# Patient Record
Sex: MALE | Race: WHITE | NOT HISPANIC OR LATINO | URBAN - METROPOLITAN AREA
[De-identification: names, ages, dates, MRNs, and addresses within clinical notes are randomized per-mention and may not be internally consistent; named-entity substitution may affect disease eponyms.]

---

## 2017-02-01 ENCOUNTER — OFFICE (OUTPATIENT)
Dept: URBAN - METROPOLITAN AREA CLINIC 19 | Facility: CLINIC | Age: 42
End: 2017-02-01

## 2017-02-01 VITALS
DIASTOLIC BLOOD PRESSURE: 84 MMHG | SYSTOLIC BLOOD PRESSURE: 127 MMHG | WEIGHT: 232 LBS | HEIGHT: 72 IN | HEART RATE: 77 BPM

## 2017-02-01 DIAGNOSIS — Z83.71 FAMILY HISTORY OF COLONIC POLYPS: ICD-10-CM

## 2017-02-01 DIAGNOSIS — K62.5 HEMORRHAGE OF ANUS AND RECTUM: ICD-10-CM

## 2017-02-01 DIAGNOSIS — E66.9 OBESITY, UNSPECIFIED: ICD-10-CM

## 2017-02-01 PROCEDURE — 99243 OFF/OP CNSLTJ NEW/EST LOW 30: CPT | Performed by: INTERNAL MEDICINE

## 2017-02-01 RX ORDER — HYDROCORTISONE ACETATE 25 MG/1
SUPPOSITORY RECTAL
Qty: 14 | Refills: 3 | Status: COMPLETED
Start: 2017-02-01 | End: 2017-06-13

## 2017-06-13 ENCOUNTER — AMBULATORY SURGICAL CENTER (OUTPATIENT)
Dept: URBAN - METROPOLITAN AREA SURGERY 2 | Facility: SURGERY | Age: 42
End: 2017-06-13
Payer: COMMERCIAL

## 2017-06-13 ENCOUNTER — AMBULATORY SURGICAL CENTER (OUTPATIENT)
Dept: URBAN - METROPOLITAN AREA SURGERY 2 | Facility: SURGERY | Age: 42
End: 2017-06-13

## 2017-06-13 VITALS
DIASTOLIC BLOOD PRESSURE: 77 MMHG | HEIGHT: 72 IN | SYSTOLIC BLOOD PRESSURE: 130 MMHG | DIASTOLIC BLOOD PRESSURE: 77 MMHG | OXYGEN SATURATION: 98 % | TEMPERATURE: 98.5 F | RESPIRATION RATE: 20 BRPM | TEMPERATURE: 98 F | HEART RATE: 79 BPM | WEIGHT: 230 LBS | RESPIRATION RATE: 18 BRPM | OXYGEN SATURATION: 100 % | HEART RATE: 74 BPM | DIASTOLIC BLOOD PRESSURE: 76 MMHG | OXYGEN SATURATION: 97 % | SYSTOLIC BLOOD PRESSURE: 127 MMHG | TEMPERATURE: 98.5 F | SYSTOLIC BLOOD PRESSURE: 128 MMHG | TEMPERATURE: 98 F | HEART RATE: 74 BPM | HEART RATE: 71 BPM | DIASTOLIC BLOOD PRESSURE: 86 MMHG | DIASTOLIC BLOOD PRESSURE: 86 MMHG | HEART RATE: 78 BPM | RESPIRATION RATE: 20 BRPM | OXYGEN SATURATION: 100 % | HEIGHT: 72 IN | DIASTOLIC BLOOD PRESSURE: 73 MMHG | SYSTOLIC BLOOD PRESSURE: 127 MMHG | SYSTOLIC BLOOD PRESSURE: 130 MMHG | HEART RATE: 78 BPM | OXYGEN SATURATION: 98 % | HEART RATE: 71 BPM | DIASTOLIC BLOOD PRESSURE: 76 MMHG | WEIGHT: 230 LBS | HEART RATE: 79 BPM | OXYGEN SATURATION: 97 % | RESPIRATION RATE: 18 BRPM | SYSTOLIC BLOOD PRESSURE: 128 MMHG | DIASTOLIC BLOOD PRESSURE: 73 MMHG

## 2017-06-13 DIAGNOSIS — K62.5 HEMORRHAGE OF ANUS AND RECTUM: ICD-10-CM

## 2017-06-13 DIAGNOSIS — Z83.71 FAMILY HISTORY OF COLONIC POLYPS: ICD-10-CM

## 2017-06-13 PROBLEM — D12.0 BENIGN NEOPLASM OF CECUM: Status: ACTIVE | Noted: 2017-06-13

## 2017-06-13 PROBLEM — K92.2 EVALUATION OF UNEXPLAINED GI BLEEDING: Status: ACTIVE | Noted: 2017-06-13

## 2017-06-13 PROCEDURE — 45378 DIAGNOSTIC COLONOSCOPY: CPT | Performed by: INTERNAL MEDICINE

## 2019-06-24 ENCOUNTER — APPOINTMENT (OUTPATIENT)
Dept: GENERAL RADIOLOGY | Facility: HOSPITAL | Age: 44
End: 2019-06-24

## 2019-06-24 ENCOUNTER — APPOINTMENT (OUTPATIENT)
Dept: CT IMAGING | Facility: HOSPITAL | Age: 44
End: 2019-06-24

## 2019-06-24 ENCOUNTER — HOSPITAL ENCOUNTER (INPATIENT)
Facility: HOSPITAL | Age: 44
LOS: 5 days | Discharge: HOME | End: 2019-06-29
Attending: INTERNAL MEDICINE | Admitting: INTERNAL MEDICINE

## 2019-06-24 ENCOUNTER — APPOINTMENT (OUTPATIENT)
Dept: CARDIOLOGY | Facility: HOSPITAL | Age: 44
End: 2019-06-24

## 2019-06-24 DIAGNOSIS — I42.8 NON-ISCHEMIC CARDIOMYOPATHY (HCC): ICD-10-CM

## 2019-06-24 DIAGNOSIS — B33.22 VIRAL MYOCARDITIS, UNSPECIFIED CHRONICITY: Primary | ICD-10-CM

## 2019-06-24 PROBLEM — A41.9 SEPSIS (HCC): Status: ACTIVE | Noted: 2019-06-24

## 2019-06-24 PROBLEM — I40.0: Status: ACTIVE | Noted: 2019-06-24

## 2019-06-24 PROBLEM — E78.49 FAMILIAL HYPERLIPIDEMIA: Status: ACTIVE | Noted: 2019-06-24

## 2019-06-24 PROBLEM — D72.829 LEUKOCYTOSIS: Status: ACTIVE | Noted: 2019-06-24

## 2019-06-24 PROBLEM — I51.4 MYOCARDITIS (HCC): Status: ACTIVE | Noted: 2019-06-24

## 2019-06-24 PROBLEM — I25.119 CORONARY ARTERY DISEASE INVOLVING NATIVE CORONARY ARTERY OF NATIVE HEART WITH ANGINA PECTORIS (HCC): Status: ACTIVE | Noted: 2019-06-24

## 2019-06-24 PROBLEM — E78.5 HYPERLIPIDEMIA LDL GOAL <70: Status: ACTIVE | Noted: 2019-06-24

## 2019-06-24 PROBLEM — I51.4 MYOCARDITIS (HCC): Status: RESOLVED | Noted: 2019-06-24 | Resolved: 2019-06-24

## 2019-06-24 LAB
ALBUMIN SERPL-MCNC: 3.4 G/DL (ref 3.5–5.2)
ALBUMIN/GLOB SERPL: 0.9 G/DL
ALP SERPL-CCNC: 65 U/L (ref 39–117)
ALT SERPL W P-5'-P-CCNC: 14 U/L (ref 1–41)
ANION GAP SERPL CALCULATED.3IONS-SCNC: 13 MMOL/L
AST SERPL-CCNC: 65 U/L (ref 1–40)
BASOPHILS # BLD AUTO: 0.07 10*3/MM3 (ref 0–0.2)
BASOPHILS NFR BLD AUTO: 0.3 % (ref 0–1.5)
BH CV ECHO MEAS - AO ROOT AREA (BSA CORRECTED): 1.3
BH CV ECHO MEAS - AO ROOT AREA: 6.7 CM^2
BH CV ECHO MEAS - AO ROOT DIAM: 2.9 CM
BH CV ECHO MEAS - BSA(HAYCOCK): 2.2 M^2
BH CV ECHO MEAS - BSA: 2.2 M^2
BH CV ECHO MEAS - BZI_BMI: 29.2 KILOGRAMS/M^2
BH CV ECHO MEAS - BZI_METRIC_HEIGHT: 182.9 CM
BH CV ECHO MEAS - BZI_METRIC_WEIGHT: 97.5 KG
BH CV ECHO MEAS - EDV(CUBED): 126.8 ML
BH CV ECHO MEAS - EDV(MOD-SP2): 118 ML
BH CV ECHO MEAS - EDV(MOD-SP4): 109 ML
BH CV ECHO MEAS - EDV(TEICH): 119.5 ML
BH CV ECHO MEAS - EF(CUBED): 61.7 %
BH CV ECHO MEAS - EF(MOD-BP): 53 %
BH CV ECHO MEAS - EF(MOD-SP2): 55.1 %
BH CV ECHO MEAS - EF(MOD-SP4): 54.1 %
BH CV ECHO MEAS - EF(TEICH): 53 %
BH CV ECHO MEAS - ESV(CUBED): 48.5 ML
BH CV ECHO MEAS - ESV(MOD-SP2): 53 ML
BH CV ECHO MEAS - ESV(MOD-SP4): 50 ML
BH CV ECHO MEAS - ESV(TEICH): 56.2 ML
BH CV ECHO MEAS - FS: 27.4 %
BH CV ECHO MEAS - IVS/LVPW: 0.93
BH CV ECHO MEAS - IVSD: 1.1 CM
BH CV ECHO MEAS - LA DIMENSION: 3.6 CM
BH CV ECHO MEAS - LA/AO: 1.2
BH CV ECHO MEAS - LAD MAJOR: 4.9 CM
BH CV ECHO MEAS - LAT PEAK E' VEL: 15.8 CM/SEC
BH CV ECHO MEAS - LATERAL E/E' RATIO: 7
BH CV ECHO MEAS - LV DIASTOLIC VOL/BSA (35-75): 49.6 ML/M^2
BH CV ECHO MEAS - LV MASS(C)D: 217.7 GRAMS
BH CV ECHO MEAS - LV MASS(C)DI: 99.1 GRAMS/M^2
BH CV ECHO MEAS - LV SYSTOLIC VOL/BSA (12-30): 22.8 ML/M^2
BH CV ECHO MEAS - LVIDD: 5 CM
BH CV ECHO MEAS - LVIDS: 3.6 CM
BH CV ECHO MEAS - LVLD AP2: 8.6 CM
BH CV ECHO MEAS - LVLD AP4: 8.8 CM
BH CV ECHO MEAS - LVLS AP2: 6.8 CM
BH CV ECHO MEAS - LVLS AP4: 7.4 CM
BH CV ECHO MEAS - LVPWD: 1.2 CM
BH CV ECHO MEAS - MED PEAK E' VEL: 11.6 CM/SEC
BH CV ECHO MEAS - MEDIAL E/E' RATIO: 9.5
BH CV ECHO MEAS - MV DEC TIME: 0.12 SEC
BH CV ECHO MEAS - MV E MAX VEL: 111.6 CM/SEC
BH CV ECHO MEAS - PA ACC SLOPE: 661.4 CM/SEC^2
BH CV ECHO MEAS - PA ACC TIME: 0.13 SEC
BH CV ECHO MEAS - PA PR(ACCEL): 19.6 MMHG
BH CV ECHO MEAS - RVDD: 2 CM
BH CV ECHO MEAS - SI(CUBED): 35.6 ML/M^2
BH CV ECHO MEAS - SI(MOD-SP2): 29.6 ML/M^2
BH CV ECHO MEAS - SI(MOD-SP4): 26.9 ML/M^2
BH CV ECHO MEAS - SI(TEICH): 28.8 ML/M^2
BH CV ECHO MEAS - SV(CUBED): 78.2 ML
BH CV ECHO MEAS - SV(MOD-SP2): 65 ML
BH CV ECHO MEAS - SV(MOD-SP4): 59 ML
BH CV ECHO MEAS - SV(TEICH): 63.3 ML
BH CV ECHO MEAS - TAPSE (>1.6): 2.1 CM2
BH CV ECHO MEASUREMENTS AVERAGE E/E' RATIO: 8.15
BH CV VAS BP RIGHT ARM: NORMAL MMHG
BH CV XLRA - RV BASE: 2.3 CM
BH CV XLRA - RV LENGTH: 6.4 CM
BH CV XLRA - RV MID: 1.9 CM
BH CV XLRA - TDI S': 12.2 CM/SEC
BILIRUB SERPL-MCNC: 0.4 MG/DL (ref 0.2–1.2)
BUN BLD-MCNC: 11 MG/DL (ref 6–20)
BUN/CREAT SERPL: 13.8 (ref 7–25)
CALCIUM SPEC-SCNC: 8.8 MG/DL (ref 8.6–10.5)
CHLORIDE SERPL-SCNC: 98 MMOL/L (ref 98–107)
CHOLEST SERPL-MCNC: 191 MG/DL (ref 0–200)
CO2 SERPL-SCNC: 22 MMOL/L (ref 22–29)
CREAT BLD-MCNC: 0.8 MG/DL (ref 0.76–1.27)
CRP SERPL-MCNC: 23.07 MG/DL (ref 0–0.5)
DEPRECATED RDW RBC AUTO: 37.2 FL (ref 37–54)
EOSINOPHIL # BLD AUTO: 0.06 10*3/MM3 (ref 0–0.4)
EOSINOPHIL NFR BLD AUTO: 0.3 % (ref 0.3–6.2)
ERYTHROCYTE [DISTWIDTH] IN BLOOD BY AUTOMATED COUNT: 12.2 % (ref 12.3–15.4)
ERYTHROCYTE [SEDIMENTATION RATE] IN BLOOD: 45 MM/HR (ref 0–15)
GFR SERPL CREATININE-BSD FRML MDRD: 105 ML/MIN/1.73
GLOBULIN UR ELPH-MCNC: 3.6 GM/DL
GLUCOSE BLD-MCNC: 130 MG/DL (ref 65–99)
HBA1C MFR BLD: 5.7 % (ref 4.8–5.6)
HCT VFR BLD AUTO: 43.9 % (ref 37.5–51)
HDLC SERPL-MCNC: 58 MG/DL (ref 40–60)
HGB BLD-MCNC: 14.3 G/DL (ref 13–17.7)
HIV1+2 AB SER QL: NORMAL
IMM GRANULOCYTES # BLD AUTO: 0.21 10*3/MM3 (ref 0–0.05)
IMM GRANULOCYTES NFR BLD AUTO: 1 % (ref 0–0.5)
L PNEUMO1 AG UR QL IA: NEGATIVE
LDLC SERPL CALC-MCNC: 123 MG/DL (ref 0–100)
LDLC/HDLC SERPL: 2.12 {RATIO}
LEFT ATRIUM VOLUME INDEX: 21.8 ML/M^2
LEFT ATRIUM VOLUME: 48 ML
LV EF 2D ECHO EST: 35 %
LYMPHOCYTES # BLD AUTO: 1.32 10*3/MM3 (ref 0.7–3.1)
LYMPHOCYTES NFR BLD AUTO: 6.1 % (ref 19.6–45.3)
MAGNESIUM SERPL-MCNC: 2.1 MG/DL (ref 1.6–2.6)
MAXIMAL PREDICTED HEART RATE: 176 BPM
MCH RBC QN AUTO: 27.4 PG (ref 26.6–33)
MCHC RBC AUTO-ENTMCNC: 32.6 G/DL (ref 31.5–35.7)
MCV RBC AUTO: 84.1 FL (ref 79–97)
MONOCYTES # BLD AUTO: 1.4 10*3/MM3 (ref 0.1–0.9)
MONOCYTES NFR BLD AUTO: 6.5 % (ref 5–12)
NEUTROPHILS # BLD AUTO: 18.64 10*3/MM3 (ref 1.7–7)
NEUTROPHILS NFR BLD AUTO: 85.8 % (ref 42.7–76)
NRBC BLD AUTO-RTO: 0 /100 WBC (ref 0–0.2)
PLATELET # BLD AUTO: 313 10*3/MM3 (ref 140–450)
PMV BLD AUTO: 9 FL (ref 6–12)
POTASSIUM BLD-SCNC: 4.1 MMOL/L (ref 3.5–5.2)
PROCALCITONIN SERPL-MCNC: 0.19 NG/ML (ref 0.1–0.25)
PROT SERPL-MCNC: 7 G/DL (ref 6–8.5)
RBC # BLD AUTO: 5.22 10*6/MM3 (ref 4.14–5.8)
SODIUM BLD-SCNC: 133 MMOL/L (ref 136–145)
STRESS TARGET HR: 150 BPM
TRIGL SERPL-MCNC: 51 MG/DL (ref 0–150)
TROPONIN T SERPL-MCNC: 1.02 NG/ML (ref 0–0.03)
TROPONIN T SERPL-MCNC: 1.27 NG/ML (ref 0–0.03)
VLDLC SERPL-MCNC: 10.2 MG/DL
WBC NRBC COR # BLD: 21.7 10*3/MM3 (ref 3.4–10.8)

## 2019-06-24 PROCEDURE — 93306 TTE W/DOPPLER COMPLETE: CPT

## 2019-06-24 PROCEDURE — 86666 EHRLICHIA ANTIBODY: CPT | Performed by: HOSPITALIST

## 2019-06-24 PROCEDURE — C1894 INTRO/SHEATH, NON-LASER: HCPCS | Performed by: INTERNAL MEDICINE

## 2019-06-24 PROCEDURE — 86140 C-REACTIVE PROTEIN: CPT | Performed by: PHYSICIAN ASSISTANT

## 2019-06-24 PROCEDURE — G0378 HOSPITAL OBSERVATION PER HR: HCPCS

## 2019-06-24 PROCEDURE — 25010000002 VANCOMYCIN 10 G RECONSTITUTED SOLUTION

## 2019-06-24 PROCEDURE — B2111ZZ FLUOROSCOPY OF MULTIPLE CORONARY ARTERIES USING LOW OSMOLAR CONTRAST: ICD-10-PCS | Performed by: INTERNAL MEDICINE

## 2019-06-24 PROCEDURE — 74176 CT ABD & PELVIS W/O CONTRAST: CPT

## 2019-06-24 PROCEDURE — 87633 RESP VIRUS 12-25 TARGETS: CPT | Performed by: PHYSICIAN ASSISTANT

## 2019-06-24 PROCEDURE — S0260 H&P FOR SURGERY: HCPCS | Performed by: PHYSICIAN ASSISTANT

## 2019-06-24 PROCEDURE — 85025 COMPLETE CBC W/AUTO DIFF WBC: CPT | Performed by: INTERNAL MEDICINE

## 2019-06-24 PROCEDURE — 71045 X-RAY EXAM CHEST 1 VIEW: CPT

## 2019-06-24 PROCEDURE — 84145 PROCALCITONIN (PCT): CPT | Performed by: PHYSICIAN ASSISTANT

## 2019-06-24 PROCEDURE — 4A023N7 MEASUREMENT OF CARDIAC SAMPLING AND PRESSURE, LEFT HEART, PERCUTANEOUS APPROACH: ICD-10-PCS | Performed by: INTERNAL MEDICINE

## 2019-06-24 PROCEDURE — 87899 AGENT NOS ASSAY W/OPTIC: CPT | Performed by: PHYSICIAN ASSISTANT

## 2019-06-24 PROCEDURE — 93458 L HRT ARTERY/VENTRICLE ANGIO: CPT | Performed by: INTERNAL MEDICINE

## 2019-06-24 PROCEDURE — 81001 URINALYSIS AUTO W/SCOPE: CPT | Performed by: HOSPITALIST

## 2019-06-24 PROCEDURE — 25010000002 FENTANYL CITRATE (PF) 100 MCG/2ML SOLUTION: Performed by: INTERNAL MEDICINE

## 2019-06-24 PROCEDURE — 93005 ELECTROCARDIOGRAM TRACING: CPT | Performed by: INTERNAL MEDICINE

## 2019-06-24 PROCEDURE — G0432 EIA HIV-1/HIV-2 SCREEN: HCPCS | Performed by: HOSPITALIST

## 2019-06-24 PROCEDURE — 99223 1ST HOSP IP/OBS HIGH 75: CPT | Performed by: INTERNAL MEDICINE

## 2019-06-24 PROCEDURE — 25010000002 PIPERACILLIN SOD-TAZOBACTAM PER 1 G: Performed by: HOSPITALIST

## 2019-06-24 PROCEDURE — 99255 IP/OBS CONSLTJ NEW/EST HI 80: CPT | Performed by: HOSPITALIST

## 2019-06-24 PROCEDURE — 80061 LIPID PANEL: CPT | Performed by: INTERNAL MEDICINE

## 2019-06-24 PROCEDURE — 0 IOPAMIDOL PER 1 ML: Performed by: INTERNAL MEDICINE

## 2019-06-24 PROCEDURE — 83036 HEMOGLOBIN GLYCOSYLATED A1C: CPT | Performed by: INTERNAL MEDICINE

## 2019-06-24 PROCEDURE — 87486 CHLMYD PNEUM DNA AMP PROBE: CPT | Performed by: PHYSICIAN ASSISTANT

## 2019-06-24 PROCEDURE — 25010000002 MIDAZOLAM PER 1 MG: Performed by: INTERNAL MEDICINE

## 2019-06-24 PROCEDURE — 86757 RICKETTSIA ANTIBODY: CPT | Performed by: HOSPITALIST

## 2019-06-24 PROCEDURE — 86753 PROTOZOA ANTIBODY NOS: CPT | Performed by: HOSPITALIST

## 2019-06-24 PROCEDURE — 87798 DETECT AGENT NOS DNA AMP: CPT | Performed by: PHYSICIAN ASSISTANT

## 2019-06-24 PROCEDURE — 84484 ASSAY OF TROPONIN QUANT: CPT | Performed by: INTERNAL MEDICINE

## 2019-06-24 PROCEDURE — B2151ZZ FLUOROSCOPY OF LEFT HEART USING LOW OSMOLAR CONTRAST: ICD-10-PCS | Performed by: INTERNAL MEDICINE

## 2019-06-24 PROCEDURE — 86618 LYME DISEASE ANTIBODY: CPT | Performed by: HOSPITALIST

## 2019-06-24 PROCEDURE — 25010000002 SULFUR HEXAFLUORIDE MICROSPH 60.7-25 MG RECONSTITUTED SUSPENSION: Performed by: INTERNAL MEDICINE

## 2019-06-24 PROCEDURE — 87581 M.PNEUMON DNA AMP PROBE: CPT | Performed by: PHYSICIAN ASSISTANT

## 2019-06-24 PROCEDURE — 85652 RBC SED RATE AUTOMATED: CPT | Performed by: PHYSICIAN ASSISTANT

## 2019-06-24 PROCEDURE — 25010000002 HEPARIN (PORCINE) PER 1000 UNITS: Performed by: INTERNAL MEDICINE

## 2019-06-24 PROCEDURE — 93010 ELECTROCARDIOGRAM REPORT: CPT | Performed by: INTERNAL MEDICINE

## 2019-06-24 PROCEDURE — 71250 CT THORAX DX C-: CPT

## 2019-06-24 PROCEDURE — 36415 COLL VENOUS BLD VENIPUNCTURE: CPT

## 2019-06-24 PROCEDURE — 83735 ASSAY OF MAGNESIUM: CPT | Performed by: INTERNAL MEDICINE

## 2019-06-24 PROCEDURE — 93306 TTE W/DOPPLER COMPLETE: CPT | Performed by: INTERNAL MEDICINE

## 2019-06-24 PROCEDURE — C1769 GUIDE WIRE: HCPCS | Performed by: INTERNAL MEDICINE

## 2019-06-24 PROCEDURE — 80053 COMPREHEN METABOLIC PANEL: CPT | Performed by: INTERNAL MEDICINE

## 2019-06-24 PROCEDURE — 87040 BLOOD CULTURE FOR BACTERIA: CPT | Performed by: PHYSICIAN ASSISTANT

## 2019-06-24 RX ORDER — POTASSIUM CHLORIDE 750 MG/1
40 CAPSULE, EXTENDED RELEASE ORAL AS NEEDED
Status: DISCONTINUED | OUTPATIENT
Start: 2019-06-24 | End: 2019-06-29 | Stop reason: HOSPADM

## 2019-06-24 RX ORDER — ACETAMINOPHEN 325 MG/1
650 TABLET ORAL EVERY 4 HOURS PRN
Status: DISCONTINUED | OUTPATIENT
Start: 2019-06-24 | End: 2019-06-29 | Stop reason: HOSPADM

## 2019-06-24 RX ORDER — MIDAZOLAM HYDROCHLORIDE 1 MG/ML
INJECTION INTRAMUSCULAR; INTRAVENOUS AS NEEDED
Status: DISCONTINUED | OUTPATIENT
Start: 2019-06-24 | End: 2019-06-24 | Stop reason: HOSPADM

## 2019-06-24 RX ORDER — SODIUM CHLORIDE 0.9 % (FLUSH) 0.9 %
3 SYRINGE (ML) INJECTION EVERY 12 HOURS SCHEDULED
Status: DISCONTINUED | OUTPATIENT
Start: 2019-06-24 | End: 2019-06-29 | Stop reason: HOSPADM

## 2019-06-24 RX ORDER — SODIUM CHLORIDE 0.9 % (FLUSH) 0.9 %
3-10 SYRINGE (ML) INJECTION AS NEEDED
Status: DISCONTINUED | OUTPATIENT
Start: 2019-06-24 | End: 2019-06-29 | Stop reason: HOSPADM

## 2019-06-24 RX ORDER — MAGNESIUM SULFATE HEPTAHYDRATE 40 MG/ML
2 INJECTION, SOLUTION INTRAVENOUS AS NEEDED
Status: DISCONTINUED | OUTPATIENT
Start: 2019-06-24 | End: 2019-06-29 | Stop reason: HOSPADM

## 2019-06-24 RX ORDER — AZITHROMYCIN 250 MG/1
250 TABLET, FILM COATED ORAL DAILY
COMMUNITY
End: 2019-06-29 | Stop reason: HOSPADM

## 2019-06-24 RX ORDER — MAGNESIUM SULFATE HEPTAHYDRATE 40 MG/ML
4 INJECTION, SOLUTION INTRAVENOUS AS NEEDED
Status: DISCONTINUED | OUTPATIENT
Start: 2019-06-24 | End: 2019-06-29 | Stop reason: HOSPADM

## 2019-06-24 RX ORDER — VALSARTAN 80 MG/1
40 TABLET ORAL
Status: DISCONTINUED | OUTPATIENT
Start: 2019-06-24 | End: 2019-06-25

## 2019-06-24 RX ORDER — DEXTROMETHORPHAN POLISTIREX 30 MG/5ML
60 SUSPENSION ORAL EVERY 12 HOURS PRN
Status: DISCONTINUED | OUTPATIENT
Start: 2019-06-24 | End: 2019-06-29 | Stop reason: HOSPADM

## 2019-06-24 RX ORDER — LIDOCAINE HYDROCHLORIDE 10 MG/ML
INJECTION, SOLUTION EPIDURAL; INFILTRATION; INTRACAUDAL; PERINEURAL AS NEEDED
Status: DISCONTINUED | OUTPATIENT
Start: 2019-06-24 | End: 2019-06-24 | Stop reason: HOSPADM

## 2019-06-24 RX ORDER — POTASSIUM CHLORIDE 1.5 G/1.77G
40 POWDER, FOR SOLUTION ORAL AS NEEDED
Status: DISCONTINUED | OUTPATIENT
Start: 2019-06-24 | End: 2019-06-29 | Stop reason: HOSPADM

## 2019-06-24 RX ORDER — SODIUM CHLORIDE 9 MG/ML
125 INJECTION, SOLUTION INTRAVENOUS CONTINUOUS
Status: DISCONTINUED | OUTPATIENT
Start: 2019-06-24 | End: 2019-06-24

## 2019-06-24 RX ORDER — FENTANYL CITRATE 50 UG/ML
INJECTION, SOLUTION INTRAMUSCULAR; INTRAVENOUS AS NEEDED
Status: DISCONTINUED | OUTPATIENT
Start: 2019-06-24 | End: 2019-06-24 | Stop reason: HOSPADM

## 2019-06-24 RX ORDER — TRAMADOL HYDROCHLORIDE 50 MG/1
50 TABLET ORAL EVERY 6 HOURS PRN
Status: DISCONTINUED | OUTPATIENT
Start: 2019-06-24 | End: 2019-06-29 | Stop reason: HOSPADM

## 2019-06-24 RX ADMIN — VALSARTAN 40 MG: 80 TABLET, FILM COATED ORAL at 20:51

## 2019-06-24 RX ADMIN — DEXTROMETHORPHAN POLISTIREX 60 MG: 30 SUSPENSION ORAL at 22:26

## 2019-06-24 RX ADMIN — SULFUR HEXAFLUORIDE 2 ML: KIT at 14:18

## 2019-06-24 RX ADMIN — TAZOBACTAM SODIUM AND PIPERACILLIN SODIUM 3.38 G: 375; 3 INJECTION, SOLUTION INTRAVENOUS at 18:01

## 2019-06-24 RX ADMIN — SODIUM CHLORIDE 125 ML/HR: 9 INJECTION, SOLUTION INTRAVENOUS at 18:01

## 2019-06-24 RX ADMIN — METOPROLOL TARTRATE 25 MG: 25 TABLET ORAL at 20:50

## 2019-06-24 RX ADMIN — DOXYCYCLINE 100 MG: 100 INJECTION, POWDER, LYOPHILIZED, FOR SOLUTION INTRAVENOUS at 19:11

## 2019-06-24 RX ADMIN — ACETAMINOPHEN 650 MG: 325 TABLET ORAL at 15:19

## 2019-06-24 RX ADMIN — VANCOMYCIN HYDROCHLORIDE 2000 MG: 10 INJECTION, POWDER, LYOPHILIZED, FOR SOLUTION INTRAVENOUS at 20:53

## 2019-06-24 RX ADMIN — ACETAMINOPHEN 650 MG: 325 TABLET ORAL at 21:00

## 2019-06-25 ENCOUNTER — APPOINTMENT (OUTPATIENT)
Dept: MRI IMAGING | Facility: HOSPITAL | Age: 44
End: 2019-06-25

## 2019-06-25 LAB
ALBUMIN SERPL-MCNC: 3.2 G/DL (ref 3.5–5.2)
ALBUMIN/GLOB SERPL: 0.9 G/DL
ALP SERPL-CCNC: 59 U/L (ref 39–117)
ALT SERPL W P-5'-P-CCNC: 28 U/L (ref 1–41)
ANION GAP SERPL CALCULATED.3IONS-SCNC: 13 MMOL/L
AST SERPL-CCNC: 128 U/L (ref 1–40)
B PARAPERT DNA SPEC QL NAA+PROBE: NOT DETECTED
B PERT DNA SPEC QL NAA+PROBE: NOT DETECTED
BACTERIA UR QL AUTO: NORMAL /HPF
BASOPHILS # BLD AUTO: 0.07 10*3/MM3 (ref 0–0.2)
BASOPHILS NFR BLD AUTO: 0.3 % (ref 0–1.5)
BILIRUB SERPL-MCNC: 0.8 MG/DL (ref 0.2–1.2)
BILIRUB UR QL STRIP: NEGATIVE
BUN BLD-MCNC: 11 MG/DL (ref 6–20)
BUN/CREAT SERPL: 11.3 (ref 7–25)
C PNEUM DNA NPH QL NAA+NON-PROBE: NOT DETECTED
CALCIUM SPEC-SCNC: 8.5 MG/DL (ref 8.6–10.5)
CHLORIDE SERPL-SCNC: 98 MMOL/L (ref 98–107)
CK SERPL-CCNC: 962 U/L (ref 20–200)
CLARITY UR: CLEAR
CO2 SERPL-SCNC: 23 MMOL/L (ref 22–29)
COLOR UR: YELLOW
CREAT BLD-MCNC: 0.97 MG/DL (ref 0.76–1.27)
DEPRECATED RDW RBC AUTO: 38.4 FL (ref 37–54)
EOSINOPHIL # BLD AUTO: 0.09 10*3/MM3 (ref 0–0.4)
EOSINOPHIL NFR BLD AUTO: 0.4 % (ref 0.3–6.2)
ERYTHROCYTE [DISTWIDTH] IN BLOOD BY AUTOMATED COUNT: 12.5 % (ref 12.3–15.4)
FLUAV H1 2009 PAND RNA NPH QL NAA+PROBE: NOT DETECTED
FLUAV H1 HA GENE NPH QL NAA+PROBE: NOT DETECTED
FLUAV H3 RNA NPH QL NAA+PROBE: NOT DETECTED
FLUAV SUBTYP SPEC NAA+PROBE: NOT DETECTED
FLUBV RNA ISLT QL NAA+PROBE: NOT DETECTED
GFR SERPL CREATININE-BSD FRML MDRD: 84 ML/MIN/1.73
GLOBULIN UR ELPH-MCNC: 3.6 GM/DL
GLUCOSE BLD-MCNC: 130 MG/DL (ref 65–99)
GLUCOSE UR STRIP-MCNC: NEGATIVE MG/DL
HADV DNA SPEC NAA+PROBE: NOT DETECTED
HCOV 229E RNA SPEC QL NAA+PROBE: NOT DETECTED
HCOV HKU1 RNA SPEC QL NAA+PROBE: NOT DETECTED
HCOV NL63 RNA SPEC QL NAA+PROBE: NOT DETECTED
HCOV OC43 RNA SPEC QL NAA+PROBE: NOT DETECTED
HCT VFR BLD AUTO: 42.9 % (ref 37.5–51)
HGB BLD-MCNC: 14.1 G/DL (ref 13–17.7)
HGB UR QL STRIP.AUTO: ABNORMAL
HMPV RNA NPH QL NAA+NON-PROBE: NOT DETECTED
HPIV1 RNA SPEC QL NAA+PROBE: NOT DETECTED
HPIV2 RNA SPEC QL NAA+PROBE: NOT DETECTED
HPIV3 RNA NPH QL NAA+PROBE: NOT DETECTED
HPIV4 P GENE NPH QL NAA+PROBE: NOT DETECTED
HYALINE CASTS UR QL AUTO: NORMAL /LPF
IMM GRANULOCYTES # BLD AUTO: 0.18 10*3/MM3 (ref 0–0.05)
IMM GRANULOCYTES NFR BLD AUTO: 0.8 % (ref 0–0.5)
KETONES UR QL STRIP: NEGATIVE
LEUKOCYTE ESTERASE UR QL STRIP.AUTO: NEGATIVE
LYMPHOCYTES # BLD AUTO: 2.04 10*3/MM3 (ref 0.7–3.1)
LYMPHOCYTES NFR BLD AUTO: 9.4 % (ref 19.6–45.3)
M PNEUMO IGG SER IA-ACNC: NOT DETECTED
MCH RBC QN AUTO: 27.6 PG (ref 26.6–33)
MCHC RBC AUTO-ENTMCNC: 32.9 G/DL (ref 31.5–35.7)
MCV RBC AUTO: 84.1 FL (ref 79–97)
MONOCYTES # BLD AUTO: 1.72 10*3/MM3 (ref 0.1–0.9)
MONOCYTES NFR BLD AUTO: 7.9 % (ref 5–12)
NEUTROPHILS # BLD AUTO: 17.54 10*3/MM3 (ref 1.7–7)
NEUTROPHILS NFR BLD AUTO: 81.2 % (ref 42.7–76)
NITRITE UR QL STRIP: NEGATIVE
NRBC BLD AUTO-RTO: 0 /100 WBC (ref 0–0.2)
PH UR STRIP.AUTO: 6 [PH] (ref 5–8)
PLATELET # BLD AUTO: 291 10*3/MM3 (ref 140–450)
PMV BLD AUTO: 8.9 FL (ref 6–12)
POTASSIUM BLD-SCNC: 4.2 MMOL/L (ref 3.5–5.2)
PROT SERPL-MCNC: 6.8 G/DL (ref 6–8.5)
PROT UR QL STRIP: NEGATIVE
RBC # BLD AUTO: 5.1 10*6/MM3 (ref 4.14–5.8)
RBC # UR: NORMAL /HPF
REF LAB TEST METHOD: NORMAL
RHINOVIRUS RNA SPEC NAA+PROBE: NOT DETECTED
RSV RNA NPH QL NAA+NON-PROBE: NOT DETECTED
SODIUM BLD-SCNC: 134 MMOL/L (ref 136–145)
SP GR UR STRIP: 1.02 (ref 1–1.03)
SQUAMOUS #/AREA URNS HPF: NORMAL /HPF
TROPONIN T SERPL-MCNC: 3.25 NG/ML (ref 0–0.03)
UROBILINOGEN UR QL STRIP: ABNORMAL
WBC NRBC COR # BLD: 21.64 10*3/MM3 (ref 3.4–10.8)
WBC UR QL AUTO: NORMAL /HPF

## 2019-06-25 PROCEDURE — 82550 ASSAY OF CK (CPK): CPT | Performed by: HOSPITALIST

## 2019-06-25 PROCEDURE — 25010000002 CEFTRIAXONE PER 250 MG: Performed by: INTERNAL MEDICINE

## 2019-06-25 PROCEDURE — 99233 SBSQ HOSP IP/OBS HIGH 50: CPT | Performed by: INTERNAL MEDICINE

## 2019-06-25 PROCEDURE — 87899 AGENT NOS ASSAY W/OPTIC: CPT | Performed by: INTERNAL MEDICINE

## 2019-06-25 PROCEDURE — 99232 SBSQ HOSP IP/OBS MODERATE 35: CPT | Performed by: INTERNAL MEDICINE

## 2019-06-25 PROCEDURE — 86658 ENTEROVIRUS ANTIBODY: CPT | Performed by: INTERNAL MEDICINE

## 2019-06-25 PROCEDURE — G0378 HOSPITAL OBSERVATION PER HR: HCPCS

## 2019-06-25 PROCEDURE — 25010000002 VANCOMYCIN 10 G RECONSTITUTED SOLUTION

## 2019-06-25 PROCEDURE — 80053 COMPREHEN METABOLIC PANEL: CPT | Performed by: HOSPITALIST

## 2019-06-25 PROCEDURE — 84484 ASSAY OF TROPONIN QUANT: CPT | Performed by: INTERNAL MEDICINE

## 2019-06-25 PROCEDURE — 25010000002 PIPERACILLIN SOD-TAZOBACTAM PER 1 G: Performed by: HOSPITALIST

## 2019-06-25 PROCEDURE — 85025 COMPLETE CBC W/AUTO DIFF WBC: CPT | Performed by: HOSPITALIST

## 2019-06-25 RX ORDER — FLUTICASONE PROPIONATE 50 MCG
2 SPRAY, SUSPENSION (ML) NASAL DAILY
Status: DISCONTINUED | OUTPATIENT
Start: 2019-06-25 | End: 2019-06-29 | Stop reason: HOSPADM

## 2019-06-25 RX ORDER — SPIRONOLACTONE 25 MG/1
25 TABLET ORAL DAILY
Status: DISCONTINUED | OUTPATIENT
Start: 2019-06-25 | End: 2019-06-25

## 2019-06-25 RX ORDER — ALPRAZOLAM 1 MG/1
1 TABLET ORAL
Status: DISCONTINUED | OUTPATIENT
Start: 2019-06-26 | End: 2019-06-25

## 2019-06-25 RX ORDER — ALPRAZOLAM 1 MG/1
1 TABLET ORAL
Status: COMPLETED | OUTPATIENT
Start: 2019-06-25 | End: 2019-06-25

## 2019-06-25 RX ORDER — DOXYCYCLINE 100 MG/1
100 CAPSULE ORAL EVERY 12 HOURS
Status: DISCONTINUED | OUTPATIENT
Start: 2019-06-25 | End: 2019-06-29 | Stop reason: HOSPADM

## 2019-06-25 RX ORDER — VALSARTAN 160 MG/1
80 TABLET ORAL
Status: DISCONTINUED | OUTPATIENT
Start: 2019-06-26 | End: 2019-06-27

## 2019-06-25 RX ORDER — METOPROLOL SUCCINATE 25 MG/1
25 TABLET, EXTENDED RELEASE ORAL
Status: DISCONTINUED | OUTPATIENT
Start: 2019-06-25 | End: 2019-06-29 | Stop reason: HOSPADM

## 2019-06-25 RX ORDER — CHOLECALCIFEROL (VITAMIN D3) 125 MCG
5 CAPSULE ORAL NIGHTLY
Status: DISCONTINUED | OUTPATIENT
Start: 2019-06-25 | End: 2019-06-29 | Stop reason: HOSPADM

## 2019-06-25 RX ORDER — IBUPROFEN 600 MG/1
600 TABLET ORAL EVERY 6 HOURS SCHEDULED
Status: DISCONTINUED | OUTPATIENT
Start: 2019-06-26 | End: 2019-06-26

## 2019-06-25 RX ADMIN — ACETAMINOPHEN 650 MG: 325 TABLET ORAL at 04:43

## 2019-06-25 RX ADMIN — DOXYCYCLINE 100 MG: 100 INJECTION, POWDER, LYOPHILIZED, FOR SOLUTION INTRAVENOUS at 04:25

## 2019-06-25 RX ADMIN — ACETAMINOPHEN 650 MG: 325 TABLET ORAL at 14:59

## 2019-06-25 RX ADMIN — FLUTICASONE PROPIONATE 2 SPRAY: 50 SPRAY, METERED NASAL at 14:12

## 2019-06-25 RX ADMIN — TAZOBACTAM SODIUM AND PIPERACILLIN SODIUM 3.38 G: 375; 3 INJECTION, SOLUTION INTRAVENOUS at 00:09

## 2019-06-25 RX ADMIN — VANCOMYCIN HYDROCHLORIDE 1500 MG: 10 INJECTION, POWDER, LYOPHILIZED, FOR SOLUTION INTRAVENOUS at 18:14

## 2019-06-25 RX ADMIN — TAZOBACTAM SODIUM AND PIPERACILLIN SODIUM 3.38 G: 375; 3 INJECTION, SOLUTION INTRAVENOUS at 08:59

## 2019-06-25 RX ADMIN — SODIUM CHLORIDE, PRESERVATIVE FREE 3 ML: 5 INJECTION INTRAVENOUS at 21:45

## 2019-06-25 RX ADMIN — DEXTROMETHORPHAN POLISTIREX 60 MG: 30 SUSPENSION ORAL at 09:07

## 2019-06-25 RX ADMIN — ALPRAZOLAM 1 MG: 1 TABLET ORAL at 16:51

## 2019-06-25 RX ADMIN — VALSARTAN 40 MG: 80 TABLET, FILM COATED ORAL at 08:59

## 2019-06-25 RX ADMIN — DOXYCYCLINE 100 MG: 100 CAPSULE ORAL at 18:13

## 2019-06-25 RX ADMIN — GUAIFENESIN 200 MG: 200 SOLUTION ORAL at 14:59

## 2019-06-25 RX ADMIN — CEFTRIAXONE 2 G: 2 INJECTION, POWDER, FOR SOLUTION INTRAMUSCULAR; INTRAVENOUS at 13:17

## 2019-06-25 RX ADMIN — METOPROLOL TARTRATE 25 MG: 25 TABLET ORAL at 08:59

## 2019-06-25 RX ADMIN — TRAMADOL HYDROCHLORIDE 50 MG: 50 TABLET, FILM COATED ORAL at 00:10

## 2019-06-25 RX ADMIN — VANCOMYCIN HYDROCHLORIDE 1500 MG: 10 INJECTION, POWDER, LYOPHILIZED, FOR SOLUTION INTRAVENOUS at 05:36

## 2019-06-25 RX ADMIN — ACETAMINOPHEN 650 MG: 325 TABLET ORAL at 09:07

## 2019-06-25 RX ADMIN — ACETAMINOPHEN 650 MG: 325 TABLET ORAL at 21:45

## 2019-06-25 RX ADMIN — GUAIFENESIN 200 MG: 200 SOLUTION ORAL at 21:45

## 2019-06-26 ENCOUNTER — APPOINTMENT (OUTPATIENT)
Dept: MRI IMAGING | Facility: HOSPITAL | Age: 44
End: 2019-06-26

## 2019-06-26 PROBLEM — I51.4 MYOCARDITIS (HCC): Status: ACTIVE | Noted: 2019-06-26

## 2019-06-26 LAB
B BURGDOR IGG+IGM SER-ACNC: <0.91 ISR (ref 0–0.9)
B BURGDOR IGM SER-ACNC: <0.8 INDEX (ref 0–0.79)
L PNEUMO1 AG UR QL IA: NEGATIVE
VANCOMYCIN TROUGH SERPL-MCNC: 8.8 MCG/ML (ref 5–20)

## 2019-06-26 PROCEDURE — 86713 LEGIONELLA ANTIBODY: CPT | Performed by: INTERNAL MEDICINE

## 2019-06-26 PROCEDURE — 25010000002 CEFTRIAXONE PER 250 MG: Performed by: INTERNAL MEDICINE

## 2019-06-26 PROCEDURE — 87040 BLOOD CULTURE FOR BACTERIA: CPT | Performed by: INTERNAL MEDICINE

## 2019-06-26 PROCEDURE — 86618 LYME DISEASE ANTIBODY: CPT | Performed by: INTERNAL MEDICINE

## 2019-06-26 PROCEDURE — 25010000002 PROMETHAZINE PER 50 MG: Performed by: INTERNAL MEDICINE

## 2019-06-26 PROCEDURE — 0 GADOBENATE DIMEGLUMINE 529 MG/ML SOLUTION: Performed by: INTERNAL MEDICINE

## 2019-06-26 PROCEDURE — 86753 PROTOZOA ANTIBODY NOS: CPT | Performed by: INTERNAL MEDICINE

## 2019-06-26 PROCEDURE — 80202 ASSAY OF VANCOMYCIN: CPT

## 2019-06-26 PROCEDURE — 99232 SBSQ HOSP IP/OBS MODERATE 35: CPT | Performed by: INTERNAL MEDICINE

## 2019-06-26 PROCEDURE — 75561 CARDIAC MRI FOR MORPH W/DYE: CPT

## 2019-06-26 PROCEDURE — A9577 INJ MULTIHANCE: HCPCS | Performed by: INTERNAL MEDICINE

## 2019-06-26 PROCEDURE — 75561 CARDIAC MRI FOR MORPH W/DYE: CPT | Performed by: INTERNAL MEDICINE

## 2019-06-26 PROCEDURE — 25010000002 VANCOMYCIN 10 G RECONSTITUTED SOLUTION

## 2019-06-26 RX ORDER — PROMETHAZINE HYDROCHLORIDE 25 MG/ML
25 INJECTION, SOLUTION INTRAMUSCULAR; INTRAVENOUS
Status: DISCONTINUED | OUTPATIENT
Start: 2019-06-27 | End: 2019-06-26

## 2019-06-26 RX ORDER — PROMETHAZINE HYDROCHLORIDE 25 MG/ML
25 INJECTION, SOLUTION INTRAMUSCULAR; INTRAVENOUS
Status: COMPLETED | OUTPATIENT
Start: 2019-06-26 | End: 2019-06-26

## 2019-06-26 RX ORDER — ALPRAZOLAM 1 MG/1
2 TABLET ORAL EVERY 8 HOURS
Status: DISCONTINUED | OUTPATIENT
Start: 2019-06-26 | End: 2019-06-26

## 2019-06-26 RX ORDER — IBUPROFEN 600 MG/1
600 TABLET ORAL EVERY 6 HOURS PRN
Status: DISCONTINUED | OUTPATIENT
Start: 2019-06-26 | End: 2019-06-29 | Stop reason: HOSPADM

## 2019-06-26 RX ADMIN — SODIUM CHLORIDE, PRESERVATIVE FREE 3 ML: 5 INJECTION INTRAVENOUS at 08:39

## 2019-06-26 RX ADMIN — DOXYCYCLINE 100 MG: 100 CAPSULE ORAL at 06:15

## 2019-06-26 RX ADMIN — MELATONIN TAB 5 MG 5 MG: 5 TAB at 22:09

## 2019-06-26 RX ADMIN — ACETAMINOPHEN 650 MG: 325 TABLET ORAL at 09:53

## 2019-06-26 RX ADMIN — FLUTICASONE PROPIONATE 2 SPRAY: 50 SPRAY, METERED NASAL at 08:36

## 2019-06-26 RX ADMIN — ALPRAZOLAM 2 MG: 1 TABLET ORAL at 11:08

## 2019-06-26 RX ADMIN — VALSARTAN 80 MG: 160 TABLET, FILM COATED ORAL at 08:37

## 2019-06-26 RX ADMIN — GADOBENATE DIMEGLUMINE 20 ML: 529 INJECTION, SOLUTION INTRAVENOUS at 13:30

## 2019-06-26 RX ADMIN — DEXTROMETHORPHAN POLISTIREX 60 MG: 30 SUSPENSION ORAL at 22:09

## 2019-06-26 RX ADMIN — IBUPROFEN 600 MG: 600 TABLET ORAL at 00:03

## 2019-06-26 RX ADMIN — GUAIFENESIN 200 MG: 200 SOLUTION ORAL at 11:19

## 2019-06-26 RX ADMIN — CEFTRIAXONE 2 G: 2 INJECTION, POWDER, FOR SOLUTION INTRAMUSCULAR; INTRAVENOUS at 13:38

## 2019-06-26 RX ADMIN — MELATONIN TAB 5 MG 5 MG: 5 TAB at 00:04

## 2019-06-26 RX ADMIN — IBUPROFEN 600 MG: 600 TABLET, FILM COATED ORAL at 16:44

## 2019-06-26 RX ADMIN — METOPROLOL SUCCINATE 25 MG: 25 TABLET, EXTENDED RELEASE ORAL at 08:37

## 2019-06-26 RX ADMIN — SODIUM CHLORIDE, PRESERVATIVE FREE 3 ML: 5 INJECTION INTRAVENOUS at 22:08

## 2019-06-26 RX ADMIN — DOXYCYCLINE 100 MG: 100 CAPSULE ORAL at 18:42

## 2019-06-26 RX ADMIN — PROMETHAZINE HYDROCHLORIDE 25 MG: 25 INJECTION INTRAMUSCULAR; INTRAVENOUS at 11:08

## 2019-06-26 RX ADMIN — GADOBENATE DIMEGLUMINE 5 ML: 529 INJECTION, SOLUTION INTRAVENOUS at 13:30

## 2019-06-26 RX ADMIN — Medication: at 05:00

## 2019-06-27 LAB
ALBUMIN SERPL-MCNC: 2.6 G/DL (ref 3.5–5.2)
ALBUMIN/GLOB SERPL: 0.7 G/DL
ALP SERPL-CCNC: 87 U/L (ref 39–117)
ALT SERPL W P-5'-P-CCNC: 72 U/L (ref 1–41)
ANION GAP SERPL CALCULATED.3IONS-SCNC: 11 MMOL/L (ref 5–15)
AST SERPL-CCNC: 79 U/L (ref 1–40)
B BURGDOR IGG+IGM SER-ACNC: <0.91 ISR (ref 0–0.9)
B MICROTI IGG TITR SER: NORMAL {TITER}
B MICROTI IGM TITR SER: NORMAL {TITER}
BACTERIA UR QL AUTO: NORMAL /HPF
BASOPHILS # BLD AUTO: 0.07 10*3/MM3 (ref 0–0.2)
BASOPHILS NFR BLD AUTO: 0.4 % (ref 0–1.5)
BILIRUB SERPL-MCNC: 0.4 MG/DL (ref 0.2–1.2)
BILIRUB UR QL STRIP: NEGATIVE
BUN BLD-MCNC: 13 MG/DL (ref 6–20)
BUN/CREAT SERPL: 13.8 (ref 7–25)
CALCIUM SPEC-SCNC: 8.4 MG/DL (ref 8.6–10.5)
CHLORIDE SERPL-SCNC: 102 MMOL/L (ref 98–107)
CLARITY UR: CLEAR
CO2 SERPL-SCNC: 22 MMOL/L (ref 22–29)
COLOR UR: YELLOW
CREAT BLD-MCNC: 0.94 MG/DL (ref 0.76–1.27)
CV A16 IGG TITR SER IF: NEGATIVE TITER
CV A16 IGM TITR SER IF: NEGATIVE TITER
CV A24 IGG TITR SER IF: ABNORMAL TITER
CV A24 IGM TITR SER IF: NEGATIVE TITER
CV A7 IGG TITR SER IF: NEGATIVE TITER
CV A7 IGM TITR SER IF: NEGATIVE TITER
CV A9 IGG TITR SER IF: ABNORMAL TITER
CV A9 IGM TITR SER IF: NEGATIVE TITER
DEPRECATED RDW RBC AUTO: 39.7 FL (ref 37–54)
EOSINOPHIL # BLD AUTO: 0.37 10*3/MM3 (ref 0–0.4)
EOSINOPHIL NFR BLD AUTO: 1.9 % (ref 0.3–6.2)
ERYTHROCYTE [DISTWIDTH] IN BLOOD BY AUTOMATED COUNT: 12.8 % (ref 12.3–15.4)
GFR SERPL CREATININE-BSD FRML MDRD: 87 ML/MIN/1.73
GLOBULIN UR ELPH-MCNC: 3.9 GM/DL
GLUCOSE BLD-MCNC: 111 MG/DL (ref 65–99)
GLUCOSE UR STRIP-MCNC: NEGATIVE MG/DL
HCT VFR BLD AUTO: 36.6 % (ref 37.5–51)
HGB BLD-MCNC: 12 G/DL (ref 13–17.7)
HGB UR QL STRIP.AUTO: NEGATIVE
HYALINE CASTS UR QL AUTO: NORMAL /LPF
IMM GRANULOCYTES # BLD AUTO: 0.17 10*3/MM3 (ref 0–0.05)
IMM GRANULOCYTES NFR BLD AUTO: 0.9 % (ref 0–0.5)
KETONES UR QL STRIP: NEGATIVE
LEUKOCYTE ESTERASE UR QL STRIP.AUTO: NEGATIVE
LYMPHOCYTES # BLD AUTO: 1.56 10*3/MM3 (ref 0.7–3.1)
LYMPHOCYTES NFR BLD AUTO: 8 % (ref 19.6–45.3)
MCH RBC QN AUTO: 27.8 PG (ref 26.6–33)
MCHC RBC AUTO-ENTMCNC: 32.8 G/DL (ref 31.5–35.7)
MCV RBC AUTO: 84.7 FL (ref 79–97)
MONOCYTES # BLD AUTO: 1.26 10*3/MM3 (ref 0.1–0.9)
MONOCYTES NFR BLD AUTO: 6.5 % (ref 5–12)
NEUTROPHILS # BLD AUTO: 16.02 10*3/MM3 (ref 1.7–7)
NEUTROPHILS NFR BLD AUTO: 82.3 % (ref 42.7–76)
NITRITE UR QL STRIP: NEGATIVE
NRBC BLD AUTO-RTO: 0 /100 WBC (ref 0–0.2)
PH UR STRIP.AUTO: 5.5 [PH] (ref 5–8)
PLATELET # BLD AUTO: 241 10*3/MM3 (ref 140–450)
PMV BLD AUTO: 9.5 FL (ref 6–12)
POTASSIUM BLD-SCNC: 3.8 MMOL/L (ref 3.5–5.2)
PROT SERPL-MCNC: 6.5 G/DL (ref 6–8.5)
PROT UR QL STRIP: ABNORMAL
R RICKETTSI IGG SER QL IA: NEGATIVE
R RICKETTSI IGM TITR SER: 0.3 INDEX (ref 0–0.89)
RBC # BLD AUTO: 4.32 10*6/MM3 (ref 4.14–5.8)
RBC # UR: NORMAL /HPF
REF LAB TEST METHOD: NORMAL
SODIUM BLD-SCNC: 135 MMOL/L (ref 136–145)
SP GR UR STRIP: 1.02 (ref 1–1.03)
SQUAMOUS #/AREA URNS HPF: NORMAL /HPF
UROBILINOGEN UR QL STRIP: ABNORMAL
WBC NRBC COR # BLD: 19.45 10*3/MM3 (ref 3.4–10.8)
WBC UR QL AUTO: NORMAL /HPF

## 2019-06-27 PROCEDURE — 86747 PARVOVIRUS ANTIBODY: CPT | Performed by: INTERNAL MEDICINE

## 2019-06-27 PROCEDURE — 85025 COMPLETE CBC W/AUTO DIFF WBC: CPT | Performed by: INTERNAL MEDICINE

## 2019-06-27 PROCEDURE — 86638 Q FEVER ANTIBODY: CPT | Performed by: INTERNAL MEDICINE

## 2019-06-27 PROCEDURE — 86038 ANTINUCLEAR ANTIBODIES: CPT | Performed by: INTERNAL MEDICINE

## 2019-06-27 PROCEDURE — 83520 IMMUNOASSAY QUANT NOS NONAB: CPT | Performed by: INTERNAL MEDICINE

## 2019-06-27 PROCEDURE — 86778 TOXOPLASMA ANTIBODY IGM: CPT | Performed by: INTERNAL MEDICINE

## 2019-06-27 PROCEDURE — 86790 VIRUS ANTIBODY NOS: CPT | Performed by: INTERNAL MEDICINE

## 2019-06-27 PROCEDURE — 86256 FLUORESCENT ANTIBODY TITER: CPT | Performed by: INTERNAL MEDICINE

## 2019-06-27 PROCEDURE — 99231 SBSQ HOSP IP/OBS SF/LOW 25: CPT | Performed by: NURSE PRACTITIONER

## 2019-06-27 PROCEDURE — 99232 SBSQ HOSP IP/OBS MODERATE 35: CPT | Performed by: INTERNAL MEDICINE

## 2019-06-27 PROCEDURE — 81001 URINALYSIS AUTO W/SCOPE: CPT | Performed by: INTERNAL MEDICINE

## 2019-06-27 PROCEDURE — 86480 TB TEST CELL IMMUN MEASURE: CPT | Performed by: INTERNAL MEDICINE

## 2019-06-27 PROCEDURE — 80053 COMPREHEN METABOLIC PANEL: CPT | Performed by: INTERNAL MEDICINE

## 2019-06-27 RX ORDER — VALSARTAN 40 MG/1
40 TABLET ORAL DAILY
Qty: 90 TABLET | Refills: 3 | Status: SHIPPED | OUTPATIENT
Start: 2019-06-27 | End: 2019-06-28

## 2019-06-27 RX ORDER — PROMETHAZINE HYDROCHLORIDE 25 MG/ML
25 INJECTION, SOLUTION INTRAMUSCULAR; INTRAVENOUS ONCE AS NEEDED
Status: DISCONTINUED | OUTPATIENT
Start: 2019-06-27 | End: 2019-06-29 | Stop reason: HOSPADM

## 2019-06-27 RX ORDER — VALSARTAN 80 MG/1
40 TABLET ORAL
Status: DISCONTINUED | OUTPATIENT
Start: 2019-06-28 | End: 2019-06-29 | Stop reason: HOSPADM

## 2019-06-27 RX ORDER — ALPRAZOLAM 1 MG/1
2 TABLET ORAL ONCE AS NEEDED
Status: COMPLETED | OUTPATIENT
Start: 2019-06-27 | End: 2019-06-28

## 2019-06-27 RX ORDER — METOPROLOL SUCCINATE 25 MG/1
25 TABLET, EXTENDED RELEASE ORAL
Qty: 90 TABLET | Refills: 3 | Status: SHIPPED | OUTPATIENT
Start: 2019-06-28 | End: 2019-06-28

## 2019-06-27 RX ADMIN — IBUPROFEN 600 MG: 600 TABLET, FILM COATED ORAL at 14:53

## 2019-06-27 RX ADMIN — DOXYCYCLINE 100 MG: 100 CAPSULE ORAL at 06:42

## 2019-06-27 RX ADMIN — ACETAMINOPHEN 650 MG: 325 TABLET ORAL at 01:01

## 2019-06-27 RX ADMIN — GUAIFENESIN 200 MG: 200 SOLUTION ORAL at 21:28

## 2019-06-27 RX ADMIN — SODIUM CHLORIDE, PRESERVATIVE FREE 3 ML: 5 INJECTION INTRAVENOUS at 21:28

## 2019-06-27 RX ADMIN — IBUPROFEN 600 MG: 600 TABLET, FILM COATED ORAL at 02:53

## 2019-06-27 RX ADMIN — FLUTICASONE PROPIONATE 2 SPRAY: 50 SPRAY, METERED NASAL at 08:40

## 2019-06-27 RX ADMIN — METOPROLOL SUCCINATE 25 MG: 25 TABLET, EXTENDED RELEASE ORAL at 08:39

## 2019-06-27 RX ADMIN — ACETAMINOPHEN 650 MG: 325 TABLET ORAL at 16:41

## 2019-06-27 RX ADMIN — SODIUM CHLORIDE, PRESERVATIVE FREE 3 ML: 5 INJECTION INTRAVENOUS at 08:41

## 2019-06-27 RX ADMIN — DOXYCYCLINE 100 MG: 100 CAPSULE ORAL at 17:39

## 2019-06-27 RX ADMIN — MELATONIN TAB 5 MG 5 MG: 5 TAB at 21:28

## 2019-06-28 ENCOUNTER — APPOINTMENT (OUTPATIENT)
Dept: MRI IMAGING | Facility: HOSPITAL | Age: 44
End: 2019-06-28

## 2019-06-28 PROBLEM — I42.8 NON-ISCHEMIC CARDIOMYOPATHY (HCC): Status: ACTIVE | Noted: 2019-06-28

## 2019-06-28 PROBLEM — I51.4 MYOCARDITIS: Status: RESOLVED | Noted: 2019-06-26 | Resolved: 2019-06-28

## 2019-06-28 LAB
A PHAGOCYTOPH IGM TITR SER IF: NEGATIVE {TITER}
ALBUMIN SERPL-MCNC: 2.6 G/DL (ref 3.5–5.2)
ALBUMIN/GLOB SERPL: 0.8 G/DL
ALP SERPL-CCNC: 155 U/L (ref 39–117)
ALT SERPL W P-5'-P-CCNC: 55 U/L (ref 1–41)
ANA SER QL: NEGATIVE
ANION GAP SERPL CALCULATED.3IONS-SCNC: 14 MMOL/L (ref 5–15)
ASO AB SERPL-ACNC: NEGATIVE [IU]/ML
AST SERPL-CCNC: 34 U/L (ref 1–40)
BILIRUB SERPL-MCNC: 0.3 MG/DL (ref 0.2–1.2)
BUN BLD-MCNC: 13 MG/DL (ref 6–20)
BUN/CREAT SERPL: 16.3 (ref 7–25)
CALCIUM SPEC-SCNC: 8.1 MG/DL (ref 8.6–10.5)
CHLORIDE SERPL-SCNC: 102 MMOL/L (ref 98–107)
CK SERPL-CCNC: 74 U/L (ref 20–200)
CO2 SERPL-SCNC: 21 MMOL/L (ref 22–29)
CONV HGE IGG TITER: NEGATIVE
CREAT BLD-MCNC: 0.8 MG/DL (ref 0.76–1.27)
DEPRECATED RDW RBC AUTO: 39.3 FL (ref 37–54)
E CHAFFEENSIS IGG TITR SER IF: NEGATIVE {TITER}
E. CHAFFEENSIS (HME) IGM TITER: NEGATIVE
ERYTHROCYTE [DISTWIDTH] IN BLOOD BY AUTOMATED COUNT: 12.8 % (ref 12.3–15.4)
GFR SERPL CREATININE-BSD FRML MDRD: 105 ML/MIN/1.73
GLOBULIN UR ELPH-MCNC: 3.4 GM/DL
GLUCOSE BLD-MCNC: 177 MG/DL (ref 65–99)
HCT VFR BLD AUTO: 36.4 % (ref 37.5–51)
HGB BLD-MCNC: 12 G/DL (ref 13–17.7)
LABORATORY COMMENT REPORT: NORMAL
MCH RBC QN AUTO: 27.8 PG (ref 26.6–33)
MCHC RBC AUTO-ENTMCNC: 33 G/DL (ref 31.5–35.7)
MCV RBC AUTO: 84.3 FL (ref 79–97)
PLATELET # BLD AUTO: 268 10*3/MM3 (ref 140–450)
PMV BLD AUTO: 9.5 FL (ref 6–12)
POTASSIUM BLD-SCNC: 3.4 MMOL/L (ref 3.5–5.2)
POTASSIUM BLD-SCNC: 3.9 MMOL/L (ref 3.5–5.2)
PROT SERPL-MCNC: 6 G/DL (ref 6–8.5)
RBC # BLD AUTO: 4.32 10*6/MM3 (ref 4.14–5.8)
SODIUM BLD-SCNC: 137 MMOL/L (ref 136–145)
T GONDII IGM SER IA-ACNC: <3 AU/ML (ref 0–7.9)
WBC NRBC COR # BLD: 16.35 10*3/MM3 (ref 3.4–10.8)

## 2019-06-28 PROCEDURE — 72141 MRI NECK SPINE W/O DYE: CPT

## 2019-06-28 PROCEDURE — 87798 DETECT AGENT NOS DNA AMP: CPT | Performed by: INTERNAL MEDICINE

## 2019-06-28 PROCEDURE — 82550 ASSAY OF CK (CPK): CPT | Performed by: INTERNAL MEDICINE

## 2019-06-28 PROCEDURE — 70551 MRI BRAIN STEM W/O DYE: CPT

## 2019-06-28 PROCEDURE — 84132 ASSAY OF SERUM POTASSIUM: CPT | Performed by: INTERNAL MEDICINE

## 2019-06-28 PROCEDURE — 86063 ANTISTREPTOLYSIN O SCREEN: CPT | Performed by: INTERNAL MEDICINE

## 2019-06-28 PROCEDURE — 80053 COMPREHEN METABOLIC PANEL: CPT | Performed by: INTERNAL MEDICINE

## 2019-06-28 PROCEDURE — 85027 COMPLETE CBC AUTOMATED: CPT | Performed by: INTERNAL MEDICINE

## 2019-06-28 PROCEDURE — 82164 ANGIOTENSIN I ENZYME TEST: CPT | Performed by: INTERNAL MEDICINE

## 2019-06-28 PROCEDURE — 99232 SBSQ HOSP IP/OBS MODERATE 35: CPT | Performed by: INTERNAL MEDICINE

## 2019-06-28 RX ORDER — DOXYCYCLINE 100 MG/1
100 CAPSULE ORAL EVERY 12 HOURS
Qty: 22 CAPSULE | Refills: 0 | Status: SHIPPED | OUTPATIENT
Start: 2019-06-28 | End: 2019-06-29

## 2019-06-28 RX ORDER — DEXTROMETHORPHAN POLISTIREX 30 MG/5ML
10 SUSPENSION ORAL EVERY 12 HOURS PRN
Qty: 280 ML | Refills: 0 | Status: SHIPPED | OUTPATIENT
Start: 2019-06-28 | End: 2019-06-29

## 2019-06-28 RX ORDER — VALSARTAN 40 MG/1
40 TABLET ORAL DAILY
Qty: 90 TABLET | Refills: 0 | Status: SHIPPED | OUTPATIENT
Start: 2019-06-28 | End: 2019-06-29

## 2019-06-28 RX ORDER — METOPROLOL SUCCINATE 25 MG/1
25 TABLET, EXTENDED RELEASE ORAL
Qty: 90 TABLET | Refills: 0 | Status: SHIPPED | OUTPATIENT
Start: 2019-06-28 | End: 2019-06-29

## 2019-06-28 RX ADMIN — VALSARTAN 40 MG: 80 TABLET, FILM COATED ORAL at 09:06

## 2019-06-28 RX ADMIN — DOXYCYCLINE 100 MG: 100 CAPSULE ORAL at 06:18

## 2019-06-28 RX ADMIN — IBUPROFEN 600 MG: 600 TABLET, FILM COATED ORAL at 13:10

## 2019-06-28 RX ADMIN — ACETAMINOPHEN 650 MG: 325 TABLET ORAL at 13:10

## 2019-06-28 RX ADMIN — TRAMADOL HYDROCHLORIDE 50 MG: 50 TABLET, FILM COATED ORAL at 02:26

## 2019-06-28 RX ADMIN — ACETAMINOPHEN 650 MG: 325 TABLET ORAL at 03:06

## 2019-06-28 RX ADMIN — POTASSIUM CHLORIDE 40 MEQ: 750 CAPSULE, EXTENDED RELEASE ORAL at 13:02

## 2019-06-28 RX ADMIN — DOXYCYCLINE 100 MG: 100 CAPSULE ORAL at 17:28

## 2019-06-28 RX ADMIN — ACETAMINOPHEN 650 MG: 325 TABLET ORAL at 20:22

## 2019-06-28 RX ADMIN — MELATONIN TAB 5 MG 5 MG: 5 TAB at 20:22

## 2019-06-28 RX ADMIN — POTASSIUM CHLORIDE 40 MEQ: 750 CAPSULE, EXTENDED RELEASE ORAL at 09:06

## 2019-06-28 RX ADMIN — FLUTICASONE PROPIONATE 2 SPRAY: 50 SPRAY, METERED NASAL at 09:06

## 2019-06-28 RX ADMIN — IBUPROFEN 600 MG: 600 TABLET, FILM COATED ORAL at 02:01

## 2019-06-28 RX ADMIN — SODIUM CHLORIDE, PRESERVATIVE FREE 3 ML: 5 INJECTION INTRAVENOUS at 09:07

## 2019-06-28 RX ADMIN — METOPROLOL SUCCINATE 25 MG: 25 TABLET, EXTENDED RELEASE ORAL at 09:06

## 2019-06-28 RX ADMIN — ALPRAZOLAM 2 MG: 1 TABLET ORAL at 04:01

## 2019-06-28 RX ADMIN — TRAMADOL HYDROCHLORIDE 50 MG: 50 TABLET, FILM COATED ORAL at 22:50

## 2019-06-29 VITALS
DIASTOLIC BLOOD PRESSURE: 71 MMHG | SYSTOLIC BLOOD PRESSURE: 123 MMHG | BODY MASS INDEX: 29.12 KG/M2 | HEIGHT: 72 IN | RESPIRATION RATE: 16 BRPM | OXYGEN SATURATION: 93 % | HEART RATE: 101 BPM | TEMPERATURE: 99.7 F | WEIGHT: 215 LBS

## 2019-06-29 LAB
ANION GAP SERPL CALCULATED.3IONS-SCNC: 12 MMOL/L (ref 5–15)
BACTERIA SPEC AEROBE CULT: NORMAL
BACTERIA SPEC AEROBE CULT: NORMAL
BASOPHILS # BLD AUTO: 0.07 10*3/MM3 (ref 0–0.2)
BASOPHILS NFR BLD AUTO: 0.4 % (ref 0–1.5)
BUN BLD-MCNC: 10 MG/DL (ref 6–20)
BUN/CREAT SERPL: 12.8 (ref 7–25)
C-ANCA TITR SER IF: NORMAL TITER
CALCIUM SPEC-SCNC: 8.6 MG/DL (ref 8.6–10.5)
CHLORIDE SERPL-SCNC: 103 MMOL/L (ref 98–107)
CO2 SERPL-SCNC: 22 MMOL/L (ref 22–29)
CREAT BLD-MCNC: 0.78 MG/DL (ref 0.76–1.27)
DEPRECATED RDW RBC AUTO: 40.4 FL (ref 37–54)
EOSINOPHIL # BLD AUTO: 0.41 10*3/MM3 (ref 0–0.4)
EOSINOPHIL NFR BLD AUTO: 2.4 % (ref 0.3–6.2)
ERYTHROCYTE [DISTWIDTH] IN BLOOD BY AUTOMATED COUNT: 12.8 % (ref 12.3–15.4)
GFR SERPL CREATININE-BSD FRML MDRD: 108 ML/MIN/1.73
GLUCOSE BLD-MCNC: 150 MG/DL (ref 65–99)
HCT VFR BLD AUTO: 38.5 % (ref 37.5–51)
HGB BLD-MCNC: 12.5 G/DL (ref 13–17.7)
IMM GRANULOCYTES # BLD AUTO: 0.19 10*3/MM3 (ref 0–0.05)
IMM GRANULOCYTES NFR BLD AUTO: 1.1 % (ref 0–0.5)
LYMPHOCYTES # BLD AUTO: 1.65 10*3/MM3 (ref 0.7–3.1)
LYMPHOCYTES NFR BLD AUTO: 9.8 % (ref 19.6–45.3)
MCH RBC QN AUTO: 27.8 PG (ref 26.6–33)
MCHC RBC AUTO-ENTMCNC: 32.5 G/DL (ref 31.5–35.7)
MCV RBC AUTO: 85.6 FL (ref 79–97)
MONOCYTES # BLD AUTO: 1.14 10*3/MM3 (ref 0.1–0.9)
MONOCYTES NFR BLD AUTO: 6.8 % (ref 5–12)
MYELOPEROXIDASE AB SER-ACNC: <9 U/ML (ref 0–9)
NEUTROPHILS # BLD AUTO: 13.36 10*3/MM3 (ref 1.7–7)
NEUTROPHILS NFR BLD AUTO: 79.5 % (ref 42.7–76)
NRBC BLD AUTO-RTO: 0 /100 WBC (ref 0–0.2)
P-ANCA ATYPICAL TITR SER IF: NORMAL TITER
P-ANCA TITR SER IF: NORMAL TITER
PLATELET # BLD AUTO: 286 10*3/MM3 (ref 140–450)
PMV BLD AUTO: 9.2 FL (ref 6–12)
POTASSIUM BLD-SCNC: 3.8 MMOL/L (ref 3.5–5.2)
PROTEINASE3 AB SER IA-ACNC: <3.5 U/ML (ref 0–3.5)
RBC # BLD AUTO: 4.5 10*6/MM3 (ref 4.14–5.8)
SODIUM BLD-SCNC: 137 MMOL/L (ref 136–145)
WBC NRBC COR # BLD: 16.82 10*3/MM3 (ref 3.4–10.8)

## 2019-06-29 PROCEDURE — 80048 BASIC METABOLIC PNL TOTAL CA: CPT

## 2019-06-29 PROCEDURE — 85025 COMPLETE CBC W/AUTO DIFF WBC: CPT | Performed by: INTERNAL MEDICINE

## 2019-06-29 PROCEDURE — 99232 SBSQ HOSP IP/OBS MODERATE 35: CPT | Performed by: PHYSICIAN ASSISTANT

## 2019-06-29 PROCEDURE — 99239 HOSP IP/OBS DSCHRG MGMT >30: CPT | Performed by: INTERNAL MEDICINE

## 2019-06-29 RX ORDER — DEXTROMETHORPHAN POLISTIREX 30 MG/5ML
10 SUSPENSION ORAL EVERY 12 HOURS PRN
Qty: 280 ML | Refills: 0 | Status: SHIPPED | OUTPATIENT
Start: 2019-06-29 | End: 2019-08-21

## 2019-06-29 RX ORDER — VALSARTAN 40 MG/1
40 TABLET ORAL DAILY
Qty: 90 TABLET | Refills: 0 | Status: SHIPPED | OUTPATIENT
Start: 2019-06-29 | End: 2019-08-28 | Stop reason: HOSPADM

## 2019-06-29 RX ORDER — METOPROLOL SUCCINATE 25 MG/1
25 TABLET, EXTENDED RELEASE ORAL
Qty: 90 TABLET | Refills: 0 | Status: SHIPPED | OUTPATIENT
Start: 2019-06-29 | End: 2020-02-04 | Stop reason: SDUPTHER

## 2019-06-29 RX ORDER — DOXYCYCLINE 100 MG/1
100 CAPSULE ORAL EVERY 12 HOURS
Qty: 22 CAPSULE | Refills: 0 | Status: SHIPPED | OUTPATIENT
Start: 2019-06-29 | End: 2019-07-12 | Stop reason: HOSPADM

## 2019-06-29 RX ADMIN — IBUPROFEN 600 MG: 600 TABLET, FILM COATED ORAL at 08:46

## 2019-06-29 RX ADMIN — DOXYCYCLINE 100 MG: 100 CAPSULE ORAL at 06:51

## 2019-06-29 RX ADMIN — FLUTICASONE PROPIONATE 2 SPRAY: 50 SPRAY, METERED NASAL at 08:47

## 2019-06-29 RX ADMIN — SODIUM CHLORIDE, PRESERVATIVE FREE 3 ML: 5 INJECTION INTRAVENOUS at 08:51

## 2019-06-29 RX ADMIN — GUAIFENESIN 200 MG: 200 SOLUTION ORAL at 08:46

## 2019-06-29 RX ADMIN — ACETAMINOPHEN 650 MG: 325 TABLET ORAL at 00:16

## 2019-06-29 RX ADMIN — METOPROLOL SUCCINATE 25 MG: 25 TABLET, EXTENDED RELEASE ORAL at 08:47

## 2019-06-29 RX ADMIN — DEXTROMETHORPHAN POLISTIREX 60 MG: 30 SUSPENSION ORAL at 00:16

## 2019-06-29 RX ADMIN — IBUPROFEN 600 MG: 600 TABLET, FILM COATED ORAL at 00:16

## 2019-06-29 RX ADMIN — VALSARTAN 40 MG: 80 TABLET, FILM COATED ORAL at 08:47

## 2019-06-29 RX ADMIN — ACETAMINOPHEN 650 MG: 325 TABLET ORAL at 08:46

## 2019-06-30 ENCOUNTER — READMISSION MANAGEMENT (OUTPATIENT)
Dept: CALL CENTER | Facility: HOSPITAL | Age: 44
End: 2019-06-30

## 2019-06-30 LAB
LEGIONELLA PNEUMOPHILA 1-6,IGM: NORMAL
T CRUZI IGG SER-ACNC: 0.2 IV

## 2019-06-30 NOTE — OUTREACH NOTE
Prep Survey      Responses   Facility patient discharged from?  [facility]   Is patient eligible?  Yes   Discharge diagnosis  Acute infective myocarditis   Does the patient have one of the following disease processes/diagnoses(primary or secondary)?  Sepsis   Does the patient have Home health ordered?  No   Is there a DME ordered?  No   Prep survey completed?  Yes          Riya Nicole RN

## 2019-07-01 ENCOUNTER — TRANSCRIBE ORDERS (OUTPATIENT)
Dept: LAB | Facility: HOSPITAL | Age: 44
End: 2019-07-01

## 2019-07-01 ENCOUNTER — READMISSION MANAGEMENT (OUTPATIENT)
Dept: CALL CENTER | Facility: HOSPITAL | Age: 44
End: 2019-07-01

## 2019-07-01 ENCOUNTER — LAB (OUTPATIENT)
Dept: LAB | Facility: HOSPITAL | Age: 44
End: 2019-07-01

## 2019-07-01 DIAGNOSIS — I40.9 ACUTE MYOCARDITIS, UNSPECIFIED MYOCARDITIS TYPE: ICD-10-CM

## 2019-07-01 DIAGNOSIS — R50.9 HYPERTHERMIA-INDUCED DEFECT: ICD-10-CM

## 2019-07-01 DIAGNOSIS — J18.9 UNRESOLVED PNEUMONIA: Primary | ICD-10-CM

## 2019-07-01 DIAGNOSIS — J18.9 UNRESOLVED PNEUMONIA: ICD-10-CM

## 2019-07-01 DIAGNOSIS — D72.823 NEUTROPHILIC LEUKEMOID REACTION: ICD-10-CM

## 2019-07-01 LAB
ACE SERPL-CCNC: 35 U/L (ref 14–82)
ALBUMIN SERPL-MCNC: 3.1 G/DL (ref 3.5–5.2)
ALBUMIN/GLOB SERPL: 0.7 G/DL
ALP SERPL-CCNC: 107 U/L (ref 39–117)
ALT SERPL W P-5'-P-CCNC: 32 U/L (ref 1–41)
ANION GAP SERPL CALCULATED.3IONS-SCNC: 13 MMOL/L (ref 5–15)
AST SERPL-CCNC: 18 U/L (ref 1–40)
B19V IGG SER IA-ACNC: 7.8 INDEX (ref 0–0.8)
B19V IGM SER IA-ACNC: 0.4 INDEX (ref 0–0.8)
BACTERIA SPEC AEROBE CULT: NORMAL
BACTERIA SPEC AEROBE CULT: NORMAL
BASOPHILS # BLD AUTO: 0.1 10*3/MM3 (ref 0–0.2)
BASOPHILS NFR BLD AUTO: 0.5 % (ref 0–1.5)
BILIRUB SERPL-MCNC: 0.3 MG/DL (ref 0.2–1.2)
BUN BLD-MCNC: 8 MG/DL (ref 6–20)
BUN/CREAT SERPL: 10.8 (ref 7–25)
CALCIUM SPEC-SCNC: 9 MG/DL (ref 8.6–10.5)
CHLORIDE SERPL-SCNC: 98 MMOL/L (ref 98–107)
CO2 SERPL-SCNC: 25 MMOL/L (ref 22–29)
CREAT BLD-MCNC: 0.74 MG/DL (ref 0.76–1.27)
DEPRECATED RDW RBC AUTO: 40.5 FL (ref 37–54)
EOSINOPHIL # BLD AUTO: 0.22 10*3/MM3 (ref 0–0.4)
EOSINOPHIL NFR BLD AUTO: 1.1 % (ref 0.3–6.2)
ERYTHROCYTE [DISTWIDTH] IN BLOOD BY AUTOMATED COUNT: 13 % (ref 12.3–15.4)
ERYTHROCYTE [SEDIMENTATION RATE] IN BLOOD: 87 MM/HR (ref 0–15)
GFR SERPL CREATININE-BSD FRML MDRD: 114 ML/MIN/1.73
GLOBULIN UR ELPH-MCNC: 4.3 GM/DL
GLUCOSE BLD-MCNC: 103 MG/DL (ref 65–99)
HCT VFR BLD AUTO: 39.6 % (ref 37.5–51)
HGB BLD-MCNC: 12.9 G/DL (ref 13–17.7)
IMM GRANULOCYTES # BLD AUTO: 0.29 10*3/MM3 (ref 0–0.05)
IMM GRANULOCYTES NFR BLD AUTO: 1.5 % (ref 0–0.5)
LYMPHOCYTES # BLD AUTO: 1.51 10*3/MM3 (ref 0.7–3.1)
LYMPHOCYTES NFR BLD AUTO: 7.8 % (ref 19.6–45.3)
MCH RBC QN AUTO: 27.7 PG (ref 26.6–33)
MCHC RBC AUTO-ENTMCNC: 32.6 G/DL (ref 31.5–35.7)
MCV RBC AUTO: 85.2 FL (ref 79–97)
MONOCYTES # BLD AUTO: 1.22 10*3/MM3 (ref 0.1–0.9)
MONOCYTES NFR BLD AUTO: 6.3 % (ref 5–12)
NEUTROPHILS # BLD AUTO: 15.93 10*3/MM3 (ref 1.7–7)
NEUTROPHILS NFR BLD AUTO: 82.8 % (ref 42.7–76)
NRBC BLD AUTO-RTO: 0 /100 WBC (ref 0–0.2)
NT-PROBNP SERPL-MCNC: 1356 PG/ML (ref 5–450)
PLATELET # BLD AUTO: 385 10*3/MM3 (ref 140–450)
PMV BLD AUTO: 9.8 FL (ref 6–12)
POTASSIUM BLD-SCNC: 3.9 MMOL/L (ref 3.5–5.2)
PROT SERPL-MCNC: 7.4 G/DL (ref 6–8.5)
QUANTIFERON CRITERIA: NORMAL
QUANTIFERON MITOGEN VALUE: >10 IU/ML
QUANTIFERON NIL VALUE: 0.02 IU/ML
QUANTIFERON TB1 AG VALUE: 0.05 IU/ML
QUANTIFERON TB2 AG VALUE: 0.03 IU/ML
QUANTIFERON-TB GOLD PLUS: NEGATIVE
RBC # BLD AUTO: 4.65 10*6/MM3 (ref 4.14–5.8)
SODIUM BLD-SCNC: 136 MMOL/L (ref 136–145)
TROPONIN T SERPL-MCNC: <0.01 NG/ML (ref 0–0.03)
WBC NRBC COR # BLD: 19.27 10*3/MM3 (ref 3.4–10.8)

## 2019-07-01 PROCEDURE — 87899 AGENT NOS ASSAY W/OPTIC: CPT

## 2019-07-01 PROCEDURE — 85025 COMPLETE CBC W/AUTO DIFF WBC: CPT

## 2019-07-01 PROCEDURE — 80053 COMPREHEN METABOLIC PANEL: CPT

## 2019-07-01 PROCEDURE — 84484 ASSAY OF TROPONIN QUANT: CPT

## 2019-07-01 PROCEDURE — 83880 ASSAY OF NATRIURETIC PEPTIDE: CPT

## 2019-07-01 PROCEDURE — 85652 RBC SED RATE AUTOMATED: CPT

## 2019-07-01 PROCEDURE — 36415 COLL VENOUS BLD VENIPUNCTURE: CPT

## 2019-07-01 NOTE — OUTREACH NOTE
Sepsis Week 1 Survey      Responses   Facility patient discharged from?  [facility]   Does the patient have one of the following disease processes/diagnoses(primary or secondary)?  Sepsis   Is there a successful TCM telephone encounter documented?  No   Week 1 attempt successful?  Yes   Call start time  1357   Rescheduled  Rescheduled-pt requested   Call end time  1357   Discharge diagnosis  Acute infective myocarditis          Jael Dumont RN

## 2019-07-01 NOTE — PAYOR COMM NOTE
"Ref # [id number]  Evelyn Jarrett RN, BSN  Phone # [phone]  Fax # [phone]  Stephen Banks (44 y.o. Male)     Date of Birth Social Security Number Address Home Phone MRN    1975  [address] [facility] [address] [phone] [id number]    Mormonism Marital Status          Unknown        Admission Date Admission Type Admitting Provider Attending Provider Department, Room/Bed    19 Urgent Deep Muñiz IV, MD  [facility] 6A, N612/1    Discharge Date Discharge Disposition Discharge Destination        2019 Home or Self Care              Attending Provider:  (none)   Allergies:  Ciprofloxacin    Isolation:  None   Infection:  None   Code Status:  Prior    Ht:  182 cm (71.65\")   Wt:  97.5 kg (215 lb)    Admission Cmt:  None   Principal Problem:  Acute infective myocarditis [I40.0] More...                 Active Insurance as of 2019     Primary Coverage     Payor Plan Insurance Group Employer/Plan Group    [employer]UE [employer] BLUE [employer] PPO [id number]     Payor Plan Address Payor Plan Phone Number Payor Plan Fax Number Effective Dates    [address] [phone]  2018 - None Entered    [place] [address]       Subscriber Name Subscriber Birth Date Member ID       STEPHEN BANKS 1975 [id number]                 Emergency Contacts      (Rel.) Home Phone Work Phone Mobile Phone    RAJEEV BANKS (Spouse) [phone] -- [phone]               Discharge Summary      Dana Garcia MD at 2019 12:55 PM              [facility] Medicine Services  DISCHARGE SUMMARY    Patient Name: Stephen Banks  : 1975  MRN: [id number]    Date of Admission: 2019  Date of Discharge:  2019  Primary Care Physician: Ihsan Blackwell MD    Consults     Date and Time Order Name Status Description    2019 1639 Inpatient Infectious Diseases Consult Completed     2019 " 1002 Inpatient Hospitalist Consult Completed           Hospital Course     Presenting Problem:   ST elevation (STEMI) myocardial infarction (CMS/HCC) [I21.3]  Myocarditis (CMS/HCC) [I51.4]  Myocarditis (CMS/HCC) [I51.4]    Active Hospital Problems    Diagnosis  POA   • **Acute infective myocarditis [I40.0]  Yes   • Non-ischemic cardiomyopathy (CMS/HCC) [I42.8]  Yes   • Hyperlipidemia LDL goal <70 [E78.5]  Yes   • Coronary artery disease involving native coronary artery of native heart with angina pectoris (CMS/HCC) [I25.119]  Yes   • Leukocytosis [D72.829]  Yes   • Possible Sepsis (CMS/HCC) [A41.9]  Yes      Resolved Hospital Problems    Diagnosis Date Resolved POA   • Myocarditis (CMS/HCC) [I51.4] 06/28/2019 Yes   • Myocarditis (CMS/HCC) [I51.4] 06/24/2019 Yes          Hospital Course:  Stephen Stoll is a 44 y.o. male, healthy and active at baseline, admitted for chest pain, fevers of unknown origin, and associated symptoms and lab abnormalities.     The patient had developed fevers and night sweats while on vacation in [place], [place].  He was diagnosed with pneumonia and treated with antibiotics but the fevers persisted.  The morning of admission, he developed severe pain in his right arm as well as chest pressure.  He went to the [facility] ER where his troponin was 11 and EKG showed ST elevation.  He was transferred to [facility] for emergent heart cath.      Cardiac catheterization showed only mild nonobstructive coronary artery disease present.  LV systolic function was low normal with an EF of approximately 50%.  An echocardiogram, however, showed EF of 35%.  He was diagnosed with acute myocarditis of unknown etiology.  His cardiac workup included an MRI (showing EF 42 and diffuse myocardal inflammation).  He was started on losartan and beta blockade.    His hectic fevers continued approximately three times a day, to 102.5 or so.  Associated issues were muscle cramps with elevated CK, elevated  transaminases, a chest CT showing a single necrotic lymph node in the mediastinum.  Sed rate was 45 and CRP was 23.  He was originally on broad-spectrum antibiotics, but these were weaned to doxycycline only with no clinical change.     He was followed by [facility].  Numerous serologies were sent off.  By discharge, he has tested negative/normal for MARIA ELENA, ASO, legionella, HIV, Lyme titers, RMSF, Toxoplasm, Babesia.  Blood cultures and respiratory PCR were negative.  He had a day of neck stiffness without headache or photophobia; MRI of head and neck were unremarkable and neck pain resolved quickly. Coxsackie IgG was positive in high titers but significance after 2-3 weeks of illness was questionable.     Clinically, he has remained stable to slightly improved.  His severe muscle cramps have abated and DK has normalized.  WBC has decreased from 25 to 16.  He remains weak but is able to walk a bit in the halls, and is tolerating food.  He has had a cough which was initially dry but is now a bit productive.  He has not required oxygen.      Still pending are several serologic tests.  Sarcoid and Still's disease are under consideration.  Because he has been stable, he is asking to go home with a plan for close follow-up in the [facility] clinic.      Discharge Follow Up Recommendations for labs/diagnostics:   *Dr Dumont to follow up on pending labs  * repeat echo in 2 months    Day of Discharge     HPI:   Feels the same to a bit better.  Still have fever several times per day which leave him weak.  He is able to walk slowly in the halls.  Has been eating.  Cough is better today. Neck pain has resolved.     Review of Systems   Gen- fatigue.   CV- No chest pain, palpitations  Resp- No dyspnea at rest  GI- No N/V/D, abd pain  Skin - no rash    Otherwise ROS is negative except as mentioned in the HPI.    Vital Signs:   Temp:  [97.5 °F (36.4 °C)-101.5 °F (38.6 °C)] 99.7 °F (37.6 °C)  Heart Rate:  [] 101  Resp:  [16] 16  BP:  (111-123)/(57-71) 123/71     Physical Exam:  Constitutional: Awake, alert man in bed, father at bedside.   Eyes: PERRLA, sclerae anicteric, no conjunctival injection  HENT: NCAT, mucous membranes moist  Neck: Supple, no thyromegaly, no lymphadenopathy, trachea midline  Respiratory: Clear to auscultation bilaterally, nonlabored respirations , no w r r, comfortable on RA  Cardiovascular: RRR, no murmurs, rubs, or gallops, palpable pedal pulses bilaterally  Gastrointestinal: Positive bowel sounds, soft, nontender, nondistended  Musculoskeletal: No bilateral ankle edema, no clubbing or cyanosis to extremities  Psychiatric: Appropriate affect, cooperative  Neurologic: Oriented x 3, strength symmetric in all extremities, Cranial Nerves grossly intact to confrontation, speech clear  Skin: No rashes      Pertinent  and/or Most Recent Results     Results from last 7 days   Lab Units 06/29/19  0455 06/28/19  1746 06/28/19 0405 06/27/19  0530 06/25/19  0434 06/24/19  0929   WBC 10*3/mm3 16.82*  --  16.35* 19.45* 21.64* 21.70*   HEMOGLOBIN g/dL 12.5*  --  12.0* 12.0* 14.1 14.3   HEMATOCRIT % 38.5  --  36.4* 36.6* 42.9 43.9   PLATELETS 10*3/mm3 286  --  268 241 291 313   SODIUM mmol/L 137  --  137 135* 134* 133*   POTASSIUM mmol/L 3.8 3.9 3.4* 3.8 4.2 4.1   CHLORIDE mmol/L 103  --  102 102 98 98   CO2 mmol/L 22.0  --  21.0* 22.0 23.0 22.0   BUN mg/dL 10  --  13 13 11 11   CREATININE mg/dL 0.78  --  0.80 0.94 0.97 0.80   GLUCOSE mg/dL 150*  --  177* 111* 130* 130*   CALCIUM mg/dL 8.6  --  8.1* 8.4* 8.5* 8.8     Results from last 7 days   Lab Units 06/28/19  0405 06/27/19  0530 06/25/19  0434 06/24/19  0929   BILIRUBIN mg/dL 0.3 0.4 0.8 0.4   ALK PHOS U/L 155* 87 59 65   ALT (SGPT) U/L 55* 72* 28 14   AST (SGOT) U/L 34 79* 128* 65*     Results from last 7 days   Lab Units 06/24/19  0929   CHOLESTEROL mg/dL 191   TRIGLYCERIDES mg/dL 51   HDL CHOL mg/dL 58     Results from last 7 days   Lab Units 06/25/19  0434 06/24/19  1620  06/24/19  0929   HEMOGLOBIN A1C %  --   --  5.70*   TROPONIN T ng/mL 3.250* 1.270* 1.020*   PROCALCITONIN ng/mL  --  0.19  --        Brief Urine Lab Results  (Last result in the past 365 days)      Color   Clarity   Blood   Leuk Est   Nitrite   Protein   CREAT   Urine HCG        06/27/19 1615 Yellow Clear Negative Negative Negative 30 mg/dL (1+)               Microbiology Results Abnormal     Procedure Component Value - Date/Time    Blood Culture - Blood, Arm, Left [[id number]] Collected:  06/26/19 0136    Lab Status:  Preliminary result Specimen:  Blood from Arm, Left Updated:  06/29/19 0200     Blood Culture No growth at 3 days    Blood Culture - Blood, Hand, Left [[id number]] Collected:  06/26/19 0133    Lab Status:  Preliminary result Specimen:  Blood from Hand, Left Updated:  06/29/19 0200     Blood Culture No growth at 3 days    Blood Culture - Blood, Arm, Right [[id number]] Collected:  06/24/19 1625    Lab Status:  Preliminary result Specimen:  Blood from Arm, Right Updated:  06/28/19 1645     Blood Culture No growth at 4 days    Narrative:       Aerobic bottle only    Blood Culture - Blood, Hand, Right [[id number]] Collected:  06/24/19 1620    Lab Status:  Preliminary result Specimen:  Blood from Hand, Right Updated:  06/28/19 1645     Blood Culture No growth at 4 days    Legionella Antigen, Urine - Urine, Urine, Clean Catch [[id number]]  (Normal) Collected:  06/25/19 1653    Lab Status:  Final result Specimen:  Urine, Clean Catch Updated:  06/26/19 0103     LEGIONELLA ANTIGEN, URINE Negative    Respiratory Panel, PCR - Swab, Nasopharynx [[id number]]  (Normal) Collected:  06/24/19 1824    Lab Status:  Final result Specimen:  Swab from Nasopharynx Updated:  06/25/19 1039     ADENOVIRUS, PCR Not Detected     Coronavirus 229E Not Detected     Coronavirus HKU1 Not Detected     Coronavirus NL63 Not Detected     Coronavirus OC43 Not Detected     Human Metapneumovirus Not Detected     Human Rhinovirus/Enterovirus Not  Detected     Influenza B PCR Not Detected     Parainfluenza Virus 1 Not Detected     Parainfluenza Virus 2 Not Detected     Parainfluenza Virus 3 Not Detected     Parainfluenza Virus 4 Not Detected     Bordetella pertussis pcr Not Detected     Influenza A H1 2009 PCR Not Detected     Chlamydophila pneumoniae PCR Not Detected     Mycoplasma pneumo by PCR Not Detected     Influenza A PCR Not Detected     Influenza A H3 Not Detected     Influenza A H1 Not Detected     RSV, PCR Not Detected     Bordetella parapertussis PCR Not Detected    Legionella Antigen, Urine - Urine, Urine, Clean Catch [[id number]]  (Normal) Collected:  06/24/19 1636    Lab Status:  Final result Specimen:  Urine, Clean Catch Updated:  06/24/19 2340     LEGIONELLA ANTIGEN, URINE Negative          Imaging Results (all)     Procedure Component Value Units Date/Time    MRI Brain Without Contrast [[id number]] Collected:  06/28/19 0846     Updated:  06/28/19 1603    Narrative:       EXAMINATION: MRI BRAIN WO CONTRAST-     INDICATION: Infection due to other mycobacteria; B33.22-Viral  myocarditis.      TECHNIQUE: Sagittal and axial images of the brain are displayed. No  intravenous contrast was utilized.     COMPARISON: None.     FINDINGS: There is no intracranial mass. There is no hemorrhage. There  is no midline shift or extraaxial fluid collection. There is no evidence  of encephalitis. There is no cerebellopontine angle mass. Pituitary  gland is normal. There is no acute infarct.       Impression:       Normal examination.     D:  06/28/2019  E:  06/28/2019     This report was finalized on 6/28/2019 4:00 PM by Dr. Don Diaz MD.       MRI Cervical Spine Without Contrast [[id number]] Collected:  06/28/19 0844     Updated:  06/28/19 1603    Narrative:       EXAMINATION: MRI CERVICAL SPINE WO CONTRAST-     INDICATION: B33.22-Viral myocarditis.     TECHNIQUE: Sagittal and axial images of the cervical spine are  displayed. No intravenous contrast was  utilized.     COMPARISON: None.     FINDINGS: There is normal marrow signal in the cervical vertebral  bodies. There is normal alignment. The intervertebral disc space is  preserved throughout. There is no disc protrusion. There is no spinal  stenosis. There is no epidural abscess. The cord is of normal size.  There is normal cord signal. There is no Chiari malformation.       Impression:       Normal examination.     D:  06/28/2019  E:  06/28/2019     This report was finalized on 6/28/2019 4:00 PM by Dr. Don Diaz MD.       MRI Cardiac Function Complete With & Without Morphology [[id number]] Resulted:  06/27/19 1319     Updated:  06/27/19 1319    CT Chest Without Contrast [[id number]] Collected:  06/24/19 1849     Updated:  06/26/19 1612    Narrative:       EXAMINATION: CT CHEST WO CONTRAST-      INDICATION: Myocarditis, recently treated pneumonia, rule out other  causes for systemic symptoms.      TECHNIQUE: CT chest without intravenous contrast.     The radiation dose reduction device was turned on for each scan per the  ALARA (As Low as Reasonably Achievable) protocol.     COMPARISON: None.     FINDINGS: Thyroid is homogeneous in attenuation. Mediastinal lymph node  right lower paratracheal which appears to have central low attenuation  may represent central necrosis measuring up to 1.6 cm. Central airways  are patent. Esophagus in normal course and caliber. Nonaneurysmal  thoracic aorta. Cardiac size within normal limits without pericardial  effusion. Extended lung windows without focal opacification or  consolidation. No dominant nodule or mass lesion. No pleural effusion.  Degenerative changes of the spine without aggressive osseous or soft  tissue body wall. Visualized portions of the upper abdomen are grossly  unremarkable.       Impression:       1. No acute parenchymal findings in the lung specifically no focal  consolidation or pleural effusion.  2. A 1.6 cm mildly enlarged and central low attenuation  concerning for  central necrotic right lower paratracheal lymph node of indeterminate  significance. Attention on followup as clinically recommended.  3. Cardiac size within normal limits without pericardial effusion.     D:  06/24/2019  E:  06/25/2019     This report was finalized on 6/26/2019 4:09 PM by Dr. Aneesh Rivero.       XR Chest 1 View [[id number]] Collected:  06/24/19 1332     Updated:  06/26/19 1603    Narrative:       EXAMINATION: XR CHEST 1 VW- 06/24/2019      INDICATION: ? Pneumonia      COMPARISON: NONE     FINDINGS: Cardiac size within normal limits. Mild prominence of the  perihilar lung markings may represent peribronchial inflammatory process  without focal consolidation. No pneumothorax or pleural effusion.  Degenerative changes of the spine.       Impression:       Increased perihilar lung markings suggestive of acute  bronchitis without focal consolidation to suggest acute pneumonia and no  significant pleural effusion.     D:  06/24/2019  E:  06/24/2019     This report was finalized on 6/26/2019 4:00 PM by Dr. Aneesh Rivero.       CT Abdomen Pelvis Without Contrast [[id number]] Collected:  06/25/19 0850     Updated:  06/25/19 1439    Narrative:       EXAMINATION: CT ABDOMEN AND PELVIS WO CONTRAST-06/24/2019:      INDICATION: Abdominal pain, fever, abscess suspected.     TECHNIQUE: CT scan of the abdomen and pelvis was performed without  intravenous contrast.     The radiation dose reduction device was turned on for each scan per the  ALARA (As Low as Reasonably Achievable) protocol.     COMPARISON: NONE.     FINDINGS: The most superior images demonstrate no basilar inflammatory  process or pleural effusion. The liver and spleen are normal. There is  no adrenal or pancreatic mass. There is no renal mass, stone or  obstruction. There is no ascites, aneurysm or retroperitoneal  lymphadenopathy. There are sigmoid diverticuli without features of  diverticulitis. There is no pelvic mass, fluid or  inguinal  lymphadenopathy.       Impression:       Normal examination.     D:  06/25/2019  E:  06/25/2019           This report was finalized on 6/25/2019 2:36 PM by Dr. Don Diaz MD.                       Results for orders placed during the hospital encounter of 06/24/19   Transthoracic Echo Complete With Contrast if Necessary Per Protocol    Narrative · Left ventricular systolic function is moderately decreased. Estimated EF   = 35%. There is global hypokinesis of the left ventricle.  · Mild mitral valve regurgitation is present           Order Current Status    ANCA Panel In process    Angiotensin Converting Enzyme In process    Human Herpes Virus Type 6 IgM In process    Legionella Pneumophila 1 - 6,IgG In process    Legionella Pneumophila 1 - 6,IgM In process    Parvovirus B19 Antibody, IgG & IgM In process    Q Fever Antibodies IgG / IgM In process    QuantiFERON TB Plus Client Incubated In process    Tropheryma Whipplei PCR In process    Trypanosoma Cruzi Antibody, IgG In process    Trypanosoma Cruzi Antibody, IgM In process    Blood Culture - Blood, Arm, Left Preliminary result    Blood Culture - Blood, Arm, Right Preliminary result    Blood Culture - Blood, Hand, Left Preliminary result    Blood Culture - Blood, Hand, Right Preliminary result        Discharge Details        Discharge Medications      New Medications      Instructions Start Date   dextromethorphan polistirex ER 30 MG/5ML Suspension Extended Release oral suspension  Commonly known as:  DELSYM   10 mL, Oral, Every 12 Hours PRN      doxycycline 100 MG capsule  Commonly known as:  MONODOX   100 mg, Oral, Every 12 Hours      metoprolol succinate XL 25 MG 24 hr tablet  Commonly known as:  TOPROL-XL   25 mg, Oral, Every 24 Hours Scheduled      valsartan 40 MG tablet  Commonly known as:  DIOVAN   40 mg, Oral, Daily         Stop These Medications    azithromycin 250 MG tablet  Commonly known as:  ZITHROMAX            Allergies   Allergen  "Reactions   • Ciprofloxacin Anxiety     \"Extreme anxiety and paranoia\"         Discharge Disposition:  Home or Self Care    Discharge Diet:  Diet Order   Procedures   • Diet Regular     Discharge Activity:  Walk several times daily.    CODE STATUS:    Code Status and Medical Interventions:   Ordered at: 06/24/19 0919     Code Status:    CPR     Medical Interventions (Level of Support Prior to Arrest):    Full         Future Appointments   Date Time Provider Department Center   7/3/2019  2:45 PM Willow Daniels APRN [facility]   9/3/2019  8:30 AM [facility] ECHO/VASC CART RM1 [facility]   9/3/2019 11:00 AM Korin Bear APRN [facility] None       Additional Instructions for the Follow-ups that You Need to Schedule     Discharge Follow-up with Specialty: Fredy Muñiz; 2 Months   As directed      Specialty:  Fredy Muñiz    Follow Up:  2 Months    Follow Up Details:  Hospital FU for myocarditis         Discharge Follow-up with Specified Provider: dr edenilson RIOS; 1 Week   As directed      To:  dr edenilson RIOS    Follow Up:  1 Week         Discharge Follow-up with Specified Provider: dr pyle tubalbina June 2   As directed      To:  pretty granados June 2               Time Spent on Discharge:  40 minutes    Electronically signed by Naila Garcia MD, 06/29/19, 2:00 PM.        Electronically signed by Naila Garcia MD at 6/29/2019  2:19 PM       Discharge Order (From admission, onward)    Start     Ordered    06/29/19 1129  Discharge patient  Once     Expected Discharge Date:  06/29/19    Expected Discharge Time:  Midday    Discharge Disposition:  Home or Self Care    Physician of Record for Attribution - Please select from Treatment Team:  NAILA GARCIA [[id number]]    Review needed by CMO to determine Physician of Record:  No       Question Answer Comment   Physician of Record for Attribution - Please select from Treatment Team NAILA GARCIA    Review needed by CMO to determine Physician of Record No  "       06/29/19 1129

## 2019-07-02 ENCOUNTER — READMISSION MANAGEMENT (OUTPATIENT)
Dept: CALL CENTER | Facility: HOSPITAL | Age: 44
End: 2019-07-02

## 2019-07-02 LAB
CRYPTOC AG CSF QL: NEGATIVE
Lab: ABNORMAL

## 2019-07-02 NOTE — OUTREACH NOTE
Sepsis Week 1 Survey      Responses   Facility patient discharged from?  [facility]   Does the patient have one of the following disease processes/diagnoses(primary or secondary)?  Sepsis   Is there a successful TCM telephone encounter documented?  No   Week 1 attempt successful?  Yes   Call start time  1229   Call end time  1241   Discharge diagnosis  Acute infective myocarditis   Is patient permission given to speak with other caregiver?  No   Meds reviewed with patient/caregiver?  Yes   Is the patient having any side effects they believe may be caused by any medication additions or changes?  No   Does the patient have all medications related to this admission filled (includes all antibiotics, inhalers, nebulizers,steroids,etc.)  Yes   Is the patient taking all medications as directed (includes completed medication regime)?  Yes   Does the patient have a primary care provider?   Yes   Comments regarding PCP  Ihsan Blackwell MD, 7/16/2019   Does the patient have an appointment with their PCP within 7 days of discharge?  Greater than 7 days   Has the patient kept scheduled appointments due by today?  N/A   Comments  New Patient with AILYN Taylor, Wednesday Jul 3, 2019 2:45 PM    Has home health visited the patient within 72 hours of discharge?  N/A   Psychosocial issues?  No   Did the patient receive a copy of their discharge instructions?  Yes   Nursing interventions  Reviewed instructions with patient   What is the patient's perception of their health status since discharge?  Same   Nursing interventions  Nurse provided patient education   Is the patient/caregiver able to teach back Sepsis?  S - Shivering,fever or very cold, E - Extreme pain or generalized discomfort (worst ever,especially abdomen), P - Pale or discolored skin, S - Sleepy, difficult to arouse,confused, I -   I feel like I might die-a feeling of hopelessness, S - Short of breath [Patient continues to run a fever ]   Nursing  interventions  Nurse provided reassurance to patient, Nurse provided patient education   Is patient/caregiver able to teach back steps to recovery at home?  Set small, achievable goals for return to baseline health, Rest and regain strength   Is the patient/caregiver able to teach back signs and symptoms of worsening condition:  Fever, Hyperthermia, Shortness of breath/rapid respiratory rate   Is the patient/caregiver able to teach back the hierarchy of who to call/visit for symptoms/problems? PCP, Specialist, Home health nurse, Urgent Care, ED, 911  Yes   Week 1 call completed?  Yes          Cynthia Martin RN

## 2019-07-03 ENCOUNTER — HOSPITAL ENCOUNTER (OUTPATIENT)
Dept: CARDIOLOGY | Facility: HOSPITAL | Age: 44
Discharge: HOME | End: 2019-07-03
Admitting: INTERNAL MEDICINE

## 2019-07-03 ENCOUNTER — OFFICE VISIT (OUTPATIENT)
Dept: CARDIOLOGY | Facility: HOSPITAL | Age: 44
End: 2019-07-03

## 2019-07-03 VITALS
OXYGEN SATURATION: 95 % | TEMPERATURE: 97.3 F | HEART RATE: 106 BPM | HEIGHT: 72 IN | SYSTOLIC BLOOD PRESSURE: 118 MMHG | WEIGHT: 220 LBS | DIASTOLIC BLOOD PRESSURE: 58 MMHG | BODY MASS INDEX: 29.8 KG/M2 | RESPIRATION RATE: 18 BRPM

## 2019-07-03 DIAGNOSIS — I25.119 CORONARY ARTERY DISEASE INVOLVING NATIVE CORONARY ARTERY OF NATIVE HEART WITH ANGINA PECTORIS (HCC): ICD-10-CM

## 2019-07-03 DIAGNOSIS — R06.02 SHORTNESS OF BREATH: ICD-10-CM

## 2019-07-03 DIAGNOSIS — I42.8 NON-ISCHEMIC CARDIOMYOPATHY (HCC): ICD-10-CM

## 2019-07-03 DIAGNOSIS — I42.8 NON-ISCHEMIC CARDIOMYOPATHY (HCC): Primary | ICD-10-CM

## 2019-07-03 DIAGNOSIS — I40.0 ACUTE VIRAL MYOCARDITIS: ICD-10-CM

## 2019-07-03 LAB
HHV6 IGM TITR SER IF: NORMAL {TITER}
Lab: NORMAL
T WHIPPLEI BY PCR, SOURCE: NORMAL
TROPHERYMA WHIPPLEI PCR: NOT DETECTED

## 2019-07-03 PROCEDURE — 93005 ELECTROCARDIOGRAM TRACING: CPT

## 2019-07-03 PROCEDURE — 93010 ELECTROCARDIOGRAM REPORT: CPT | Performed by: INTERNAL MEDICINE

## 2019-07-03 PROCEDURE — 99214 OFFICE O/P EST MOD 30 MIN: CPT | Performed by: NURSE PRACTITIONER

## 2019-07-03 NOTE — PROGRESS NOTES
[facility]      Encounter Date:07/03/2019     Stephen Stoll  [address] [facility] [place] [address]  [unfilled]    1975    Ihsan Blackwell MD    Stephen Stoll is a 44 y.o. male.      Subjective:     Chief Complaint:  No chief complaint on file.       HPI     44-year-old male who presented to [facility] on 6/24/2019 with chest pain.  Left heart catheterization completed with mild nonobstructive CAD, EF 50%.  Echocardiogram showed EF of 35%.  Diagnosed with acute myocarditis unknown etiology.  Cardiac MRI showed EF 42%, diffuse myocardial inflammation.  Patient started on losartan and beta-blockade.  Patient continued to have fevers of unknown origin.  Plan for repeat echocardiogram 2 months.  Followed by infectious disease.    Pt denies CP, pressure, palpitations, syncope, edema, weight gain.  Patient continuing to have right-sided hip pain.  He is taken Tylenol.  Feels that tramadol causes dizziness and nausea.  Tramadol recently discontinued with prescription of Norco.  Patient no longer taking NSAIDs due to cardiomyopathy.  Patient continues to have fevers 3 times a day associated with fatigue, pain, chills, dyspnea.    Patient had noted worsening dyspnea today before clinic visit.  He felt fever worsening.  Took Tylenol as scheduled.  Dyspnea slowly improving.  Patient denies feelings of anxiety.    Patient is tolerating metoprolol and valsartan.    Patient Active Problem List    Diagnosis Date Noted   • Non-ischemic cardiomyopathy (CMS/HCC) 06/28/2019     Note Last Updated: 6/28/2019     · Cardiac MR (6/26/2019): Diffuse gadolinium enhancement of the myocardium consistent with acute myocarditis.  LVEF 42%.     • Acute infective myocarditis 06/24/2019     Note Last Updated: 6/26/2019     · Elevated troponin and chest pain in the setting of recent respiratory illness  · Cardiac catheterization (6/24/2019): Minimal nonobstructive CAD.    · Cardiac MR  "(6/26/2019): Diffuse gadolinium enhancement of the myocardium consistent with acute myocarditis.  LVEF 42%.     • Hyperlipidemia LDL goal <70 06/24/2019     Note Last Updated: 6/24/2019     · High intensity statin therapy indicated given the presence of CAD     • Coronary artery disease involving native coronary artery of native heart with angina pectoris (CMS/HCC) 06/24/2019     Note Last Updated: 6/24/2019     · Cardiac catheterization for suspected STEMI (6/24/2019): Minimal nonobstructive CAD.  Normal LVEF.     • Leukocytosis 06/24/2019   • Possible Sepsis (CMS/HCC) 06/24/2019         Past Surgical History:   Procedure Laterality Date   • APPENDECTOMY     • BACK SURGERY      L5 S1   • CARDIAC CATHETERIZATION N/A 6/24/2019    Procedure: LEFT HEART CATH;  Surgeon: Deep Muñiz IV, MD;  Location: [facility] CATH INVASIVE LOCATION;  Service: Cardiovascular       Allergies   Allergen Reactions   • Ciprofloxacin Anxiety     \"Extreme anxiety and paranoia\"         Current Outpatient Medications:   •  dextromethorphan polistirex ER (DELSYM) 30 MG/5ML Suspension Extended Release oral suspension, Take 10 ml by mouth Every 12 (Twelve) Hours As Needed (cough)., Disp: 280 mL, Rfl: 0  •  doxycycline (MONODOX) 100 MG capsule, Take 1 capsule by mouth Every 12 (Twelve) Hours for 22 doses., Disp: 22 capsule, Rfl: 0  •  metoprolol succinate XL (TOPROL-XL) 25 MG 24 hr tablet, Take 1 tablet by mouth Daily., Disp: 90 tablet, Rfl: 0  •  valsartan (DIOVAN) 40 MG tablet, Take 1 tablet by mouth Daily., Disp: 90 tablet, Rfl: 0    The following portions of the patient's history were reviewed and updated today during office visit as appropriate: allergies, current medications, past family history, past medical history, past social history, past surgical history and problem list.    Review of Systems   Constitution: Positive for chills, diaphoresis and fever.   Respiratory: Positive for shortness of breath.    Musculoskeletal: " "Positive for joint pain.   Gastrointestinal: Positive for nausea.   All other systems reviewed and are negative.      Objective:     Vitals:    07/03/19 1424 07/03/19 1427 07/03/19 1428   BP: 132/64 127/58 118/58   BP Location: Left arm Right arm Right arm   Patient Position: Sitting Sitting Standing   Cuff Size: Adult Adult Adult   Pulse: 99  106   Resp: 18     Temp: 97.3 °F (36.3 °C)     TempSrc: Temporal     SpO2: 96%  95%   Weight: 99.8 kg (220 lb)     Height: 182 cm (71.65\")           Physical Exam   Constitutional: He is oriented to person, place, and time. He appears well-developed and well-nourished. No distress.   HENT:   Mouth/Throat: Oropharynx is clear and moist.   Eyes: No scleral icterus.   Neck: No hepatojugular reflux and no JVD present. Carotid bruit is not present. No tracheal deviation present. No thyromegaly present.   Cardiovascular: Normal rate, regular rhythm, normal heart sounds and intact distal pulses. Exam reveals no friction rub.   No murmur heard.  Pulmonary/Chest: Effort normal and breath sounds normal.   Abdominal: Soft. Bowel sounds are normal. He exhibits no distension. There is no tenderness.   Musculoskeletal: He exhibits no edema.   Lymphadenopathy:     He has no cervical adenopathy.   Neurological: He is alert and oriented to person, place, and time.   Skin: Skin is warm, dry and intact. No rash noted. No cyanosis or erythema. No pallor.   Psychiatric: He has a normal mood and affect. His behavior is normal. Thought content normal.   Vitals reviewed.      Lab and Diagnostic Review:  Lab Results   Component Value Date    WBC 19.27 (H) 07/01/2019    HGB 12.9 (L) 07/01/2019    HCT 39.6 07/01/2019    MCV 85.2 07/01/2019     07/01/2019     Lab Results   Component Value Date    GLUCOSE 103 (H) 07/01/2019    BUN 8 07/01/2019    CREATININE 0.74 (L) 07/01/2019    EGFRIFNONA 114 07/01/2019    BCR 10.8 07/01/2019    K 3.9 07/01/2019    CO2 25.0 07/01/2019    CALCIUM 9.0 07/01/2019    " ALBUMIN 3.10 (L) 07/01/2019    AST 18 07/01/2019    ALT 32 07/01/2019     Lab Results   Component Value Date    ALT 32 07/01/2019     Lab Results   Component Value Date    CHOL 191 06/24/2019     Lab Results   Component Value Date    TRIG 51 06/24/2019     Lab Results   Component Value Date    HDL 58 06/24/2019     Lab Results   Component Value Date     (H) 06/24/2019       Echocardiogram 6/24/2019: EF 35%, mild MR    Left heart catheterization 6/24/2019: Mild nonobstructive CAD  Assessment and Plan:         1. Non-ischemic cardiomyopathy (CMS/HCC)  EF 35%,  No s/s HF worsening    Heart failure education discussed: What is heart failure, causes, signs and symptoms, medication management, daily weight monitoring, low-sodium diet of less than 2 g per day, daily exercise, role the heart failure center.      - ECG 12 Lead; sinus rhythm 91 bpm    2. Acute viral myocarditis  Followed by ID  Avoiding NSAID    3. Coronary artery disease involving native coronary artery of native heart with angina pectoris (CMS/HCC)  Will need to consider statin, but at this time continue to monitor LFTs in setting of frequent Tylenol use, antibiotics.    4. Shortness of breath  Felt related to fever.  Improved after Tylenol.  Continue to monitor  No heart failure signs and symptoms.  Acceptable EKG    F/u 6 months or sooner if needed.  F/u with ID, [facility], PCP as scheduled.         *Please note that portions of this note were completed with a voice recognition program. Efforts were made to edit the dictations, but occasionally words are mistranscribed.

## 2019-07-08 ENCOUNTER — APPOINTMENT (OUTPATIENT)
Dept: GENERAL RADIOLOGY | Facility: HOSPITAL | Age: 44
End: 2019-07-08

## 2019-07-08 ENCOUNTER — APPOINTMENT (OUTPATIENT)
Dept: CARDIOLOGY | Facility: HOSPITAL | Age: 44
End: 2019-07-08

## 2019-07-08 ENCOUNTER — HOSPITAL ENCOUNTER (INPATIENT)
Facility: HOSPITAL | Age: 44
LOS: 4 days | Discharge: HOME | End: 2019-07-12
Attending: HOSPITALIST | Admitting: INTERNAL MEDICINE

## 2019-07-08 DIAGNOSIS — R93.89 ABNORMAL CT SCAN: ICD-10-CM

## 2019-07-08 DIAGNOSIS — R50.9 FEVER OF UNKNOWN ORIGIN: ICD-10-CM

## 2019-07-08 PROBLEM — A41.9 SEPSIS: Status: RESOLVED | Noted: 2019-06-24 | Resolved: 2019-07-08

## 2019-07-08 LAB
ALBUMIN SERPL-MCNC: 2.8 G/DL (ref 3.5–5.2)
ALBUMIN/GLOB SERPL: 0.6 G/DL
ALP SERPL-CCNC: 111 U/L (ref 39–117)
ALT SERPL W P-5'-P-CCNC: 25 U/L (ref 1–41)
ANION GAP SERPL CALCULATED.3IONS-SCNC: 15 MMOL/L (ref 5–15)
AST SERPL-CCNC: 31 U/L (ref 1–40)
BASOPHILS # BLD AUTO: 0.16 10*3/MM3 (ref 0–0.2)
BASOPHILS NFR BLD AUTO: 0.5 % (ref 0–1.5)
BILIRUB SERPL-MCNC: 0.5 MG/DL (ref 0.2–1.2)
BUN BLD-MCNC: 13 MG/DL (ref 6–20)
BUN/CREAT SERPL: 14 (ref 7–25)
CALCIUM SPEC-SCNC: 8.4 MG/DL (ref 8.6–10.5)
CHLORIDE SERPL-SCNC: 90 MMOL/L (ref 98–107)
CK MB SERPL-CCNC: <1 NG/ML
CK SERPL-CCNC: 129 U/L (ref 20–200)
CK SERPL-CCNC: 131 U/L (ref 20–200)
CO2 SERPL-SCNC: 23 MMOL/L (ref 22–29)
CREAT BLD-MCNC: 0.93 MG/DL (ref 0.76–1.27)
CRP SERPL-MCNC: 47.8 MG/DL (ref 0–0.5)
D-LACTATE SERPL-SCNC: 1.3 MMOL/L (ref 0.5–2)
DEPRECATED RDW RBC AUTO: 40.5 FL (ref 37–54)
EOSINOPHIL # BLD AUTO: 0.18 10*3/MM3 (ref 0–0.4)
EOSINOPHIL NFR BLD AUTO: 0.5 % (ref 0.3–6.2)
ERYTHROCYTE [DISTWIDTH] IN BLOOD BY AUTOMATED COUNT: 13.3 % (ref 12.3–15.4)
ERYTHROCYTE [SEDIMENTATION RATE] IN BLOOD: 90 MM/HR (ref 0–15)
FERRITIN SERPL-MCNC: 2250 NG/ML (ref 30–400)
GFR SERPL CREATININE-BSD FRML MDRD: 88 ML/MIN/1.73
GLOBULIN UR ELPH-MCNC: 4.5 GM/DL
GLUCOSE BLD-MCNC: 116 MG/DL (ref 65–99)
HCT VFR BLD AUTO: 34.7 % (ref 37.5–51)
HGB BLD-MCNC: 11.3 G/DL (ref 13–17.7)
IMM GRANULOCYTES # BLD AUTO: 0.99 10*3/MM3 (ref 0–0.05)
IMM GRANULOCYTES NFR BLD AUTO: 2.8 % (ref 0–0.5)
INR PPP: 1.53 (ref 0.85–1.16)
LYMPHOCYTES # BLD AUTO: 1.73 10*3/MM3 (ref 0.7–3.1)
LYMPHOCYTES NFR BLD AUTO: 4.9 % (ref 19.6–45.3)
MCH RBC QN AUTO: 27.2 PG (ref 26.6–33)
MCHC RBC AUTO-ENTMCNC: 32.6 G/DL (ref 31.5–35.7)
MCV RBC AUTO: 83.6 FL (ref 79–97)
MONOCYTES # BLD AUTO: 1.83 10*3/MM3 (ref 0.1–0.9)
MONOCYTES NFR BLD AUTO: 5.2 % (ref 5–12)
NEUTROPHILS # BLD AUTO: 30.13 10*3/MM3 (ref 1.7–7)
NEUTROPHILS NFR BLD AUTO: 86.1 % (ref 42.7–76)
NRBC BLD AUTO-RTO: 0 /100 WBC (ref 0–0.2)
NT-PROBNP SERPL-MCNC: 433.4 PG/ML (ref 5–450)
PLATELET # BLD AUTO: 529 10*3/MM3 (ref 140–450)
PMV BLD AUTO: 10 FL (ref 6–12)
POTASSIUM BLD-SCNC: 4.3 MMOL/L (ref 3.5–5.2)
PROCALCITONIN SERPL-MCNC: 2.18 NG/ML (ref 0.1–0.25)
PROT SERPL-MCNC: 7.3 G/DL (ref 6–8.5)
PROTHROMBIN TIME: 17.7 SECONDS (ref 11.2–14.3)
RBC # BLD AUTO: 4.15 10*6/MM3 (ref 4.14–5.8)
SODIUM BLD-SCNC: 128 MMOL/L (ref 136–145)
TROPONIN T SERPL-MCNC: <0.01 NG/ML (ref 0–0.03)
WBC NRBC COR # BLD: 35.02 10*3/MM3 (ref 3.4–10.8)

## 2019-07-08 PROCEDURE — 71046 X-RAY EXAM CHEST 2 VIEWS: CPT

## 2019-07-08 PROCEDURE — 86256 FLUORESCENT ANTIBODY TITER: CPT | Performed by: INTERNAL MEDICINE

## 2019-07-08 PROCEDURE — 82550 ASSAY OF CK (CPK): CPT | Performed by: INTERNAL MEDICINE

## 2019-07-08 PROCEDURE — 94640 AIRWAY INHALATION TREATMENT: CPT

## 2019-07-08 PROCEDURE — 25010000002 VANCOMYCIN 10 G RECONSTITUTED SOLUTION

## 2019-07-08 PROCEDURE — 82553 CREATINE MB FRACTION: CPT | Performed by: HOSPITALIST

## 2019-07-08 PROCEDURE — 84484 ASSAY OF TROPONIN QUANT: CPT | Performed by: HOSPITALIST

## 2019-07-08 PROCEDURE — 93306 TTE W/DOPPLER COMPLETE: CPT | Performed by: INTERNAL MEDICINE

## 2019-07-08 PROCEDURE — 86063 ANTISTREPTOLYSIN O SCREEN: CPT | Performed by: INTERNAL MEDICINE

## 2019-07-08 PROCEDURE — 84145 PROCALCITONIN (PCT): CPT | Performed by: HOSPITALIST

## 2019-07-08 PROCEDURE — 82550 ASSAY OF CK (CPK): CPT | Performed by: HOSPITALIST

## 2019-07-08 PROCEDURE — 25010000002 MORPHINE PER 10 MG: Performed by: INTERNAL MEDICINE

## 2019-07-08 PROCEDURE — 80053 COMPREHEN METABOLIC PANEL: CPT | Performed by: HOSPITALIST

## 2019-07-08 PROCEDURE — 87040 BLOOD CULTURE FOR BACTERIA: CPT | Performed by: HOSPITALIST

## 2019-07-08 PROCEDURE — 85025 COMPLETE CBC W/AUTO DIFF WBC: CPT | Performed by: HOSPITALIST

## 2019-07-08 PROCEDURE — 86140 C-REACTIVE PROTEIN: CPT | Performed by: HOSPITALIST

## 2019-07-08 PROCEDURE — 83520 IMMUNOASSAY QUANT NOS NONAB: CPT | Performed by: INTERNAL MEDICINE

## 2019-07-08 PROCEDURE — 99223 1ST HOSP IP/OBS HIGH 75: CPT | Performed by: INTERNAL MEDICINE

## 2019-07-08 PROCEDURE — 82728 ASSAY OF FERRITIN: CPT | Performed by: INTERNAL MEDICINE

## 2019-07-08 PROCEDURE — 85652 RBC SED RATE AUTOMATED: CPT | Performed by: HOSPITALIST

## 2019-07-08 PROCEDURE — 83880 ASSAY OF NATRIURETIC PEPTIDE: CPT | Performed by: INTERNAL MEDICINE

## 2019-07-08 PROCEDURE — 83605 ASSAY OF LACTIC ACID: CPT | Performed by: HOSPITALIST

## 2019-07-08 PROCEDURE — 85610 PROTHROMBIN TIME: CPT | Performed by: HOSPITALIST

## 2019-07-08 PROCEDURE — 25010000002 CEFTRIAXONE PER 250 MG: Performed by: INTERNAL MEDICINE

## 2019-07-08 PROCEDURE — 93306 TTE W/DOPPLER COMPLETE: CPT

## 2019-07-08 RX ORDER — HYDROCODONE BITARTRATE AND ACETAMINOPHEN 10; 325 MG/1; MG/1
1 TABLET ORAL EVERY 6 HOURS PRN
COMMUNITY
End: 2019-08-21

## 2019-07-08 RX ORDER — SODIUM CHLORIDE 0.9 % (FLUSH) 0.9 %
3 SYRINGE (ML) INJECTION EVERY 12 HOURS SCHEDULED
Status: DISCONTINUED | OUTPATIENT
Start: 2019-07-08 | End: 2019-07-12 | Stop reason: HOSPADM

## 2019-07-08 RX ORDER — IPRATROPIUM BROMIDE AND ALBUTEROL SULFATE 2.5; .5 MG/3ML; MG/3ML
3 SOLUTION RESPIRATORY (INHALATION)
Status: DISCONTINUED | OUTPATIENT
Start: 2019-07-08 | End: 2019-07-09

## 2019-07-08 RX ORDER — MORPHINE SULFATE 2 MG/ML
2 INJECTION, SOLUTION INTRAMUSCULAR; INTRAVENOUS EVERY 8 HOURS PRN
Status: DISCONTINUED | OUTPATIENT
Start: 2019-07-08 | End: 2019-07-12 | Stop reason: HOSPADM

## 2019-07-08 RX ORDER — DEXTROMETHORPHAN POLISTIREX 30 MG/5ML
60 SUSPENSION ORAL EVERY 12 HOURS SCHEDULED
Status: DISCONTINUED | OUTPATIENT
Start: 2019-07-09 | End: 2019-07-12 | Stop reason: HOSPADM

## 2019-07-08 RX ORDER — ACETAMINOPHEN 325 MG/1
650 TABLET ORAL EVERY 4 HOURS PRN
Status: DISCONTINUED | OUTPATIENT
Start: 2019-07-08 | End: 2019-07-12 | Stop reason: HOSPADM

## 2019-07-08 RX ORDER — SODIUM CHLORIDE 0.9 % (FLUSH) 0.9 %
3-10 SYRINGE (ML) INJECTION AS NEEDED
Status: DISCONTINUED | OUTPATIENT
Start: 2019-07-08 | End: 2019-07-12 | Stop reason: HOSPADM

## 2019-07-08 RX ORDER — HYDROCODONE BITARTRATE AND ACETAMINOPHEN 5; 325 MG/1; MG/1
1 TABLET ORAL EVERY 6 HOURS PRN
Status: DISCONTINUED | OUTPATIENT
Start: 2019-07-08 | End: 2019-07-10

## 2019-07-08 RX ADMIN — CEFTRIAXONE SODIUM 1 G: 1 INJECTION, POWDER, FOR SOLUTION INTRAMUSCULAR; INTRAVENOUS at 16:56

## 2019-07-08 RX ADMIN — MORPHINE SULFATE 2 MG: 2 INJECTION, SOLUTION INTRAMUSCULAR; INTRAVENOUS at 16:55

## 2019-07-08 RX ADMIN — VANCOMYCIN HYDROCHLORIDE 2000 MG: 10 INJECTION, POWDER, LYOPHILIZED, FOR SOLUTION INTRAVENOUS at 18:52

## 2019-07-08 RX ADMIN — IPRATROPIUM BROMIDE AND ALBUTEROL SULFATE 3 ML: 2.5; .5 SOLUTION RESPIRATORY (INHALATION) at 21:14

## 2019-07-08 RX ADMIN — ACETAMINOPHEN 650 MG: 325 TABLET, FILM COATED ORAL at 16:55

## 2019-07-08 RX ADMIN — HYDROCODONE BITARTRATE AND ACETAMINOPHEN 1 TABLET: 5; 325 TABLET ORAL at 21:18

## 2019-07-08 RX ADMIN — SODIUM CHLORIDE, PRESERVATIVE FREE 3 ML: 5 INJECTION INTRAVENOUS at 20:50

## 2019-07-08 NOTE — PROGRESS NOTES
"Pharmacy Consult-Vancomycin Dosing  Stephen Stoll is a  44 y.o. male receiving vancomycin therapy.     Indication: sepsis  Consulting Provider: hospital medicine  ID Consult: yes (Dr. Hauser)  Initial duration of therapy: 7 days  Goal Trough:15-20    Current Antimicrobial Therapy  Anti-Infectives (From admission, onward)      Ordered     Dose/Rate Route Frequency Start Stop    07/08/19 1619  vancomycin 2000 mg/500 mL 0.9% NS IVPB ([facility])     Ordering Provider:  Isiah Huntley RPH    2,000 mg  over 180 Minutes Intravenous Every 12 Hours Scheduled 07/08/19 1730 07/15/19 1759    07/08/19 1544  cefTRIAXone (ROCEPHIN) 1 g/100 mL 0.9% NS ([facility])     Ordering Provider:  Dana Garcia MD    1 g  over 30 Minutes Intravenous Every 24 Hours 07/08/19 1600 07/15/19 1559    07/08/19 1544  Pharmacy to dose vancomycin     Ordering Provider:  Dana Garcia MD     Does not apply Continuous PRN 07/08/19 1544 07/15/19 1543            Allergies  Allergies as of 07/08/2019 - Reviewed 07/08/2019   Allergen Reaction Noted   • Tramadol Nausea And Vomiting 07/08/2019   • Ciprofloxacin Anxiety 06/24/2019       Labs    Results from last 7 days   Lab Units 07/08/19  1429   BUN mg/dL 13   CREATININE mg/dL 0.93       Results from last 7 days   Lab Units 07/08/19  1429   WBC 10*3/mm3 35.02*       Evaluation of Dosing     Last Dose Received in the ED/Outside Facility: na  Is Patient on Dialysis or Renal Replacement: no    Ht - 182 cm (71.65\")  Wt - 99.8 kg (220 lb)    Estimated Creatinine Clearance: 122 mL/min (by C-G formula based on SCr of 0.93 mg/dL).    Intake & Output (last 3 days)       None            Microbiology and Radiology  Microbiology Results (last 10 days)       ** No results found for the last 240 hours. **            Evaluation of Level                           Assessment/Plan:  On prior admission in June 2019 vancomycin 1500 mg iv every 12 hours resulted in subtherapeutic trough of 8.8; will therefore start patient on " vancomycin 2000 mg iv every 12 hours. Will check vancomycin trough prior to fourth total dose on Wednesday(7/10)  Isiah Huntley RPH

## 2019-07-08 NOTE — H&P
[facility] Medicine Services  HISTORY AND PHYSICAL    Patient Name: Stephen Stoll  : 1975  MRN: [id number]  Primary Care Physician: Ihsan Blackwell MD  Date of admission: 2019      Subjective   Subjective     Chief Complaint:  Fever of unknown origin x weeks; ongoing despite doxycycline    HPI:  Stephen Stoll is a 44 y.o. male recently admitted from  to  with FUO and myocarditis.  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  He developed recurrent temperatures to 102 along with a cough and was treated outpatient for pneumonia, but was admitted on  due to new chest pain and troponin elevation.  He was seen by Cardiology here and was diagnosed with myocarditis.  Initial echo indicated an EF of 35%; cardiac MRI later showed EF of 42% with diffuse inflammation consistent with a myocarditis diagnosis.  He was followed closely by the ID and cardiac services and clinically improved with resolution of muscle cramps and some improvement in his energy and exercise tolerance.  He was eventually discharged in stable condition - still with intermittent fevers and cough, still with some serologic tests pending - on doxycycline with a plan for close followup by ID.      Since discharge the fevers have continued.  His dyspnea has worsened.  His R wrist became swollen and painful yesterday.  Today he saw ID in the office and was referred for admission and further inpatient workup.       Review of Systems   Gen- ongoing fever and chills several times per day.  Poor appetite. Fatigue.    CV- No chest pain, palpitations  Resp- Chronic dry cough and worsening dyspnea, no hemoptysis.    GI- No N/V/D, abd pain    Otherwise complete ROS reviewed and is negative except as mentioned in the HPI.    Personal History     Past Medical History:   Diagnosis Date   • Hyperlipidemia    •  Pneumonia        Past Surgical History:   Procedure Laterality Date   • APPENDECTOMY     • BACK SURGERY      L5 S1   • CARDIAC CATHETERIZATION N/A 6/24/2019    Procedure: LEFT HEART CATH;  Surgeon: Deep Muñiz IV, MD;  Location: [facility] CATH INVASIVE LOCATION;  Service: Cardiovascular       Family History: family history includes Cancer in his paternal grandfather; Emphysema in his maternal grandfather; Hyperlipidemia in his mother; Hypertension in his father; No Known Problems in his maternal grandmother; Stroke in his maternal grandfather and paternal grandmother. Otherwise pertinent FHx was reviewed and unremarkable.     Social History:  reports that he has never smoked. He has never used smokeless tobacco. He reports that he does not drink alcohol or use drugs.  Social History     Social History Narrative    Caffeine use 4-5 servings a day       Medications:    Available home medication information reviewed.  Medications Prior to Admission   Medication Sig Dispense Refill Last Dose   • guaiFENesin (ROBITUSSIN) 100 MG/5ML syrup Take 200 mg by mouth 3 (Three) Times a Day As Needed for Cough.      • HYDROcodone-acetaminophen (NORCO)  MG per tablet Take 1 tablet by mouth Every 6 (Six) Hours As Needed for Moderate Pain . 1/2 to 2 tablets every 6 hours as needed      • metoprolol succinate XL (TOPROL-XL) 25 MG 24 hr tablet Take 1 tablet by mouth Daily. 90 tablet 0 7/8/2019 at Unknown time   • valsartan (DIOVAN) 40 MG tablet Take 1 tablet by mouth Daily. 90 tablet 0 7/8/2019 at Unknown time   • dextromethorphan polistirex ER (DELSYM) 30 MG/5ML Suspension Extended Release oral suspension Take 10 ml by mouth Every 12 (Twelve) Hours As Needed (cough). 280 mL 0 Taking   • doxycycline (MONODOX) 100 MG capsule Take 1 capsule by mouth Every 12 (Twelve) Hours for 22 doses. 22 capsule 0 Taking       Allergies   Allergen Reactions   • Tramadol Nausea And Vomiting     Nausea and not working   • Ciprofloxacin  "Anxiety     \"Extreme anxiety and paranoia\"       Objective   Objective     Vital Signs:   Temp:  [100.2 °F (37.9 °C)] 100.2 °F (37.9 °C)  Heart Rate:  [93] 93  Resp:  [18] 18  BP: (125)/(80) 125/80        Physical Exam   Constitutional: Awake, alert, seen in echo lab  Eyes: PERRLA, sclerae anicteric, no conjunctival injection  HENT: NCAT, mucous membranes moist  Neck: Supple, no thyromegaly, no lymphadenopathy, trachea midline  Respiratory: persistent dry cough, unremitting.  CTA, no W R R  Cardiovascular: RRR, no murmurs, rubs, or gallops, palpable pedal pulses bilaterally  Gastrointestinal: Positive bowel sounds, soft, nontender, nondistended  Musculoskeletal: No bilateral ankle edema, no clubbing or cyanosis to extremities.  R wrist with effusion and tenderness to all ROM.  Not hot/red.  Tender to palpation.    Psychiatric: Appropriate affect, cooperative  Neurologic: Oriented x 3, strength symmetric in all extremities, Cranial Nerves grossly intact to confrontation, speech clear  Skin: No rashes.  Both pinnae are red and hot.  Has small tender point on R post calf but no visible lesion at the site.         Results Reviewed:  I have personally reviewed current lab, radiology, and data and agree.              Invalid input(s):  ALKPHOS  Estimated Creatinine Clearance: 153.3 mL/min (A) (by C-G formula based on SCr of 0.74 mg/dL (L)).  Brief Urine Lab Results  (Last result in the past 365 days)      Color   Clarity   Blood   Leuk Est   Nitrite   Protein   CREAT   Urine HCG        06/27/19 1615 Yellow Clear Negative Negative Negative 30 mg/dL (1+)             Imaging Results (last 24 hours)     ** No results found for the last 24 hours. **        Results for orders placed during the hospital encounter of 06/24/19   Transthoracic Echo Complete With Contrast if Necessary Per Protocol    Narrative · Left ventricular systolic function is moderately decreased. Estimated EF   = 35%. There is global hypokinesis of the left " ventricle.  · Mild mitral valve regurgitation is present          Assessment/Plan   Assessment / Plan     Active Hospital Problems    Diagnosis POA   • Fever [R50.9] Yes     A/p    FUO  Leukocytosis now 35K  Sed rate rising since admission   - still consider viral or atypical infection, sarcoid,Still's disease, vasculitis, other  - note that sarcoid has beeen associated with leukocytosis that resolves w treatment.     Myocarditis - EF has normalized by today's echo.  - continue ARB and BB  - discussed with Dr. Muñiz.   - normal  troponin    R wrist arthritis/effusion  - discussed w Dr Diaz - plan tap tomorrow under fluoro or CT guidance  - suspect this is reactive, ? Sarcoid - will ask ortho about recommended labs on synovial fluid    Persistent cough, dyspnea  CT last admission showed a necrotic center to a R paratracheal lymph node  - consider pulm or CT surg to attempt bronch/biopsy    DVT prophylaxis: ambulation.  [facility] today.      CODE STATUS:    There are no questions and answers to display.       Admission Status:  I believe this patient meets INPATIENT status due to the need for care which can only be reasonably provided in an hospital setting duye to progressie outpatient decline and multiple organ system involvement with an undndiagnosed illness..  In such, I feel patient’s risk for adverse outcomes and need for care warrant INPATIENT evaluation and predict the patient’s care encounter to likely last beyond 2 midnights.    Electronically signed by Dana Garcia MD, 07/08/19, 3:05 PM.

## 2019-07-09 ENCOUNTER — READMISSION MANAGEMENT (OUTPATIENT)
Dept: CALL CENTER | Facility: HOSPITAL | Age: 44
End: 2019-07-09

## 2019-07-09 ENCOUNTER — APPOINTMENT (OUTPATIENT)
Dept: CT IMAGING | Facility: HOSPITAL | Age: 44
End: 2019-07-09

## 2019-07-09 ENCOUNTER — APPOINTMENT (OUTPATIENT)
Dept: PULMONOLOGY | Facility: HOSPITAL | Age: 44
End: 2019-07-09

## 2019-07-09 ENCOUNTER — APPOINTMENT (OUTPATIENT)
Dept: GENERAL RADIOLOGY | Facility: HOSPITAL | Age: 44
End: 2019-07-09

## 2019-07-09 LAB
ANION GAP SERPL CALCULATED.3IONS-SCNC: 15 MMOL/L (ref 5–15)
APPEARANCE FLD: CLEAR
ASO AB SERPL-ACNC: NEGATIVE [IU]/ML
BASOPHILS # BLD MANUAL: 0 10*3/MM3 (ref 0–0.2)
BASOPHILS NFR BLD AUTO: 0 % (ref 0–1.5)
BH CV ECHO MEAS - AO MAX PG (FULL): 9.9 MMHG
BH CV ECHO MEAS - AO MAX PG: 22.7 MMHG
BH CV ECHO MEAS - AO ROOT AREA (BSA CORRECTED): 1.3
BH CV ECHO MEAS - AO ROOT AREA: 6.2 CM^2
BH CV ECHO MEAS - AO ROOT DIAM: 2.8 CM
BH CV ECHO MEAS - AO V2 MAX: 238.3 CM/SEC
BH CV ECHO MEAS - BSA(HAYCOCK): 2.3 M^2
BH CV ECHO MEAS - BSA: 2.2 M^2
BH CV ECHO MEAS - BZI_BMI: 30.7 KILOGRAMS/M^2
BH CV ECHO MEAS - BZI_METRIC_HEIGHT: 180.3 CM
BH CV ECHO MEAS - BZI_METRIC_WEIGHT: 99.8 KG
BH CV ECHO MEAS - EDV(CUBED): 102.1 ML
BH CV ECHO MEAS - EDV(MOD-SP2): 86 ML
BH CV ECHO MEAS - EDV(MOD-SP4): 85 ML
BH CV ECHO MEAS - EDV(TEICH): 101 ML
BH CV ECHO MEAS - EF(CUBED): 83.9 %
BH CV ECHO MEAS - EF(MOD-BP): 64 %
BH CV ECHO MEAS - EF(MOD-SP2): 67.4 %
BH CV ECHO MEAS - EF(MOD-SP4): 60 %
BH CV ECHO MEAS - EF(TEICH): 77 %
BH CV ECHO MEAS - ESV(CUBED): 16.4 ML
BH CV ECHO MEAS - ESV(MOD-SP2): 28 ML
BH CV ECHO MEAS - ESV(MOD-SP4): 34 ML
BH CV ECHO MEAS - ESV(TEICH): 23.3 ML
BH CV ECHO MEAS - FS: 45.6 %
BH CV ECHO MEAS - IVS/LVPW: 0.77
BH CV ECHO MEAS - IVSD: 0.83 CM
BH CV ECHO MEAS - LA DIMENSION: 3.5 CM
BH CV ECHO MEAS - LA/AO: 1.2
BH CV ECHO MEAS - LAD MAJOR: 5 CM
BH CV ECHO MEAS - LAT PEAK E' VEL: 14.5 CM/SEC
BH CV ECHO MEAS - LATERAL E/E' RATIO: 7.3
BH CV ECHO MEAS - LV DIASTOLIC VOL/BSA (35-75): 38.7 ML/M^2
BH CV ECHO MEAS - LV MASS(C)D: 152.3 GRAMS
BH CV ECHO MEAS - LV MASS(C)DI: 69.4 GRAMS/M^2
BH CV ECHO MEAS - LV MAX PG: 12.8 MMHG
BH CV ECHO MEAS - LV MEAN PG: 8.1 MMHG
BH CV ECHO MEAS - LV SYSTOLIC VOL/BSA (12-30): 15.5 ML/M^2
BH CV ECHO MEAS - LV V1 MAX: 178.9 CM/SEC
BH CV ECHO MEAS - LV V1 MEAN: 135.8 CM/SEC
BH CV ECHO MEAS - LV V1 VTI: 25.8 CM
BH CV ECHO MEAS - LVIDD: 4.7 CM
BH CV ECHO MEAS - LVIDS: 2.5 CM
BH CV ECHO MEAS - LVLD AP2: 8.1 CM
BH CV ECHO MEAS - LVLD AP4: 7.5 CM
BH CV ECHO MEAS - LVLS AP2: 6.8 CM
BH CV ECHO MEAS - LVLS AP4: 6.4 CM
BH CV ECHO MEAS - LVPWD: 1.1 CM
BH CV ECHO MEAS - MED PEAK E' VEL: 12.2 CM/SEC
BH CV ECHO MEAS - MEDIAL E/E' RATIO: 8.6
BH CV ECHO MEAS - MV A MAX VEL: 79.5 CM/SEC
BH CV ECHO MEAS - MV DEC TIME: 0.14 SEC
BH CV ECHO MEAS - MV E MAX VEL: 106.3 CM/SEC
BH CV ECHO MEAS - MV E/A: 1.3
BH CV ECHO MEAS - PA ACC SLOPE: 1155 CM/SEC^2
BH CV ECHO MEAS - PA ACC TIME: 0.11 SEC
BH CV ECHO MEAS - PA MAX PG: 8.5 MMHG
BH CV ECHO MEAS - PA PR(ACCEL): 29.9 MMHG
BH CV ECHO MEAS - PA V2 MAX: 145.5 CM/SEC
BH CV ECHO MEAS - SI(CUBED): 39 ML/M^2
BH CV ECHO MEAS - SI(MOD-SP2): 26.4 ML/M^2
BH CV ECHO MEAS - SI(MOD-SP4): 23.2 ML/M^2
BH CV ECHO MEAS - SI(TEICH): 35.4 ML/M^2
BH CV ECHO MEAS - SV(CUBED): 85.7 ML
BH CV ECHO MEAS - SV(MOD-SP2): 58 ML
BH CV ECHO MEAS - SV(MOD-SP4): 51 ML
BH CV ECHO MEAS - SV(TEICH): 77.8 ML
BH CV ECHO MEAS - TAPSE (>1.6): 2.9 CM2
BH CV ECHO MEASUREMENTS AVERAGE E/E' RATIO: 7.96
BH CV XLRA - RV BASE: 2.9 CM
BH CV XLRA - RV LENGTH: 7.8 CM
BH CV XLRA - RV MID: 2.1 CM
BH CV XLRA - TDI S': 20 CM/SEC
BUN BLD-MCNC: 13 MG/DL (ref 6–20)
BUN/CREAT SERPL: 13.7 (ref 7–25)
CALCIUM SPEC-SCNC: 8.4 MG/DL (ref 8.6–10.5)
CHLORIDE SERPL-SCNC: 94 MMOL/L (ref 98–107)
CO2 SERPL-SCNC: 22 MMOL/L (ref 22–29)
COLOR FLD: COLORLESS
CREAT BLD-MCNC: 0.95 MG/DL (ref 0.76–1.27)
CRYSTALS FLD MICRO: NORMAL
DEPRECATED RDW RBC AUTO: 42.1 FL (ref 37–54)
EOSINOPHIL # BLD MANUAL: 0.75 10*3/MM3 (ref 0–0.4)
EOSINOPHIL NFR BLD MANUAL: 2 % (ref 0.3–6.2)
ERYTHROCYTE [DISTWIDTH] IN BLOOD BY AUTOMATED COUNT: 13.4 % (ref 12.3–15.4)
GFR SERPL CREATININE-BSD FRML MDRD: 86 ML/MIN/1.73
GLUCOSE BLD-MCNC: 127 MG/DL (ref 65–99)
HCT VFR BLD AUTO: 36 % (ref 37.5–51)
HGB BLD-MCNC: 11.8 G/DL (ref 13–17.7)
KOH PREP NAIL: NORMAL
LEFT ATRIUM VOLUME INDEX: 24.1 ML/M^2
LEFT ATRIUM VOLUME: 53 ML
LV EF 2D ECHO EST: 55 %
LYMPHOCYTES # BLD MANUAL: 2.25 10*3/MM3 (ref 0.7–3.1)
LYMPHOCYTES NFR BLD MANUAL: 5 % (ref 5–12)
LYMPHOCYTES NFR BLD MANUAL: 6 % (ref 19.6–45.3)
MAXIMAL PREDICTED HEART RATE: 175 BPM
MCH RBC QN AUTO: 27.7 PG (ref 26.6–33)
MCHC RBC AUTO-ENTMCNC: 32.8 G/DL (ref 31.5–35.7)
MCV RBC AUTO: 84.5 FL (ref 79–97)
MONOCYTES # BLD AUTO: 1.88 10*3/MM3 (ref 0.1–0.9)
MYELOCYTES NFR BLD MANUAL: 1 % (ref 0–0)
NEUTROPHILS # BLD AUTO: 32.28 10*3/MM3 (ref 1.7–7)
NEUTROPHILS NFR BLD MANUAL: 83 % (ref 42.7–76)
NEUTS BAND NFR BLD MANUAL: 3 % (ref 0–5)
PLAT MORPH BLD: NORMAL
PLATELET # BLD AUTO: 597 10*3/MM3 (ref 140–450)
PMV BLD AUTO: 9.8 FL (ref 6–12)
POTASSIUM BLD-SCNC: 4.4 MMOL/L (ref 3.5–5.2)
RBC # BLD AUTO: 4.26 10*6/MM3 (ref 4.14–5.8)
RBC # FLD AUTO: <2000 /MM3
RBC MORPH BLD: NORMAL
SODIUM BLD-SCNC: 131 MMOL/L (ref 136–145)
STRESS TARGET HR: 149 BPM
TROPONIN T SERPL-MCNC: <0.01 NG/ML (ref 0–0.03)
WBC # FLD AUTO: 1 /MM3
WBC MORPH BLD: NORMAL
WBC NRBC COR # BLD: 37.54 10*3/MM3 (ref 3.4–10.8)

## 2019-07-09 PROCEDURE — 87070 CULTURE OTHR SPECIMN AEROBIC: CPT | Performed by: INTERNAL MEDICINE

## 2019-07-09 PROCEDURE — 87015 SPECIMEN INFECT AGNT CONCNTJ: CPT | Performed by: INTERNAL MEDICINE

## 2019-07-09 PROCEDURE — 25010000002 LORAZEPAM PER 2 MG: Performed by: INTERNAL MEDICINE

## 2019-07-09 PROCEDURE — 94010 BREATHING CAPACITY TEST: CPT

## 2019-07-09 PROCEDURE — 87205 SMEAR GRAM STAIN: CPT | Performed by: INTERNAL MEDICINE

## 2019-07-09 PROCEDURE — 0R9N3ZX DRAINAGE OF RIGHT WRIST JOINT, PERCUTANEOUS APPROACH, DIAGNOSTIC: ICD-10-PCS | Performed by: INTERNAL MEDICINE

## 2019-07-09 PROCEDURE — 77002 NEEDLE LOCALIZATION BY XRAY: CPT

## 2019-07-09 PROCEDURE — 87220 TISSUE EXAM FOR FUNGI: CPT | Performed by: INTERNAL MEDICINE

## 2019-07-09 PROCEDURE — 87449 NOS EACH ORGANISM AG IA: CPT | Performed by: INTERNAL MEDICINE

## 2019-07-09 PROCEDURE — 25010000002 CEFTRIAXONE PER 250 MG: Performed by: INTERNAL MEDICINE

## 2019-07-09 PROCEDURE — 99232 SBSQ HOSP IP/OBS MODERATE 35: CPT | Performed by: INTERNAL MEDICINE

## 2019-07-09 PROCEDURE — 87102 FUNGUS ISOLATION CULTURE: CPT | Performed by: INTERNAL MEDICINE

## 2019-07-09 PROCEDURE — 80048 BASIC METABOLIC PNL TOTAL CA: CPT | Performed by: INTERNAL MEDICINE

## 2019-07-09 PROCEDURE — 89050 BODY FLUID CELL COUNT: CPT | Performed by: INTERNAL MEDICINE

## 2019-07-09 PROCEDURE — 25010000003 LIDOCAINE 1 % SOLUTION: Performed by: RADIOLOGY

## 2019-07-09 PROCEDURE — 89060 EXAM SYNOVIAL FLUID CRYSTALS: CPT | Performed by: INTERNAL MEDICINE

## 2019-07-09 PROCEDURE — 94729 DIFFUSING CAPACITY: CPT | Performed by: INTERNAL MEDICINE

## 2019-07-09 PROCEDURE — 94729 DIFFUSING CAPACITY: CPT

## 2019-07-09 PROCEDURE — 94727 GAS DIL/WSHOT DETER LNG VOL: CPT | Performed by: INTERNAL MEDICINE

## 2019-07-09 PROCEDURE — 85027 COMPLETE CBC AUTOMATED: CPT | Performed by: INTERNAL MEDICINE

## 2019-07-09 PROCEDURE — 85007 BL SMEAR W/DIFF WBC COUNT: CPT | Performed by: INTERNAL MEDICINE

## 2019-07-09 PROCEDURE — 94727 GAS DIL/WSHOT DETER LNG VOL: CPT

## 2019-07-09 PROCEDURE — 25010000002 VANCOMYCIN 10 G RECONSTITUTED SOLUTION

## 2019-07-09 PROCEDURE — 94010 BREATHING CAPACITY TEST: CPT | Performed by: INTERNAL MEDICINE

## 2019-07-09 PROCEDURE — 84484 ASSAY OF TROPONIN QUANT: CPT | Performed by: INTERNAL MEDICINE

## 2019-07-09 PROCEDURE — 25010000002 MORPHINE PER 10 MG: Performed by: INTERNAL MEDICINE

## 2019-07-09 PROCEDURE — 99233 SBSQ HOSP IP/OBS HIGH 50: CPT | Performed by: INTERNAL MEDICINE

## 2019-07-09 PROCEDURE — 87385 HISTOPLASMA CAPSUL AG IA: CPT | Performed by: INTERNAL MEDICINE

## 2019-07-09 PROCEDURE — 94799 UNLISTED PULMONARY SVC/PX: CPT

## 2019-07-09 PROCEDURE — 71250 CT THORAX DX C-: CPT

## 2019-07-09 PROCEDURE — 99222 1ST HOSP IP/OBS MODERATE 55: CPT | Performed by: INTERNAL MEDICINE

## 2019-07-09 RX ORDER — LIDOCAINE HYDROCHLORIDE 10 MG/ML
10 INJECTION, SOLUTION INFILTRATION; PERINEURAL ONCE
Status: COMPLETED | OUTPATIENT
Start: 2019-07-09 | End: 2019-07-09

## 2019-07-09 RX ORDER — BENZONATATE 100 MG/1
100 CAPSULE ORAL 3 TIMES DAILY PRN
Status: DISCONTINUED | OUTPATIENT
Start: 2019-07-09 | End: 2019-07-12 | Stop reason: HOSPADM

## 2019-07-09 RX ORDER — METOPROLOL SUCCINATE 25 MG/1
25 TABLET, EXTENDED RELEASE ORAL
Status: DISCONTINUED | OUTPATIENT
Start: 2019-07-09 | End: 2019-07-12 | Stop reason: HOSPADM

## 2019-07-09 RX ORDER — LORAZEPAM 2 MG/ML
1 INJECTION INTRAMUSCULAR ONCE AS NEEDED
Status: COMPLETED | OUTPATIENT
Start: 2019-07-09 | End: 2019-07-09

## 2019-07-09 RX ADMIN — VANCOMYCIN HYDROCHLORIDE 2000 MG: 10 INJECTION, POWDER, LYOPHILIZED, FOR SOLUTION INTRAVENOUS at 17:00

## 2019-07-09 RX ADMIN — HYDROCODONE BITARTRATE AND ACETAMINOPHEN 1 TABLET: 5; 325 TABLET ORAL at 08:21

## 2019-07-09 RX ADMIN — ACETAMINOPHEN 650 MG: 325 TABLET, FILM COATED ORAL at 17:00

## 2019-07-09 RX ADMIN — BENZONATATE 100 MG: 100 CAPSULE ORAL at 00:55

## 2019-07-09 RX ADMIN — BENZONATATE 100 MG: 100 CAPSULE ORAL at 21:32

## 2019-07-09 RX ADMIN — CEFTRIAXONE SODIUM 1 G: 1 INJECTION, POWDER, FOR SOLUTION INTRAMUSCULAR; INTRAVENOUS at 15:19

## 2019-07-09 RX ADMIN — MORPHINE SULFATE 2 MG: 2 INJECTION, SOLUTION INTRAMUSCULAR; INTRAVENOUS at 01:36

## 2019-07-09 RX ADMIN — DEXTROMETHORPHAN POLISTIREX 60 MG: 30 SUSPENSION ORAL at 08:20

## 2019-07-09 RX ADMIN — BENZONATATE 100 MG: 100 CAPSULE ORAL at 13:21

## 2019-07-09 RX ADMIN — ACETAMINOPHEN 650 MG: 325 TABLET, FILM COATED ORAL at 22:02

## 2019-07-09 RX ADMIN — LIDOCAINE HYDROCHLORIDE 10 ML: 10 INJECTION, SOLUTION INFILTRATION; PERINEURAL at 11:15

## 2019-07-09 RX ADMIN — DEXTROMETHORPHAN POLISTIREX 60 MG: 30 SUSPENSION ORAL at 21:32

## 2019-07-09 RX ADMIN — ACETAMINOPHEN 650 MG: 325 TABLET, FILM COATED ORAL at 01:37

## 2019-07-09 RX ADMIN — MORPHINE SULFATE 2 MG: 2 INJECTION, SOLUTION INTRAMUSCULAR; INTRAVENOUS at 16:01

## 2019-07-09 RX ADMIN — LORAZEPAM 1 MG: 2 INJECTION, SOLUTION INTRAMUSCULAR; INTRAVENOUS at 09:32

## 2019-07-09 RX ADMIN — VANCOMYCIN HYDROCHLORIDE 2000 MG: 10 INJECTION, POWDER, LYOPHILIZED, FOR SOLUTION INTRAVENOUS at 06:50

## 2019-07-09 RX ADMIN — IPRATROPIUM BROMIDE AND ALBUTEROL SULFATE 3 ML: 2.5; .5 SOLUTION RESPIRATORY (INHALATION) at 06:44

## 2019-07-09 RX ADMIN — SODIUM CHLORIDE, PRESERVATIVE FREE 3 ML: 5 INJECTION INTRAVENOUS at 08:21

## 2019-07-09 RX ADMIN — METOPROLOL SUCCINATE 25 MG: 25 TABLET, EXTENDED RELEASE ORAL at 08:19

## 2019-07-09 RX ADMIN — MORPHINE SULFATE 2 MG: 2 INJECTION, SOLUTION INTRAMUSCULAR; INTRAVENOUS at 23:33

## 2019-07-09 NOTE — NURSING NOTE
Heart and Valve    Patient Name:  Stephen Stoll  :  1975  DOS:  19    Active Hospital Problems    Diagnosis   • **Fever   • Non-ischemic cardiomyopathy (CMS/HCC)     · Cardiac MR (2019): Diffuse gadolinium enhancement of the myocardium consistent with acute myocarditis.  LVEF 42%.  · Echo (2019): LVEF 55%.  No significant valvular abnormality         45 yo male with recent hospitalization on 19 with CP.  LHC revealed Non-obstructive CAD with NICM (EF 35%) due to viral myocarditis.  Pt readmitted for continued fevers, cough and joint pain.  EF has improved.  Followed by ID, Rheumatology, and pulmonology  Medical records have been reviewed. Reviewed role of H&V Center. Pt will f/u with H&V Center at needed.       Echo Results:    19 echo  · Left ventricular systolic function is normal. Estimated EF = 55%.  · The cardiac valves are anatomically and functionally normal.  ·   Heart Failure Quality Measures    ACE I, ARB, ARNI (if EF <40%)     If no contraindications:  ARB held due to cough.  Continue to monitor.  EF has improved    Evidence-based Beta Blocker (EF<40%)    (Bisoprolol, Carvedilol, Metoprolol Succinate)  Metoprolol succinate    Aldosterone Antagonist (EF <40%)  Normal EF.  No volume overload.      Heart Failure Education    Risk factors, Medications management and adherance, Low Na diet, Signs / symptoms, Daily weight monitoring and Role of Heart and Valve Center and when to call

## 2019-07-09 NOTE — PLAN OF CARE
Problem: Patient Care Overview  Goal: Plan of Care Review  Outcome: Ongoing (interventions implemented as appropriate)   07/09/19 1535   Coping/Psychosocial   Plan of Care Reviewed With patient   Plan of Care Review   Progress improving   OTHER   Outcome Summary Pt complains of minimal pain, prn medications given, Pulmonary function test completed, CT scan obtained, wrist aspirated, vital signs stable, pt continues to have a cough and hoarse voice.     Goal: Individualization and Mutuality  Outcome: Ongoing (interventions implemented as appropriate)   07/09/19 7775   Individualization   Patient Specific Interventions Assess pain q2hr and prn

## 2019-07-09 NOTE — PROGRESS NOTES
[facility]  Cardiology Progress Note      Chief Complaint/Reason for service:    · Myocarditis         Patient presently feels okay.  Right wrist is swollen.  No shortness of breath or chest pain.    Past medical, surgical, social and family history reviewed in the patient's electronic medical record.         Vital Sign Min/Max for last 24 hours  Temp  Min: 99.1 °F (37.3 °C)  Max: 102.4 °F (39.1 °C)   BP  Min: 125/80  Max: 145/80   Pulse  Min: 85  Max: 101   Resp  Min: 18  Max: 18   SpO2  Min: 93 %  Max: 95 %   No Data Recorded    No intake or output data in the 24 hours ending 07/09/19 0903        Physical Exam   Constitutional: He is oriented to person, place, and time. He appears well-developed and well-nourished.   HENT:   Head: Normocephalic and atraumatic.   Cardiovascular: Normal rate and regular rhythm.   Pulmonary/Chest: Effort normal.   Abdominal: Soft.   Neurological: He is alert and oriented to person, place, and time.   Skin: Skin is warm and dry.       Tele: Sinus    Results Review (reviewed the patient's recent labs in the electronic medical record):     Echo images from 7/8/2019 reviewed.  LV systolic function normal (previously 35% by left ventriculography).  No significant valvular abnormality.        Results from last 7 days   Lab Units 07/09/19  0708 07/08/19  1429   SODIUM mmol/L 131* 128*   POTASSIUM mmol/L 4.4 4.3   CHLORIDE mmol/L 94* 90*   BUN mg/dL 13 13   CREATININE mg/dL 0.95 0.93     Results from last 7 days   Lab Units 07/09/19  0708 07/08/19  1429   TROPONIN T ng/mL <0.010 <0.010     Results from last 7 days   Lab Units 07/09/19  0708 07/08/19  1429   WBC 10*3/mm3 37.54* 35.02*   HEMOGLOBIN g/dL 11.8* 11.3*   HEMATOCRIT % 36.0* 34.7*   PLATELETS 10*3/mm3 597* 529*       Lab Results   Component Value Date    HGBA1C 5.70 (H) 06/24/2019       Lab Results   Component Value Date    CHOL 191 06/24/2019    TRIG 51 06/24/2019    HDL 58 06/24/2019    LDL  123 (H) 06/24/2019    AST 31 07/08/2019    ALT 25 07/08/2019                    Active Hospital Problems    Diagnosis POA   • **Fever [R50.9] Yes     Priority: High   • Non-ischemic cardiomyopathy (CMS/HCC) [I42.8] Yes     Priority: Medium     · Cardiac MR (6/26/2019): Diffuse gadolinium enhancement of the myocardium consistent with acute myocarditis.  LVEF 42%.  · Echo (7/8/2019): LVEF 55%.  No significant valvular abnormality                · Rheumatologic/infectious work-up per hospitalist/ID  · If endomyocardial biopsy needed for diagnosis, we can arrange this hospitalization    Deep Muñiz IV, MD  7/9/2019

## 2019-07-09 NOTE — PAYOR COMM NOTE
"Stephen Banks (44 y.o. Male)     Date of Birth Social Security Number Address Home Phone MRN    1975  [address] [id number] [phone] [id number]    Anabaptist Marital Status          Zoroastrianism        Admission Date Admission Type Admitting Provider Attending Provider Department, Room/Bed    19 Urgent Dana Garcia MD Mini, Jocelyn, MD [facility] 2G, S222/1    Discharge Date Discharge Disposition Discharge Destination                       Attending Provider:  Dana Garcia MD    Allergies:  Tramadol, Ciprofloxacin    Isolation:  None   Infection:  None   Code Status:  CPR    Ht:  182 cm (71.65\")   Wt:  99.8 kg (220 lb)    Admission Cmt:  None   Principal Problem:  Fever [R50.9]                 Active Insurance as of 2019     Primary Coverage     Payor Plan Insurance Group Employer/Plan Group    ANTHEM BLUE CROSS ANTHEM BLUE CROSS BLUE SHIELD PPO [id number]     Payor Plan Address Payor Plan Phone Number Payor Plan Fax Number Effective Dates    [address] [phone]  2018 - None Entered    [facility] [id number]       Subscriber Name Subscriber Birth Date Member ID       STEPHEN BANKS 1975 [id number]                 Emergency Contacts      (Rel.) Home Phone Work Phone Mobile Phone    RAJEEV BANKS (Spouse) [phone] -- [phone]               History & Physical      Dana Garcia MD at 2019  3:05 PM              [facility] Medicine Services  HISTORY AND PHYSICAL    Patient Name: Stephen Banks  : 1975  MRN: [id number]  Primary Care Physician: Ihsan Blackwell MD  Date of admission: 2019      Subjective   Subjective     Chief Complaint:  Fever of unknown origin x weeks; ongoing despite doxycycline    HPI:  Stephen Banks is a 44 y.o. male recently admitted from  to  with FUO and myocarditis.  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  He developed recurrent temperatures to 102 along with a cough and was treated outpatient for pneumonia, but was admitted on 6/24 due to new chest pain and troponin elevation.  He was seen by Cardiology here and was diagnosed with myocarditis.  Initial echo indicated an EF of 35%; cardiac MRI later showed EF of 42% with diffuse inflammation consistent with a myocarditis diagnosis.  He was followed closely by the ID and cardiac services and clinically improved with resolution of muscle cramps and some improvement in his energy and exercise tolerance.  He was eventually discharged in stable condition - still with intermittent fevers and cough, still with some serologic tests pending - on doxycycline with a plan for close followup by ID.      Since discharge the fevers have continued.  His dyspnea has worsened.  His R wrist became swollen and painful yesterday.  Today he saw ID in the office and was referred for admission and further inpatient workup.       Review of Systems   Gen- ongoing fever and chills several times per day.  Poor appetite. Fatigue.    CV- No chest pain, palpitations  Resp- Chronic dry cough and worsening dyspnea, no hemoptysis.    GI- No N/V/D, abd pain    Otherwise complete ROS reviewed and is negative except as mentioned in the HPI.    Personal History     Past Medical History:   Diagnosis Date   • Hyperlipidemia    • Pneumonia        Past Surgical History:   Procedure Laterality Date   • APPENDECTOMY     • BACK SURGERY      L5 S1   • CARDIAC CATHETERIZATION N/A 6/24/2019    Procedure: LEFT HEART CATH;  Surgeon: Deep Muñiz IV, MD;  Location: [facility] CATH IN[facility]ASIVE LOC[facility];  Service: Cardiovascular       Family History: family history includes Cancer in his paternal grandfather; Emphysema in his maternal grandfather; Hyperlipidemia in his mother; Hypertension in his father; No Known Problems  "in his maternal grandmother; Stroke in his maternal grandfather and paternal grandmother. Otherwise pertinent FHx was reviewed and unremarkable.     Social History:  reports that he has never smoked. He has never used smokeless tobacco. He reports that he does not drink alcohol or use drugs.  Social History     Social History Narrative    Caffeine use 4-5 servings a day       Medications:    Available home medication information reviewed.  Medications Prior to Admission   Medication Sig Dispense Refill Last Dose   • guaiFENesin (ROBITUSSIN) 100 MG/5ML syrup Take 200 mg by mouth 3 (Three) Times a Day As Needed for Cough.      • HYDROcodone-acetaminophen (NORCO)  MG per tablet Take 1 tablet by mouth Every 6 (Six) Hours As Needed for Moderate Pain . 1/2 to 2 tablets every 6 hours as needed      • metoprolol succinate XL (TOPROL-XL) 25 MG 24 hr tablet Take 1 tablet by mouth Daily. 90 tablet 0 7/8/2019 at Unknown time   • valsartan (DIOVAN) 40 MG tablet Take 1 tablet by mouth Daily. 90 tablet 0 7/8/2019 at Unknown time   • dextromethorphan polistirex ER (DELSYM) 30 MG/5ML Suspension Extended Release oral suspension Take 10 ml by mouth Every 12 (Twelve) Hours As Needed (cough). 280 mL 0 Taking   • doxycycline (MONODOX) 100 MG capsule Take 1 capsule by mouth Every 12 (Twelve) Hours for 22 doses. 22 capsule 0 Taking       Allergies   Allergen Reactions   • Tramadol Nausea And Vomiting     Nausea and not working   • Ciprofloxacin Anxiety     \"Extreme anxiety and paranoia\"       Objective   Objective     Vital Signs:   Temp:  [100.2 °F (37.9 °C)] 100.2 °F (37.9 °C)  Heart Rate:  [93] 93  Resp:  [18] 18  BP: (125)/(80) 125/80        Physical Exam   Constitutional: Awake, alert, seen in echo lab  Eyes: PERRLA, sclerae anicteric, no conjunctival injection  HENT: NCAT, mucous membranes moist  Neck: Supple, no thyromegaly, no lymphadenopathy, trachea midline  Respiratory: persistent dry cough, unremitting.  CTA, no W R " R  Cardiovascular: RRR, no murmurs, rubs, or gallops, palpable pedal pulses bilaterally  Gastrointestinal: Positive bowel sounds, soft, nontender, nondistended  Musculoskeletal: No bilateral ankle edema, no clubbing or cyanosis to extremities.  R wrist with effusion and tenderness to all ROM.  Not hot/red.  Tender to palpation.    Psychiatric: Appropriate affect, cooperative  Neurologic: Oriented x 3, strength symmetric in all extremities, Cranial Nerves grossly intact to confrontation, speech clear  Skin: No rashes.  Both pinnae are red and hot.  Has small tender point on R post calf but no visible lesion at the site.         Results Reviewed:  I have personally reviewed current lab, radiology, and data and agree.              Invalid input(s):  ALKPHOS  Estimated Creatinine Clearance: 153.3 mL/min (A) (by C-G formula based on SCr of 0.74 mg/dL (L)).  Brief Urine Lab Results  (Last result in the past 365 days)      Color   Clarity   Blood   Leuk Est   Nitrite   Protein   CREAT   Urine HCG        06/27/19 1615 Yellow Clear Negative Negative Negative 30 mg/dL (1+)             Imaging Results (last 24 hours)     ** No results found for the last 24 hours. **        Results for orders placed during the hospital encounter of 06/24/19   Transthoracic Echo Complete With Contrast if Necessary Per Protocol    Narrative · Left ventricular systolic function is moderately decreased. Estimated EF   = 35%. There is global hypokinesis of the left ventricle.  · Mild mitral valve regurgitation is present          Assessment/Plan   Assessment / Plan     Active Hospital Problems    Diagnosis POA   • Fever [R50.9] Yes     A/p    FUO  Leukocytosis now 35K  Sed rate rising since admission   - still consider viral or atypical infection, sarcoid,Still's disease, vasculitis, other  - note that sarcoid has beeen associated with leukocytosis that resolves w treatment.     Myocarditis - EF has normalized by today's echo.  - continue ARB and  BB  - discussed with Dr. Muñiz.   - normal  troponin    R wrist arthritis/effusion  - discussed w Dr Diaz - plan tap tomorrow under fluoro or CT guidance  - suspect this is reactive, ? Sarcoid - will ask ortho about recommended labs on synovial fluid    Persistent cough, dyspnea  CT last admission showed a necrotic center to a R paratracheal lymph node  - consider pulm or CT surg to attempt bronch/biopsy    DVT prophylaxis: ambulation.  [facility] today.      CODE STATUS:    There are no questions and answers to display.       Admission Status:  I believe this patient meets INPATIENT status due to the need for care which can only be reasonably provided in an hospital setting duye to progressie outpatient decline and multiple organ system involvement with an undndiagnosed illness..  In such, I feel patient’s risk for adverse outcomes and need for care warrant INPATIENT evaluation and predict the patient’s care encounter to likely last beyond 2 midnights.    Electronically signed by Dana Garcia MD, 07/08/19, 3:05 PM.        Electronically signed by Dana Garcia MD at 7/8/2019  5:01 PM       ICU Vital Signs     Row Name 07/09/19 0810 07/09/19 0645 07/09/19 0600 07/09/19 0417 07/09/19 0400       Vitals    Temp  100.9 °F (38.3 °C)  (Abnormal)   --  --  99.1 °F (37.3 °C)  --    Temp src  Oral  --  --  Oral  --    Pulse  107  98  --  85  --    Heart Rate Source  Monitor  --  --  Monitor  --    Resp  18  --  --  18  --    Resp Rate Source  Visual  --  --  Visual  --    BP  113/98  --  --  145/80  --    Noninvasive MAP (mmHg)  105  --  --  99  --    BP Location  Right arm  --  --  Right arm  --    BP Method  Automatic  --  --  Automatic  --    Patient Position  Lying  --  --  Lying  --       Oxygen Therapy    SpO2  --  --  --  94 %  --    Pulse Oximetry Type  --  --  --  --  --    Device (Oxygen Therapy)  --  room air  room air  --  room air    Row Name 07/09/19 0200 07/09/19 0040 07/09/19 0000 07/08/19 2200 07/08/19 2114  "      Vitals    Temp  --  100.9 °F (38.3 °C)  (Abnormal)   --  --  --    Temp src  --  Oral  --  --  --    Pulse  --  94  --  --  --    Heart Rate Source  --  Monitor  --  --  Monitor    Resp  --  18  --  --  18    Resp Rate Source  --  Visual  --  --  Visual    BP  --  128/73  --  --  --    Noninvasive MAP (mmHg)  --  93  --  --  --    BP Location  --  Right arm  --  --  --    BP Method  --  Automatic  --  --  --    Patient Position  --  Lying  --  --  --       Oxygen Therapy    SpO2  --  94 %  --  --  --    Device (Oxygen Therapy)  room air  --  room air  room air  room air    Row Name 07/08/19 2000 07/08/19 1939 07/08/19 1924 07/08/19 1800 07/08/19 1730       Vitals    Temp  --  99.3 °F (37.4 °C)  99.3 °F (37.4 °C)  --  102.4 °F (39.1 °C)  (Abnormal)     Temp src  --  Oral  --  --  Oral    Pulse  --  88  --  --  101    Heart Rate Source  --  Monitor  --  --  Monitor    Resp  --  18  --  --  18    Resp Rate Source  --  Visual  --  --  Visual    BP  --  126/76  --  --  130/66    Noninvasive MAP (mmHg)  --  92  --  --  74    BP Location  --  Right arm  --  --  Right arm    BP Method  --  Automatic  --  --  Automatic    Patient Position  --  Lying  --  --  Lying       Oxygen Therapy    SpO2  --  --  --  --  93 %    Device (Oxygen Therapy)  room air  --  --  room air  --    Row Name 07/08/19 1609 07/08/19 1600 07/08/19 1409 07/08/19 1355          Height and Weight    Height  182 cm (71.65\")  --  --  --     Weight  99.8 kg (220 lb)  --  --  --     Ideal Body Weight (IBW) (kg)  81.1  --  --  --     BSA (Calculated - sq m)  2.21 sq meters  --  --  --     BMI (Calculated)  30.1  --  --  --     Weight in (lb) to have BMI = 25  182.2  --  --  --        Vitals    Temp  --  --  --  100.2 °F (37.9 °C)     Temp src  --  --  --  Oral     Pulse  --  --  --  93     Heart Rate Source  --  --  --  Monitor     Resp  --  --  --  18     Resp Rate Source  --  --  --  Visual     BP  --  --  --  125/80     Noninvasive MAP (mmHg)  --  --  " --  92     BP Location  --  --  --  Right arm     BP Method  --  --  --  Automatic     Patient Position  --  --  --  Lying        Oxygen Therapy    SpO2  --  --  --  95 %     Device (Oxygen Therapy)  --  room air  room air  room air         Hospital Medications (all)       Dose Frequency Start End    ! vancomycin trough ordered for 7/10 @ 05:30 please hold 7/10 0600 vancomycin dose until vnacomycin trough drawn and resulted with value less than or equal to 20  Once 7/10/2019     Sig - Route: 1 (One) Time. - Does not apply    acetaminophen (TYLENOL) tablet 650 mg 650 mg Every 4 Hours PRN 7/8/2019     Sig - Route: Take 2 tablets by mouth Every 4 (Four) Hours As Needed for Mild Pain . - Oral    benzonatate (TESSALON) capsule 100 mg 100 mg 3 Times Daily PRN 7/9/2019     Sig - Route: Take 1 capsule by mouth 3 (Three) Times a Day As Needed for Cough. - Oral    cefTRIAXone (ROCEPHIN) 1 g/100 mL 0.9% NS (MBP) 1 g Every 24 Hours 7/8/2019 7/15/2019    Sig - Route: Infuse 100 mL into a venous catheter Daily. - Intravenous    dextromethorphan polistirex ER (DELSYM) 30 MG/5ML oral suspension 60 mg 60 mg Every 12 Hours Scheduled 7/9/2019     Sig - Route: Take 60 mg by mouth Every 12 (Twelve) Hours. - Oral    HYDROcodone-acetaminophen (NORCO) 5-325 MG per tablet 1 tablet 1 tablet Every 6 Hours PRN 7/8/2019 7/18/2019    Sig - Route: Take 1 tablet by mouth Every 6 (Six) Hours As Needed for Moderate Pain . - Oral    lidocaine (XYLOCAINE) 1 % injection 10 mL 10 mL Once 7/9/2019 7/9/2019    Sig - Route: Inject 10 mL under the skin into the appropriate area as directed 1 (One) Time. - Subcutaneous    Cosign for Ordering: Required by Bruce Crowe MD    LORazepam (ATIVAN) injection 1 mg 1 mg Once As Needed 7/9/2019 7/9/2019    Sig - Route: Infuse 0.5 mL into a venous catheter 1 (One) Time As Needed for Anxiety (before high res CT). - Intravenous    metoprolol succinate XL (TOPROL-XL) 24 hr tablet 25 mg 25 mg Every 24 Hours Scheduled  7/9/2019     Sig - Route: Take 1 tablet by mouth Daily. - Oral    morphine injection 2 mg 2 mg Every 8 Hours PRN 7/8/2019 7/18/2019    Sig - Route: Infuse 1 mL into a venous catheter Every 8 (Eight) Hours As Needed for Severe Pain . - Intravenous    Pharmacy to dose vancomycin  Continuous PRN 7/8/2019 7/15/2019    Sig - Route: Continuous As Needed for Consult. - Does not apply    sodium chloride 0.9 % flush 3 mL 3 mL Every 12 Hours Scheduled 7/8/2019     Sig - Route: Infuse 3 mL into a venous catheter Every 12 (Twelve) Hours. - Intravenous    sodium chloride 0.9 % flush 3-10 mL 3-10 mL As Needed 7/8/2019     Sig - Route: Infuse 3-10 mL into a venous catheter As Needed for Line Care. - Intravenous    vancomycin 2000 mg/500 mL 0.9% NS IVPB ([facility]) 2,000 mg Every 12 Hours Scheduled 7/8/2019 7/15/2019    Sig - Route: Infuse 500 mL into a venous catheter Every 12 (Twelve) Hours. - Intravenous    ipratropium-albuterol (DUO-NEB) nebulizer solution 3 mL (Discontinued) 3 mL Every 6 Hours While Awake - RT 7/8/2019 7/9/2019    Sig - Route: Take 3 mL by nebulization Every 6 (Six) Hours While Awake. - Nebulization            Lab Results (last 24 hours)     Procedure Component Value Units Date/Time    Crystal Exam, Fluid - Synovial Fluid, [[id number]] Collected:  07/09/19 1030    Specimen:  Synovial Fluid Updated:  07/09/19 1205     Crystals, Fluid No crystals seen    Body Fluid Cell Count With Differential - Aspirate, Wrist, Right [[id number]] Collected:  07/09/19 1045    Specimen:  Aspirate from Wrist, Right Updated:  07/09/19 1203    Narrative:       The following orders were created for panel order Body Fluid Cell Count With Differential - Aspirate, Wrist, Right.  Procedure                               Abnormality         Status                     ---------                               -----------         ------                     Body fluid cell count - ...[[id number]]                      Final result                 Please  view results for these tests on the individual orders.    Body fluid cell count - Aspirate, Wrist, Right [[id number]] Collected:  07/09/19 1045    Specimen:  Aspirate from Wrist, Right Updated:  07/09/19 1203     Color, Fluid Colorless     Appearance, Fluid Clear     WBC, Fluid 1 /mm3      RBC, Fluid <2,000 /mm3     Narrative:       Differential not indicated.    Respiratory Culture - Sputum, Cough [[id number]] Collected:  07/09/19 0655    Specimen:  Sputum from Cough Updated:  07/09/19 0935     Gram Stain Few (2+) WBCs per low power field      Occasional Epithelial cells per low power field      Few (2+) Gram positive cocci in pairs    LSAC Slide Creation [[id number]] Collected:  07/09/19 0708    Specimen:  Blood Updated:  07/09/19 0810    CBC & Differential [[id number]] Collected:  07/09/19 0708    Specimen:  Blood Updated:  07/09/19 0809    Narrative:       The following orders were created for panel order CBC & Differential.  Procedure                               Abnormality         Status                     ---------                               -----------         ------                     Manual Differential[[id number]]          Abnormal            Final result               CBC Auto Differential[[id number]]        Abnormal            Final result                 Please view results for these tests on the individual orders.    Manual Differential [[id number]]  (Abnormal) Collected:  07/09/19 0708    Specimen:  Blood Updated:  07/09/19 0809     Neutrophil % 83.0 %      Lymphocyte % 6.0 %      Monocyte % 5.0 %      Eosinophil % 2.0 %      Basophil % 0.0 %      Bands %  3.0 %      Myelocyte % 1.0 %      Neutrophils Absolute 32.28 10*3/mm3      Lymphocytes Absolute 2.25 10*3/mm3      Monocytes Absolute 1.88 10*3/mm3      Eosinophils Absolute 0.75 10*3/mm3      Basophils Absolute 0.00 10*3/mm3      RBC Morphology Normal     WBC Morphology Normal     Platelet Morphology Normal    CBC Auto Differential [[id number]]   (Abnormal) Collected:  07/09/19 0708    Specimen:  Blood Updated:  07/09/19 0809     WBC 37.54 10*3/mm3      RBC 4.26 10*6/mm3      Hemoglobin 11.8 g/dL      Hematocrit 36.0 %      MCV 84.5 fL      MCH 27.7 pg      MCHC 32.8 g/dL      RDW 13.4 %      RDW-SD 42.1 fl      MPV 9.8 fL      Platelets 597 10*3/mm3     Basic Metabolic Panel [[id number]]  (Abnormal) Collected:  07/09/19 0708    Specimen:  Blood Updated:  07/09/19 0746     Glucose 127 mg/dL      BUN 13 mg/dL      Creatinine 0.95 mg/dL      Sodium 131 mmol/L      Potassium 4.4 mmol/L      Chloride 94 mmol/L      CO2 22.0 mmol/L      Calcium 8.4 mg/dL      eGFR Non African Amer 86 mL/min/1.73      BUN/Creatinine Ratio 13.7     Anion Gap 15.0 mmol/L     Narrative:       GFR Normal >60  Chronic Kidney Disease <60  Kidney Failure <15    Troponin [[id number]]  (Normal) Collected:  07/09/19 0708    Specimen:  Blood Updated:  07/09/19 0743     Troponin T <0.010 ng/mL     Narrative:       Troponin T Reference Range:  <= 0.03 ng/mL-   Negative for AMI  >0.03 ng/mL-     Abnormal for myocardial necrosis.  Clinicians would have to utilize clinical acumen, EKG, Troponin and serial changes to determine if it is an Acute Myocardial Infarction or myocardial injury due to an underlying chronic condition.     Antistreptolysin O Screen [[id number]]  (Normal) Collected:  07/08/19 1604    Specimen:  Blood Updated:  07/09/19 0738     ASO Negative    CK [[id number]]  (Normal) Collected:  07/08/19 1429    Specimen:  Blood Updated:  07/08/19 1611     Creatine Kinase 129 U/L     Ferritin [[id number]]  (Abnormal) Collected:  07/08/19 1429    Specimen:  Blood Updated:  07/08/19 1608     Ferritin 2,250.00 ng/mL     ANCA Panel [[id number]] Collected:  07/08/19 1604    Specimen:  Blood Updated:  07/08/19 1604    proBNP [[id number]]  (Normal) Collected:  07/08/19 1429    Specimen:  Blood Updated:  07/08/19 1547     proBNP 433.4 pg/mL     Narrative:       Among patients with dyspnea, NT-proBNP is  "highly sensitive for the detection of acute congestive heart failure. In addition NT-proBNP of <300 pg/ml effectively rules out acute congestive heart failure with 99% negative predictive value.    C-reactive Protein [[id number]]  (Abnormal) Collected:  07/08/19 1429    Specimen:  Blood Updated:  07/08/19 1528     C-Reactive Protein 47.80 mg/dL     Protime-INR [[id number]]  (Abnormal) Collected:  07/08/19 1429    Specimen:  Blood Updated:  07/08/19 1528     Protime 17.7 Seconds      INR 1.53    Procalcitonin [[id number]]  (Abnormal) Collected:  07/08/19 1429    Specimen:  Blood Updated:  07/08/19 1520     Procalcitonin 2.18 ng/mL     Narrative:       As a Marker for Sepsis (Non-Neonates):   1. <0.5 ng/mL represents a low risk of severe sepsis and/or septic shock.  1. >2 ng/mL represents a high risk of severe sepsis and/or septic shock.    As a Marker for Lower Respiratory Tract Infections that require antibiotic therapy:  PCT on Admission     Antibiotic Therapy             6-12 Hrs later  > 0.5                Strongly Recommended            >0.25 - <0.5         Recommended  0.1 - 0.25           Discouraged                   Remeasure/reassess PCT  <0.1                 Strongly Discouraged          Remeasure/reassess PCT      As 28 day mortality risk marker: \"Change in Procalcitonin Result\" (> 80 % or <=80 %) if Day 0 (or Day 1) and Day 4 values are available. Refer to http://www.[employer]-pct-calculator.com/   Change in PCT <=80 %   A decrease of PCT levels below or equal to 80 % defines a positive change in PCT test result representing a higher risk for 28-day all-cause mortality of patients diagnosed with severe sepsis or septic shock.  Change in PCT > 80 %   A decrease of PCT levels of more than 80 % defines a negative change in PCT result representing a lower risk for 28-day all-cause mortality of patients diagnosed with severe sepsis or septic shock.                Comprehensive Metabolic Panel [[id number]]  " (Abnormal) Collected:  07/08/19 1429    Specimen:  Blood Updated:  07/08/19 1519     Glucose 116 mg/dL      BUN 13 mg/dL      Creatinine 0.93 mg/dL      Sodium 128 mmol/L      Potassium 4.3 mmol/L      Chloride 90 mmol/L      CO2 23.0 mmol/L      Calcium 8.4 mg/dL      Total Protein 7.3 g/dL      Albumin 2.80 g/dL      ALT (SGPT) 25 U/L      AST (SGOT) 31 U/L      Alkaline Phosphatase 111 U/L      Total Bilirubin 0.5 mg/dL      eGFR Non African Amer 88 mL/min/1.73      Globulin 4.5 gm/dL      A/G Ratio 0.6 g/dL      BUN/Creatinine Ratio 14.0     Anion Gap 15.0 mmol/L     Narrative:       GFR Normal >60  Chronic Kidney Disease <60  Kidney Failure <15    CK Total & CKMB [[id number]]  (Normal) Collected:  07/08/19 1429    Specimen:  Blood Updated:  07/08/19 1519     CKMB <1.00 ng/mL      Creatine Kinase 131 U/L     Troponin [[id number]]  (Normal) Collected:  07/08/19 1429    Specimen:  Blood Updated:  07/08/19 1516     Troponin T <0.010 ng/mL     Narrative:       Troponin T Reference Range:  <= 0.03 ng/mL-   Negative for AMI  >0.03 ng/mL-     Abnormal for myocardial necrosis.  Clinicians would have to utilize clinical acumen, EKG, Troponin and serial changes to determine if it is an Acute Myocardial Infarction or myocardial injury due to an underlying chronic condition.     Sedimentation Rate [[id number]]  (Abnormal) Collected:  07/08/19 1429    Specimen:  Blood Updated:  07/08/19 1514     Sed Rate 90 mm/hr     CBC & Differential [[id number]] Collected:  07/08/19 1429    Specimen:  Blood Updated:  07/08/19 1512    Narrative:       The following orders were created for panel order CBC & Differential.  Procedure                               Abnormality         Status                     ---------                               -----------         ------                     CBC Auto Differential[[id number]]        Abnormal            Final result                 Please view results for these tests on the individual orders.     CBC Auto Differential [[id number]]  (Abnormal) Collected:  07/08/19 1429    Specimen:  Blood Updated:  07/08/19 1512     WBC 35.02 10*3/mm3      RBC 4.15 10*6/mm3      Hemoglobin 11.3 g/dL      Hematocrit 34.7 %      MCV 83.6 fL      MCH 27.2 pg      MCHC 32.6 g/dL      RDW 13.3 %      RDW-SD 40.5 fl      MPV 10.0 fL      Platelets 529 10*3/mm3      Neutrophil % 86.1 %      Lymphocyte % 4.9 %      Monocyte % 5.2 %      Eosinophil % 0.5 %      Basophil % 0.5 %      Immature Grans % 2.8 %      Neutrophils, Absolute 30.13 10*3/mm3      Lymphocytes, Absolute 1.73 10*3/mm3      Monocytes, Absolute 1.83 10*3/mm3      Eosinophils, Absolute 0.18 10*3/mm3      Basophils, Absolute 0.16 10*3/mm3      Immature Grans, Absolute 0.99 10*3/mm3      nRBC 0.0 /100 WBC     Narrative:       Verified by repeat analysis.    Lactic Acid, Plasma [[id number]]  (Normal) Collected:  07/08/19 1429    Specimen:  Blood Updated:  07/08/19 1510     Lactate 1.3 mmol/L      Comment: Falsely depressed results may occur on samples drawn from patients receiving N-Acetylcysteine (NAC) or Metamizole.       Blood Culture - Blood, Arm, Right [[id number]] Collected:  07/08/19 1429    Specimen:  Blood from Arm, Right Updated:  07/08/19 1441    Blood Culture - Blood, Arm, Left [[id number]] Collected:  07/08/19 1429    Specimen:  Blood from Arm, Left Updated:  07/08/19 1441        Imaging Results (last 24 hours)     Procedure Component Value Units Date/Time    CT Chest Hi Resolution [[id number]] Updated:  07/09/19 1050    FL Guided Aspiration Joint [[id number]] Updated:  07/09/19 1032    XR Chest PA & Lateral [[id number]] Collected:  07/09/19 0905     Updated:  07/09/19 1006    Narrative:       EXAMINATION: XR CHEST PA AND LATERAL- 07/08/2019      INDICATION: persistent hypoxia      COMPARISON: 06/24/2019     FINDINGS: Cardiac silhouette is normal. There is no evidence of cardiac  decompensation. There is no pulmonary inflammatory process, mass or  effusion.            Impression:       No active disease.     D:  07/09/2019  E:  07/09/2019                 Physician Progress Notes (last 24 hours) (Notes from 7/8/2019  1:16 PM through 7/9/2019  1:16 PM)      Deep Muñiz IV, MD at 7/9/2019  8:59 AM          [facility]  Cardiology Progress Note      Chief Complaint/Reason for service:    · Myocarditis    Subjective     Patient presently feels okay.  Right wrist is swollen.  No shortness of breath or chest pain.    Past medical, surgical, social and family history reviewed in the patient's electronic medical record.      Objective   Vital Sign Min/Max for last 24 hours  Temp  Min: 99.1 °F (37.3 °C)  Max: 102.4 °F (39.1 °C)   BP  Min: 125/80  Max: 145/80   Pulse  Min: 85  Max: 101   Resp  Min: 18  Max: 18   SpO2  Min: 93 %  Max: 95 %   No Data Recorded    No intake or output data in the 24 hours ending 07/09/19 0903        Physical Exam   Constitutional: He is oriented to person, place, and time. He appears well-developed and well-nourished.   HENT:   Head: Normocephalic and atraumatic.   Cardiovascular: Normal rate and regular rhythm.   Pulmonary/Chest: Effort normal.   Abdominal: Soft.   Neurological: He is alert and oriented to person, place, and time.   Skin: Skin is warm and dry.       Tele: Sinus    Results Review (reviewed the patient's recent labs in the electronic medical record):     Echo images from 7/8/2019 reviewed.  LV systolic function normal (previously 35% by left ventriculography).  No significant valvular abnormality.        Results from last 7 days   Lab Units 07/09/19  0708 07/08/19  1429   SODIUM mmol/L 131* 128*   POTASSIUM mmol/L 4.4 4.3   CHLORIDE mmol/L 94* 90*   BUN mg/dL 13 13   CREATININE mg/dL 0.95 0.93     Results from last 7 days   Lab Units 07/09/19  0708 07/08/19  1429   TROPONIN T ng/mL <0.010 <0.010     Results from last 7 days   Lab Units 07/09/19  0708 07/08/19  1429   WBC 10*3/mm3 37.54* 35.02*    HEMOGLOBIN g/dL 11.8* 11.3*   HEMATOCRIT % 36.0* 34.7*   PLATELETS 10*3/mm3 597* 529*       Lab Results   Component Value Date    HGBA1C 5.70 (H) 2019       Lab Results   Component Value Date    CHOL 191 2019    TRIG 51 2019    HDL 58 2019     (H) 2019    AST 31 2019    ALT 25 2019               Assessment     Active Hospital Problems    Diagnosis POA   • **Fever [R50.9] Yes     Priority: High   • Non-ischemic cardiomyopathy (CMS/HCC) [I42.8] Yes     Priority: Medium     · Cardiac MR (2019): Diffuse gadolinium enhancement of the myocardium consistent with acute myocarditis.  LVEF 42%.  · Echo (2019): LVEF 55%.  No significant valvular abnormality           Plan     · Rheumatologic/infectious work-up per hospitalist/ID  · If endomyocardial biopsy needed for diagnosis, we can arrange this hospitalization    Deep Muñiz IV, MD  2019      Electronically signed by Deep Muñiz IV, MD at 2019  9:03 AM     Dana Garcia MD at 2019  7:36 AM              [facility] Medicine Services  PROGRESS NOTE    Patient Name: Stephen Stoll  : 1975  MRN: [id number]    Date of Admission: 2019  Length of Stay: 1  Primary Care Physician: Ihsan Blackwell MD    Subjective   Subjective     CC:  Worsening dyspnea, new R wrist swelling    HPI:  Got some sleep last night.  Cough persists.  Nebs did not help.   Wrist a bit less 'hot' feeling.     Review of Systems   Gen- intermittent fevers continue  CV- No chest pain, palpitations  Resp-  Cough and dyspnea.    GI- No N/V/D, abd pain  musc - tolerating norco 5mg prn for back and wrist pain.     Otherwise ROS is negative except as mentioned in the HPI.    Objective   Objective     Vital Signs:   Temp:  [99.1 °F (37.3 °C)-102.4 °F (39.1 °C)] 99.1 °F (37.3 °C)  Heart Rate:  [] 98  Resp:  [18] 18  BP: (125-145)/(66-80) 145/80        Physical  Exam:  Constitutional: Awake, alert, up on EOB.  Looks nontoxic.   Eyes: PERRLA, sclerae anicteric, no conjunctival injection  HENT: NCAT, mucous membranes moist  Neck: Supple, , trachea midline  Respiratory: Clear to auscultation bilaterally, nonlabored respirations on RA, but dry unrelenting cough persists  Cardiovascular: RRR, no murmurs, rubs, or gallops, palpable pedal pulses bilaterally  Gastrointestinal: Positive bowel sounds, soft, nontender, nondistended  Musculoskeletal: No bilateral ankle edema, no clubbing or cyanosis to extremities.  R wrist swelling and tenderness/pain with movement persists unchanged.   Psychiatric: Appropriate affect, cooperative  Neurologic: Oriented x 3, strength symmetric in all extremities, Cranial Nerves grossly intact to confrontation, speech clear  Skin: No rashes    Results Reviewed:  I have personally reviewed current lab, radiology, and data and agree.    Results from last 7 days   Lab Units 07/08/19  1429   WBC 10*3/mm3 35.02*   HEMOGLOBIN g/dL 11.3*   HEMATOCRIT % 34.7*   PLATELETS 10*3/mm3 529*   INR  1.53*   PROCALCITONIN ng/mL 2.18*     Results from last 7 days   Lab Units 07/08/19  1429   SODIUM mmol/L 128*   POTASSIUM mmol/L 4.3   CHLORIDE mmol/L 90*   CO2 mmol/L 23.0   BUN mg/dL 13   CREATININE mg/dL 0.93   GLUCOSE mg/dL 116*   CALCIUM mg/dL 8.4*   ALT (SGPT) U/L 25   AST (SGOT) U/L 31   TROPONIN T ng/mL <0.010   PROBNP pg/mL 433.4     Estimated Creatinine Clearance: 122 mL/min (by C-G formula based on SCr of 0.93 mg/dL).    Microbiology Results Abnormal     None          Imaging Results (last 24 hours)     Procedure Component Value Units Date/Time    XR Chest PA & Lateral [[id number]] Updated:  07/08/19 1846          Results for orders placed during the hospital encounter of 06/24/19   Transthoracic Echo Complete With Contrast if Necessary Per Protocol    Narrative · Left ventricular systolic function is moderately decreased. Estimated EF   = 35%. There is global  hypokinesis of the left ventricle.  · Mild mitral valve regurgitation is present          I have reviewed the medications:  Scheduled Meds:  [START ON 7/10/2019] Pharmacy Consult  Does not apply Once   ceftriaxone 1 g Intravenous Q24H   dextromethorphan polistirex ER 60 mg Oral Q12H   ipratropium-albuterol 3 mL Nebulization Q6H While Awake - RT   sodium chloride 3 mL Intravenous Q12H   vancomycin 2,000 mg Intravenous Q12H     Continuous Infusions:  Pharmacy to dose vancomycin      PRN Meds:.acetaminophen  •  benzonatate  •  HYDROcodone-acetaminophen  •  Morphine  •  Pharmacy to dose vancomycin  •  sodium chloride      Assessment/Plan   Assessment / Plan     Active Hospital Problems    Diagnosis  POA   • **Fever [R50.9]  Yes      Resolved Hospital Problems   No resolved problems to display.        Brief Hospital Course to date:  Stephen Stoll is a 44 y.o. male with a recent admission June 24-29. He was a healthy man until early June when he developed daily fevers to 102 and a persistent dry cough.  Since then he has had myocarditis (now resolved), progressive dyspnea, and new R wrist arthritis.     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  This pattern waned over the years and was completely resolved prior to this episode.     On 7/8 I discussed his case with Sasha DE LA ROSA, an orthopedist, a rheumatologist, and a pulmonologist.  Will proceed with arthrocentesis by radiology today - though I doubt the findings will point to a specific diagnosis.  Given the prominent pulm symptoms and abnl lymph node on last CT, get HRCT and a pulm consult to eval for biopsy.     A/p     FUO  Leukocytosis now 37K  Sed rate rising since prev admission   - still consider viral or atypical infection, sarcoid,Still's disease, vasculitis, other  - note that sarcoid has beeen  associated with leukocytosis that resolves w treatment.      Myocarditis - EF has normalized by 7/8 echo.  Trop nl.   - stop ARB due to remote chance it is contributing to cough; continue BB  - discussed  echo with Dr. Muñiz.      R wrist arthritis/effusion  - discussed w Dr Diaz - plan tap today under fluoro or CT guidance  - suspect this is reactive, autoimmune ? Sarcoid  - Discussed with Ortho; will get the usual studies.        Persistent cough, progressive dyspnea  CT last admission showed a necrotic center to a R paratracheal lymph node  - HRCT today   - pulm consult for possible tissue diagnosis.       DVT Prophylaxis:  lovenox    Disposition: I expect the patient to be discharged tbd.    CODE STATUS:   Code Status and Medical Interventions:   Ordered at: 07/08/19 1545     Level Of Support Discussed With:    Patient     Code Status:    CPR     Medical Interventions (Level of Support Prior to Arrest):    Full         Electronically signed by Dana Garcia MD, 07/09/19, 7:36 AM.        Electronically signed by Dana Garcia MD at 7/9/2019  8:43 AM          Consult Notes (last 24 hours) (Notes from 7/8/2019  1:16 PM through 7/9/2019  1:16 PM)      Lazaro Hauser MD at 7/9/2019 12:16 PM      Consult Orders    1. Inpatient Infectious Diseases Consult [[id number]] ordered by Deep Muñiz IV, MD at 07/08/19 1536                Stephen Stoll  1975  [id number]  7/9/2019      Referring Provider: Lazaro Hauser MD  Reason for Consultation: No chief complaint on file.        Subjective   History of present illness:    Patient is a very pleasant  44 y.o.  Yr old male with CC of fever  Patient was otherwise well until over a month ago  Co cough fever and URI symptoms  Eventually Dx with pna - steroids and shot and Z-Pack    Two weeks ago admitted here with myocarditis  EF 35%  Eventually home with doxycycline  Now feels worse, with fever and new right wrist pain  Notes redness right wrist    Co  "productive cough with yellow phlegm  Co SOA and hypoxia - sats 92% in the office        Past Medical History:   Diagnosis Date   • Hyperlipidemia    • Pneumonia        Past Surgical History:   Procedure Laterality Date   • APPENDECTOMY     • BACK SURGERY      L5 S1   • CARDIAC CATHETERIZATION N/A 6/24/2019    Procedure: LEFT HEART CATH;  Surgeon: Deep Muñiz IV, MD;  Location:  [facility] INVASIVE [facility];  Service: Cardiovascular       Pediatric History   Patient Guardian Status   • Not on file     Other Topics Concern   • Not on file   Social History Narrative    Caffeine use 4-5 servings a day       family history includes Cancer in his paternal grandfather; Emphysema in his maternal grandfather; Hyperlipidemia in his mother; Hypertension in his father; No Known Problems in his maternal grandmother; Stroke in his maternal grandfather and paternal grandmother.    Allergies   Allergen Reactions   • Tramadol Nausea And Vomiting     Nausea and not working   • Ciprofloxacin Anxiety     \"Extreme anxiety and paranoia\"       Medication:  Antibiotics:  Anti-Infectives (From admission, onward)    Ordered     Dose/Rate Route Frequency Start Stop    07/08/19 1619  vancomycin 2000 mg/500 mL 0.9% NS IVPB ([facility])     Ordering Provider:  Isiah Huntley RPH    2,000 mg  over 180 Minutes Intravenous Every 12 Hours Scheduled 07/08/19 1730 07/15/19 1759    07/08/19 1544  cefTRIAXone (ROCEPHIN) 1 g/100 mL 0.9% NS (MBP)     Ordering Provider:  Dana Garcia MD    1 g  over 30 Minutes Intravenous Every 24 Hours 07/08/19 1600 07/15/19 1559    07/08/19 1544  Pharmacy to dose vancomycin     Ordering Provider:  Dana Garcia MD     Does not apply Continuous PRN 07/08/19 1544 07/15/19 1543          Please refer to the medical record for a full medication list    Review of Systems    General: fever  HEENT:  NO icterus  Resp:  Co cough  CV: No chest pain  ABD:  No diarrhea  :  No dysuria  MS:  co pain hips and right " "wrist  Neuro:  no CVA  Skin:  No rash  Endocrine:  no DM  Psych:  Co anxiety  Hematological  No bleeding      Objective     Physical Exam:   Vital Signs   Temp:  [99.1 °F (37.3 °C)-102.4 °F (39.1 °C)] 100.9 °F (38.3 °C)  Heart Rate:  [] 107  Resp:  [18] 18  BP: (113-145)/(66-98) 113/98    Blood pressure 113/98, pulse 107, temperature (!) 100.9 °F (38.3 °C), temperature source Oral, resp. rate 18, height 182 cm (71.65\"), weight 99.8 kg (220 lb), SpO2 94 %.  GENERAL: Awake and alert, in mild distress.   HEENT: Oropharynx without thrush. . Dentition in good repair. No cervical adenopathy. No neck masses  EYES: PERRL. No conjunctival injection. No icterus.   LYMPHATICS: No lymphadenopathy of the neck or axillary or inguinal regions.   HEART: No murmur, gallop, or pericardial friction rub.   LUNGS: Clear to auscultation anteriorly. No respiratory distress  ABDOMEN: Soft, nontender, nondistended. No appreciable HSM.    SKIN: Warm and dry without cutaneous eruptions.    PSYCHIATRIC: Mental status lucid. No confusion  EXT:  Redness swelling and tenderness right wrist        Lab Results   Component Value Date    WBC 37.54 (C) 07/09/2019    HGB 11.8 (L) 07/09/2019    HCT 36.0 (L) 07/09/2019    MCV 84.5 07/09/2019     (H) 07/09/2019       Lab Results   Component Value Date    GLUCOSE 127 (H) 07/09/2019    BUN 13 07/09/2019    CREATININE 0.95 07/09/2019    EGFRIFNONA 86 07/09/2019    BCR 13.7 07/09/2019    CO2 22.0 07/09/2019    CALCIUM 8.4 (L) 07/09/2019    ALBUMIN 2.80 (L) 07/08/2019    AST 31 07/08/2019    ALT 25 07/08/2019       Estimated Creatinine Clearance: 119.4 mL/min (by C-G formula based on SCr of 0.95 mg/dL).      Microbiology:reviewed      Radiology:  Imaging Results (last 72 hours)     Procedure Component Value Units Date/Time    CT Chest Hi Resolution [[id number]] Updated:  07/09/19 1050    FL Guided Aspiration Joint [[id number]] Updated:  07/09/19 1032    XR Chest PA & Lateral [[id number]] Collected:  " 07/09/19 0905     Updated:  07/09/19 1006    Narrative:       EXAMINATION: XR CHEST PA AND LATERAL- 07/08/2019      INDICATION: persistent hypoxia      COMPARISON: 06/24/2019     FINDINGS: Cardiac silhouette is normal. There is no evidence of cardiac  decompensation. There is no pulmonary inflammatory process, mass or  effusion.           Impression:       No active disease.     D:  07/09/2019  E:  07/09/2019                ASSESSMENT AND PLAN:     -- FUO  -- Myocarditis  --Inflammation right wrist   -- Neutrophilic Leukocytosis  -- Elevated inflammatory markers  -- cough with sputum production  -- High Ferritin    Continue vancomycin and rocephin for now  Suspect Stills  Steroids OK with me  Can hold on Myocardial Bx?    Discussed with Attending  Discussed with Cardiologist    Lung Bx OK with me - CT with nodular lesions?     I discussed the patients findings and my recommendations with patient and family    Thank you for this consult.  Our group would be pleased to follow this patient over the course of their hospitalization and assist with outpatient antimicrobial therapy, as indicated.     Lazaro Hauser MD  7/9/2019              Electronically signed by Lazaro Hauser MD at 7/9/2019 12:29 PM

## 2019-07-09 NOTE — CONSULTS
"Stephen Stoll  1975  [id number]  7/9/2019      Referring Provider: Lazaro Hauser MD  Reason for Consultation: No chief complaint on file.        Subjective   History of present illness:    Patient is a very pleasant  44 y.o.  Yr old male with CC of fever  Patient was otherwise well until over a month ago  Co cough fever and URI symptoms  Eventually Dx with pna - steroids and shot and Z-Pack    Two weeks ago admitted here with myocarditis  EF 35%  Eventually home with doxycycline  Now feels worse, with fever and new right wrist pain  Notes redness right wrist    Co productive cough with yellow phlegm  Co SOA and hypoxia - sats 92% in the office        Past Medical History:   Diagnosis Date   • Hyperlipidemia    • Pneumonia        Past Surgical History:   Procedure Laterality Date   • APPENDECTOMY     • BACK SURGERY      L5 S1   • CARDIAC CATHETERIZATION N/A 6/24/2019    Procedure: LEFT HEART CATH;  Surgeon: Deep Muñiz IV, MD;  Location: [facility] CATH INVASIVE LOC[facility];  Service: Cardiovascular       Pediatric History   Patient Guardian Status   • Not on file     Other Topics Concern   • Not on file   Social History Narrative    Caffeine use 4-5 servings a day       family history includes Cancer in his paternal grandfather; Emphysema in his maternal grandfather; Hyperlipidemia in his mother; Hypertension in his father; No Known Problems in his maternal grandmother; Stroke in his maternal grandfather and paternal grandmother.    Allergies   Allergen Reactions   • Tramadol Nausea And Vomiting     Nausea and not working   • Ciprofloxacin Anxiety     \"Extreme anxiety and paranoia\"       Medication:  Antibiotics:  Anti-Infectives (From admission, onward)    Ordered     Dose/Rate Route Frequency Start Stop    07/08/19 1619  vancomycin 2000 mg/500 mL 0.9% NS IVPB ([facility])     Ordering Provider:  Isiah Huntley [facility]    2,000 mg  over 180 Minutes Intravenous Every 12 Hours Scheduled 07/08/19 1731 " "07/15/19 1759    07/08/19 1544  cefTRIAXone (ROCEPHIN) 1 g/100 mL 0.9% NS (MBP)     Ordering Provider:  Dana Garcia MD    1 g  over 30 Minutes Intravenous Every 24 Hours 07/08/19 1600 07/15/19 1559    07/08/19 1544  Pharmacy to dose vancomycin     Ordering Provider:  Dana Garcia MD     Does not apply Continuous PRN 07/08/19 1544 07/15/19 1543          Please refer to the medical record for a full medication list    Review of Systems    General: fever  HEENT:  NO icterus  Resp:  Co cough  CV: No chest pain  ABD:  No diarrhea  :  No dysuria  MS:  co pain hips and right wrist  Neuro:  no CVA  Skin:  No rash  Endocrine:  no DM  Psych:  Co anxiety  Hematological  No bleeding      Objective     Physical Exam:   Vital Signs   Temp:  [99.1 °F (37.3 °C)-102.4 °F (39.1 °C)] 100.9 °F (38.3 °C)  Heart Rate:  [] 107  Resp:  [18] 18  BP: (113-145)/(66-98) 113/98    Blood pressure 113/98, pulse 107, temperature (!) 100.9 °F (38.3 °C), temperature source Oral, resp. rate 18, height 182 cm (71.65\"), weight 99.8 kg (220 lb), SpO2 94 %.  GENERAL: Awake and alert, in mild distress.   HEENT: Oropharynx without thrush. . Dentition in good repair. No cervical adenopathy. No neck masses  EYES: PERRL. No conjunctival injection. No icterus.   LYMPHATICS: No lymphadenopathy of the neck or axillary or inguinal regions.   HEART: No murmur, gallop, or pericardial friction rub.   LUNGS: Clear to auscultation anteriorly. No respiratory distress  ABDOMEN: Soft, nontender, nondistended. No appreciable HSM.    SKIN: Warm and dry without cutaneous eruptions.    PSYCHIATRIC: Mental status lucid. No confusion  EXT:  Redness swelling and tenderness right wrist        Lab Results   Component Value Date    WBC 37.54 (C) 07/09/2019    HGB 11.8 (L) 07/09/2019    HCT 36.0 (L) 07/09/2019    MCV 84.5 07/09/2019     (H) 07/09/2019       Lab Results   Component Value Date    GLUCOSE 127 (H) 07/09/2019    BUN 13 07/09/2019    CREATININE 0.95 " 07/09/2019    EGFRIFNONA 86 07/09/2019    BCR 13.7 07/09/2019    CO2 22.0 07/09/2019    CALCIUM 8.4 (L) 07/09/2019    ALBUMIN 2.80 (L) 07/08/2019    AST 31 07/08/2019    ALT 25 07/08/2019       Estimated Creatinine Clearance: 119.4 mL/min (by C-G formula based on SCr of 0.95 mg/dL).      Microbiology:reviewed      Radiology:  Imaging Results (last 72 hours)     Procedure Component Value Units Date/Time    CT Chest Hi Resolution [[id number]] Updated:  07/09/19 1050    FL Guided Aspiration Joint [[id number]] Updated:  07/09/19 1032    XR Chest PA & Lateral [[id number]] Collected:  07/09/19 0905     Updated:  07/09/19 1006    Narrative:       EXAMINATION: XR CHEST PA AND LATERAL- 07/08/2019      INDICATION: persistent hypoxia      COMPARISON: 06/24/2019     FINDINGS: Cardiac silhouette is normal. There is no evidence of cardiac  decompensation. There is no pulmonary inflammatory process, mass or  effusion.           Impression:       No active disease.     D:  07/09/2019  E:  07/09/2019                ASSESSMENT AND PLAN:     -- FUO  -- Myocarditis  --Inflammation right wrist   -- Neutrophilic Leukocytosis  -- Elevated inflammatory markers  -- cough with sputum production  -- High Ferritin    Continue vancomycin and rocephin for now  Suspect Stills  Steroids OK with me  Can hold on Myocardial Bx?    Discussed with Attending  Discussed with Cardiologist    Lung Bx OK with me - CT with nodular lesions?     I discussed the patients findings and my recommendations with patient and family    Thank you for this consult.  Our group would be pleased to follow this patient over the course of their hospitalization and assist with outpatient antimicrobial therapy, as indicated.     Lazaro Hauser MD  7/9/2019

## 2019-07-09 NOTE — PLAN OF CARE
Problem: Patient Care Overview  Goal: Plan of Care Review  Outcome: Ongoing (interventions implemented as appropriate)   07/09/19 0328   Coping/Psychosocial   Plan of Care Reviewed With patient   Plan of Care Review   Progress no change   OTHER   Outcome Summary Pt complains of pain in his back and a cough. He has also spiked a fever of 100.9. Will continue to monitor. 0330 7-9-19

## 2019-07-09 NOTE — PROGRESS NOTES
INPATIENT PULMONARY SERVICE   HOSPITAL VISIT     Hospital Day:  LOS: 1 day    Requesting MD: Dr. Radha Almanza   Mr. Stephen Stoll is a 44 y.o. male who was admitted with:      Fever    Non-ischemic cardiomyopathy (CMS/HCC)    HPI     Persistent/Recurrent fevers over last month.  Some cough.  Sputum was yellow, but now it clear.  Recent admission 2 weeks ago with myocarditis.  Also new Right Wrist pain.     PMH: He  has a past medical history of Hyperlipidemia and Pneumonia.   PSxH: He  has a past surgical history that includes Back surgery; Appendectomy; and Cardiac catheterization (N/A, 6/24/2019).      Medications:  No current facility-administered medications on file prior to encounter.      Current Outpatient Medications on File Prior to Encounter   Medication Sig   • guaiFENesin (ROBITUSSIN) 100 MG/5ML syrup Take 200 mg by mouth 3 (Three) Times a Day As Needed for Cough.   • HYDROcodone-acetaminophen (NORCO)  MG per tablet Take 1 tablet by mouth Every 6 (Six) Hours As Needed for Moderate Pain . 1/2 to 2 tablets every 6 hours as needed   • metoprolol succinate XL (TOPROL-XL) 25 MG 24 hr tablet Take 1 tablet by mouth Daily.   • valsartan (DIOVAN) 40 MG tablet Take 1 tablet by mouth Daily.   • dextromethorphan polistirex ER (DELSYM) 30 MG/5ML Suspension Extended Release oral suspension Take 10 ml by mouth Every 12 (Twelve) Hours As Needed (cough).   • doxycycline (MONODOX) 100 MG capsule Take 1 capsule by mouth Every 12 (Twelve) Hours for 22 doses.       Allergies: He is allergic to tramadol and ciprofloxacin.   FH: His family history includes Cancer in his paternal grandfather; Emphysema in his maternal grandfather; Hyperlipidemia in his mother; Hypertension in his father; No Known Problems in his maternal grandmother; Stroke in his maternal grandfather and paternal grandmother.   SH: He  reports that he has never smoked. He has never used smokeless tobacco. He reports that he does not drink  alcohol or use drugs.     The patient's relevant past medical, surgical and social history were reviewed and updated in Epic as appropriate.      Review of Systems  See H+P done by Dr. Garcia on 7/8/2019.    Objective   O     Vitals:  Temp: (!) 100.9 °F (38.3 °C) (07/09/19 0810) Temp  Min: 99.1 °F (37.3 °C)  Max: 102.4 °F (39.1 °C)   BP: 113/98 (07/09/19 0810) BP  Min: 113/98  Max: 145/80   Pulse: 107 (07/09/19 0810) Pulse  Min: 85  Max: 107   Resp: 18 (07/09/19 0810) Resp  Min: 18  Max: 18   SpO2: 94 % (07/09/19 0417) SpO2  Min: 93 %  Max: 94 %   Device: room air (07/09/19 1400)    Flow Rate:   No Data Recorded     Medications (drips):    Pharmacy to dose vancomycin       Telemetry:  Rhythm: normal sinus rhythm (07/09/19 1400)         Constitutional:  No acute distress.   HEENT: Normocephalic and atraumatic.   Neck:  No tracheal deviation present.  No JVD present.   No thyromegaly.    Cardiovascular: Normal rate, regular and rhythm. Normal heart sounds.  No murmurs, gallop or rub.   Respiratory: No respiratory distress.  Normal breath sounds  No adventitious sounds    Abdominal:  Soft with no tenderness  No distension. No HSM.   Extremities: Warm with no cyanosis.  No peripheral edema.   Skin No rashes, lesions or ulcers noted.    Neurological:   Alert and Oriented to person, place, and time.  Best Eye Response: 4-->(E4) spontaneous (07/09/19 0821)  Best Motor Response: 6-->(M6) obeys commands (07/09/19 0821)  Best Verbal Response: 5-->(V5) oriented (07/09/19 0821)  [place] Coma Scale Score: 15 (07/09/19 0821)   Psychiatric: Normal affect.     Hematology:  Results from last 7 days   Lab Units 07/09/19  0708 07/08/19  1429   WBC 10*3/mm3 37.54* 35.02*   HEMOGLOBIN g/dL 11.8* 11.3*   MCV fL 84.5 83.6   PLATELETS 10*3/mm3 597* 529*       Chemistry:  Estimated Creatinine Clearance: 119.4 mL/min (by C-G formula based on SCr of 0.95 mg/dL).    Results from last 7 days   Lab Units 07/09/19  0708 07/08/19  1429   SODIUM  mmol/L 131* 128*   POTASSIUM mmol/L 4.4 4.3   CHLORIDE mmol/L 94* 90*   CO2 mmol/L 22.0 23.0   ANION GAP mmol/L 15.0 15.0   GLUCOSE mg/dL 127* 116*   CALCIUM mg/dL 8.4* 8.4*         Results from last 7 days   Lab Units 07/09/19  0708 07/08/19  1429   BUN mg/dL 13 13   CREATININE mg/dL 0.95 0.93       Lab Results   Lab Value Date/Time    PROCALCITO 2.18 (H) 07/08/2019 1429    PROCALCITO 0.19 06/24/2019 1620    CRP 47.80 (H) 07/08/2019 1429    CRP 23.07 (H) 06/24/2019 1620       Lab Results   Lab Value Date/Time    BLOODCX No growth at 24 hours 07/08/2019 1429    BLOODCX No growth at 24 hours 07/08/2019 1429    BLOODCX No growth at 5 days 06/26/2019 0136    BLOODCX No growth at 5 days 06/26/2019 0133       Images:  Xr Chest Pa & Lateral    Result Date: 7/9/2019  No active disease.  D:  07/09/2019 E:  07/09/2019  This report was finalized on 7/9/2019 2:50 PM by Dr. Don Diaz MD.      Fl Guided Aspiration Joint    Result Date: 7/9/2019  Successful fluoroscopic guided right wrist joint aspiration as above with no immediate complications.      CT Chest 6/24/19    Impression:      1. No acute parenchymal findings in the lung specifically no focal consolidation or pleural effusion.  2. A 1.6 cm mildly enlarged and central low attenuation concerning for central necrotic right lower paratracheal lymph node of indeterminate significance. Attention on followup as clinically recommended.  3. Cardiac size within normal limits without pericardial effusion.     D:  06/24/2019    Echo:  Results for orders placed during the hospital encounter of 07/08/19   Adult Transthoracic Echo Complete W/ Cont if Necessary Per Protocol    Narrative · Left ventricular systolic function is normal. Estimated EF = 55%.  · The cardiac valves are anatomically and functionally normal.          I reviewed the patient's new clinical results.  I independently reviewed the patient's new imaging results.    Assessment/Plan   A / P     Assessment:    44  y.o.male, admitted on 7/8/2019 with Fever [R50.9]:     1. FUO  1. R paratracheal LN, with suggestion of necrosis.  2. Leukocytosis (37K), with neutrophilia.  3. Broad differential diagnosis, including rheumatological conditions, infectious, and others like sarcoidosis.  2. Right wrist effusion  3. Recent non ischemic myocarditis.  4. Antibiotics: PER ID  5. Dyslipidemia         Lab Results   Lab Value Date/Time    HGBA1C 5.70 (H) 06/24/2019 0929       Diet: Diet Regular; Cardiac   Advance Directives: Code Status and Medical Interventions:   Ordered at: 07/08/19 1545     Level Of Support Discussed With:    Patient     Code Status:    CPR     Medical Interventions (Level of Support Prior to Arrest):    Full        Plan:    1. He wants to discuss with Dr. Hauser, before agreeing with Bronchoscopy, as they have been talking about other kind of biopsies too (ie: endomyocardial biopsy).  2. Fungal serologies.  3. Please let us know if he decided to proceed with Bronch, TBBx and EBUS, so we can schedule it.    Plan of care and goals reviewed during interdisciplinary rounds.  I discussed the patient's findings and my recommendations with patient    Level of Risk is Moderate due to: undiagnosed new problem.     Time: 50 minutes, face to face, with the patient or on the sosa coordinating care with other health care providers.     I spent > 50% percent of this time, counseling and discussing management.     Patient Care Team:  Ihsan Blackwell MD as PCP - General (Family Medicine)    Thank you very much for allowing to participate in the care of Mr. Stephen Stoll    Marvel Sandoval MD, FACP, FCCP, CNSC  Intensive Care Medicine, Nutrition Support and Pulmonary Medicine

## 2019-07-09 NOTE — PROGRESS NOTES
[facility] Medicine Services  PROGRESS NOTE    Patient Name: Stephen Stoll  : 1975  MRN: [id number]    Date of Admission: 2019  Length of Stay: 1  Primary Care Physician: Ihsan Blackwell MD    Subjective   Subjective     CC:  Worsening dyspnea, new R wrist swelling    HPI:  Got some sleep last night.  Cough persists.  Nebs did not help.   Wrist a bit less 'hot' feeling.     Review of Systems   Gen- intermittent fevers continue  CV- No chest pain, palpitations  Resp-  Cough and dyspnea.    GI- No N/V/D, abd pain  musc - tolerating norco 5mg prn for back and wrist pain.     Otherwise ROS is negative except as mentioned in the HPI.    Objective   Objective     Vital Signs:   Temp:  [99.1 °F (37.3 °C)-102.4 °F (39.1 °C)] 99.1 °F (37.3 °C)  Heart Rate:  [] 98  Resp:  [18] 18  BP: (125-145)/(66-80) 145/80        Physical Exam:  Constitutional: Awake, alert, up on EOB.  Looks nontoxic.   Eyes: PERRLA, sclerae anicteric, no conjunctival injection  HENT: NCAT, mucous membranes moist  Neck: Supple, , trachea midline  Respiratory: Clear to auscultation bilaterally, nonlabored respirations on RA, but dry unrelenting cough persists  Cardiovascular: RRR, no murmurs, rubs, or gallops, palpable pedal pulses bilaterally  Gastrointestinal: Positive bowel sounds, soft, nontender, nondistended  Musculoskeletal: No bilateral ankle edema, no clubbing or cyanosis to extremities.  R wrist swelling and tenderness/pain with movement persists unchanged.   Psychiatric: Appropriate affect, cooperative  Neurologic: Oriented x 3, strength symmetric in all extremities, Cranial Nerves grossly intact to confrontation, speech clear  Skin: No rashes    Results Reviewed:  I have personally reviewed current lab, radiology, and data and agree.    Results from last 7 days   Lab Units 19  1429   WBC 10*3/mm3 35.02*   HEMOGLOBIN g/dL 11.3*   HEMATOCRIT % 34.7*   PLATELETS 10*3/mm3 529*   INR   1.53*   PROCALCITONIN ng/mL 2.18*     Results from last 7 days   Lab Units 07/08/19  1429   SODIUM mmol/L 128*   POTASSIUM mmol/L 4.3   CHLORIDE mmol/L 90*   CO2 mmol/L 23.0   BUN mg/dL 13   CREATININE mg/dL 0.93   GLUCOSE mg/dL 116*   CALCIUM mg/dL 8.4*   ALT (SGPT) U/L 25   AST (SGOT) U/L 31   TROPONIN T ng/mL <0.010   PROBNP pg/mL 433.4     Estimated Creatinine Clearance: 122 mL/min (by C-G formula based on SCr of 0.93 mg/dL).    Microbiology Results Abnormal     None          Imaging Results (last 24 hours)     Procedure Component Value Units Date/Time    XR Chest PA & Lateral [[id number]] Updated:  07/08/19 1846          Results for orders placed during the hospital encounter of 06/24/19   Transthoracic Echo Complete With Contrast if Necessary Per Protocol    Narrative · Left ventricular systolic function is moderately decreased. Estimated EF   = 35%. There is global hypokinesis of the left ventricle.  · Mild mitral valve regurgitation is present          I have reviewed the medications:  Scheduled Meds:  [START ON 7/10/2019] Pharmacy Consult  Does not apply Once   ceftriaxone 1 g Intravenous Q24H   dextromethorphan polistirex ER 60 mg Oral Q12H   ipratropium-albuterol 3 mL Nebulization Q6H While Awake - RT   sodium chloride 3 mL Intravenous Q12H   vancomycin 2,000 mg Intravenous Q12H     Continuous Infusions:  Pharmacy to dose vancomycin      PRN Meds:.acetaminophen  •  benzonatate  •  HYDROcodone-acetaminophen  •  Morphine  •  Pharmacy to dose vancomycin  •  sodium chloride      Assessment/Plan   Assessment / Plan     Active Hospital Problems    Diagnosis  POA   • **Fever [R50.9]  Yes      Resolved Hospital Problems   No resolved problems to display.        Brief Hospital Course to date:  Stephen Stoll is a 44 y.o. male with a recent admission June 24-29. He was a healthy man until early June when he developed daily fevers to 102 and a persistent dry cough.  Since then he has had myocarditis (now  resolved), progressive dyspnea, and new R wrist arthritis.     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  This pattern waned over the years and was completely resolved prior to this episode.     On 7/8 I discussed his case with Sasha DE LA ROSA, an orthopedist, a rheumatologist, and a pulmonologist.  Will proceed with arthrocentesis by radiology today - though I doubt the findings will point to a specific diagnosis.  Given the prominent pulm symptoms and abnl lymph node on last CT, get HRCT and a pulm consult to eval for biopsy.     A/p     FUO  Leukocytosis now 37K  Sed rate rising since prev admission   - still consider viral or atypical infection, sarcoid,Still's disease, vasculitis, other  - note that sarcoid has beeen associated with leukocytosis that resolves w treatment.      Myocarditis - EF has normalized by 7/8 echo.  Trop nl.   - stop ARB due to remote chance it is contributing to cough; continue BB  - discussed echo with Dr. Muñiz.      R wrist arthritis/effusion  - discussed w Dr Diaz - plan tap today under fluoro or CT guidance  - suspect this is reactive, autoimmune ? Sarcoid  - Discussed with Ortho; will get the usual studies.        Persistent cough, progressive dyspnea  CT last admission showed a necrotic center to a R paratracheal lymph node  - HRCT today   - pulm consult for possible tissue diagnosis.       DVT Prophylaxis:  lovenox    Disposition: I expect the patient to be discharged tbd.    CODE STATUS:   Code Status and Medical Interventions:   Ordered at: 07/08/19 4569     Level Of Support Discussed With:    Patient     Code Status:    CPR     Medical Interventions (Level of Support Prior to Arrest):    Full         Electronically signed by Dana Garcia MD, 07/09/19, 7:36 AM.

## 2019-07-09 NOTE — PROGRESS NOTES
Case Management Readmission Assessment Note       Case Management Readmission Assessment (all recorded)      Readmission Interview     Row Name 07/09/19 1305             Readmission Indications    Is this hospitalization related to the prior hospital diagnosis?  Yes      What was the reason you were admitted?  Fever      [facility] Name 07/09/19 1305             Recommendation for rehospitalization    Did you speak with your physician prior to coming to the hospital  Yes      If yes, what physician did you speak with?  Infectious disease physician      Who recommended you return to the hospital?  Specialist      Did you seek care elsewhere prior to coming to the hospital?  Yes      [facility] Name 07/09/19 1305             Follow up appointment    Do you have a PCP?  Yes      Did you have an appointment with PCP/specialist after hospitalization within 7 days?  Yes      Did you keep your appointment?  Yes      [facility] Name 07/09/19 1305             Medications    Did you have newly prescribed medications at discharge?  Yes      Did you understand the reasons for your medications at discharge and how to take them?  Yes      Did you understand the side effects of your medications?  Yes      Are you taking all of you prescribed medications?  Yes      [facility] Name 07/09/19 1305             Discharge Instructions    Did you understand your discharge instructions?  Yes      Did your family/caregiver hear your instructions?  Yes      [facility] Name 07/09/19 1305             Index discharge location/services    Where did you go upon discharge?  Home

## 2019-07-09 NOTE — PROGRESS NOTES
Discharge Planning Assessment  [facility]     Patient Name: Stephen Stoll  MRN: [id number]  Today's Date: 7/9/2019    Admit Date: 7/8/2019    Discharge Needs Assessment     Row Name 07/09/19 1301       Living Environment    Lives With  child(vern), dependent;spouse    Current Living Arrangements  home/apartment/condo    Primary Care Provided by  self    Provides Primary Care For  child(vern)    Family Caregiver if Needed  spouse    Quality of Family Relationships  supportive    Able to Return to Prior Arrangements  yes       Resource/Environmental Concerns    Resource/Environmental Concerns  none    Transportation Concerns  car, none       Transition Planning    Patient/Family Anticipates Transition to  home    Patient/Family Anticipated Services at Transition  none    Transportation Anticipated  family or friend will provide       Discharge Needs Assessment    Concerns to be Addressed  no discharge needs identified    Equipment Currently Used at Home  none    Anticipated Changes Related to Illness  none    Equipment Needed After Discharge  none        Discharge Plan     Row Name 07/09/19 1309       Plan    Plan  Home    Patient/Family in Agreement with Plan  yes    Plan Comments  Spoke with pt at bedside. Pt is independent in ADl's and IADl's. Pt has prescription coverage with his insurance. Pt 's PCP is Ihsan Blackwell. pt had no current discharge needs or concerns and plans to discharge home with his family when medically ready.     Final Discharge Disposition Code  01 - home or self-care        Destination      No service coordination in this encounter.      Durable Medical Equipment      No service coordination in this encounter.      Dialysis/Infusion      No service coordination in this encounter.      Home Medical Care      No service coordination in this encounter.      Therapy      No service coordination in this encounter.      Community Resources      No service coordination in this encounter.           Demographic Summary     Row Name 07/09/19 1301       General Information    Reason for Consult  discharge planning        Functional Status     Row Name 07/09/19 1301       Functional Status, IADL    Medications  independent    Meal Preparation  independent    Housekeeping  independent    Laundry  independent    Shopping  independent        Psychosocial    No documentation.       Abuse/Neglect    No documentation.       Legal    No documentation.       Substance Abuse    No documentation.       Patient Forms    No documentation.           MASON Pearce

## 2019-07-09 NOTE — OUTREACH NOTE
Sepsis Week 2 Survey      Responses   Facility patient discharged from?  [facility]   Does the patient have one of the following disease processes/diagnoses(primary or secondary)?  Sepsis   Week 2 attempt successful?  No   Revoke  Readmitted          Rosenda Hartley RN

## 2019-07-10 ENCOUNTER — APPOINTMENT (OUTPATIENT)
Dept: GENERAL RADIOLOGY | Facility: HOSPITAL | Age: 44
End: 2019-07-10

## 2019-07-10 ENCOUNTER — ANESTHESIA EVENT (OUTPATIENT)
Dept: GASTROENTEROLOGY | Facility: HOSPITAL | Age: 44
End: 2019-07-10

## 2019-07-10 ENCOUNTER — ANESTHESIA (OUTPATIENT)
Dept: GASTROENTEROLOGY | Facility: HOSPITAL | Age: 44
End: 2019-07-10

## 2019-07-10 LAB
ANION GAP SERPL CALCULATED.3IONS-SCNC: 13 MMOL/L (ref 5–15)
BASOPHILS # BLD AUTO: 0.11 10*3/MM3 (ref 0–0.2)
BASOPHILS NFR BLD AUTO: 0.3 % (ref 0–1.5)
BUN BLD-MCNC: 12 MG/DL (ref 6–20)
BUN/CREAT SERPL: 15.2 (ref 7–25)
C BURNET PH1 IGG SER-ACNC: NEGATIVE
C BURNET PH1 IGM SER-ACNC: NEGATIVE
C BURNET PH2 IGG SER-ACNC: NEGATIVE
C BURNET PH2 IGM SER-ACNC: NEGATIVE
CALCIUM SPEC-SCNC: 8.5 MG/DL (ref 8.6–10.5)
CHLORIDE SERPL-SCNC: 98 MMOL/L (ref 98–107)
CO2 SERPL-SCNC: 23 MMOL/L (ref 22–29)
CREAT BLD-MCNC: 0.79 MG/DL (ref 0.76–1.27)
DEPRECATED RDW RBC AUTO: 42.4 FL (ref 37–54)
EOSINOPHIL # BLD AUTO: 0.14 10*3/MM3 (ref 0–0.4)
EOSINOPHIL NFR BLD AUTO: 0.4 % (ref 0.3–6.2)
ERYTHROCYTE [DISTWIDTH] IN BLOOD BY AUTOMATED COUNT: 13.6 % (ref 12.3–15.4)
GFR SERPL CREATININE-BSD FRML MDRD: 106 ML/MIN/1.73
GLUCOSE BLD-MCNC: 125 MG/DL (ref 65–99)
HCT VFR BLD AUTO: 34.4 % (ref 37.5–51)
HGB BLD-MCNC: 11.2 G/DL (ref 13–17.7)
IMM GRANULOCYTES # BLD AUTO: 0.99 10*3/MM3 (ref 0–0.05)
IMM GRANULOCYTES NFR BLD AUTO: 2.8 % (ref 0–0.5)
LYMPHOCYTES # BLD AUTO: 1.87 10*3/MM3 (ref 0.7–3.1)
LYMPHOCYTES NFR BLD AUTO: 5.3 % (ref 19.6–45.3)
LYMPHOCYTES NFR FLD MANUAL: 20 %
MACROPHAGE FLUID: 15 %
MCH RBC QN AUTO: 27.6 PG (ref 26.6–33)
MCHC RBC AUTO-ENTMCNC: 32.6 G/DL (ref 31.5–35.7)
MCV RBC AUTO: 84.7 FL (ref 79–97)
MESOTHL CELL NFR FLD MANUAL: 18 %
MONOCYTES # BLD AUTO: 1.68 10*3/MM3 (ref 0.1–0.9)
MONOCYTES NFR BLD AUTO: 4.7 % (ref 5–12)
MONOCYTES NFR FLD: 7 %
NEUTROPHILS # BLD AUTO: 30.58 10*3/MM3 (ref 1.7–7)
NEUTROPHILS NFR BLD AUTO: 86.5 % (ref 42.7–76)
NEUTROPHILS NFR FLD MANUAL: 40 %
NRBC BLD AUTO-RTO: 0 /100 WBC (ref 0–0.2)
PLATELET # BLD AUTO: 564 10*3/MM3 (ref 140–450)
PMV BLD AUTO: 10 FL (ref 6–12)
POTASSIUM BLD-SCNC: 4.5 MMOL/L (ref 3.5–5.2)
RBC # BLD AUTO: 4.06 10*6/MM3 (ref 4.14–5.8)
SODIUM BLD-SCNC: 134 MMOL/L (ref 136–145)
VANCOMYCIN TROUGH SERPL-MCNC: 12.2 MCG/ML (ref 5–20)
WBC NRBC COR # BLD: 35.37 10*3/MM3 (ref 3.4–10.8)

## 2019-07-10 PROCEDURE — 89051 BODY FLUID CELL COUNT: CPT | Performed by: INTERNAL MEDICINE

## 2019-07-10 PROCEDURE — 71045 X-RAY EXAM CHEST 1 VIEW: CPT

## 2019-07-10 PROCEDURE — 87385 HISTOPLASMA CAPSUL AG IA: CPT | Performed by: INTERNAL MEDICINE

## 2019-07-10 PROCEDURE — 85025 COMPLETE CBC W/AUTO DIFF WBC: CPT | Performed by: INTERNAL MEDICINE

## 2019-07-10 PROCEDURE — 76000 FLUOROSCOPY <1 HR PHYS/QHP: CPT

## 2019-07-10 PROCEDURE — 88305 TISSUE EXAM BY PATHOLOGIST: CPT | Performed by: INTERNAL MEDICINE

## 2019-07-10 PROCEDURE — 87205 SMEAR GRAM STAIN: CPT | Performed by: INTERNAL MEDICINE

## 2019-07-10 PROCEDURE — 87206 SMEAR FLUORESCENT/ACID STAI: CPT | Performed by: INTERNAL MEDICINE

## 2019-07-10 PROCEDURE — 87116 MYCOBACTERIA CULTURE: CPT | Performed by: INTERNAL MEDICINE

## 2019-07-10 PROCEDURE — 25010000002 DEXAMETHASONE PER 1 MG: Performed by: NURSE ANESTHETIST, CERTIFIED REGISTERED

## 2019-07-10 PROCEDURE — 25010000002 METHYLPREDNISOLONE PER 40 MG: Performed by: INTERNAL MEDICINE

## 2019-07-10 PROCEDURE — 31624 DX BRONCHOSCOPE/LAVAGE: CPT | Performed by: INTERNAL MEDICINE

## 2019-07-10 PROCEDURE — 86606 ASPERGILLUS ANTIBODY: CPT | Performed by: INTERNAL MEDICINE

## 2019-07-10 PROCEDURE — 80048 BASIC METABOLIC PNL TOTAL CA: CPT | Performed by: INTERNAL MEDICINE

## 2019-07-10 PROCEDURE — 31628 BRONCHOSCOPY/LUNG BX EACH: CPT | Performed by: INTERNAL MEDICINE

## 2019-07-10 PROCEDURE — 87071 CULTURE AEROBIC QUANT OTHER: CPT | Performed by: INTERNAL MEDICINE

## 2019-07-10 PROCEDURE — 25010000002 CEFTRIAXONE PER 250 MG: Performed by: INTERNAL MEDICINE

## 2019-07-10 PROCEDURE — 87278 LEGION PNEUMOPHILIA AG IF: CPT | Performed by: INTERNAL MEDICINE

## 2019-07-10 PROCEDURE — 87177 OVA AND PARASITES SMEARS: CPT | Performed by: INTERNAL MEDICINE

## 2019-07-10 PROCEDURE — 87556 M.TUBERCULO DNA AMP PROBE: CPT | Performed by: INTERNAL MEDICINE

## 2019-07-10 PROCEDURE — C1726 CATH, BAL DIL, NON-VASCULAR: HCPCS | Performed by: INTERNAL MEDICINE

## 2019-07-10 PROCEDURE — 99233 SBSQ HOSP IP/OBS HIGH 50: CPT | Performed by: INTERNAL MEDICINE

## 2019-07-10 PROCEDURE — 87070 CULTURE OTHR SPECIMN AEROBIC: CPT | Performed by: INTERNAL MEDICINE

## 2019-07-10 PROCEDURE — 25010000002 ONDANSETRON PER 1 MG: Performed by: NURSE ANESTHETIST, CERTIFIED REGISTERED

## 2019-07-10 PROCEDURE — 31623 DX BRONCHOSCOPE/BRUSH: CPT | Performed by: INTERNAL MEDICINE

## 2019-07-10 PROCEDURE — 25010000002 PROPOFOL 10 MG/ML EMULSION: Performed by: NURSE ANESTHETIST, CERTIFIED REGISTERED

## 2019-07-10 PROCEDURE — 0B9D8ZX DRAINAGE OF RIGHT MIDDLE LUNG LOBE, VIA NATURAL OR ARTIFICIAL OPENING ENDOSCOPIC, DIAGNOSTIC: ICD-10-PCS | Performed by: INTERNAL MEDICINE

## 2019-07-10 PROCEDURE — 0BD68ZX EXTRACTION OF RIGHT LOWER LOBE BRONCHUS, VIA NATURAL OR ARTIFICIAL OPENING ENDOSCOPIC, DIAGNOSTIC: ICD-10-PCS | Performed by: INTERNAL MEDICINE

## 2019-07-10 PROCEDURE — 25010000002 VANCOMYCIN 10 G RECONSTITUTED SOLUTION

## 2019-07-10 PROCEDURE — 80202 ASSAY OF VANCOMYCIN: CPT

## 2019-07-10 PROCEDURE — 86698 HISTOPLASMA ANTIBODY: CPT | Performed by: INTERNAL MEDICINE

## 2019-07-10 PROCEDURE — 86612 BLASTOMYCES ANTIBODY: CPT | Performed by: INTERNAL MEDICINE

## 2019-07-10 PROCEDURE — 87102 FUNGUS ISOLATION CULTURE: CPT | Performed by: INTERNAL MEDICINE

## 2019-07-10 PROCEDURE — 07D78ZX EXTRACTION OF THORAX LYMPHATIC, VIA NATURAL OR ARTIFICIAL OPENING ENDOSCOPIC, DIAGNOSTIC: ICD-10-PCS | Performed by: INTERNAL MEDICINE

## 2019-07-10 PROCEDURE — 87209 SMEAR COMPLEX STAIN: CPT | Performed by: INTERNAL MEDICINE

## 2019-07-10 PROCEDURE — 31632 BRONCHOSCOPY/LUNG BX ADDL: CPT | Performed by: INTERNAL MEDICINE

## 2019-07-10 PROCEDURE — 31652 BRONCH EBUS SAMPLNG 1/2 NODE: CPT | Performed by: INTERNAL MEDICINE

## 2019-07-10 PROCEDURE — 88112 CYTOPATH CELL ENHANCE TECH: CPT | Performed by: INTERNAL MEDICINE

## 2019-07-10 PROCEDURE — 25010000003 LIDOCAINE 1 % SOLUTION: Performed by: NURSE ANESTHETIST, CERTIFIED REGISTERED

## 2019-07-10 PROCEDURE — 0BD58ZX EXTRACTION OF RIGHT MIDDLE LOBE BRONCHUS, VIA NATURAL OR ARTIFICIAL OPENING ENDOSCOPIC, DIAGNOSTIC: ICD-10-PCS | Performed by: INTERNAL MEDICINE

## 2019-07-10 RX ORDER — PROMETHAZINE HYDROCHLORIDE 25 MG/ML
6.25 INJECTION, SOLUTION INTRAMUSCULAR; INTRAVENOUS ONCE AS NEEDED
Status: DISCONTINUED | OUTPATIENT
Start: 2019-07-10 | End: 2019-07-10 | Stop reason: HOSPADM

## 2019-07-10 RX ORDER — HYDROCODONE BITARTRATE AND ACETAMINOPHEN 5; 325 MG/1; MG/1
1 TABLET ORAL EVERY 4 HOURS PRN
Status: DISCONTINUED | OUTPATIENT
Start: 2019-07-10 | End: 2019-07-12 | Stop reason: HOSPADM

## 2019-07-10 RX ORDER — DEXAMETHASONE SODIUM PHOSPHATE 4 MG/ML
INJECTION, SOLUTION INTRA-ARTICULAR; INTRALESIONAL; INTRAMUSCULAR; INTRAVENOUS; SOFT TISSUE AS NEEDED
Status: DISCONTINUED | OUTPATIENT
Start: 2019-07-10 | End: 2019-07-10 | Stop reason: SURG

## 2019-07-10 RX ORDER — PROMETHAZINE HYDROCHLORIDE 25 MG/1
25 SUPPOSITORY RECTAL ONCE AS NEEDED
Status: DISCONTINUED | OUTPATIENT
Start: 2019-07-10 | End: 2019-07-10 | Stop reason: HOSPADM

## 2019-07-10 RX ORDER — OXYCODONE HYDROCHLORIDE AND ACETAMINOPHEN 5; 325 MG/1; MG/1
1 TABLET ORAL ONCE AS NEEDED
Status: DISCONTINUED | OUTPATIENT
Start: 2019-07-10 | End: 2019-07-10 | Stop reason: HOSPADM

## 2019-07-10 RX ORDER — METHYLPREDNISOLONE SODIUM SUCCINATE 40 MG/ML
40 INJECTION, POWDER, LYOPHILIZED, FOR SOLUTION INTRAMUSCULAR; INTRAVENOUS EVERY 8 HOURS
Status: DISCONTINUED | OUTPATIENT
Start: 2019-07-10 | End: 2019-07-11

## 2019-07-10 RX ORDER — ONDANSETRON 2 MG/ML
4 INJECTION INTRAMUSCULAR; INTRAVENOUS ONCE AS NEEDED
Status: COMPLETED | OUTPATIENT
Start: 2019-07-10 | End: 2019-07-10

## 2019-07-10 RX ORDER — PROPOFOL 10 MG/ML
VIAL (ML) INTRAVENOUS AS NEEDED
Status: DISCONTINUED | OUTPATIENT
Start: 2019-07-10 | End: 2019-07-10 | Stop reason: SURG

## 2019-07-10 RX ORDER — ATRACURIUM BESYLATE 10 MG/ML
INJECTION, SOLUTION INTRAVENOUS AS NEEDED
Status: DISCONTINUED | OUTPATIENT
Start: 2019-07-10 | End: 2019-07-10 | Stop reason: SURG

## 2019-07-10 RX ORDER — PROMETHAZINE HYDROCHLORIDE 25 MG/1
25 TABLET ORAL ONCE AS NEEDED
Status: DISCONTINUED | OUTPATIENT
Start: 2019-07-10 | End: 2019-07-10 | Stop reason: HOSPADM

## 2019-07-10 RX ORDER — FENTANYL CITRATE 50 UG/ML
50 INJECTION, SOLUTION INTRAMUSCULAR; INTRAVENOUS
Status: DISCONTINUED | OUTPATIENT
Start: 2019-07-10 | End: 2019-07-10 | Stop reason: HOSPADM

## 2019-07-10 RX ORDER — FAMOTIDINE 10 MG/ML
20 INJECTION, SOLUTION INTRAVENOUS ONCE
Status: COMPLETED | OUTPATIENT
Start: 2019-07-10 | End: 2019-07-10

## 2019-07-10 RX ORDER — ALBUTEROL SULFATE 2.5 MG/3ML
2.5 SOLUTION RESPIRATORY (INHALATION) ONCE AS NEEDED
Status: DISCONTINUED | OUTPATIENT
Start: 2019-07-10 | End: 2019-07-10 | Stop reason: HOSPADM

## 2019-07-10 RX ORDER — MEPERIDINE HYDROCHLORIDE 25 MG/ML
12.5 INJECTION INTRAMUSCULAR; INTRAVENOUS; SUBCUTANEOUS
Status: DISCONTINUED | OUTPATIENT
Start: 2019-07-10 | End: 2019-07-10 | Stop reason: HOSPADM

## 2019-07-10 RX ORDER — LIDOCAINE HYDROCHLORIDE 10 MG/ML
INJECTION, SOLUTION INFILTRATION; PERINEURAL AS NEEDED
Status: DISCONTINUED | OUTPATIENT
Start: 2019-07-10 | End: 2019-07-10 | Stop reason: SURG

## 2019-07-10 RX ORDER — SODIUM CHLORIDE, SODIUM LACTATE, POTASSIUM CHLORIDE, CALCIUM CHLORIDE 600; 310; 30; 20 MG/100ML; MG/100ML; MG/100ML; MG/100ML
20 INJECTION, SOLUTION INTRAVENOUS CONTINUOUS
Status: DISCONTINUED | OUTPATIENT
Start: 2019-07-10 | End: 2019-07-12 | Stop reason: HOSPADM

## 2019-07-10 RX ADMIN — SODIUM CHLORIDE, POTASSIUM CHLORIDE, SODIUM LACTATE AND CALCIUM CHLORIDE: 600; 310; 30; 20 INJECTION, SOLUTION INTRAVENOUS at 14:40

## 2019-07-10 RX ADMIN — HYDROCODONE BITARTRATE AND ACETAMINOPHEN 1 TABLET: 5; 325 TABLET ORAL at 01:27

## 2019-07-10 RX ADMIN — SODIUM CHLORIDE, PRESERVATIVE FREE 3 ML: 5 INJECTION INTRAVENOUS at 08:50

## 2019-07-10 RX ADMIN — SODIUM CHLORIDE, POTASSIUM CHLORIDE, SODIUM LACTATE AND CALCIUM CHLORIDE 20 ML/HR: 600; 310; 30; 20 INJECTION, SOLUTION INTRAVENOUS at 13:12

## 2019-07-10 RX ADMIN — DEXTROMETHORPHAN POLISTIREX 60 MG: 30 SUSPENSION ORAL at 08:49

## 2019-07-10 RX ADMIN — VANCOMYCIN HYDROCHLORIDE 2000 MG: 10 INJECTION, POWDER, LYOPHILIZED, FOR SOLUTION INTRAVENOUS at 08:49

## 2019-07-10 RX ADMIN — DEXAMETHASONE SODIUM PHOSPHATE 8 MG: 4 INJECTION, SOLUTION INTRAMUSCULAR; INTRAVENOUS at 14:06

## 2019-07-10 RX ADMIN — METOPROLOL SUCCINATE 25 MG: 25 TABLET, EXTENDED RELEASE ORAL at 08:49

## 2019-07-10 RX ADMIN — ATRACURIUM BESYLATE 30 MG: 10 INJECTION, SOLUTION INTRAVENOUS at 14:00

## 2019-07-10 RX ADMIN — PROPOFOL 200 MG: 10 INJECTION, EMULSION INTRAVENOUS at 14:00

## 2019-07-10 RX ADMIN — BENZONATATE 100 MG: 100 CAPSULE ORAL at 17:35

## 2019-07-10 RX ADMIN — ACETAMINOPHEN 650 MG: 325 TABLET, FILM COATED ORAL at 06:01

## 2019-07-10 RX ADMIN — BENZONATATE 100 MG: 100 CAPSULE ORAL at 08:49

## 2019-07-10 RX ADMIN — VANCOMYCIN HYDROCHLORIDE 2000 MG: 10 INJECTION, POWDER, LYOPHILIZED, FOR SOLUTION INTRAVENOUS at 17:35

## 2019-07-10 RX ADMIN — CEFTRIAXONE SODIUM 1 G: 1 INJECTION, POWDER, FOR SOLUTION INTRAMUSCULAR; INTRAVENOUS at 16:06

## 2019-07-10 RX ADMIN — FAMOTIDINE 20 MG: 10 INJECTION, SOLUTION INTRAVENOUS at 13:11

## 2019-07-10 RX ADMIN — METHYLPREDNISOLONE SODIUM SUCCINATE 40 MG: 40 INJECTION, POWDER, FOR SOLUTION INTRAMUSCULAR; INTRAVENOUS at 16:05

## 2019-07-10 RX ADMIN — HYDROCODONE BITARTRATE AND ACETAMINOPHEN 1 TABLET: 5; 325 TABLET ORAL at 18:10

## 2019-07-10 RX ADMIN — HYDROCODONE BITARTRATE AND ACETAMINOPHEN 1 TABLET: 5; 325 TABLET ORAL at 09:58

## 2019-07-10 RX ADMIN — DEXTROMETHORPHAN POLISTIREX 60 MG: 30 SUSPENSION ORAL at 21:24

## 2019-07-10 RX ADMIN — ONDANSETRON 4 MG: 2 INJECTION INTRAMUSCULAR; INTRAVENOUS at 14:25

## 2019-07-10 RX ADMIN — LIDOCAINE HYDROCHLORIDE 50 MG: 10 INJECTION, SOLUTION INFILTRATION; PERINEURAL at 14:00

## 2019-07-10 NOTE — ANESTHESIA POSTPROCEDURE EVALUATION
Patient: Stephen Stoll    Procedure Summary     Date:  07/10/19 Room / Location:   [facility]    Anesthesia Start:  1350 Anesthesia Stop:  1505    Procedure:  BRONCHOSCOPY WITH ENDOBRONCHIAL ULTRASOUND AND TRANSBRONCHIAL BIOPSY AND FLUORO (N/A Bronchus) Diagnosis:      Surgeon:  Marvel Sandoval MD Provider:  Meng Cano MD    Anesthesia Type:  general ASA Status:  3          Anesthesia Type: general  Last vitals  BP   141/64 (07/10/19 1505)   Temp   98 °F (36.7 °C) (07/10/19 1505)   Pulse   108 (07/10/19 1505)   Resp   16 (07/10/19 1505)     SpO2   96 % (07/10/19 1505)     Post Anesthesia Care and Evaluation    Patient location during evaluation: PACU  Patient participation: complete - patient participated  Level of consciousness: awake and alert  Pain management: adequate  Airway patency: patent  Anesthetic complications: No anesthetic complications  PONV Status: none  Cardiovascular status: acceptable  Respiratory status: acceptable  Hydration status: acceptable

## 2019-07-10 NOTE — PLAN OF CARE
Problem: Patient Care Overview  Goal: Plan of Care Review  Outcome: Ongoing (interventions implemented as appropriate)   07/10/19 0140   Coping/Psychosocial   Plan of Care Reviewed With patient   Plan of Care Review   Progress no change   OTHER   Outcome Summary Pt complaining of alot of back pain and is unable to get comfortable in the bed. Waffle mattress applied and pain medications given. Cough seems to be well controlled with cough medicine. Will continue to monitor.

## 2019-07-10 NOTE — PROGRESS NOTES
[facility] Medicine Services  PROGRESS NOTE    Patient Name: Stephen Stoll  : 1975  MRN: [id number]    Date of Admission: 2019  Length of Stay: 2  Primary Care Physician: Ihsan Blackwell MD    Subjective   Subjective     CC:  Worsening dyspnea, new R wrist swelling    HPI:  Currently NPO for bronch in afternoon.  Continues to cough; isn't sure if meds help.  He and mother ask appropriate questions about bronch and steroids.      Review of Systems   Gen- intermittent fevers continue.  A bit decreased past day or two. None this morning.   CV- No chest pain, palpitations  Resp-  Cough and dyspnea continue  GI- No N/V/D, abd pain  musc - tolerating norco 5mg prn for back and wrist pain.  Wrist is about the same.     Otherwise ROS is negative except as mentioned in the HPI.    Objective   Objective     Vital Signs:   Temp:  [99.1 °F (37.3 °C)-102 °F (38.9 °C)] 99.1 °F (37.3 °C)  Heart Rate:  [] 91  Resp:  [18] 18  BP: (113-137)/(61-98) 133/83        Physical Exam:  Constitutional: Awake, alert, up in chair, mother present.  He continues to cough a lot and looks same as previous.   Eyes: PERRLA, sclerae anicteric, no conjunctival injection  HENT: NCAT, mucous membranes moist  Neck: Supple, , trachea midline  Respiratory: Clear to auscultation bilaterally, nonlabored respirations on RA, but dry unrelenting cough persists  Cardiovascular: RRR, no murmurs, rubs, or gallops, palpable pedal pulses bilaterally  Gastrointestinal: Positive bowel sounds, soft, nontender, nondistended  Musculoskeletal: No bilateral ankle edema, no clubbing or cyanosis to extremities.  R wrist swelling and tenderness/pain with movement persists unchanged.   Psychiatric: Appropriate affect, cooperative  Neurologic: Oriented x 3, strength symmetric in all extremities, Cranial Nerves grossly intact to confrontation, speech clear  Skin: No rashes    Results Reviewed:  I have personally reviewed  current lab, radiology, and data and agree.    Results from last 7 days   Lab Units 07/10/19  0522 07/09/19  0708 07/08/19  1429   WBC 10*3/mm3 35.37* 37.54* 35.02*   HEMOGLOBIN g/dL 11.2* 11.8* 11.3*   HEMATOCRIT % 34.4* 36.0* 34.7*   PLATELETS 10*3/mm3 564* 597* 529*   INR   --   --  1.53*   PROCALCITONIN ng/mL  --   --  2.18*     Results from last 7 days   Lab Units 07/10/19  0522 07/09/19  0708 07/08/19  1429   SODIUM mmol/L 134* 131* 128*   POTASSIUM mmol/L 4.5 4.4 4.3   CHLORIDE mmol/L 98 94* 90*   CO2 mmol/L 23.0 22.0 23.0   BUN mg/dL 12 13 13   CREATININE mg/dL 0.79 0.95 0.93   GLUCOSE mg/dL 125* 127* 116*   CALCIUM mg/dL 8.5* 8.4* 8.4*   ALT (SGPT) U/L  --   --  25   AST (SGOT) U/L  --   --  31   TROPONIN T ng/mL  --  <0.010 <0.010   PROBNP pg/mL  --   --  433.4     Estimated Creatinine Clearance: 143.6 mL/min (by C-G formula based on SCr of 0.79 mg/dL).    Microbiology Results Abnormal     Procedure Component Value - Date/Time    Body Fluid Culture - Body Fluid, Wrist, Right [[id number]] Collected:  07/09/19 1030    Lab Status:  Preliminary result Specimen:  Body Fluid from Wrist, Right Updated:  07/09/19 1924     Gram Stain No WBCs or organisms seen    KOH Prep - Aspirate, Wrist, Right [[id number]] Collected:  07/09/19 1508    Lab Status:  Final result Specimen:  Aspirate from Wrist, Right Updated:  07/09/19 1539     KOH Prep No yeast or hyphal elements seen    Blood Culture - Blood, Arm, Left [[id number]] Collected:  07/08/19 1429    Lab Status:  Preliminary result Specimen:  Blood from Arm, Left Updated:  07/09/19 1446     Blood Culture No growth at 24 hours    Blood Culture - Blood, Arm, Right [[id number]] Collected:  07/08/19 1429    Lab Status:  Preliminary result Specimen:  Blood from Arm, Right Updated:  07/09/19 1446     Blood Culture No growth at 24 hours    Respiratory Culture - Sputum, Cough [[id number]] Collected:  07/09/19 0655    Lab Status:  Preliminary result Specimen:  Sputum from Cough  Updated:  07/09/19 0935     Gram Stain Few (2+) WBCs per low power field      Occasional Epithelial cells per low power field      Few (2+) Gram positive cocci in pairs          Imaging Results (last 24 hours)     Procedure Component Value Units Date/Time    FL Guided Aspiration Joint [[id number]] Collected:  07/09/19 1522     Updated:  07/09/19 1522    Narrative:       Exam: Fluoroscopic guided joint aspiration     INDICATION: Right wrist pain     TECHNIQUE: 21 seconds of fluoroscopic time was used for this procedure.  Procedure, risks, complications, and side effects of joint aspiration  were discussed and reviewed in detail with patient including the  possibility for bleeding, infection, needle damage to surrounding  structures, and the potential for a nondiagnostic exam. Patient  indicated understanding and consented to the procedure.     The right wrist was prepped and draped in a sterile fashion. 1%  lidocaine was used as a local anesthetic to the skin and subcutaneous  tissues. Under fluoroscopic guidance a 22-gauge needle was advanced to  the joint space. Despite multiple attempts, synovial fluid was not able  to be aspirated at this time, therefore approximately 2 to 3 cc of  sterile saline was injected into the joint space, aspirated, labeled,  and sent to pathology for further analysis. The needle was removed.     FINDINGS: The patient tolerated the procedure well, and no immediate  complications occurred.       Impression:       Successful fluoroscopic guided right wrist joint aspiration  as above with no immediate complications.       XR Chest PA & Lateral [[id number]] Collected:  07/09/19 0905     Updated:  07/09/19 1453    Narrative:       EXAMINATION: XR CHEST PA AND LATERAL- 07/08/2019      INDICATION: persistent hypoxia      COMPARISON: 06/24/2019     FINDINGS: Cardiac silhouette is normal. There is no evidence of cardiac  decompensation. There is no pulmonary inflammatory process, mass or  effusion.            Impression:       No active disease.     D:  07/09/2019  E:  07/09/2019     This report was finalized on 7/9/2019 2:50 PM by Dr. Don Diaz MD.       CT Chest Hi Resolution [[id number]] Updated:  07/09/19 1050          Results for orders placed during the hospital encounter of 07/08/19   Adult Transthoracic Echo Complete W/ Cont if Necessary Per Protocol    Narrative · Left ventricular systolic function is normal. Estimated EF = 55%.  · The cardiac valves are anatomically and functionally normal.          I have reviewed the medications:  Scheduled Meds:    Pharmacy Consult  Does not apply Once   ceftriaxone 1 g Intravenous Q24H   dextromethorphan polistirex ER 60 mg Oral Q12H   metoprolol succinate XL 25 mg Oral Q24H   sodium chloride 3 mL Intravenous Q12H   vancomycin 2,000 mg Intravenous Q12H     Continuous Infusions:    Pharmacy to dose vancomycin      PRN Meds:.acetaminophen  •  benzonatate  •  HYDROcodone-acetaminophen  •  Morphine  •  Pharmacy to dose vancomycin  •  sodium chloride      Assessment/Plan   Assessment / Plan     Active Hospital Problems    Diagnosis  POA   • **Fever [R50.9]  Yes   • Non-ischemic cardiomyopathy (CMS/HCC) [I42.8]  Yes      Resolved Hospital Problems   No resolved problems to display.        Brief Hospital Course to date:  Stephen Stoll is a 44 y.o. male with a recent admission June 24-29 for FUO, now readmitted for the same issue. He was a healthy man until early June when he developed daily fevers to 102 and a persistent dry cough.  Since then he has had myocarditis (now resolved), progressive dyspnea, and new R wrist arthritis.     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  This pattern waned over the years and was completely resolved prior to his current fevrile illness.     On 7/8 I discussed his case  with Sasha DE LA ROSA, an orthopedist, a rheumatologist, and a pulmonologist.  Will proceed with arthrocentesis by radiology today - though I doubt the findings will point to a specific diagnosis.  Given the prominent pulm symptoms and abnl lymph node on last CT, proceeded with HRCT and a pulm consult to eval for biopsy.     A/p     FUO  Leukocytosis now 37K  Sed rate rising since prev admission   - still consider viral or atypical infection, sarcoid,Still's disease, vasculitis, other  - note that sarcoid has beeen associated with leukocytosis that resolves w treatment.   - planning to start steroids after bronch.      Myocarditis - EF has normalized by 7/8 echo.  Trop nl.   - stop ARB due to remote chance it is contributing to cough; continue BB  - discussed echo with Dr. Muñiz.      R wrist arthritis/effusion  - tapped 7/8 by IR.    -  Discussed with Ortho  - synovial fluid labs unrevealing.      Persistent cough, progressive dyspnea  CT last admission showed a necrotic center to a R paratracheal lymph node  - HRCT noted   - plan bronch/transbronch bx for possible tissue diagnosis.       DVT Prophylaxis:  lovenox on hold for bronch.     Disposition: I expect the patient to be discharged tbd.    CODE STATUS:   Code Status and Medical Interventions:   Ordered at: 07/08/19 1545     Level Of Support Discussed With:    Patient     Code Status:    CPR     Medical Interventions (Level of Support Prior to Arrest):    Full         Electronically signed by Dana Garcia MD, 07/10/19, 7:39 AM.

## 2019-07-10 NOTE — PLAN OF CARE
Problem: Patient Care Overview  Goal: Plan of Care Review  Outcome: Ongoing (interventions implemented as appropriate)   07/10/19 1813   Coping/Psychosocial   Plan of Care Reviewed With patient   Plan of Care Review   Progress no change   OTHER   Outcome Summary Pt had bronchoscopy today, cough continues as well as back pain, prn pain and cough medications given.      Goal: Individualization and Mutuality  Outcome: Ongoing (interventions implemented as appropriate)   07/09/19 1535   Individualization   Patient Specific Interventions Assess pain q2hr and prn       Problem: Pain, Chronic (Adult)  Goal: Identify Related Risk Factors and Signs and Symptoms  Outcome: Ongoing (interventions implemented as appropriate)   07/10/19 1813   Pain, Chronic (Adult)   Related Risk Factors (Chronic Pain) coping ineffective   Signs and Symptoms (Chronic Pain) verbalization of pain descriptors     Goal: Acceptable Pain/Comfort Level and Functional Ability  Outcome: Ongoing (interventions implemented as appropriate)   07/10/19 1813   Pain, Chronic (Adult)   Acceptable Pain/Comfort Level and Functional Ability making progress toward outcome

## 2019-07-10 NOTE — OP NOTE
"BRONCHOSCOPY REPORT     Date: 7/10/2019       Procedure(s): Procedure(s):  BRONCHOSCOPY WITH ENDOBRONCHIAL ULTRASOUND, BALF, Brush AND TRANSBRONCHIAL BIOPSY AND FLUORO   Surgeon: Surgeon(s):  Marvel Sandoval MD    Scope In: 14:11   Scope Out: 14:51   Pre-Operative Diagnosis: FUO   Post-Operative Diagnosis: FUO   ASA and Mallampati: ASA Class / Mallampati } Per Anesthesia.   Time out: Immediately prior to procedure a \"time out\" was called to verify the correct patient, procedure, equipment, support staff and site/side as required.   Anesthesia: General       Findings:    A fiberoptic bronchoscope was inserted into the main airway via the ETT.      An anatomical survey was done of the main airways and the subsegmental bronchus: including the Right Mainstem bronchus, the Right Upper Lobe bronchus, Bronchus Intermedius, Right Middle Lobe, Superior Segment of the Right Lower Lobe and the Basilar segments of the Right Lower Lobe.     Then, the scope was withdrawn back to the gisela and advanced into the Left Mainstem Bronchus, Left Upper Lobe bronchus, Lingula Bronchus, and the basilar segments of the Left Lower Lobe.    Only scant amount of white secretions noted in the airways.    A BALF in the RML was then done.    After that, we proceeded with an EBUS of the right Paratracheal LN, several passes were done.    Then we switched back to the FOB, and did a Brush RML.    Finally we proceeded with a TBBx, RML X 3 passes, and then RLL X 3 passes. Cold saline was used to control bleeding.    No immediate complications    Vocal Cords: Vocal Cords not able to examined since patient is intubated.   Main Tachea and Gisela: Normal.   Bronchial Tree: Normal mucosa. Scant amount of white secretions, in both right and left mainstem.       Estimated Blood Loss: 30 mL.   Medications: None.   Complications: None     Bronchial Alveolar Lavage: Site:RML Total  In: 120 mL. Total  Out: 60 mL.     Specimens:   BW BALF PSB Brush EBBx TBBx " EBUS   Location  RML  RML  RML + RLL R Para- tracheal LN   Respiratory Cult. & GS ([id number]).          BAL Cx Quant & GS ([id number]) ()  X        PSB Cx Quant & GS ([id number]) ()          Fungus smear (GMS) ([id number])  X        Fungus Culture (HBB580)  X        AFB Smear and Culture (HGZ205)  X        MTB NAVIN (PCR) ([id number])  X        Viral Culture, Rapid, Resp.    ([id number])  X        Ova and Parasite Examination (MON698)  X        Differential, Body Fluid (FTY9899)  X        Legionella Ag. DFA (KKD0727)  X        Pneumocystis smear (GMS) ([id number])  X                  Non-Gyn Cytology (LAB13)  X  X                Pathology ([id number])      X X     Fluoroscopy: Used to guide TBBX     Gisela: RUL R Bronch Intermedius          Left Mainstem     MONALISA and LLL

## 2019-07-10 NOTE — ANESTHESIA PROCEDURE NOTES
Airway  Urgency: elective    End Time:7/10/2019 2:02 PM  Airway not difficult    General Information and Staff    Patient location during procedure: OR  CRNA: aKthie Abebe CRNA    Indications and Patient Condition  Indications for airway management: airway protection    Preoxygenated: yes  MILS not maintained throughout  Mask difficulty assessment: 1 - vent by mask    Final Airway Details  Final airway type: endotracheal airway      Successful airway: ETT  Cuffed: yes   Successful intubation technique: video laryngoscopy  Facilitating devices/methods: intubating stylet  Endotracheal tube insertion site: oral  Blade: Charleen  Blade size: 3  ETT size (mm): 9.0  Cormack-Lehane Classification: grade I - full view of glottis  Placement verified by: chest auscultation and capnometry   Cuff volume (mL): 3  Measured from: teeth  ETT to teeth (cm): 23  Number of attempts at approach: 2    Additional Comments  NSmooth IV induction.  Atraumatic ET intubation.  Dentition unchanged.  Negative epigastric sounds, Breath sound equal bilaterally with symmetric chest rise and fall

## 2019-07-10 NOTE — PROGRESS NOTES
"Pharmacy Consult-Vancomycin Dosing    Stephen Stoll is a 44 y.o. male with a recent admission June 24-29. He was a healthy man until early June when he developed daily fevers to 102 and a persistent dry cough.  Since then he has had myocarditis (now resolved), progressive dyspnea, and new R wrist arthritis. Empiric antibiotic therapy begun, Pharmacy to dose and monitor vancomycin.    Indication: sepsis  Consulting Provider: hospital medicine  ID Consult: yes (Dr. Hauser)  Initial duration of therapy: 7 days  Goal Trough:15-20    Current Antimicrobial Therapy  Anti-Infectives (From admission, onward)      Ordered     Dose/Rate Route Frequency Start Stop    07/08/19 1619  vancomycin 2000 mg/500 mL 0.9% NS IVPB ([facility])     Ordering Provider:  Isiah Huntley RPH    2,000 mg  over 180 Minutes Intravenous Every 12 Hours Scheduled 07/08/19 1730 07/15/19 1759    07/08/19 1544  cefTRIAXone (ROCEPHIN) 1 g/100 mL 0.9% NS ([facility])     Ordering Provider:  Dana Garcia MD    1 g  over 30 Minutes Intravenous Every 24 Hours 07/08/19 1600 07/15/19 1559    07/08/19 1544  Pharmacy to dose vancomycin     Ordering Provider:  Dana Garcia MD     Does not apply Continuous PRN 07/08/19 1544 07/15/19 1543            Allergies  Allergies as of 07/08/2019 - Reviewed 07/08/2019   Allergen Reaction Noted   • Tramadol Nausea And Vomiting 07/08/2019   • Ciprofloxacin Anxiety 06/24/2019       Labs    Results from last 7 days   Lab Units 07/10/19  0522 07/09/19  0708 07/08/19  1429   BUN mg/dL 12 13 13   CREATININE mg/dL 0.79 0.95 0.93       Results from last 7 days   Lab Units 07/10/19  0522 07/09/19  0708 07/08/19  1429   WBC 10*3/mm3 35.37* 37.54* 35.02*       Evaluation of Dosing     Ht - 182 cm (71.65\")  Wt - 99.8 kg (220 lb)    Estimated Creatinine Clearance: 143.6 mL/min (by C-G formula based on SCr of 0.79 mg/dL).    Intake & Output (last 3 days)         07/07 0701 - 07/08 0700 07/08 0701 - 07/09 0700 07/09 0701 - 07/10 0700 07/10 " 0701 - 07/11 0700    I.V. (mL/kg)   1838 (18.4)     Total Intake(mL/kg)   1838 (18.4)     Urine (mL/kg/hr)   100 (0)     Total Output   100     Net   +1738                     Microbiology and Radiology  Microbiology Results (last 10 days)       Procedure Component Value - Date/Time    KOH Prep - Aspirate, Wrist, Right [[id number]] Collected:  07/09/19 1508    Lab Status:  Final result Specimen:  Aspirate from Wrist, Right Updated:  07/09/19 1539     KOH Prep No yeast or hyphal elements seen    Body Fluid Culture - Body Fluid, Wrist, Right [[id number]] Collected:  07/09/19 1030    Lab Status:  Preliminary result Specimen:  Body Fluid from Wrist, Right Updated:  07/09/19 1924     Gram Stain No WBCs or organisms seen    Respiratory Culture - Sputum, Cough [[id number]] Collected:  07/09/19 0655    Lab Status:  Preliminary result Specimen:  Sputum from Cough Updated:  07/09/19 0935     Gram Stain Few (2+) WBCs per low power field      Occasional Epithelial cells per low power field      Few (2+) Gram positive cocci in pairs    Blood Culture - Blood, Arm, Left [[id number]] Collected:  07/08/19 1429    Lab Status:  Preliminary result Specimen:  Blood from Arm, Left Updated:  07/09/19 1446     Blood Culture No growth at 24 hours    Blood Culture - Blood, Arm, Right [[id number]] Collected:  07/08/19 1429    Lab Status:  Preliminary result Specimen:  Blood from Arm, Right Updated:  07/09/19 1446     Blood Culture No growth at 24 hours            Evaluation of Level                  Results from last 7 days   Lab Units 07/10/19  0522   VANCOMYCIN TR mcg/mL 12.20   7/10: Vancomycin trough: 12.2 mcg/mL, 12.5 hours after 3rd dose       Assessment/Plan:  1. Pharmacy to dose vancomycin for empiric treatment of sepsis. Patient has received a scheduled maintenance dose of vancomycin 2000 mg IV q12h (20 mg/kg).  2. Vancomycin trough today is subtherapeutic at 12.2 mcg/mL, level drawn ~12.5 hours after 3rd total dose. Patient's renal  function and urine output appear stable. Today, we will continue the maintenance dosing regimen of vancomycin 2000 mg IV q12h (20 mg/kg) as patient will likely continue to accumulate and did not receive a loading dose.  3. Will schedule another vancomycin trough for 7/12 to ensure WNL.  4. Pharmacy will continue to monitor and adjust vancomycin dose as necessary based on renal function, cultures, labs, and clinical status.    Cole Sarkar, PharmD  Pharmacy Resident  7/10/2019  7:33 AM

## 2019-07-10 NOTE — PROGRESS NOTES
Stephen Stoll  1975  [id number]  7/10/2019    CC: No chief complaint on file.      Stephen Stoll is a 44 y.o. male here for  Fever      History of present illness:    Patient is a very pleasant  44 y.o.  Yr old male with CC of fever  Patient was otherwise well until over a month ago  Co cough fever and URI symptoms  Eventually Dx with pna - steroids and shot and Z-Pack     Two weeks ago admitted here with myocarditis  EF 35%  Eventually home with doxycycline  Now feels worse, with fever and new right wrist pain  Notes redness right wrist     Co productive cough with yellow phlegm  Co SOA and hypoxia - sats 92% in the office    7/10/19 - co fever  Co weakness  Co cough  Co wrist pain  ROS discussed with family            Past medical history:  Past Medical History:   Diagnosis Date   • Hyperlipidemia    • Pneumonia        Medications:   Current Facility-Administered Medications:   •  ! vancomycin trough ordered for 7/10 @ 05:30 please hold 7/10 0600 vancomycin dose until vnacomycin trough drawn and resulted with value less than or equal to 20, , Does not apply, Once, Isiah Huntley, [facility]  •  acetaminophen (TYLENOL) tablet 650 mg, 650 mg, Oral, Q4H PRN, Dana Gracia MD, 650 mg at 07/10/19 0601  •  benzonatate (TESSALON) capsule 100 mg, 100 mg, Oral, TID PRN, Ivette Snow, APRN, 100 mg at 07/09/19 2132  •  cefTRIAXone (ROCEPHIN) 1 g/100 mL 0.9% NS (MBP), 1 g, Intravenous, Q24H, Dana Garcia MD, 1 g at 07/09/19 1519  •  dextromethorphan polistirex ER (DELSYM) 30 MG/5ML oral suspension 60 mg, 60 mg, Oral, Q12H, Dana Garcia MD, 60 mg at 07/09/19 2132  •  HYDROcodone-acetaminophen (NORCO) 5-325 MG per tablet 1 tablet, 1 tablet, Oral, Q6H PRN, Dana Garcia MD, 1 tablet at 07/10/19 0127  •  metoprolol succinate XL (TOPROL-XL) 24 hr tablet 25 mg, 25 mg, Oral, Q24H, Dana Garcia MD, 25 mg at 07/09/19 0819  •  morphine injection 2 mg, 2 mg, Intravenous, Q8H PRN, Dana Garcia MD, 2 mg at  "07/09/19 2333  •  Pharmacy to dose vancomycin, , Does not apply, Continuous PRN, Dana Garcia MD  •  sodium chloride 0.9 % flush 3 mL, 3 mL, Intravenous, Q12H, Dana Garcia MD, 3 mL at 07/09/19 0821  •  sodium chloride 0.9 % flush 3-10 mL, 3-10 mL, Intravenous, PRN, Dana Garcia MD  •  vancomycin 2000 mg/500 mL 0.9% NS IVPB ([facility]), 2,000 mg, Intravenous, Q12H, Isiah Huntley RPH, 2,000 mg at 07/09/19 1700  Antibiotics:  Anti-Infectives (From admission, onward)    Ordered     Dose/Rate Route Frequency Start Stop    07/08/19 1619  vancomycin 2000 mg/500 mL 0.9% NS IVPB ([facility])     Cole Sarkar RPH reviewed the order on 07/10/19 0740.   Ordering Provider:  Isiah Huntley RPH    2,000 mg  over 180 Minutes Intravenous Every 12 Hours Scheduled 07/08/19 1730 07/15/19 1759    07/08/19 1544  cefTRIAXone (ROCEPHIN) 1 g/100 mL 0.9% NS (MBP)     Ordering Provider:  Dana Garcia MD    1 g  over 30 Minutes Intravenous Every 24 Hours 07/08/19 1600 07/15/19 1559    07/08/19 1544  Pharmacy to dose vancomycin     Cole Sarkar RPH reviewed the order on 07/10/19 0740.   Ordering Provider:  Dana Garcia MD     Does not apply Continuous PRN 07/08/19 1544 07/15/19 1543          Allergies:  is allergic to tramadol and ciprofloxacin.    Family History: family history includes Cancer in his paternal grandfather; Emphysema in his maternal grandfather; Hyperlipidemia in his mother; Hypertension in his father; No Known Problems in his maternal grandmother; Stroke in his maternal grandfather and paternal grandmother.    Social History:  reports that he has never smoked. He has never used smokeless tobacco. He reports that he does not drink alcohol or use drugs.    Review of Systems: All other reviewed and negative except as per HPI    Blood pressure 133/83, pulse 91, temperature 99.1 °F (37.3 °C), temperature source Oral, resp. rate 18, height 182 cm (71.65\"), weight 99.8 kg (220 lb), SpO2 94 %.  GENERAL: Awake and alert, in " mild distress.   HEENT: Oropharynx without thrush. . No cervical adenopathy. No neck masses  EYES: . No conjunctival injection. No icterus.   LYMPHATICS: No lymphadenopathy of the neck   HEART: No murmur, gallop, or pericardial friction rub.   LUNGS: Clear to auscultation anteriorly. No respiratory distress  ABDOMEN: Soft, nontender, nondistended.   SKIN: Warm and dry without cutaneous eruptions. .   PSYCHIATRIC: Mental status lucid. Cranial nerve function intact.   EXT: right wrist red and slightly swollen and tender      DIAGNOSTICS:  Lab Results   Component Value Date    WBC 35.37 (C) 07/10/2019    HGB 11.2 (L) 07/10/2019    HCT 34.4 (L) 07/10/2019     (H) 07/10/2019     Lab Results   Component Value Date    CRP 47.80 (H) 07/08/2019     Lab Results   Component Value Date    SEDRATE 90 (H) 07/08/2019     Lab Results   Component Value Date    GLUCOSE 125 (H) 07/10/2019    BUN 12 07/10/2019    CREATININE 0.79 07/10/2019    EGFRIFNONA 106 07/10/2019    BCR 15.2 07/10/2019    CO2 23.0 07/10/2019    CALCIUM 8.5 (L) 07/10/2019    ALBUMIN 2.80 (L) 07/08/2019    AST 31 07/08/2019    ALT 25 07/08/2019       Microbiology: seen      RADIOLOGY:  Imaging Results (last 72 hours)     Procedure Component Value Units Date/Time    FL Guided Aspiration Joint [[id number]] Collected:  07/09/19 1522     Updated:  07/09/19 1522    Narrative:       Exam: Fluoroscopic guided joint aspiration     INDICATION: Right wrist pain     TECHNIQUE: 21 seconds of fluoroscopic time was used for this procedure.  Procedure, risks, complications, and side effects of joint aspiration  were discussed and reviewed in detail with patient including the  possibility for bleeding, infection, needle damage to surrounding  structures, and the potential for a nondiagnostic exam. Patient  indicated understanding and consented to the procedure.     The right wrist was prepped and draped in a sterile fashion. 1%  lidocaine was used as a local anesthetic to the  skin and subcutaneous  tissues. Under fluoroscopic guidance a 22-gauge needle was advanced to  the joint space. Despite multiple attempts, synovial fluid was not able  to be aspirated at this time, therefore approximately 2 to 3 cc of  sterile saline was injected into the joint space, aspirated, labeled,  and sent to pathology for further analysis. The needle was removed.     FINDINGS: The patient tolerated the procedure well, and no immediate  complications occurred.       Impression:       Successful fluoroscopic guided right wrist joint aspiration  as above with no immediate complications.       XR Chest PA & Lateral [[id number]] Collected:  07/09/19 0905     Updated:  07/09/19 1453    Narrative:       EXAMINATION: XR CHEST PA AND LATERAL- 07/08/2019      INDICATION: persistent hypoxia      COMPARISON: 06/24/2019     FINDINGS: Cardiac silhouette is normal. There is no evidence of cardiac  decompensation. There is no pulmonary inflammatory process, mass or  effusion.           Impression:       No active disease.     D:  07/09/2019  E:  07/09/2019     This report was finalized on 7/9/2019 2:50 PM by Dr. Don Diaz MD.       CT Chest Hi Resolution [[id number]] Updated:  07/09/19 1050          Assessment and Plan:     -- FUO  -- Myocarditis  --Inflammation right wrist   -- Neutrophilic Leukocytosis  -- Elevated inflammatory markers  -- cough with sputum production  -- High Ferritin     Continue vancomycin and rocephin for now  Suspect Stills  Steroids OK with me    For  Bronch    Reviewed CT with radiologist       Discussed with Attending       Lung Bx OK with me - still with necrotic lymph node     I discussed the patients findings and my recommendations with patient and family             Lazaro Hauser MD  7/10/2019

## 2019-07-10 NOTE — ANESTHESIA PREPROCEDURE EVALUATION
Anesthesia Evaluation     Patient summary reviewed and Nursing notes reviewed   NPO Solid Status: > 8 hours  NPO Liquid Status: > 8 hours           Airway   Mallampati: III  TM distance: <3 FB  Neck ROM: limited  Possible difficult intubation  Dental      Pulmonary    (+) pneumonia , a smoker,   (-) COPD, asthma, recent URI    ROS comment: Cough 4 weeks   Cardiovascular     ECG reviewed  Patient on routine beta blocker    (+) CAD, angina (stable), CHF (infective carditis with decreased EF see above ), hyperlipidemia,   (-) hypertension, dysrhythmias    ROS comment: Normal sinus rhythm  T wave abnormality, consider inferolateral ischemia  Abnormal ECG    ECHO EF = 55%.  valves are normal.    CATH EF 50% LCx 30% RCA 20%  PDA 40%     Neuro/Psych  (-) seizures, CVA  GI/Hepatic/Renal/Endo    (-) liver disease, no renal disease, diabetes, hypothyroidism    Musculoskeletal     Abdominal    Substance History      OB/GYN          Other        ROS/Med Hx Other: High [facility]                   Anesthesia Plan    ASA 3     general   (Glidescope SB )  intravenous induction   Anesthetic plan, all risks, benefits, and alternatives have been provided, discussed and informed consent has been obtained with: patient.    Plan discussed with CRNA.

## 2019-07-11 ENCOUNTER — APPOINTMENT (OUTPATIENT)
Dept: GENERAL RADIOLOGY | Facility: HOSPITAL | Age: 44
End: 2019-07-11

## 2019-07-11 PROBLEM — M08.20 STILL'S DISEASE (HCC): Status: ACTIVE | Noted: 2019-07-11

## 2019-07-11 LAB
ANION GAP SERPL CALCULATED.3IONS-SCNC: 14 MMOL/L (ref 5–15)
BACTERIA SPEC RESP CULT: NORMAL
BASOPHILS # BLD AUTO: 0.1 10*3/MM3 (ref 0–0.2)
BASOPHILS NFR BLD AUTO: 0.3 % (ref 0–1.5)
BUN BLD-MCNC: 15 MG/DL (ref 6–20)
BUN/CREAT SERPL: 22.4 (ref 7–25)
CALCIUM SPEC-SCNC: 8.4 MG/DL (ref 8.6–10.5)
CHLORIDE SERPL-SCNC: 100 MMOL/L (ref 98–107)
CO2 SERPL-SCNC: 20 MMOL/L (ref 22–29)
CREAT BLD-MCNC: 0.67 MG/DL (ref 0.76–1.27)
CYTO UR: NORMAL
DEPRECATED RDW RBC AUTO: 42.9 FL (ref 37–54)
EOSINOPHIL # BLD AUTO: 0 10*3/MM3 (ref 0–0.4)
EOSINOPHIL NFR BLD AUTO: 0 % (ref 0.3–6.2)
ERYTHROCYTE [DISTWIDTH] IN BLOOD BY AUTOMATED COUNT: 13.8 % (ref 12.3–15.4)
GFR SERPL CREATININE-BSD FRML MDRD: 128 ML/MIN/1.73
GLUCOSE BLD-MCNC: 195 MG/DL (ref 65–99)
GRAM STN SPEC: NORMAL
HCT VFR BLD AUTO: 35.7 % (ref 37.5–51)
HGB BLD-MCNC: 11.3 G/DL (ref 13–17.7)
IMM GRANULOCYTES # BLD AUTO: 0.93 10*3/MM3 (ref 0–0.05)
IMM GRANULOCYTES NFR BLD AUTO: 2.9 % (ref 0–0.5)
LAB AP CASE REPORT: NORMAL
LAB AP CLINICAL INFORMATION: NORMAL
LYMPHOCYTES # BLD AUTO: 0.98 10*3/MM3 (ref 0.7–3.1)
LYMPHOCYTES NFR BLD AUTO: 3.1 % (ref 19.6–45.3)
MCH RBC QN AUTO: 26.8 PG (ref 26.6–33)
MCHC RBC AUTO-ENTMCNC: 31.7 G/DL (ref 31.5–35.7)
MCV RBC AUTO: 84.6 FL (ref 79–97)
MONOCYTES # BLD AUTO: 0.4 10*3/MM3 (ref 0.1–0.9)
MONOCYTES NFR BLD AUTO: 1.3 % (ref 5–12)
NEUTROPHILS # BLD AUTO: 29.31 10*3/MM3 (ref 1.7–7)
NEUTROPHILS NFR BLD AUTO: 92.4 % (ref 42.7–76)
NRBC BLD AUTO-RTO: 0 /100 WBC (ref 0–0.2)
PATH REPORT.FINAL DX SPEC: NORMAL
PATH REPORT.GROSS SPEC: NORMAL
PLATELET # BLD AUTO: 611 10*3/MM3 (ref 140–450)
PMV BLD AUTO: 9.9 FL (ref 6–12)
POTASSIUM BLD-SCNC: 4.3 MMOL/L (ref 3.5–5.2)
RBC # BLD AUTO: 4.22 10*6/MM3 (ref 4.14–5.8)
SODIUM BLD-SCNC: 134 MMOL/L (ref 136–145)
WBC NRBC COR # BLD: 31.72 10*3/MM3 (ref 3.4–10.8)

## 2019-07-11 PROCEDURE — 99232 SBSQ HOSP IP/OBS MODERATE 35: CPT | Performed by: INTERNAL MEDICINE

## 2019-07-11 PROCEDURE — 99233 SBSQ HOSP IP/OBS HIGH 50: CPT | Performed by: INTERNAL MEDICINE

## 2019-07-11 PROCEDURE — 71045 X-RAY EXAM CHEST 1 VIEW: CPT

## 2019-07-11 PROCEDURE — 99231 SBSQ HOSP IP/OBS SF/LOW 25: CPT | Performed by: NURSE PRACTITIONER

## 2019-07-11 PROCEDURE — 25010000002 VANCOMYCIN 10 G RECONSTITUTED SOLUTION

## 2019-07-11 PROCEDURE — 25010000002 METHYLPREDNISOLONE PER 40 MG: Performed by: INTERNAL MEDICINE

## 2019-07-11 PROCEDURE — 85025 COMPLETE CBC W/AUTO DIFF WBC: CPT | Performed by: INTERNAL MEDICINE

## 2019-07-11 PROCEDURE — 25010000002 ENOXAPARIN PER 10 MG: Performed by: INTERNAL MEDICINE

## 2019-07-11 PROCEDURE — 80048 BASIC METABOLIC PNL TOTAL CA: CPT | Performed by: INTERNAL MEDICINE

## 2019-07-11 RX ORDER — PREDNISONE 20 MG/1
60 TABLET ORAL
Status: DISCONTINUED | OUTPATIENT
Start: 2019-07-12 | End: 2019-07-12 | Stop reason: HOSPADM

## 2019-07-11 RX ORDER — AZITHROMYCIN 250 MG/1
250 TABLET, FILM COATED ORAL DAILY
Status: DISCONTINUED | OUTPATIENT
Start: 2019-07-12 | End: 2019-07-12 | Stop reason: HOSPADM

## 2019-07-11 RX ORDER — AZITHROMYCIN 250 MG/1
500 TABLET, FILM COATED ORAL DAILY
Status: COMPLETED | OUTPATIENT
Start: 2019-07-11 | End: 2019-07-11

## 2019-07-11 RX ORDER — FLUCONAZOLE 200 MG/1
400 TABLET ORAL EVERY 24 HOURS
Status: DISCONTINUED | OUTPATIENT
Start: 2019-07-11 | End: 2019-07-12

## 2019-07-11 RX ADMIN — METHYLPREDNISOLONE SODIUM SUCCINATE 40 MG: 40 INJECTION, POWDER, FOR SOLUTION INTRAMUSCULAR; INTRAVENOUS at 08:35

## 2019-07-11 RX ADMIN — HYDROCODONE BITARTRATE AND ACETAMINOPHEN 1 TABLET: 5; 325 TABLET ORAL at 23:34

## 2019-07-11 RX ADMIN — DEXTROMETHORPHAN POLISTIREX 60 MG: 30 SUSPENSION ORAL at 08:36

## 2019-07-11 RX ADMIN — ENOXAPARIN SODIUM 40 MG: 40 INJECTION SUBCUTANEOUS at 08:36

## 2019-07-11 RX ADMIN — FLUCONAZOLE 400 MG: 200 TABLET ORAL at 12:43

## 2019-07-11 RX ADMIN — DEXTROMETHORPHAN POLISTIREX 60 MG: 30 SUSPENSION ORAL at 21:58

## 2019-07-11 RX ADMIN — METOPROLOL SUCCINATE 25 MG: 25 TABLET, EXTENDED RELEASE ORAL at 08:35

## 2019-07-11 RX ADMIN — AZITHROMYCIN 500 MG: 250 TABLET, FILM COATED ORAL at 12:43

## 2019-07-11 RX ADMIN — METHYLPREDNISOLONE SODIUM SUCCINATE 40 MG: 40 INJECTION, POWDER, FOR SOLUTION INTRAMUSCULAR; INTRAVENOUS at 02:21

## 2019-07-11 RX ADMIN — VANCOMYCIN HYDROCHLORIDE 2000 MG: 10 INJECTION, POWDER, LYOPHILIZED, FOR SOLUTION INTRAVENOUS at 08:35

## 2019-07-11 NOTE — PROGRESS NOTES
"Stephen Stoll  1975  [id number]  7/11/2019    CC: No chief complaint on file.      Stephen Stoll is a 44 y.o. male here for  Fever      History of present illness:    Patient is a very pleasant  44 y.o.  Yr old male with CC of fever  Patient was otherwise well until over a month ago  Co cough fever and URI symptoms  Eventually Dx with pna - steroids and shot and Z-Pack     Two weeks ago admitted here with myocarditis  EF 35%  Eventually home with doxycycline  Now feels worse, with fever and new right wrist pain  Notes redness right wrist     Co productive cough with yellow phlegm  Co SOA and hypoxia - sats 92% in the office    7/10/19 - co fever  Co weakness  Co cough  Co wrist pain  ROS discussed with family  7/11 - \"I feel better\"  Wrist pain better.  Fever better \"On steroids\"  No new rash  Co cough still              Past medical history:  Past Medical History:   Diagnosis Date   • Hyperlipidemia    • Pneumonia        Medications:   Current Facility-Administered Medications:   •  [START ON 7/12/2019] ! Vancomycin trough 7/12 @ 0530. Do not give 0600 dose until pharmacy evaluates level, , Does not apply, Once, Cole Sarkar, [facility]  •  acetaminophen (TYLENOL) tablet 650 mg, 650 mg, Oral, Q4H PRN, Dana Garcia MD, 650 mg at 07/10/19 0601  •  benzonatate (TESSALON) capsule 100 mg, 100 mg, Oral, TID PRN, Ivette Snow, APRN, 100 mg at 07/10/19 1735  •  cefTRIAXone (ROCEPHIN) 1 g/100 mL 0.9% NS (MBP), 1 g, Intravenous, Q24H, Dana Garcia MD, 1 g at 07/10/19 1606  •  dextromethorphan polistirex ER (DELSYM) 30 MG/5ML oral suspension 60 mg, 60 mg, Oral, Q12H, Dana Garcia MD, 60 mg at 07/11/19 0836  •  enoxaparin (LOVENOX) syringe 40 mg, 40 mg, Subcutaneous, Daily, Dana Garcia MD, 40 mg at 07/11/19 0836  •  HYDROcodone-acetaminophen (NORCO) 5-325 MG per tablet 1 tablet, 1 tablet, Oral, Q4H PRN, Dana Garcia MD, 1 tablet at 07/10/19 1810  •  lactated ringers infusion, 20 mL/hr, " Intravenous, Continuous, Meng Cano MD, Last Rate: 20 mL/hr at 07/10/19 1312  •  methylPREDNISolone sodium succinate (SOLU-Medrol) injection 40 mg, 40 mg, Intravenous, Q8H, Dana Garcia MD, 40 mg at 07/11/19 0835  •  metoprolol succinate XL (TOPROL-XL) 24 hr tablet 25 mg, 25 mg, Oral, Q24H, Dana Garcia MD, 25 mg at 07/11/19 0835  •  morphine injection 2 mg, 2 mg, Intravenous, Q8H PRN, Dana Garcia MD, 2 mg at 07/09/19 2333  •  Pharmacy to dose vancomycin, , Does not apply, Continuous PRN, Dana Garcia MD  •  sodium chloride 0.9 % flush 3 mL, 3 mL, Intravenous, Q12H, Dana Garcia MD, 3 mL at 07/10/19 0850  •  sodium chloride 0.9 % flush 3-10 mL, 3-10 mL, Intravenous, PRN, Dana Garcia MD  •  vancomycin 2000 mg/500 mL 0.9% NS IVPB ([facility]), 2,000 mg, Intravenous, Q12H, Isiah Huntley RPH, 2,000 mg at 07/11/19 0835  Antibiotics:  Anti-Infectives (From admission, onward)    Ordered     Dose/Rate Route Frequency Start Stop    07/08/19 1619  vancomycin 2000 mg/500 mL 0.9% NS IVPB ([facility])     Cole Sarkar RPH reviewed the order on 07/10/19 0740.   Ordering Provider:  Isiah Huntley RPH    2,000 mg  over 180 Minutes Intravenous Every 12 Hours Scheduled 07/08/19 1730 07/15/19 1759    07/08/19 1544  cefTRIAXone (ROCEPHIN) 1 g/100 mL 0.9% NS (MBP)     Ordering Provider:  Dana Garcia MD    1 g  over 30 Minutes Intravenous Every 24 Hours 07/08/19 1600 07/15/19 1559    07/08/19 1544  Pharmacy to dose vancomycin     Cole Sarkar RPH reviewed the order on 07/10/19 0740.   Ordering Provider:  Dana Garcia MD     Does not apply Continuous PRN 07/08/19 1544 07/15/19 1543          Allergies:  is allergic to tramadol and ciprofloxacin.    Family History: family history includes Cancer in his paternal grandfather; Emphysema in his maternal grandfather; Hyperlipidemia in his mother; Hypertension in his father; No Known Problems in his maternal grandmother; Stroke in his maternal grandfather and paternal  "grandmother.    Social History:  reports that he has never smoked. He has never used smokeless tobacco. He reports that he does not drink alcohol or use drugs.    Review of Systems: All other reviewed and negative except as per HPI    Blood pressure 129/72, pulse 70, temperature 97.7 °F (36.5 °C), temperature source Oral, resp. rate 18, height 182 cm (71.65\"), weight 99.8 kg (220 lb), SpO2 94 %.  GENERAL: Awake and alert, in mild distress.   HEENT: Oropharynx without thrush. . No cervical adenopathy. No neck masses  EYES: . No conjunctival injection. No icterus.   LYMPHATICS: No lymphadenopathy of the neck   HEART: No murmur, gallop, or pericardial friction rub.   LUNGS: Clear to auscultation anteriorly. No respiratory distress  ABDOMEN: Soft, nontender, nondistended.   SKIN: Warm and dry without cutaneous eruptions. .   PSYCHIATRIC: Mental status lucid. Cranial nerve function intact.   EXT: right wrist less red and slightly swollen and tender      DIAGNOSTICS:  Lab Results   Component Value Date    WBC 31.72 (C) 07/11/2019    HGB 11.3 (L) 07/11/2019    HCT 35.7 (L) 07/11/2019     (H) 07/11/2019     Lab Results   Component Value Date    CRP 47.80 (H) 07/08/2019     Lab Results   Component Value Date    SEDRATE 90 (H) 07/08/2019     Lab Results   Component Value Date    GLUCOSE 195 (H) 07/11/2019    BUN 15 07/11/2019    CREATININE 0.67 (L) 07/11/2019    EGFRIFNONA 128 07/11/2019    BCR 22.4 07/11/2019    CO2 20.0 (L) 07/11/2019    CALCIUM 8.4 (L) 07/11/2019    ALBUMIN 2.80 (L) 07/08/2019    AST 31 07/08/2019    ALT 25 07/08/2019       Microbiology: seen      RADIOLOGY:  Imaging Results (last 72 hours)     Procedure Component Value Units Date/Time    FL Guided Aspiration Joint [[id number]] Collected:  07/09/19 1522     Updated:  07/09/19 1522    Narrative:       Exam: Fluoroscopic guided joint aspiration     INDICATION: Right wrist pain     TECHNIQUE: 21 seconds of fluoroscopic time was used for this " procedure.  Procedure, risks, complications, and side effects of joint aspiration  were discussed and reviewed in detail with patient including the  possibility for bleeding, infection, needle damage to surrounding  structures, and the potential for a nondiagnostic exam. Patient  indicated understanding and consented to the procedure.     The right wrist was prepped and draped in a sterile fashion. 1%  lidocaine was used as a local anesthetic to the skin and subcutaneous  tissues. Under fluoroscopic guidance a 22-gauge needle was advanced to  the joint space. Despite multiple attempts, synovial fluid was not able  to be aspirated at this time, therefore approximately 2 to 3 cc of  sterile saline was injected into the joint space, aspirated, labeled,  and sent to pathology for further analysis. The needle was removed.     FINDINGS: The patient tolerated the procedure well, and no immediate  complications occurred.       Impression:       Successful fluoroscopic guided right wrist joint aspiration  as above with no immediate complications.       XR Chest PA & Lateral [[id number]] Collected:  07/09/19 0905     Updated:  07/09/19 1453    Narrative:       EXAMINATION: XR CHEST PA AND LATERAL- 07/08/2019      INDICATION: persistent hypoxia      COMPARISON: 06/24/2019     FINDINGS: Cardiac silhouette is normal. There is no evidence of cardiac  decompensation. There is no pulmonary inflammatory process, mass or  effusion.           Impression:       No active disease.     D:  07/09/2019  E:  07/09/2019     This report was finalized on 7/9/2019 2:50 PM by Dr. Don Diaz MD.       CT Chest Hi Resolution [[id number]] Updated:  07/09/19 1050          Assessment and Plan:     -- FUO  -- Myocarditis  --Inflammation right wrist   -- Neutrophilic Leukocytosis  -- Elevated inflammatory markers  -- cough with sputum production  -- High Ferritin     Change vancomycin and rocephin to PO zithromax and diflucan  Suspect Stills  Steroids  OK with me    await  Bronch studies       Discussed with Attending     I discussed the patients findings and my recommendations with patient      Discussed with another Physician          Lazaro Hauser MD  7/11/2019

## 2019-07-11 NOTE — PROGRESS NOTES
Continued Stay Note  [facility]     Patient Name: Stephen Stoll  MRN: [id number]  Today's Date: 7/11/2019    Admit Date: 7/8/2019    Discharge Plan     Row Name 07/11/19 1228       Plan    Plan Comments  SW continues to follow for discharge needs as arise. Plan remains for pt to discharge home with his family when medically ready. Per physician notes, pt may discharge with outpatient antibiotics through ID. SW continues to follow for discharge needs as arise.     Final Discharge Disposition Code  01 - home or self-care        Discharge Codes    No documentation.             MASON Pearce

## 2019-07-11 NOTE — PROGRESS NOTES
[facility] at [facility]  Cardiology Progress Note      Chief Complaint/Reason for service:    · Myocarditis         Patient reports cough is improved.  He reports that infectious disease thinks he has still's disease.  He thinks treatment is moving in the right direction and is improving on steroids.  His non ischemic cardiomyopathy has resolved this admission and LVEF normalized. Troponins and BNP were negative on admission.  Patient underwent a bronchoscopy yesterday with tissue samples taken and results pending.  WBCs still critically elevated at 31 but trending down.  He denies chest pain, orthopnea, palpitations or syncope.    Past medical, surgical, social and family history reviewed in the patient's electronic medical record.         Vital Sign Min/Max for last 24 hours  Temp  Min: 97.6 °F (36.4 °C)  Max: 98.5 °F (36.9 °C)   BP  Min: 112/62  Max: 144/78   Pulse  Min: 69  Max: 115   Resp  Min: 16  Max: 24   SpO2  Min: 92 %  Max: 96 %   No Data Recorded      Intake/Output Summary (Last 24 hours) at 7/11/2019 0852  Last data filed at 7/11/2019 0835  Gross per 24 hour   Intake 1500 ml   Output --   Net 1500 ml           Physical Exam   Constitutional: He is oriented to person, place, and time. He appears well-developed and well-nourished.   HENT:   Head: Normocephalic.   Neck: No JVD present. Carotid bruit is not present.   Cardiovascular: Normal rate, regular rhythm and normal heart sounds. Exam reveals no gallop and no friction rub.   No murmur heard.  Pulmonary/Chest: Effort normal and breath sounds normal.   Abdominal: Soft. Bowel sounds are normal. He exhibits no distension.   Neurological: He is alert and oriented to person, place, and time.   Skin: Skin is warm and dry.   Psychiatric: He has a normal mood and affect.       Tele: Normal sinus rhythm    Results Review (reviewed the patient's recent labs in the electronic medical record):         Results from last 7 days   Lab Units  07/10/19  0522 07/09/19  0708 07/08/19  1429   SODIUM mmol/L 134* 131* 128*   POTASSIUM mmol/L 4.5 4.4 4.3   CHLORIDE mmol/L 98 94* 90*   BUN mg/dL 12 13 13   CREATININE mg/dL 0.79 0.95 0.93     Results from last 7 days   Lab Units 07/09/19  0708 07/08/19  1429   TROPONIN T ng/mL <0.010 <0.010     Results from last 7 days   Lab Units 07/11/19  0722 07/10/19  0522 07/09/19  0708   WBC 10*3/mm3 31.72* 35.37* 37.54*   HEMOGLOBIN g/dL 11.3* 11.2* 11.8*   HEMATOCRIT % 35.7* 34.4* 36.0*   PLATELETS 10*3/mm3 611* 564* 597*       Lab Results   Component Value Date    HGBA1C 5.70 (H) 06/24/2019       Lab Results   Component Value Date    CHOL 191 06/24/2019    TRIG 51 06/24/2019    HDL 58 06/24/2019     (H) 06/24/2019            Active Hospital Problems    Diagnosis POA   • **Fever [R50.9] Yes   • Non-ischemic cardiomyopathy (CMS/HCC) [I42.8] Yes     · Cardiac MR (6/26/2019): Diffuse gadolinium enhancement of the myocardium consistent with acute myocarditis.  LVEF 42%.  · Echo (7/8/2019): LVEF 55%.  No significant valvular abnormality                · We will sign off  · Please call us with any questions or concerns    Shirley Bear, AILYN  7/11/2019

## 2019-07-11 NOTE — PROGRESS NOTES
[facility] Medicine Services  PROGRESS NOTE    Patient Name: Stephen Stoll  : 1975  MRN: [id number]    Date of Admission: 2019  Length of Stay: 3  Primary Care Physician: Ihsan Blackwell MD    Subjective   Subjective     CC:  Worsening dyspnea, new R wrist swelling    HPI:  Feeling much better today. No fever last night. Dry cough noted. No current pain, tolerating po    Review of Systems   No headache, no chest pain, no dyspnea    Otherwise ROS is negative except as mentioned in the HPI.    Objective   Objective     Vital Signs:   Temp:  [97.6 °F (36.4 °C)-98.5 °F (36.9 °C)] 97.7 °F (36.5 °C)  Heart Rate:  [] 70  Resp:  [16-24] 18  BP: (112-144)/(62-78) 129/72        Physical Exam:  Constitutional: Awake, alert, up in chair, dry cough noted, no distress, Ox4, nontoxic appearing  Eyes: PERRLA, sclerae anicteric, no conjunctival injection  HENT: NCAT, mucous membranes moist  Neck: Supple, , trachea midline  Respiratory: Clear to auscultation bilaterally, nonlabored respirations on RA  Cardiovascular: RRR, no murmurs, rubs, or gallops, palpable pedal pulses bilaterally  Gastrointestinal: Positive bowel sounds, soft, nontender, nondistended  Musculoskeletal: No bilateral ankle edema, no clubbing or cyanosis to extremities.  R wrist no edema or erythema today (resolved)  Psychiatric: Appropriate affect, cooperative  Neurologic: Oriented x 3, strength symmetric in all extremities, Cranial Nerves grossly intact to confrontation, speech clear  Skin: No rashes    Results Reviewed:  I have personally reviewed current lab, radiology, and data and agree.    Results from last 7 days   Lab Units 19  0722 07/10/19  0522 19  0708 19  1429   WBC 10*3/mm3 31.72* 35.37* 37.54* 35.02*   HEMOGLOBIN g/dL 11.3* 11.2* 11.8* 11.3*   HEMATOCRIT % 35.7* 34.4* 36.0* 34.7*   PLATELETS 10*3/mm3 611* 564* 597* 529*   INR   --   --   --  1.53*   PROCALCITONIN ng/mL  --   --    --  2.18*     Results from last 7 days   Lab Units 07/11/19  0722 07/10/19  0522 07/09/19  0708 07/08/19  1429   SODIUM mmol/L 134* 134* 131* 128*   POTASSIUM mmol/L 4.3 4.5 4.4 4.3   CHLORIDE mmol/L 100 98 94* 90*   CO2 mmol/L 20.0* 23.0 22.0 23.0   BUN mg/dL 15 12 13 13   CREATININE mg/dL 0.67* 0.79 0.95 0.93   GLUCOSE mg/dL 195* 125* 127* 116*   CALCIUM mg/dL 8.4* 8.5* 8.4* 8.4*   ALT (SGPT) U/L  --   --   --  25   AST (SGOT) U/L  --   --   --  31   TROPONIN T ng/mL  --   --  <0.010 <0.010   PROBNP pg/mL  --   --   --  433.4     Estimated Creatinine Clearance: 169.4 mL/min (A) (by C-G formula based on SCr of 0.67 mg/dL (L)).    Microbiology Results Abnormal     Procedure Component Value - Date/Time    BAL Culture, Quantitative - Lavage, Lung, Right Middle Lobe [[id number]] Collected:  07/10/19 1415    Lab Status:  Preliminary result Specimen:  Lavage from Lung, Right Middle Lobe Updated:  07/11/19 0951     BAL Culture No growth     Gram Stain No WBCs per low power field      No organisms seen    Respiratory Culture - Lavage, Lung, Right Middle Lobe [[id number]] Collected:  07/10/19 1415    Lab Status:  Preliminary result Specimen:  Lavage from Lung, Right Middle Lobe Updated:  07/11/19 0950     Respiratory Culture No growth     Gram Stain No WBCs per low power field      No organisms seen    Respiratory Culture - Sputum, Cough [[id number]] Collected:  07/09/19 0655    Lab Status:  Final result Specimen:  Sputum from Cough Updated:  07/11/19 0905     Respiratory Culture Light growth (2+) Normal Respiratory Iesha     Gram Stain Few (2+) WBCs per low power field      Occasional Epithelial cells per low power field      Few (2+) Gram positive cocci in pairs    Body Fluid Culture - Body Fluid, Wrist, Right [[id number]] Collected:  07/09/19 1030    Lab Status:  Preliminary result Specimen:  Body Fluid from Wrist, Right Updated:  07/11/19 0903     Body Fluid Culture No growth at 2 days     Gram Stain No WBCs or organisms  seen    Blood Culture - Blood, Arm, Right [[id number]] Collected:  07/08/19 1429    Lab Status:  Preliminary result Specimen:  Blood from Arm, Right Updated:  07/10/19 1445     Blood Culture No growth at 2 days    Blood Culture - Blood, Arm, Left [[id number]] Collected:  07/08/19 1429    Lab Status:  Preliminary result Specimen:  Blood from Arm, Left Updated:  07/10/19 1445     Blood Culture No growth at 2 days    KOH Prep - Aspirate, Wrist, Right [[id number]] Collected:  07/09/19 1508    Lab Status:  Final result Specimen:  Aspirate from Wrist, Right Updated:  07/09/19 1539     KOH Prep No yeast or hyphal elements seen          Imaging Results (last 24 hours)     Procedure Component Value Units Date/Time    XR Chest 1 View [[id number]] Collected:  07/11/19 0848     Updated:  07/11/19 1055    Narrative:       EXAMINATION: XR CHEST 1 VW-      INDICATION: Right transbronchial biopsy on 07/10/19; R93.89-Abnormal  findings on diagnostic imaging of other specified body structures;  R50.9-Fever, unspecified.      COMPARISON: 07/10/2019.     FINDINGS: Heart is borderline enlarged. The vasculature appears upper  limits of normal. Coarse interstitial markings, and minimal residual  disease at the right base is again noted. Right base appears  significantly improved from the 07/10/2019 study. No pneumothorax or  other new chest pathology seen.           Impression:       Mild remaining patchy opacity in the right base. No new  chest pathology is seen.     D:  07/11/2019  E:  07/11/2019          CT Chest Hi Resolution [[id number]] Collected:  07/09/19 1223     Updated:  07/11/19 1005    Narrative:       EXAMINATION: CT CHEST HI RESOLUTION-      INDICATION: Cough, fever, suspected autoimmune disease.     TECHNIQUE: Axial 1 mm images at 1 cm intervals through the lungs with  image reconstruction using edge enhancement algorithm. Patient was  scanned both supine and prone for the study.     The radiation dose reduction device was  turned on for each scan per the  ALARA (As Low as Reasonably Achievable) protocol.     COMPARISON: 06/24/2019.     FINDINGS: Lungs appear clear bilaterally. There is no evidence of  groundglass opacity, reticular or nodular disease, peribronchovascular  disease, or intralobular interstitial thickening. No lung consolidation  or effusion is seen. There is no evidence of significant bronchiectasis.  Limited mediastinal images show a few shotty mediastinal lymph nodes but  no obvious adenopathy. No gross abnormalities are noted of the included  structures of the upper abdomen.       Impression:       Negative high-resolution scan. No evidence of significant  pulmonary interstitial disease.     D:  07/10/2019  E:  07/11/2019       FL C Arm During Surgery [[id number]] Collected:  07/10/19 1553     Updated:  07/11/19 0836    Narrative:       EXAMINATION: FL C ARM DURING SURGERY- 07/10/2019      INDICATION: Bronchoscopy; R93.89-Abnormal findings on diagnostic imaging  of other specified body structures; R50.9-Fever, unspecified; shortness  of breath     COMPARISON: NONE     FINDINGS: 1 minute of fluoroscopy and no images used for bronchoscopy.  Please see the procedure report for full details.       Impression:       Fluoroscopy for bronchoscopy. Please see the procedure  report for full details.     D:  07/10/2019  E:  07/11/2019           XR Chest 1 View [[id number]] Collected:  07/10/19 1553     Updated:  07/10/19 2316    Narrative:          EXAMINATION: XR CHEST 1 VW - 07/10/2019     INDICATION:  R93.89-Abnormal findings on diagnostic imaging of other  specified body structures; R50.9-Fever, unspecified.      COMPARISON: NONE     FINDINGS: Portable chest reveals cardiac and mediastinal silhouettes  within normal limits. Patchy airspace disease identified within the  right lower lobe. Degenerative changes seen within the spine. The lung  fields are clear. No focal parenchymal opacification present. No pleural  effusion or  pneumothorax.           Impression:       Airspace disease identified within the right lower lobe. No  pleural effusion. No pneumothorax.     DICTATED:   07/10/2019  EDITED/ls :   07/10/2019           XR Chest 1 View [[id number]] Collected:  07/10/19 1726     Updated:  07/10/19 1735    Narrative:       EXAMINATION: XR CHEST 1 VW- 07/10/2019      INDICATION: S/p Right TBBx; R93.89-Abnormal findings on diagnostic  imaging of other specified body structures; R50.9-Fever, unspecified      COMPARISON: 07/10/2019     FINDINGS: Portable chest reveals heart to be borderline enlarged with  some improvement seen in aeration of the right lung base. Minimal  residual markings remain. The left lung is clear. Bony structures are  unremarkable.           Impression:       There is minimal residual markings remaining at the right  lung base. The left lung is clear.     D:  07/10/2019  E:  07/10/2019                Results for orders placed during the hospital encounter of 07/08/19   Adult Transthoracic Echo Complete W/ Cont if Necessary Per Protocol    Narrative · Left ventricular systolic function is normal. Estimated EF = 55%.  · The cardiac valves are anatomically and functionally normal.          I have reviewed the medications:  Scheduled Meds:    [START ON 7/12/2019] Pharmacy Consult  Does not apply Once   azithromycin 500 mg Oral Daily   Followed by      [START ON 7/12/2019] azithromycin 250 mg Oral Daily   dextromethorphan polistirex ER 60 mg Oral Q12H   enoxaparin 40 mg Subcutaneous Daily   fluconazole 400 mg Oral Q24H   metoprolol succinate XL 25 mg Oral Q24H   [START ON 7/12/2019] predniSONE 60 mg Oral Daily With Breakfast   sodium chloride 3 mL Intravenous Q12H     Continuous Infusions:    lactated ringers 20 mL/hr Last Rate: 20 mL/hr (07/10/19 1312)     PRN Meds:.acetaminophen  •  benzonatate  •  HYDROcodone-acetaminophen  •  Morphine  •  sodium chloride      Assessment/Plan   Assessment / Plan     Active Hospital  Problems    Diagnosis  POA   • **Fever [R50.9]  Yes   • 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        Brief Hospital Course to date:  Stephen Stoll is a 44 y.o. male with a recent admission June 24-29 for FUO, now readmitted for the same issue. He was a healthy man until early June when he developed daily fevers to 102 and a persistent dry cough.  Since then he has had myocarditis (now resolved), progressive dyspnea, and new R wrist arthritis. On 7/8 my colleague discussed his case with Sasha DE LA ROSA, an orthopedist, a rheumatologist, and a pulmonologist.  Will proceed with arthrocentesis by radiology today - though I doubt the findings will point to a specific diagnosis.  Given the prominent pulm symptoms and abnl lymph node on last CT, proceeded with HRCT and a pulm consult. Pulmonary performed bronchoscopy on 7/10/19 and my colleague initiated steroids afterward.  ------------------------------------------------  A/p:  Fevers, improved   -negative talia, p-anca,c-anca, myeloperoxidase   -cultures and serologies thus far negative  S/p recent myocarditis   -echo 7/8/19 this admission showed normalization of EF  Right wrist arthritis (s/p aspiration 7/9)   Not c/w infection, cx's neg thus far  Leukocytosis  Cough  Right paratracheal lymph node   -s/p bronch w/ biopsy 7/10/19 by Dr. gibson  Elevated esr/crp/ferritin  ------------------------------------------------------------------------------  Plan:  -afebrile overnight (last fever 7/9 evening)  -started solumedrol 40mg q8h 7/10/19 after bronchoscopy  -Pulm following, follow up bronch studies/biopsy  -d/w Dr. Hauser 7/11/19, favouring Stills disease. He is changing antibiotics to z-max/diflucan.  -My colleague d/w Dr. Johnson of rheumatology yesterday via phone. I will attempt to discuss w/ Dr. Johnson again today. Will change to prednisone 60mg po  daily for now, tentatively plan to follow up w/ rheum regarding steroid taper and further outpatient f/u    -am labs: cbc,bmp     *dispo: depending on clinical course, consultant recs (including pulm & phone conversation w/ rheum which is pending and I have page out), possible d/c home tomorrow w/ outpatient ID & rheum follow up. Keep cards follow up    Addendum:  -d/w dr. Johnson (rheum). Agree w/ d/c on prednisone 60mg po daily, follow up within a week (Dr. Johnson took patient's information).      DVT Prophylaxis:  lovenox on hold s/p bronch    Disposition: I expect the patient to be discharged tbd.    CODE STATUS:   Code Status and Medical Interventions:   Ordered at: 07/08/19 5769     Level Of Support Discussed With:    Patient     Code Status:    CPR     Medical Interventions (Level of Support Prior to Arrest):    Full         Electronically signed by Junior Gibbs MD, 07/11/19, 11:51 AM.

## 2019-07-11 NOTE — PROGRESS NOTES
INPATIENT PULMONARY SERVICE   HOSPITAL VISIT     Hospital Day:  LOS: 3 days    Requesting MD: Dr. Radha Almanza   Mr. Stephen Stoll is a 44 y.o. male who was admitted with:      Fever    Non-ischemic cardiomyopathy (CMS/HCC)    Still's disease (CMS/HCC)    HPI     No complications post bronchoscopy done yesterday.  Scant amount of hemoptysis, as expected post biopsy.  R hand much better.     Review of Systems  Constitutional: Negative for fever or chills.   Respiratory: Negative for dyspnea.   Cardiovascular: Negative for chest pain or palpitations.   Gastrointestinal: Negative for  nausea, vomiting or diarrhea.        Objective   O     Vitals:  Temp: 97.7 °F (36.5 °C) (07/11/19 1144) Temp  Min: 97.6 °F (36.4 °C)  Max: 98.3 °F (36.8 °C)   BP: 129/72 (07/11/19 1144) BP  Min: 112/62  Max: 142/73   Pulse: 70 (07/11/19 1144) Pulse  Min: 69  Max: 115   Resp: 18 (07/11/19 1144) Resp  Min: 16  Max: 24   SpO2: 94 % (07/10/19 1515) SpO2  Min: 92 %  Max: 96 %   Device: (P) nasal cannula (07/11/19 1200)    Flow Rate:   No Data Recorded       Telemetry:  Rhythm: sinus tachycardia (07/11/19 0600)         Constitutional:  No acute distress.   Cardiovascular: Normal rate, regular and rhythm. Normal heart sounds.  No murmurs, gallop or rub.   Respiratory: No respiratory distress.  Normal breath sounds  No adventitious sounds    Abdominal:  Soft with no tenderness  No distension. No HSM.   Extremities: Warm with no cyanosis.  No peripheral edema.   Skin No rashes, lesions or ulcers noted.    Neurological:   Alert and Oriented to person, place, and time.  Best Eye Response: 4-->(E4) spontaneous (07/11/19 0800)  Best Motor Response: 6-->(M6) obeys commands (07/11/19 0800)  Best Verbal Response: 5-->(V5) oriented (07/11/19 0800)  Jayshree Coma Scale Score: 15 (07/11/19 0800)   Psychiatric: Normal affect.     Hematology:  Results from last 7 days   Lab Units 07/11/19  0722 07/10/19  0522 07/09/19  0708   WBC 10*3/mm3 31.72*  35.37* 37.54*   HEMOGLOBIN g/dL 11.3* 11.2* 11.8*   MCV fL 84.6 84.7 84.5   PLATELETS 10*3/mm3 611* 564* 597*       Chemistry:  Estimated Creatinine Clearance: 169.4 mL/min (A) (by C-G formula based on SCr of 0.67 mg/dL (L)).    Results from last 7 days   Lab Units 07/11/19  0722 07/10/19  0522 07/09/19  0708   SODIUM mmol/L 134* 134* 131*   POTASSIUM mmol/L 4.3 4.5 4.4   CHLORIDE mmol/L 100 98 94*   CO2 mmol/L 20.0* 23.0 22.0   ANION GAP mmol/L 14.0 13.0 15.0   GLUCOSE mg/dL 195* 125* 127*   CALCIUM mg/dL 8.4* 8.5* 8.4*         Results from last 7 days   Lab Units 07/11/19  0722 07/10/19  0522 07/09/19  0708   BUN mg/dL 15 12 13   CREATININE mg/dL 0.67* 0.79 0.95     Lab Results   Lab Value Date/Time    PROCALCITO 2.18 (H) 07/08/2019 1429    PROCALCITO 0.19 06/24/2019 1620    CRP 47.80 (H) 07/08/2019 1429    CRP 23.07 (H) 06/24/2019 1620     Bacteriology:    BAL Quantitative Culture:  Lab Results   Component Value Date    BALCX No growth 07/10/2019     Mycobacteria:  Lab Results   Component Value Date    AFBSTAIN No acid fast bacilli seen on concentrated smear 07/10/2019     Images:  Xr Chest 1 View    Result Date: 7/11/2019  There is minimal residual markings remaining at the right lung base. The left lung is clear.  D:  07/10/2019 E:  07/10/2019  This report was finalized on 7/11/2019 12:14 PM by Dr. Ana Paula De La Cruz MD.      Xr Chest 1 View    Result Date: 7/11/2019  Airspace disease identified within the right lower lobe. No pleural effusion. No pneumothorax.  DICTATED:   07/10/2019 EDITED/ls :   07/10/2019  This report was finalized on 7/11/2019 12:13 PM by Dr. Ana Paula De La Cruz MD.      Xr Chest 1 View    Result Date: 7/11/2019  Mild remaining patchy opacity in the right base. No new chest pathology is seen.  D:  07/11/2019 E:  07/11/2019       Fl C Arm During Surgery    Result Date: 7/11/2019  Fluoroscopy for bronchoscopy. Please see the procedure report for full details.  D:  07/10/2019 E:  07/11/2019     This report was finalized on 7/11/2019 12:13 PM by Dr. Ana Paula De La Cruz MD.      Ct Chest Hi Resolution    Result Date: 7/11/2019  Negative high-resolution scan. No evidence of significant pulmonary interstitial disease.  D:  07/10/2019 E:  07/11/2019      Xr Chest Pa & Lateral    Result Date: 7/9/2019  No active disease.  D:  07/09/2019 E:  07/09/2019  This report was finalized on 7/9/2019 2:50 PM by Dr. Don Diaz MD.      Fl Guided Aspiration Joint    Result Date: 7/9/2019  Successful fluoroscopic guided right wrist joint aspiration as above with no immediate complications.      CT Chest 6/24/19    Impression:      1. No acute parenchymal findings in the lung specifically no focal consolidation or pleural effusion.  2. A 1.6 cm mildly enlarged and central low attenuation concerning for central necrotic right lower paratracheal lymph node of indeterminate significance. Attention on followup as clinically recommended.  3. Cardiac size within normal limits without pericardial effusion.     D:  06/24/2019    Echo:  Results for orders placed during the hospital encounter of 07/08/19   Adult Transthoracic Echo Complete W/ Cont if Necessary Per Protocol    Narrative · Left ventricular systolic function is normal. Estimated EF = 55%.  · The cardiac valves are anatomically and functionally normal.          I reviewed the patient's new clinical results.  I independently reviewed the patient's new imaging results.    Assessment/Plan   A / P     Assessment:    44 y.o.male, admitted on 7/8/2019 with Fever [R50.9]:     1. FUO  1. S/P Bronch with BALF, B Brush RML, TBBx RML and RLL, and EBUS R paratracheal LN    Lab Results   Lab Value Date/Time    FINALDX  07/10/2019 1425      1. RIGHT PARATRACHEAL LYMPH NODE, TRANSBRONCHIAL NEEDLE ASPIRATE:  Fragment of bronchial mucosa with atypical/dysplastic squamous metaplasia.  Negative for invasive carcinoma.  Negative for lymph node.   2. RIGHT MIDDLE LOBE, TRANSBRONCHIAL NEEDLE  ASPIRATE:  Abundant benign alveolated lung with benign bronchial epithelium and bronchial mucosa.    FLP/dlb         2. Leukocytosis, with neutrophilia, trending down, but still > 30K.  3. Broad differential diagnosis, including rheumatological conditions, infectious, and others like sarcoidosis.  2. Right wrist effusion  3. Recent non ischemic myocarditis.  4. Antibiotics: PER ID  5. Dyslipidemia         Lab Results   Lab Value Date/Time    HGBA1C 5.70 (H) 06/24/2019 0929       Diet: Diet Regular; Cardiac   Advance Directives: Code Status and Medical Interventions:   Ordered at: 07/08/19 1544     Level Of Support Discussed With:    Patient     Code Status:    CPR     Medical Interventions (Level of Support Prior to Arrest):    Full        Plan:    1. Awaiting Bronch Cultures and Cytology.  2. Discussed with Dr. Gibbs  3. Discussed with patient.    We will be available as needed.    Plan of care and goals reviewed during interdisciplinary rounds.  I discussed the patient's findings and my recommendations with patient    Level of Risk is Moderate due to: undiagnosed new problem.     Thank you.    Marvel Sandoval MD, FACP, FCCP, [facility]  Intensive Care Medicine, Nutrition Support and Pulmonary Medicine

## 2019-07-11 NOTE — PLAN OF CARE
Problem: Patient Care Overview  Goal: Plan of Care Review  Outcome: Ongoing (interventions implemented as appropriate)   07/11/19 7312   Coping/Psychosocial   Plan of Care Reviewed With patient   Plan of Care Review   Progress no change   OTHER   Outcome Summary Pt has sore throat r/t bronchoscopy. Denies overt pain; afebrile this shift

## 2019-07-12 VITALS
BODY MASS INDEX: 29.8 KG/M2 | DIASTOLIC BLOOD PRESSURE: 83 MMHG | OXYGEN SATURATION: 94 % | SYSTOLIC BLOOD PRESSURE: 138 MMHG | RESPIRATION RATE: 18 BRPM | HEART RATE: 73 BPM | HEIGHT: 72 IN | TEMPERATURE: 98.3 F | WEIGHT: 220 LBS

## 2019-07-12 LAB
ANION GAP SERPL CALCULATED.3IONS-SCNC: 10 MMOL/L (ref 5–15)
BACTERIA SPEC AEROBE CULT: NO GROWTH
BACTERIA SPEC RESP CULT: NO GROWTH
BUN BLD-MCNC: 15 MG/DL (ref 6–20)
BUN/CREAT SERPL: 24.6 (ref 7–25)
C-ANCA TITR SER IF: NORMAL TITER
CALCIUM SPEC-SCNC: 8.4 MG/DL (ref 8.6–10.5)
CHLORIDE SERPL-SCNC: 104 MMOL/L (ref 98–107)
CO2 SERPL-SCNC: 24 MMOL/L (ref 22–29)
CREAT BLD-MCNC: 0.61 MG/DL (ref 0.76–1.27)
DEPRECATED RDW RBC AUTO: 43.7 FL (ref 37–54)
ERYTHROCYTE [DISTWIDTH] IN BLOOD BY AUTOMATED COUNT: 13.9 % (ref 12.3–15.4)
GFR SERPL CREATININE-BSD FRML MDRD: 143 ML/MIN/1.73
GIE STN SPEC: NORMAL
GIE STN SPEC: NORMAL
GLUCOSE BLD-MCNC: 157 MG/DL (ref 65–99)
GRAM STN SPEC: NORMAL
H CAPSUL AG UR-MCNC: <0.5 NG/ML
HCT VFR BLD AUTO: 32.8 % (ref 37.5–51)
HGB BLD-MCNC: 10.5 G/DL (ref 13–17.7)
L PNEUMO AG SPEC QL IF: NEGATIVE
LAB AP CASE REPORT: NORMAL
Lab: NORMAL
MCH RBC QN AUTO: 27.6 PG (ref 26.6–33)
MCHC RBC AUTO-ENTMCNC: 32 G/DL (ref 31.5–35.7)
MCV RBC AUTO: 86.1 FL (ref 79–97)
MYELOPEROXIDASE AB SER-ACNC: <9 U/ML (ref 0–9)
P-ANCA ATYPICAL TITR SER IF: NORMAL TITER
P-ANCA TITR SER IF: NORMAL TITER
PATH REPORT.FINAL DX SPEC: NORMAL
PLATELET # BLD AUTO: 607 10*3/MM3 (ref 140–450)
PMV BLD AUTO: 9.9 FL (ref 6–12)
POTASSIUM BLD-SCNC: 4.9 MMOL/L (ref 3.5–5.2)
PROTEINASE3 AB SER IA-ACNC: <3.5 U/ML (ref 0–3.5)
RBC # BLD AUTO: 3.81 10*6/MM3 (ref 4.14–5.8)
SODIUM BLD-SCNC: 138 MMOL/L (ref 136–145)
WBC NRBC COR # BLD: 31.59 10*3/MM3 (ref 3.4–10.8)

## 2019-07-12 PROCEDURE — 99239 HOSP IP/OBS DSCHRG MGMT >30: CPT | Performed by: INTERNAL MEDICINE

## 2019-07-12 PROCEDURE — 85027 COMPLETE CBC AUTOMATED: CPT | Performed by: INTERNAL MEDICINE

## 2019-07-12 PROCEDURE — 25010000002 ENOXAPARIN PER 10 MG: Performed by: INTERNAL MEDICINE

## 2019-07-12 PROCEDURE — 80048 BASIC METABOLIC PNL TOTAL CA: CPT | Performed by: INTERNAL MEDICINE

## 2019-07-12 PROCEDURE — 63710000001 PREDNISONE PER 1 MG: Performed by: INTERNAL MEDICINE

## 2019-07-12 RX ORDER — AZITHROMYCIN 250 MG/1
TABLET, FILM COATED ORAL
Qty: 4 TABLET | Refills: 0 | Status: SHIPPED | OUTPATIENT
Start: 2019-07-13 | End: 2019-08-21

## 2019-07-12 RX ORDER — PREDNISONE 20 MG/1
60 TABLET ORAL
Qty: 60 TABLET | Refills: 0 | Status: SHIPPED | OUTPATIENT
Start: 2019-07-13 | End: 2019-08-21

## 2019-07-12 RX ORDER — FLUCONAZOLE 200 MG/1
200 TABLET ORAL EVERY 24 HOURS
Status: DISCONTINUED | OUTPATIENT
Start: 2019-07-13 | End: 2019-07-12 | Stop reason: HOSPADM

## 2019-07-12 RX ORDER — FLUCONAZOLE 200 MG/1
200 TABLET ORAL EVERY 24 HOURS
Qty: 29 TABLET | Refills: 0 | Status: SHIPPED | OUTPATIENT
Start: 2019-07-13 | End: 2019-08-21

## 2019-07-12 RX ADMIN — PREDNISONE 60 MG: 20 TABLET ORAL at 08:12

## 2019-07-12 RX ADMIN — FLUCONAZOLE 200 MG: 200 TABLET ORAL at 14:05

## 2019-07-12 RX ADMIN — METOPROLOL SUCCINATE 25 MG: 25 TABLET, EXTENDED RELEASE ORAL at 08:12

## 2019-07-12 RX ADMIN — ENOXAPARIN SODIUM 40 MG: 40 INJECTION SUBCUTANEOUS at 08:12

## 2019-07-12 RX ADMIN — DEXTROMETHORPHAN POLISTIREX 60 MG: 30 SUSPENSION ORAL at 08:12

## 2019-07-12 RX ADMIN — AZITHROMYCIN 250 MG: 250 TABLET, FILM COATED ORAL at 08:12

## 2019-07-12 RX ADMIN — SODIUM CHLORIDE, PRESERVATIVE FREE 3 ML: 5 INJECTION INTRAVENOUS at 08:20

## 2019-07-12 RX ADMIN — BENZONATATE 100 MG: 100 CAPSULE ORAL at 08:12

## 2019-07-12 NOTE — PROGRESS NOTES
"Stephen Stoll  1975  [id number]  7/12/2019    CC: No chief complaint on file.      Stephen Stoll is a 44 y.o. male here for  Fever      History of present illness:    Patient is a very pleasant  44 y.o.  Yr old male with CC of fever  Patient was otherwise well until over a month ago  Co cough fever and URI symptoms  Eventually Dx with pna - steroids and shot and Z-Pack     Two weeks ago admitted here with myocarditis  EF 35%  Eventually home with doxycycline  Now feels worse, with fever and new right wrist pain  Notes redness right wrist     Co productive cough with yellow phlegm  Co SOA and hypoxia - sats 92% in the office    7/10/19 - co fever  Co weakness  Co cough  Co wrist pain  ROS discussed with family  7/11 - \"I feel better\"  Wrist pain better.  Fever better \"On steroids\"  No new rash  Co cough still    7/12 - \"I feel better\"  Notes he can move wrist better. Minimal cough. NO rash or fever.             Past medical history:  Past Medical History:   Diagnosis Date   • Hyperlipidemia    • Pneumonia        Medications:   Current Facility-Administered Medications:   •  acetaminophen (TYLENOL) tablet 650 mg, 650 mg, Oral, Q4H PRN, Dana Garcia MD, 650 mg at 07/10/19 0601  •  [COMPLETED] azithromycin (ZITHROMAX) tablet 500 mg, 500 mg, Oral, Daily, 500 mg at 07/11/19 1243 **FOLLOWED BY** azithromycin (ZITHROMAX) tablet 250 mg, 250 mg, Oral, Daily, Lazaro Hauser MD, 250 mg at 07/12/19 0812  •  benzonatate (TESSALON) capsule 100 mg, 100 mg, Oral, TID PRN, Ivette Snow APRN, 100 mg at 07/12/19 0812  •  dextromethorphan polistirex ER (DELSYM) 30 MG/5ML oral suspension 60 mg, 60 mg, Oral, Q12H, Dana Garcia MD, 60 mg at 07/12/19 0812  •  enoxaparin (LOVENOX) syringe 40 mg, 40 mg, Subcutaneous, Daily, Dana Garcia MD, 40 mg at 07/12/19 0812  •  fluconazole (DIFLUCAN) tablet 400 mg, 400 mg, Oral, Q24H, Lazaro Hauser MD, 400 mg at 07/11/19 1243  •  HYDROcodone-acetaminophen (NORCO) 5-325 " MG per tablet 1 tablet, 1 tablet, Oral, Q4H PRN, Dana Garcia MD, 1 tablet at 07/11/19 2334  •  lactated ringers infusion, 20 mL/hr, Intravenous, Continuous, Meng Cano MD, Last Rate: 20 mL/hr at 07/10/19 1312  •  metoprolol succinate XL (TOPROL-XL) 24 hr tablet 25 mg, 25 mg, Oral, Q24H, Dana Garcia MD, 25 mg at 07/12/19 0812  •  morphine injection 2 mg, 2 mg, Intravenous, Q8H PRN, Dana Garcia MD, 2 mg at 07/09/19 2333  •  predniSONE (DELTASONE) tablet 60 mg, 60 mg, Oral, Daily With Breakfast, Junior Gibbs MD, 60 mg at 07/12/19 0812  •  sodium chloride 0.9 % flush 3 mL, 3 mL, Intravenous, Q12H, Dana Garcia MD, 3 mL at 07/12/19 0820  •  sodium chloride 0.9 % flush 3-10 mL, 3-10 mL, Intravenous, PRN, Dana Garcia MD  Antibiotics:  Anti-Infectives (From admission, onward)    Ordered     Dose/Rate Route Frequency Start Stop    07/11/19 1150  azithromycin (ZITHROMAX) tablet 250 mg     Ordering Provider:  Lazaro Hauser MD    250 mg Oral Daily 07/12/19 0000 07/16/19 0859    07/11/19 1150  azithromycin (ZITHROMAX) tablet 500 mg     Ordering Provider:  Lazaro Hauser MD    500 mg Oral Daily 07/11/19 1245 07/11/19 1243    07/11/19 1151  fluconazole (DIFLUCAN) tablet 400 mg     Ordering Provider:  Lazaro Hauser MD    400 mg Oral Every 24 Hours 07/11/19 1245 07/21/19 1300          Allergies:  is allergic to tramadol and ciprofloxacin.    Family History: family history includes Cancer in his paternal grandfather; Emphysema in his maternal grandfather; Hyperlipidemia in his mother; Hypertension in his father; No Known Problems in his maternal grandmother; Stroke in his maternal grandfather and paternal grandmother.    Social History:  reports that he has never smoked. He has never used smokeless tobacco. He reports that he does not drink alcohol or use drugs.    Review of Systems: All other reviewed and negative except as per HPI    Blood pressure 142/83, pulse 75, temperature 97.7 °F (36.5 °C),  "temperature source Oral, resp. rate 18, height 182 cm (71.65\"), weight 99.8 kg (220 lb), SpO2 94 %.  GENERAL: Awake and alert, in no distress.   HEENT: Oropharynx without thrush. . No cervical adenopathy. No neck masses  EYES: . No conjunctival injection. No icterus.   LYMPHATICS: No lymphadenopathy of the neck   HEART: No murmur, gallop, or pericardial friction rub.   LUNGS: Clear to auscultation anteriorly. No respiratory distress  ABDOMEN: Soft, nontender, nondistended.   SKIN: Warm and dry without cutaneous eruptions. .   PSYCHIATRIC: Mental status lucid. Cranial nerve function intact.   EXT: right wrist without redness      DIAGNOSTICS:  Lab Results   Component Value Date    WBC 31.59 (C) 07/12/2019    HGB 10.5 (L) 07/12/2019    HCT 32.8 (L) 07/12/2019     (H) 07/12/2019     Lab Results   Component Value Date    CRP 47.80 (H) 07/08/2019     Lab Results   Component Value Date    SEDRATE 90 (H) 07/08/2019     Lab Results   Component Value Date    GLUCOSE 157 (H) 07/12/2019    BUN 15 07/12/2019    CREATININE 0.61 (L) 07/12/2019    EGFRIFNONA 143 07/12/2019    BCR 24.6 07/12/2019    CO2 24.0 07/12/2019    CALCIUM 8.4 (L) 07/12/2019    ALBUMIN 2.80 (L) 07/08/2019    AST 31 07/08/2019    ALT 25 07/08/2019       Microbiology: seen      RADIOLOGY:  Imaging Results (last 72 hours)     Procedure Component Value Units Date/Time    FL Guided Aspiration Joint [[id number]] Collected:  07/09/19 1522     Updated:  07/09/19 1522    Narrative:       Exam: Fluoroscopic guided joint aspiration     INDICATION: Right wrist pain     TECHNIQUE: 21 seconds of fluoroscopic time was used for this procedure.  Procedure, risks, complications, and side effects of joint aspiration  were discussed and reviewed in detail with patient including the  possibility for bleeding, infection, needle damage to surrounding  structures, and the potential for a nondiagnostic exam. Patient  indicated understanding and consented to the procedure.   "   The right wrist was prepped and draped in a sterile fashion. 1%  lidocaine was used as a local anesthetic to the skin and subcutaneous  tissues. Under fluoroscopic guidance a 22-gauge needle was advanced to  the joint space. Despite multiple attempts, synovial fluid was not able  to be aspirated at this time, therefore approximately 2 to 3 cc of  sterile saline was injected into the joint space, aspirated, labeled,  and sent to pathology for further analysis. The needle was removed.     FINDINGS: The patient tolerated the procedure well, and no immediate  complications occurred.       Impression:       Successful fluoroscopic guided right wrist joint aspiration  as above with no immediate complications.       XR Chest PA & Lateral [[id number]] Collected:  07/09/19 0905     Updated:  07/09/19 1453    Narrative:       EXAMINATION: XR CHEST PA AND LATERAL- 07/08/2019      INDICATION: persistent hypoxia      COMPARISON: 06/24/2019     FINDINGS: Cardiac silhouette is normal. There is no evidence of cardiac  decompensation. There is no pulmonary inflammatory process, mass or  effusion.           Impression:       No active disease.     D:  07/09/2019  E:  07/09/2019     This report was finalized on 7/9/2019 2:50 PM by Dr. Don Diaz MD.       CT Chest Hi Resolution [[id number]] Updated:  07/09/19 1050          Assessment and Plan:     -- FUO  -- Myocarditis  --Inflammation right wrist   -- Neutrophilic Leukocytosis  -- Elevated inflammatory markers  -- cough with sputum production  -- High Ferritin     Continue PO zithromax and diflucan  Suspect Stills  Steroids OK with me    await  Bronch studies       Discussed with Attending     I discussed the patients findings and my recommendations with patient and father     UM discussed with staff    Appointment with me in two weeks.     Home on Z-pack and daily Diflucan 200mg one month.          Lazaro Hauser MD  7/12/2019

## 2019-07-12 NOTE — DISCHARGE SUMMARY
[facility] Medicine Services  DISCHARGE SUMMARY    Patient Name: Stephen Stoll  : 1975  MRN: [id number]    Date of Admission: 2019  Date of Discharge:  2019  Primary Care Physician: Ihsan Blackwell MD    Consults     Date and Time Order Name Status Description    2019 0717 Inpatient Pulmonology Consult Completed     2019 1536 Inpatient Infectious Diseases Consult Completed     2019 1417 Inpatient Cardiology Consult      2019 1639 Inpatient Infectious Diseases Consult Completed     2019 1002 Inpatient Hospitalist Consult Completed           Hospital Course     Presenting Problem:   Fever [R50.9]    Active Hospital Problems    Diagnosis  POA   • **Fever [R50.9]  Yes   • 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      --------------Final Diagnoses------------  Fevers, improved   -differential includes Still's disease vs other autoimmune dz vs yet to be identified infectious etiology (infectious disease favors Still's disease)              -negative talia, p-anca,c-anca, myeloperoxidase              -cultures and serologies thus far negative  S/p recent myocarditis              -echo 19 this admission showed normalization of EF  Right wrist arthritis (s/p aspiration ), resolved              Not c/w infection, cx's neg thus far  Persistent Leukocytosis  Cough  Right paratracheal lymph node              -s/p bronch w/ biopsy 7/10/19 by Dr. Sandoval, pathology not consistent w/ sarcoid  Elevated esr/crp/ferritin  ------------------------------------------------    Hospital Course:  Stephen Stoll is a 44 y.o. male with a recent admission  for FUO, now readmitted for the same issue. He was a healthy man until early  when he developed daily fevers to 102 and a persistent dry cough.  Since then he has had  myocarditis (now resolved), progressive dyspnea, and new R wrist arthritis. On 7/8 my colleague discussed his case with Sasha DE LA ROSA, an orthopedist, a rheumatologist, and a pulmonologist. Patient was initiated on broad spectrum empiric antibiotics and followed by infectoius disease. Cultures & serologies thus far negative and infectious workup negative.  Patient underwent arthrocentesis by radiology and findings were not consistent w/ infection. ID & pulmonary consulted. p-anca, c-anca, myeloperoxidase, and talia were negative. Due to dry cough & abnormal partracheal lymph node seen on previous ct scan, pulmonary performed bronchoscopy & transbronchial biopsies. The right paratracheal lymph node was negative for carcinoma, and was consistent w/ a fragment of bronchial mucosa w/ atypical/dysplastic squamous metaplasia. Right middle lobe lung need aspirate revealed benign lung and no signs of sarcoid. Patient was initiated on steroids and antibiotics switched to zithromax x 4 more days and diflucan 200mg daily x 1 month. Patient now afebrile > 48 hours and feeling quite well and eager for discharge. I spoke w/ Dr. Gypsy Johnson on 7/11/19 (day prior to discharge) and she was agreeable to see patient in short term follow up as outpatient and agreed on prednisone 60mg po daily until follows up with her. F/u pcp 1 week. F/u Dr. Hauser 2 weeks.      Discharge Follow Up Recommendations for labs/diagnostics:  - follow up final respiratory/bronchoscopy lavage studies many of which are still pending at time of discharge  -recommend follow wbc count    Day of Discharge     HPI:   Feeling well, dry cough improved. No fever last night    Review of Systems  No chest pain    Otherwise ROS is negative except as mentioned in the HPI.    Vital Signs:   Temp:  [97.6 °F (36.4 °C)-98.3 °F (36.8 °C)] 98.3 °F (36.8 °C)  Heart Rate:  [73-83] 73  Resp:  [18] 18  BP: (134-144)/(77-84) 138/83     Physical Exam:  Constitutional: Awake, alert, up in  chair, dry cough noted, no distress, Ox4, nontoxic appearing  Eyes: PERRLA, sclerae anicteric, no conjunctival injection  HENT: NCAT, mucous membranes moist  Neck: Supple, , trachea midline  Respiratory: Clear to auscultation bilaterally, nonlabored respirations on RA  Cardiovascular: RRR, no murmurs, rubs, or gallops, palpable pedal pulses bilaterally  Gastrointestinal: Positive bowel sounds, soft, nontender, nondistended  Musculoskeletal: No bilateral ankle edema, no clubbing or cyanosis to extremities.  R wrist no edema or erythema today (resolved)  Psychiatric: Appropriate affect, cooperative  Neurologic: Oriented x 3, strength symmetric in all extremities, Cranial Nerves grossly intact to confrontation, speech clear  Skin: No rashes            Pertinent  and/or Most Recent Results     Results from last 7 days   Lab Units 07/12/19  0650 07/11/19  0722 07/10/19  0522 07/09/19  0708 07/08/19  1429   WBC 10*3/mm3 31.59* 31.72* 35.37* 37.54* 35.02*   HEMOGLOBIN g/dL 10.5* 11.3* 11.2* 11.8* 11.3*   HEMATOCRIT % 32.8* 35.7* 34.4* 36.0* 34.7*   PLATELETS 10*3/mm3 607* 611* 564* 597* 529*   SODIUM mmol/L 138 134* 134* 131* 128*   POTASSIUM mmol/L 4.9 4.3 4.5 4.4 4.3   CHLORIDE mmol/L 104 100 98 94* 90*   CO2 mmol/L 24.0 20.0* 23.0 22.0 23.0   BUN mg/dL 15 15 12 13 13   CREATININE mg/dL 0.61* 0.67* 0.79 0.95 0.93   GLUCOSE mg/dL 157* 195* 125* 127* 116*   CALCIUM mg/dL 8.4* 8.4* 8.5* 8.4* 8.4*     Results from last 7 days   Lab Units 07/08/19  1429   BILIRUBIN mg/dL 0.5   ALK PHOS U/L 111   ALT (SGPT) U/L 25   AST (SGOT) U/L 31   PROTIME Seconds 17.7*   INR  1.53*           Invalid input(s): TG, LDLCALC, LDLREALC  Results from last 7 days   Lab Units 07/09/19  0708 07/08/19  1429   PROBNP pg/mL  --  433.4   TROPONIN T ng/mL <0.010 <0.010   PROCALCITONIN ng/mL  --  2.18*   LACTATE mmol/L  --  1.3       Brief Urine Lab Results  (Last result in the past 365 days)      Color   Clarity   Blood   Leuk Est   Nitrite   Protein    CREAT   Urine HCG        06/27/19 1615 Yellow Clear Negative Negative Negative 30 mg/dL (1+)               Microbiology Results Abnormal     Procedure Component Value - Date/Time    Respiratory Culture - Lavage, Lung, Right Middle Lobe [[id number]] Collected:  07/10/19 1415    Lab Status:  Final result Specimen:  Lavage from Lung, Right Middle Lobe Updated:  07/12/19 0920     Respiratory Culture No growth     Gram Stain No WBCs per low power field      No organisms seen    BAL Culture, Quantitative - Lavage, Lung, Right Middle Lobe [[id number]] Collected:  07/10/19 1415    Lab Status:  Final result Specimen:  Lavage from Lung, Right Middle Lobe Updated:  07/12/19 0920     BAL Culture No growth     Gram Stain No WBCs per low power field      No organisms seen    Body Fluid Culture - Body Fluid, Wrist, Right [[id number]] Collected:  07/09/19 1030    Lab Status:  Preliminary result Specimen:  Body Fluid from Wrist, Right Updated:  07/12/19 0659     Body Fluid Culture No growth at 3 days     Gram Stain No WBCs or organisms seen    Blood Culture - Blood, Arm, Left [[id number]] Collected:  07/08/19 1429    Lab Status:  Preliminary result Specimen:  Blood from Arm, Left Updated:  07/11/19 1445     Blood Culture No growth at 3 days    Blood Culture - Blood, Arm, Right [[id number]] Collected:  07/08/19 1429    Lab Status:  Preliminary result Specimen:  Blood from Arm, Right Updated:  07/11/19 1445     Blood Culture No growth at 3 days    AFB Culture - Lavage, Lung, Right Middle Lobe [[id number]] Collected:  07/10/19 1415    Lab Status:  Preliminary result Specimen:  Lavage from Lung, Right Middle Lobe Updated:  07/11/19 1156     AFB Stain No acid fast bacilli seen on concentrated smear    Respiratory Culture - Sputum, Cough [[id number]] Collected:  07/09/19 0655    Lab Status:  Final result Specimen:  Sputum from Cough Updated:  07/11/19 0905     Respiratory Culture Light growth (2+) Normal Respiratory Iesha     Gram Stain Few (2+)  WBCs per low power field      Occasional Epithelial cells per low power field      Few (2+) Gram positive cocci in pairs    KOH Prep - Aspirate, Wrist, Right [[id number]] Collected:  07/09/19 1508    Lab Status:  Final result Specimen:  Aspirate from Wrist, Right Updated:  07/09/19 1539     KOH Prep No yeast or hyphal elements seen          Imaging Results (all)     Procedure Component Value Units Date/Time    XR Chest 1 View [[id number]] Collected:  07/11/19 0848     Updated:  07/11/19 2146    Narrative:       EXAMINATION: XR CHEST 1 VW-      INDICATION: Right transbronchial biopsy on 07/10/19; R93.89-Abnormal  findings on diagnostic imaging of other specified body structures;  R50.9-Fever, unspecified.      COMPARISON: 07/10/2019.     FINDINGS: Heart is borderline enlarged. The vasculature appears upper  limits of normal. Coarse interstitial markings, and minimal residual  disease at the right base is again noted. Right base appears  significantly improved from the 07/10/2019 study. No pneumothorax or  other new chest pathology seen.           Impression:       Mild remaining patchy opacity in the right base. No new  chest pathology is seen.     D:  07/11/2019  E:  07/11/2019     This report was finalized on 7/11/2019 9:43 PM by DR. Bruce Crowe MD.       XR Chest 1 View [[id number]] Collected:  07/10/19 1726     Updated:  07/11/19 1217    Narrative:       EXAMINATION: XR CHEST 1 VW- 07/10/2019      INDICATION: S/p Right TBBx; R93.89-Abnormal findings on diagnostic  imaging of other specified body structures; R50.9-Fever, unspecified      COMPARISON: 07/10/2019     FINDINGS: Portable chest reveals heart to be borderline enlarged with  some improvement seen in aeration of the right lung base. Minimal  residual markings remain. The left lung is clear. Bony structures are  unremarkable.           Impression:       There is minimal residual markings remaining at the right  lung base. The left lung is clear.     D:   07/10/2019  E:  07/10/2019     This report was finalized on 7/11/2019 12:14 PM by Dr. Ana Paula De La Cruz MD.       XR Chest 1 View [[id number]] Collected:  07/10/19 1553     Updated:  07/11/19 1216    Narrative:          EXAMINATION: XR CHEST 1 VW - 07/10/2019     INDICATION:  R93.89-Abnormal findings on diagnostic imaging of other  specified body structures; R50.9-Fever, unspecified.      COMPARISON: NONE     FINDINGS: Portable chest reveals cardiac and mediastinal silhouettes  within normal limits. Patchy airspace disease identified within the  right lower lobe. Degenerative changes seen within the spine. The lung  fields are clear. No focal parenchymal opacification present. No pleural  effusion or pneumothorax.           Impression:       Airspace disease identified within the right lower lobe. No  pleural effusion. No pneumothorax.     DICTATED:   07/10/2019  EDITED/ls :   07/10/2019      This report was finalized on 7/11/2019 12:13 PM by Dr. Ana Paula De La Cruz MD.       FL C Arm During Surgery [[id number]] Collected:  07/10/19 1553     Updated:  07/11/19 1216    Narrative:       EXAMINATION: FL C ARM DURING SURGERY- 07/10/2019      INDICATION: Bronchoscopy; R93.89-Abnormal findings on diagnostic imaging  of other specified body structures; R50.9-Fever, unspecified; shortness  of breath     COMPARISON: NONE     FINDINGS: 1 minute of fluoroscopy and no images used for bronchoscopy.  Please see the procedure report for full details.       Impression:       Fluoroscopy for bronchoscopy. Please see the procedure  report for full details.     D:  07/10/2019  E:  07/11/2019         This report was finalized on 7/11/2019 12:13 PM by Dr. Ana Paula De La Cruz MD.       CT Chest Hi Resolution [[id number]] Collected:  07/09/19 1223     Updated:  07/11/19 1005    Narrative:       EXAMINATION: CT CHEST HI RESOLUTION-      INDICATION: Cough, fever, suspected autoimmune disease.     TECHNIQUE: Axial 1 mm images at 1 cm  intervals through the lungs with  image reconstruction using edge enhancement algorithm. Patient was  scanned both supine and prone for the study.     The radiation dose reduction device was turned on for each scan per the  ALARA (As Low as Reasonably Achievable) protocol.     COMPARISON: 06/24/2019.     FINDINGS: Lungs appear clear bilaterally. There is no evidence of  groundglass opacity, reticular or nodular disease, peribronchovascular  disease, or intralobular interstitial thickening. No lung consolidation  or effusion is seen. There is no evidence of significant bronchiectasis.  Limited mediastinal images show a few shotty mediastinal lymph nodes but  no obvious adenopathy. No gross abnormalities are noted of the included  structures of the upper abdomen.       Impression:       Negative high-resolution scan. No evidence of significant  pulmonary interstitial disease.     D:  07/10/2019  E:  07/11/2019       FL Guided Aspiration Joint [[id number]] Collected:  07/09/19 1522     Updated:  07/09/19 1522    Narrative:       Exam: Fluoroscopic guided joint aspiration     INDICATION: Right wrist pain     TECHNIQUE: 21 seconds of fluoroscopic time was used for this procedure.  Procedure, risks, complications, and side effects of joint aspiration  were discussed and reviewed in detail with patient including the  possibility for bleeding, infection, needle damage to surrounding  structures, and the potential for a nondiagnostic exam. Patient  indicated understanding and consented to the procedure.     The right wrist was prepped and draped in a sterile fashion. 1%  lidocaine was used as a local anesthetic to the skin and subcutaneous  tissues. Under fluoroscopic guidance a 22-gauge needle was advanced to  the joint space. Despite multiple attempts, synovial fluid was not able  to be aspirated at this time, therefore approximately 2 to 3 cc of  sterile saline was injected into the joint space, aspirated, labeled,  and  sent to pathology for further analysis. The needle was removed.     FINDINGS: The patient tolerated the procedure well, and no immediate  complications occurred.       Impression:       Successful fluoroscopic guided right wrist joint aspiration  as above with no immediate complications.       XR Chest PA & Lateral [[id number]] Collected:  07/09/19 0905     Updated:  07/09/19 1453    Narrative:       EXAMINATION: XR CHEST PA AND LATERAL- 07/08/2019      INDICATION: persistent hypoxia      COMPARISON: 06/24/2019     FINDINGS: Cardiac silhouette is normal. There is no evidence of cardiac  decompensation. There is no pulmonary inflammatory process, mass or  effusion.           Impression:       No active disease.     D:  07/09/2019  E:  07/09/2019     This report was finalized on 7/9/2019 2:50 PM by Dr. Don Diaz MD.                       Results for orders placed during the hospital encounter of 07/08/19   Adult Transthoracic Echo Complete W/ Cont if Necessary Per Protocol    Narrative · Left ventricular systolic function is normal. Estimated EF = 55%.  · The cardiac valves are anatomically and functionally normal.           Order Current Status    Viral Culture, Rapid, Respiratory - Lavage, Lung, Right Middle Lobe Collected (07/10/19 1415)    ANCA Panel In process    Blastomyces Antigen - Urine, Urine, Clean Catch In process    Fungal Antibodies, DID In process    Fungus Culture - Aspirate, Wrist, Right In process    Fungus Culture - Lavage, Lung, Right Middle Lobe In process    Fungus Smear - Lavage, Lung, Right Middle Lobe In process    Histoplasma Ag Ur - Urine, Clean Catch In process    Histoplasma Antigen ser In process    Legionella Pneumophilia Antigen - Lung, Right Middle Lobe In process    MTB NAVIN Without AFB Culture - Lavage, Lung, Right Middle Lobe In process    Ova & Parasite Examination - Lavage, Lung, Right Middle Lobe In process    AFB Culture - Lavage, Lung, Right Middle Lobe Preliminary result     "Blood Culture - Blood, Arm, Left Preliminary result    Blood Culture - Blood, Arm, Right Preliminary result    Body Fluid Culture - Body Fluid, Wrist, Right Preliminary result        Discharge Details        Discharge Medications      New Medications      Instructions Start Date   azithromycin 250 MG tablet  Commonly known as:  ZITHROMAX   then 1 tablet daily for 4 days.   Start Date:  7/13/2019     fluconazole 200 MG tablet  Commonly known as:  DIFLUCAN   200 mg, Oral, Every 24 Hours   Start Date:  7/13/2019     PHARMACY MEDS TO BED CONSULT   Does not apply, Daily      predniSONE 20 MG tablet  Commonly known as:  DELTASONE   60 mg, Oral, Daily With Breakfast   Start Date:  7/13/2019        Continue These Medications      Instructions Start Date   dextromethorphan polistirex ER 30 MG/5ML Suspension Extended Release oral suspension  Commonly known as:  DELSYM   10 mL, Oral, Every 12 Hours PRN      guaiFENesin 100 MG/5ML syrup  Commonly known as:  ROBITUSSIN   200 mg, Oral, 3 Times Daily PRN      HYDROcodone-acetaminophen  MG per tablet  Commonly known as:  NORCO   1 tablet, Oral, Every 6 Hours PRN, 1/2 to 2 tablets every 6 hours as needed       metoprolol succinate XL 25 MG 24 hr tablet  Commonly known as:  TOPROL-XL   25 mg, Oral, Every 24 Hours Scheduled      valsartan 40 MG tablet  Commonly known as:  DIOVAN   40 mg, Oral, Daily         Stop These Medications    doxycycline 100 MG capsule  Commonly known as:  MONODOX            Allergies   Allergen Reactions   • Tramadol Nausea And Vomiting     Nausea and not working   • Ciprofloxacin Anxiety     \"Extreme anxiety and paranoia\"         Discharge Disposition:  Home or Self Care    Discharge Diet:  Diet Order   Procedures   • Diet Regular; Cardiac         Discharge Activity:         CODE STATUS:    Code Status and Medical Interventions:   Ordered at: 07/08/19 9218     Level Of Support Discussed With:    Patient     Code Status:    CPR     Medical Interventions " (Level of Support Prior to Arrest):    Full         Future Appointments   Date Time Provider Department Center   9/3/2019  8:30 AM [facility]   9/3/2019 11:00 AM Korin Bear APRN [facility] None   1/3/2020  1:45 PM Willow Daniels APRN [facility]       Additional Instructions for the Follow-ups that You Need to Schedule     Discharge Follow-up with PCP   As directed       Currently Documented PCP:    Ihsan Blackwell MD    PCP Phone Number:    [phone]     Follow Up Details:  1 week         Discharge Follow-up with Specialty: Dr. lorraine sandra (rheumatology) 1 week (please call her office to ensure this appointment set up). thanks   As directed      Specialty:  Dr. lorraine sandra (rheumatology) 1 week (please call her office to ensure this appointment set up). thanks         Discharge Follow-up with Specialty: dr. harris (id) 2 weeks   As directed      Specialty:  dr. harris (id) 2 weeks               Time Spent on Discharge: 35 min    Electronically signed by Junior Gibbs MD, 07/12/19, 1:06 PM.

## 2019-07-12 NOTE — PAYOR COMM NOTE
"Stephen Stoll (44 y.o. Male)     Date of Birth Social Security Number Address Home Phone MRN    1975  [address] [id number] [phone] [id number]    Zoroastrian Marital Status          Orthodox        Admission Date Admission Type Admitting Provider Attending Provider Department, Room/Bed    19 Urgent Junior Gibbs MD  [facility] 2G, S222/1    Discharge Date Discharge Disposition Discharge Destination        2019 Home or Self Care              Attending Provider:  (none)   Allergies:  Tramadol, Ciprofloxacin    Isolation:  None   Infection:  None   Code Status:  CPR    Ht:  182 cm (71.65\")   Wt:  99.8 kg (220 lb)    Admission Cmt:  None   Principal Problem:  Fever [R50.9]                 Active Insurance as of 2019     Primary Coverage     Payor Plan Insurance Group Employer/Plan Group    ANTHEM BLUE CROSS ANTHEM BLUE CROSS BLUE SHIELD PPO [id number]     Payor Plan Address Payor Plan Phone Number Payor Plan Fax Number Effective Dates    [address] [phone]  2018 - None Entered    [place] [address]       Subscriber Name Subscriber Birth Date Member ID       STEPHEN STOLL 1975 [id number]                 Emergency Contacts      (Rel.) Home Phone Work Phone Mobile Phone    RAJEEV STOLL (Spouse) [phone] -- [phone]               Discharge Summary      Junior Gibbs MD at 2019  1:06 PM              [facility]  DISCHARGE SUMMARY    Patient Name: Stephen Stoll  : 1975  MRN: [id number]    Date of Admission: 2019  Date of Discharge:  2019  Primary Care Physician: Ihsan Blackwell MD    Consults     Date and Time Order Name Status Description    2019 0717 Inpatient Pulmonology Consult Completed     2019 1536 Inpatient Infectious Diseases Consult Completed     2019 1417 Inpatient Cardiology Consult "      6/24/2019 1639 Inpatient Infectious Diseases Consult Completed     6/24/2019 1002 Inpatient Hospitalist Consult Completed           Hospital Course     Presenting Problem:   Fever [R50.9]    Active Hospital Problems    Diagnosis  POA   • **Fever [R50.9]  Yes   • 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      --------------Final Diagnoses------------  Fevers, improved   -differential includes Still's disease vs other autoimmune dz vs yet to be identified infectious etiology (infectious disease favors Still's disease)              -negative talia, p-anca,c-anca, myeloperoxidase              -cultures and serologies thus far negative  S/p recent myocarditis              -echo 7/8/19 this admission showed normalization of EF  Right wrist arthritis (s/p aspiration 7/9), resolved              Not c/w infection, cx's neg thus far  Persistent Leukocytosis  Cough  Right paratracheal lymph node              -s/p bronch w/ biopsy 7/10/19 by Dr. Sandoval, pathology not consistent w/ sarcoid  Elevated esr/crp/ferritin  ------------------------------------------------    Hospital Course:  Stephen Stoll is a 44 y.o. male with a recent admission June 24-29 for FUO, now readmitted for the same issue. He was a healthy man until early June when he developed daily fevers to 102 and a persistent dry cough.  Since then he has had myocarditis (now resolved), progressive dyspnea, and new R wrist arthritis. On 7/8 my colleague discussed his case with IDSasha, an orthopedist, a rheumatologist, and a pulmonologist. Patient was initiated on broad spectrum empiric antibiotics and followed by infectoius disease. Cultures & serologies thus far negative and infectious workup negative.  Patient underwent arthrocentesis by radiology and findings were not consistent w/ infection. ID & pulmonary consulted. p-anca, c-anca,  myeloperoxidase, and talia were negative. Due to dry cough & abnormal partracheal lymph node seen on previous ct scan, pulmonary performed bronchoscopy & transbronchial biopsies. The right paratracheal lymph node was negative for carcinoma, and was consistent w/ a fragment of bronchial mucosa w/ atypical/dysplastic squamous metaplasia. Right middle lobe lung need aspirate revealed benign lung and no signs of sarcoid. Patient was initiated on steroids and antibiotics switched to zithromax x 4 more days and diflucan 200mg daily x 1 month. Patient now afebrile > 48 hours and feeling quite well and eager for discharge. I spoke w/ Dr. Gypsy Johnson on 7/11/19 (day prior to discharge) and she was agreeable to see patient in short term follow up as outpatient and agreed on prednisone 60mg po daily until follows up with her. F/u pcp 1 week. F/u Dr. Hauser 2 weeks.      Discharge Follow Up Recommendations for labs/diagnostics:  - follow up final respiratory/bronchoscopy lavage studies many of which are still pending at time of discharge  -recommend follow wbc count    Day of Discharge     HPI:   Feeling well, dry cough improved. No fever last night    Review of Systems  No chest pain    Otherwise ROS is negative except as mentioned in the HPI.    Vital Signs:   Temp:  [97.6 °F (36.4 °C)-98.3 °F (36.8 °C)] 98.3 °F (36.8 °C)  Heart Rate:  [73-83] 73  Resp:  [18] 18  BP: (134-144)/(77-84) 138/83     Physical Exam:  Constitutional: Awake, alert, up in chair, dry cough noted, no distress, Ox4, nontoxic appearing  Eyes: PERRLA, sclerae anicteric, no conjunctival injection  HENT: NCAT, mucous membranes moist  Neck: Supple, , trachea midline  Respiratory: Clear to auscultation bilaterally, nonlabored respirations on RA  Cardiovascular: RRR, no murmurs, rubs, or gallops, palpable pedal pulses bilaterally  Gastrointestinal: Positive bowel sounds, soft, nontender, nondistended  Musculoskeletal: No bilateral ankle edema, no clubbing or  cyanosis to extremities.  R wrist no edema or erythema today (resolved)  Psychiatric: Appropriate affect, cooperative  Neurologic: Oriented x 3, strength symmetric in all extremities, Cranial Nerves grossly intact to confrontation, speech clear  Skin: No rashes            Pertinent  and/or Most Recent Results     Results from last 7 days   Lab Units 07/12/19  0650 07/11/19  0722 07/10/19  0522 07/09/19  0708 07/08/19  1429   WBC 10*3/mm3 31.59* 31.72* 35.37* 37.54* 35.02*   HEMOGLOBIN g/dL 10.5* 11.3* 11.2* 11.8* 11.3*   HEMATOCRIT % 32.8* 35.7* 34.4* 36.0* 34.7*   PLATELETS 10*3/mm3 607* 611* 564* 597* 529*   SODIUM mmol/L 138 134* 134* 131* 128*   POTASSIUM mmol/L 4.9 4.3 4.5 4.4 4.3   CHLORIDE mmol/L 104 100 98 94* 90*   CO2 mmol/L 24.0 20.0* 23.0 22.0 23.0   BUN mg/dL 15 15 12 13 13   CREATININE mg/dL 0.61* 0.67* 0.79 0.95 0.93   GLUCOSE mg/dL 157* 195* 125* 127* 116*   CALCIUM mg/dL 8.4* 8.4* 8.5* 8.4* 8.4*     Results from last 7 days   Lab Units 07/08/19  1429   BILIRUBIN mg/dL 0.5   ALK PHOS U/L 111   ALT (SGPT) U/L 25   AST (SGOT) U/L 31   PROTIME Seconds 17.7*   INR  1.53*           Invalid input(s): TG, LDLCALC, LDLREALC  Results from last 7 days   Lab Units 07/09/19  0708 07/08/19  1429   PROBNP pg/mL  --  433.4   TROPONIN T ng/mL <0.010 <0.010   PROCALCITONIN ng/mL  --  2.18*   LACTATE mmol/L  --  1.3       Brief Urine Lab Results  (Last result in the past 365 days)      Color   Clarity   Blood   Leuk Est   Nitrite   Protein   CREAT   Urine HCG        06/27/19 1615 Yellow Clear Negative Negative Negative 30 mg/dL (1+)               Microbiology Results Abnormal     Procedure Component Value - Date/Time    Respiratory Culture - Lavage, Lung, Right Middle Lobe [[id number]] Collected:  07/10/19 1415    Lab Status:  Final result Specimen:  Lavage from Lung, Right Middle Lobe Updated:  07/12/19 0920     Respiratory Culture No growth     Gram Stain No WBCs per low power field      No organisms seen    BAL  Culture, Quantitative - Lavage, Lung, Right Middle Lobe [[id number]] Collected:  07/10/19 1415    Lab Status:  Final result Specimen:  Lavage from Lung, Right Middle Lobe Updated:  07/12/19 0920     BAL Culture No growth     Gram Stain No WBCs per low power field      No organisms seen    Body Fluid Culture - Body Fluid, Wrist, Right [[id number]] Collected:  07/09/19 1030    Lab Status:  Preliminary result Specimen:  Body Fluid from Wrist, Right Updated:  07/12/19 0659     Body Fluid Culture No growth at 3 days     Gram Stain No WBCs or organisms seen    Blood Culture - Blood, Arm, Left [[id number]] Collected:  07/08/19 1429    Lab Status:  Preliminary result Specimen:  Blood from Arm, Left Updated:  07/11/19 1445     Blood Culture No growth at 3 days    Blood Culture - Blood, Arm, Right [[id number]] Collected:  07/08/19 1429    Lab Status:  Preliminary result Specimen:  Blood from Arm, Right Updated:  07/11/19 1445     Blood Culture No growth at 3 days    AFB Culture - Lavage, Lung, Right Middle Lobe [[id number]] Collected:  07/10/19 1415    Lab Status:  Preliminary result Specimen:  Lavage from Lung, Right Middle Lobe Updated:  07/11/19 1156     AFB Stain No acid fast bacilli seen on concentrated smear    Respiratory Culture - Sputum, Cough [[id number]] Collected:  07/09/19 0655    Lab Status:  Final result Specimen:  Sputum from Cough Updated:  07/11/19 0905     Respiratory Culture Light growth (2+) Normal Respiratory Iesha     Gram Stain Few (2+) WBCs per low power field      Occasional Epithelial cells per low power field      Few (2+) Gram positive cocci in pairs    KOH Prep - Aspirate, Wrist, Right [[id number]] Collected:  07/09/19 1508    Lab Status:  Final result Specimen:  Aspirate from Wrist, Right Updated:  07/09/19 1539     KOH Prep No yeast or hyphal elements seen          Imaging Results (all)     Procedure Component Value Units Date/Time    XR Chest 1 View [[id number]] Collected:  07/11/19 0848     Updated:   07/11/19 2146    Narrative:       EXAMINATION: XR CHEST 1 VW-      INDICATION: Right transbronchial biopsy on 07/10/19; R93.89-Abnormal  findings on diagnostic imaging of other specified body structures;  R50.9-Fever, unspecified.      COMPARISON: 07/10/2019.     FINDINGS: Heart is borderline enlarged. The vasculature appears upper  limits of normal. Coarse interstitial markings, and minimal residual  disease at the right base is again noted. Right base appears  significantly improved from the 07/10/2019 study. No pneumothorax or  other new chest pathology seen.           Impression:       Mild remaining patchy opacity in the right base. No new  chest pathology is seen.     D:  07/11/2019  E:  07/11/2019     This report was finalized on 7/11/2019 9:43 PM by DR. Bruce Crowe MD.       XR Chest 1 View [[id number]] Collected:  07/10/19 1726     Updated:  07/11/19 1217    Narrative:       EXAMINATION: XR CHEST 1 VW- 07/10/2019      INDICATION: S/p Right TBBx; R93.89-Abnormal findings on diagnostic  imaging of other specified body structures; R50.9-Fever, unspecified      COMPARISON: 07/10/2019     FINDINGS: Portable chest reveals heart to be borderline enlarged with  some improvement seen in aeration of the right lung base. Minimal  residual markings remain. The left lung is clear. Bony structures are  unremarkable.           Impression:       There is minimal residual markings remaining at the right  lung base. The left lung is clear.     D:  07/10/2019  E:  07/10/2019     This report was finalized on 7/11/2019 12:14 PM by Dr. Ana Paula De La Cruz MD.       XR Chest 1 View [[id number]] Collected:  07/10/19 1553     Updated:  07/11/19 1216    Narrative:          EXAMINATION: XR CHEST 1 VW - 07/10/2019     INDICATION:  R93.89-Abnormal findings on diagnostic imaging of other  specified body structures; R50.9-Fever, unspecified.      COMPARISON: NONE     FINDINGS: Portable chest reveals cardiac and mediastinal  silhouettes  within normal limits. Patchy airspace disease identified within the  right lower lobe. Degenerative changes seen within the spine. The lung  fields are clear. No focal parenchymal opacification present. No pleural  effusion or pneumothorax.           Impression:       Airspace disease identified within the right lower lobe. No  pleural effusion. No pneumothorax.     DICTATED:   07/10/2019  EDITED/ls :   07/10/2019      This report was finalized on 7/11/2019 12:13 PM by Dr. Ana Paula De La Cruz MD.       FL C Arm During Surgery [[id number]] Collected:  07/10/19 1553     Updated:  07/11/19 1216    Narrative:       EXAMINATION: FL C ARM DURING SURGERY- 07/10/2019      INDICATION: Bronchoscopy; R93.89-Abnormal findings on diagnostic imaging  of other specified body structures; R50.9-Fever, unspecified; shortness  of breath     COMPARISON: NONE     FINDINGS: 1 minute of fluoroscopy and no images used for bronchoscopy.  Please see the procedure report for full details.       Impression:       Fluoroscopy for bronchoscopy. Please see the procedure  report for full details.     D:  07/10/2019  E:  07/11/2019         This report was finalized on 7/11/2019 12:13 PM by Dr. Ana Paula De La Cruz MD.       CT Chest Hi Resolution [[id number]] Collected:  07/09/19 1223     Updated:  07/11/19 1005    Narrative:       EXAMINATION: CT CHEST HI RESOLUTION-      INDICATION: Cough, fever, suspected autoimmune disease.     TECHNIQUE: Axial 1 mm images at 1 cm intervals through the lungs with  image reconstruction using edge enhancement algorithm. Patient was  scanned both supine and prone for the study.     The radiation dose reduction device was turned on for each scan per the  ALARA (As Low as Reasonably Achievable) protocol.     COMPARISON: 06/24/2019.     FINDINGS: Lungs appear clear bilaterally. There is no evidence of  groundglass opacity, reticular or nodular disease, peribronchovascular  disease, or intralobular  interstitial thickening. No lung consolidation  or effusion is seen. There is no evidence of significant bronchiectasis.  Limited mediastinal images show a few shotty mediastinal lymph nodes but  no obvious adenopathy. No gross abnormalities are noted of the included  structures of the upper abdomen.       Impression:       Negative high-resolution scan. No evidence of significant  pulmonary interstitial disease.     D:  07/10/2019  E:  07/11/2019       FL Guided Aspiration Joint [[id number]] Collected:  07/09/19 1522     Updated:  07/09/19 1522    Narrative:       Exam: Fluoroscopic guided joint aspiration     INDICATION: Right wrist pain     TECHNIQUE: 21 seconds of fluoroscopic time was used for this procedure.  Procedure, risks, complications, and side effects of joint aspiration  were discussed and reviewed in detail with patient including the  possibility for bleeding, infection, needle damage to surrounding  structures, and the potential for a nondiagnostic exam. Patient  indicated understanding and consented to the procedure.     The right wrist was prepped and draped in a sterile fashion. 1%  lidocaine was used as a local anesthetic to the skin and subcutaneous  tissues. Under fluoroscopic guidance a 22-gauge needle was advanced to  the joint space. Despite multiple attempts, synovial fluid was not able  to be aspirated at this time, therefore approximately 2 to 3 cc of  sterile saline was injected into the joint space, aspirated, labeled,  and sent to pathology for further analysis. The needle was removed.     FINDINGS: The patient tolerated the procedure well, and no immediate  complications occurred.       Impression:       Successful fluoroscopic guided right wrist joint aspiration  as above with no immediate complications.       XR Chest PA & Lateral [[id number]] Collected:  07/09/19 0905     Updated:  07/09/19 1453    Narrative:       EXAMINATION: XR CHEST PA AND LATERAL- 07/08/2019      INDICATION:  persistent hypoxia      COMPARISON: 06/24/2019     FINDINGS: Cardiac silhouette is normal. There is no evidence of cardiac  decompensation. There is no pulmonary inflammatory process, mass or  effusion.           Impression:       No active disease.     D:  07/09/2019  E:  07/09/2019     This report was finalized on 7/9/2019 2:50 PM by Dr. Don Diaz MD.                       Results for orders placed during the hospital encounter of 07/08/19   Adult Transthoracic Echo Complete W/ Cont if Necessary Per Protocol    Narrative · Left ventricular systolic function is normal. Estimated EF = 55%.  · The cardiac valves are anatomically and functionally normal.           Order Current Status    Viral Culture, Rapid, Respiratory - Lavage, Lung, Right Middle Lobe Collected (07/10/19 1415)    ANCA Panel In process    Blastomyces Antigen - Urine, Urine, Clean Catch In process    Fungal Antibodies, DID In process    Fungus Culture - Aspirate, Wrist, Right In process    Fungus Culture - Lavage, Lung, Right Middle Lobe In process    Fungus Smear - Lavage, Lung, Right Middle Lobe In process    Histoplasma Ag Ur - Urine, Clean Catch In process    Histoplasma Antigen ser In process    Legionella Pneumophilia Antigen - Lung, Right Middle Lobe In process    MTB NAVIN Without AFB Culture - Lavage, Lung, Right Middle Lobe In process    Ova & Parasite Examination - Lavage, Lung, Right Middle Lobe In process    AFB Culture - Lavage, Lung, Right Middle Lobe Preliminary result    Blood Culture - Blood, Arm, Left Preliminary result    Blood Culture - Blood, Arm, Right Preliminary result    Body Fluid Culture - Body Fluid, Wrist, Right Preliminary result        Discharge Details        Discharge Medications      New Medications      Instructions Start Date   azithromycin 250 MG tablet  Commonly known as:  ZITHROMAX   then 1 tablet daily for 4 days.   Start Date:  7/13/2019     fluconazole 200 MG tablet  Commonly known as:  DIFLUCAN   200 mg,  "Oral, Every 24 Hours   Start Date:  7/13/2019     PHARMACY MEDS TO BED CONSULT   Does not apply, Daily      predniSONE 20 MG tablet  Commonly known as:  DELTASONE   60 mg, Oral, Daily With Breakfast   Start Date:  7/13/2019        Continue These Medications      Instructions Start Date   dextromethorphan polistirex ER 30 MG/5ML Suspension Extended Release oral suspension  Commonly known as:  DELSYM   10 mL, Oral, Every 12 Hours PRN      guaiFENesin 100 MG/5ML syrup  Commonly known as:  ROBITUSSIN   200 mg, Oral, 3 Times Daily PRN      HYDROcodone-acetaminophen  MG per tablet  Commonly known as:  NORCO   1 tablet, Oral, Every 6 Hours PRN, 1/2 to 2 tablets every 6 hours as needed       metoprolol succinate XL 25 MG 24 hr tablet  Commonly known as:  TOPROL-XL   25 mg, Oral, Every 24 Hours Scheduled      valsartan 40 MG tablet  Commonly known as:  DIOVAN   40 mg, Oral, Daily         Stop These Medications    doxycycline 100 MG capsule  Commonly known as:  MONODOX            Allergies   Allergen Reactions   • Tramadol Nausea And Vomiting     Nausea and not working   • Ciprofloxacin Anxiety     \"Extreme anxiety and paranoia\"         Discharge Disposition:  Home or Self Care    Discharge Diet:  Diet Order   Procedures   • Diet Regular; Cardiac         Discharge Activity:         CODE STATUS:    Code Status and Medical Interventions:   Ordered at: 07/08/19 5998     Level Of Support Discussed With:    Patient     Code Status:    CPR     Medical Interventions (Level of Support Prior to Arrest):    Full         Future Appointments   Date Time Provider Department Center   9/3/2019  8:30 AM [facility]   9/3/2019 11:00 AM Korin Bear APRN [facility] None   1/3/2020  1:45 PM Willow Daniels APRN [facility]       Additional Instructions for the Follow-ups that You Need to Schedule     Discharge Follow-up with PCP   As directed       Currently Documented PCP:    Ihsan Blackwell, " MD    PCP Phone Number:    [phone]     Follow Up Details:  1 week         Discharge Follow-up with Specialty: Dr. lorraine sandra (rheumatology) 1 week (please call her office to ensure this appointment set up). thanks   As directed      Specialty:  Dr. lorraine sandra (rheumatology) 1 week (please call her office to ensure this appointment set up). thanks         Discharge Follow-up with Specialty: dr. harris (id) 2 weeks   As directed      Specialty:  dr. harris (id) 2 weeks               Time Spent on Discharge: 35 min    Electronically signed by Junior Gibbs MD, 07/12/19, 1:06 PM.      Electronically signed by Junior Gibbs MD at 7/12/2019  1:18 PM

## 2019-07-12 NOTE — PLAN OF CARE
Problem: Patient Care Overview  Goal: Plan of Care Review  Outcome: Ongoing (interventions implemented as appropriate)   07/12/19 0407   Coping/Psychosocial   Plan of Care Reviewed With patient;father   Plan of Care Review   Progress improving   OTHER   Outcome Summary pt afebrile; ambulating in the hallway; denies pain; no swelling to R wrist

## 2019-07-12 NOTE — PROGRESS NOTES
Case Management Discharge Note    Final Note: Pt plans to discharge home with his family. Per pt's chart, pt being discharged on oral antibiotics. Pt had no current discharge needs or concerns.     Destination      No service has been selected for the patient.      Durable Medical Equipment      No service has been selected for the patient.      Dialysis/Infusion      No service has been selected for the patient.      Home Medical Care      No service has been selected for the patient.      Therapy      No service has been selected for the patient.      Community Resources      No service has been selected for the patient.             Final Discharge Disposition Code: 01 - home or self-care

## 2019-07-13 ENCOUNTER — READMISSION MANAGEMENT (OUTPATIENT)
Dept: CALL CENTER | Facility: HOSPITAL | Age: 44
End: 2019-07-13

## 2019-07-13 LAB
A FLAVUS AB SER QL ID: NEGATIVE
A FUMIGATUS AB SER QL ID: NEGATIVE
A NIGER AB SER QL ID: NEGATIVE
B DERMAT AB TITR SER: NEGATIVE {TITER}
BACTERIA FLD CULT: NORMAL
BACTERIA SPEC AEROBE CULT: NORMAL
BACTERIA SPEC AEROBE CULT: NORMAL
GRAM STN SPEC: NORMAL
H CAPSUL AB TITR SER ID: NEGATIVE {TITER}
H CAPSUL AG SER IA-MCNC: <0.5 NG/ML
Lab: NORMAL
O+P SPEC MICRO: NORMAL
O+P STL TRI STN: NORMAL

## 2019-07-13 NOTE — OUTREACH NOTE
Prep Survey      Responses   Facility patient discharged from?  [facility]   Is patient eligible?  Yes   Discharge diagnosis  fever,  Still's    Does the patient have one of the following disease processes/diagnoses(primary or secondary)?  Other   Does the patient have Home health ordered?  No   Is there a DME ordered?  No   Prep survey completed?  Yes          Vicki Mackey RN

## 2019-07-14 LAB
M TB DNA SPEC QL NAA+PROBE: NEGATIVE
SPECIMEN PREPARATION: NORMAL

## 2019-07-15 ENCOUNTER — NURSE TRIAGE (OUTPATIENT)
Dept: CALL CENTER | Facility: HOSPITAL | Age: 44
End: 2019-07-15

## 2019-07-15 ENCOUNTER — READMISSION MANAGEMENT (OUTPATIENT)
Dept: CALL CENTER | Facility: HOSPITAL | Age: 44
End: 2019-07-15

## 2019-07-15 LAB
MVISTA(R) BLASTOMYCES AG: NORMAL NG/ML
SPECIMEN SOURCE: NORMAL

## 2019-07-15 NOTE — TELEPHONE ENCOUNTER
"Caller has medication questions, which unable to answer, caller is going to call his cardiologist for medication clarification    Reason for Disposition  • Caller has URGENT medication question about med that PCP prescribed and triager unable to answer question    Additional Information  • Negative: Drug overdose and nurse unable to answer question  • Negative: Caller requesting information not related to medicine  • Negative: Caller requesting a prescription for Strep throat and has a positive culture result  • Negative: Rash while taking a medication or within 3 days of stopping it  • Negative: Immunization reaction suspected  • Negative: [1] Asthma and [2] having symptoms of asthma (cough, wheezing, etc)  • Negative: MORE THAN A DOUBLE DOSE of a prescription or over-the-counter (OTC) drug  • Negative: [1] DOUBLE DOSE (an extra dose or lesser amount) of over-the-counter (OTC) drug AND [2] any symptoms (e.g., dizziness, nausea, pain, sleepiness)  • Negative: [1] DOUBLE DOSE (an extra dose or lesser amount) of prescription drug AND [2] any symptoms (e.g., dizziness, nausea, pain, sleepiness)  • Negative: Took another person's prescription drug  • Negative: [1] DOUBLE DOSE (an extra dose or lesser amount) of prescription drug AND [2] NO symptoms (Exception: a double dose of antibiotics)  • Negative: Diabetes drug error or overdose (e.g., insulin or extra dose)  • Negative: [1] Request for URGENT new prescription or refill of \"essential\" medication (i.e., likelihood of harm to patient if not taken) AND [2] triager unable to fill per unit policy  • Negative: [1] Prescription not at pharmacy AND [2] was prescribed today by PCP  • Negative: Pharmacy calling with prescription questions and triager unable to answer question    Answer Assessment - Initial Assessment Questions  1. SYMPTOMS: \"Do you have any symptoms?\"      Patient is unsure which meds  He should take  2. SEVERITY: If symptoms are present, ask \"Are they mild, " "moderate or severe?\"      mild    Protocols used: MEDICATION QUESTION CALL-ADULT-      "

## 2019-07-15 NOTE — OUTREACH NOTE
Medical Week 1 Survey      Responses   Facility patient discharged from?  [place]   Does the patient have one of the following disease processes/diagnoses(primary or secondary)?  Other   Is there a successful TCM telephone encounter documented?  No   Week 1 attempt successful?  Yes   Call start time  1141   Call end time  1204   Discharge diagnosis  fever,  Still's    Meds reviewed with patient/caregiver?  Yes   Is the patient having any side effects they believe may be caused by any medication additions or changes?  No   Does the patient have all medications ordered at discharge?  Yes   Is the patient taking all medications as directed (includes completed medication regime)?  Yes   Does the patient have a primary care provider?   Yes   Does the patient have an appointment with their PCP within 7 days of discharge?  Yes   Has the patient kept scheduled appointments due by today?  N/A   Has home health visited the patient within 72 hours of discharge?  N/A   Psychosocial issues?  No   Did the patient receive a copy of their discharge instructions?  Yes   Nursing interventions  Reviewed instructions with patient   What is the patient's perception of their health status since discharge?  Improving   Is the patient/caregiver able to teach back signs and symptoms related to disease process for when to call PCP?  Yes   Is the patient/caregiver able to teach back signs and symptoms related to disease process for when to call 911?  Yes   Is the patient/caregiver able to teach back the hierarchy of who to call/visit for symptoms/problems? PCP, Specialist, Home health nurse, Urgent Care, ED, 911  Yes   Additional teach back comments  He has concerns with his cardiac meds-lvm for H/V to call him,  issues with Rheum appt., LVM here as well.  He is doing some better but has these two huge concerns.   Week 1 call completed?  Yes          Nelly Zuniga RN

## 2019-07-18 ENCOUNTER — TELEPHONE (OUTPATIENT)
Dept: CARDIOLOGY | Facility: HOSPITAL | Age: 44
End: 2019-07-18

## 2019-07-18 NOTE — TELEPHONE ENCOUNTER
----- Message from Evelyn Boston MA sent at 7/18/2019 12:57 PM EDT -----  Patient notified and verbalized understanding.  ----- Message -----  From: Willow Daniels APRN  Sent: 7/18/2019  11:39 AM  To: Evelyn Boston MA    Yes patient was instructed to call a provider.  Thank her for calling.  I do recommend she call her PCP who will evaluate and mange her anxiety and depression.   ----- Message -----  From: Evelyn Boston MA  Sent: 7/18/2019  10:45 AM  To: AILYN Taylor    Patient states that he was told to call if her started to have any anxiety and change is mentality. Patient states that he has been having some depression the last few days but denies suicidal thoughts. Please call [phone].

## 2019-07-22 ENCOUNTER — READMISSION MANAGEMENT (OUTPATIENT)
Dept: CALL CENTER | Facility: HOSPITAL | Age: 44
End: 2019-07-22

## 2019-07-22 NOTE — OUTREACH NOTE
Medical Week 2 Survey      Responses   Facility patient discharged from?  [place]   Does the patient have one of the following disease processes/diagnoses(primary or secondary)?  Other   Week 2 attempt successful?  Yes   Call start time  1409   Discharge diagnosis  fever,  Still's    Call end time  1411   Meds reviewed with patient/caregiver?  Yes   Is the patient taking all medications as directed (includes completed medication regime)?  Yes   Does the patient have a primary care provider?   Yes   Does the patient have an appointment with their PCP within 7 days of discharge?  Yes   Has the patient kept scheduled appointments due by today?  Yes   What is the patient's perception of their health status since discharge?  Improving   Week 2 Call Completed?  Yes   Graduated  Yes   Did the patient feel the follow up calls were helpful during their recovery period?  Yes   Was the number of calls appropriate?  Yes   Graduated/Revoked comments  Patient states that he is doing well.  All goals met.          Raina Jackson RN

## 2019-07-26 ENCOUNTER — TRANSCRIBE ORDERS (OUTPATIENT)
Dept: ADMINISTRATIVE | Facility: HOSPITAL | Age: 44
End: 2019-07-26

## 2019-07-26 ENCOUNTER — HOSPITAL ENCOUNTER (OUTPATIENT)
Dept: GENERAL RADIOLOGY | Facility: HOSPITAL | Age: 44
Discharge: HOME | End: 2019-07-26
Admitting: INTERNAL MEDICINE

## 2019-07-26 ENCOUNTER — OUTSIDE FACILITY SERVICE (OUTPATIENT)
Dept: CARDIOLOGY | Facility: CLINIC | Age: 44
End: 2019-07-26

## 2019-07-26 ENCOUNTER — TRANSCRIBE ORDERS (OUTPATIENT)
Dept: GENERAL RADIOLOGY | Facility: HOSPITAL | Age: 44
End: 2019-07-26

## 2019-07-26 DIAGNOSIS — J18.9 UNRESOLVED PNEUMONIA: ICD-10-CM

## 2019-07-26 DIAGNOSIS — J18.9 UNRESOLVED PNEUMONIA: Primary | ICD-10-CM

## 2019-07-26 PROCEDURE — 71046 X-RAY EXAM CHEST 2 VIEWS: CPT

## 2019-08-02 ENCOUNTER — TRANSCRIBE ORDERS (OUTPATIENT)
Dept: ADMINISTRATIVE | Facility: HOSPITAL | Age: 44
End: 2019-08-02

## 2019-08-02 DIAGNOSIS — M54.9 BACK PAIN, UNSPECIFIED BACK LOCATION, UNSPECIFIED BACK PAIN LATERALITY, UNSPECIFIED CHRONICITY: Primary | ICD-10-CM

## 2019-08-09 ENCOUNTER — HOSPITAL ENCOUNTER (OUTPATIENT)
Dept: MRI IMAGING | Facility: HOSPITAL | Age: 44
Discharge: HOME | End: 2019-08-09
Admitting: INTERNAL MEDICINE

## 2019-08-09 ENCOUNTER — HOSPITAL ENCOUNTER (OUTPATIENT)
Dept: MRI IMAGING | Facility: HOSPITAL | Age: 44
Discharge: HOME | End: 2019-08-09

## 2019-08-09 DIAGNOSIS — M54.9 BACK PAIN, UNSPECIFIED BACK LOCATION, UNSPECIFIED BACK PAIN LATERALITY, UNSPECIFIED CHRONICITY: ICD-10-CM

## 2019-08-09 PROCEDURE — 72146 MRI CHEST SPINE W/O DYE: CPT

## 2019-08-09 PROCEDURE — 72148 MRI LUMBAR SPINE W/O DYE: CPT

## 2019-08-13 ENCOUNTER — TELEPHONE (OUTPATIENT)
Dept: CARDIOLOGY | Facility: HOSPITAL | Age: 44
End: 2019-08-13

## 2019-08-13 NOTE — TELEPHONE ENCOUNTER
Stephen Stoll   Male, 44 y.o., 1975  Weight:   97.5 kg (215 lb)  Phone:   [phone] (M)  PCP:   Carlos Ramirez MD  MRN:   [id number]  MyChart:   Active  Next Appt:   09/03/2019  Message   Received: Today   Message Contents   Willow Daniels APRN Morrison, Ashley M, CMA             This BP med typically does not have this side effect. He can try holding for 1 week. Then restart med to see if if makes a difference.    Previous Messages      ----- Message -----   From: Evelyn Boston CMA   Sent: 8/12/2019   9:07 AM   To: AILYN [facility] Telephone Note for:     Stephen Stoll   Mobile          [phone]       Reason for Call: Patient called and states that he has had some unexplained midback pain that he states began 1.5 weeks after starting the Valsartan consistently.     Symptoms:  Midback pain that patient describes as sharp     Onset::  1.5 weeks after Valsartan     Anything Tried?:  Patient states that he has been taking Tylenol for pain     Other Pertinent Information (Weight, Vitals, etc.):  Patient states that his PCP ordered an MRI of the T-spine on Friday of last week. He expects to find out the results today. Patient is wondering if the MRI is negative if he could stop the Valsartan because he has read that pain may be related to Valsartan side effects.

## 2019-08-20 LAB — FUNGUS WND CULT: NORMAL

## 2019-08-21 ENCOUNTER — APPOINTMENT (OUTPATIENT)
Dept: MRI IMAGING | Facility: HOSPITAL | Age: 44
End: 2019-08-21

## 2019-08-21 ENCOUNTER — HOSPITAL ENCOUNTER (INPATIENT)
Facility: HOSPITAL | Age: 44
LOS: 7 days | Discharge: HOME | End: 2019-08-28
Attending: EMERGENCY MEDICINE | Admitting: HOSPITALIST

## 2019-08-21 ENCOUNTER — APPOINTMENT (OUTPATIENT)
Dept: GENERAL RADIOLOGY | Facility: HOSPITAL | Age: 44
End: 2019-08-21

## 2019-08-21 DIAGNOSIS — M46.20 VERTEBRAL OSTEOMYELITIS (HCC): Primary | ICD-10-CM

## 2019-08-21 DIAGNOSIS — M08.20 STILL'S DISEASE (HCC): ICD-10-CM

## 2019-08-21 DIAGNOSIS — D84.9 IMMUNOSUPPRESSED STATUS (HCC): ICD-10-CM

## 2019-08-21 LAB
ALBUMIN SERPL-MCNC: 3.4 G/DL (ref 3.5–5.2)
ALBUMIN/GLOB SERPL: 0.8 G/DL
ALP SERPL-CCNC: 137 U/L (ref 39–117)
ALT SERPL W P-5'-P-CCNC: 103 U/L (ref 1–41)
ANION GAP SERPL CALCULATED.3IONS-SCNC: 12 MMOL/L (ref 5–15)
AST SERPL-CCNC: 33 U/L (ref 1–40)
B PARAPERT DNA SPEC QL NAA+PROBE: NOT DETECTED
B PERT DNA SPEC QL NAA+PROBE: NOT DETECTED
BASOPHILS # BLD AUTO: 0.15 10*3/MM3 (ref 0–0.2)
BASOPHILS NFR BLD AUTO: 0.7 % (ref 0–1.5)
BILIRUB SERPL-MCNC: 0.2 MG/DL (ref 0.2–1.2)
BILIRUB UR QL STRIP: NEGATIVE
BUN BLD-MCNC: 22 MG/DL (ref 6–20)
BUN/CREAT SERPL: 21.6 (ref 7–25)
C PNEUM DNA NPH QL NAA+NON-PROBE: NOT DETECTED
CALCIUM SPEC-SCNC: 9.3 MG/DL (ref 8.6–10.5)
CHLORIDE SERPL-SCNC: 94 MMOL/L (ref 98–107)
CLARITY UR: CLEAR
CO2 SERPL-SCNC: 29 MMOL/L (ref 22–29)
COLOR UR: YELLOW
CREAT BLD-MCNC: 1.02 MG/DL (ref 0.76–1.27)
CRP SERPL-MCNC: 19.55 MG/DL (ref 0–0.5)
D-LACTATE SERPL-SCNC: 1.7 MMOL/L (ref 0.5–2)
DEPRECATED RDW RBC AUTO: 47.7 FL (ref 37–54)
EOSINOPHIL # BLD AUTO: 0.03 10*3/MM3 (ref 0–0.4)
EOSINOPHIL NFR BLD AUTO: 0.1 % (ref 0.3–6.2)
ERYTHROCYTE [DISTWIDTH] IN BLOOD BY AUTOMATED COUNT: 15.6 % (ref 12.3–15.4)
ERYTHROCYTE [SEDIMENTATION RATE] IN BLOOD: 105 MM/HR (ref 0–15)
FLUAV H1 2009 PAND RNA NPH QL NAA+PROBE: NOT DETECTED
FLUAV H1 HA GENE NPH QL NAA+PROBE: NOT DETECTED
FLUAV H3 RNA NPH QL NAA+PROBE: NOT DETECTED
FLUAV SUBTYP SPEC NAA+PROBE: NOT DETECTED
FLUBV RNA ISLT QL NAA+PROBE: NOT DETECTED
FUNGUS WND CULT: NORMAL
GFR SERPL CREATININE-BSD FRML MDRD: 79 ML/MIN/1.73
GLOBULIN UR ELPH-MCNC: 4.4 GM/DL
GLUCOSE BLD-MCNC: 101 MG/DL (ref 65–99)
GLUCOSE UR STRIP-MCNC: NEGATIVE MG/DL
HADV DNA SPEC NAA+PROBE: NOT DETECTED
HCOV 229E RNA SPEC QL NAA+PROBE: NOT DETECTED
HCOV HKU1 RNA SPEC QL NAA+PROBE: NOT DETECTED
HCOV NL63 RNA SPEC QL NAA+PROBE: NOT DETECTED
HCOV OC43 RNA SPEC QL NAA+PROBE: NOT DETECTED
HCT VFR BLD AUTO: 41.5 % (ref 37.5–51)
HGB BLD-MCNC: 13.1 G/DL (ref 13–17.7)
HGB UR QL STRIP.AUTO: NEGATIVE
HMPV RNA NPH QL NAA+NON-PROBE: NOT DETECTED
HPIV1 RNA SPEC QL NAA+PROBE: NOT DETECTED
HPIV2 RNA SPEC QL NAA+PROBE: NOT DETECTED
HPIV3 RNA NPH QL NAA+PROBE: NOT DETECTED
HPIV4 P GENE NPH QL NAA+PROBE: NOT DETECTED
IMM GRANULOCYTES # BLD AUTO: 1.17 10*3/MM3 (ref 0–0.05)
IMM GRANULOCYTES NFR BLD AUTO: 5.2 % (ref 0–0.5)
KETONES UR QL STRIP: NEGATIVE
LEUKOCYTE ESTERASE UR QL STRIP.AUTO: NEGATIVE
LYMPHOCYTES # BLD AUTO: 1.88 10*3/MM3 (ref 0.7–3.1)
LYMPHOCYTES NFR BLD AUTO: 8.4 % (ref 19.6–45.3)
M PNEUMO IGG SER IA-ACNC: NOT DETECTED
MCH RBC QN AUTO: 26.5 PG (ref 26.6–33)
MCHC RBC AUTO-ENTMCNC: 31.6 G/DL (ref 31.5–35.7)
MCV RBC AUTO: 84 FL (ref 79–97)
MONOCYTES # BLD AUTO: 1.9 10*3/MM3 (ref 0.1–0.9)
MONOCYTES NFR BLD AUTO: 8.5 % (ref 5–12)
MYCOBACTERIUM SPEC CULT: NORMAL
NEUTROPHILS # BLD AUTO: 17.31 10*3/MM3 (ref 1.7–7)
NEUTROPHILS NFR BLD AUTO: 77.1 % (ref 42.7–76)
NIGHT BLUE STAIN TISS: NORMAL
NITRITE UR QL STRIP: NEGATIVE
NRBC BLD AUTO-RTO: 0 /100 WBC (ref 0–0.2)
NT-PROBNP SERPL-MCNC: 217.1 PG/ML (ref 5–450)
PH UR STRIP.AUTO: 6 [PH] (ref 5–8)
PLATELET # BLD AUTO: 396 10*3/MM3 (ref 140–450)
PMV BLD AUTO: 8.6 FL (ref 6–12)
POTASSIUM BLD-SCNC: 4.3 MMOL/L (ref 3.5–5.2)
PROCALCITONIN SERPL-MCNC: 0.37 NG/ML (ref 0.1–0.25)
PROT SERPL-MCNC: 7.8 G/DL (ref 6–8.5)
PROT UR QL STRIP: ABNORMAL
RBC # BLD AUTO: 4.94 10*6/MM3 (ref 4.14–5.8)
RHINOVIRUS RNA SPEC NAA+PROBE: NOT DETECTED
RSV RNA NPH QL NAA+NON-PROBE: NOT DETECTED
SODIUM BLD-SCNC: 135 MMOL/L (ref 136–145)
SP GR UR STRIP: 1.02 (ref 1–1.03)
TROPONIN T SERPL-MCNC: <0.01 NG/ML (ref 0–0.03)
UROBILINOGEN UR QL STRIP: ABNORMAL
WBC NRBC COR # BLD: 22.44 10*3/MM3 (ref 3.4–10.8)

## 2019-08-21 PROCEDURE — 0100U HC BIOFIRE FILMARRAY RESP PANEL 2: CPT | Performed by: EMERGENCY MEDICINE

## 2019-08-21 PROCEDURE — 83605 ASSAY OF LACTIC ACID: CPT | Performed by: EMERGENCY MEDICINE

## 2019-08-21 PROCEDURE — 99284 EMERGENCY DEPT VISIT MOD MDM: CPT

## 2019-08-21 PROCEDURE — 25010000002 LORAZEPAM PER 2 MG

## 2019-08-21 PROCEDURE — 81003 URINALYSIS AUTO W/O SCOPE: CPT | Performed by: EMERGENCY MEDICINE

## 2019-08-21 PROCEDURE — 84145 PROCALCITONIN (PCT): CPT | Performed by: EMERGENCY MEDICINE

## 2019-08-21 PROCEDURE — 71046 X-RAY EXAM CHEST 2 VIEWS: CPT

## 2019-08-21 PROCEDURE — 80053 COMPREHEN METABOLIC PANEL: CPT | Performed by: EMERGENCY MEDICINE

## 2019-08-21 PROCEDURE — 85652 RBC SED RATE AUTOMATED: CPT | Performed by: EMERGENCY MEDICINE

## 2019-08-21 PROCEDURE — A9577 INJ MULTIHANCE: HCPCS | Performed by: EMERGENCY MEDICINE

## 2019-08-21 PROCEDURE — 25010000002 PIPERACILLIN SOD-TAZOBACTAM PER 1 G: Performed by: EMERGENCY MEDICINE

## 2019-08-21 PROCEDURE — 84484 ASSAY OF TROPONIN QUANT: CPT | Performed by: PHYSICIAN ASSISTANT

## 2019-08-21 PROCEDURE — 86140 C-REACTIVE PROTEIN: CPT | Performed by: EMERGENCY MEDICINE

## 2019-08-21 PROCEDURE — 72157 MRI CHEST SPINE W/O & W/DYE: CPT

## 2019-08-21 PROCEDURE — 0 GADOBENATE DIMEGLUMINE 529 MG/ML SOLUTION: Performed by: EMERGENCY MEDICINE

## 2019-08-21 PROCEDURE — 85025 COMPLETE CBC W/AUTO DIFF WBC: CPT | Performed by: EMERGENCY MEDICINE

## 2019-08-21 PROCEDURE — 87040 BLOOD CULTURE FOR BACTERIA: CPT | Performed by: EMERGENCY MEDICINE

## 2019-08-21 PROCEDURE — 25010000002 VANCOMYCIN 10 G RECONSTITUTED SOLUTION: Performed by: EMERGENCY MEDICINE

## 2019-08-21 PROCEDURE — 83880 ASSAY OF NATRIURETIC PEPTIDE: CPT | Performed by: PHYSICIAN ASSISTANT

## 2019-08-21 PROCEDURE — 99223 1ST HOSP IP/OBS HIGH 75: CPT | Performed by: INTERNAL MEDICINE

## 2019-08-21 PROCEDURE — 25010000002 HYDROMORPHONE 1 MG/ML SOLUTION: Performed by: INTERNAL MEDICINE

## 2019-08-21 RX ORDER — ACETAMINOPHEN 500 MG
1000 TABLET ORAL EVERY 6 HOURS PRN
COMMUNITY
End: 2020-02-27

## 2019-08-21 RX ORDER — L.ACID,PARA/B.BIFIDUM/S.THERM 8B CELL
1 CAPSULE ORAL DAILY
Status: DISCONTINUED | OUTPATIENT
Start: 2019-08-22 | End: 2019-08-28 | Stop reason: HOSPADM

## 2019-08-21 RX ORDER — METOPROLOL SUCCINATE 25 MG/1
25 TABLET, EXTENDED RELEASE ORAL
Status: DISCONTINUED | OUTPATIENT
Start: 2019-08-22 | End: 2019-08-28 | Stop reason: HOSPADM

## 2019-08-21 RX ORDER — OXYCODONE HYDROCHLORIDE AND ACETAMINOPHEN 5; 325 MG/1; MG/1
1 TABLET ORAL EVERY 6 HOURS PRN
Status: DISCONTINUED | OUTPATIENT
Start: 2019-08-21 | End: 2019-08-23

## 2019-08-21 RX ORDER — HYDROXYCHLOROQUINE SULFATE 200 MG/1
200 TABLET, FILM COATED ORAL 2 TIMES DAILY
COMMUNITY
End: 2019-09-12 | Stop reason: HOSPADM

## 2019-08-21 RX ORDER — SODIUM CHLORIDE 9 MG/ML
100 INJECTION, SOLUTION INTRAVENOUS CONTINUOUS
Status: DISCONTINUED | OUTPATIENT
Start: 2019-08-21 | End: 2019-08-23

## 2019-08-21 RX ORDER — ACETAMINOPHEN 650 MG/1
650 SUPPOSITORY RECTAL EVERY 4 HOURS PRN
Status: DISCONTINUED | OUTPATIENT
Start: 2019-08-21 | End: 2019-08-28 | Stop reason: HOSPADM

## 2019-08-21 RX ORDER — TRAMADOL HYDROCHLORIDE 50 MG/1
100 TABLET ORAL EVERY 6 HOURS PRN
COMMUNITY
End: 2020-02-04

## 2019-08-21 RX ORDER — ONDANSETRON 2 MG/ML
4 INJECTION INTRAMUSCULAR; INTRAVENOUS EVERY 6 HOURS PRN
Status: DISCONTINUED | OUTPATIENT
Start: 2019-08-21 | End: 2019-08-28 | Stop reason: HOSPADM

## 2019-08-21 RX ORDER — SODIUM CHLORIDE 0.9 % (FLUSH) 0.9 %
3-10 SYRINGE (ML) INJECTION AS NEEDED
Status: DISCONTINUED | OUTPATIENT
Start: 2019-08-21 | End: 2019-08-28 | Stop reason: HOSPADM

## 2019-08-21 RX ORDER — LORAZEPAM 2 MG/ML
0.5 INJECTION INTRAMUSCULAR ONCE
Status: COMPLETED | OUTPATIENT
Start: 2019-08-21 | End: 2019-08-21

## 2019-08-21 RX ORDER — ACETAMINOPHEN 325 MG/1
650 TABLET ORAL ONCE
Status: COMPLETED | OUTPATIENT
Start: 2019-08-21 | End: 2019-08-21

## 2019-08-21 RX ORDER — CHOLECALCIFEROL (VITAMIN D3) 125 MCG
5 CAPSULE ORAL NIGHTLY PRN
Status: DISCONTINUED | OUTPATIENT
Start: 2019-08-21 | End: 2019-08-28 | Stop reason: HOSPADM

## 2019-08-21 RX ORDER — ACETAMINOPHEN 160 MG/5ML
650 SOLUTION ORAL EVERY 4 HOURS PRN
Status: DISCONTINUED | OUTPATIENT
Start: 2019-08-21 | End: 2019-08-28 | Stop reason: HOSPADM

## 2019-08-21 RX ORDER — SODIUM CHLORIDE 0.9 % (FLUSH) 0.9 %
10 SYRINGE (ML) INJECTION AS NEEDED
Status: DISCONTINUED | OUTPATIENT
Start: 2019-08-21 | End: 2019-08-28 | Stop reason: HOSPADM

## 2019-08-21 RX ORDER — ACETAMINOPHEN 325 MG/1
650 TABLET ORAL EVERY 4 HOURS PRN
Status: DISCONTINUED | OUTPATIENT
Start: 2019-08-21 | End: 2019-08-28 | Stop reason: HOSPADM

## 2019-08-21 RX ORDER — SODIUM CHLORIDE 0.9 % (FLUSH) 0.9 %
3 SYRINGE (ML) INJECTION EVERY 12 HOURS SCHEDULED
Status: DISCONTINUED | OUTPATIENT
Start: 2019-08-21 | End: 2019-08-28 | Stop reason: HOSPADM

## 2019-08-21 RX ORDER — PREDNISONE 10 MG/1
10-20 TABLET ORAL SEE ADMIN INSTRUCTIONS
COMMUNITY
Start: 2019-08-07 | End: 2019-09-03

## 2019-08-21 RX ORDER — LORAZEPAM 2 MG/ML
INJECTION INTRAMUSCULAR
Status: COMPLETED
Start: 2019-08-21 | End: 2019-08-21

## 2019-08-21 RX ORDER — PREDNISONE 20 MG/1
20 TABLET ORAL 2 TIMES DAILY WITH MEALS
Status: DISCONTINUED | OUTPATIENT
Start: 2019-08-21 | End: 2019-08-26

## 2019-08-21 RX ORDER — VALSARTAN 80 MG/1
40 TABLET ORAL DAILY
Status: DISCONTINUED | OUTPATIENT
Start: 2019-08-22 | End: 2019-08-26

## 2019-08-21 RX ADMIN — GADOBENATE DIMEGLUMINE 20 ML: 529 INJECTION, SOLUTION INTRAVENOUS at 19:04

## 2019-08-21 RX ADMIN — ACETAMINOPHEN 650 MG: 325 TABLET ORAL at 19:35

## 2019-08-21 RX ADMIN — TAZOBACTAM SODIUM AND PIPERACILLIN SODIUM 3.38 G: 375; 3 INJECTION, SOLUTION INTRAVENOUS at 20:23

## 2019-08-21 RX ADMIN — HYDROMORPHONE HYDROCHLORIDE 1 MG: 1 INJECTION, SOLUTION INTRAMUSCULAR; INTRAVENOUS; SUBCUTANEOUS at 22:28

## 2019-08-21 RX ADMIN — LORAZEPAM 0.5 MG: 2 INJECTION INTRAMUSCULAR at 17:44

## 2019-08-21 RX ADMIN — VANCOMYCIN HYDROCHLORIDE 2000 MG: 10 INJECTION, POWDER, LYOPHILIZED, FOR SOLUTION INTRAVENOUS at 21:14

## 2019-08-21 RX ADMIN — SODIUM CHLORIDE, PRESERVATIVE FREE 3 ML: 5 INJECTION INTRAVENOUS at 22:28

## 2019-08-21 RX ADMIN — LORAZEPAM 0.5 MG: 2 INJECTION INTRAMUSCULAR; INTRAVENOUS at 17:44

## 2019-08-22 LAB
ANION GAP SERPL CALCULATED.3IONS-SCNC: 11 MMOL/L (ref 5–15)
BUN BLD-MCNC: 19 MG/DL (ref 6–20)
BUN/CREAT SERPL: 17.1 (ref 7–25)
CALCIUM SPEC-SCNC: 9.6 MG/DL (ref 8.6–10.5)
CHLORIDE SERPL-SCNC: 96 MMOL/L (ref 98–107)
CO2 SERPL-SCNC: 28 MMOL/L (ref 22–29)
CREAT BLD-MCNC: 1.11 MG/DL (ref 0.76–1.27)
DEPRECATED RDW RBC AUTO: 49 FL (ref 37–54)
ERYTHROCYTE [DISTWIDTH] IN BLOOD BY AUTOMATED COUNT: 15.9 % (ref 12.3–15.4)
GFR SERPL CREATININE-BSD FRML MDRD: 72 ML/MIN/1.73
GLUCOSE BLD-MCNC: 133 MG/DL (ref 65–99)
HCT VFR BLD AUTO: 41.3 % (ref 37.5–51)
HGB BLD-MCNC: 13 G/DL (ref 13–17.7)
MCH RBC QN AUTO: 26.6 PG (ref 26.6–33)
MCHC RBC AUTO-ENTMCNC: 31.5 G/DL (ref 31.5–35.7)
MCV RBC AUTO: 84.6 FL (ref 79–97)
PLATELET # BLD AUTO: 376 10*3/MM3 (ref 140–450)
PMV BLD AUTO: 9.3 FL (ref 6–12)
POTASSIUM BLD-SCNC: 5.3 MMOL/L (ref 3.5–5.2)
RBC # BLD AUTO: 4.88 10*6/MM3 (ref 4.14–5.8)
SODIUM BLD-SCNC: 135 MMOL/L (ref 136–145)
WBC NRBC COR # BLD: 22.54 10*3/MM3 (ref 3.4–10.8)

## 2019-08-22 PROCEDURE — 80048 BASIC METABOLIC PNL TOTAL CA: CPT | Performed by: PHYSICIAN ASSISTANT

## 2019-08-22 PROCEDURE — 63710000001 PREDNISONE PER 1 MG: Performed by: PHYSICIAN ASSISTANT

## 2019-08-22 PROCEDURE — 25010000002 VANCOMYCIN: Performed by: PHYSICIAN ASSISTANT

## 2019-08-22 PROCEDURE — 25010000002 VANCOMYCIN 10 G RECONSTITUTED SOLUTION: Performed by: PHYSICIAN ASSISTANT

## 2019-08-22 PROCEDURE — 25010000002 PIPERACILLIN SOD-TAZOBACTAM PER 1 G: Performed by: PHYSICIAN ASSISTANT

## 2019-08-22 PROCEDURE — 99233 SBSQ HOSP IP/OBS HIGH 50: CPT | Performed by: INTERNAL MEDICINE

## 2019-08-22 PROCEDURE — 85027 COMPLETE CBC AUTOMATED: CPT | Performed by: PHYSICIAN ASSISTANT

## 2019-08-22 RX ORDER — HYDROXYCHLOROQUINE SULFATE 200 MG/1
200 TABLET, FILM COATED ORAL 2 TIMES DAILY
Status: DISCONTINUED | OUTPATIENT
Start: 2019-08-22 | End: 2019-08-28 | Stop reason: HOSPADM

## 2019-08-22 RX ORDER — HYDROMORPHONE HYDROCHLORIDE 1 MG/ML
0.5 INJECTION, SOLUTION INTRAMUSCULAR; INTRAVENOUS; SUBCUTANEOUS ONCE
Status: DISCONTINUED | OUTPATIENT
Start: 2019-08-22 | End: 2019-08-28 | Stop reason: HOSPADM

## 2019-08-22 RX ADMIN — TAZOBACTAM SODIUM AND PIPERACILLIN SODIUM 3.38 G: 375; 3 INJECTION, SOLUTION INTRAVENOUS at 03:42

## 2019-08-22 RX ADMIN — ACETAMINOPHEN 650 MG: 325 TABLET ORAL at 23:23

## 2019-08-22 RX ADMIN — ACETAMINOPHEN 650 MG: 325 TABLET ORAL at 01:18

## 2019-08-22 RX ADMIN — PREDNISONE 20 MG: 20 TABLET ORAL at 08:51

## 2019-08-22 RX ADMIN — VALSARTAN 40 MG: 80 TABLET, FILM COATED ORAL at 13:55

## 2019-08-22 RX ADMIN — SODIUM CHLORIDE, PRESERVATIVE FREE 3 ML: 5 INJECTION INTRAVENOUS at 20:11

## 2019-08-22 RX ADMIN — SODIUM CHLORIDE, PRESERVATIVE FREE 3 ML: 5 INJECTION INTRAVENOUS at 08:52

## 2019-08-22 RX ADMIN — TAZOBACTAM SODIUM AND PIPERACILLIN SODIUM 3.38 G: 375; 3 INJECTION, SOLUTION INTRAVENOUS at 14:01

## 2019-08-22 RX ADMIN — VANCOMYCIN HYDROCHLORIDE 1500 MG: 10 INJECTION, POWDER, LYOPHILIZED, FOR SOLUTION INTRAVENOUS at 20:19

## 2019-08-22 RX ADMIN — TAZOBACTAM SODIUM AND PIPERACILLIN SODIUM 3.38 G: 375; 3 INJECTION, SOLUTION INTRAVENOUS at 20:12

## 2019-08-22 RX ADMIN — METOPROLOL SUCCINATE 25 MG: 25 TABLET, EXTENDED RELEASE ORAL at 13:55

## 2019-08-22 RX ADMIN — PREDNISONE 20 MG: 20 TABLET ORAL at 20:11

## 2019-08-22 RX ADMIN — Medication 1 CAPSULE: at 08:51

## 2019-08-22 RX ADMIN — HYDROXYCHLOROQUINE SULFATE 200 MG: 200 TABLET, FILM COATED ORAL at 20:11

## 2019-08-22 RX ADMIN — VANCOMYCIN HYDROCHLORIDE 1500 MG: 10 INJECTION, POWDER, LYOPHILIZED, FOR SOLUTION INTRAVENOUS at 09:02

## 2019-08-22 RX ADMIN — SODIUM CHLORIDE 100 ML/HR: 9 INJECTION, SOLUTION INTRAVENOUS at 09:03

## 2019-08-22 RX ADMIN — OXYCODONE HYDROCHLORIDE AND ACETAMINOPHEN 1 TABLET: 5; 325 TABLET ORAL at 18:20

## 2019-08-22 RX ADMIN — HYDROXYCHLOROQUINE SULFATE 200 MG: 200 TABLET, FILM COATED ORAL at 13:55

## 2019-08-22 RX ADMIN — OXYCODONE HYDROCHLORIDE AND ACETAMINOPHEN 1 TABLET: 5; 325 TABLET ORAL at 03:41

## 2019-08-23 LAB
ANION GAP SERPL CALCULATED.3IONS-SCNC: 13 MMOL/L (ref 5–15)
BASOPHILS # BLD AUTO: 0.12 10*3/MM3 (ref 0–0.2)
BASOPHILS NFR BLD AUTO: 0.5 % (ref 0–1.5)
BUN BLD-MCNC: 15 MG/DL (ref 6–20)
BUN/CREAT SERPL: 16.3 (ref 7–25)
CALCIUM SPEC-SCNC: 9.4 MG/DL (ref 8.6–10.5)
CHLORIDE SERPL-SCNC: 95 MMOL/L (ref 98–107)
CO2 SERPL-SCNC: 28 MMOL/L (ref 22–29)
CREAT BLD-MCNC: 0.92 MG/DL (ref 0.76–1.27)
DEPRECATED RDW RBC AUTO: 47.5 FL (ref 37–54)
EOSINOPHIL # BLD AUTO: 0.04 10*3/MM3 (ref 0–0.4)
EOSINOPHIL NFR BLD AUTO: 0.2 % (ref 0.3–6.2)
ERYTHROCYTE [DISTWIDTH] IN BLOOD BY AUTOMATED COUNT: 15.5 % (ref 12.3–15.4)
GFR SERPL CREATININE-BSD FRML MDRD: 89 ML/MIN/1.73
GLUCOSE BLD-MCNC: 204 MG/DL (ref 65–99)
HCT VFR BLD AUTO: 43.6 % (ref 37.5–51)
HGB BLD-MCNC: 13.5 G/DL (ref 13–17.7)
IMM GRANULOCYTES # BLD AUTO: 0.92 10*3/MM3 (ref 0–0.05)
IMM GRANULOCYTES NFR BLD AUTO: 4.1 % (ref 0–0.5)
LYMPHOCYTES # BLD AUTO: 1.67 10*3/MM3 (ref 0.7–3.1)
LYMPHOCYTES NFR BLD AUTO: 7.5 % (ref 19.6–45.3)
MCH RBC QN AUTO: 26.2 PG (ref 26.6–33)
MCHC RBC AUTO-ENTMCNC: 31 G/DL (ref 31.5–35.7)
MCV RBC AUTO: 84.7 FL (ref 79–97)
MONOCYTES # BLD AUTO: 0.95 10*3/MM3 (ref 0.1–0.9)
MONOCYTES NFR BLD AUTO: 4.3 % (ref 5–12)
NEUTROPHILS # BLD AUTO: 18.55 10*3/MM3 (ref 1.7–7)
NEUTROPHILS NFR BLD AUTO: 83.4 % (ref 42.7–76)
NRBC BLD AUTO-RTO: 0 /100 WBC (ref 0–0.2)
PLATELET # BLD AUTO: 368 10*3/MM3 (ref 140–450)
PMV BLD AUTO: 8.7 FL (ref 6–12)
POTASSIUM BLD-SCNC: 4.8 MMOL/L (ref 3.5–5.2)
RBC # BLD AUTO: 5.15 10*6/MM3 (ref 4.14–5.8)
SODIUM BLD-SCNC: 136 MMOL/L (ref 136–145)
VANCOMYCIN TROUGH SERPL-MCNC: 7.8 MCG/ML (ref 5–20)
WBC NRBC COR # BLD: 22.25 10*3/MM3 (ref 3.4–10.8)

## 2019-08-23 PROCEDURE — 85097 BONE MARROW INTERPRETATION: CPT | Performed by: INTERNAL MEDICINE

## 2019-08-23 PROCEDURE — 87070 CULTURE OTHR SPECIMN AEROBIC: CPT | Performed by: INTERNAL MEDICINE

## 2019-08-23 PROCEDURE — 88305 TISSUE EXAM BY PATHOLOGIST: CPT | Performed by: INTERNAL MEDICINE

## 2019-08-23 PROCEDURE — 99233 SBSQ HOSP IP/OBS HIGH 50: CPT | Performed by: INTERNAL MEDICINE

## 2019-08-23 PROCEDURE — 87116 MYCOBACTERIA CULTURE: CPT | Performed by: INTERNAL MEDICINE

## 2019-08-23 PROCEDURE — 88342 IMHCHEM/IMCYTCHM 1ST ANTB: CPT | Performed by: INTERNAL MEDICINE

## 2019-08-23 PROCEDURE — 87015 SPECIMEN INFECT AGNT CONCNTJ: CPT | Performed by: INTERNAL MEDICINE

## 2019-08-23 PROCEDURE — 25010000002 VANCOMYCIN

## 2019-08-23 PROCEDURE — 80202 ASSAY OF VANCOMYCIN: CPT | Performed by: PHYSICIAN ASSISTANT

## 2019-08-23 PROCEDURE — 99253 IP/OBS CNSLTJ NEW/EST LOW 45: CPT | Performed by: INTERNAL MEDICINE

## 2019-08-23 PROCEDURE — 87206 SMEAR FLUORESCENT/ACID STAI: CPT | Performed by: INTERNAL MEDICINE

## 2019-08-23 PROCEDURE — 88312 SPECIAL STAINS GROUP 1: CPT | Performed by: INTERNAL MEDICINE

## 2019-08-23 PROCEDURE — 88364 INSITU HYBRIDIZATION (FISH): CPT | Performed by: INTERNAL MEDICINE

## 2019-08-23 PROCEDURE — 99253 IP/OBS CNSLTJ NEW/EST LOW 45: CPT | Performed by: NEUROLOGICAL SURGERY

## 2019-08-23 PROCEDURE — 85025 COMPLETE CBC W/AUTO DIFF WBC: CPT | Performed by: INTERNAL MEDICINE

## 2019-08-23 PROCEDURE — 88313 SPECIAL STAINS GROUP 2: CPT | Performed by: INTERNAL MEDICINE

## 2019-08-23 PROCEDURE — 87205 SMEAR GRAM STAIN: CPT | Performed by: INTERNAL MEDICINE

## 2019-08-23 PROCEDURE — 88365 INSITU HYBRIDIZATION (FISH): CPT | Performed by: INTERNAL MEDICINE

## 2019-08-23 PROCEDURE — 88311 DECALCIFY TISSUE: CPT | Performed by: INTERNAL MEDICINE

## 2019-08-23 PROCEDURE — 25010000002 HYDROMORPHONE 1 MG/ML SOLUTION: Performed by: INTERNAL MEDICINE

## 2019-08-23 PROCEDURE — 25010000002 PIPERACILLIN SOD-TAZOBACTAM PER 1 G: Performed by: PHYSICIAN ASSISTANT

## 2019-08-23 PROCEDURE — 80048 BASIC METABOLIC PNL TOTAL CA: CPT | Performed by: INTERNAL MEDICINE

## 2019-08-23 PROCEDURE — 25010000002 VANCOMYCIN 10 G RECONSTITUTED SOLUTION: Performed by: PHYSICIAN ASSISTANT

## 2019-08-23 PROCEDURE — 07DR3ZX EXTRACTION OF ILIAC BONE MARROW, PERCUTANEOUS APPROACH, DIAGNOSTIC: ICD-10-PCS | Performed by: INTERNAL MEDICINE

## 2019-08-23 PROCEDURE — 63710000001 PREDNISONE PER 1 MG: Performed by: PHYSICIAN ASSISTANT

## 2019-08-23 PROCEDURE — 87075 CULTR BACTERIA EXCEPT BLOOD: CPT | Performed by: INTERNAL MEDICINE

## 2019-08-23 PROCEDURE — 25010000002 VANCOMYCIN 10 G RECONSTITUTED SOLUTION

## 2019-08-23 PROCEDURE — 87102 FUNGUS ISOLATION CULTURE: CPT | Performed by: INTERNAL MEDICINE

## 2019-08-23 PROCEDURE — 88341 IMHCHEM/IMCYTCHM EA ADD ANTB: CPT | Performed by: INTERNAL MEDICINE

## 2019-08-23 RX ORDER — OXYCODONE AND ACETAMINOPHEN 7.5; 325 MG/1; MG/1
1 TABLET ORAL EVERY 6 HOURS PRN
Status: DISCONTINUED | OUTPATIENT
Start: 2019-08-23 | End: 2019-08-28 | Stop reason: HOSPADM

## 2019-08-23 RX ORDER — HYDROMORPHONE HYDROCHLORIDE 1 MG/ML
0.5 INJECTION, SOLUTION INTRAMUSCULAR; INTRAVENOUS; SUBCUTANEOUS
Status: DISCONTINUED | OUTPATIENT
Start: 2019-08-23 | End: 2019-08-28 | Stop reason: HOSPADM

## 2019-08-23 RX ADMIN — TAZOBACTAM SODIUM AND PIPERACILLIN SODIUM 3.38 G: 375; 3 INJECTION, SOLUTION INTRAVENOUS at 20:09

## 2019-08-23 RX ADMIN — VANCOMYCIN HYDROCHLORIDE 1250 MG: 10 INJECTION, POWDER, LYOPHILIZED, FOR SOLUTION INTRAVENOUS at 18:48

## 2019-08-23 RX ADMIN — VANCOMYCIN HYDROCHLORIDE 1500 MG: 10 INJECTION, POWDER, LYOPHILIZED, FOR SOLUTION INTRAVENOUS at 09:43

## 2019-08-23 RX ADMIN — OXYCODONE HYDROCHLORIDE AND ACETAMINOPHEN 1 TABLET: 5; 325 TABLET ORAL at 21:45

## 2019-08-23 RX ADMIN — OXYCODONE HYDROCHLORIDE AND ACETAMINOPHEN 1 TABLET: 5; 325 TABLET ORAL at 13:14

## 2019-08-23 RX ADMIN — HYDROXYCHLOROQUINE SULFATE 200 MG: 200 TABLET, FILM COATED ORAL at 20:09

## 2019-08-23 RX ADMIN — HYDROMORPHONE HYDROCHLORIDE 1 MG: 1 INJECTION, SOLUTION INTRAMUSCULAR; INTRAVENOUS; SUBCUTANEOUS at 17:34

## 2019-08-23 RX ADMIN — PREDNISONE 20 MG: 20 TABLET ORAL at 08:17

## 2019-08-23 RX ADMIN — ACETAMINOPHEN 650 MG: 325 TABLET ORAL at 20:09

## 2019-08-23 RX ADMIN — SODIUM CHLORIDE 100 ML/HR: 9 INJECTION, SOLUTION INTRAVENOUS at 04:58

## 2019-08-23 RX ADMIN — PREDNISONE 20 MG: 20 TABLET ORAL at 20:09

## 2019-08-23 RX ADMIN — METOPROLOL SUCCINATE 25 MG: 25 TABLET, EXTENDED RELEASE ORAL at 13:14

## 2019-08-23 RX ADMIN — TAZOBACTAM SODIUM AND PIPERACILLIN SODIUM 3.38 G: 375; 3 INJECTION, SOLUTION INTRAVENOUS at 04:58

## 2019-08-23 RX ADMIN — HYDROXYCHLOROQUINE SULFATE 200 MG: 200 TABLET, FILM COATED ORAL at 08:17

## 2019-08-23 RX ADMIN — ACETAMINOPHEN 650 MG: 325 TABLET ORAL at 09:43

## 2019-08-23 RX ADMIN — TAZOBACTAM SODIUM AND PIPERACILLIN SODIUM 3.38 G: 375; 3 INJECTION, SOLUTION INTRAVENOUS at 13:20

## 2019-08-23 RX ADMIN — Medication 1 CAPSULE: at 08:17

## 2019-08-23 RX ADMIN — VALSARTAN 40 MG: 80 TABLET, FILM COATED ORAL at 13:48

## 2019-08-23 RX ADMIN — OXYCODONE HYDROCHLORIDE AND ACETAMINOPHEN 1 TABLET: 5; 325 TABLET ORAL at 05:30

## 2019-08-23 RX ADMIN — SODIUM CHLORIDE, PRESERVATIVE FREE 3 ML: 5 INJECTION INTRAVENOUS at 20:09

## 2019-08-24 ENCOUNTER — APPOINTMENT (OUTPATIENT)
Dept: CARDIOLOGY | Facility: HOSPITAL | Age: 44
End: 2019-08-24

## 2019-08-24 LAB
ANION GAP SERPL CALCULATED.3IONS-SCNC: 12 MMOL/L (ref 5–15)
BASOPHILS # BLD MANUAL: 0 10*3/MM3 (ref 0–0.2)
BASOPHILS NFR BLD AUTO: 0 % (ref 0–1.5)
BH CV ECHO MEAS - AO ROOT AREA (BSA CORRECTED): 1
BH CV ECHO MEAS - AO ROOT AREA: 3.9 CM^2
BH CV ECHO MEAS - AO ROOT DIAM: 2.2 CM
BH CV ECHO MEAS - BSA(HAYCOCK): 2.2 M^2
BH CV ECHO MEAS - BSA: 2.2 M^2
BH CV ECHO MEAS - BZI_BMI: 31 KILOGRAMS/M^2
BH CV ECHO MEAS - BZI_METRIC_HEIGHT: 177.8 CM
BH CV ECHO MEAS - BZI_METRIC_WEIGHT: 98 KG
BH CV ECHO MEAS - EDV(CUBED): 50.3 ML
BH CV ECHO MEAS - EDV(TEICH): 57.8 ML
BH CV ECHO MEAS - EF(CUBED): 68.9 %
BH CV ECHO MEAS - EF(TEICH): 61.3 %
BH CV ECHO MEAS - ESV(CUBED): 15.7 ML
BH CV ECHO MEAS - ESV(TEICH): 22.4 ML
BH CV ECHO MEAS - FS: 32.2 %
BH CV ECHO MEAS - IVS/LVPW: 1.1
BH CV ECHO MEAS - IVSD: 1.1 CM
BH CV ECHO MEAS - LA DIMENSION: 3 CM
BH CV ECHO MEAS - LA/AO: 1.3
BH CV ECHO MEAS - LAD MAJOR: 5.2 CM
BH CV ECHO MEAS - LAT PEAK E' VEL: 14.6 CM/SEC
BH CV ECHO MEAS - LATERAL E/E' RATIO: 4.7
BH CV ECHO MEAS - LV MASS(C)D: 120.3 GRAMS
BH CV ECHO MEAS - LV MASS(C)DI: 55.8 GRAMS/M^2
BH CV ECHO MEAS - LVIDD: 3.7 CM
BH CV ECHO MEAS - LVIDS: 2.5 CM
BH CV ECHO MEAS - LVPWD: 1 CM
BH CV ECHO MEAS - MED PEAK E' VEL: 8.1 CM/SEC
BH CV ECHO MEAS - MEDIAL E/E' RATIO: 8.4
BH CV ECHO MEAS - MV A MAX VEL: 58.2 CM/SEC
BH CV ECHO MEAS - MV DEC SLOPE: 414 CM/SEC^2
BH CV ECHO MEAS - MV DEC TIME: 0.25 SEC
BH CV ECHO MEAS - MV E MAX VEL: 69.6 CM/SEC
BH CV ECHO MEAS - MV E/A: 1.2
BH CV ECHO MEAS - MV P1/2T MAX VEL: 90.9 CM/SEC
BH CV ECHO MEAS - MV P1/2T: 64.3 MSEC
BH CV ECHO MEAS - MVA P1/2T LCG: 2.4 CM^2
BH CV ECHO MEAS - MVA(P1/2T): 3.4 CM^2
BH CV ECHO MEAS - PA ACC SLOPE: 681.4 CM/SEC^2
BH CV ECHO MEAS - PA ACC TIME: 0.13 SEC
BH CV ECHO MEAS - PA PR(ACCEL): 22 MMHG
BH CV ECHO MEAS - RV MAX PG: 2.3 MMHG
BH CV ECHO MEAS - RV V1 MAX: 76.5 CM/SEC
BH CV ECHO MEAS - SI(CUBED): 16.1 ML/M^2
BH CV ECHO MEAS - SI(TEICH): 16.4 ML/M^2
BH CV ECHO MEAS - SV(CUBED): 34.7 ML
BH CV ECHO MEAS - SV(TEICH): 35.5 ML
BH CV ECHO MEAS - TAPSE (>1.6): 2.5 CM2
BH CV ECHO MEASUREMENTS AVERAGE E/E' RATIO: 6.13
BH CV XLRA - RV BASE: 3.5 CM
BH CV XLRA - RV LENGTH: 7.3 CM
BH CV XLRA - RV MID: 3 CM
BH CV XLRA - TDI S': 19.5 CM/SEC
BUN BLD-MCNC: 16 MG/DL (ref 6–20)
BUN/CREAT SERPL: 19 (ref 7–25)
CALCIUM SPEC-SCNC: 8.9 MG/DL (ref 8.6–10.5)
CHLORIDE SERPL-SCNC: 97 MMOL/L (ref 98–107)
CO2 SERPL-SCNC: 26 MMOL/L (ref 22–29)
CREAT BLD-MCNC: 0.84 MG/DL (ref 0.76–1.27)
DEPRECATED RDW RBC AUTO: 49.6 FL (ref 37–54)
EOSINOPHIL # BLD MANUAL: 0 10*3/MM3 (ref 0–0.4)
EOSINOPHIL NFR BLD MANUAL: 0 % (ref 0.3–6.2)
ERYTHROCYTE [DISTWIDTH] IN BLOOD BY AUTOMATED COUNT: 15.9 % (ref 12.3–15.4)
GFR SERPL CREATININE-BSD FRML MDRD: 99 ML/MIN/1.73
GLUCOSE BLD-MCNC: 141 MG/DL (ref 65–99)
HCT VFR BLD AUTO: 41.4 % (ref 37.5–51)
HGB BLD-MCNC: 13.1 G/DL (ref 13–17.7)
HYPOCHROMIA BLD QL: ABNORMAL
LV EF 2D ECHO EST: 62 %
LYMPHOCYTES # BLD MANUAL: 1.25 10*3/MM3 (ref 0.7–3.1)
LYMPHOCYTES NFR BLD MANUAL: 6 % (ref 19.6–45.3)
LYMPHOCYTES NFR BLD MANUAL: 8 % (ref 5–12)
MAXIMAL PREDICTED HEART RATE: 175 BPM
MCH RBC QN AUTO: 27.2 PG (ref 26.6–33)
MCHC RBC AUTO-ENTMCNC: 31.6 G/DL (ref 31.5–35.7)
MCV RBC AUTO: 85.9 FL (ref 79–97)
METAMYELOCYTES NFR BLD MANUAL: 1 % (ref 0–0)
MONOCYTES # BLD AUTO: 1.67 10*3/MM3 (ref 0.1–0.9)
NEUTROPHILS # BLD AUTO: 17.51 10*3/MM3 (ref 1.7–7)
NEUTROPHILS NFR BLD MANUAL: 77 % (ref 42.7–76)
NEUTS BAND NFR BLD MANUAL: 7 % (ref 0–5)
PLAT MORPH BLD: NORMAL
PLATELET # BLD AUTO: 268 10*3/MM3 (ref 140–450)
PMV BLD AUTO: 9.8 FL (ref 6–12)
POTASSIUM BLD-SCNC: 4.7 MMOL/L (ref 3.5–5.2)
RBC # BLD AUTO: 4.82 10*6/MM3 (ref 4.14–5.8)
SODIUM BLD-SCNC: 135 MMOL/L (ref 136–145)
STRESS TARGET HR: 149 BPM
VARIANT LYMPHS NFR BLD MANUAL: 1 % (ref 0–5)
WBC MORPH BLD: NORMAL
WBC NRBC COR # BLD: 20.85 10*3/MM3 (ref 3.4–10.8)

## 2019-08-24 PROCEDURE — 63710000001 PREDNISONE PER 1 MG: Performed by: PHYSICIAN ASSISTANT

## 2019-08-24 PROCEDURE — 25010000002 PIPERACILLIN SOD-TAZOBACTAM PER 1 G: Performed by: PHYSICIAN ASSISTANT

## 2019-08-24 PROCEDURE — 80048 BASIC METABOLIC PNL TOTAL CA: CPT

## 2019-08-24 PROCEDURE — 25010000002 VANCOMYCIN 10 G RECONSTITUTED SOLUTION

## 2019-08-24 PROCEDURE — 93306 TTE W/DOPPLER COMPLETE: CPT | Performed by: INTERNAL MEDICINE

## 2019-08-24 PROCEDURE — 25010000002 HYDROMORPHONE PER 4 MG: Performed by: FAMILY MEDICINE

## 2019-08-24 PROCEDURE — 85025 COMPLETE CBC W/AUTO DIFF WBC: CPT | Performed by: INTERNAL MEDICINE

## 2019-08-24 PROCEDURE — 93306 TTE W/DOPPLER COMPLETE: CPT

## 2019-08-24 PROCEDURE — 99233 SBSQ HOSP IP/OBS HIGH 50: CPT | Performed by: INTERNAL MEDICINE

## 2019-08-24 PROCEDURE — 85007 BL SMEAR W/DIFF WBC COUNT: CPT | Performed by: INTERNAL MEDICINE

## 2019-08-24 RX ADMIN — VANCOMYCIN HYDROCHLORIDE 1250 MG: 10 INJECTION, POWDER, LYOPHILIZED, FOR SOLUTION INTRAVENOUS at 03:43

## 2019-08-24 RX ADMIN — Medication 1 CAPSULE: at 09:02

## 2019-08-24 RX ADMIN — TAZOBACTAM SODIUM AND PIPERACILLIN SODIUM 3.38 G: 375; 3 INJECTION, SOLUTION INTRAVENOUS at 12:38

## 2019-08-24 RX ADMIN — METOPROLOL SUCCINATE 25 MG: 25 TABLET, EXTENDED RELEASE ORAL at 11:54

## 2019-08-24 RX ADMIN — HYDROXYCHLOROQUINE SULFATE 200 MG: 200 TABLET, FILM COATED ORAL at 09:02

## 2019-08-24 RX ADMIN — HYDROXYCHLOROQUINE SULFATE 200 MG: 200 TABLET, FILM COATED ORAL at 20:00

## 2019-08-24 RX ADMIN — SODIUM CHLORIDE, PRESERVATIVE FREE 3 ML: 5 INJECTION INTRAVENOUS at 20:00

## 2019-08-24 RX ADMIN — VALSARTAN 40 MG: 80 TABLET, FILM COATED ORAL at 11:53

## 2019-08-24 RX ADMIN — OXYCODONE HYDROCHLORIDE AND ACETAMINOPHEN 1 TABLET: 7.5; 325 TABLET ORAL at 18:28

## 2019-08-24 RX ADMIN — SODIUM CHLORIDE, PRESERVATIVE FREE 3 ML: 5 INJECTION INTRAVENOUS at 09:03

## 2019-08-24 RX ADMIN — PREDNISONE 20 MG: 20 TABLET ORAL at 20:00

## 2019-08-24 RX ADMIN — TAZOBACTAM SODIUM AND PIPERACILLIN SODIUM 3.38 G: 375; 3 INJECTION, SOLUTION INTRAVENOUS at 03:45

## 2019-08-24 RX ADMIN — PREDNISONE 20 MG: 20 TABLET ORAL at 09:02

## 2019-08-24 RX ADMIN — OXYCODONE HYDROCHLORIDE AND ACETAMINOPHEN 1 TABLET: 7.5; 325 TABLET ORAL at 12:48

## 2019-08-24 RX ADMIN — OXYCODONE HYDROCHLORIDE AND ACETAMINOPHEN 1 TABLET: 7.5; 325 TABLET ORAL at 06:17

## 2019-08-24 RX ADMIN — VANCOMYCIN HYDROCHLORIDE 1250 MG: 10 INJECTION, POWDER, LYOPHILIZED, FOR SOLUTION INTRAVENOUS at 10:35

## 2019-08-24 RX ADMIN — HYDROMORPHONE HYDROCHLORIDE 0.5 MG: 1 INJECTION, SOLUTION INTRAMUSCULAR; INTRAVENOUS; SUBCUTANEOUS at 00:48

## 2019-08-25 LAB
ANION GAP SERPL CALCULATED.3IONS-SCNC: 9 MMOL/L (ref 5–15)
BUN BLD-MCNC: 14 MG/DL (ref 6–20)
BUN/CREAT SERPL: 15.9 (ref 7–25)
CALCIUM SPEC-SCNC: 9.1 MG/DL (ref 8.6–10.5)
CHLORIDE SERPL-SCNC: 97 MMOL/L (ref 98–107)
CO2 SERPL-SCNC: 28 MMOL/L (ref 22–29)
CREAT BLD-MCNC: 0.88 MG/DL (ref 0.76–1.27)
DEPRECATED RDW RBC AUTO: 47.5 FL (ref 37–54)
ERYTHROCYTE [DISTWIDTH] IN BLOOD BY AUTOMATED COUNT: 15.6 % (ref 12.3–15.4)
GFR SERPL CREATININE-BSD FRML MDRD: 94 ML/MIN/1.73
GLUCOSE BLD-MCNC: 157 MG/DL (ref 65–99)
HCT VFR BLD AUTO: 40.2 % (ref 37.5–51)
HGB BLD-MCNC: 12.6 G/DL (ref 13–17.7)
MCH RBC QN AUTO: 26.4 PG (ref 26.6–33)
MCHC RBC AUTO-ENTMCNC: 31.3 G/DL (ref 31.5–35.7)
MCV RBC AUTO: 84.3 FL (ref 79–97)
PLATELET # BLD AUTO: 247 10*3/MM3 (ref 140–450)
PMV BLD AUTO: 10.4 FL (ref 6–12)
POTASSIUM BLD-SCNC: 4.7 MMOL/L (ref 3.5–5.2)
RBC # BLD AUTO: 4.77 10*6/MM3 (ref 4.14–5.8)
SODIUM BLD-SCNC: 134 MMOL/L (ref 136–145)
WBC NRBC COR # BLD: 21.59 10*3/MM3 (ref 3.4–10.8)

## 2019-08-25 PROCEDURE — 99232 SBSQ HOSP IP/OBS MODERATE 35: CPT | Performed by: INTERNAL MEDICINE

## 2019-08-25 PROCEDURE — 80048 BASIC METABOLIC PNL TOTAL CA: CPT

## 2019-08-25 PROCEDURE — 25010000002 HYDROMORPHONE PER 4 MG: Performed by: FAMILY MEDICINE

## 2019-08-25 PROCEDURE — 85027 COMPLETE CBC AUTOMATED: CPT | Performed by: INTERNAL MEDICINE

## 2019-08-25 PROCEDURE — 63710000001 PREDNISONE PER 1 MG: Performed by: PHYSICIAN ASSISTANT

## 2019-08-25 RX ORDER — CALCIUM CARBONATE 200(500)MG
1 TABLET,CHEWABLE ORAL 3 TIMES DAILY PRN
Status: DISCONTINUED | OUTPATIENT
Start: 2019-08-25 | End: 2019-08-28 | Stop reason: HOSPADM

## 2019-08-25 RX ADMIN — POLYETHYLENE GLYCOL 3350 17 G: 17 POWDER, FOR SOLUTION ORAL at 15:28

## 2019-08-25 RX ADMIN — PREDNISONE 20 MG: 20 TABLET ORAL at 20:42

## 2019-08-25 RX ADMIN — Medication 1 CAPSULE: at 09:29

## 2019-08-25 RX ADMIN — OXYCODONE HYDROCHLORIDE AND ACETAMINOPHEN 1 TABLET: 7.5; 325 TABLET ORAL at 17:57

## 2019-08-25 RX ADMIN — SODIUM CHLORIDE, PRESERVATIVE FREE 3 ML: 5 INJECTION INTRAVENOUS at 20:42

## 2019-08-25 RX ADMIN — CALCIUM CARBONATE 1 TABLET: 500 TABLET ORAL at 21:29

## 2019-08-25 RX ADMIN — ACETAMINOPHEN 650 MG: 325 TABLET ORAL at 00:04

## 2019-08-25 RX ADMIN — HYDROXYCHLOROQUINE SULFATE 200 MG: 200 TABLET, FILM COATED ORAL at 20:42

## 2019-08-25 RX ADMIN — VALSARTAN 40 MG: 80 TABLET, FILM COATED ORAL at 11:43

## 2019-08-25 RX ADMIN — HYDROMORPHONE HYDROCHLORIDE 0.5 MG: 1 INJECTION, SOLUTION INTRAMUSCULAR; INTRAVENOUS; SUBCUTANEOUS at 09:35

## 2019-08-25 RX ADMIN — ACETAMINOPHEN 650 MG: 325 TABLET ORAL at 20:42

## 2019-08-25 RX ADMIN — METOPROLOL SUCCINATE 25 MG: 25 TABLET, EXTENDED RELEASE ORAL at 11:41

## 2019-08-25 RX ADMIN — ACETAMINOPHEN 325 MG: 325 TABLET ORAL at 09:47

## 2019-08-25 RX ADMIN — OXYCODONE HYDROCHLORIDE AND ACETAMINOPHEN 1 TABLET: 7.5; 325 TABLET ORAL at 11:41

## 2019-08-25 RX ADMIN — SODIUM CHLORIDE, PRESERVATIVE FREE 3 ML: 5 INJECTION INTRAVENOUS at 10:09

## 2019-08-25 RX ADMIN — HYDROXYCHLOROQUINE SULFATE 200 MG: 200 TABLET, FILM COATED ORAL at 10:08

## 2019-08-25 RX ADMIN — OXYCODONE HYDROCHLORIDE AND ACETAMINOPHEN 1 TABLET: 7.5; 325 TABLET ORAL at 02:25

## 2019-08-25 RX ADMIN — PREDNISONE 20 MG: 20 TABLET ORAL at 09:29

## 2019-08-25 RX ADMIN — ACETAMINOPHEN 325 MG: 325 TABLET ORAL at 09:53

## 2019-08-26 PROBLEM — D72.829 LEUKOCYTOSIS: Status: RESOLVED | Noted: 2019-06-24 | Resolved: 2019-08-26

## 2019-08-26 LAB
ANION GAP SERPL CALCULATED.3IONS-SCNC: 13 MMOL/L (ref 5–15)
BACTERIA FLD CULT: NORMAL
BACTERIA SPEC AEROBE CULT: NORMAL
BACTERIA SPEC AEROBE CULT: NORMAL
BASOPHILS # BLD AUTO: 0.11 10*3/MM3 (ref 0–0.2)
BASOPHILS NFR BLD AUTO: 0.5 % (ref 0–1.5)
BUN BLD-MCNC: 14 MG/DL (ref 6–20)
BUN/CREAT SERPL: 17.1 (ref 7–25)
CALCIUM SPEC-SCNC: 9.1 MG/DL (ref 8.6–10.5)
CHLORIDE SERPL-SCNC: 96 MMOL/L (ref 98–107)
CO2 SERPL-SCNC: 26 MMOL/L (ref 22–29)
CREAT BLD-MCNC: 0.82 MG/DL (ref 0.76–1.27)
DEPRECATED RDW RBC AUTO: 48.3 FL (ref 37–54)
EOSINOPHIL # BLD AUTO: 0.01 10*3/MM3 (ref 0–0.4)
EOSINOPHIL NFR BLD AUTO: 0 % (ref 0.3–6.2)
ERYTHROCYTE [DISTWIDTH] IN BLOOD BY AUTOMATED COUNT: 15.5 % (ref 12.3–15.4)
GFR SERPL CREATININE-BSD FRML MDRD: 102 ML/MIN/1.73
GLUCOSE BLD-MCNC: 139 MG/DL (ref 65–99)
GRAM STN SPEC: NORMAL
GRAM STN SPEC: NORMAL
HCT VFR BLD AUTO: 43.3 % (ref 37.5–51)
HGB BLD-MCNC: 13.5 G/DL (ref 13–17.7)
IMM GRANULOCYTES # BLD AUTO: 0.85 10*3/MM3 (ref 0–0.05)
IMM GRANULOCYTES NFR BLD AUTO: 3.8 % (ref 0–0.5)
LYMPHOCYTES # BLD AUTO: 1.2 10*3/MM3 (ref 0.7–3.1)
LYMPHOCYTES NFR BLD AUTO: 5.4 % (ref 19.6–45.3)
MCH RBC QN AUTO: 26.6 PG (ref 26.6–33)
MCHC RBC AUTO-ENTMCNC: 31.2 G/DL (ref 31.5–35.7)
MCV RBC AUTO: 85.4 FL (ref 79–97)
MONOCYTES # BLD AUTO: 1.22 10*3/MM3 (ref 0.1–0.9)
MONOCYTES NFR BLD AUTO: 5.5 % (ref 5–12)
NEUTROPHILS # BLD AUTO: 18.69 10*3/MM3 (ref 1.7–7)
NEUTROPHILS NFR BLD AUTO: 84.8 % (ref 42.7–76)
NRBC BLD AUTO-RTO: 0 /100 WBC (ref 0–0.2)
PLATELET # BLD AUTO: 209 10*3/MM3 (ref 140–450)
PMV BLD AUTO: 9.2 FL (ref 6–12)
POTASSIUM BLD-SCNC: 4.8 MMOL/L (ref 3.5–5.2)
RBC # BLD AUTO: 5.07 10*6/MM3 (ref 4.14–5.8)
SODIUM BLD-SCNC: 135 MMOL/L (ref 136–145)
WBC NRBC COR # BLD: 22.08 10*3/MM3 (ref 3.4–10.8)

## 2019-08-26 PROCEDURE — 88342 IMHCHEM/IMCYTCHM 1ST ANTB: CPT | Performed by: RADIOLOGY

## 2019-08-26 PROCEDURE — 99231 SBSQ HOSP IP/OBS SF/LOW 25: CPT | Performed by: INTERNAL MEDICINE

## 2019-08-26 PROCEDURE — 77002 NEEDLE LOCALIZATION BY XRAY: CPT | Performed by: RADIOLOGY

## 2019-08-26 PROCEDURE — 87206 SMEAR FLUORESCENT/ACID STAI: CPT | Performed by: RADIOLOGY

## 2019-08-26 PROCEDURE — 99024 POSTOP FOLLOW-UP VISIT: CPT | Performed by: NEUROLOGICAL SURGERY

## 2019-08-26 PROCEDURE — 88307 TISSUE EXAM BY PATHOLOGIST: CPT | Performed by: RADIOLOGY

## 2019-08-26 PROCEDURE — 25010000002 ERTAPENEM PER 500 MG: Performed by: INTERNAL MEDICINE

## 2019-08-26 PROCEDURE — 20225 BONE BIOPSY TROCAR/NDL DEEP: CPT | Performed by: RADIOLOGY

## 2019-08-26 PROCEDURE — 99153 MOD SED SAME PHYS/QHP EA: CPT | Performed by: RADIOLOGY

## 2019-08-26 PROCEDURE — 88341 IMHCHEM/IMCYTCHM EA ADD ANTB: CPT | Performed by: RADIOLOGY

## 2019-08-26 PROCEDURE — 0PB43ZX EXCISION OF THORACIC VERTEBRA, PERCUTANEOUS APPROACH, DIAGNOSTIC: ICD-10-PCS | Performed by: RADIOLOGY

## 2019-08-26 PROCEDURE — 99152 MOD SED SAME PHYS/QHP 5/>YRS: CPT | Performed by: RADIOLOGY

## 2019-08-26 PROCEDURE — 25010000002 FENTANYL CITRATE (PF) 100 MCG/2ML SOLUTION: Performed by: RADIOLOGY

## 2019-08-26 PROCEDURE — 99233 SBSQ HOSP IP/OBS HIGH 50: CPT | Performed by: HOSPITALIST

## 2019-08-26 PROCEDURE — 99253 IP/OBS CNSLTJ NEW/EST LOW 45: CPT | Performed by: INTERNAL MEDICINE

## 2019-08-26 PROCEDURE — 88311 DECALCIFY TISSUE: CPT | Performed by: RADIOLOGY

## 2019-08-26 PROCEDURE — 80048 BASIC METABOLIC PNL TOTAL CA: CPT | Performed by: INTERNAL MEDICINE

## 2019-08-26 PROCEDURE — 63710000001 PREDNISONE PER 1 MG: Performed by: PHYSICIAN ASSISTANT

## 2019-08-26 PROCEDURE — 87116 MYCOBACTERIA CULTURE: CPT | Performed by: RADIOLOGY

## 2019-08-26 PROCEDURE — 87070 CULTURE OTHR SPECIMN AEROBIC: CPT | Performed by: RADIOLOGY

## 2019-08-26 PROCEDURE — 63710000001 PREDNISONE PER 5 MG: Performed by: INTERNAL MEDICINE

## 2019-08-26 PROCEDURE — 85025 COMPLETE CBC W/AUTO DIFF WBC: CPT | Performed by: INTERNAL MEDICINE

## 2019-08-26 PROCEDURE — 25010000002 HYDROMORPHONE PER 4 MG: Performed by: FAMILY MEDICINE

## 2019-08-26 PROCEDURE — 87102 FUNGUS ISOLATION CULTURE: CPT | Performed by: RADIOLOGY

## 2019-08-26 PROCEDURE — 25010000002 DAPTOMYCIN PER 1 MG: Performed by: INTERNAL MEDICINE

## 2019-08-26 PROCEDURE — 87015 SPECIMEN INFECT AGNT CONCNTJ: CPT | Performed by: RADIOLOGY

## 2019-08-26 PROCEDURE — 25010000002 MIDAZOLAM PER 1 MG: Performed by: RADIOLOGY

## 2019-08-26 PROCEDURE — 87205 SMEAR GRAM STAIN: CPT | Performed by: RADIOLOGY

## 2019-08-26 RX ORDER — FENTANYL CITRATE 50 UG/ML
INJECTION, SOLUTION INTRAMUSCULAR; INTRAVENOUS AS NEEDED
Status: DISCONTINUED | OUTPATIENT
Start: 2019-08-26 | End: 2019-08-26 | Stop reason: HOSPADM

## 2019-08-26 RX ORDER — MIDAZOLAM HYDROCHLORIDE 1 MG/ML
INJECTION INTRAMUSCULAR; INTRAVENOUS AS NEEDED
Status: DISCONTINUED | OUTPATIENT
Start: 2019-08-26 | End: 2019-08-26 | Stop reason: HOSPADM

## 2019-08-26 RX ORDER — LIDOCAINE HYDROCHLORIDE 10 MG/ML
INJECTION, SOLUTION EPIDURAL; INFILTRATION; INTRACAUDAL; PERINEURAL AS NEEDED
Status: DISCONTINUED | OUTPATIENT
Start: 2019-08-26 | End: 2019-08-26 | Stop reason: HOSPADM

## 2019-08-26 RX ORDER — VORICONAZOLE 200 MG/1
200 TABLET, FILM COATED ORAL EVERY 12 HOURS SCHEDULED
Status: DISCONTINUED | OUTPATIENT
Start: 2019-08-27 | End: 2019-08-27

## 2019-08-26 RX ORDER — VORICONAZOLE 200 MG/1
400 TABLET, FILM COATED ORAL ONCE
Status: DISCONTINUED | OUTPATIENT
Start: 2019-08-26 | End: 2019-08-27

## 2019-08-26 RX ORDER — PREDNISONE 10 MG/1
15 TABLET ORAL 2 TIMES DAILY WITH MEALS
Status: DISCONTINUED | OUTPATIENT
Start: 2019-08-26 | End: 2019-08-28 | Stop reason: HOSPADM

## 2019-08-26 RX ADMIN — OXYCODONE HYDROCHLORIDE AND ACETAMINOPHEN 1 TABLET: 7.5; 325 TABLET ORAL at 12:09

## 2019-08-26 RX ADMIN — SODIUM CHLORIDE, PRESERVATIVE FREE 3 ML: 5 INJECTION INTRAVENOUS at 10:40

## 2019-08-26 RX ADMIN — HYDROXYCHLOROQUINE SULFATE 200 MG: 200 TABLET, FILM COATED ORAL at 20:46

## 2019-08-26 RX ADMIN — DAPTOMYCIN 500 MG: 500 INJECTION, POWDER, LYOPHILIZED, FOR SOLUTION INTRAVENOUS at 19:01

## 2019-08-26 RX ADMIN — HYDROXYCHLOROQUINE SULFATE 200 MG: 200 TABLET, FILM COATED ORAL at 10:35

## 2019-08-26 RX ADMIN — ERTAPENEM SODIUM 1 G: 1 INJECTION, POWDER, LYOPHILIZED, FOR SOLUTION INTRAMUSCULAR; INTRAVENOUS at 18:10

## 2019-08-26 RX ADMIN — VALSARTAN 40 MG: 80 TABLET, FILM COATED ORAL at 12:21

## 2019-08-26 RX ADMIN — OXYCODONE HYDROCHLORIDE AND ACETAMINOPHEN 1 TABLET: 7.5; 325 TABLET ORAL at 00:03

## 2019-08-26 RX ADMIN — PREDNISONE 15 MG: 10 TABLET ORAL at 20:45

## 2019-08-26 RX ADMIN — OXYCODONE HYDROCHLORIDE AND ACETAMINOPHEN 1 TABLET: 7.5; 325 TABLET ORAL at 18:04

## 2019-08-26 RX ADMIN — Medication 1 CAPSULE: at 10:36

## 2019-08-26 RX ADMIN — HYDROMORPHONE HYDROCHLORIDE 0.5 MG: 1 INJECTION, SOLUTION INTRAMUSCULAR; INTRAVENOUS; SUBCUTANEOUS at 10:18

## 2019-08-26 RX ADMIN — METOPROLOL SUCCINATE 25 MG: 25 TABLET, EXTENDED RELEASE ORAL at 12:29

## 2019-08-26 RX ADMIN — PREDNISONE 20 MG: 20 TABLET ORAL at 10:37

## 2019-08-26 RX ADMIN — ACETAMINOPHEN 650 MG: 325 TABLET ORAL at 12:09

## 2019-08-27 LAB
ANION GAP SERPL CALCULATED.3IONS-SCNC: 11 MMOL/L (ref 5–15)
BUN BLD-MCNC: 14 MG/DL (ref 6–20)
BUN/CREAT SERPL: 18.2 (ref 7–25)
CALCIUM SPEC-SCNC: 8.9 MG/DL (ref 8.6–10.5)
CHLORIDE SERPL-SCNC: 96 MMOL/L (ref 98–107)
CO2 SERPL-SCNC: 26 MMOL/L (ref 22–29)
CREAT BLD-MCNC: 0.77 MG/DL (ref 0.76–1.27)
DEPRECATED RDW RBC AUTO: 48.4 FL (ref 37–54)
ERYTHROCYTE [DISTWIDTH] IN BLOOD BY AUTOMATED COUNT: 15.6 % (ref 12.3–15.4)
GFR SERPL CREATININE-BSD FRML MDRD: 109 ML/MIN/1.73
GLUCOSE BLD-MCNC: 104 MG/DL (ref 65–99)
HCT VFR BLD AUTO: 42.7 % (ref 37.5–51)
HGB BLD-MCNC: 13.1 G/DL (ref 13–17.7)
MCH RBC QN AUTO: 26.3 PG (ref 26.6–33)
MCHC RBC AUTO-ENTMCNC: 30.7 G/DL (ref 31.5–35.7)
MCV RBC AUTO: 85.7 FL (ref 79–97)
PLATELET # BLD AUTO: 168 10*3/MM3 (ref 140–450)
PMV BLD AUTO: 9 FL (ref 6–12)
POTASSIUM BLD-SCNC: 5.1 MMOL/L (ref 3.5–5.2)
RBC # BLD AUTO: 4.98 10*6/MM3 (ref 4.14–5.8)
SODIUM BLD-SCNC: 133 MMOL/L (ref 136–145)
WBC NRBC COR # BLD: 22.17 10*3/MM3 (ref 3.4–10.8)

## 2019-08-27 PROCEDURE — 80048 BASIC METABOLIC PNL TOTAL CA: CPT | Performed by: HOSPITALIST

## 2019-08-27 PROCEDURE — 25010000002 DAPTOMYCIN PER 1 MG: Performed by: INTERNAL MEDICINE

## 2019-08-27 PROCEDURE — 85027 COMPLETE CBC AUTOMATED: CPT | Performed by: HOSPITALIST

## 2019-08-27 PROCEDURE — 25010000002 ERTAPENEM PER 500 MG: Performed by: INTERNAL MEDICINE

## 2019-08-27 PROCEDURE — 99233 SBSQ HOSP IP/OBS HIGH 50: CPT | Performed by: HOSPITALIST

## 2019-08-27 PROCEDURE — 99231 SBSQ HOSP IP/OBS SF/LOW 25: CPT | Performed by: INTERNAL MEDICINE

## 2019-08-27 PROCEDURE — 63710000001 PREDNISONE PER 5 MG: Performed by: INTERNAL MEDICINE

## 2019-08-27 RX ADMIN — CALCIUM CARBONATE 1 TABLET: 500 TABLET ORAL at 18:12

## 2019-08-27 RX ADMIN — HYDROXYCHLOROQUINE SULFATE 200 MG: 200 TABLET, FILM COATED ORAL at 08:16

## 2019-08-27 RX ADMIN — OXYCODONE HYDROCHLORIDE AND ACETAMINOPHEN 1 TABLET: 7.5; 325 TABLET ORAL at 00:18

## 2019-08-27 RX ADMIN — OXYCODONE HYDROCHLORIDE AND ACETAMINOPHEN 1 TABLET: 7.5; 325 TABLET ORAL at 23:43

## 2019-08-27 RX ADMIN — ACETAMINOPHEN 650 MG: 325 TABLET ORAL at 11:45

## 2019-08-27 RX ADMIN — DAPTOMYCIN 500 MG: 500 INJECTION, POWDER, LYOPHILIZED, FOR SOLUTION INTRAVENOUS at 18:06

## 2019-08-27 RX ADMIN — OXYCODONE HYDROCHLORIDE AND ACETAMINOPHEN 1 TABLET: 7.5; 325 TABLET ORAL at 07:12

## 2019-08-27 RX ADMIN — SODIUM CHLORIDE, PRESERVATIVE FREE 3 ML: 5 INJECTION INTRAVENOUS at 08:05

## 2019-08-27 RX ADMIN — Medication 1 CAPSULE: at 08:05

## 2019-08-27 RX ADMIN — PREDNISONE 15 MG: 10 TABLET ORAL at 20:38

## 2019-08-27 RX ADMIN — OXYCODONE HYDROCHLORIDE AND ACETAMINOPHEN 1 TABLET: 7.5; 325 TABLET ORAL at 17:12

## 2019-08-27 RX ADMIN — METOPROLOL SUCCINATE 25 MG: 25 TABLET, EXTENDED RELEASE ORAL at 11:42

## 2019-08-27 RX ADMIN — ERTAPENEM SODIUM 1 G: 1 INJECTION, POWDER, LYOPHILIZED, FOR SOLUTION INTRAMUSCULAR; INTRAVENOUS at 17:13

## 2019-08-27 RX ADMIN — HYDROXYCHLOROQUINE SULFATE 200 MG: 200 TABLET, FILM COATED ORAL at 20:38

## 2019-08-27 RX ADMIN — PREDNISONE 15 MG: 10 TABLET ORAL at 08:04

## 2019-08-27 RX ADMIN — VORICONAZOLE 200 MG: 200 TABLET, FILM COATED ORAL at 08:04

## 2019-08-27 RX ADMIN — ACETAMINOPHEN 650 MG: 325 TABLET ORAL at 20:38

## 2019-08-28 VITALS
SYSTOLIC BLOOD PRESSURE: 133 MMHG | DIASTOLIC BLOOD PRESSURE: 65 MMHG | HEIGHT: 70 IN | OXYGEN SATURATION: 95 % | TEMPERATURE: 98.4 F | RESPIRATION RATE: 16 BRPM | WEIGHT: 216.4 LBS | HEART RATE: 94 BPM | BODY MASS INDEX: 30.98 KG/M2

## 2019-08-28 PROBLEM — A41.9 SEPSIS, UNSPECIFIED ORGANISM (HCC): Status: RESOLVED | Noted: 2019-06-24 | Resolved: 2019-08-28

## 2019-08-28 PROBLEM — I42.8 NON-ISCHEMIC CARDIOMYOPATHY: Status: RESOLVED | Noted: 2019-06-28 | Resolved: 2019-08-28

## 2019-08-28 LAB
ANION GAP SERPL CALCULATED.3IONS-SCNC: 13 MMOL/L (ref 5–15)
BACTERIA SPEC ANAEROBE CULT: NORMAL
BUN BLD-MCNC: 17 MG/DL (ref 6–20)
BUN/CREAT SERPL: 18.3 (ref 7–25)
CALCIUM SPEC-SCNC: 8.8 MG/DL (ref 8.6–10.5)
CHLORIDE SERPL-SCNC: 94 MMOL/L (ref 98–107)
CO2 SERPL-SCNC: 26 MMOL/L (ref 22–29)
CREAT BLD-MCNC: 0.93 MG/DL (ref 0.76–1.27)
DEPRECATED RDW RBC AUTO: 48 FL (ref 37–54)
ERYTHROCYTE [DISTWIDTH] IN BLOOD BY AUTOMATED COUNT: 15.8 % (ref 12.3–15.4)
GFR SERPL CREATININE-BSD FRML MDRD: 88 ML/MIN/1.73
GLUCOSE BLD-MCNC: 129 MG/DL (ref 65–99)
HCT VFR BLD AUTO: 40.2 % (ref 37.5–51)
HGB BLD-MCNC: 12.9 G/DL (ref 13–17.7)
MCH RBC QN AUTO: 26.7 PG (ref 26.6–33)
MCHC RBC AUTO-ENTMCNC: 32.1 G/DL (ref 31.5–35.7)
MCV RBC AUTO: 83.2 FL (ref 79–97)
PLATELET # BLD AUTO: 174 10*3/MM3 (ref 140–450)
PMV BLD AUTO: 9.6 FL (ref 6–12)
POTASSIUM BLD-SCNC: 5.2 MMOL/L (ref 3.5–5.2)
RBC # BLD AUTO: 4.83 10*6/MM3 (ref 4.14–5.8)
SODIUM BLD-SCNC: 133 MMOL/L (ref 136–145)
WBC NRBC COR # BLD: 22.01 10*3/MM3 (ref 3.4–10.8)

## 2019-08-28 PROCEDURE — 25010000002 ERTAPENEM PER 500 MG: Performed by: INTERNAL MEDICINE

## 2019-08-28 PROCEDURE — 63710000001 PREDNISONE PER 5 MG: Performed by: INTERNAL MEDICINE

## 2019-08-28 PROCEDURE — 25010000002 DAPTOMYCIN PER 1 MG

## 2019-08-28 PROCEDURE — 80048 BASIC METABOLIC PNL TOTAL CA: CPT | Performed by: HOSPITALIST

## 2019-08-28 PROCEDURE — 85027 COMPLETE CBC AUTOMATED: CPT | Performed by: HOSPITALIST

## 2019-08-28 PROCEDURE — 99239 HOSP IP/OBS DSCHRG MGMT >30: CPT | Performed by: HOSPITALIST

## 2019-08-28 RX ORDER — SULFAMETHOXAZOLE AND TRIMETHOPRIM 800; 160 MG/1; MG/1
1 TABLET ORAL EVERY 12 HOURS SCHEDULED
Status: DISCONTINUED | OUTPATIENT
Start: 2019-08-28 | End: 2019-08-28 | Stop reason: HOSPADM

## 2019-08-28 RX ORDER — L.ACID,PARA/B.BIFIDUM/S.THERM 8B CELL
1 CAPSULE ORAL DAILY
Qty: 30 CAPSULE | Refills: 0 | Status: SHIPPED | OUTPATIENT
Start: 2019-08-29 | End: 2019-09-28

## 2019-08-28 RX ORDER — IBUPROFEN 600 MG/1
600 TABLET ORAL EVERY 8 HOURS PRN
Start: 2019-08-28 | End: 2019-09-12 | Stop reason: HOSPADM

## 2019-08-28 RX ORDER — SULFAMETHOXAZOLE AND TRIMETHOPRIM 800; 160 MG/1; MG/1
1 TABLET ORAL EVERY 12 HOURS SCHEDULED
Start: 2019-08-28 | End: 2019-09-12 | Stop reason: HOSPADM

## 2019-08-28 RX ORDER — OXYCODONE AND ACETAMINOPHEN 7.5; 325 MG/1; MG/1
TABLET ORAL
Qty: 18 TABLET | Refills: 0 | Status: SHIPPED | OUTPATIENT
Start: 2019-08-28 | End: 2019-09-30 | Stop reason: SDUPTHER

## 2019-08-28 RX ADMIN — DAPTOMYCIN 498.6 MG: 500 INJECTION, POWDER, LYOPHILIZED, FOR SOLUTION INTRAVENOUS at 11:31

## 2019-08-28 RX ADMIN — ERTAPENEM SODIUM 1 G: 1 INJECTION, POWDER, LYOPHILIZED, FOR SOLUTION INTRAMUSCULAR; INTRAVENOUS at 12:18

## 2019-08-28 RX ADMIN — ACETAMINOPHEN 650 MG: 325 TABLET ORAL at 04:42

## 2019-08-28 RX ADMIN — Medication 1 CAPSULE: at 08:09

## 2019-08-28 RX ADMIN — OXYCODONE HYDROCHLORIDE AND ACETAMINOPHEN 1 TABLET: 7.5; 325 TABLET ORAL at 12:30

## 2019-08-28 RX ADMIN — SODIUM CHLORIDE, PRESERVATIVE FREE 3 ML: 5 INJECTION INTRAVENOUS at 08:10

## 2019-08-28 RX ADMIN — METOPROLOL SUCCINATE 25 MG: 25 TABLET, EXTENDED RELEASE ORAL at 11:30

## 2019-08-28 RX ADMIN — HYDROXYCHLOROQUINE SULFATE 200 MG: 200 TABLET, FILM COATED ORAL at 08:09

## 2019-08-28 RX ADMIN — PREDNISONE 15 MG: 10 TABLET ORAL at 08:09

## 2019-08-28 RX ADMIN — SULFAMETHOXAZOLE AND TRIMETHOPRIM 160 MG: 800; 160 TABLET ORAL at 13:10

## 2019-08-28 RX ADMIN — OXYCODONE HYDROCHLORIDE AND ACETAMINOPHEN 1 TABLET: 7.5; 325 TABLET ORAL at 06:11

## 2019-08-29 ENCOUNTER — READMISSION MANAGEMENT (OUTPATIENT)
Dept: CALL CENTER | Facility: HOSPITAL | Age: 44
End: 2019-08-29

## 2019-08-29 LAB
BACTERIA FLD CULT: NORMAL
GRAM STN SPEC: NORMAL
GRAM STN SPEC: NORMAL

## 2019-08-30 ENCOUNTER — LAB (OUTPATIENT)
Dept: LAB | Facility: HOSPITAL | Age: 44
End: 2019-08-30

## 2019-08-30 ENCOUNTER — READMISSION MANAGEMENT (OUTPATIENT)
Dept: CALL CENTER | Facility: HOSPITAL | Age: 44
End: 2019-08-30

## 2019-08-30 ENCOUNTER — OFFICE VISIT (OUTPATIENT)
Dept: ONCOLOGY | Facility: CLINIC | Age: 44
End: 2019-08-30

## 2019-08-30 VITALS
HEART RATE: 93 BPM | HEIGHT: 71 IN | OXYGEN SATURATION: 98 % | WEIGHT: 211 LBS | BODY MASS INDEX: 29.54 KG/M2 | SYSTOLIC BLOOD PRESSURE: 131 MMHG | DIASTOLIC BLOOD PRESSURE: 76 MMHG | TEMPERATURE: 97.4 F | RESPIRATION RATE: 18 BRPM

## 2019-08-30 DIAGNOSIS — C84.60 ANAPLASTIC ALK-POSITIVE LARGE CELL LYMPHOMA, UNSPECIFIED BODY REGION (HCC): Chronic | ICD-10-CM

## 2019-08-30 DIAGNOSIS — C84.60 ANAPLASTIC ALK-POSITIVE LARGE CELL LYMPHOMA, UNSPECIFIED BODY REGION (HCC): ICD-10-CM

## 2019-08-30 DIAGNOSIS — D72.829 LEUKOCYTOSIS, UNSPECIFIED TYPE: ICD-10-CM

## 2019-08-30 DIAGNOSIS — Z12.11 SCREENING FOR COLON CANCER: Primary | ICD-10-CM

## 2019-08-30 DIAGNOSIS — D72.829 LEUKOCYTOSIS, UNSPECIFIED TYPE: Primary | Chronic | ICD-10-CM

## 2019-08-30 PROBLEM — C84.70 ANAPLASTIC ALK-NEGATIVE LARGE CELL LYMPHOMA (HCC): Chronic | Status: ACTIVE | Noted: 2019-08-30

## 2019-08-30 PROBLEM — C84.70 ANAPLASTIC ALK-NEGATIVE LARGE CELL LYMPHOMA (HCC): Status: ACTIVE | Noted: 2019-08-30

## 2019-08-30 LAB
ALBUMIN SERPL-MCNC: 3.6 G/DL (ref 3.5–5.2)
ALBUMIN/GLOB SERPL: 0.6 G/DL
ALP SERPL-CCNC: 137 U/L (ref 39–117)
ALT SERPL W P-5'-P-CCNC: 62 U/L (ref 1–41)
ANION GAP SERPL CALCULATED.3IONS-SCNC: 13 MMOL/L (ref 5–15)
AST SERPL-CCNC: 31 U/L (ref 1–40)
BASOPHILS # BLD AUTO: 0.12 10*3/MM3 (ref 0–0.2)
BASOPHILS NFR BLD AUTO: 0.5 % (ref 0–1.5)
BILIRUB SERPL-MCNC: 0.2 MG/DL (ref 0.2–1.2)
BUN BLD-MCNC: 21 MG/DL (ref 6–20)
BUN/CREAT SERPL: 21.4 (ref 7–25)
CALCIUM SPEC-SCNC: 9.3 MG/DL (ref 8.6–10.5)
CHLORIDE SERPL-SCNC: 99 MMOL/L (ref 98–107)
CO2 SERPL-SCNC: 24 MMOL/L (ref 22–29)
CREAT BLD-MCNC: 0.98 MG/DL (ref 0.76–1.27)
CRP SERPL-MCNC: 13.47 MG/DL (ref 0–0.5)
DEPRECATED RDW RBC AUTO: 49.6 FL (ref 37–54)
EOSINOPHIL # BLD AUTO: 0.02 10*3/MM3 (ref 0–0.4)
EOSINOPHIL NFR BLD AUTO: 0.1 % (ref 0.3–6.2)
ERYTHROCYTE [DISTWIDTH] IN BLOOD BY AUTOMATED COUNT: 16.3 % (ref 12.3–15.4)
ERYTHROCYTE [SEDIMENTATION RATE] IN BLOOD: 117 MM/HR (ref 0–15)
GFR SERPL CREATININE-BSD FRML MDRD: 83 ML/MIN/1.73
GLOBULIN UR ELPH-MCNC: 5.8 GM/DL
GLUCOSE BLD-MCNC: 127 MG/DL (ref 65–99)
HCT VFR BLD AUTO: 43.5 % (ref 37.5–51)
HGB BLD-MCNC: 13.5 G/DL (ref 13–17.7)
IMM GRANULOCYTES # BLD AUTO: 0.9 10*3/MM3 (ref 0–0.05)
IMM GRANULOCYTES NFR BLD AUTO: 4.1 % (ref 0–0.5)
LYMPHOCYTES # BLD AUTO: 0.94 10*3/MM3 (ref 0.7–3.1)
LYMPHOCYTES NFR BLD AUTO: 4.3 % (ref 19.6–45.3)
MCH RBC QN AUTO: 25.8 PG (ref 26.6–33)
MCHC RBC AUTO-ENTMCNC: 31 G/DL (ref 31.5–35.7)
MCV RBC AUTO: 83.2 FL (ref 79–97)
MONOCYTES # BLD AUTO: 1.14 10*3/MM3 (ref 0.1–0.9)
MONOCYTES NFR BLD AUTO: 5.2 % (ref 5–12)
NEUTROPHILS # BLD AUTO: 18.74 10*3/MM3 (ref 1.7–7)
NEUTROPHILS NFR BLD AUTO: 85.8 % (ref 42.7–76)
NRBC BLD AUTO-RTO: 0 /100 WBC (ref 0–0.2)
PLATELET # BLD AUTO: 315 10*3/MM3 (ref 140–450)
PMV BLD AUTO: 9.5 FL (ref 6–12)
POTASSIUM BLD-SCNC: 5.2 MMOL/L (ref 3.5–5.2)
PROT SERPL-MCNC: 9.4 G/DL (ref 6–8.5)
RBC # BLD AUTO: 5.23 10*6/MM3 (ref 4.14–5.8)
SODIUM BLD-SCNC: 136 MMOL/L (ref 136–145)
WBC NRBC COR # BLD: 21.86 10*3/MM3 (ref 3.4–10.8)

## 2019-08-30 PROCEDURE — 84165 PROTEIN E-PHORESIS SERUM: CPT

## 2019-08-30 PROCEDURE — 82784 ASSAY IGA/IGD/IGG/IGM EACH: CPT

## 2019-08-30 PROCEDURE — 85025 COMPLETE CBC W/AUTO DIFF WBC: CPT

## 2019-08-30 PROCEDURE — 83883 ASSAY NEPHELOMETRY NOT SPEC: CPT

## 2019-08-30 PROCEDURE — 80053 COMPREHEN METABOLIC PANEL: CPT

## 2019-08-30 PROCEDURE — 86334 IMMUNOFIX E-PHORESIS SERUM: CPT

## 2019-08-30 PROCEDURE — 99215 OFFICE O/P EST HI 40 MIN: CPT | Performed by: INTERNAL MEDICINE

## 2019-08-30 PROCEDURE — 85652 RBC SED RATE AUTOMATED: CPT

## 2019-08-30 PROCEDURE — 86140 C-REACTIVE PROTEIN: CPT

## 2019-08-30 PROCEDURE — 36415 COLL VENOUS BLD VENIPUNCTURE: CPT

## 2019-08-30 NOTE — OUTREACH NOTE
Prep Survey      Responses   Facility patient discharged from?  [place]   Is patient eligible?  Yes   Discharge diagnosis  Vertebral osteomyelitis   Does the patient have one of the following disease processes/diagnoses(primary or secondary)?  Other   Does the patient have Home health ordered?  No   Is there a DME ordered?  No   Prep survey completed?  Yes          Isabell Mcallister RN

## 2019-08-30 NOTE — PROGRESS NOTES
CHIEF COMPLAINT: Abnormal spine biopsy    Problem List:  Oncology/Hematology History    1. Leukocytosis  2. FUO  3. Decreased ejection fraction with evidence of myocarditis  4. Marked elevation of sedimentation rate and C-reactive protein  5. 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  Asked to see regarding leukocytosis.  He had upper respiratory infection with pneumonia, chest pains, elevated troponins with diagnosis of myocarditis and decreased ejection fraction with fevers to 104.  Tentatively diagnosed with still's disease and treated with prednisone with some improvement in fevers as well as improvement in a rash.  By the end of July, he had severe mid back pain relatively acute and onset that progressed over time with imaging of the area showing progressive abnormality of both bone and marrow signals in the spine that could be malignant marrow involvement or, more likely per radiologist, infectious.  Sedimentation rate was over 100 with a C-reactive protein elevated and white count 20-30,000 for the better part of 2 months.  Slight left shift but no immature forms.  Broad-spectrum serologic testing has not shown pathogens as of the initial consultation.  I ordered bone marrow biopsy and Dr. Jimenez coordinated spine biopsy  -6/24/2019 CT chest abdomen pelvis without contrast showed no parenchymal lung disease but a 1.6 cm mildly enlarged centrally necrotic right paratracheal node with no other adenopathy or hepatosplenomegaly.  -6/28/2019 MRI brain and cervical spine without contrast normal  -7/9/2019 fluoroscopy guided right joint aspiration unrevealing  -7/10/2019 fluoroscopic guided bronchoscopy showed no pathogens or pathology  -8/9/2019 MRI thoracic and lumbar spine showed low T1 signals in T8 and T9 with increased T2 involvement concerning for aggressive marrow signal findings that could represent lipid poor  spinal hemangioma but concerning for metastasis versus multiple myeloma.  Contrast MRI recommended.  -8/21/2019 MRI thoracic spine with and without contrast showed multiple foci of abnormal marrow signal within the thoracic spine was prominent T8 and T9 with interval increase compared to 8/9/2019 with deformity of the endplates at T8 and T9 but no definitive fracture or discitis.  Several smaller additional lesions suspected at T1, T2, T4, T5, and T12 and the differential would include multifocal vertebral osteomyelitis versus metastasis.  -8/23/2019 bone marrow biopsy showed mild polyclonal plasmacytosis with normocellular marrow and trilineage hematopoiesis with mild myeloid expansion.  No lymphoid infiltrate and no organisms for fungi, acid fast bacilli, tumor, or granuloma.  Bennett 2- negative.  -8/26/2019 T8 biopsy show focal area of markedly atypical appearance.  There are inflammatory cells with occasional eosinophils and larger cells.  Some focal hemosiderin.  There is equivocal positivity for CD4, , CD45, and Constance-Barr virus.  It is positive for CD30, ALK 1, EMA, and vimentin.  No staining for CD 5, 7, 3, 20, or 15.  No staining for cytokeratin, SOX 10, , or CD1a.  Outside referral to [facility] ER pending.  -8/30/2019 [facility] outpatient follow-up visit: Anaplastic large cell lymphoma ALK positive just involving the bone would be unusual but not impossible I suppose.  We will get PET scan.  Were this hemophagocytic syndrome I would have expected his marrow to be abnormal along with splenomegaly and cytopenias as would the marrow be abnormal for this a plasma cell dyscrasia and the plasma cells were polyclonal.  I will check his SIEP along with triglycerides, peripheral blood flow cytometry for soluble CD 25, ferritin, and NK cell activity.  Once I have that information, hopefully we will have more information back from [facility] ER and unlikely to involve my colleagues at the [facility] in this process if this is an ALK positive anaplastic large cell lymphoma without kia involvement given the unusual nature and appearance of this.        Leukocytosis (leucocytosis)    6/24/2019 Initial Diagnosis     Leukocytosis (leucocytosis)            HISTORY OF PRESENT ILLNESS:  The patient is a 44 y.o. male, here for follow up on management of leukocytosis with FUO and abnormal bone biopsy.  See above for details of his hematology history of present illness      Past Medical History:   Diagnosis Date   • Hyperlipidemia    • Pneumonia      Past Surgical History:   Procedure Laterality Date   • APPENDECTOMY     • BACK SURGERY      L5 S1   • BRONCHOSCOPY N/A 7/10/2019    Procedure: BRONCHOSCOPY WITH ENDOBRONCHIAL ULTRASOUND AND TRANSBRONCHIAL BIOPSY AND FLUORO;  Surgeon: Marvel Sandoval MD;  Location:  [facility];  Service: Pulmonary   • CARDIAC CATHETERIZATION N/A 6/24/2019    Procedure: LEFT HEART CATH;  Surgeon: Deep Muñiz IV, MD;  Location:  [facility];  Service: Cardiovascular   • INTERVENTIONAL RADIOLOGY PROCEDURE N/A 8/26/2019    Procedure: THORACIC SPINE BIOSPY;  Surgeon: Juve Avila MD;  Location:  [facility];  Service: Interventional Radiology       Allergies   Allergen Reactions   • Ciprofloxacin Anxiety     \"Extreme anxiety and paranoia\"       Family History and Social History reviewed and changed as necessary      REVIEW OF SYSTEM:   Review of Systems   Constitutional: Negative for appetite change, chills, diaphoresis, fatigue, fever and unexpected weight change.   HENT:   Negative for mouth sores, sore throat and trouble swallowing.    Eyes: Negative for icterus.   Respiratory: Negative for cough, hemoptysis and shortness of breath.    Cardiovascular: Negative for chest pain, leg swelling and palpitations.   Gastrointestinal: Negative for abdominal distention, abdominal pain, blood in stool, constipation, diarrhea, nausea and " "vomiting.   Endocrine: Negative for hot flashes.   Genitourinary: Negative for bladder incontinence, difficulty urinating, dysuria, frequency and hematuria.    Musculoskeletal: Negative for gait problem, neck pain and neck stiffness.   Skin: Negative for rash.   Neurological: Negative for dizziness, gait problem, headaches, light-headedness and numbness.   Hematological: Negative for adenopathy. Does not bruise/bleed easily.   Psychiatric/Behavioral: Negative for depression. The patient is not nervous/anxious.    All other systems reviewed and are negative.       PHYSICAL EXAM    Vitals:    08/30/19 1506   BP: 131/76   Pulse: 93   Resp: 18   Temp: 97.4 °F (36.3 °C)   TempSrc: Temporal   SpO2: 98%   Weight: 95.7 kg (211 lb)   Height: 179.1 cm (70.5\")     Constitutional: Appears well-developed and well-nourished. No distress.   ECOG: (1) Restricted in physically strenuous activity, ambulatory and able to do work of light nature  HENT:   Head: Normocephalic.   Mouth/Throat: Oropharynx is clear and moist.   Eyes: Conjunctivae are normal. Pupils are equal, round, and reactive to light. No scleral icterus.   Neck: Neck supple. No JVD present. No thyromegaly present.   Cardiovascular: Normal rate, regular rhythm and normal heart sounds.    Pulmonary/Chest: Breath sounds normal. No respiratory distress.   Abdominal: Soft. Exhibits no distension and no mass. There is no hepatosplenomegaly. There is no tenderness. There is no rebound and no guarding.   Musculoskeletal:Exhibits no edema, tenderness or deformity.   Neurological: Alert and oriented to person, place, and time. Exhibits normal muscle tone.   Skin: No ecchymosis, no petechiae and no rash noted. Not diaphoretic. No cyanosis. Nails show no clubbing.   Psychiatric: Normal mood and affect.   Vitals reviewed.      Lab Results   Component Value Date    HGB 12.9 (L) 08/28/2019    HCT 40.2 08/28/2019    MCV 83.2 08/28/2019     08/28/2019    WBC 22.01 (H) 08/28/2019 "    NEUTROABS 18.69 (H) 08/26/2019    LYMPHSABS 1.20 08/26/2019    MONOSABS 1.22 (H) 08/26/2019    EOSABS 0.01 08/26/2019    BASOSABS 0.11 08/26/2019       Lab Results   Component Value Date    GLUCOSE 129 (H) 08/28/2019    BUN 17 08/28/2019    CREATININE 0.93 08/28/2019     (L) 08/28/2019    K 5.2 08/28/2019    CL 94 (L) 08/28/2019    CO2 26.0 08/28/2019    CALCIUM 8.8 08/28/2019    PROTEINTOT 7.8 08/21/2019    ALBUMIN 3.40 (L) 08/21/2019    BILITOT 0.2 08/21/2019    ALKPHOS 137 (H) 08/21/2019    AST 33 08/21/2019     (H) 08/21/2019                   ASSESSMENT & PLAN:    1. Leukocytosis  2. FUO  3. Decreased ejection fraction with evidence of myocarditis  4. Marked elevation of sedimentation rate and C-reactive protein  5. 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  Asked to see regarding leukocytosis.  He had upper respiratory infection with pneumonia, chest pains, elevated troponins with diagnosis of myocarditis and decreased ejection fraction with fevers to 104.  Tentatively diagnosed with still's disease and treated with prednisone with some improvement in fevers as well as improvement in a rash.  By the end of July, he had severe mid back pain relatively acute and onset that progressed over time with imaging of the area showing progressive abnormality of both bone and marrow signals in the spine that could be malignant marrow involvement or, more likely per radiologist, infectious.  Sedimentation rate was over 100 with a C-reactive protein elevated and white count 20-30,000 for the better part of 2 months.  Slight left shift but no immature forms.  Broad-spectrum serologic testing has not shown pathogens as of the initial consultation.  I ordered bone marrow biopsy and Dr. Jimenez coordinated spine biopsy  -6/24/2019 CT chest abdomen pelvis without contrast showed no parenchymal lung disease but a 1.6 cm  mildly enlarged centrally necrotic right paratracheal node with no other adenopathy or hepatosplenomegaly.  -6/28/2019 MRI brain and cervical spine without contrast normal  -7/9/2019 fluoroscopy guided right joint aspiration unrevealing  -7/10/2019 fluoroscopic guided bronchoscopy showed no pathogens or pathology  -8/9/2019 MRI thoracic and lumbar spine showed low T1 signals in T8 and T9 with increased T2 involvement concerning for aggressive marrow signal findings that could represent lipid poor spinal hemangioma but concerning for metastasis versus multiple myeloma.  Contrast MRI recommended.  -8/21/2019 MRI thoracic spine with and without contrast showed multiple foci of abnormal marrow signal within the thoracic spine was prominent T8 and T9 with interval increase compared to 8/9/2019 with deformity of the endplates at T8 and T9 but no definitive fracture or discitis.  Several smaller additional lesions suspected at T1, T2, T4, T5, and T12 and the differential would include multifocal vertebral osteomyelitis versus metastasis.  -8/23/2019 bone marrow biopsy showed mild polyclonal plasmacytosis with normocellular marrow and trilineage hematopoiesis with mild myeloid expansion.  No lymphoid infiltrate and no organisms for fungi, acid fast bacilli, tumor, or granuloma.  Bennett 2- negative.  -8/26/2019 T8 biopsy show focal area of markedly atypical appearance.  There are inflammatory cells with occasional eosinophils and larger cells.  Some focal hemosiderin.  There is equivocal positivity for CD4, , CD45, and Constance-Barr virus.  It is positive for CD30, ALK 1, EMA, and vimentin.  No staining for CD 5, 7, 3, 20, or 15.  No staining for cytokeratin, SOX 10, , or CD1a.  Outside referral to [facility] pending.    -8/30/2019 [facility] outpatient follow-up visit: Anaplastic large cell lymphoma ALK positive just involving the bone would be unusual but not impossible I suppose.  We will get PET scan.  Were  this hemophagocytic syndrome I would have expected his marrow to be abnormal along with splenomegaly and cytopenias as would the marrow be abnormal for this a plasma cell dyscrasia and the plasma cells were polyclonal.  I will check his SIEP along with triglycerides, peripheral blood flow cytometry for soluble CD 25, ferritin, and NK cell activity.  Once I have that information, hopefully we will have more information back from [facility] and unlikely to involve my colleagues at the [facility] in this process if this is an ALK positive anaplastic large cell lymphoma without kia involvement given the unusual nature and appearance of this.  He is having severe dyspepsia on his steroids and diarrhea.  I suspect this is all steroid related and antibiotic related but I will check a stool for C. difficile toxin and I have arranged with Artur Sifuentes who will call him to set up EGD and colonoscopy next week before I see him back.  We want to be sure that he has no mucosal lymphoma which may be missed on PET and CT.    Discussed with patient face-to-face 45 minutes regarding the complexity of this very complex decision tree as outlined above.    Brent Brizuela MD    08/30/2019

## 2019-08-30 NOTE — OUTREACH NOTE
Medical Week 1 Survey      Responses   Facility patient discharged from?  [place]   Does the patient have one of the following disease processes/diagnoses(primary or secondary)?  Other   Is there a successful TCM telephone encounter documented?  No   Week 1 attempt successful?  No   Unsuccessful attempts  Attempt 1          Jael Dumont RN

## 2019-08-31 LAB
KAPPA LC SERPL-MCNC: 84.5 MG/L (ref 3.3–19.4)
KAPPA LC/LAMBDA SER: 1.35 {RATIO} (ref 0.26–1.65)
LAMBDA LC FREE SERPL-MCNC: 62.6 MG/L (ref 5.7–26.3)

## 2019-09-03 ENCOUNTER — READMISSION MANAGEMENT (OUTPATIENT)
Dept: CALL CENTER | Facility: HOSPITAL | Age: 44
End: 2019-09-03

## 2019-09-03 ENCOUNTER — LAB (OUTPATIENT)
Dept: LAB | Facility: HOSPITAL | Age: 44
End: 2019-09-03

## 2019-09-03 DIAGNOSIS — C84.60 ANAPLASTIC ALK-POSITIVE LARGE CELL LYMPHOMA, UNSPECIFIED BODY REGION (HCC): Chronic | ICD-10-CM

## 2019-09-03 DIAGNOSIS — D72.829 LEUKOCYTOSIS, UNSPECIFIED TYPE: Primary | Chronic | ICD-10-CM

## 2019-09-03 DIAGNOSIS — D72.829 LEUKOCYTOSIS, UNSPECIFIED TYPE: Chronic | ICD-10-CM

## 2019-09-03 LAB
ALBUMIN SERPL-MCNC: 2.7 G/DL (ref 2.9–4.4)
ALBUMIN/GLOB SERPL: 0.6 {RATIO} (ref 0.7–1.7)
ALPHA1 GLOB FLD ELPH-MCNC: 0.5 G/DL (ref 0–0.4)
ALPHA2 GLOB SERPL ELPH-MCNC: 1.1 G/DL (ref 0.4–1)
B-GLOBULIN SERPL ELPH-MCNC: 1.4 G/DL (ref 0.7–1.3)
C DIFF TOX GENS STL QL NAA+PROBE: NOT DETECTED
GAMMA GLOB SERPL ELPH-MCNC: 2.4 G/DL (ref 0.4–1.8)
GLOBULIN SER CALC-MCNC: 5.3 G/DL (ref 2.2–3.9)
IGA SERPL-MCNC: 567 MG/DL (ref 90–386)
IGG SERPL-MCNC: 2550 MG/DL (ref 700–1600)
IGM SERPL-MCNC: 105 MG/DL (ref 20–172)
INTERPRETATION SERPL IEP-IMP: ABNORMAL
Lab: ABNORMAL
M-SPIKE: ABNORMAL G/DL
PROT SERPL-MCNC: 8 G/DL (ref 6–8.5)

## 2019-09-03 PROCEDURE — 88184 FLOWCYTOMETRY/ TC 1 MARKER: CPT

## 2019-09-03 PROCEDURE — 88185 FLOWCYTOMETRY/TC ADD-ON: CPT

## 2019-09-03 PROCEDURE — 87493 C DIFF AMPLIFIED PROBE: CPT

## 2019-09-03 PROCEDURE — 36415 COLL VENOUS BLD VENIPUNCTURE: CPT

## 2019-09-03 NOTE — OUTREACH NOTE
Medical Week 1 Survey      Responses   Facility patient discharged from?  [place]   Does the patient have one of the following disease processes/diagnoses(primary or secondary)?  Other   Is there a successful TCM telephone encounter documented?  No   Week 1 attempt successful?  No   Unsuccessful attempts  Attempt 2          Raina Jackson RN

## 2019-09-04 ENCOUNTER — TELEPHONE (OUTPATIENT)
Dept: ONCOLOGY | Facility: CLINIC | Age: 44
End: 2019-09-04

## 2019-09-04 ENCOUNTER — TELEPHONE (OUTPATIENT)
Dept: GASTROENTEROLOGY | Facility: CLINIC | Age: 44
End: 2019-09-04

## 2019-09-04 NOTE — TELEPHONE ENCOUNTER
Patient is scheduled for EGD and colonoscopy tomorrow.  He is still running intermittent fevers and wants to verify Dr. Brizuela wants him to proceed with endoscopies. He is waiting for a call back from Dr. Sifuentes's office to see if they he plans to proceed.   Discussed with Dr. Brizuela.  He want patient to proceed with EGD and colonoscopy as scheduled. Patient notified and verbalized understanding.     Also left voicemail with Dr. Sifuentes's office per his request stating Dr. Brizuela requests they proceed with scopes.

## 2019-09-04 NOTE — TELEPHONE ENCOUNTER
Spouse called regarding pt fever. States the fever has not been more than 100.8. Informing of fever due to procedure scheduled for tomorrow and wanting to make sure he can still proceed with colonoscopy. Advised I would consult with Dr. Sifuentes and return call. Pt v/u

## 2019-09-04 NOTE — TELEPHONE ENCOUNTER
SPOKE TO DR. MCDERMOTT; AGREES TO PROCEED WITH PROCEDURES. STATES THE ONLY ISSUES WOULD BE ANESTHESIOLOGIST IF PT HAS FEVER, THEY MAY DISAGREE ON SEDATION. CALLED PT TO INFORM HIM. PT V/U

## 2019-09-05 ENCOUNTER — HOSPITAL ENCOUNTER (INPATIENT)
Facility: HOSPITAL | Age: 44
LOS: 7 days | Discharge: HOME | End: 2019-09-12
Attending: EMERGENCY MEDICINE | Admitting: THORACIC SURGERY (CARDIOTHORACIC VASCULAR SURGERY)

## 2019-09-05 ENCOUNTER — APPOINTMENT (OUTPATIENT)
Dept: CT IMAGING | Facility: HOSPITAL | Age: 44
End: 2019-09-05

## 2019-09-05 ENCOUNTER — LAB (OUTPATIENT)
Dept: LAB | Facility: HOSPITAL | Age: 44
End: 2019-09-05

## 2019-09-05 ENCOUNTER — APPOINTMENT (OUTPATIENT)
Dept: GENERAL RADIOLOGY | Facility: HOSPITAL | Age: 44
End: 2019-09-05

## 2019-09-05 ENCOUNTER — READMISSION MANAGEMENT (OUTPATIENT)
Dept: CALL CENTER | Facility: HOSPITAL | Age: 44
End: 2019-09-05

## 2019-09-05 DIAGNOSIS — R93.89 ABNORMAL CHEST X-RAY: ICD-10-CM

## 2019-09-05 DIAGNOSIS — D72.829 LEUKOCYTOSIS, UNSPECIFIED TYPE: ICD-10-CM

## 2019-09-05 DIAGNOSIS — R59.0 MEDIASTINAL LYMPHADENOPATHY: Primary | ICD-10-CM

## 2019-09-05 DIAGNOSIS — D72.829 LEUKOCYTOSIS, UNSPECIFIED TYPE: Chronic | ICD-10-CM

## 2019-09-05 DIAGNOSIS — A41.9 SEPSIS, DUE TO UNSPECIFIED ORGANISM: ICD-10-CM

## 2019-09-05 DIAGNOSIS — C84.60 ANAPLASTIC ALK-POSITIVE LARGE CELL LYMPHOMA, UNSPECIFIED BODY REGION (HCC): Chronic | ICD-10-CM

## 2019-09-05 DIAGNOSIS — R06.02 SHORTNESS OF BREATH: ICD-10-CM

## 2019-09-05 DIAGNOSIS — C84.60 ANAPLASTIC ALK-POSITIVE LARGE CELL LYMPHOMA, UNSPECIFIED BODY REGION (HCC): ICD-10-CM

## 2019-09-05 PROBLEM — I40.0: Status: RESOLVED | Noted: 2019-06-24 | Resolved: 2019-09-05

## 2019-09-05 PROBLEM — R65.20 SEVERE SEPSIS: Status: ACTIVE | Noted: 2019-06-24

## 2019-09-05 PROBLEM — J18.0 BRONCHIAL PNEUMONIA: Status: ACTIVE | Noted: 2019-09-05

## 2019-09-05 PROBLEM — E87.1 HYPONATREMIA: Status: ACTIVE | Noted: 2019-09-05

## 2019-09-05 PROBLEM — E87.20 LACTIC ACIDOSIS: Status: ACTIVE | Noted: 2019-09-05

## 2019-09-05 PROBLEM — N17.9 AKI (ACUTE KIDNEY INJURY): Status: ACTIVE | Noted: 2019-09-05

## 2019-09-05 LAB
ALBUMIN SERPL-MCNC: 2 G/DL (ref 3.5–5.2)
ALBUMIN SERPL-MCNC: 2.8 G/DL (ref 3.5–5.2)
ALBUMIN/GLOB SERPL: 0.4 G/DL
ALBUMIN/GLOB SERPL: 0.5 G/DL
ALP SERPL-CCNC: 117 U/L (ref 39–117)
ALP SERPL-CCNC: 137 U/L (ref 39–117)
ALT SERPL W P-5'-P-CCNC: 27 U/L (ref 1–41)
ALT SERPL W P-5'-P-CCNC: 35 U/L (ref 1–41)
ANION GAP SERPL CALCULATED.3IONS-SCNC: 15 MMOL/L (ref 5–15)
ANION GAP SERPL CALCULATED.3IONS-SCNC: 18 MMOL/L (ref 5–15)
APTT PPP: 35 SECONDS (ref 24–37)
AST SERPL-CCNC: 30 U/L (ref 1–40)
AST SERPL-CCNC: 31 U/L (ref 1–40)
BACTERIA UR QL AUTO: NORMAL /HPF
BASOPHILS # BLD MANUAL: 0 10*3/MM3 (ref 0–0.2)
BASOPHILS # BLD MANUAL: 0 10*3/MM3 (ref 0–0.2)
BASOPHILS NFR BLD AUTO: 0 % (ref 0–1.5)
BASOPHILS NFR BLD AUTO: 0 % (ref 0–1.5)
BILIRUB SERPL-MCNC: 0.5 MG/DL (ref 0.2–1.2)
BILIRUB SERPL-MCNC: 0.7 MG/DL (ref 0.2–1.2)
BILIRUB UR QL STRIP: NEGATIVE
BUN BLD-MCNC: 23 MG/DL (ref 6–20)
BUN BLD-MCNC: 29 MG/DL (ref 6–20)
BUN/CREAT SERPL: 17.4 (ref 7–25)
BUN/CREAT SERPL: 20.9 (ref 7–25)
CALCIUM SPEC-SCNC: 7.5 MG/DL (ref 8.6–10.5)
CALCIUM SPEC-SCNC: 8.9 MG/DL (ref 8.6–10.5)
CHLORIDE SERPL-SCNC: 81 MMOL/L (ref 98–107)
CHLORIDE SERPL-SCNC: 90 MMOL/L (ref 98–107)
CLARITY UR: CLEAR
CO2 SERPL-SCNC: 22 MMOL/L (ref 22–29)
CO2 SERPL-SCNC: 25 MMOL/L (ref 22–29)
COLOR UR: YELLOW
CREAT BLD-MCNC: 1.1 MG/DL (ref 0.76–1.27)
CREAT BLD-MCNC: 1.67 MG/DL (ref 0.76–1.27)
CRP SERPL-MCNC: 37.66 MG/DL (ref 0–0.5)
D-LACTATE SERPL-SCNC: 2.8 MMOL/L (ref 0.5–2)
D-LACTATE SERPL-SCNC: 4.8 MMOL/L (ref 0.5–2)
DEPRECATED RDW RBC AUTO: 47.5 FL (ref 37–54)
DEPRECATED RDW RBC AUTO: 52 FL (ref 37–54)
EOSINOPHIL # BLD MANUAL: 0 10*3/MM3 (ref 0–0.4)
EOSINOPHIL # BLD MANUAL: 0 10*3/MM3 (ref 0–0.4)
EOSINOPHIL NFR BLD MANUAL: 0 % (ref 0.3–6.2)
EOSINOPHIL NFR BLD MANUAL: 0 % (ref 0.3–6.2)
ERYTHROCYTE [DISTWIDTH] IN BLOOD BY AUTOMATED COUNT: 16.7 % (ref 12.3–15.4)
ERYTHROCYTE [DISTWIDTH] IN BLOOD BY AUTOMATED COUNT: 17.2 % (ref 12.3–15.4)
ERYTHROCYTE [SEDIMENTATION RATE] IN BLOOD: >130 MM/HR (ref 0–15)
GFR SERPL CREATININE-BSD FRML MDRD: 45 ML/MIN/1.73
GFR SERPL CREATININE-BSD FRML MDRD: 72 ML/MIN/1.73
GLOBULIN UR ELPH-MCNC: 4.6 GM/DL
GLOBULIN UR ELPH-MCNC: 5.3 GM/DL
GLUCOSE BLD-MCNC: 132 MG/DL (ref 65–99)
GLUCOSE BLD-MCNC: 164 MG/DL (ref 65–99)
GLUCOSE UR STRIP-MCNC: NEGATIVE MG/DL
HCT VFR BLD AUTO: 31 % (ref 37.5–51)
HCT VFR BLD AUTO: 38 % (ref 37.5–51)
HGB BLD-MCNC: 12.7 G/DL (ref 13–17.7)
HGB BLD-MCNC: 9.8 G/DL (ref 13–17.7)
HGB UR QL STRIP.AUTO: ABNORMAL
HOLD SPECIMEN: NORMAL
HYALINE CASTS UR QL AUTO: NORMAL /LPF
HYPOCHROMIA BLD QL: ABNORMAL
INR PPP: 1.55 (ref 0.85–1.16)
KETONES UR QL STRIP: NEGATIVE
LEUKOCYTE ESTERASE UR QL STRIP.AUTO: NEGATIVE
LYMPHOCYTES # BLD MANUAL: 0.57 10*3/MM3 (ref 0.7–3.1)
LYMPHOCYTES # BLD MANUAL: 1.21 10*3/MM3 (ref 0.7–3.1)
LYMPHOCYTES NFR BLD MANUAL: 2 % (ref 19.6–45.3)
LYMPHOCYTES NFR BLD MANUAL: 2 % (ref 5–12)
LYMPHOCYTES NFR BLD MANUAL: 4 % (ref 19.6–45.3)
LYMPHOCYTES NFR BLD MANUAL: 5 % (ref 5–12)
MCH RBC QN AUTO: 26.3 PG (ref 26.6–33)
MCH RBC QN AUTO: 26.4 PG (ref 26.6–33)
MCHC RBC AUTO-ENTMCNC: 31.6 G/DL (ref 31.5–35.7)
MCHC RBC AUTO-ENTMCNC: 33.4 G/DL (ref 31.5–35.7)
MCV RBC AUTO: 79 FL (ref 79–97)
MCV RBC AUTO: 83.3 FL (ref 79–97)
METAMYELOCYTES NFR BLD MANUAL: 2 % (ref 0–0)
MICROCYTES BLD QL: ABNORMAL
MONOCYTES # BLD AUTO: 0.57 10*3/MM3 (ref 0.1–0.9)
MONOCYTES # BLD AUTO: 1.51 10*3/MM3 (ref 0.1–0.9)
MYELOCYTES NFR BLD MANUAL: 1 % (ref 0–0)
NEUTROPHILS # BLD AUTO: 25.95 10*3/MM3 (ref 1.7–7)
NEUTROPHILS # BLD AUTO: 26.32 10*3/MM3 (ref 1.7–7)
NEUTROPHILS NFR BLD MANUAL: 79 % (ref 42.7–76)
NEUTROPHILS NFR BLD MANUAL: 79 % (ref 42.7–76)
NEUTS BAND NFR BLD MANUAL: 14 % (ref 0–5)
NEUTS BAND NFR BLD MANUAL: 7 % (ref 0–5)
NITRITE UR QL STRIP: NEGATIVE
NT-PROBNP SERPL-MCNC: 400.9 PG/ML (ref 5–450)
PH UR STRIP.AUTO: 6 [PH] (ref 5–8)
PLAT MORPH BLD: NORMAL
PLAT MORPH BLD: NORMAL
PLATELET # BLD AUTO: 187 10*3/MM3 (ref 140–450)
PLATELET # BLD AUTO: 262 10*3/MM3 (ref 140–450)
PMV BLD AUTO: 9.2 FL (ref 6–12)
PMV BLD AUTO: 9.9 FL (ref 6–12)
POTASSIUM BLD-SCNC: 4.4 MMOL/L (ref 3.5–5.2)
POTASSIUM BLD-SCNC: 4.6 MMOL/L (ref 3.5–5.2)
PROCALCITONIN SERPL-MCNC: 38.17 NG/ML (ref 0.1–0.25)
PROT SERPL-MCNC: 6.6 G/DL (ref 6–8.5)
PROT SERPL-MCNC: 8.1 G/DL (ref 6–8.5)
PROT UR QL STRIP: NEGATIVE
PROTHROMBIN TIME: 17.8 SECONDS (ref 11.2–14.3)
RBC # BLD AUTO: 3.72 10*6/MM3 (ref 4.14–5.8)
RBC # BLD AUTO: 4.81 10*6/MM3 (ref 4.14–5.8)
RBC # UR: NORMAL /HPF
RBC MORPH BLD: NORMAL
REF LAB TEST METHOD: NORMAL
SODIUM BLD-SCNC: 124 MMOL/L (ref 136–145)
SODIUM BLD-SCNC: 127 MMOL/L (ref 136–145)
SP GR UR STRIP: 1.01 (ref 1–1.03)
SQUAMOUS #/AREA URNS HPF: NORMAL /HPF
TRIGL SERPL-MCNC: 269 MG/DL (ref 0–150)
TROPONIN T SERPL-MCNC: 0.02 NG/ML (ref 0–0.03)
UROBILINOGEN UR QL STRIP: ABNORMAL
VARIANT LYMPHS NFR BLD MANUAL: 5 % (ref 0–5)
WBC MORPH BLD: NORMAL
WBC MORPH BLD: NORMAL
WBC NRBC COR # BLD: 28.3 10*3/MM3 (ref 3.4–10.8)
WBC NRBC COR # BLD: 30.17 10*3/MM3 (ref 3.4–10.8)
WBC UR QL AUTO: NORMAL /HPF
WHOLE BLOOD HOLD SPECIMEN: NORMAL
WHOLE BLOOD HOLD SPECIMEN: NORMAL

## 2019-09-05 PROCEDURE — 85025 COMPLETE CBC W/AUTO DIFF WBC: CPT | Performed by: NURSE PRACTITIONER

## 2019-09-05 PROCEDURE — 71045 X-RAY EXAM CHEST 1 VIEW: CPT

## 2019-09-05 PROCEDURE — 84478 ASSAY OF TRIGLYCERIDES: CPT

## 2019-09-05 PROCEDURE — 85652 RBC SED RATE AUTOMATED: CPT | Performed by: EMERGENCY MEDICINE

## 2019-09-05 PROCEDURE — 25010000002 VANCOMYCIN 10 G RECONSTITUTED SOLUTION: Performed by: EMERGENCY MEDICINE

## 2019-09-05 PROCEDURE — 80053 COMPREHEN METABOLIC PANEL: CPT | Performed by: EMERGENCY MEDICINE

## 2019-09-05 PROCEDURE — 87147 CULTURE TYPE IMMUNOLOGIC: CPT | Performed by: EMERGENCY MEDICINE

## 2019-09-05 PROCEDURE — 85730 THROMBOPLASTIN TIME PARTIAL: CPT | Performed by: NURSE PRACTITIONER

## 2019-09-05 PROCEDURE — 80053 COMPREHEN METABOLIC PANEL: CPT | Performed by: NURSE PRACTITIONER

## 2019-09-05 PROCEDURE — 84145 PROCALCITONIN (PCT): CPT | Performed by: EMERGENCY MEDICINE

## 2019-09-05 PROCEDURE — 87150 DNA/RNA AMPLIFIED PROBE: CPT | Performed by: EMERGENCY MEDICINE

## 2019-09-05 PROCEDURE — 74176 CT ABD & PELVIS W/O CONTRAST: CPT

## 2019-09-05 PROCEDURE — 25010000002 PIPERACILLIN SOD-TAZOBACTAM PER 1 G: Performed by: INTERNAL MEDICINE

## 2019-09-05 PROCEDURE — 83880 ASSAY OF NATRIURETIC PEPTIDE: CPT | Performed by: EMERGENCY MEDICINE

## 2019-09-05 PROCEDURE — 36415 COLL VENOUS BLD VENIPUNCTURE: CPT

## 2019-09-05 PROCEDURE — 84484 ASSAY OF TROPONIN QUANT: CPT | Performed by: EMERGENCY MEDICINE

## 2019-09-05 PROCEDURE — 71250 CT THORAX DX C-: CPT

## 2019-09-05 PROCEDURE — 87186 SC STD MICRODIL/AGAR DIL: CPT | Performed by: EMERGENCY MEDICINE

## 2019-09-05 PROCEDURE — 93005 ELECTROCARDIOGRAM TRACING: CPT | Performed by: EMERGENCY MEDICINE

## 2019-09-05 PROCEDURE — 99291 CRITICAL CARE FIRST HOUR: CPT | Performed by: INTERNAL MEDICINE

## 2019-09-05 PROCEDURE — 81001 URINALYSIS AUTO W/SCOPE: CPT | Performed by: INTERNAL MEDICINE

## 2019-09-05 PROCEDURE — 83605 ASSAY OF LACTIC ACID: CPT | Performed by: EMERGENCY MEDICINE

## 2019-09-05 PROCEDURE — 85025 COMPLETE CBC W/AUTO DIFF WBC: CPT | Performed by: EMERGENCY MEDICINE

## 2019-09-05 PROCEDURE — 87040 BLOOD CULTURE FOR BACTERIA: CPT | Performed by: EMERGENCY MEDICINE

## 2019-09-05 PROCEDURE — 25010000002 PIPERACILLIN SOD-TAZOBACTAM PER 1 G: Performed by: EMERGENCY MEDICINE

## 2019-09-05 PROCEDURE — 86140 C-REACTIVE PROTEIN: CPT | Performed by: EMERGENCY MEDICINE

## 2019-09-05 PROCEDURE — 99285 EMERGENCY DEPT VISIT HI MDM: CPT

## 2019-09-05 PROCEDURE — 85610 PROTHROMBIN TIME: CPT | Performed by: NURSE PRACTITIONER

## 2019-09-05 PROCEDURE — 85007 BL SMEAR W/DIFF WBC COUNT: CPT | Performed by: EMERGENCY MEDICINE

## 2019-09-05 PROCEDURE — 25010000002 HYDROCORTISONE SODIUM SUCCINATE 100 MG RECONSTITUTED SOLUTION: Performed by: NURSE PRACTITIONER

## 2019-09-05 RX ORDER — SODIUM CHLORIDE 0.9 % (FLUSH) 0.9 %
10 SYRINGE (ML) INJECTION AS NEEDED
Status: DISCONTINUED | OUTPATIENT
Start: 2019-09-05 | End: 2019-09-12 | Stop reason: HOSPADM

## 2019-09-05 RX ORDER — ACETAMINOPHEN 500 MG
1000 TABLET ORAL ONCE
Status: COMPLETED | OUTPATIENT
Start: 2019-09-05 | End: 2019-09-05

## 2019-09-05 RX ORDER — L.ACID,PARA/B.BIFIDUM/S.THERM 8B CELL
1 CAPSULE ORAL DAILY
Status: DISCONTINUED | OUTPATIENT
Start: 2019-09-05 | End: 2019-09-07

## 2019-09-05 RX ORDER — VANCOMYCIN HYDROCHLORIDE 1 G/200ML
1000 INJECTION, SOLUTION INTRAVENOUS EVERY 12 HOURS
Status: DISCONTINUED | OUTPATIENT
Start: 2019-09-05 | End: 2019-09-05

## 2019-09-05 RX ORDER — ACETAMINOPHEN 500 MG
1000 TABLET ORAL EVERY 6 HOURS PRN
Status: DISCONTINUED | OUTPATIENT
Start: 2019-09-05 | End: 2019-09-12 | Stop reason: HOSPADM

## 2019-09-05 RX ORDER — SODIUM CHLORIDE 0.9 % (FLUSH) 0.9 %
10 SYRINGE (ML) INJECTION AS NEEDED
Status: DISCONTINUED | OUTPATIENT
Start: 2019-09-05 | End: 2019-09-05

## 2019-09-05 RX ORDER — SODIUM CHLORIDE, SODIUM LACTATE, POTASSIUM CHLORIDE, CALCIUM CHLORIDE 600; 310; 30; 20 MG/100ML; MG/100ML; MG/100ML; MG/100ML
100 INJECTION, SOLUTION INTRAVENOUS CONTINUOUS
Status: DISCONTINUED | OUTPATIENT
Start: 2019-09-05 | End: 2019-09-07

## 2019-09-05 RX ORDER — SODIUM CHLORIDE 0.9 % (FLUSH) 0.9 %
10 SYRINGE (ML) INJECTION EVERY 12 HOURS SCHEDULED
Status: DISCONTINUED | OUTPATIENT
Start: 2019-09-05 | End: 2019-09-12 | Stop reason: HOSPADM

## 2019-09-05 RX ORDER — PREDNISONE 10 MG/1
10 TABLET ORAL 3 TIMES DAILY
Status: ON HOLD | COMMUNITY
End: 2019-09-12 | Stop reason: SDUPTHER

## 2019-09-05 RX ORDER — HYDROXYCHLOROQUINE SULFATE 200 MG/1
200 TABLET, FILM COATED ORAL 2 TIMES DAILY
Status: DISCONTINUED | OUTPATIENT
Start: 2019-09-05 | End: 2019-09-10

## 2019-09-05 RX ORDER — OXYCODONE AND ACETAMINOPHEN 7.5; 325 MG/1; MG/1
1 TABLET ORAL EVERY 6 HOURS PRN
Status: DISCONTINUED | OUTPATIENT
Start: 2019-09-05 | End: 2019-09-12 | Stop reason: HOSPADM

## 2019-09-05 RX ADMIN — HYDROCORTISONE SODIUM SUCCINATE 100 MG: 100 INJECTION, POWDER, FOR SOLUTION INTRAMUSCULAR; INTRAVENOUS at 16:49

## 2019-09-05 RX ADMIN — SODIUM CHLORIDE, POTASSIUM CHLORIDE, SODIUM LACTATE AND CALCIUM CHLORIDE 100 ML/HR: 600; 310; 30; 20 INJECTION, SOLUTION INTRAVENOUS at 16:49

## 2019-09-05 RX ADMIN — OXYCODONE HYDROCHLORIDE AND ACETAMINOPHEN 1 TABLET: 7.5; 325 TABLET ORAL at 16:49

## 2019-09-05 RX ADMIN — Medication 1 CAPSULE: at 17:24

## 2019-09-05 RX ADMIN — TAZOBACTAM SODIUM AND PIPERACILLIN SODIUM 3.38 G: 375; 3 INJECTION, SOLUTION INTRAVENOUS at 20:55

## 2019-09-05 RX ADMIN — ACETAMINOPHEN 1000 MG: 500 TABLET, FILM COATED ORAL at 11:21

## 2019-09-05 RX ADMIN — SODIUM CHLORIDE 850 ML: 9 INJECTION, SOLUTION INTRAVENOUS at 12:16

## 2019-09-05 RX ADMIN — SODIUM CHLORIDE 1000 ML: 9 INJECTION, SOLUTION INTRAVENOUS at 11:19

## 2019-09-05 RX ADMIN — SODIUM CHLORIDE, PRESERVATIVE FREE 10 ML: 5 INJECTION INTRAVENOUS at 20:55

## 2019-09-05 RX ADMIN — VANCOMYCIN HYDROCHLORIDE 1750 MG: 10 INJECTION, POWDER, LYOPHILIZED, FOR SOLUTION INTRAVENOUS at 12:16

## 2019-09-05 RX ADMIN — TAZOBACTAM SODIUM AND PIPERACILLIN SODIUM 3.38 G: 375; 3 INJECTION, SOLUTION INTRAVENOUS at 11:26

## 2019-09-05 RX ADMIN — HYDROXYCHLOROQUINE SULFATE 200 MG: 200 TABLET, FILM COATED ORAL at 20:55

## 2019-09-05 NOTE — H&P
INTENSIVIST   INITIAL HOSPITAL VISIT NOTE     Hospital:  LOS: 0 days     Mr. Stephen Stoll, 44 y.o. male is seen for a Chief Complaint of:  Chief Complaint   Patient presents with   • Shortness of Breath   • Rapid Heart Rate       Severe sepsis (CMS/HCC)    Anaplastic ALK-positive large cell lymphoma     BEAU (acute kidney injury) (CMS/HCC)    Hyponatremia    Bronchial pneumonia    Lactic acidosis    Subjective   S       Stephen Stoll is a 44 y.o. male who presents to [facility] ED 09/05/19 c/o shortness of breath for 3 weeks.     In June 2019 patient was on vacation when he developed persistent dry cough and fevers reaching 102. Upon returning he presented to the [facility] ED with elevated troponin and chest pain. 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 At this time was started on 60 mg Prednisone for 3 weeks along with valsartan and metoprolol.     Nearing the end of July Mr. Stoll developed severe mid-back pain that worsened over time. MRI of the t-spine 8/9/19 showed abnormal lesions between T8-T9 concerning for osteomyelitis vs metastatic disease. Repeat scan 8/21/19 showed enhancement of known lesions within T8-T9 along with subtle additional lesions noted in the upper thoracic vertebrae. He was seen by Dr. Brizuela who ordered a bone marrow biopsy and Dr. Jimenez who coordinated his spine biopsy. The biopsy was nondiagnostic and he then underwent a bone marrow biopsy. There is some concern that this may represent a large cell lymphoma and his pathology is pending review at the [facility].     Dr. Brizuela arranged for him to have an outpatient EGD and colonoscopy to evaluate for possible GI lymphoma. He finished the prep for his colonoscopy. When he presented for his endoscopy, he was noted to be febrile and hypotensive and was recommended to present to the ED. Upon arrival to the ED, his BP was 87/33 and his HR was 152 with a temp of 102.7. He was  started on IV fluids. Dr. Hauser from ID was called and recommended that the patient be started on Vancomycin and Zosyn.     He has recently been tapering his steroids per his Rheumatologist.        PMH: He  has a past medical history of Hyperlipidemia and Pneumonia.   PSxH: He  has a past surgical history that includes Back surgery; Appendectomy; Cardiac catheterization (N/A, 6/24/2019); Bronchoscopy (N/A, 7/10/2019); and Interventional radiology procedure (N/A, 8/26/2019).      Medications:  No current facility-administered medications on file prior to encounter.      Current Outpatient Medications on File Prior to Encounter   Medication Sig   • oxyCODONE-acetaminophen (PERCOCET) 7.5-325 MG per tablet Take 1 tablet PO every 4-6 hours as needed for pain   • predniSONE (DELTASONE) 10 MG tablet Take 10 mg by mouth 3 (Three) Times a Day.   • Sod Picosulfate-Mag Ox-Cit Acd 10-3.5-12 MG-GM -GM/160ML solution Take 1 kit by mouth Take As Directed. Follow instructions that were mailed to your home. If you didn't receive these call [phone].   • acetaminophen (TYLENOL) 500 MG tablet Take 1,000 mg by mouth Every 6 (Six) Hours As Needed for Mild Pain . Alternates with Tramadol   • hydroxychloroquine (PLAQUENIL) 200 MG tablet Take 200 mg by mouth 2 (Two) Times a Day.   • ibuprofen (ADVIL,MOTRIN) 600 MG tablet Take 1 tablet by mouth Every 8 (Eight) Hours As Needed for Mild Pain  (use sparingly).   • lactobacillus acidophilus (RISAQUAD) capsule capsule Take 1 capsule by mouth Daily for 30 days.   • metoprolol succinate XL (TOPROL-XL) 25 MG 24 hr tablet Take 1 tablet by mouth Daily.   • sulfamethoxazole-trimethoprim (BACTRIM DS,SEPTRA DS) 800-160 MG per tablet Take 1 tablet by mouth Every 12 (Twelve) Hours.   • traMADol (ULTRAM) 50 MG tablet Take 100 mg by mouth Every 6 (Six) Hours As Needed for Moderate Pain . Alternates with Tylenol   • [DISCONTINUED] guaiFENesin (ROBITUSSIN) 100 MG/5ML syrup Take 200 mg by mouth 3 (Three)  Times a Day As Needed for Cough.       Allergies: He is allergic to ciprofloxacin.   FH: His family history includes Cancer in his paternal grandfather; Emphysema in his maternal grandfather; Hyperlipidemia in his mother; Hypertension in his father; No Known Problems in his maternal grandmother; Stroke in his maternal grandfather and paternal grandmother.   SH: He  reports that he has never smoked. He has never used smokeless tobacco. He reports that he does not drink alcohol or use drugs.     The patient's relevant past medical, surgical and social history were reviewed and updated in Epic as appropriate.     ROS (14):   Review of Systems   Constitutional: Positive for activity change, fatigue and fever.   HENT: Negative.    Eyes: Negative.    Respiratory: Positive for cough.    Cardiovascular: Negative.    Gastrointestinal: Negative.    Endocrine: Negative.    Genitourinary: Negative.    Musculoskeletal: Positive for back pain.   Skin: Negative.    Allergic/Immunologic: Negative.    Neurological: Positive for weakness.   Hematological: Negative.    Psychiatric/Behavioral: Negative.        Objective   O     Vitals    Temp  Min: 100.8 °F (38.2 °C)  Max: 102.7 °F (39.3 °C)  BP  Min: 87/33  Max: 115/78  Pulse  Min: 111  Max: 152  Resp  Min: 20  Max: 22  SpO2  Min: 91 %  Max: 94 % No Data Recorded     I/O 24 hrs (7:00AM - 6:59 AM)  Intake/Output       09/05/19 0700 - 09/06/19 0659    Intake (ml) 1050    Output (ml) --    Net (ml) 1050    Last Weight  93.4 kg (206 lb)          Physical Examination    Telemetry: Sinus tachycardia.    Constitutional:  No acute distress.  Conversant.   HEENT: Normocephalic and atraumatic.   Neck:  Normal range of motion. Neck supple.   No JVD present.   No thyromegaly.    Cardiovascular: Regular rate and rhythm.  No murmurs, rub or gallop.  No peripheral edema.   Respiratory: Normal respiratory effort.  Clear to ascultation.   Abdominal:  Soft. No masses.   Non-tender. No distension.   No  hepatosplenomegaly.   MSK: Normal muscle strength and tone.   Extremities: No digital cyanosis or clubbing.   Skin: Skin is warm and dry.  No rashes, lesions or ulcers noted.    Neurological:   Alert and Oriented to person, place, and time.   Moves all extremities.   Psychiatric:  Normal affect.   Normal judgment.            Results from last 7 days   Lab Units 09/05/19  1115 08/30/19  1628   WBC 10*3/mm3 30.17* 21.86*   HEMOGLOBIN g/dL 12.7* 13.5   MCV fL 79.0 83.2   PLATELETS 10*3/mm3 262 315     Results from last 7 days   Lab Units 09/05/19  1115 08/30/19  1628   SODIUM mmol/L 124* 136   POTASSIUM mmol/L 4.6 5.2   CO2 mmol/L 25.0 24.0   CREATININE mg/dL 1.67* 0.98     Estimated Creatinine Clearance: 66.3 mL/min (A) (by C-G formula based on SCr of 1.67 mg/dL (H)).  Results from last 7 days   Lab Units 09/05/19  1115 08/30/19  1628   ALK PHOS U/L 137* 137*   BILIRUBIN mg/dL 0.7 0.2   ALT (SGPT) U/L 35 62*   AST (SGOT) U/L 31 31           Images:    Imaging Results (last 24 hours)     Procedure Component Value Units Date/Time    XR Chest 1 View [[id number]] Collected:  09/05/19 1140     Updated:  09/05/19 1148    Narrative:       EXAMINATION: XR CHEST 1 VW- 09/05/2019     INDICATION: SOA triage protocol      COMPARISON: Chest radiograph 08/21/2019     FINDINGS: Single portable chest radiograph is submitted for review.      The heart is top normal size, similar to prior. There has been an  increase in bilateral perihilar airspace changes with interval  development of right mid lung nodular opacities. The visualized upper  abdomen is unrevealing. No acute osseous abnormality.           Impression:       There has been interval worsening of mild perihilar  airspace changes with nodular airspace opacities noted within the right  mid lung of undetermined etiology. Consideration would be for pneumonia  as these are new when compared to 08/21/2019, however underlying  developing pulmonary lesions are not excluded.  Consideration for  follow-up CT of the chest without IV contrast should be considered if  clinically appropriate.     D:  09/05/2019  E:  09/05/2019                  I reviewed the patient's new laboratory and imaging results.  I independently reviewed the patient's new images.    Assessment/Plan   A / P     Mr. Stoll is a 45yo M with a history of fevers, myocarditis, Still's Disease, Spinal lesions and possible large cell lymphoma who presented with hypotension and fevers in the setting of prepping for a colonoscopy. His CXR is consistent with a new right mid-lung infiltrate. He has been given IV fluids with some improvement in his tachycardia and hypotension. He has been on steroids which have recently started to taper.       Nutrition:   Diet Full Liquid  Advance Directives:   There are no questions and answers to display.       Active Hospital Problems    Diagnosis   • **Severe sepsis (CMS/HCC)   • BEAU (acute kidney injury) (CMS/HCC)   • Hyponatremia   • Bronchial pneumonia   • Lactic acidosis   • Anaplastic ALK-positive large cell lymphoma        Assessment / Plan:    1. Admit to ICU  2. Start Vancomycin and Zosyn. ID to see.   3. Start stress dose steroids.   4. IV fluids  5. F/u CT scan of the chest. May require bronchoscopy.   6. Repeat lactate level.   7. Consult Dr. Brizuela.   8. Full liquid diet. Advance as tolerated.   9. Repeat sodium level this evening.   10. AM labs    Critically ill secondary to severe sepsis and hypotension.     I discussed the patient's findings and my recommendations with patient and family    Time:   Critical Care Time: was greater than 45 minutes.(excluding time spent on other separately billable procedures or treating other patients).        Roxanna AUGUSTINE Case, DO  Pulmonary and Critical Care Medicine

## 2019-09-05 NOTE — PROGRESS NOTES
SEPTIC SHOCK FOCUSED EXAM    *Must be completed by a licensed independent Practitioner within 6 hours of diagnosis for the following conditions -- Septic Shock or Severe Sepsis with Lactate >/= 4.    Fluid bolus must be completed prior to assessment.      Diagnosis: Septic Shock     Vital Signs (Attestation) Reviewed Temp, HR, RR, BP, SpO2   Shock Index (HR/SBP) (Attestation) Reviewed    Urine Output (Attestation) Reviewed   General resting comfortably, no distress   Respiratory Within Normal Limits   Cardiac/Chest tachycardia   Skin (documentation of skin color is required) pink mucosa and warm/dry   Capillary Refill <3 seconds   Peripheral Pulses Checked                          radial  palpable     † Septic shock is defined by CMS as severe sepsis plus one of the following: persistent hypotension after fluid bolus OR tissue hypoperfusion (Lactic Acid ?4)  †† ONE OF THE FOLLOWING can be done in lieu of the Focused Exam: Measure CVP; Measure ScVO2; Echocardiogram (cardiac echo or cardiac ultrasound); Dynamic assessment of fluid responsiveness with passive leg raise or fluid challenge.    Roxanna Guo DO  09/05/19  3:17 PM

## 2019-09-05 NOTE — OUTREACH NOTE
Medical Week 2 Survey      Responses   Facility patient discharged from?  [facility]   Does the patient have one of the following disease processes/diagnoses(primary or secondary)?  Other   Week 2 attempt successful?  No   Rescheduled  Revoked   Revoke  Readmitted          Maribell Dumont RN

## 2019-09-05 NOTE — PLAN OF CARE
Problem: Patient Care Overview  Goal: Plan of Care Review  Outcome: Ongoing (interventions implemented as appropriate)   09/05/19 3852   Coping/Psychosocial   Plan of Care Reviewed With patient;spouse   Plan of Care Review   Progress no change   OTHER   Outcome Summary Arrived from the ER this evening. Multiple watery BMs (from bowel prep for colonoscopy). Pt and spouse asking about when EGD/colonoscopy will be done. Afebrile since arrived, some chills. 2L required. Still need sputum and urine. Pt and spouse asking about the CT results but are also wondering about PET scan and how soon it can be scheduled.        Problem: Sepsis/Septic Shock (Adult)  Goal: Signs and Symptoms of Listed Potential Problems Will be Absent, Minimized or Managed (Sepsis/Septic Shock)  Outcome: Ongoing (interventions implemented as appropriate)

## 2019-09-05 NOTE — ED PROVIDER NOTES
Subjective   Stephen Stoll is a 44 y.o. male who presents to the ED c/o shortness of breath for 3 weeks. In 06/2019 patient was on vacation when he became ill and returned with a diagnosis of pneumonia. He presented to the [place] ED with elevated troponin and chest pain. Patient was diagnosed with Stills disease and complains of 4 weeks of fever, rash on his left thigh, chest, and feet bilaterally, and joint pain. While diagnosed with Stills disease he took 60 mg of Prednisone daily for 3 weeks. At the end of July patient had severe mid back pain that worsened with time. Dr. Brizuela, oncology, ordered bone marrow biopsy and Dr. Jimenez, neurosurgeon, coordinated the spine biopsy. Patient had an MRI of his brain on 06/28/2019 and an MRI of his thoracic and lumbar spine on 08/09/2019. Dr. Jimenez stated it may be osteomyelitis. Patient has seen Dr. Hauser, infectious disease, multiple times. He complains of an intermittent cough for 3 months, a current fever of 102.7, decreased appetite and back pain. His current pain is 4/10 in severity. Patient was at [facility] today for upper and lower endoscopy but was sent to the ED for further evaluation. Patient is currently on Bactrim but missed yesterday's dosage for prep for the endoscopies today. He states the shortness of breath is worsened upon getting up and ambulating. His cardiac output was 62% last week while in the hospital. In July patient was admitted with a swollen and erythematous wrist but no infection was found. Patient's primary care physician is Dr. Ramirez. There are no other acute complaints at this time.        History provided by:  Patient and spouse  Shortness of Breath   Severity:  Moderate  Onset quality:  Gradual  Duration:  3 weeks  Timing:  Constant  Progression:  Worsening  Chronicity:  Recurrent  Relieved by:  Lying down  Worsened by:  Movement and exertion  Ineffective treatments:  Sitting up  Associated symptoms: chest pain,  "cough (intermittent for 3 months), fever (102.7. been ongoing for a month) and rash (2 days duration. similar to one pt had in July)        Review of Systems   Constitutional: Positive for appetite change (decreased due to acid reflux) and fever (102.7. been ongoing for a month).   Respiratory: Positive for cough (intermittent for 3 months) and shortness of breath.    Cardiovascular: Positive for chest pain.   Gastrointestinal:        Bloating+   Skin: Positive for rash (2 days duration. similar to one pt had in July).   All other systems reviewed and are negative.      Past Medical History:   Diagnosis Date   • Hyperlipidemia    • Pneumonia        Allergies   Allergen Reactions   • Ciprofloxacin Anxiety     \"Extreme anxiety and paranoia\"       Past Surgical History:   Procedure Laterality Date   • APPENDECTOMY     • BACK SURGERY      L5 S1   • BRONCHOSCOPY N/A 7/10/2019    Procedure: BRONCHOSCOPY WITH ENDOBRONCHIAL ULTRASOUND AND TRANSBRONCHIAL BIOPSY AND FLUORO;  Surgeon: Marvel Sandoval MD;  Location:  [facility];  Service: Pulmonary   • CARDIAC CATHETERIZATION N/A 6/24/2019    Procedure: LEFT HEART CATH;  Surgeon: Deep Muñiz IV, MD;  Location:  [facility];  Service: Cardiovascular   • INTERVENTIONAL RADIOLOGY PROCEDURE N/A 8/26/2019    Procedure: THORACIC SPINE BIOSPY;  Surgeon: Juve Avila MD;  Location:  [facility];  Service: Interventional Radiology       Family History   Problem Relation Age of Onset   • Hyperlipidemia Mother    • Hypertension Father         white coat   • No Known Problems Maternal Grandmother    • Stroke Maternal Grandfather         related to smoking   • Emphysema Maternal Grandfather    • Stroke Paternal Grandmother    • Cancer Paternal Grandfather         throat and mouth       Social History     Socioeconomic History   • Marital status:      Spouse name: Not on file   • Number of children: Not on file   • Years of " education: Not on file   • Highest education level: Not on file   Tobacco Use   • Smoking status: Never Smoker   • Smokeless tobacco: Never Used   Substance and Sexual Activity   • Alcohol use: No     Frequency: Never     Drinks per session: 1 or 2     Comment: socially   • Drug use: No   • Sexual activity: Yes   Social History Narrative    Caffeine use 4-5 servings a day         Objective   Physical Exam   Constitutional: He is oriented to person, place, and time. He appears well-developed and well-nourished. No distress.   HENT:   Head: Normocephalic and atraumatic.   Eyes: Conjunctivae are normal. No scleral icterus.   Neck: Normal range of motion. Neck supple.   Cardiovascular: Regular rhythm and normal heart sounds. Tachycardia present.   No murmur heard.  Peripheral pulses are strong.   Pulmonary/Chest: Effort normal and breath sounds normal. No respiratory distress.   Breath sounds are clear.   Abdominal: Soft. There is tenderness (minimal diffuse tenderness). There is no rebound and no guarding.   Musculoskeletal: Normal range of motion. He exhibits no edema.   Neurological: He is alert and oriented to person, place, and time.   Skin: Skin is warm and dry. Rash noted. There is pallor (mild).   Patient's skin is clammy.  Benign appearing and mildly raised erythamous rash over chest and proximal lower extremities bilaterally.   Psychiatric: He has a normal mood and affect. His behavior is normal.   Nursing note and vitals reviewed.      Critical Care  Performed by: Phi Mir DO  Authorized by: Phi Mir DO     Critical care provider statement:     Critical care time (minutes):  35    Critical care time was exclusive of:  Separately billable procedures and treating other patients    Critical care was necessary to treat or prevent imminent or life-threatening deterioration of the following conditions:  Sepsis    Critical care was time spent personally by me on the following activities:  Development of  treatment plan with patient or surrogate, discussions with consultants, evaluation of patient's response to treatment, examination of patient, obtaining history from patient or surrogate, ordering and performing treatments and interventions, ordering and review of laboratory studies, ordering and review of radiographic studies, pulse oximetry, re-evaluation of patient's condition and review of old charts    I assumed direction of critical care for this patient from another provider in my specialty: no               ED Course  ED Course as of Sep 05 1647   Thu Sep 05, 2019   1150 Dr. Mir spoke with Dr. Hauser, infectious disease. Dr. Hauser agreed with the treatment of Zosyn and Vancomycin. Dr. Hauser recommend holding the Bactrim prescription. He will see the patient.   [BS]   1323 Due to persistent hypotension patient will discuss case with ICU about admission.  [BS]      ED Course User Index  [BS] Robles Dewitt     Recent Results (from the past 24 hour(s))   Triglycerides    Collection Time: 09/05/19 10:21 AM   Result Value Ref Range    Triglycerides 269 (H) 0 - 150 mg/dL   Comprehensive Metabolic Panel    Collection Time: 09/05/19 11:15 AM   Result Value Ref Range    Glucose 132 (H) 65 - 99 mg/dL    BUN 29 (H) 6 - 20 mg/dL    Creatinine 1.67 (H) 0.76 - 1.27 mg/dL    Sodium 124 (L) 136 - 145 mmol/L    Potassium 4.6 3.5 - 5.2 mmol/L    Chloride 81 (L) 98 - 107 mmol/L    CO2 25.0 22.0 - 29.0 mmol/L    Calcium 8.9 8.6 - 10.5 mg/dL    Total Protein 8.1 6.0 - 8.5 g/dL    Albumin 2.80 (L) 3.50 - 5.20 g/dL    ALT (SGPT) 35 1 - 41 U/L    AST (SGOT) 31 1 - 40 U/L    Alkaline Phosphatase 137 (H) 39 - 117 U/L    Total Bilirubin 0.7 0.2 - 1.2 mg/dL    eGFR Non African Amer 45 (L) >60 mL/min/1.73    Globulin 5.3 gm/dL    A/G Ratio 0.5 g/dL    BUN/Creatinine Ratio 17.4 7.0 - 25.0    Anion Gap 18.0 (H) 5.0 - 15.0 mmol/L   BNP    Collection Time: 09/05/19 11:15 AM   Result Value Ref Range    proBNP 400.9 5.0 - 450.0 pg/mL   Troponin     Collection Time: 09/05/19 11:15 AM   Result Value Ref Range    Troponin T 0.017 0.000 - 0.030 ng/mL   Green Top (Gel)    Collection Time: 09/05/19 11:15 AM   Result Value Ref Range    Extra Tube Hold for add-ons.    Lavender Top    Collection Time: 09/05/19 11:15 AM   Result Value Ref Range    Extra Tube hold for add-on    Gold Top - SST    Collection Time: 09/05/19 11:15 AM   Result Value Ref Range    Extra Tube Hold for add-ons.    CBC Auto Differential    Collection Time: 09/05/19 11:15 AM   Result Value Ref Range    WBC 30.17 (C) 3.40 - 10.80 10*3/mm3    RBC 4.81 4.14 - 5.80 10*6/mm3    Hemoglobin 12.7 (L) 13.0 - 17.7 g/dL    Hematocrit 38.0 37.5 - 51.0 %    MCV 79.0 79.0 - 97.0 fL    MCH 26.4 (L) 26.6 - 33.0 pg    MCHC 33.4 31.5 - 35.7 g/dL    RDW 16.7 (H) 12.3 - 15.4 %    RDW-SD 47.5 37.0 - 54.0 fl    MPV 9.2 6.0 - 12.0 fL    Platelets 262 140 - 450 10*3/mm3   Lactic Acid, Plasma    Collection Time: 09/05/19 11:15 AM   Result Value Ref Range    Lactate 4.8 (C) 0.5 - 2.0 mmol/L   Light Blue Top    Collection Time: 09/05/19 11:15 AM   Result Value Ref Range    Extra Tube hold for add-on    Procalcitonin    Collection Time: 09/05/19 11:15 AM   Result Value Ref Range    Procalcitonin 38.17 (H) 0.10 - 0.25 ng/mL   Sedimentation Rate    Collection Time: 09/05/19 11:15 AM   Result Value Ref Range    Sed Rate >130 (H) 0 - 15 mm/hr   C-reactive Protein    Collection Time: 09/05/19 11:15 AM   Result Value Ref Range    C-Reactive Protein 37.66 (H) 0.00 - 0.50 mg/dL   Lactic Acid, Reflex Timer (This will reflex a repeat order 3-3:15 hours after ordered.)    Collection Time: 09/05/19 11:15 AM   Result Value Ref Range    Extra Tube Hold for add-ons.    Manual Differential    Collection Time: 09/05/19 11:15 AM   Result Value Ref Range    Neutrophil % 79.0 (H) 42.7 - 76.0 %    Lymphocyte % 4.0 (L) 19.6 - 45.3 %    Monocyte % 5.0 5.0 - 12.0 %    Eosinophil % 0.0 (L) 0.3 - 6.2 %    Basophil % 0.0 0.0 - 1.5 %    Bands %  7.0 (H)  0.0 - 5.0 %    Atypical Lymphocyte % 5.0 0.0 - 5.0 %    Neutrophils Absolute 25.95 (H) 1.70 - 7.00 10*3/mm3    Lymphocytes Absolute 1.21 0.70 - 3.10 10*3/mm3    Monocytes Absolute 1.51 (H) 0.10 - 0.90 10*3/mm3    Eosinophils Absolute 0.00 0.00 - 0.40 10*3/mm3    Basophils Absolute 0.00 0.00 - 0.20 10*3/mm3    Hypochromia Slight/1+ None Seen    Microcytes Slight/1+ None Seen    WBC Morphology Normal Normal    Platelet Morphology Normal Normal     Note: In addition to lab results from this visit, the labs listed above may include labs taken at another facility or during a different encounter within the last 24 hours. Please correlate lab times with ED admission and discharge times for further clarification of the services performed during this visit.    CT Chest Without Contrast   Preliminary Result       Constellation of findings concerning for metastatic disease:        - Interval development of numerous pulmonary nodules throughout the   lungs with the largest measuring 2.0 cm within the right midlung and   concerning for underlying pulmonary metastasis and which may account for   abnormality identified in the chest radiograph performed 09/05/2019.       - Extensive axillary, mediastinal, and abdominopelvic lymphadenopathy as   described above.       - Hypoattenuated regions involving the liver concerning for additional   sites of metastatic disease, although are limited in evaluation   secondary to lack of intravenous contrast. Attention on follow-up   imaging is advised.       - Redemonstration of T8/T9 aggressive osseous lesions with additional   lytic lesion involving the L4 vertebral body and additional   subcentimeter lytic lesions throughout the thoracolumbar spine   suggested.       Nonspecific perinephric inflammation. Correlate with urinalysis to   exclude underlying pyelonephritis.       DICTATED:   09/05/2019   EDITED/ls :   09/05/2019           CT Abdomen Pelvis Without Contrast   Preliminary Result        Constellation of findings concerning for metastatic disease:        - Interval development of numerous pulmonary nodules throughout the   lungs with the largest measuring 2.0 cm within the right midlung and   concerning for underlying pulmonary metastasis and which may account for   abnormality identified in the chest radiograph performed 09/05/2019.       - Extensive axillary, mediastinal, and abdominopelvic lymphadenopathy as   described above.       - Hypoattenuated regions involving the liver concerning for additional   sites of metastatic disease, although are limited in evaluation   secondary to lack of intravenous contrast. Attention on follow-up   imaging is advised.       - Redemonstration of T8/T9 aggressive osseous lesions with additional   lytic lesion involving the L4 vertebral body and additional   subcentimeter lytic lesions throughout the thoracolumbar spine   suggested.       Nonspecific perinephric inflammation. Correlate with urinalysis to   exclude underlying pyelonephritis.       DICTATED:   09/05/2019   EDITED/ls :   09/05/2019           XR Chest 1 View   Preliminary Result   There has been interval worsening of mild perihilar   airspace changes with nodular airspace opacities noted within the right   mid lung of undetermined etiology. Consideration would be for pneumonia   as these are new when compared to 08/21/2019, however underlying   developing pulmonary lesions are not excluded. Consideration for   follow-up CT of the chest without IV contrast should be considered if   clinically appropriate.       D:  09/05/2019   E:  09/05/2019              XR Chest 1 View    (Results Pending)     Vitals:    09/05/19 1320 09/05/19 1500 09/05/19 1549 09/05/19 1639   BP: 96/42 109/66  105/71   BP Location: Left arm   Left arm   Patient Position: Lying   Sitting   Pulse:  106  (!) 126   Resp:  19  24   Temp:   99 °F (37.2 °C) 98.7 °F (37.1 °C)   TempSrc:    Oral   SpO2:  95%  98%   Weight:    88.9 kg  (195 lb 15.8 oz)   Height:         Medications   sodium chloride 0.9 % flush 10 mL (not administered)   acetaminophen (TYLENOL) tablet 1,000 mg (not administered)   hydroxychloroquine (PLAQUENIL) tablet 200 mg (not administered)   lactobacillus acidophilus (RISAQUAD) capsule 1 capsule (not administered)   oxyCODONE-acetaminophen (PERCOCET) 7.5-325 MG per tablet 1 tablet (not administered)   sodium chloride 0.9 % flush 10 mL (not administered)   sodium chloride 0.9 % flush 10 mL (not administered)   lactated ringers infusion (not administered)   hydrocortisone sodium succinate (Solu-CORTEF) injection 100 mg (not administered)   sodium chloride 0.9 % bolus 1,000 mL (0 mL Intravenous Stopped 9/5/19 1215)   acetaminophen (TYLENOL) tablet 1,000 mg (1,000 mg Oral Given 9/5/19 1121)   piperacillin-tazobactam (ZOSYN) 3.375 g in iso-osmotic dextrose 50 ml (premix) (0 g Intravenous Stopped 9/5/19 1215)   vancomycin 1750 mg/500 mL 0.9% NS IVPB ([facility]) (0 mg/kg × 93.4 kg Intravenous Stopped 9/5/19 1416)   sodium chloride 0.9 % bolus 1,850 mL (0 mL Intravenous Stopped 9/5/19 1246)     ECG/EMG Results (last 24 hours)     Procedure Component Value Units Date/Time    ECG 12 Lead [[id number]] Collected:  09/05/19 1111     Updated:  09/05/19 1149        ECG 12 Lead                             MDM    Final diagnoses:   Sepsis, due to unspecified organism (CMS/HCC)   Shortness of breath   Abnormal chest x-ray   Leukocytosis, unspecified type       Documentation assistance provided by cedrick Dewitt.  Information recorded by the scrtramaine was done at my direction and has been verified and validated by me.     Robles Dewitt  09/05/19 1403       Phi Mir DO  09/05/19 1644       Phi Mir DO  09/05/19 [id number]

## 2019-09-06 ENCOUNTER — ANESTHESIA (OUTPATIENT)
Dept: PERIOP | Facility: HOSPITAL | Age: 44
End: 2019-09-06

## 2019-09-06 ENCOUNTER — ANESTHESIA EVENT (OUTPATIENT)
Dept: PERIOP | Facility: HOSPITAL | Age: 44
End: 2019-09-06

## 2019-09-06 ENCOUNTER — APPOINTMENT (OUTPATIENT)
Dept: GENERAL RADIOLOGY | Facility: HOSPITAL | Age: 44
End: 2019-09-06

## 2019-09-06 PROBLEM — E87.20 LACTIC ACIDOSIS: Status: RESOLVED | Noted: 2019-09-05 | Resolved: 2019-09-06

## 2019-09-06 PROBLEM — R59.0 MEDIASTINAL LYMPHADENOPATHY: Status: ACTIVE | Noted: 2019-09-05

## 2019-09-06 LAB
ABO GROUP BLD: NORMAL
ABO GROUP BLD: NORMAL
ALBUMIN SERPL-MCNC: 1.9 G/DL (ref 3.5–5.2)
ALBUMIN/GLOB SERPL: 0.5 G/DL
ALP SERPL-CCNC: 110 U/L (ref 39–117)
ALT SERPL W P-5'-P-CCNC: 26 U/L (ref 1–41)
ANION GAP SERPL CALCULATED.3IONS-SCNC: 12 MMOL/L (ref 5–15)
AST SERPL-CCNC: 25 U/L (ref 1–40)
BACTERIA BLD CULT: ABNORMAL
BILIRUB SERPL-MCNC: 0.3 MG/DL (ref 0.2–1.2)
BLD GP AB SCN SERPL QL: NEGATIVE
BUN BLD-MCNC: 17 MG/DL (ref 6–20)
BUN/CREAT SERPL: 18.1 (ref 7–25)
CALCIUM SPEC-SCNC: 7.7 MG/DL (ref 8.6–10.5)
CHLORIDE SERPL-SCNC: 94 MMOL/L (ref 98–107)
CO2 SERPL-SCNC: 25 MMOL/L (ref 22–29)
CREAT BLD-MCNC: 0.94 MG/DL (ref 0.76–1.27)
D-LACTATE SERPL-SCNC: 3.9 MMOL/L (ref 0.5–2)
DEPRECATED RDW RBC AUTO: 49.9 FL (ref 37–54)
ERYTHROCYTE [DISTWIDTH] IN BLOOD BY AUTOMATED COUNT: 17.1 % (ref 12.3–15.4)
GFR SERPL CREATININE-BSD FRML MDRD: 87 ML/MIN/1.73
GLOBULIN UR ELPH-MCNC: 4.2 GM/DL
GLUCOSE BLD-MCNC: 132 MG/DL (ref 65–99)
HCT VFR BLD AUTO: 28.2 % (ref 37.5–51)
HGB BLD-MCNC: 9.1 G/DL (ref 13–17.7)
LDH SERPL-CCNC: 228 U/L (ref 135–225)
MAGNESIUM SERPL-MCNC: 2.5 MG/DL (ref 1.6–2.6)
MCH RBC QN AUTO: 26.1 PG (ref 26.6–33)
MCHC RBC AUTO-ENTMCNC: 32.3 G/DL (ref 31.5–35.7)
MCV RBC AUTO: 81 FL (ref 79–97)
PLATELET # BLD AUTO: 162 10*3/MM3 (ref 140–450)
PMV BLD AUTO: 9.9 FL (ref 6–12)
POTASSIUM BLD-SCNC: 4.2 MMOL/L (ref 3.5–5.2)
PROT SERPL-MCNC: 6.1 G/DL (ref 6–8.5)
RBC # BLD AUTO: 3.48 10*6/MM3 (ref 4.14–5.8)
RH BLD: POSITIVE
RH BLD: POSITIVE
SODIUM BLD-SCNC: 131 MMOL/L (ref 136–145)
T&S EXPIRATION DATE: NORMAL
URATE SERPL-MCNC: 3.8 MG/DL (ref 3.4–7)
WBC NRBC COR # BLD: 24.62 10*3/MM3 (ref 3.4–10.8)

## 2019-09-06 PROCEDURE — 99255 IP/OBS CONSLTJ NEW/EST HI 80: CPT | Performed by: INTERNAL MEDICINE

## 2019-09-06 PROCEDURE — 88341 IMHCHEM/IMCYTCHM EA ADD ANTB: CPT | Performed by: THORACIC SURGERY (CARDIOTHORACIC VASCULAR SURGERY)

## 2019-09-06 PROCEDURE — 88305 TISSUE EXAM BY PATHOLOGIST: CPT | Performed by: THORACIC SURGERY (CARDIOTHORACIC VASCULAR SURGERY)

## 2019-09-06 PROCEDURE — 25010000002 DEXAMETHASONE PER 1 MG: Performed by: NURSE ANESTHETIST, CERTIFIED REGISTERED

## 2019-09-06 PROCEDURE — 25010000003 CEFAZOLIN IN DEXTROSE 2-4 GM/100ML-% SOLUTION: Performed by: PHYSICIAN ASSISTANT

## 2019-09-06 PROCEDURE — 99255 IP/OBS CONSLTJ NEW/EST HI 80: CPT | Performed by: THORACIC SURGERY (CARDIOTHORACIC VASCULAR SURGERY)

## 2019-09-06 PROCEDURE — 25010000002 KETOROLAC TROMETHAMINE PER 15 MG: Performed by: NURSE PRACTITIONER

## 2019-09-06 PROCEDURE — 80053 COMPREHEN METABOLIC PANEL: CPT | Performed by: NURSE PRACTITIONER

## 2019-09-06 PROCEDURE — 83735 ASSAY OF MAGNESIUM: CPT | Performed by: NURSE PRACTITIONER

## 2019-09-06 PROCEDURE — 85027 COMPLETE CBC AUTOMATED: CPT | Performed by: NURSE PRACTITIONER

## 2019-09-06 PROCEDURE — 88331 PATH CONSLTJ SURG 1 BLK 1SPC: CPT | Performed by: PATHOLOGY

## 2019-09-06 PROCEDURE — 39402 MEDIASTINOSCPY W/LMPH NOD BX: CPT | Performed by: THORACIC SURGERY (CARDIOTHORACIC VASCULAR SURGERY)

## 2019-09-06 PROCEDURE — 84550 ASSAY OF BLOOD/URIC ACID: CPT | Performed by: INTERNAL MEDICINE

## 2019-09-06 PROCEDURE — 07B74ZX EXCISION OF THORAX LYMPHATIC, PERCUTANEOUS ENDOSCOPIC APPROACH, DIAGNOSTIC: ICD-10-PCS | Performed by: THORACIC SURGERY (CARDIOTHORACIC VASCULAR SURGERY)

## 2019-09-06 PROCEDURE — 25010000002 FENTANYL CITRATE (PF) 100 MCG/2ML SOLUTION: Performed by: NURSE ANESTHETIST, CERTIFIED REGISTERED

## 2019-09-06 PROCEDURE — 86901 BLOOD TYPING SEROLOGIC RH(D): CPT

## 2019-09-06 PROCEDURE — 86900 BLOOD TYPING SEROLOGIC ABO: CPT

## 2019-09-06 PROCEDURE — 0BJ08ZZ INSPECTION OF TRACHEOBRONCHIAL TREE, VIA NATURAL OR ARTIFICIAL OPENING ENDOSCOPIC: ICD-10-PCS | Performed by: THORACIC SURGERY (CARDIOTHORACIC VASCULAR SURGERY)

## 2019-09-06 PROCEDURE — 25010000002 VANCOMYCIN 10 G RECONSTITUTED SOLUTION

## 2019-09-06 PROCEDURE — 86900 BLOOD TYPING SEROLOGIC ABO: CPT | Performed by: PHYSICIAN ASSISTANT

## 2019-09-06 PROCEDURE — 25010000002 PHENYLEPHRINE PER 1 ML: Performed by: NURSE ANESTHETIST, CERTIFIED REGISTERED

## 2019-09-06 PROCEDURE — 25010000002 ONDANSETRON PER 1 MG: Performed by: NURSE ANESTHETIST, CERTIFIED REGISTERED

## 2019-09-06 PROCEDURE — 88360 TUMOR IMMUNOHISTOCHEM/MANUAL: CPT | Performed by: THORACIC SURGERY (CARDIOTHORACIC VASCULAR SURGERY)

## 2019-09-06 PROCEDURE — 83605 ASSAY OF LACTIC ACID: CPT | Performed by: NURSE PRACTITIONER

## 2019-09-06 PROCEDURE — 86901 BLOOD TYPING SEROLOGIC RH(D): CPT | Performed by: PHYSICIAN ASSISTANT

## 2019-09-06 PROCEDURE — 88185 FLOWCYTOMETRY/TC ADD-ON: CPT

## 2019-09-06 PROCEDURE — 25010000002 PROPOFOL 10 MG/ML EMULSION: Performed by: NURSE ANESTHETIST, CERTIFIED REGISTERED

## 2019-09-06 PROCEDURE — 25010000002 HYDROCORTISONE SODIUM SUCCINATE 100 MG RECONSTITUTED SOLUTION: Performed by: NURSE PRACTITIONER

## 2019-09-06 PROCEDURE — 25010000002 PIPERACILLIN SOD-TAZOBACTAM PER 1 G: Performed by: INTERNAL MEDICINE

## 2019-09-06 PROCEDURE — 88342 IMHCHEM/IMCYTCHM 1ST ANTB: CPT | Performed by: THORACIC SURGERY (CARDIOTHORACIC VASCULAR SURGERY)

## 2019-09-06 PROCEDURE — 99232 SBSQ HOSP IP/OBS MODERATE 35: CPT | Performed by: INTERNAL MEDICINE

## 2019-09-06 PROCEDURE — 25010000002 MIDAZOLAM PER 1 MG: Performed by: NURSE ANESTHETIST, CERTIFIED REGISTERED

## 2019-09-06 PROCEDURE — 83615 LACTATE (LD) (LDH) ENZYME: CPT | Performed by: INTERNAL MEDICINE

## 2019-09-06 PROCEDURE — 71045 X-RAY EXAM CHEST 1 VIEW: CPT

## 2019-09-06 PROCEDURE — 86923 COMPATIBILITY TEST ELECTRIC: CPT

## 2019-09-06 PROCEDURE — 86850 RBC ANTIBODY SCREEN: CPT | Performed by: PHYSICIAN ASSISTANT

## 2019-09-06 PROCEDURE — 25010000003 LIDOCAINE 1 % SOLUTION: Performed by: NURSE ANESTHETIST, CERTIFIED REGISTERED

## 2019-09-06 PROCEDURE — 88184 FLOWCYTOMETRY/ TC 1 MARKER: CPT

## 2019-09-06 RX ORDER — MIDAZOLAM HYDROCHLORIDE 1 MG/ML
INJECTION INTRAMUSCULAR; INTRAVENOUS AS NEEDED
Status: DISCONTINUED | OUTPATIENT
Start: 2019-09-06 | End: 2019-09-06 | Stop reason: SURG

## 2019-09-06 RX ORDER — HYDROMORPHONE HYDROCHLORIDE 1 MG/ML
0.5 INJECTION, SOLUTION INTRAMUSCULAR; INTRAVENOUS; SUBCUTANEOUS
Status: DISCONTINUED | OUTPATIENT
Start: 2019-09-06 | End: 2019-09-06 | Stop reason: HOSPADM

## 2019-09-06 RX ORDER — ALLOPURINOL 300 MG/1
300 TABLET ORAL DAILY
Status: DISCONTINUED | OUTPATIENT
Start: 2019-09-06 | End: 2019-09-12 | Stop reason: HOSPADM

## 2019-09-06 RX ORDER — DEXAMETHASONE SODIUM PHOSPHATE 4 MG/ML
INJECTION, SOLUTION INTRA-ARTICULAR; INTRALESIONAL; INTRAMUSCULAR; INTRAVENOUS; SOFT TISSUE AS NEEDED
Status: DISCONTINUED | OUTPATIENT
Start: 2019-09-06 | End: 2019-09-06 | Stop reason: SURG

## 2019-09-06 RX ORDER — PROMETHAZINE HYDROCHLORIDE 25 MG/ML
12.5 INJECTION, SOLUTION INTRAMUSCULAR; INTRAVENOUS ONCE AS NEEDED
Status: DISCONTINUED | OUTPATIENT
Start: 2019-09-06 | End: 2019-09-06 | Stop reason: HOSPADM

## 2019-09-06 RX ORDER — LIDOCAINE HYDROCHLORIDE 10 MG/ML
INJECTION, SOLUTION INFILTRATION; PERINEURAL AS NEEDED
Status: DISCONTINUED | OUTPATIENT
Start: 2019-09-06 | End: 2019-09-06 | Stop reason: SURG

## 2019-09-06 RX ORDER — ONDANSETRON 2 MG/ML
INJECTION INTRAMUSCULAR; INTRAVENOUS AS NEEDED
Status: DISCONTINUED | OUTPATIENT
Start: 2019-09-06 | End: 2019-09-06 | Stop reason: SURG

## 2019-09-06 RX ORDER — SODIUM CHLORIDE 0.9 % (FLUSH) 0.9 %
3 SYRINGE (ML) INJECTION EVERY 12 HOURS SCHEDULED
Status: DISCONTINUED | OUTPATIENT
Start: 2019-09-06 | End: 2019-09-06 | Stop reason: HOSPADM

## 2019-09-06 RX ORDER — METOPROLOL SUCCINATE 25 MG/1
25 TABLET, EXTENDED RELEASE ORAL ONCE
Status: COMPLETED | OUTPATIENT
Start: 2019-09-06 | End: 2019-09-06

## 2019-09-06 RX ORDER — CEFAZOLIN SODIUM 2 G/100ML
2 INJECTION, SOLUTION INTRAVENOUS ONCE
Status: COMPLETED | OUTPATIENT
Start: 2019-09-06 | End: 2019-09-06

## 2019-09-06 RX ORDER — FENTANYL CITRATE 50 UG/ML
INJECTION, SOLUTION INTRAMUSCULAR; INTRAVENOUS AS NEEDED
Status: DISCONTINUED | OUTPATIENT
Start: 2019-09-06 | End: 2019-09-06 | Stop reason: SURG

## 2019-09-06 RX ORDER — FAMOTIDINE 20 MG/1
20 TABLET, FILM COATED ORAL ONCE
Status: COMPLETED | OUTPATIENT
Start: 2019-09-06 | End: 2019-09-06

## 2019-09-06 RX ORDER — NEOSTIGMINE METHYLSULFATE 5 MG/5 ML
SYRINGE (ML) INTRAVENOUS AS NEEDED
Status: DISCONTINUED | OUTPATIENT
Start: 2019-09-06 | End: 2019-09-06 | Stop reason: SURG

## 2019-09-06 RX ORDER — PROMETHAZINE HYDROCHLORIDE 25 MG/1
25 SUPPOSITORY RECTAL ONCE AS NEEDED
Status: DISCONTINUED | OUTPATIENT
Start: 2019-09-06 | End: 2019-09-06 | Stop reason: HOSPADM

## 2019-09-06 RX ORDER — KETOROLAC TROMETHAMINE 30 MG/ML
30 INJECTION, SOLUTION INTRAMUSCULAR; INTRAVENOUS ONCE AS NEEDED
Status: COMPLETED | OUTPATIENT
Start: 2019-09-06 | End: 2019-09-06

## 2019-09-06 RX ORDER — MAGNESIUM HYDROXIDE 1200 MG/15ML
LIQUID ORAL AS NEEDED
Status: DISCONTINUED | OUTPATIENT
Start: 2019-09-06 | End: 2019-09-06 | Stop reason: HOSPADM

## 2019-09-06 RX ORDER — FENTANYL CITRATE 50 UG/ML
50 INJECTION, SOLUTION INTRAMUSCULAR; INTRAVENOUS
Status: DISCONTINUED | OUTPATIENT
Start: 2019-09-06 | End: 2019-09-06 | Stop reason: HOSPADM

## 2019-09-06 RX ORDER — SODIUM CHLORIDE 450 MG/100ML
100 INJECTION, SOLUTION INTRAVENOUS CONTINUOUS
Status: DISCONTINUED | OUTPATIENT
Start: 2019-09-06 | End: 2019-09-07

## 2019-09-06 RX ORDER — PROPOFOL 10 MG/ML
VIAL (ML) INTRAVENOUS AS NEEDED
Status: DISCONTINUED | OUTPATIENT
Start: 2019-09-06 | End: 2019-09-06 | Stop reason: SURG

## 2019-09-06 RX ORDER — GLYCOPYRROLATE 0.2 MG/ML
INJECTION INTRAMUSCULAR; INTRAVENOUS AS NEEDED
Status: DISCONTINUED | OUTPATIENT
Start: 2019-09-06 | End: 2019-09-06 | Stop reason: SURG

## 2019-09-06 RX ORDER — SODIUM CHLORIDE 0.9 % (FLUSH) 0.9 %
10 SYRINGE (ML) INJECTION AS NEEDED
Status: DISCONTINUED | OUTPATIENT
Start: 2019-09-06 | End: 2019-09-06 | Stop reason: HOSPADM

## 2019-09-06 RX ORDER — ONDANSETRON 2 MG/ML
4 INJECTION INTRAMUSCULAR; INTRAVENOUS ONCE AS NEEDED
Status: DISCONTINUED | OUTPATIENT
Start: 2019-09-06 | End: 2019-09-06 | Stop reason: HOSPADM

## 2019-09-06 RX ORDER — ATRACURIUM BESYLATE 10 MG/ML
INJECTION, SOLUTION INTRAVENOUS AS NEEDED
Status: DISCONTINUED | OUTPATIENT
Start: 2019-09-06 | End: 2019-09-06 | Stop reason: SURG

## 2019-09-06 RX ORDER — PROMETHAZINE HYDROCHLORIDE 25 MG/1
25 TABLET ORAL ONCE AS NEEDED
Status: DISCONTINUED | OUTPATIENT
Start: 2019-09-06 | End: 2019-09-06 | Stop reason: HOSPADM

## 2019-09-06 RX ORDER — SODIUM CHLORIDE, SODIUM LACTATE, POTASSIUM CHLORIDE, CALCIUM CHLORIDE 600; 310; 30; 20 MG/100ML; MG/100ML; MG/100ML; MG/100ML
9 INJECTION, SOLUTION INTRAVENOUS CONTINUOUS
Status: DISCONTINUED | OUTPATIENT
Start: 2019-09-06 | End: 2019-09-07

## 2019-09-06 RX ADMIN — ATRACURIUM BESYLATE 40 MG: 10 INJECTION, SOLUTION INTRAVENOUS at 10:58

## 2019-09-06 RX ADMIN — Medication 3 MG: at 11:56

## 2019-09-06 RX ADMIN — TAZOBACTAM SODIUM AND PIPERACILLIN SODIUM 3.38 G: 375; 3 INJECTION, SOLUTION INTRAVENOUS at 16:26

## 2019-09-06 RX ADMIN — FAMOTIDINE 20 MG: 20 TABLET ORAL at 10:00

## 2019-09-06 RX ADMIN — FENTANYL CITRATE 50 MCG: 50 INJECTION, SOLUTION INTRAMUSCULAR; INTRAVENOUS at 10:58

## 2019-09-06 RX ADMIN — DEXAMETHASONE SODIUM PHOSPHATE 8 MG: 4 INJECTION, SOLUTION INTRAMUSCULAR; INTRAVENOUS at 11:12

## 2019-09-06 RX ADMIN — FENTANYL CITRATE 50 MCG: 50 INJECTION, SOLUTION INTRAMUSCULAR; INTRAVENOUS at 11:46

## 2019-09-06 RX ADMIN — SODIUM CHLORIDE, PRESERVATIVE FREE 10 ML: 5 INJECTION INTRAVENOUS at 20:13

## 2019-09-06 RX ADMIN — ATRACURIUM BESYLATE 10 MG: 10 INJECTION, SOLUTION INTRAVENOUS at 11:24

## 2019-09-06 RX ADMIN — CEFAZOLIN SODIUM 2 G: 2 INJECTION, SOLUTION INTRAVENOUS at 10:51

## 2019-09-06 RX ADMIN — VANCOMYCIN HYDROCHLORIDE 1500 MG: 10 INJECTION, POWDER, LYOPHILIZED, FOR SOLUTION INTRAVENOUS at 00:07

## 2019-09-06 RX ADMIN — TAZOBACTAM SODIUM AND PIPERACILLIN SODIUM 3.38 G: 375; 3 INJECTION, SOLUTION INTRAVENOUS at 02:12

## 2019-09-06 RX ADMIN — VANCOMYCIN HYDROCHLORIDE 1500 MG: 10 INJECTION, POWDER, LYOPHILIZED, FOR SOLUTION INTRAVENOUS at 12:00

## 2019-09-06 RX ADMIN — ONDANSETRON 4 MG: 2 INJECTION INTRAMUSCULAR; INTRAVENOUS at 11:35

## 2019-09-06 RX ADMIN — Medication 1 CAPSULE: at 13:56

## 2019-09-06 RX ADMIN — HYDROXYCHLOROQUINE SULFATE 200 MG: 200 TABLET, FILM COATED ORAL at 16:26

## 2019-09-06 RX ADMIN — FENTANYL CITRATE 50 MCG: 50 INJECTION, SOLUTION INTRAMUSCULAR; INTRAVENOUS at 11:22

## 2019-09-06 RX ADMIN — HYDROCORTISONE SODIUM SUCCINATE 100 MG: 100 INJECTION, POWDER, FOR SOLUTION INTRAMUSCULAR; INTRAVENOUS at 09:11

## 2019-09-06 RX ADMIN — METOPROLOL SUCCINATE 25 MG: 25 TABLET, EXTENDED RELEASE ORAL at 10:13

## 2019-09-06 RX ADMIN — PHENYLEPHRINE HYDROCHLORIDE 100 MCG: 10 INJECTION INTRAVENOUS at 11:13

## 2019-09-06 RX ADMIN — BENZOCAINE AND MENTHOL 1 LOZENGE: 15; 3.6 LOZENGE ORAL at 17:56

## 2019-09-06 RX ADMIN — FENTANYL CITRATE 50 MCG: 50 INJECTION, SOLUTION INTRAMUSCULAR; INTRAVENOUS at 12:16

## 2019-09-06 RX ADMIN — PHENYLEPHRINE HYDROCHLORIDE 100 MCG: 10 INJECTION INTRAVENOUS at 11:34

## 2019-09-06 RX ADMIN — MIDAZOLAM HYDROCHLORIDE 2 MG: 1 INJECTION, SOLUTION INTRAMUSCULAR; INTRAVENOUS at 10:32

## 2019-09-06 RX ADMIN — HYDROCORTISONE SODIUM SUCCINATE 100 MG: 100 INJECTION, POWDER, FOR SOLUTION INTRAMUSCULAR; INTRAVENOUS at 16:26

## 2019-09-06 RX ADMIN — HYDROCORTISONE SODIUM SUCCINATE 100 MG: 100 INJECTION, POWDER, FOR SOLUTION INTRAMUSCULAR; INTRAVENOUS at 00:07

## 2019-09-06 RX ADMIN — LIDOCAINE HYDROCHLORIDE 50 MG: 10 INJECTION, SOLUTION INFILTRATION; PERINEURAL at 10:58

## 2019-09-06 RX ADMIN — HYDROXYCHLOROQUINE SULFATE 200 MG: 200 TABLET, FILM COATED ORAL at 20:13

## 2019-09-06 RX ADMIN — GLYCOPYRROLATE 0.4 MG: 0.2 INJECTION, SOLUTION INTRAMUSCULAR; INTRAVENOUS at 11:56

## 2019-09-06 RX ADMIN — FENTANYL CITRATE 50 MCG: 50 INJECTION INTRAMUSCULAR; INTRAVENOUS at 13:05

## 2019-09-06 RX ADMIN — ACETAMINOPHEN 1000 MG: 500 TABLET, FILM COATED ORAL at 00:08

## 2019-09-06 RX ADMIN — TAZOBACTAM SODIUM AND PIPERACILLIN SODIUM 3.38 G: 375; 3 INJECTION, SOLUTION INTRAVENOUS at 20:13

## 2019-09-06 RX ADMIN — PROPOFOL 180 MG: 10 INJECTION, EMULSION INTRAVENOUS at 10:58

## 2019-09-06 RX ADMIN — FENTANYL CITRATE 50 MCG: 50 INJECTION INTRAMUSCULAR; INTRAVENOUS at 12:51

## 2019-09-06 RX ADMIN — BENZOCAINE AND MENTHOL 1 LOZENGE: 15; 3.6 LOZENGE ORAL at 13:56

## 2019-09-06 RX ADMIN — PHENYLEPHRINE HYDROCHLORIDE 100 MCG: 10 INJECTION INTRAVENOUS at 11:33

## 2019-09-06 RX ADMIN — TAZOBACTAM SODIUM AND PIPERACILLIN SODIUM 3.38 G: 375; 3 INJECTION, SOLUTION INTRAVENOUS at 10:36

## 2019-09-06 RX ADMIN — ATRACURIUM BESYLATE 10 MG: 10 INJECTION, SOLUTION INTRAVENOUS at 11:23

## 2019-09-06 RX ADMIN — BENZOCAINE AND MENTHOL 1 LOZENGE: 15; 3.6 LOZENGE ORAL at 21:33

## 2019-09-06 RX ADMIN — SODIUM CHLORIDE, POTASSIUM CHLORIDE, SODIUM LACTATE AND CALCIUM CHLORIDE 100 ML/HR: 600; 310; 30; 20 INJECTION, SOLUTION INTRAVENOUS at 04:45

## 2019-09-06 RX ADMIN — KETOROLAC TROMETHAMINE 30 MG: 30 INJECTION, SOLUTION INTRAMUSCULAR; INTRAVENOUS at 17:56

## 2019-09-06 RX ADMIN — ALLOPURINOL 300 MG: 300 TABLET ORAL at 13:57

## 2019-09-06 RX ADMIN — SODIUM CHLORIDE, POTASSIUM CHLORIDE, SODIUM LACTATE AND CALCIUM CHLORIDE 100 ML/HR: 600; 310; 30; 20 INJECTION, SOLUTION INTRAVENOUS at 10:00

## 2019-09-06 NOTE — PROGRESS NOTES
Multidisciplinary Rounds    Time: 20min  Patient Name: Stephen Stoll  Date of Encounter: 09/06/19 9:48 AM  MRN: [id number]  Admission date: 9/5/2019      Reason for visit: MDR. RD to continue to follow per protocol.     Additional information obtained during MDR: Pt going to the OR today for bronchoscopy and diagnostic mediastinoscopy.     Current diet: NPO Diet      Intervention:  Follow treatment plan  Care plan reviewed    Follow up:   Per protocol      Mary Pardo MS RD/MONY CNSC  9:48 AM

## 2019-09-06 NOTE — PROGRESS NOTES
Does not appear to be clinically toxic.  Extensive discussion with patient and wife at bedside.  Aware of fever last evening.  Drenching night sweats.  No shaking Reiger's.  Denies cough or purulent sputum production.  Mild dyspepsia.  No nausea vomiting or diarrhea.  No genitourinary tract symptoms.  Complains of macular reticular eruption over left dorsal foot, left medial thigh, and base of neck.  1       Stephen Stoll  1975  [id number]  9/6/2019    CC: Fever of unknown origin, generalized lymphadenopathy, and necrotic mediastinal lymph node    Stephen Stoll is a 44 y.o. 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      Past medical history:  Past Medical History:   Diagnosis Date   • Hyperlipidemia    • Pneumonia        Medications:   Current Facility-Administered Medications:   •  [START ON 9/7/2019] ! Vancomycin trough Saturday 9/7 @1130. Please hold 1200 dose until evaluated by pharmacy., , Does not apply, Once, Yesi Martinez, [facility]  •  acetaminophen (TYLENOL) tablet 1,000 mg, 1,000 mg, Oral, Q6H PRN, Case, Roxanna V., DO, 1,000 mg at 09/06/19 0008  •  allopurinol (ZYLOPRIM) tablet 300 mg, 300 mg, Oral, Daily, Brent Brizuela MD  •  hydrocortisone sodium succinate (Solu-CORTEF) injection 100 mg, 100 mg, Intravenous, Q8H, Sanjuanita Ching DNP, APRN, 100 mg at 09/06/19 0007  •  hydroxychloroquine (PLAQUENIL) tablet 200 mg, 200 mg, Oral, BID, Brant, Roxanna V., DO, 200 mg at 09/05/19 2055  •  lactated ringers infusion, 100 mL/hr, Intravenous, Continuous, Sanjuanita Ching DNP, APRMARISELA, Last Rate: 100 mL/hr at 09/06/19 0445, 100 mL/hr at 09/06/19 0445  •  lactobacillus acidophilus (RISAQUAD) capsule 1 capsule, 1 capsule, Oral, Daily, Brant, Roxanna V., DO, 1 capsule at 09/05/19 1724  •  oxyCODONE-acetaminophen  (PERCOCET) 7.5-325 MG per tablet 1 tablet, 1 tablet, Oral, Q6H PRN, Brant Roxanna V., DO, 1 tablet at 09/05/19 1649  •  Pharmacy to dose vancomycin, , Does not apply, Continuous PRN, Lazaro Hauser MD  •  piperacillin-tazobactam (ZOSYN) 3.375 g in iso-osmotic dextrose 50 ml (premix), 3.375 g, Intravenous, Q6H, Lazaro Hauser MD, 3.375 g at 09/06/19 0212  •  sodium chloride 0.9 % flush 10 mL, 10 mL, Intravenous, PRN, [place], Phi, DO  •  sodium chloride 0.9 % flush 10 mL, 10 mL, Intravenous, Q12H, Sanjuanita Ching DNP, APRN, 10 mL at 09/05/19 2055  •  sodium chloride 0.9 % flush 10 mL, 10 mL, Intravenous, PRN, Sanjuanita Ching, RAVINDER, APRN  •  vancomycin 1500 mg/500 mL 0.9% NS IVPB ([facility]), 1,500 mg, Intravenous, Q12H, Yesi Martinez RPH, 1,500 mg at 09/06/19 0007  Antibiotics:  Anti-Infectives (From admission, onward)    Ordered     Dose/Rate Route Frequency Start Stop    09/06/19 0655  Pharmacy to dose vancomycin     Ordering Provider:  Lazaro Hauser MD     Does not apply Continuous PRN 09/06/19 0655 09/16/19 0654    09/05/19 2031  vancomycin 1500 mg/500 mL 0.9% NS IVPB ([facility])     Joslyn Ochoa RPH reviewed the order on 09/06/19 0649.   Ordering Provider:  Yesi Martinez RPH    1,500 mg  over 90 Minutes Intravenous Every 12 Hours 09/06/19 0000 09/15/19 2359    09/05/19 1520  hydroxychloroquine (PLAQUENIL) tablet 200 mg     Joslyn Ochoa RPH reviewed the order on 09/06/19 0716.   Ordering Provider:  Brant Roxanna V., DO    200 mg Oral 2 Times Daily 09/05/19 2100      09/05/19 2012  piperacillin-tazobactam (ZOSYN) 3.375 g in iso-osmotic dextrose 50 ml (premix)     Ordering Provider:  Lazaro Hauser MD    3.375 g  over 30 Minutes Intravenous Every 6 Hours 09/05/19 2100 09/15/19 2059    09/05/19 1121  vancomycin 1750 mg/500 mL 0.9% NS IVPB ([facility])     Ordering Provider:  Phi Mir DO    20 mg/kg × 93.4 kg  over 120 Minutes Intravenous Once 09/05/19 1123 09/05/19 1416    09/05/19 1121  piperacillin-tazobactam  "(ZOSYN) 3.375 g in iso-osmotic dextrose 50 ml (premix)     Ordering Provider:  Phi Mir,     3.375 g  over 30 Minutes Intravenous Once 09/05/19 1122 09/05/19 1215          Allergies:  is allergic to ciprofloxacin and sulfa antibiotics.    Review of Systems: All other reviewed and negative except as per HPI    Blood pressure 101/65, pulse 79, temperature 97.7 °F (36.5 °C), temperature source Oral, resp. rate 20, height 182.9 cm (72\"), weight 88.9 kg (195 lb 15.8 oz), SpO2 98 %.  GENERAL: Awake and alert, in no acute distress.  The patient's wife is present during the exam and interview.  I spoke with them at length regarding his upcoming mediastinal lymph node biopsy.  He was aware of hectic fever last evening.  Positive drenching night sweat.  No pulmonary or genitourinary tract complaints.  Mild dyspepsia without nausea vomiting or diarrhea.  HEENT: Oropharynx without thrush. Sinuses nontender. Dentition in good repair.  Bulky cervical lymphadenopathy. No carotid bruits/ jugular venous distention.   EYES: PERRL. No conjunctival injection. No icterus. EOMI.  LYMPHATICS: + lymphadenopathy of the neck and axillary regions.   HEART: No murmur, gallop, or pericardial friction rub.   LUNGS: Clear to auscultation anteriorly. No percussion dullness.   ABDOMEN: Soft, nontender, nondistended.  Spleen tip palpable 8 cm below the left costal margin..    SKIN: Reticular eruptions with mild erythema on left dorsal foot, left medial thigh, and base of neck. No embolic stigmata.   PSYCHIATRIC: Mental status lucid. Cranial nerve function intact.       DIAGNOSTICS:  Lab Results   Component Value Date    WBC 24.62 (H) 09/06/2019    HGB 9.1 (L) 09/06/2019    HCT 28.2 (L) 09/06/2019     09/06/2019     Lab Results   Component Value Date    CRP 37.66 (H) 09/05/2019     Lab Results   Component Value Date    SEDRATE >130 (H) 09/05/2019     Lab Results   Component Value Date    GLUCOSE 132 (H) 09/06/2019    BUN 17 09/06/2019    " CREATININE 0.94 09/06/2019    EGFRIFNONA 87 09/06/2019    BCR 18.1 09/06/2019    CO2 25.0 09/06/2019    CALCIUM 7.7 (L) 09/06/2019    PROTENTOTREF 8.0 08/30/2019    ALBUMIN 1.90 (L) 09/06/2019    LABIL2 0.6 (L) 08/30/2019    AST 25 09/06/2019    ALT 26 09/06/2019       Microbiology: 9/3 C. difficile toxin assay by PCR negative.  9/5 blood cultures with gram-positive cocci in 2/2 sats.  Bio fire multiplex PCR negative for MEC a gene cassette/oxacillin susceptible isolate.    RADIOLOGY:  Imaging Results (last 72 hours)     Procedure Component Value Units Date/Time    XR Chest 1 View [[id number]] Updated:  09/06/19 0603    CT Chest Without Contrast [[id number]] Collected:  09/05/19 1551     Updated:  09/05/19 1607    Narrative:       EXAMINATION: CT CHEST WO CONTRAST, CT ABDOMEN/PELVIS WO CONTRAST -  09/05/2019     INDICATION: Cough, sepsis, abnormal chest x-ray.     TECHNIQUE: Unenhanced CT imaging of the chest, abdomen, and pelvis was  performed. Additional coronal and sagittal reformatted imaging was  provided for review.     The radiation dose reduction device was turned on for each scan per the  ALARA (As Low as Reasonably Achievable) protocol.     COMPARISON: MRI of the thoracic and lumbar spine 08/09/2019.     FINDINGS:      CHEST: Dedicated CT imaging of the chest demonstrates interval  development of numerous pulmonary nodules throughout the lungs, the  largest within the right mid lung measuring 2.0 cm. These are concerning  for metastatic foci given the provided clinical history. No focal  pneumonia, pleural effusion, or pneumothorax identified. These pulmonary  nodules account for the abnormality seen on the  chest radiograph from  09/05/2019.     Extensive axillary and mediastinal lymphadenopathy is identified. The  largest within the right paratracheal region measures up to 2.1 cm and  demonstrates mild central necrosis. The pulmonary artery is within  normal limits in size. The heart is within normal  limits in size. Trace  pericardial effusion is identified. Prominent cardiophrenic lymph nodes  are also noted.     ABDOMEN/PELVIS: Lack of intravenous contrast limits evaluation of  underlying abdominal and pelvic organs. Hypoattenuated region involving  the right hepatic lobe is identified with additional more confluent  hypoattenuating region within the mid right hepatic lobe noted measuring  up to 2.5 cm which is concerning for an additional site of metastatic  disease. The gallbladder demonstrates mild cholelithiasis. The spleen is  enlarged and otherwise unremarkable. The pancreas does not demonstrate  abnormality. The adrenal glands and kidneys also do not demonstrate  abnormality. No evidence of hydronephrosis. There is mild perinephric  inflammation which requires clinical correlation. Extensive peritoneal  and retroperitoneal lymphadenopathy is identified throughout the  visualized abdomen and pelvis. The urinary bladder is partially  distended and otherwise unremarkable. The stomach is decompressed and  unremarkable. No evidence of small bowel obstruction. No free air or  free fluid identified. Postsurgical changes in the right lower quadrant  are identified and likely from prior appendectomy.  Small fat-filled  right inguinal and left inguinal hernias are noted.     Osseous structures: The visualized bony pelvis appears intact.  Degenerative changes of the bilateral hips are identified.  Re-demonstration of aggressive osseous changes at T8 and T9, previously  documented MRI thoracic spine from 8/21/2019. No convincing evidence of  posterior cortex retropulsion identified. Additional lytic lesion  involving L4 is also noted.       Impression:          Constellation of findings concerning for metastatic disease:      - Interval development of numerous pulmonary nodules throughout the  lungs with the largest measuring 2.0 cm within the right midlung and  concerning for underlying pulmonary metastasis and  which may account for  abnormality identified in the chest radiograph performed 09/05/2019.     - Extensive axillary, mediastinal, and abdominopelvic lymphadenopathy as  described above.     - Hypoattenuated regions involving the liver concerning for additional  sites of metastatic disease, although are limited in evaluation  secondary to lack of intravenous contrast. Attention on follow-up  imaging is advised.     - Redemonstration of T8/T9 aggressive osseous lesions with additional  lytic lesion involving the L4 vertebral body and additional  subcentimeter lytic lesions throughout the thoracolumbar spine  suggested.     Nonspecific perinephric inflammation. Correlate with urinalysis to  exclude underlying pyelonephritis.     DICTATED:   09/05/2019  EDITED/ls :   09/05/2019        CT Abdomen Pelvis Without Contrast [[id number]] Collected:  09/05/19 1551     Updated:  09/05/19 1607    Narrative:       EXAMINATION: CT CHEST WO CONTRAST, CT ABDOMEN/PELVIS WO CONTRAST -  09/05/2019     INDICATION: Cough, sepsis, abnormal chest x-ray.     TECHNIQUE: Unenhanced CT imaging of the chest, abdomen, and pelvis was  performed. Additional coronal and sagittal reformatted imaging was  provided for review.     The radiation dose reduction device was turned on for each scan per the  ALARA (As Low as Reasonably Achievable) protocol.     COMPARISON: MRI of the thoracic and lumbar spine 08/09/2019.     FINDINGS:      CHEST: Dedicated CT imaging of the chest demonstrates interval  development of numerous pulmonary nodules throughout the lungs, the  largest within the right mid lung measuring 2.0 cm. These are concerning  for metastatic foci given the provided clinical history. No focal  pneumonia, pleural effusion, or pneumothorax identified. These pulmonary  nodules account for the abnormality seen on the  chest radiograph from  09/05/2019.     Extensive axillary and mediastinal lymphadenopathy is identified. The  largest within the  right paratracheal region measures up to 2.1 cm and  demonstrates mild central necrosis. The pulmonary artery is within  normal limits in size. The heart is within normal limits in size. Trace  pericardial effusion is identified. Prominent cardiophrenic lymph nodes  are also noted.     ABDOMEN/PELVIS: Lack of intravenous contrast limits evaluation of  underlying abdominal and pelvic organs. Hypoattenuated region involving  the right hepatic lobe is identified with additional more confluent  hypoattenuating region within the mid right hepatic lobe noted measuring  up to 2.5 cm which is concerning for an additional site of metastatic  disease. The gallbladder demonstrates mild cholelithiasis. The spleen is  enlarged and otherwise unremarkable. The pancreas does not demonstrate  abnormality. The adrenal glands and kidneys also do not demonstrate  abnormality. No evidence of hydronephrosis. There is mild perinephric  inflammation which requires clinical correlation. Extensive peritoneal  and retroperitoneal lymphadenopathy is identified throughout the  visualized abdomen and pelvis. The urinary bladder is partially  distended and otherwise unremarkable. The stomach is decompressed and  unremarkable. No evidence of small bowel obstruction. No free air or  free fluid identified. Postsurgical changes in the right lower quadrant  are identified and likely from prior appendectomy.  Small fat-filled  right inguinal and left inguinal hernias are noted.     Osseous structures: The visualized bony pelvis appears intact.  Degenerative changes of the bilateral hips are identified.  Re-demonstration of aggressive osseous changes at T8 and T9, previously  documented MRI thoracic spine from 8/21/2019. No convincing evidence of  posterior cortex retropulsion identified. Additional lytic lesion  involving L4 is also noted.       Impression:          Constellation of findings concerning for metastatic disease:      - Interval  development of numerous pulmonary nodules throughout the  lungs with the largest measuring 2.0 cm within the right midlung and  concerning for underlying pulmonary metastasis and which may account for  abnormality identified in the chest radiograph performed 09/05/2019.     - Extensive axillary, mediastinal, and abdominopelvic lymphadenopathy as  described above.     - Hypoattenuated regions involving the liver concerning for additional  sites of metastatic disease, although are limited in evaluation  secondary to lack of intravenous contrast. Attention on follow-up  imaging is advised.     - Redemonstration of T8/T9 aggressive osseous lesions with additional  lytic lesion involving the L4 vertebral body and additional  subcentimeter lytic lesions throughout the thoracolumbar spine  suggested.     Nonspecific perinephric inflammation. Correlate with urinalysis to  exclude underlying pyelonephritis.     DICTATED:   09/05/2019  EDITED/ls :   09/05/2019        XR Chest 1 View [[id number]] Collected:  09/05/19 1140     Updated:  09/05/19 1148    Narrative:       EXAMINATION: XR CHEST 1 VW- 09/05/2019     INDICATION: SOA triage protocol      COMPARISON: Chest radiograph 08/21/2019     FINDINGS: Single portable chest radiograph is submitted for review.      The heart is top normal size, similar to prior. There has been an  increase in bilateral perihilar airspace changes with interval  development of right mid lung nodular opacities. The visualized upper  abdomen is unrevealing. No acute osseous abnormality.           Impression:       There has been interval worsening of mild perihilar  airspace changes with nodular airspace opacities noted within the right  mid lung of undetermined etiology. Consideration would be for pneumonia  as these are new when compared to 08/21/2019, however underlying  developing pulmonary lesions are not excluded. Consideration for  follow-up CT of the chest without IV contrast should be  considered if  clinically appropriate.     D:  09/05/2019  E:  09/05/2019                Assessment and Plan: Inflammatory myocarditis.  Markedly depressed left ventricular ejection fraction/resolved post corticosteroids.  Leukocytosis with bandemia/24.6.  Massive increase in inflammatory markers.  Severe diffuse arthralgias.  Elevated serum ferritin/surrogate marker for stills disease.  Rapid expansion of generalized lymphadenopathy with necrotic mediastinal lymph node.  High-grade Staphylococcus aureus bacteremia/oxacillin susceptible strain.  Lactic acidosis/3.9.  Hyponatremia.  Protein calorie malnutrition with serum albumin 1.9.  Coagulopathy with PT/INR 18/1.6.  Lytic lesion at T8/T9.  Hepatic hypodensities concerning for metastatic disease.  Maximum temperature over 24 hours 1-2.7.  Currently 97.7.  Transient hypotension with systolic blood pressure of 87/33 mmHg.  Pulse 79/respiratory rate 20.  No arterial desaturation.  Currently on vancomycin and piperacillin-tazobactam.  Immunocompromised host on corticosteroids and Plaquenil.  Plans for a mediastinoscopy and node biopsy today.  Hypotension and worsening lactic acidosis troubling.  On allopurinol for tumor lysis risk.  While there is no definitive causation, there are numerous reports in the literature of adult onset stills disease in association with both solid tumors of breast, lung, kidney, and thyroid gland.  Furthermore, hematologic malignancies including acute myelogenous leukemia and non-Hodgkin's lymphomas are described.  The staph aureus in the patient's bloodstream should be susceptible to both vancomycin and piperacillin-tazobactam.  Therapy could be de-escalated to either a first generation cephalosporin or semisynthetic penicillin.  In light of the patient's hemodynamic instability and lactic acidosis, I would prefer that he remain on broad coverage.  It is difficult to know if the patient still's disease was precipitated by an underlying  non-Hodgkin's lymphoma or whether his current bulky lymphadenopathy and pulmonary nodules reflect evolution of an opportunistic infection following 3 months of corticosteroids and Plaquenil.  In addition to frozen section, formal histopathology, and flow cytometry please submit lymph node tissue for Gram stain, aerobic and anaerobic cultures, fungal preparation and cultures, acid-fast smears and cultures, and preserve some unfixed lymph node tissue for DNA PCR testing in the future, if indicated.  The patient will likely require transesophageal echocardiography to exclude an endovascular focus of infection in light of high-grade Staphylococcus aureus bacteremia without an obvious drainable/removable focus.  I will see on rounds on Monday morning.  SURAJ ARRIAZA will see this weekend.  Greater than 45 minutes spent in data review, radiographic assessment, culture review, consideration of antimicrobial modification, bedside examination, and in discussion with family.  Noah Dumont MD  9/6/2019

## 2019-09-06 NOTE — PLAN OF CARE
Problem: Patient Care Overview  Goal: Plan of Care Review  Outcome: Ongoing (interventions implemented as appropriate)   09/06/19 1720   Coping/Psychosocial   Plan of Care Reviewed With patient;spouse   Plan of Care Review   Progress improving   OTHER   Outcome Summary VSS. Afebrile. Underwent medisternotoscopy with biopsy today, site dressing CDI. Pain controlled w/PRN percocet. Pt very pleasant, accepting and optimistic despite news he was given today. Diet ordered. UOP adequate. Will continue to monitor. Plan for port to be placed next week.       Problem: Fall Risk (Adult)  Goal: Identify Related Risk Factors and Signs and Symptoms  Outcome: Outcome(s) achieved Date Met: 09/06/19    Goal: Absence of Fall  Outcome: Ongoing (interventions implemented as appropriate)

## 2019-09-06 NOTE — CONSULTS
Stephen Stoll  1975  [id number]  9/5/2019      Referring Provider: No ref. provider found  Reason for Consultation:   Chief Complaint   Patient presents with   • Shortness of Breath   • Rapid Heart Rate         Subjective   History of present illness:    Patient is a very pleasant  44 y.o.  Yr old male with CC of fever  Patient was otherwise well until about4 months ago  Had URI and possible pna episode  Found to have myocarditis with low EF  Extensive work-up negative    Second admission a nonth later with wrist redness and SOA  Bronch negative  Given steroids    Third admission with back pain.  Found to have spine lesions  BM Bx negative  Back Bx suspecious for Lymphoma?  Home of Bactrim    Now with further dyspnea, fever chills weakness and rash  Back pain still there but better  Some cough  Was to have endoscopies today, cancelled          Past Medical History:   Diagnosis Date   • Hyperlipidemia    • Pneumonia        Past Surgical History:   Procedure Laterality Date   • APPENDECTOMY     • BACK SURGERY      L5 S1   • BRONCHOSCOPY N/A 7/10/2019    Procedure: BRONCHOSCOPY WITH ENDOBRONCHIAL ULTRASOUND AND TRANSBRONCHIAL BIOPSY AND FLUORO;  Surgeon: Marvel Sandoval MD;  Location:  [facility] ENDOSCOPY;  Service: Pulmonary   • CARDIAC CATHETERIZATION N/A 6/24/2019    Procedure: LEFT HEART CATH;  Surgeon: Deep Muñiz IV, MD;  Location:  [facility];  Service: Cardiovascular   • INTERVENTIONAL RADIOLOGY PROCEDURE N/A 8/26/2019    Procedure: THORACIC SPINE BIOSPY;  Surgeon: Juve Avila MD;  Location:  [facility];  Service: Interventional Radiology       Pediatric History   Patient Guardian Status   • Not on file     Other Topics Concern   • Not on file   Social History Narrative    Caffeine use 4-5 servings a day       family history includes Cancer in his paternal grandfather; Emphysema in his maternal grandfather; Hyperlipidemia in his mother; Hypertension  "in his father; No Known Problems in his maternal grandmother; Stroke in his maternal grandfather and paternal grandmother.    Allergies   Allergen Reactions   • Ciprofloxacin Anxiety     \"Extreme anxiety and paranoia\"   • Sulfa Antibiotics Rash       Medication:  Antibiotics:  Anti-Infectives (From admission, onward)    Ordered     Dose/Rate Route Frequency Start Stop    09/05/19 1520  hydroxychloroquine (PLAQUENIL) tablet 200 mg     Ordering Provider:  Brant, Roxanna AGUUSTINE., DO    200 mg Oral 2 Times Daily 09/05/19 2100      09/05/19 2012  piperacillin-tazobactam (ZOSYN) 3.375 g in iso-osmotic dextrose 50 ml (premix)     Ordering Provider:  Lazaro Hauser MD    3.375 g  over 30 Minutes Intravenous Every 6 Hours 09/05/19 2100 09/15/19 2059    09/05/19 2012  vancomycin (VANCOCIN) in iso-osmotic dextrose IVPB 1 g (premix) 200 mL     Ordering Provider:  Lazaro Hauser MD    1,000 mg  over 120 Minutes Intravenous Every 12 Hours 09/05/19 2100 09/15/19 2059    09/05/19 1121  vancomycin 1750 mg/500 mL 0.9% NS IVPB ([facility])     Ordering Provider:  Phi Mir DO    20 mg/kg × 93.4 kg  over 120 Minutes Intravenous Once 09/05/19 1123 09/05/19 1416    09/05/19 1121  piperacillin-tazobactam (ZOSYN) 3.375 g in iso-osmotic dextrose 50 ml (premix)     Ordering Provider:  Phi Mir DO    3.375 g  over 30 Minutes Intravenous Once 09/05/19 1122 09/05/19 1215          Please refer to the medical record for a full medication list    Review of Systems    General: fever  HEENT:  NO icterus  Resp:  Co cough  CV: No chest pain  ABD:  No diarrhea  :  No dysuria  MS:  co pain  Back  better  Neuro:  Co CVA  Skin:  co rash  Endocrine:  no DM  Psych:  Co anxiety  Hematological  No bleeding      Objective     Physical Exam:   Vital Signs   Temp:  [98.7 °F (37.1 °C)-102.7 °F (39.3 °C)] 98.7 °F (37.1 °C)  Heart Rate:  [106-152] 126  Resp:  [19-24] 24  BP: ()/(33-78) 105/71    Blood pressure 105/71, pulse (!) 126, temperature 98.7 °F (37.1 °C), " "temperature source Oral, resp. rate 24, height 182.9 cm (72\"), weight 88.9 kg (195 lb 15.8 oz), SpO2 98 %.  GENERAL: Awake and alert, in some distress.   HEENT: Oropharynx without thrush. . Dentition in good repair. No cervical adenopathy. No neck masses  EYES: PERRL. No conjunctival injection. No icterus.   LYMPHATICS: No lymphadenopathy of the neck or axillary or inguinal regions.   HEART: No murmur, gallop, or pericardial friction rub.   LUNGS: Clear to auscultation anteriorly. mild respiratory distress  ABDOMEN: Soft, nontender, nondistended. No appreciable HSM.    SKIN: rash on legs  PSYCHIATRIC: Mental status lucid. No confusion  Seems depressed  EXT:  No cellulitic change        Lab Results   Component Value Date    WBC 28.30 (H) 09/05/2019    HGB 9.8 (L) 09/05/2019    HCT 31.0 (L) 09/05/2019    MCV 83.3 09/05/2019     09/05/2019       Lab Results   Component Value Date    GLUCOSE 132 (H) 09/05/2019    BUN 29 (H) 09/05/2019    CREATININE 1.67 (H) 09/05/2019    EGFRIFNONA 45 (L) 09/05/2019    BCR 17.4 09/05/2019    CO2 25.0 09/05/2019    CALCIUM 8.9 09/05/2019    PROTENTOTREF 8.0 08/30/2019    ALBUMIN 2.80 (L) 09/05/2019    LABIL2 0.6 (L) 08/30/2019    AST 31 09/05/2019    ALT 35 09/05/2019       Estimated Creatinine Clearance: 70.2 mL/min (A) (by C-G formula based on SCr of 1.67 mg/dL (H)).      Microbiology  reviewed      Radiology:  Imaging Results (last 72 hours)     Procedure Component Value Units Date/Time    CT Chest Without Contrast [[id number]] Collected:  09/05/19 1551     Updated:  09/05/19 1607    Narrative:       EXAMINATION: CT CHEST WO CONTRAST, CT ABDOMEN/PELVIS WO CONTRAST -  09/05/2019     INDICATION: Cough, sepsis, abnormal chest x-ray.     TECHNIQUE: Unenhanced CT imaging of the chest, abdomen, and pelvis was  performed. Additional coronal and sagittal reformatted imaging was  provided for review.     The radiation dose reduction device was turned on for each scan per the  ALARA (As " Low as Reasonably Achievable) protocol.     COMPARISON: MRI of the thoracic and lumbar spine 08/09/2019.     FINDINGS:      CHEST: Dedicated CT imaging of the chest demonstrates interval  development of numerous pulmonary nodules throughout the lungs, the  largest within the right mid lung measuring 2.0 cm. These are concerning  for metastatic foci given the provided clinical history. No focal  pneumonia, pleural effusion, or pneumothorax identified. These pulmonary  nodules account for the abnormality seen on the  chest radiograph from  09/05/2019.     Extensive axillary and mediastinal lymphadenopathy is identified. The  largest within the right paratracheal region measures up to 2.1 cm and  demonstrates mild central necrosis. The pulmonary artery is within  normal limits in size. The heart is within normal limits in size. Trace  pericardial effusion is identified. Prominent cardiophrenic lymph nodes  are also noted.     ABDOMEN/PELVIS: Lack of intravenous contrast limits evaluation of  underlying abdominal and pelvic organs. Hypoattenuated region involving  the right hepatic lobe is identified with additional more confluent  hypoattenuating region within the mid right hepatic lobe noted measuring  up to 2.5 cm which is concerning for an additional site of metastatic  disease. The gallbladder demonstrates mild cholelithiasis. The spleen is  enlarged and otherwise unremarkable. The pancreas does not demonstrate  abnormality. The adrenal glands and kidneys also do not demonstrate  abnormality. No evidence of hydronephrosis. There is mild perinephric  inflammation which requires clinical correlation. Extensive peritoneal  and retroperitoneal lymphadenopathy is identified throughout the  visualized abdomen and pelvis. The urinary bladder is partially  distended and otherwise unremarkable. The stomach is decompressed and  unremarkable. No evidence of small bowel obstruction. No free air or  free fluid identified.  Postsurgical changes in the right lower quadrant  are identified and likely from prior appendectomy.  Small fat-filled  right inguinal and left inguinal hernias are noted.     Osseous structures: The visualized bony pelvis appears intact.  Degenerative changes of the bilateral hips are identified.  Re-demonstration of aggressive osseous changes at T8 and T9, previously  documented MRI thoracic spine from 8/21/2019. No convincing evidence of  posterior cortex retropulsion identified. Additional lytic lesion  involving L4 is also noted.       Impression:          Constellation of findings concerning for metastatic disease:      - Interval development of numerous pulmonary nodules throughout the  lungs with the largest measuring 2.0 cm within the right midlung and  concerning for underlying pulmonary metastasis and which may account for  abnormality identified in the chest radiograph performed 09/05/2019.     - Extensive axillary, mediastinal, and abdominopelvic lymphadenopathy as  described above.     - Hypoattenuated regions involving the liver concerning for additional  sites of metastatic disease, although are limited in evaluation  secondary to lack of intravenous contrast. Attention on follow-up  imaging is advised.     - Redemonstration of T8/T9 aggressive osseous lesions with additional  lytic lesion involving the L4 vertebral body and additional  subcentimeter lytic lesions throughout the thoracolumbar spine  suggested.     Nonspecific perinephric inflammation. Correlate with urinalysis to  exclude underlying pyelonephritis.     DICTATED:   09/05/2019  EDITED/ls :   09/05/2019        CT Abdomen Pelvis Without Contrast [[id number]] Collected:  09/05/19 1551     Updated:  09/05/19 1607    Narrative:       EXAMINATION: CT CHEST WO CONTRAST, CT ABDOMEN/PELVIS WO CONTRAST -  09/05/2019     INDICATION: Cough, sepsis, abnormal chest x-ray.     TECHNIQUE: Unenhanced CT imaging of the chest, abdomen, and pelvis  was  performed. Additional coronal and sagittal reformatted imaging was  provided for review.     The radiation dose reduction device was turned on for each scan per the  ALARA (As Low as Reasonably Achievable) protocol.     COMPARISON: MRI of the thoracic and lumbar spine 08/09/2019.     FINDINGS:      CHEST: Dedicated CT imaging of the chest demonstrates interval  development of numerous pulmonary nodules throughout the lungs, the  largest within the right mid lung measuring 2.0 cm. These are concerning  for metastatic foci given the provided clinical history. No focal  pneumonia, pleural effusion, or pneumothorax identified. These pulmonary  nodules account for the abnormality seen on the  chest radiograph from  09/05/2019.     Extensive axillary and mediastinal lymphadenopathy is identified. The  largest within the right paratracheal region measures up to 2.1 cm and  demonstrates mild central necrosis. The pulmonary artery is within  normal limits in size. The heart is within normal limits in size. Trace  pericardial effusion is identified. Prominent cardiophrenic lymph nodes  are also noted.     ABDOMEN/PELVIS: Lack of intravenous contrast limits evaluation of  underlying abdominal and pelvic organs. Hypoattenuated region involving  the right hepatic lobe is identified with additional more confluent  hypoattenuating region within the mid right hepatic lobe noted measuring  up to 2.5 cm which is concerning for an additional site of metastatic  disease. The gallbladder demonstrates mild cholelithiasis. The spleen is  enlarged and otherwise unremarkable. The pancreas does not demonstrate  abnormality. The adrenal glands and kidneys also do not demonstrate  abnormality. No evidence of hydronephrosis. There is mild perinephric  inflammation which requires clinical correlation. Extensive peritoneal  and retroperitoneal lymphadenopathy is identified throughout the  visualized abdomen and pelvis. The urinary bladder is  partially  distended and otherwise unremarkable. The stomach is decompressed and  unremarkable. No evidence of small bowel obstruction. No free air or  free fluid identified. Postsurgical changes in the right lower quadrant  are identified and likely from prior appendectomy.  Small fat-filled  right inguinal and left inguinal hernias are noted.     Osseous structures: The visualized bony pelvis appears intact.  Degenerative changes of the bilateral hips are identified.  Re-demonstration of aggressive osseous changes at T8 and T9, previously  documented MRI thoracic spine from 8/21/2019. No convincing evidence of  posterior cortex retropulsion identified. Additional lytic lesion  involving L4 is also noted.       Impression:          Constellation of findings concerning for metastatic disease:      - Interval development of numerous pulmonary nodules throughout the  lungs with the largest measuring 2.0 cm within the right midlung and  concerning for underlying pulmonary metastasis and which may account for  abnormality identified in the chest radiograph performed 09/05/2019.     - Extensive axillary, mediastinal, and abdominopelvic lymphadenopathy as  described above.     - Hypoattenuated regions involving the liver concerning for additional  sites of metastatic disease, although are limited in evaluation  secondary to lack of intravenous contrast. Attention on follow-up  imaging is advised.     - Redemonstration of T8/T9 aggressive osseous lesions with additional  lytic lesion involving the L4 vertebral body and additional  subcentimeter lytic lesions throughout the thoracolumbar spine  suggested.     Nonspecific perinephric inflammation. Correlate with urinalysis to  exclude underlying pyelonephritis.     DICTATED:   09/05/2019  EDITED/ls :   09/05/2019        XR Chest 1 View [[id number]] Collected:  09/05/19 1140     Updated:  09/05/19 1148    Narrative:       EXAMINATION: XR CHEST 1 VW- 09/05/2019     INDICATION:  SOA triage protocol      COMPARISON: Chest radiograph 08/21/2019     FINDINGS: Single portable chest radiograph is submitted for review.      The heart is top normal size, similar to prior. There has been an  increase in bilateral perihilar airspace changes with interval  development of right mid lung nodular opacities. The visualized upper  abdomen is unrevealing. No acute osseous abnormality.           Impression:       There has been interval worsening of mild perihilar  airspace changes with nodular airspace opacities noted within the right  mid lung of undetermined etiology. Consideration would be for pneumonia  as these are new when compared to 08/21/2019, however underlying  developing pulmonary lesions are not excluded. Consideration for  follow-up CT of the chest without IV contrast should be considered if  clinically appropriate.     D:  09/05/2019  E:  09/05/2019                ASSESSMENT AND PLAN:     -- FUO - working diagnosis was Stills - on tapering Prednisone  -- Dyspnea, with new nodular lung lesions - malignancy?  -- Myocarditis - resolved  -- spine lesions - Bx with possible Lymphoma - [facility] review pending  -- diffuse lymphadenopathy - new - don't feel any, Bx?  -- neutrophillic leukocytosis - ongoing  -- renal insufficiency - new - watch on hydration  -- Rash - new - stills vs Bactrim reaction  - will make allergic to Bactrim         I discussed the patients findings and my recommendations with patient    Continue vancomycin and Zosyn for now    Dr Dumont to see tomorrow    Thank you for this consult.  Our group would be pleased to follow this patient over the course of their hospitalization and assist with outpatient antimicrobial therapy, as indicated.     Lazaro Hauser MD  9/5/2019

## 2019-09-06 NOTE — PROGRESS NOTES
INTENSIVIST NOTE     Hospital Day: 1      Mr. Stephen Stoll, 44 y.o. male is followed for:          SUBJECTIVE     44 y.o. male who presents to [facility] ED 09/05/19 c/o shortness of breath for 3 weeks.      In June 2019 patient was on vacation when he developed persistent dry cough and fevers reaching 102. Upon returning he presented to the [place] ED with elevated troponin and chest pain. 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 At this time was started on 60 mg Prednisone for 3 weeks along with valsartan and metoprolol.      Nearing the end of July Mr. Stoll developed severe mid-back pain that worsened over time. MRI of the t-spine 8/9/19 showed abnormal lesions between T8-T9 concerning for osteomyelitis vs metastatic disease. Repeat scan 8/21/19 showed enhancement of known lesions within T8-T9 along with subtle additional lesions noted in the upper thoracic vertebrae. He was seen by Dr. Brizuela who ordered a bone marrow biopsy and Dr. Jimenez who coordinated his spine biopsy. The biopsy was nondiagnostic and he then underwent a bone marrow biopsy. There is some concern that this may represent a large cell lymphoma and his pathology is pending review at the [facility].      Dr. Brizuela arranged for him to have an outpatient EGD and colonoscopy to evaluate for possible GI lymphoma. He finished the prep for his colonoscopy. When he presented for his endoscopy, he was noted to be febrile and hypotensive and was recommended to present to the ED. Upon arrival to the ED, his BP was 87/33 and his HR was 152 with a temp of 102.7. He was started on IV fluids. Dr. Hauser from ID was called and recommended that the patient be started on Vancomycin and Zosyn.      He has recently been tapering his steroids per his Rheumatologist.     Interval history:    Because of the potential diagnosis of sepsis and the positive sepsis screen he was initiated broad-spectrum  "antibiotics.  There is some suspicion of pneumonia in the right lung but CAT scans are more consistent with a neoplastic process in the lungs.  He is now afebrile and normotensive.    The patient's relevant past medical, surgical and social history were reviewed and updated in Epic as appropriate.       OBJECTIVE     Vital Sign Min/Max for last 24 hours  Temp  Min: 97.4 °F (36.3 °C)  Max: 99.3 °F (37.4 °C)   BP  Min: 91/58  Max: 128/75   Pulse  Min: 73  Max: 126   Resp  Min: 16  Max: 24   SpO2  Min: 92 %  Max: 99 %   No Data Recorded   Weight  Min: 88.9 kg (195 lb 15.8 oz)  Max: 88.9 kg (195 lb 15.8 oz)      Intake/Output Summary (Last 24 hours) at 9/6/2019 1258  Last data filed at 9/6/2019 1217  Gross per 24 hour   Intake 2228 ml   Output 2930 ml   Net -702 ml      Flowsheet Rows      First Filed Value   Admission Height  182.9 cm (72\") Documented at 09/05/2019 1110   Admission Weight  93.4 kg (206 lb) Documented at 09/05/2019 1110             09/05/19  1110 09/05/19  1639   Weight: 93.4 kg (206 lb) 88.9 kg (195 lb 15.8 oz)            Objective:  General Appearance:  In no acute distress.    Vital signs: (most recent): Blood pressure 119/66, pulse 89, temperature 98.2 °F (36.8 °C), temperature source Temporal, resp. rate 18, height 182.9 cm (72\"), weight 88.9 kg (195 lb 15.8 oz), SpO2 98 %.    HEENT: Normal HEENT exam.    Lungs:  Normal effort and normal respiratory rate.  Breath sounds clear to auscultation.  He is not in respiratory distress.  No rales, wheezes or rhonchi.    Heart: Normal rate.  Regular rhythm.  S1 normal and S2 normal.  No murmur, gallop or friction rub.   Chest: Symmetric chest wall expansion.   Abdomen: Abdomen is soft and non-distended.  Bowel sounds are normal.   There is no abdominal tenderness.   There is no mass. There is no splenomegaly. There is no hepatomegaly.   Extremities: There is no deformity or dependent edema.    Neurological: Patient is alert and oriented to person, place and " time.    Pupils:  Pupils are equal, round, and reactive to light.    Skin:  Warm and dry.              I reviewed the patient's new clinical results.  I reviewed the patient's new imaging results/reports including actual images and agree with reports.      Chest X-Ray: Right perihilar and right midlung nodules    INFUSIONS    lactated ringers 100 mL/hr Last Rate: 100 mL/hr (09/06/19 1000)   lactated ringers 9 mL/hr    Pharmacy to dose vancomycin         Results from last 7 days   Lab Units 09/06/19 0215 09/05/19 1827 09/05/19  1115   WBC 10*3/mm3 24.62* 28.30* 30.17*   HEMOGLOBIN g/dL 9.1* 9.8* 12.7*   HEMATOCRIT % 28.2* 31.0* 38.0   PLATELETS 10*3/mm3 162 187 262     Results from last 7 days   Lab Units 09/06/19 0215 09/05/19 1827 09/05/19  1115   SODIUM mmol/L 131* 127* 124*   POTASSIUM mmol/L 4.2 4.4 4.6   CHLORIDE mmol/L 94* 90* 81*   CO2 mmol/L 25.0 22.0 25.0   BUN mg/dL 17 23* 29*   GLUCOSE mg/dL 132* 164* 132*   CREATININE mg/dL 0.94 1.10 1.67*   MAGNESIUM mg/dL 2.5  --   --    CALCIUM mg/dL 7.7* 7.5* 8.9                 [facility] Ventilation:      I reviewed the patient's medications.    Assessment/Plan   ASSESSMENT      Active Hospital Problems    Diagnosis   • **Severe sepsis (CMS/HCC)   • Mediastinal lymphadenopathy   • Anaplastic ALK-positive large cell lymphoma    • BEAU (acute kidney injury) (CMS/HCC)   • Hyponatremia         44-year-old male with what is likely diffuse lymphoma.  I doubt he has pneumonia based upon the CAT scan.  There is the possibility of some other infection source and he certainly is likely immunosuppressed and there is a gram-positive cocci in the blood, however this could be a contaminant.  His hypotension may have been more related to volume depletion and fevers could be related to lymphoma.        PLAN     1. Plans are for mediastinoscopy today for lymph node biopsy.  2. Renal function greatly improved with hydration.  We will continue for now as he is n.p.o.  3. Antibiotics per  ID  4. Monitor sodium level  5. Stress steroids and taper  6. Continue to monitor in ICU  7. Discussed in detail with Dr. Brizuela and the patient         I discussed the patient's findings and my recommendations with patient, nursing staff and consulting provider     Plan of care and goals reviewed with multidisciplinary team at daily rounds.    .    Vinh Marr MD  Pulmonary and Critical Care Medicine  09/06/19 12:58 PM

## 2019-09-06 NOTE — CONSULTS
HEMATOLOGY/ONCOLOGY INPATIENT CONSULT    REASON FOR CONSULT: Lymphadenopathy    Subjective   HISTORY OF PRESENT ILLNESS; I am asked to see this 44 y.o.  male, referred for progressive lymphadenopathy and new lung nodules with fevers of unknown origin.  The following is my oncology history of present illness:    1. Leukocytosis  2. FUO  3. Decreased ejection fraction with evidence of myocarditis  4. Marked elevation of sedimentation rate and C-reactive protein  5. 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 Elevated triglycerides on fasting  7. Acute renal failure resolved with hydration    -8/23/2019 initial inpatient [facility] medical oncology consultation.  Asked to see regarding leukocytosis.  He had upper respiratory infection with pneumonia, chest pains, elevated troponins with diagnosis of myocarditis and decreased ejection fraction with fevers to 104.  Tentatively diagnosed with still's disease and treated with prednisone with some improvement in fevers as well as improvement in a rash.  By the end of July, he had severe mid back pain relatively acute and onset that progressed over time with imaging of the area showing progressive abnormality of both bone and marrow signals in the spine that could be malignant marrow involvement or, more likely per radiologist, infectious.  Sedimentation rate was over 100 with a C-reactive protein elevated and white count 20-30,000 for the better part of 2 months.  Slight left shift but no immature forms.  Broad-spectrum serologic testing has not shown pathogens as of the initial consultation.  I ordered bone marrow biopsy and Dr. Jimenez coordinated spine biopsy  -6/24/2019 CT chest abdomen pelvis without contrast showed no parenchymal lung disease but a 1.6 cm mildly enlarged centrally necrotic right paratracheal node with no other adenopathy or hepatosplenomegaly.  Ejection fraction 35% on echocardiogram.  -6/28/2019 MRI brain and cervical spine  without contrast normal  -7/9/2019 fluoroscopy guided right joint aspiration unrevealing  -7/10/2019 fluoroscopic guided bronchoscopy showed no pathogens or pathology  -8/9/2019 MRI thoracic and lumbar spine showed low T1 signals in T8 and T9 with increased T2 involvement concerning for aggressive marrow signal findings that could represent lipid poor spinal hemangioma but concerning for metastasis versus multiple myeloma.  Contrast MRI recommended.  -8/21/2019 MRI thoracic spine with and without contrast showed multiple foci of abnormal marrow signal within the thoracic spine was prominent T8 and T9 with interval increase compared to 8/9/2019 with deformity of the endplates at T8 and T9 but no definitive fracture or discitis.  Several smaller additional lesions suspected at T1, T2, T4, T5, and T12 and the differential would include multifocal vertebral osteomyelitis versus metastasis.  -8/23/2019 bone marrow biopsy showed mild polyclonal plasmacytosis with normocellular marrow and trilineage hematopoiesis with mild myeloid expansion.  No lymphoid infiltrate and no organisms for fungi, acid fast bacilli, tumor, or granuloma.  Bennett 2- negative.  -8/24/2019 ejection fraction up to 65%  -8/26/2019 T8 biopsy show focal area of markedly atypical appearance.  There are inflammatory cells with occasional eosinophils and larger cells.  Some focal hemosiderin.  There is equivocal positivity for CD4, , CD45, and Constance-Barr virus.  It is positive for CD30, ALK 1, EMA, and vimentin.  No staining for CD 5, 7, 3, 20, or 15.  No staining for cytokeratin, SOX 10, , or CD1a.  [facility].  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  We will get PET scan.  Were this hemophagocytic syndrome I would have expected his marrow to be abnormal along with splenomegaly and  cytopenias as would the marrow be abnormal for this a plasma cell dyscrasia and the plasma cells were polyclonal.  I will check his SIEP along with triglycerides, peripheral blood flow cytometry for soluble CD 25, ferritin, and NK cell activity.  Once I have that information, hopefully we will have more information back from [facility] and unlikely to involve my colleagues at the [facility] in this process if this is an ALK positive anaplastic large cell lymphoma without kia involvement given the unusual nature and appearance of this.    -9/6/2019 subsequent oncology reconsultation for readmission: Met patient with his last admission.  He has had a 4-month history of fevers with upper respiratory infection and possible pneumonia.  Found to have myocarditis with low ejection fraction with extensive infectious disease work-up negative.  A month later readmitted with shortness of breath and arthralgias with some redness in his wrist.  Bronchoscopy was negative and he was treated with some steroids.  On his third admission with relatively recent acute onset back pain he was found to have some spine lesions.  The bone marrow biopsy was unrevealing,  a T8 biopsy by Dr. Wilde showed markedly atypical appearing cells in a background of eosinophils.  The larger cells were positive for ALK 1, CD30, EMA, and vimentin.  No staining for CD 5, 7, 3, 20, or 15.  No staining for cytokeratin, sox 10, , or CD1a.  Tissue has been sent for second opinion at [facility].  Anaplastic large cell lymphoma ALK positive was considered but given that his fevers which started months back included testing with CT chest abdomen pelvis 6/24/2019 only showing mildly enlarged centrally necrotic right lower paratracheal node of indeterminate significance and no other significant adenopathy or splenomegaly and no pulmonary infiltrate or mediastinal adenopathy on repeat CT chest 7/9/2019, PET scan was ordered.  Before PET could be  completed and GI evaluation completed for evaluation of possible GI source for kia procurement, the patient on the day he was due for endoscopy continue to have fevers with hypotension and a pulse in the 150s.  9/5/2019 CT chest abdomen and pelvis on admission now shows showed new numerous bilateral pulmonary lung nodules in the right lung measuring 2 cm without focal pneumonia.  There was extensive axillary, mediastinal adenopathy the largest in the right paratracheal area 2.1 cm with central necrosis.  There were hypoattenuating regions in the right hepatic lobe measuring up to 2.5 cm incompletely assessed due to lack of contrast due to his creatinine on admission 9/5/2019 being up to 1.67 down to 0.94 by the morning of 9/6/2019.  CT also showed new extensive axillary, mediastinal, and abdominopelvic lymphadenopathy.  There was redemonstration of the T8-T9 aggressive osseous lytic lesions involving thoracolumbar spine and multiple levels.  Nonspecific perinephric inflammation also seen possibly representing pyelonephritis.  Laboratory testing on admission 9/5/2019 showed C-reactive protein up to 37.66 with sedimentation rate greater than 130.  Procalcitonin over 38 with lactic acid  4.8.   normal with normal troponin.  Fasting triglyceride up to 269 and peripheral blood flow cytometry sent for NK cell activity as well as CD25 for IL-2 receptor for the possibility of hemophagocytic syndrome which would be unusual without hemophagocytosis bone marrow.  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  We will get echocardiogram and hold on the GI evaluation and I discussed in person with Dr. Benja Can to see for procurement of lymph node.  He is going to go after upper mediastinal lymph node.  Once we have more tissue then we will make further plans for treatment.  His  ejection fraction on 8/24/2019 was 62%.  Assuming that we find lymphoma and not some aggressive infectious disease process, he will need a port as well but we will wait on the port until we know what we are dealing with.  My partners are on board and will cover me this weekend and I will check back Monday to see where we stand.  He already has an appointment with Dr. Roosevelt Nguyen at  on the 11th who I was enlisting to help sort this out but we may be able to get our answers now that we have node to go after.  I may yet need his services depending on the ilk of this lymphoma and whether or not we would need to consolidate aggressively or treat more aggressively than with standard chemotherapy upfront.  I am starting him on allopurinol.  He already has a rash on his upper thighs and heels and around the area of his prior bone marrow biopsy but I suspect that just related to this probable lymphomatous process or perhaps the recent Bactrim started by Dr. Hauser.  His procalcitonin is quite high and I am still concerned about concomitant infection and I am not sure if the procalcitonin it can be falsely dramatically elevated in the face of lymphoma.  If this is anaplastic large cell lymphoma, with his ECOG performance status generally less than 2 and age less than 60 but with at least stage III disease, even if his LDH is high, he would at least have a risk score of 2 which would put him at the low intermediate risk on the IPI index.  Whether we would use Rituxan or not depends on if this turns out to be CD20 positive where his initial bone biopsy was not.  Given that it is CD30 positive, will probably use brentuximab, Cytoxan, Adriamycin, prednisone every 3 weeks x 6 cycles.  This usually is given without vincristine but with the Brentuximab 1.8 mg/kg IV day 1 of each cycle.  A progression free survival was seen both in ALK positive and negative anaplastic large cell lymphomas treated with brentuximab rather than  vincristine.  Neuropathy still can be an issue and diarrhea is more common.  If this is anaplastic large cell lymphoma ALK positive, autologous stem cell transplant may be considered in patients over 40 years of age with an IPI score greater than or equal to 3 but generally not if the IPI score is less than 3 so we will assess that very carefully.  Despite the elevated fasting triglyceride recently, I doubt this is hemophagocytic syndrome given the negative bone marrow and now with the blossoming lymphadenopathy.  But we shall see.      Past Medical History:   Diagnosis Date   • Hyperlipidemia    • Pneumonia      Past Surgical History:   Procedure Laterality Date   • APPENDECTOMY     • BACK SURGERY      L5 S1   • BRONCHOSCOPY N/A 7/10/2019    Procedure: BRONCHOSCOPY WITH ENDOBRONCHIAL ULTRASOUND AND TRANSBRONCHIAL BIOPSY AND FLUORO;  Surgeon: Marvel Sandoval MD;  Location:  [facility];  Service: Pulmonary   • CARDIAC CATHETERIZATION N/A 6/24/2019    Procedure: LEFT HEART CATH;  Surgeon: Deep Muñiz IV, MD;  Location:  [facility];  Service: Cardiovascular   • INTERVENTIONAL RADIOLOGY PROCEDURE N/A 8/26/2019    Procedure: THORACIC SPINE BIOSPY;  Surgeon: Juve Avila MD;  Location:  [facility];  Service: Interventional Radiology       No current facility-administered medications on file prior to encounter.      Current Outpatient Medications on File Prior to Encounter   Medication Sig Dispense Refill   • oxyCODONE-acetaminophen (PERCOCET) 7.5-325 MG per tablet Take 1 tablet PO every 4-6 hours as needed for pain 18 tablet 0   • predniSONE (DELTASONE) 10 MG tablet Take 10 mg by mouth 3 (Three) Times a Day.     • Sod Picosulfate-Mag Ox-Cit Acd 10-3.5-12 MG-GM -GM/160ML solution Take 1 kit by mouth Take As Directed. Follow instructions that were mailed to your home. If you didn't receive these call [phone]. 2 bottle 0   • acetaminophen (TYLENOL) 500 MG  "tablet Take 1,000 mg by mouth Every 6 (Six) Hours As Needed for Mild Pain . Alternates with Tramadol     • hydroxychloroquine (PLAQUENIL) 200 MG tablet Take 200 mg by mouth 2 (Two) Times a Day.     • ibuprofen (ADVIL,MOTRIN) 600 MG tablet Take 1 tablet by mouth Every 8 (Eight) Hours As Needed for Mild Pain  (use sparingly).     • lactobacillus acidophilus (RISAQUAD) capsule capsule Take 1 capsule by mouth Daily for 30 days. 30 capsule 0   • metoprolol succinate XL (TOPROL-XL) 25 MG 24 hr tablet Take 1 tablet by mouth Daily. 90 tablet 0   • sulfamethoxazole-trimethoprim (BACTRIM DS,SEPTRA DS) 800-160 MG per tablet Take 1 tablet by mouth Every 12 (Twelve) Hours.     • traMADol (ULTRAM) 50 MG tablet Take 100 mg by mouth Every 6 (Six) Hours As Needed for Moderate Pain . Alternates with Tylenol         Allergies   Allergen Reactions   • Ciprofloxacin Anxiety     \"Extreme anxiety and paranoia\"   • Sulfa Antibiotics Rash       Social History     Socioeconomic History   • Marital status:      Spouse name: Not on file   • Number of children: Not on file   • Years of education: Not on file   • Highest education level: Not on file   Tobacco Use   • Smoking status: Never Smoker   • Smokeless tobacco: Never Used   Substance and Sexual Activity   • Alcohol use: No     Frequency: Never     Drinks per session: 1 or 2     Comment: socially   • Drug use: No   • Sexual activity: Yes   Social History Narrative    Caffeine use 4-5 servings a day       Family History   Problem Relation Age of Onset   • Hyperlipidemia Mother    • Hypertension Father         white coat   • No Known Problems Maternal Grandmother    • Stroke Maternal Grandfather         related to smoking   • Emphysema Maternal Grandfather    • Stroke Paternal Grandmother    • Cancer Paternal Grandfather         throat and mouth         REVIEW OF SYSTEMS:  A 14 point review of systems was performed and is negative except as noted above.    Objective   PHYSICAL " "EXAM:    /65   Pulse 79   Temp 97.7 °F (36.5 °C) (Oral)   Resp 20   Ht 182.9 cm (72\")   Wt 88.9 kg (195 lb 15.8 oz)   SpO2 98%   BMI 26.58 kg/m²     ECOG: (1) Restricted in physically strenuous activity, ambulatory and able to do work of light nature  General: well appearing male in no acute distress  HEENT: sclerae anicteric, oropharynx clear  Lymphatics: no cervical, supraclavicular, inguinal, or axillary adenopathy  Neck: Supple. No thyromegaly.  Cardiovascular: regular rate and rhythm, no murmurs  Lungs: clear to auscultation bilaterally. No respiratory distress  Abdomen: soft, nontender, nondistended.  No palpable organomegaly  Extremities: no lower extremity edema, cyanosis, or clubbing  Skin: Rash on medial thighs and ankles  Neuro: Alert and oriented x3. Moves all extremities.    Results:    Results from last 7 days   Lab Units 09/06/19 0215 09/05/19 1827 09/05/19  1115   WBC 10*3/mm3 24.62* 28.30* 30.17*   HEMOGLOBIN g/dL 9.1* 9.8* 12.7*   PLATELETS 10*3/mm3 162 187 262     Results from last 7 days   Lab Units 09/06/19 0215 09/05/19 1827 09/05/19  1115   SODIUM mmol/L 131* 127* 124*   POTASSIUM mmol/L 4.2 4.4 4.6   CO2 mmol/L 25.0 22.0 25.0   BUN mg/dL 17 23* 29*   CREATININE mg/dL 0.94 1.10 1.67*   GLUCOSE mg/dL 132* 164* 132*     Results from last 7 days   Lab Units 09/06/19 0215 09/05/19 1827 09/05/19  1115   AST (SGOT) U/L 25 30 31   ALT (SGPT) U/L 26 27 35   BILIRUBIN mg/dL 0.3 0.5 0.7   ALK PHOS U/L 110 117 137*         Ct Abdomen Pelvis Without Contrast    Result Date: 9/5/2019   Constellation of findings concerning for metastatic disease:  - Interval development of numerous pulmonary nodules throughout the lungs with the largest measuring 2.0 cm within the right midlung and concerning for underlying pulmonary metastasis and which may account for abnormality identified in the chest radiograph performed 09/05/2019.  - Extensive axillary, mediastinal, and abdominopelvic " lymphadenopathy as described above.  - Hypoattenuated regions involving the liver concerning for additional sites of metastatic disease, although are limited in evaluation secondary to lack of intravenous contrast. Attention on follow-up imaging is advised.  - Redemonstration of T8/T9 aggressive osseous lesions with additional lytic lesion involving the L4 vertebral body and additional subcentimeter lytic lesions throughout the thoracolumbar spine suggested.  Nonspecific perinephric inflammation. Correlate with urinalysis to exclude underlying pyelonephritis.  DICTATED:   09/05/2019 EDITED/ls :   09/05/2019      Ct Chest Without Contrast    Result Date: 9/5/2019   Constellation of findings concerning for metastatic disease:  - Interval development of numerous pulmonary nodules throughout the lungs with the largest measuring 2.0 cm within the right midlung and concerning for underlying pulmonary metastasis and which may account for abnormality identified in the chest radiograph performed 09/05/2019.  - Extensive axillary, mediastinal, and abdominopelvic lymphadenopathy as described above.  - Hypoattenuated regions involving the liver concerning for additional sites of metastatic disease, although are limited in evaluation secondary to lack of intravenous contrast. Attention on follow-up imaging is advised.  - Redemonstration of T8/T9 aggressive osseous lesions with additional lytic lesion involving the L4 vertebral body and additional subcentimeter lytic lesions throughout the thoracolumbar spine suggested.  Nonspecific perinephric inflammation. Correlate with urinalysis to exclude underlying pyelonephritis.  DICTATED:   09/05/2019 EDITED/ls :   09/05/2019      Xr Chest 1 View    Result Date: 9/5/2019  There has been interval worsening of mild perihilar airspace changes with nodular airspace opacities noted within the right mid lung of undetermined etiology. Consideration would be for pneumonia as these are new when  compared to 08/21/2019, however underlying developing pulmonary lesions are not excluded. Consideration for follow-up CT of the chest without IV contrast should be considered if clinically appropriate.  D:  09/05/2019 E:  09/05/2019         Assessment    ASSESSMENT & PLAN:    8. Leukocytosis  9. FUO  10. Decreased ejection fraction with evidence of myocarditis  11. Marked elevation of sedimentation rate and C-reactive protein  12. 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 Elevated triglycerides on fasting  14. Acute renal failure resolved with hydration    -8/23/2019 initial inpatient [facility] medical oncology consultation.  Asked to see regarding leukocytosis.  He had upper respiratory infection with pneumonia, chest pains, elevated troponins with diagnosis of myocarditis and decreased ejection fraction with fevers to 104.  Tentatively diagnosed with still's disease and treated with prednisone with some improvement in fevers as well as improvement in a rash.  By the end of July, he had severe mid back pain relatively acute and onset that progressed over time with imaging of the area showing progressive abnormality of both bone and marrow signals in the spine that could be malignant marrow involvement or, more likely per radiologist, infectious.  Sedimentation rate was over 100 with a C-reactive protein elevated and white count 20-30,000 for the better part of 2 months.  Slight left shift but no immature forms.  Broad-spectrum serologic testing has not shown pathogens as of the initial consultation.  I ordered bone marrow biopsy and Dr. Jimenez coordinated spine biopsy  -6/24/2019 CT chest abdomen pelvis without contrast showed no parenchymal lung disease but a 1.6 cm mildly enlarged centrally necrotic right paratracheal node with no other adenopathy or hepatosplenomegaly.  Ejection fraction 35% on echocardiogram.  -6/28/2019 MRI brain and cervical spine without contrast  normal  -7/9/2019 fluoroscopy guided right joint aspiration unrevealing  -7/10/2019 fluoroscopic guided bronchoscopy showed no pathogens or pathology  -8/9/2019 MRI thoracic and lumbar spine showed low T1 signals in T8 and T9 with increased T2 involvement concerning for aggressive marrow signal findings that could represent lipid poor spinal hemangioma but concerning for metastasis versus multiple myeloma.  Contrast MRI recommended.  -8/21/2019 MRI thoracic spine with and without contrast showed multiple foci of abnormal marrow signal within the thoracic spine was prominent T8 and T9 with interval increase compared to 8/9/2019 with deformity of the endplates at T8 and T9 but no definitive fracture or discitis.  Several smaller additional lesions suspected at T1, T2, T4, T5, and T12 and the differential would include multifocal vertebral osteomyelitis versus metastasis.  -8/23/2019 bone marrow biopsy showed mild polyclonal plasmacytosis with normocellular marrow and trilineage hematopoiesis with mild myeloid expansion.  No lymphoid infiltrate and no organisms for fungi, acid fast bacilli, tumor, or granuloma.  Bennett 2- negative.  -8/24/2019 ejection fraction up to 65%  -8/26/2019 T8 biopsy show focal area of markedly atypical appearance.  There are inflammatory cells with occasional eosinophils and larger cells.  Some focal hemosiderin.  There is equivocal positivity for CD4, , CD45, and Constance-Barr virus.  It is positive for CD30, ALK 1, EMA, and vimentin.  No staining for CD 5, 7, 3, 20, or 15.  No staining for cytokeratin, SOX 10, , or CD1a.  Outside referral to [facility] pending.  -8/30/2019 [facility] outpatient follow-up visit: Anaplastic large cell lymphoma ALK positive just involving the bone would be unusual but not impossible I suppose.  We will get PET scan.  Were this hemophagocytic syndrome I would have expected his marrow to be abnormal along with splenomegaly and cytopenias as would  the marrow be abnormal for this a plasma cell dyscrasia and the plasma cells were polyclonal.  I will check his SIEP along with triglycerides, peripheral blood flow cytometry for soluble CD 25, ferritin, and NK cell activity.  Once I have that information, hopefully we will have more information back from [facility] and unlikely to involve my colleagues at the [facility] in this process if this is an ALK positive anaplastic large cell lymphoma without kia involvement given the unusual nature and appearance of this.    -9/6/2019 subsequent oncology reconsultation for readmission: Met patient with his last admission.  He has had a 4-month history of fevers with upper respiratory infection and possible pneumonia.  Found to have myocarditis with low ejection fraction with extensive infectious disease work-up negative.  A month later readmitted with shortness of breath and arthralgias with some redness in his wrist.  Bronchoscopy was negative and he was treated with some steroids.  On his third admission with relatively recent acute onset back pain he was found to have some spine lesions.  The bone marrow biopsy was unrevealing,  a T8 biopsy by Dr. Wilde showed markedly atypical appearing cells in a background of eosinophils.  The larger cells were positive for ALK 1, CD30, EMA, and vimentin.  No staining for CD 5, 7, 3, 20, or 15.  No staining for cytokeratin, sox 10, , or CD1a.  Tissue has been sent for second opinion at [facility].  Anaplastic large cell lymphoma ALK positive was considered but given that his fevers which started months back included testing with CT chest abdomen pelvis 6/24/2019 only showing mildly enlarged centrally necrotic right lower paratracheal node of indeterminate significance and no other significant adenopathy or splenomegaly and no pulmonary infiltrate or mediastinal adenopathy on repeat CT chest 7/9/2019, PET scan was ordered.  Before PET could be completed and GI  evaluation completed for evaluation of possible GI source for kia procurement, the patient on the day he was due for endoscopy continue to have fevers with hypotension and a pulse in the 150s.  9/5/2019 CT chest abdomen and pelvis on admission now shows showed new numerous bilateral pulmonary lung nodules in the right lung measuring 2 cm without focal pneumonia.  There was extensive axillary, mediastinal adenopathy the largest in the right paratracheal area 2.1 cm with central necrosis.  There were hypoattenuating regions in the right hepatic lobe measuring up to 2.5 cm incompletely assessed due to lack of contrast due to his creatinine on admission 9/5/2019 being up to 1.67 down to 0.94 by the morning of 9/6/2019.  CT also showed new extensive axillary, mediastinal, and abdominopelvic lymphadenopathy.  There was redemonstration of the T8-T9 aggressive osseous lytic lesions involving thoracolumbar spine and multiple levels.  Nonspecific perinephric inflammation also seen possibly representing pyelonephritis.  Laboratory testing on admission 9/5/2019 showed C-reactive protein up to 37.66 with sedimentation rate greater than 130.  Procalcitonin over 38 with lactic acid  4.8.   normal with normal troponin.  Fasting triglyceride up to 269 and peripheral blood flow cytometry sent for NK cell activity as well as CD25 for IL-2 receptor for the possibility of hemophagocytic syndrome which would be unusual without hemophagocytosis bone marrow.  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  We will get echocardiogram and hold on the GI evaluation and I discussed in person with Dr. Benja Can to see for procurement of lymph node.  He is going to go after upper mediastinal lymph node.  Once we have more tissue then we will make further plans for treatment.  His ejection  fraction on 8/24/2019 was 62%.  Assuming that we find lymphoma and not some aggressive infectious disease process, he will need a port as well but we will wait on the port until we know what we are dealing with.  My partners are on board and will cover me this weekend and I will check back Monday to see where we stand.  He already has an appointment with Dr. Roosevelt Nguyen at  on the 11th who I was enlisting to help sort this out but we may be able to get our answers now that we have node to go after.  I may yet need his services depending on the ilk of this lymphoma and whether or not we would need to consolidate aggressively or treat more aggressively than with standard chemotherapy upfront.  I am starting him on allopurinol.  He already has a rash on his upper thighs and heels and around the area of his prior bone marrow biopsy but I suspect that just related to this probable lymphomatous process or perhaps the recent Bactrim started by Dr. Hauser.  His procalcitonin is quite high and I am still concerned about concomitant infection and I am not sure if the procalcitonin it can be falsely dramatically elevated in the face of lymphoma.  If this is anaplastic large cell lymphoma, with his ECOG performance status generally less than 2 and age less than 60 but with at least stage III disease, even if his LDH is high, he would at least have a risk score of 2 which would put him at the low intermediate risk on the IPI index.  Whether we would use Rituxan or not depends on if this turns out to be CD20 positive where his initial bone biopsy was not.  Given that it is CD30 positive, will probably use brentuximab, Cytoxan, Adriamycin, prednisone every 3 weeks x 6 cycles.  This usually is given without vincristine but with the Brentuximab 1.8 mg/kg IV day 1 of each cycle.  A progression free survival was seen both in ALK positive and negative anaplastic large cell lymphomas treated with brentuximab rather than  vincristine.  Neuropathy still can be an issue and diarrhea is more common.  If this is anaplastic large cell lymphoma ALK positive, autologous stem cell transplant may be considered in patients over 40 years of age with an IPI score greater than or equal to 3 but generally not if the IPI score is less than 3 so we will assess that very carefully.  Despite the elevated fasting triglyceride recently, I doubt this is hemophagocytic syndrome given the negative bone marrow and now with the blossoming lymphadenopathy.  But we shall see.    I spent 90 minutes of continuous time evaluating discussing with patient and managing the plan as outlined above.  Viewed the images with Dr. Can and I have spoken with Dr. Marr and Dr. Hauser about the above plan.  Brent Brizuela MD    9/6/2019

## 2019-09-06 NOTE — OP NOTE
Operative Report  Stephen Stoll  [id number]  1975    Preop Diagnosis: Mediastinal lymphadenopathy        Postop Diagnosis same        Procedure: #1 diagnostic fiberoptic bronchoscopy #2 mediastinoscopy with biopsy        Surgeons: El Can        Assistant: Sabino Manuel        Operative Findings:         Description: The patient was brought to the operating room placed under general anesthesia.  Fiberoptic bronchoscope was inserted through the endotracheal tube.  Is advanced on the right side.  The right upper right middle and right lower lobes and segmental bronchi were visualized.  It the right mainstem as well as dahlia all appear to be normal.  The scope was withdrawn advanced on the left side of the left upper left lower lobes and segmental bronchi were all visualized.  These all were normal.  This time the patient's chest and neck was prepped and draped in usual sterile fashion.  A small transverse incision was then made.  Printed proceeded to dissect down between the strap muscles.  The trachea was visualized.  We will proceed to dissect down on the right side of the trachea.  Large lymph node was encountered.  Multiple biopsies was carried out of this.  Frozen section was carried out by Dr. Bryant Lorenz which revealed to be a good specimen.  There appeared to be adequate specimen for all the studies according to him.  The subcutaneous tissues were closed with 2-0 Vicryl, 3-0 Vicryl, 3-0 Monocryl subcuticular stitch.        EBL: 10 cc      Please note that portions of this note were completed with a voice recognition program. Efforts were made to edit the dictations, but words may be mistranscribed      El Can MD  09/06/19 11:56 AM

## 2019-09-06 NOTE — ANESTHESIA PROCEDURE NOTES
Airway  Urgency: elective    Date/Time: 9/6/2019 11:00 AM  Airway not difficult    General Information and Staff    Patient location during procedure: OR  CRNA: Sanjuanita Madrigal CRNA    Indications and Patient Condition  Indications for airway management: airway protection    Preoxygenated: yes  MILS not maintained throughout  Mask difficulty assessment: 2 - vent by mask + OA or adjuvant +/- NMBA    Final Airway Details  Final airway type: endotracheal airway      Successful airway: ETT  Cuffed: yes   Successful intubation technique: video laryngoscopy  Facilitating devices/methods: intubating stylet  Endotracheal tube insertion site: oral  Blade: Charleen  Blade size: 3  ETT size (mm): 8.5  Cormack-Lehane Classification: grade I - full view of glottis  Placement verified by: chest auscultation and capnometry   Measured from: lips  ETT/EBT  to lips (cm): 22  Number of attempts at approach: 1    Additional Comments  Easy bag mask ventilation with oral airway #11. DL with MAC 4- grade 3 view. Glidescope 3 blade used- grade 1 view- easy intubation. +ETCO2 +BBS.   Negative epigastric sounds, Breath sound equal bilaterally with symmetric chest rise and fall

## 2019-09-06 NOTE — PROGRESS NOTES
"Pharmacy Consult-Vancomycin Dosing  Stephen Stoll is a  44 y.o. male receiving vancomycin therapy.     Indication: Pneumonia, Sepsis  Consulting Provider: Murtaza DE LA ROSA Consult: Y    Goal Trough: 15-20 mcg/mL    Current Antimicrobial Therapy  Vancomycin Day 1  Zosyn Day 1      Allergies  Allergies as of 09/05/2019 - Reviewed 09/05/2019   Allergen Reaction Noted   • Ciprofloxacin Anxiety 06/24/2019   • Sulfa antibiotics Rash 09/05/2019       Labs    Results from last 7 days   Lab Units 09/05/19  1827 09/05/19  1115 08/30/19  1628   BUN mg/dL 23* 29* 21*   CREATININE mg/dL 1.10 1.67* 0.98       Results from last 7 days   Lab Units 09/05/19  1827 09/05/19  1115 08/30/19  1628   WBC 10*3/mm3 28.30* 30.17* 21.86*       Evaluation of Dosing     Last Dose Received in the ED/Outside Facility: 1750mg IV x1 on 9/5  Is Patient on Dialysis or Renal Replacement: N    Ht - 182.9 cm (72\")  Wt - 88.9 kg (195 lb 15.8 oz)    Estimated Creatinine Clearance: 106.6 mL/min (by C-G formula based on SCr of 1.1 mg/dL).    Intake & Output (last 3 days)       09/03 0701 - 09/04 0700 09/04 0701 - 09/05 0700 09/05 0701 - 09/06 0700    IV Piggyback   1050    Total Intake(mL/kg)   1050 (11.8)    Net   +1050           Stool Unmeasured Occurrence   2 x          Microbiology and Radiology  Microbiology Results (last 10 days)     Procedure Component Value - Date/Time    Clostridium Difficile Toxin, PCR - Stool, Per Rectum [[id number]]  (Normal) Collected:  09/03/19 1301    Lab Status:  Final result Specimen:  Stool from Per Rectum Updated:  09/03/19 1433     C. Difficile Toxins by PCR Not Detected    Narrative:       Performance characteristics of test not established for patients <2 years of age.  Negative for Toxigenic C. Difficile          Evaluation of Level                           Assessment/Plan:  1. Pharmacy to dose vancomycin for pneumonia, sepsis.   2. Patient received a loading dose of vancomycin 1750mg IV x1 today.  3. Will start on a " maintenance dose of vancomycin 1500mg (~ 16mg/kg) IV Q12H.  4. Vancomycin trough scheduled for Saturday 9/7 @1130 prior to the 5th total dose. (Goal trough = 15-20 mcg/mL).  5. Monitor renal function, cultures and sensitivities, and clinical status, and adjust regimen as necessary.    Pharmacy will continue to follow.    Yesi Martinez, PharmD  9/5/2019  8:34 PM

## 2019-09-06 NOTE — ANESTHESIA PREPROCEDURE EVALUATION
Anesthesia Evaluation     Patient summary reviewed and Nursing notes reviewed   NPO Solid Status: > 8 hours  NPO Liquid Status: > 8 hours           Airway   Mallampati: I  TM distance: >3 FB  Neck ROM: full  No difficulty expected  Dental      Pulmonary     breath sounds clear to auscultation  (+) pneumonia ,   Cardiovascular     Rhythm: regular  Rate: normal    (+) hyperlipidemia,       Neuro/Psych  GI/Hepatic/Renal/Endo      Musculoskeletal     Abdominal    Substance History      OB/GYN          Other   (+) arthritis     ROS/Med Hx Other: Most recent TTE: EF normal  Working diagnosis include lymphoma, ? Still's disease                  Anesthesia Plan    ASA 3     general     intravenous induction   Anesthetic plan, all risks, benefits, and alternatives have been provided, discussed and informed consent has been obtained with: patient and spouse/significant other.    Plan discussed with CRNA.

## 2019-09-06 NOTE — CONSULTS
Consult Note  Stephen Stoll  [id number]  1975    Referring Provider:  Reason for Consultation: Mediastinal lymphadenopathy    Patient Care Team:  Carlos Ramirez MD as PCP - General (Family Medicine)  Lazaro Hauser MD as Consulting Physician (Infectious Diseases)  Mihai Jimenez MD as Surgeon (Neurosurgery)  Roosevelt Carrion MD (Hematology and Oncology)    Chief complaint: Chest pain, pneumonia, fever      History of present illness: The patient has been admitted with pneumonia and has been found to have adenopathy in his chest and abdomen.  He has had a fever for approximately 4 months.  He has had a lesion at approximately T8 and this underwent a biopsy with by Dr. Juve Wilde.  This is suspicious for lymphoma.  I have been asked to see this patient and discussed in detail with Dr. Brent Brizuela today.  There is a concern about the correct diagnosis and treatment.  He feels we need to get more tissue and has asked me to consider mediastinoscopy.    Review of Systems    The following systems were reviewed and negative;  eyes, ENT, gastrointestinal, genitourinary, integument, breast, musculoskeletal, neurological, behavioral/psych, endocrine and allergies / immunologic    History  Past Medical History:   Diagnosis Date   • Hyperlipidemia    • Pneumonia    , Past Surgical History:   Procedure Laterality Date   • APPENDECTOMY     • BACK SURGERY      L5 S1   • BRONCHOSCOPY N/A 7/10/2019    Procedure: BRONCHOSCOPY WITH ENDOBRONCHIAL ULTRASOUND AND TRANSBRONCHIAL BIOPSY AND FLUORO;  Surgeon: Marvel Sandoval MD;  Location: [facility] ENDOSCOPY;  Service: Pulmonary   • CARDIAC CATHETERIZATION N/A 6/24/2019    Procedure: LEFT HEART CATH;  Surgeon: Deep Muñiz IV, MD;  Location:  [facility];  Service: Cardiovascular   • INTERVENTIONAL RADIOLOGY PROCEDURE N/A 8/26/2019    Procedure: THORACIC SPINE BIOSPY;  Surgeon: Juve Avila MD;  Location:  [facility]  [facility]ATION;  Service: Interventional Radiology   , Family History   Problem Relation Age of Onset   • Hyperlipidemia Mother    • Hypertension Father         white coat   • No Known Problems Maternal Grandmother    • Stroke Maternal Grandfather         related to smoking   • Emphysema Maternal Grandfather    • Stroke Paternal Grandmother    • Cancer Paternal Grandfather         throat and mouth   , Social History     Tobacco Use   • Smoking status: Never Smoker   • Smokeless tobacco: Never Used   Substance Use Topics   • Alcohol use: No     Frequency: Never     Drinks per session: 1 or 2     Comment: socially   • Drug use: No   , Medications Prior to Admission   Medication Sig Dispense Refill Last Dose   • oxyCODONE-acetaminophen (PERCOCET) 7.5-325 MG per tablet Take 1 tablet PO every 4-6 hours as needed for pain 18 tablet 0 Past Week at Unknown time   • predniSONE (DELTASONE) 10 MG tablet Take 10 mg by mouth 3 (Three) Times a Day.      • Sod Picosulfate-Mag Ox-Cit Acd 10-3.5-12 MG-GM -GM/160ML solution Take 1 kit by mouth Take As Directed. Follow instructions that were mailed to your home. If you didn't receive these call [phone]. 2 bottle 0 9/4/2019 at Unknown time   • acetaminophen (TYLENOL) 500 MG tablet Take 1,000 mg by mouth Every 6 (Six) Hours As Needed for Mild Pain . Alternates with Tramadol   Taking   • hydroxychloroquine (PLAQUENIL) 200 MG tablet Take 200 mg by mouth 2 (Two) Times a Day.   Taking   • ibuprofen (ADVIL,MOTRIN) 600 MG tablet Take 1 tablet by mouth Every 8 (Eight) Hours As Needed for Mild Pain  (use sparingly).   Taking   • lactobacillus acidophilus (RISAQUAD) capsule capsule Take 1 capsule by mouth Daily for 30 days. 30 capsule 0 9/2/2019   • metoprolol succinate XL (TOPROL-XL) 25 MG 24 hr tablet Take 1 tablet by mouth Daily. 90 tablet 0 Taking   • sulfamethoxazole-trimethoprim (BACTRIM DS,SEPTRA DS) 800-160 MG per tablet Take 1 tablet by mouth Every 12 (Twelve) Hours.   9/3/2019   •  "traMADol (ULTRAM) 50 MG tablet Take 100 mg by mouth Every 6 (Six) Hours As Needed for Moderate Pain . Alternates with Tylenol   Taking      Scheduled Meds:    [START ON 9/7/2019] Pharmacy Consult  Does not apply Once   allopurinol 300 mg Oral Daily   hydrocortisone sodium succinate 100 mg Intravenous Q8H   hydroxychloroquine 200 mg Oral BID   lactobacillus acidophilus 1 capsule Oral Daily   piperacillin-tazobactam 3.375 g Intravenous Q6H   sodium chloride 10 mL Intravenous Q12H   vancomycin 1,500 mg Intravenous Q12H      Continuous Infusions:    lactated ringers 100 mL/hr Last Rate: 100 mL/hr (09/06/19 1438)   Pharmacy to dose vancomycin        PRN Meds:  acetaminophen  •  oxyCODONE-acetaminophen  •  Pharmacy to dose vancomycin  •  sodium chloride  •  sodium chloride and Allergies:  Ciprofloxacin and Sulfa antibiotics    Objective     Vital Sign Min/Max for last 24 hours  Temp  Min: 97.7 °F (36.5 °C)  Max: 102.7 °F (39.3 °C)   BP  Min: 87/33  Max: 115/68   Pulse  Min: 78  Max: 152   Resp  Min: 19  Max: 24   SpO2  Min: 91 %  Max: 98 %   No Data Recorded   Weight  Min: 88.9 kg (195 lb 15.8 oz)  Max: 93.4 kg (206 lb)     Flowsheet Rows      First Filed Value   Admission Height  182.9 cm (72\") Documented at 09/05/2019 1110   Admission Weight  93.4 kg (206 lb) Documented at 09/05/2019 1110          Physical Exam:     General Appearance:    Alert, cooperative, in no acute distress   Head:    Normocephalic, without obvious abnormality, atraumatic   Eyes:            Lids and lashes normal, conjunctivae and sclerae normal, no   icterus, no pallor, corneas clear, PERRLA   Ears:    Ears appear intact with no abnormalities noted   Throat:   No oral lesions, no thrush, oral mucosa moist   Neck:   No adenopathy, supple, trachea midline, no thyromegaly, no   carotid bruit, no JVD   Back:     No kyphosis present, no scoliosis present, no skin lesions,      erythema or scars, no tenderness to percussion or                   " palpation,   range of motion normal   Lungs:     Clear to auscultation,respirations regular, even and                  unlabored    Heart:    Regular rhythm and normal rate, normal S1 and S2, no            murmur, no gallop, no rub, no click   Chest Wall:    No abnormalities observed   Abdomen:     Normal bowel sounds, no masses, no organomegaly, soft        non-tender, non-distended, no guarding, no rebound                tenderness   Rectal:     Deferred   Extremities:   Moves all extremities well, no edema, no cyanosis, no             redness   Pulses:   Pulses palpable and equal bilaterally   Skin:   No bleeding, bruising or rash   Lymph nodes:   No palpable adenopathy   Neurologic:   Cranial nerves 2 - 12 grossly intact, sensation intact, DTR       present and equal bilaterally             Assessment/Plan       Severe sepsis (CMS/HCC)    Anaplastic ALK-positive large cell lymphoma     BEAU (acute kidney injury) (CMS/HCC)    Hyponatremia    Bronchial pneumonia    Lactic acidosis    Sepsis (CMS/HCC)    Mediastinal lymphadenopathy      The patient is a 44-year-old white male whose had a long complicated medical history.  He has had a evidence of involvement of his spine and is undergone a biopsy by Dr. Juve Wilde.  This is concerning from a malignancy possible lymphoma.  This was sent to the [facility] where they assisted and came to this conclusion.  The patient now has been remained admitted with a 4-month history of progressive fever and has pulmonary nodules and mediastinal lymphadenopathy as well as abdominal lymphadenopathy.  Dr. Brizuela is trying to determine the correct treatment as for his the diagnosis.  He is enlisted me to do a mediastinoscopy to get better tissue to see if this patient does indeed have lymphoma needs to be treated for this.  I had a long discussion with the patient and his wife as well as Dr. Brizuela.  Of answer all the questions.  We will proceed with a bronchoscopy and a diagnostic  mediastinoscopy today.  Like thank for this consultation.    I discussed the patients findings and my recommendations with patient, family, nursing staff and consulting provider      Please note that portions of this note were completed with a voice recognition program. Efforts were made to edit the dictations, but words may be mistranscribed    El Can MD  09/06/19  9:00 AM

## 2019-09-06 NOTE — ANESTHESIA POSTPROCEDURE EVALUATION
Patient: Stephen Stoll    Procedure Summary     Date:  09/06/19 Room / Location:   [facility] [address] [place] [address] [facility]    Anesthesia Start:  1051 Anesthesia Stop:  1217    Procedure:  BRONCHOSCOPY, MEDIASTINOSCOPY WITH BIOPSY (N/A Neck) Diagnosis:       Mediastinal lymphadenopathy      (Mediastinal lymphadenopathy [R59.0])    Surgeon:  El Can MD Provider:  Ashish Riley MD    Anesthesia Type:  general ASA Status:  3          Anesthesia Type: general  Last vitals  BP   117/64 (09/06/19 1216)   Temp   98.2 °F (36.8 °C) (09/06/19 1216)   Pulse   89 (09/06/19 1216)   Resp   20 (09/06/19 1216)     SpO2   98 % (09/06/19 1216)     Post Anesthesia Care and Evaluation    Patient location during evaluation: PACU  Patient participation: complete - patient participated  Level of consciousness: awake and alert  Pain score: 0  Pain management: adequate  Airway patency: patent  Anesthetic complications: No anesthetic complications  PONV Status: none  Cardiovascular status: hemodynamically stable and acceptable  Respiratory status: nonlabored ventilation, acceptable and nasal cannula  Hydration status: acceptable

## 2019-09-06 NOTE — PROGRESS NOTES
Discharge Planning Assessment  [facility]     Patient Name: Stephen Stoll  MRN: [id number]  Today's Date: 9/6/2019    Admit Date: 9/5/2019    Discharge Needs Assessment     Row Name 09/06/19 0825       Living Environment    Lives With  child(vern), dependent;spouse 3 children    Name(s) of Who Lives With Patient  Patricia  spouse    Current Living Arrangements  home/apartment/condo    Primary Care Provided by  self    Provides Primary Care For  no one    Family Caregiver if Needed  spouse    Family Caregiver Names  Patricia  wife    Quality of Family Relationships  helpful;involved;supportive    Able to Return to Prior Arrangements  yes    Living Arrangement Comments  Pt lives with spouse and 3 children in [place], [place]       Resource/Environmental Concerns    Resource/Environmental Concerns  none    Transportation Concerns  car, none       Transition Planning    Patient/Family Anticipates Transition to  home with family    Patient/Family Anticipated Services at Transition      Transportation Anticipated  family or friend will provide       Discharge Needs Assessment    Readmission Within the Last 30 Days  current reason for admission unrelated to previous admission    Concerns to be Addressed  no discharge needs identified    Equipment Currently Used at Home  none    Anticipated Changes Related to Illness  none    Equipment Needed After Discharge  none        Discharge Plan     Row Name 09/06/19 0828       Plan    Plan  Home    Patient/Family in Agreement with Plan  yes    Plan Comments  Spoke with pt and spouse at . Plan is home with spouse in [place] Pt is independent with ADL's. Pt still works FT. Pt denies HH or DME. CM will follow as needed.    Final Discharge Disposition Code  01 - home or self-care        Destination      No service coordination in this encounter.      Durable Medical Equipment      No service coordination in this encounter.      Dialysis/Infusion      No service  coordination in this encounter.      Home Medical Care      No service coordination in this encounter.      Therapy      No service coordination in this encounter.      Community Resources      No service coordination in this encounter.        Expected Discharge Date and Time     Expected Discharge Date Expected Discharge Time    Sep 10, 2019         Demographic Summary     Row Name 09/06/19 0824       General Information    Admission Type  inpatient    Arrived From  emergency department    Referral Source  admission list    Reason for Consult  discharge planning    Preferred Language  English       Contact Information    Permission Granted to Share Info With          Functional Status     Row Name 09/06/19 0825       Functional Status    Usual Activity Tolerance  excellent       Functional Status, IADL    Medications  independent    Meal Preparation  independent    Housekeeping  independent    Laundry  independent    Shopping  independent        Psychosocial    No documentation.       Abuse/Neglect    No documentation.       Legal    No documentation.       Substance Abuse    No documentation.       Patient Forms    No documentation.           Trista Kirkland RN

## 2019-09-06 NOTE — PAYOR COMM NOTE
"Sanjuanita Washington, RN Utilization Review [phone]  Fax # [phone]      Stephen Banks (44 y.o. Male)     Date of Birth Social Security Number Address [place] Phone MRN    1975  [id number] [facility] [place] [address] [phone] [id number]    [employer] Mar[employer] Status          Gnosticism        Admission Date Admission Type Admitting Provider Attending Provider Department, Room/Bed    9/5/19 Emergency Case, Roxanna HOPSON DO Case, Roxanna HOPSON DO [facility] 2A ICU, N201/1    Discharge Date Discharge Disposition Discharge Destination                       Attending Provider:  Roxanna Guo DO    Allergies:  Ciprofloxacin, Sulfa Antibiotics    Isolation:  None   Infection:  None   Code Status:  CPR    Ht:  182.9 cm (72\")   Wt:  88.9 kg (195 lb 15.8 oz)    Admission Cmt:  None   Principal Problem:  Severe sepsis (CMS/HCC) [A41.9,R65.20]                 Active Insurance as of 9/5/2019     Primary Coverage     Payor Plan Insurance Group Employer/Plan Group    [employer] BLUE [employer]OSS [employer] PPO [id number]     Payor Plan Address Payor Plan Phone Number Payor Plan Fax Number Effective Dates    [address] [phone]  1/1/2018 - None Entered    [place] [address]       Subscriber Name Subscriber Birth Date Member ID       STEPHEN BANKS 1975 [id number]                 Emergency Contacts      (Rel.) Home Phone Work Phone Mobile Phone    RAJEEV BANKS (Spouse) -- -- [phone]               History & Physical      Case, Roxanna HOPSON DO at 9/5/2019  2:39 PM          INTENSIVIST   INITIAL HOSPITAL VISIT NOTE     Hospital:  LOS: 0 days     Mr. Stephen Banks, 44 y.o. male is seen for a Chief Complaint of:  Chief Complaint   Patient presents with   • Shortness of Breath   • Rapid Heart Rate       Severe sepsis (CMS/HCC)    Anaplastic ALK-positive large cell lymphoma     BEAU (acute kidney injury) (CMS/HCC)    " Hyponatremia    Bronchial pneumonia    Lactic acidosis    Subjective   S       Stephen Stoll is a 44 y.o. male who presents to [facility] ED 09/05/19 c/o shortness of breath for 3 weeks.     In June 2019 patient was on vacation when he developed persistent dry cough and fevers reaching 102. Upon returning he presented to the [facility] ED with elevated troponin and chest pain. 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 At this time was started on 60 mg Prednisone for 3 weeks along with valsartan and metoprolol.     Nearing the end of July Mr. Stoll developed severe mid-back pain that worsened over time. MRI of the t-spine 8/9/19 showed abnormal lesions between T8-T9 concerning for osteomyelitis vs metastatic disease. Repeat scan 8/21/19 showed enhancement of known lesions within T8-T9 along with subtle additional lesions noted in the upper thoracic vertebrae. He was seen by Dr. Brizuela who ordered a bone marrow biopsy and Dr. Jimenez who coordinated his spine biopsy. The biopsy was nondiagnostic and he then underwent a bone marrow biopsy. There is some concern that this may represent a large cell lymphoma and his pathology is pending review at the [facility].     Dr. Brizuela arranged for him to have an outpatient EGD and colonoscopy to evaluate for possible GI lymphoma. He finished the prep for his colonoscopy. When he presented for his endoscopy, he was noted to be febrile and hypotensive and was recommended to present to the ED. Upon arrival to the ED, his BP was 87/33 and his HR was 152 with a temp of 102.7. He was started on IV fluids. Dr. Hauser from ID was called and recommended that the patient be started on Vancomycin and Zosyn.     He has recently been tapering his steroids per his Rheumatologist.        PMH: He  has a past medical history of Hyperlipidemia and Pneumonia.   PSxH: He  has a past surgical history that includes Back surgery; Appendectomy;  Cardiac catheterization (N/A, 6/24/2019); Bronchoscopy (N/A, 7/10/2019); and Interventional radiology procedure (N/A, 8/26/2019).      Medications:  No current facility-administered medications on file prior to encounter.      Current Outpatient Medications on File Prior to Encounter   Medication Sig   • oxyCODONE-acetaminophen (PERCOCET) 7.5-325 MG per tablet Take 1 tablet PO every 4-6 hours as needed for pain   • predniSONE (DELTASONE) 10 MG tablet Take 10 mg by mouth 3 (Three) Times a Day.   • Sod Picosulfate-Mag Ox-Cit Acd 10-3.5-12 MG-GM -GM/160ML solution Take 1 kit by mouth Take As Directed. Follow instructions that were mailed to your home. If you didn't receive these call [phone].   • acetaminophen (TYLENOL) 500 MG tablet Take 1,000 mg by mouth Every 6 (Six) Hours As Needed for Mild Pain . Alternates with Tramadol   • hydroxychloroquine (PLAQUENIL) 200 MG tablet Take 200 mg by mouth 2 (Two) Times a Day.   • ibuprofen (ADVIL,MOTRIN) 600 MG tablet Take 1 tablet by mouth Every 8 (Eight) Hours As Needed for Mild Pain  (use sparingly).   • lactobacillus acidophilus (RISAQUAD) capsule capsule Take 1 capsule by mouth Daily for 30 days.   • metoprolol succinate XL (TOPROL-XL) 25 MG 24 hr tablet Take 1 tablet by mouth Daily.   • sulfamethoxazole-trimethoprim (BACTRIM DS,SEPTRA DS) 800-160 MG per tablet Take 1 tablet by mouth Every 12 (Twelve) Hours.   • traMADol (ULTRAM) 50 MG tablet Take 100 mg by mouth Every 6 (Six) Hours As Needed for Moderate Pain . Alternates with Tylenol   • [DISCONTINUED] guaiFENesin (ROBITUSSIN) 100 MG/5ML syrup Take 200 mg by mouth 3 (Three) Times a Day As Needed for Cough.       Allergies: He is allergic to ciprofloxacin.   FH: His family history includes Cancer in his paternal grandfather; Emphysema in his maternal grandfather; Hyperlipidemia in his mother; Hypertension in his father; No Known Problems in his maternal grandmother; Stroke in his maternal grandfather and paternal  grandmother.   SH: He  reports that he has never smoked. He has never used smokeless tobacco. He reports that he does not drink alcohol or use drugs.     The patient's relevant past medical, surgical and social history were reviewed and updated in Epic as appropriate.     ROS (14):   Review of Systems   Constitutional: Positive for activity change, fatigue and fever.   HENT: Negative.    Eyes: Negative.    Respiratory: Positive for cough.    Cardiovascular: Negative.    Gastrointestinal: Negative.    Endocrine: Negative.    Genitourinary: Negative.    Musculoskeletal: Positive for back pain.   Skin: Negative.    Allergic/Immunologic: Negative.    Neurological: Positive for weakness.   Hematological: Negative.    Psychiatric/Behavioral: Negative.        Objective   O     Vitals    Temp  Min: 100.8 °F (38.2 °C)  Max: 102.7 °F (39.3 °C)  BP  Min: 87/33  Max: 115/78  Pulse  Min: 111  Max: 152  Resp  Min: 20  Max: 22  SpO2  Min: 91 %  Max: 94 % No Data Recorded     I/O 24 hrs (7:00AM - 6:59 AM)  Intake/Output       09/05/19 0700 - 09/06/19 0659    Intake (ml) 1050    Output (ml) --    Net (ml) 1050    Last Weight  93.4 kg (206 lb)          Physical Examination    Telemetry: Sinus tachycardia.    Constitutional:  No acute distress.  Conversant.   HEENT: Normocephalic and atraumatic.   Neck:  Normal range of motion. Neck supple.   No JVD present.   No thyromegaly.    Cardiovascular: Regular rate and rhythm.  No murmurs, rub or gallop.  No peripheral edema.   Respiratory: Normal respiratory effort.  Clear to ascultation.   Abdominal:  Soft. No masses.   Non-tender. No distension.   No hepatosplenomegaly.   MSK: Normal muscle strength and tone.   Extremities: No digital cyanosis or clubbing.   Skin: Skin is warm and dry.  No rashes, lesions or ulcers noted.    Neurological:   Alert and Oriented to person, place, and time.   Moves all extremities.   Psychiatric:  Normal affect.   Normal judgment.            Results from last  7 days   Lab Units 09/05/19  1115 08/30/19  1628   WBC 10*3/mm3 30.17* 21.86*   HEMOGLOBIN g/dL 12.7* 13.5   MCV fL 79.0 83.2   PLATELETS 10*3/mm3 262 315     Results from last 7 days   Lab Units 09/05/19  1115 08/30/19  1628   SODIUM mmol/L 124* 136   POTASSIUM mmol/L 4.6 5.2   CO2 mmol/L 25.0 24.0   CREATININE mg/dL 1.67* 0.98     Estimated Creatinine Clearance: 66.3 mL/min (A) (by C-G formula based on SCr of 1.67 mg/dL (H)).  Results from last 7 days   Lab Units 09/05/19  1115 08/30/19  1628   ALK PHOS U/L 137* 137*   BILIRUBIN mg/dL 0.7 0.2   ALT (SGPT) U/L 35 62*   AST (SGOT) U/L 31 31           Images:    Imaging Results (last 24 hours)     Procedure Component Value Units Date/Time    XR Chest 1 View [[id number]] Collected:  09/05/19 1140     Updated:  09/05/19 1148    Narrative:       EXAMINATION: XR CHEST 1 VW- 09/05/2019     INDICATION: SOA triage protocol      COMPARISON: Chest radiograph 08/21/2019     FINDINGS: Single portable chest radiograph is submitted for review.      The heart is top normal size, similar to prior. There has been an  increase in bilateral perihilar airspace changes with interval  development of right mid lung nodular opacities. The visualized upper  abdomen is unrevealing. No acute osseous abnormality.           Impression:       There has been interval worsening of mild perihilar  airspace changes with nodular airspace opacities noted within the right  mid lung of undetermined etiology. Consideration would be for pneumonia  as these are new when compared to 08/21/2019, however underlying  developing pulmonary lesions are not excluded. Consideration for  follow-up CT of the chest without IV contrast should be considered if  clinically appropriate.     D:  09/05/2019  E:  09/05/2019                  I reviewed the patient's new laboratory and imaging results.  I independently reviewed the patient's new images.    Assessment/Plan   A / P     Mr. Stoll is a 45yo M with a history of  fevers, myocarditis, Still's Disease, Spinal lesions and possible large cell lymphoma who presented with hypotension and fevers in the setting of prepping for a colonoscopy. His CXR is consistent with a new right mid-lung infiltrate. He has been given IV fluids with some improvement in his tachycardia and hypotension. He has been on steroids which have recently started to taper.       Nutrition:   Diet Full Liquid  Advance Directives:   There are no questions and answers to display.       Active Hospital Problems    Diagnosis   • **Severe sepsis (CMS/HCC)   • BEAU (acute kidney injury) (CMS/HCC)   • Hyponatremia   • Bronchial pneumonia   • Lactic acidosis   • Anaplastic ALK-positive large cell lymphoma        Assessment / Plan:    1. Admit to ICU  2. Start Vancomycin and Zosyn. ID to see.   3. Start stress dose steroids.   4. IV fluids  5. F/u CT scan of the chest. May require bronchoscopy.   6. Repeat lactate level.   7. Consult Dr. Brizuela.   8. Full liquid diet. Advance as tolerated.   9. Repeat sodium level this evening.   10. AM labs    Critically ill secondary to severe sepsis and hypotension.     I discussed the patient's findings and my recommendations with patient and family    Time:   Critical Care Time: was greater than 45 minutes.(excluding time spent on other separately billable procedures or treating other patients).        Roxanna Guo DO  Pulmonary and Critical Care Medicine    Electronically signed by Roxanna Guo DO at 9/5/2019  3:16 PM          Emergency Department Notes      Phi Mir DO at 9/5/2019 11:08 AM      Procedure Orders    1. Critical Care [[id number]] ordered by Phi Mir DO at 09/05/19 1645                Subjective   Stephen Stoll is a 44 y.o. male who presents to the ED c/o shortness of breath for 3 weeks. In 06/2019 patient was on vacation when he became ill and returned with a diagnosis of pneumonia. He presented to the [place] ED with elevated troponin and  chest pain. Patient was diagnosed with Stills disease and complains of 4 weeks of fever, rash on his left thigh, chest, and feet bilaterally, and joint pain. While diagnosed with Stills disease he took 60 mg of Prednisone daily for 3 weeks. At the end of July patient had severe mid back pain that worsened with time. Dr. Brizuela, oncology, ordered bone marrow biopsy and Dr. Jimenez, neurosurgeon, coordinated the spine biopsy. Patient had an MRI of his brain on 06/28/2019 and an MRI of his thoracic and lumbar spine on 08/09/2019. Dr. Jimenez stated it may be osteomyelitis. Patient has seen Dr. Hauser, infectious disease, multiple times. He complains of an intermittent cough for 3 months, a current fever of 102.7, decreased appetite and back pain. His current pain is 4/10 in severity. Patient was at [facility] today for upper and lower endoscopy but was sent to the ED for further evaluation. Patient is currently on Bactrim but missed yesterday's dosage for prep for the endoscopies today. He states the shortness of breath is worsened upon getting up and ambulating. His cardiac output was 62% last week while in the hospital. In July patient was admitted with a swollen and erythematous wrist but no infection was found. Patient's primary care physician is Dr. Ramirez. There are no other acute complaints at this time.        History provided by:  Patient and spouse  Shortness of Breath   Severity:  Moderate  Onset quality:  Gradual  Duration:  3 weeks  Timing:  Constant  Progression:  Worsening  Chronicity:  Recurrent  Relieved by:  Lying down  Worsened by:  Movement and exertion  Ineffective treatments:  Sitting up  Associated symptoms: chest pain, cough (intermittent for 3 months), fever (102.7. been ongoing for a month) and rash (2 days duration. similar to one pt had in July)        Review of Systems   Constitutional: Positive for appetite change (decreased due to acid reflux) and fever (102.7. been ongoing for a  "month).   Respiratory: Positive for cough (intermittent for 3 months) and shortness of breath.    Cardiovascular: Positive for chest pain.   Gastrointestinal:        Bloating+   Skin: Positive for rash (2 days duration. similar to one pt had in July).   All other systems reviewed and are negative.      Past Medical History:   Diagnosis Date   • Hyperlipidemia    • Pneumonia        Allergies   Allergen Reactions   • Ciprofloxacin Anxiety     \"Extreme anxiety and paranoia\"       Past Surgical History:   Procedure Laterality Date   • APPENDECTOMY     • BACK SURGERY      L5 S1   • BRONCHOSCOPY N/A 7/10/2019    Procedure: BRONCHOSCOPY WITH ENDOBRONCHIAL ULTRASOUND AND TRANSBRONCHIAL BIOPSY AND FLUORO;  Surgeon: Marvel Sandoval MD;  Location:  [facility];  Service: Pulmonary   • CARDIAC CATHETERIZATION N/A 6/24/2019    Procedure: LEFT HEART CATH;  Surgeon: Deep Muñiz IV, MD;  Location:  [facility];  Service: Cardiovascular   • INTERVENTIONAL RADIOLOGY PROCEDURE N/A 8/26/2019    Procedure: THORACIC SPINE BIOSPY;  Surgeon: Juve Avila MD;  Location:  [facility];  Service: Interventional Radiology       Family History   Problem Relation Age of Onset   • Hyperlipidemia Mother    • Hypertension Father         white coat   • No Known Problems Maternal Grandmother    • Stroke Maternal Grandfather         related to smoking   • Emphysema Maternal Grandfather    • Stroke Paternal Grandmother    • Cancer Paternal Grandfather         throat and mouth       Social History     Socioeconomic History   • Marital status:      Spouse name: Not on file   • Number of children: Not on file   • Years of education: Not on file   • Highest education level: Not on file   Tobacco Use   • Smoking status: Never Smoker   • Smokeless tobacco: Never Used   Substance and Sexual Activity   • Alcohol use: No     Frequency: Never     Drinks per session: 1 or 2     Comment: socially   • " Drug use: No   • Sexual activity: Yes   Social History Narrative    Caffeine use 4-5 servings a day         Objective   Physical Exam   Constitutional: He is oriented to person, place, and time. He appears well-developed and well-nourished. No distress.   HENT:   Head: Normocephalic and atraumatic.   Eyes: Conjunctivae are normal. No scleral icterus.   Neck: Normal range of motion. Neck supple.   Cardiovascular: Regular rhythm and normal heart sounds. Tachycardia present.   No murmur heard.  Peripheral pulses are strong.   Pulmonary/Chest: Effort normal and breath sounds normal. No respiratory distress.   Breath sounds are clear.   Abdominal: Soft. There is tenderness (minimal diffuse tenderness). There is no rebound and no guarding.   Musculoskeletal: Normal range of motion. He exhibits no edema.   Neurological: He is alert and oriented to person, place, and time.   Skin: Skin is warm and dry. Rash noted. There is pallor (mild).   Patient's skin is clammy.  Benign appearing and mildly raised erythamous rash over chest and proximal lower extremities bilaterally.   Psychiatric: He has a normal mood and affect. His behavior is normal.   Nursing note and vitals reviewed.      Critical Care  Performed by: Phi Mir DO  Authorized by: Phi Mir DO     Critical care provider statement:     Critical care time (minutes):  35    Critical care time was exclusive of:  Separately billable procedures and treating other patients    Critical care was necessary to treat or prevent imminent or life-threatening deterioration of the following conditions:  Sepsis    Critical care was time spent personally by me on the following activities:  Development of treatment plan with patient or surrogate, discussions with consultants, evaluation of patient's response to treatment, examination of patient, obtaining history from patient or surrogate, ordering and performing treatments and interventions, ordering and review of laboratory  studies, ordering and review of radiographic studies, pulse oximetry, re-evaluation of patient's condition and review of old charts    I assumed direction of critical care for this patient from another provider in my specialty: no              ED Course  ED Course as of Sep 05 1647   Thu Sep 05, 2019   1150 Dr. Mir spoke with Dr. Hauser, infectious disease. Dr. Hauser agreed with the treatment of Zosyn and Vancomycin. Dr. Hauser recommend holding the Bactrim prescription. He will see the patient.   [BS]   1323 Due to persistent hypotension patient will discuss case with ICU about admission.  [BS]      ED Course User Index  [BS] Robles Dewitt     Recent Results (from the past 24 hour(s))   Triglycerides    Collection Time: 09/05/19 10:21 AM   Result Value Ref Range    Triglycerides 269 (H) 0 - 150 mg/dL   Comprehensive Metabolic Panel    Collection Time: 09/05/19 11:15 AM   Result Value Ref Range    Glucose 132 (H) 65 - 99 mg/dL    BUN 29 (H) 6 - 20 mg/dL    Creatinine 1.67 (H) 0.76 - 1.27 mg/dL    Sodium 124 (L) 136 - 145 mmol/L    Potassium 4.6 3.5 - 5.2 mmol/L    Chloride 81 (L) 98 - 107 mmol/L    CO2 25.0 22.0 - 29.0 mmol/L    Calcium 8.9 8.6 - 10.5 mg/dL    Total Protein 8.1 6.0 - 8.5 g/dL    Albumin 2.80 (L) 3.50 - 5.20 g/dL    ALT (SGPT) 35 1 - 41 U/L    AST (SGOT) 31 1 - 40 U/L    Alkaline Phosphatase 137 (H) 39 - 117 U/L    Total Bilirubin 0.7 0.2 - 1.2 mg/dL    eGFR Non African Amer 45 (L) >60 mL/min/1.73    Globulin 5.3 gm/dL    A/G Ratio 0.5 g/dL    BUN/Creatinine Ratio 17.4 7.0 - 25.0    Anion Gap 18.0 (H) 5.0 - 15.0 mmol/L   BNP    Collection Time: 09/05/19 11:15 AM   Result Value Ref Range    proBNP 400.9 5.0 - 450.0 pg/mL   Troponin    Collection Time: 09/05/19 11:15 AM   Result Value Ref Range    Troponin T 0.017 0.000 - 0.030 ng/mL   Green Top (Gel)    Collection Time: 09/05/19 11:15 AM   Result Value Ref Range    Extra Tube Hold for add-ons.    Lavender Top    Collection Time: 09/05/19 11:15 AM   Result  Value Ref Range    Extra Tube hold for add-on    Gold Top - SST    Collection Time: 09/05/19 11:15 AM   Result Value Ref Range    Extra Tube Hold for add-ons.    CBC Auto Differential    Collection Time: 09/05/19 11:15 AM   Result Value Ref Range    WBC 30.17 (C) 3.40 - 10.80 10*3/mm3    RBC 4.81 4.14 - 5.80 10*6/mm3    Hemoglobin 12.7 (L) 13.0 - 17.7 g/dL    Hematocrit 38.0 37.5 - 51.0 %    MCV 79.0 79.0 - 97.0 fL    MCH 26.4 (L) 26.6 - 33.0 pg    MCHC 33.4 31.5 - 35.7 g/dL    RDW 16.7 (H) 12.3 - 15.4 %    RDW-SD 47.5 37.0 - 54.0 fl    MPV 9.2 6.0 - 12.0 fL    Platelets 262 140 - 450 10*3/mm3   Lactic Acid, Plasma    Collection Time: 09/05/19 11:15 AM   Result Value Ref Range    Lactate 4.8 (C) 0.5 - 2.0 mmol/L   Light Blue Top    Collection Time: 09/05/19 11:15 AM   Result Value Ref Range    Extra Tube hold for add-on    Procalcitonin    Collection Time: 09/05/19 11:15 AM   Result Value Ref Range    Procalcitonin 38.17 (H) 0.10 - 0.25 ng/mL   Sedimentation Rate    Collection Time: 09/05/19 11:15 AM   Result Value Ref Range    Sed Rate >130 (H) 0 - 15 mm/hr   C-reactive Protein    Collection Time: 09/05/19 11:15 AM   Result Value Ref Range    C-Reactive Protein 37.66 (H) 0.00 - 0.50 mg/dL   Lactic Acid, Reflex Timer (This will reflex a repeat order 3-3:15 hours after ordered.)    Collection Time: 09/05/19 11:15 AM   Result Value Ref Range    Extra Tube Hold for add-ons.    Manual Differential    Collection Time: 09/05/19 11:15 AM   Result Value Ref Range    Neutrophil % 79.0 (H) 42.7 - 76.0 %    Lymphocyte % 4.0 (L) 19.6 - 45.3 %    Monocyte % 5.0 5.0 - 12.0 %    Eosinophil % 0.0 (L) 0.3 - 6.2 %    Basophil % 0.0 0.0 - 1.5 %    Bands %  7.0 (H) 0.0 - 5.0 %    Atypical Lymphocyte % 5.0 0.0 - 5.0 %    Neutrophils Absolute 25.95 (H) 1.70 - 7.00 10*3/mm3    Lymphocytes Absolute 1.21 0.70 - 3.10 10*3/mm3    Monocytes Absolute 1.51 (H) 0.10 - 0.90 10*3/mm3    Eosinophils Absolute 0.00 0.00 - 0.40 10*3/mm3    Basophils  Absolute 0.00 0.00 - 0.20 10*3/mm3    Hypochromia Slight/1+ None Seen    Microcytes Slight/1+ None Seen    WBC Morphology Normal Normal    Platelet Morphology Normal Normal     Note: In addition to lab results from this visit, the labs listed above may include labs taken at another facility or during a different encounter within the last 24 hours. Please correlate lab times with ED admission and discharge times for further clarification of the services performed during this visit.    CT Chest Without Contrast   Preliminary Result       Constellation of findings concerning for metastatic disease:        - Interval development of numerous pulmonary nodules throughout the   lungs with the largest measuring 2.0 cm within the right midlung and   concerning for underlying pulmonary metastasis and which may account for   abnormality identified in the chest radiograph performed 09/05/2019.       - Extensive axillary, mediastinal, and abdominopelvic lymphadenopathy as   described above.       - Hypoattenuated regions involving the liver concerning for additional   sites of metastatic disease, although are limited in evaluation   secondary to lack of intravenous contrast. Attention on follow-up   imaging is advised.       - Redemonstration of T8/T9 aggressive osseous lesions with additional   lytic lesion involving the L4 vertebral body and additional   subcentimeter lytic lesions throughout the thoracolumbar spine   suggested.       Nonspecific perinephric inflammation. Correlate with urinalysis to   exclude underlying pyelonephritis.       DICTATED:   09/05/2019   EDITED/ls :   09/05/2019           CT Abdomen Pelvis Without Contrast   Preliminary Result       Constellation of findings concerning for metastatic disease:        - Interval development of numerous pulmonary nodules throughout the   lungs with the largest measuring 2.0 cm within the right midlung and   concerning for underlying pulmonary metastasis and which may  account for   abnormality identified in the chest radiograph performed 09/05/2019.       - Extensive axillary, mediastinal, and abdominopelvic lymphadenopathy as   described above.       - Hypoattenuated regions involving the liver concerning for additional   sites of metastatic disease, although are limited in evaluation   secondary to lack of intravenous contrast. Attention on follow-up   imaging is advised.       - Redemonstration of T8/T9 aggressive osseous lesions with additional   lytic lesion involving the L4 vertebral body and additional   subcentimeter lytic lesions throughout the thoracolumbar spine   suggested.       Nonspecific perinephric inflammation. Correlate with urinalysis to   exclude underlying pyelonephritis.       DICTATED:   09/05/2019   EDITED/ls :   09/05/2019           XR Chest 1 View   Preliminary Result   There has been interval worsening of mild perihilar   airspace changes with nodular airspace opacities noted within the right   mid lung of undetermined etiology. Consideration would be for pneumonia   as these are new when compared to 08/21/2019, however underlying   developing pulmonary lesions are not excluded. Consideration for   follow-up CT of the chest without IV contrast should be considered if   clinically appropriate.       D:  09/05/2019   E:  09/05/2019              XR Chest 1 View    (Results Pending)     Vitals:    09/05/19 1320 09/05/19 1500 09/05/19 1549 09/05/19 1639   BP: 96/42 109/66  105/71   BP Location: Left arm   Left arm   Patient Position: Lying   Sitting   Pulse:  106  (!) 126   Resp:  19  24   Temp:   99 °F (37.2 °C) 98.7 °F (37.1 °C)   TempSrc:    Oral   SpO2:  95%  98%   Weight:    88.9 kg (195 lb 15.8 oz)   Height:         Medications   sodium chloride 0.9 % flush 10 mL (not administered)   acetaminophen (TYLENOL) tablet 1,000 mg (not administered)   hydroxychloroquine (PLAQUENIL) tablet 200 mg (not administered)   lactobacillus acidophilus (RISAQUAD)  capsule 1 capsule (not administered)   oxyCODONE-acetaminophen (PERCOCET) 7.5-325 MG per tablet 1 tablet (not administered)   sodium chloride 0.9 % flush 10 mL (not administered)   sodium chloride 0.9 % flush 10 mL (not administered)   lactated ringers infusion (not administered)   hydrocortisone sodium succinate (Solu-CORTEF) injection 100 mg (not administered)   sodium chloride 0.9 % bolus 1,000 mL (0 mL Intravenous Stopped 9/5/19 1215)   acetaminophen (TYLENOL) tablet 1,000 mg (1,000 mg Oral Given 9/5/19 1121)   piperacillin-tazobactam (ZOSYN) 3.375 g in iso-osmotic dextrose 50 ml (premix) (0 g Intravenous Stopped 9/5/19 1215)   vancomycin 1750 mg/500 mL 0.9% NS IVPB ([facility]) (0 mg/kg × 93.4 kg Intravenous Stopped 9/5/19 1416)   sodium chloride 0.9 % bolus 1,850 mL (0 mL Intravenous Stopped 9/5/19 1246)     ECG/EMG Results (last 24 hours)     Procedure Component Value Units Date/Time    ECG 12 Lead [[id number]] Collected:  09/05/19 1111     Updated:  09/05/19 1149        ECG 12 Lead                             MDM    Final diagnoses:   Sepsis, due to unspecified organism (CMS/HCC)   Shortness of breath   Abnormal chest x-ray   Leukocytosis, unspecified type       Documentation assistance provided by cedrick Dewitt.  Information recorded by the scribe was done at my direction and has been verified and validated by me.     Robles Dewitt  09/05/19 1403       Phi Mir DO  09/05/19 1644       Phi Mir DO  09/05/19 1648      Electronically signed by Phi Mir DO at 9/5/2019  4:48 PM       ICU Vital Signs     Row Name 09/06/19 0600 09/06/19 0500 09/06/19 0400 09/06/19 0300 09/06/19 0200       Vitals    Temp  --  --  97.7 °F (36.5 °C)  --  --    Temp src  --  --  Oral  --  --    Pulse  79  95  78  80  87    Resp  20  --  22  20  20    BP  101/65  115/68  101/59  106/63  100/63    Noninvasive MAP (mmHg)  73  78  71  75  72    BP Location  --  --  Left arm  --  --    BP Method  --  --  Automatic  --  --    Patient  Position  --  --  Lying  --  --       Oxygen Therapy    SpO2  98 %  97 %  95 %  94 %  92 %    Device (Oxygen Therapy)  humidified;nasal cannula  --  humidified;nasal cannula  --  humidified;nasal cannula    Flow (L/min)  1  --  2  --  --    Row Name 09/06/19 0100 09/06/19 0000 09/05/19 2300 09/05/19 2200 09/05/19 2100       Vitals    Temp  --  99.3 °F (37.4 °C)  --  --  --    Temp src  --  Oral  --  --  --    Pulse  89  101  95  93  101    Heart Rate Source  --  Monitor  --  --  --    Resp  --  24  24  22  24    Resp Rate Source  --  Visual  --  --  --    BP  99/60  109/64  106/64  99/66  104/68    Noninvasive MAP (mmHg)  71  81  75  77  80    BP Location  --  Left arm  --  --  --    BP Method  --  Automatic  --  --  --    Patient Position  --  Lying  --  --  --       Oxygen Therapy    SpO2  --  97 %  98 %  97 %  94 %    Row Name 09/05/19 2000 09/05/19 1800 09/05/19 1700 09/05/19 1639 09/05/19 1630       Height and Weight    Weight  --  --  --  88.9 kg (195 lb 15.8 oz)  --    Weight Method  --  --  --  Bed scale  --       Vitals    Temp  98.1 °F (36.7 °C)  --  --  98.7 °F (37.1 °C)  --    Temp src  Oral  --  --  Oral  --    Pulse  --  --  --  126  (Abnormal)   --    Heart Rate Source  --  --  --  Monitor  --    Resp  22  --  --  24  --    Resp Rate Source  --  --  --  Visual  --    BP  97/61  --  --  105/71  --    Noninvasive MAP (mmHg)  72  --  --  77  --    BP Location  Left arm  --  --  Left arm  --    BP Method  Automatic  --  --  Automatic  --    Patient Position  Lying  --  --  Sitting  --       Oxygen Therapy    SpO2  --  --  --  98 %  --    Pulse Oximetry Type  --  --  --  Continuous  --    Device (Oxygen Therapy)  humidified;nasal cannula  humidified;nasal cannula  nasal cannula;humidified  room air  room air    Flow (L/min)  2  2  2  --  --    Row Name 09/05/19 15:49:12 09/05/19 1500 09/05/19 1320 09/05/19 1300 09/05/19 1252       Vitals    Temp  99 °F (37.2 °C)  --  --  --  --    Pulse  --  106  --  111   "112    Resp  --  19  --  --  --    BP  --  109/66  96/42  96/48  --    Noninvasive MAP (mmHg)  --  80  --  62  --    BP Location  --  --  Left arm  --  --    BP Method  --  --  Manual  --  --    Patient Position  --  --  Lying  --  --       Oxygen Therapy    SpO2  --  95 %  --  94 %  93 %    Row Name 09/05/19 1250 09/05/19 12:36:46 09/05/19 1233 09/05/19 1224 09/05/19 1223       Vitals    Temp  --  100.8 °F (38.2 °C)  (Abnormal)   --  --  --    Temp src  --  Oral  --  --  --    Pulse  --  --  113  116  --    Resp  --  20  --  --  --    Resp Rate Source  --  Visual  --  --  --    BP  91/52  --  98/54  --  95/53    Noninvasive MAP (mmHg)  67  --  64  --  64       Oxygen Therapy    SpO2  --  --  92 %  92 %  --    Device (Oxygen Therapy)  --  room air  --  --  --    Row Name 09/05/19 1220 09/05/19 1110                Height and Weight    Height  --  182.9 cm (72\")       Height Method  --  Stated       Weight  --  93.4 kg (206 lb)       Weight Method  --  Stated       Ideal Body Weight (IBW) (kg)  --  82.07       BSA (Calculated - sq m)  --  2.16 sq meters       BMI (Calculated)  --  27.9       Weight in (lb) to have BMI = 25  --  183.9          Vitals    Temp  --  102.7 °F (39.3 °C)  (Abnormal)        Temp src  --  Oral       Pulse  121  (Abnormal)   152  (Abnormal)        Heart Rate Source  --  Monitor       Resp  --  22       Resp Rate Source  --  Visual       BP  87/33  (Abnormal)   115/78       Noninvasive MAP (mmHg)  57  --       BP Location  --  Right arm       BP Method  --  Automatic       Patient Position  --  Lying          Oxygen Therapy    SpO2  91 %  93 %       Pulse Oximetry Type  --  Continuous       Device (Oxygen Therapy)  --  room air  (Significant)  FREQUENT DRY COUGH            Hospital Medications (active)       Dose Frequency Start End    ! Vancomycin trough Saturday 9/7 @1130. Please hold 1200 dose until evaluated by pharmacy.  Once 9/7/2019     Sig - Route: 1 (One) Time. - Does not apply    " acetaminophen (TYLENOL) tablet 1,000 mg 1,000 mg Once 9/5/2019 9/5/2019    Sig - Route: Take 2 tablets by mouth 1 (One) Time. - Oral    acetaminophen (TYLENOL) tablet 1,000 mg 1,000 mg Every 6 Hours PRN 9/5/2019     Sig - Route: Take 2 tablets by mouth Every 6 (Six) Hours As Needed for Mild Pain . - Oral    allopurinol (ZYLOPRIM) tablet 300 mg 300 mg Daily 9/6/2019     Sig - Route: Take 1 tablet by mouth Daily. - Oral    hydrocortisone sodium succinate (Solu-CORTEF) injection 100 mg 100 mg Every 8 Hours 9/5/2019     Sig - Route: Infuse 100 mg into a venous catheter Every 8 (Eight) Hours. - Intravenous    hydroxychloroquine (PLAQUENIL) tablet 200 mg 200 mg 2 Times Daily 9/5/2019     Sig - Route: Take 1 tablet by mouth 2 (Two) Times a Day. - Oral    lactated ringers infusion 100 mL/hr Continuous 9/5/2019     Sig - Route: Infuse 100 mL/hr into a venous catheter Continuous. - Intravenous    lactobacillus acidophilus (RISAQUAD) capsule 1 capsule 1 capsule Daily 9/5/2019 9/28/2019    Sig - Route: Take 1 capsule by mouth Daily. - Oral    oxyCODONE-acetaminophen (PERCOCET) 7.5-325 MG per tablet 1 tablet 1 tablet Every 6 Hours PRN 9/5/2019     Sig - Route: Take 1 tablet by mouth Every 6 (Six) Hours As Needed for Moderate Pain . - Oral    Pharmacy to dose vancomycin  Continuous PRN 9/6/2019 9/16/2019    Sig - Route: Continuous As Needed for Consult. - Does not apply    piperacillin-tazobactam (ZOSYN) 3.375 g in iso-osmotic dextrose 50 ml (premix) 3.375 g Once 9/5/2019 9/5/2019    Sig - Route: Infuse 50 mL into a venous catheter 1 (One) Time. - Intravenous    piperacillin-tazobactam (ZOSYN) 3.375 g in iso-osmotic dextrose 50 ml (premix) 3.375 g Every 6 Hours 9/5/2019 9/15/2019    Sig - Route: Infuse 50 mL into a venous catheter Every 6 (Six) Hours. - Intravenous    sodium chloride 0.9 % bolus 1,000 mL 1,000 mL Once 9/5/2019 9/5/2019    Sig - Route: Infuse 1,000 mL into a venous catheter 1 (One) Time. - Intravenous    sodium  chloride 0.9 % bolus 1,850 mL 1,850 mL Once 9/5/2019 9/5/2019    Sig - Route: Infuse 1,850 mL into a venous catheter 1 (One) Time. - Intravenous    sodium chloride 0.9 % flush 10 mL 10 mL As Needed 9/5/2019     Sig - Route: Infuse 10 mL into a venous catheter As Needed for Line Care. - Intravenous    Cosign for Ordering: Accepted by Phi Mir DO on 9/5/2019  4:01 PM    sodium chloride 0.9 % flush 10 mL 10 mL Every 12 Hours Scheduled 9/5/2019     Sig - Route: Infuse 10 mL into a venous catheter Every 12 (Twelve) Hours. - Intravenous    sodium chloride 0.9 % flush 10 mL 10 mL As Needed 9/5/2019     Sig - Route: Infuse 10 mL into a venous catheter As Needed for Line Care. - Intravenous    vancomycin 1500 mg/500 mL 0.9% NS IVPB ([facility]) 1,500 mg Every 12 Hours 9/6/2019 9/15/2019    Sig - Route: Infuse 500 mL into a venous catheter Every 12 (Twelve) Hours. - Intravenous    vancomycin 1750 mg/500 mL 0.9% NS IVPB ([facility]) 20 mg/kg × 93.4 kg Once 9/5/2019 9/5/2019    Sig - Route: Infuse 500 mL into a venous catheter 1 (One) Time. - Intravenous    Pharmacy Consult (Discontinued)  Continuous PRN 9/5/2019 9/6/2019    Sig - Route: Continuous As Needed for Consult. - Does not apply    sodium chloride 0.9 % bolus 1,000 mL (Discontinued) 1,000 mL Once 9/5/2019 9/5/2019    Sig - Route: Infuse 1,000 mL into a venous catheter 1 (One) Time. - Intravenous    sodium chloride 0.9 % bolus 1,800 mL (Discontinued) 1,800 mL Once 9/5/2019 9/5/2019    Sig - Route: Infuse 1,800 mL into a venous catheter 1 (One) Time. - Intravenous    sodium chloride 0.9 % flush 10 mL (Discontinued) 10 mL As Needed 9/5/2019 9/5/2019    Sig - Route: Infuse 10 mL into a venous catheter As Needed for Line Care. - Intravenous    Cosign for Ordering: Accepted by hPi Mir DO on 9/5/2019  4:01 PM    vancomycin (VANCOCIN) in iso-osmotic dextrose IVPB 1 g (premix) 200 mL (Discontinued) 1,000 mg Every 12 Hours 9/5/2019 9/5/2019    Sig - Route: Infuse 200 mL into a venous  catheter Every 12 (Twelve) Hours. - Intravenous    Reason for Discontinue: *Re-Entry          Ventilator/Non-Invasive Ventilation Settings (From admission, onward)    None        Operative/Procedure Notes (all)     No notes of this type exist for this encounter.           Physician Progress Notes (all)      Noah Dumont MD at 9/6/2019  8:22 AM          1       Stephen Stoll  1975  [id number]  9/6/2019    CC: Fever of unknown origin, generalized lymphadenopathy, and necrotic mediastinal lymph node    Stephen Stoll is a 44 y.o. 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      Past medical history:  Past Medical History:   Diagnosis Date   • Hyperlipidemia    • Pneumonia        Medications:   Current Facility-Administered Medications:   •  [START ON 9/7/2019] ! Vancomycin trough Saturday 9/7 @1130. Please hold 1200 dose until evaluated by pharmacy., , Does not apply, Once, [facility], [facility]  •  acetaminophen (TYLENOL) tablet 1,000 mg, 1,000 mg, Oral, Q6H PRN, Case, Roxanna V., DO, 1,000 mg at 09/06/19 0008  •  allopurinol (ZYLOPRIM) tablet 300 mg, 300 mg, Oral, Daily, Brent Brizuela MD  •  hydrocortisone sodium succinate (Solu-CORTEF) injection 100 mg, 100 mg, Intravenous, Q8H, Sanjuanita Ching DNP, APRN, 100 mg at 09/06/19 0007  •  hydroxychloroquine (PLAQUENIL) tablet 200 mg, 200 mg, Oral, BID, Case, Roxanna V., DO, 200 mg at 09/05/19 2055  •  lactated ringers infusion, 100 mL/hr, Intravenous, Continuous, Sanjuanita Ching DNP, APRN, Last Rate: 100 mL/hr at 09/06/19 0445, 100 mL/hr at 09/06/19 0445  •  lactobacillus acidophilus (RISAQUAD) capsule 1 capsule, 1 capsule, Oral, Daily, Case, Roxanna V., DO, 1 capsule at 09/05/19 1724  •  oxyCODONE-acetaminophen (PERCOCET) 7.5-325 MG per tablet 1 tablet,  1 tablet, Oral, Q6H PRN, Brant Roxanna V., DO, 1 tablet at 09/05/19 1649  •  Pharmacy to dose vancomycin, , Does not apply, Continuous PRN, Lazaro Hauser MD  •  piperacillin-tazobactam (ZOSYN) 3.375 g in iso-osmotic dextrose 50 ml (premix), 3.375 g, Intravenous, Q6H, aLzaro Hauser MD, 3.375 g at 09/06/19 0212  •  sodium chloride 0.9 % flush 10 mL, 10 mL, Intravenous, PRN, [place], Phi, DO  •  sodium chloride 0.9 % flush 10 mL, 10 mL, Intravenous, Q12H, Sanjuanita Ching, RAVINDER, APRN, 10 mL at 09/05/19 2055  •  sodium chloride 0.9 % flush 10 mL, 10 mL, Intravenous, PRN, Ching, Sanjuanita Cate, DNP, APRN  •  vancomycin 1500 mg/500 mL 0.9% NS IVPB ([facility]), 1,500 mg, Intravenous, Q12H, [facility], 1,500 mg at 09/06/19 0007  Antibiotics:  Anti-Infectives (From admission, onward)    Ordered     Dose/Rate Route Frequency Start Stop    09/06/19 0655  Pharmacy to dose vancomycin     Ordering Provider:  Lazaro Hauser MD     Does not apply Continuous PRN 09/06/19 0655 09/16/19 0654    09/05/19 2031  vancomycin 1500 mg/500 mL 0.9% NS IVPB ([facility])     Joslyn Ochoa RPH reviewed the order on 09/06/19 0649.   Ordering Provider:  Ysei Martinez RPH    1,500 mg  over 90 Minutes Intravenous Every 12 Hours 09/06/19 0000 09/15/19 2359    09/05/19 1520  hydroxychloroquine (PLAQUENIL) tablet 200 mg     Joslyn Ochoa RPH reviewed the order on 09/06/19 0716.   Ordering Provider:  Brant Roxanna V., DO    200 mg Oral 2 Times Daily 09/05/19 2100      09/05/19 2012  piperacillin-tazobactam (ZOSYN) 3.375 g in iso-osmotic dextrose 50 ml (premix)     Ordering Provider:  Lazaro Hauser MD    3.375 g  over 30 Minutes Intravenous Every 6 Hours 09/05/19 2100 09/15/19 2059    09/05/19 1121  vancomycin 1750 mg/500 mL 0.9% NS IVPB ([facility])     Ordering Provider:  Phi Mir DO    20 mg/kg × 93.4 kg  over 120 Minutes Intravenous Once 09/05/19 1123 09/05/19 1416    09/05/19 1121  piperacillin-tazobactam (ZOSYN) 3.375 g in iso-osmotic dextrose 50  "ml (premix)     Ordering Provider:  Phi Mir,     3.375 g  over 30 Minutes Intravenous Once 09/05/19 1122 09/05/19 1215          Allergies:  is allergic to ciprofloxacin and sulfa antibiotics.    Review of Systems: All other reviewed and negative except as per HPI    Blood pressure 101/65, pulse 79, temperature 97.7 °F (36.5 °C), temperature source Oral, resp. rate 20, height 182.9 cm (72\"), weight 88.9 kg (195 lb 15.8 oz), SpO2 98 %.  GENERAL: Awake and alert, in no acute distress.   HEENT: Oropharynx without thrush. Sinuses nontender. Dentition in good repair. No cervical adenopathy. No carotid bruits/ jugular venous distention.   EYES: PERRL. No conjunctival injection. No icterus. EOMI.  LYMPHATICS: No lymphadenopathy of the neck or axillary or inguinal regions.   HEART: No murmur, gallop, or pericardial friction rub.   LUNGS: Clear to auscultation anteriorly. No percussion dullness.   ABDOMEN: Soft, nontender, nondistended. No appreciable HSM.    SKIN: Warm and dry without cutaneous eruptions. No embolic stigmata.   PSYCHIATRIC: Mental status lucid. Cranial nerve function intact.       DIAGNOSTICS:  Lab Results   Component Value Date    WBC 24.62 (H) 09/06/2019    HGB 9.1 (L) 09/06/2019    HCT 28.2 (L) 09/06/2019     09/06/2019     Lab Results   Component Value Date    CRP 37.66 (H) 09/05/2019     Lab Results   Component Value Date    SEDRATE >130 (H) 09/05/2019     Lab Results   Component Value Date    GLUCOSE 132 (H) 09/06/2019    BUN 17 09/06/2019    CREATININE 0.94 09/06/2019    EGFRIFNONA 87 09/06/2019    BCR 18.1 09/06/2019    CO2 25.0 09/06/2019    CALCIUM 7.7 (L) 09/06/2019    PROTENTOTREF 8.0 08/30/2019    ALBUMIN 1.90 (L) 09/06/2019    LABIL2 0.6 (L) 08/30/2019    AST 25 09/06/2019    ALT 26 09/06/2019       Microbiology: 9/3 C. difficile toxin assay by PCR negative.  9/5 blood cultures with gram-positive cocci in 2/2 sats.  Bio fire multiplex PCR negative for MEC a gene cassette/oxacillin " susceptible isolate.    RADIOLOGY:  Imaging Results (last 72 hours)     Procedure Component Value Units Date/Time    XR Chest 1 View [[id number]] Updated:  09/06/19 0603    CT Chest Without Contrast [[id number]] Collected:  09/05/19 1551     Updated:  09/05/19 1607    Narrative:       EXAMINATION: CT CHEST WO CONTRAST, CT ABDOMEN/PELVIS WO CONTRAST -  09/05/2019     INDICATION: Cough, sepsis, abnormal chest x-ray.     TECHNIQUE: Unenhanced CT imaging of the chest, abdomen, and pelvis was  performed. Additional coronal and sagittal reformatted imaging was  provided for review.     The radiation dose reduction device was turned on for each scan per the  ALARA (As Low as Reasonably Achievable) protocol.     COMPARISON: MRI of the thoracic and lumbar spine 08/09/2019.     FINDINGS:      CHEST: Dedicated CT imaging of the chest demonstrates interval  development of numerous pulmonary nodules throughout the lungs, the  largest within the right mid lung measuring 2.0 cm. These are concerning  for metastatic foci given the provided clinical history. No focal  pneumonia, pleural effusion, or pneumothorax identified. These pulmonary  nodules account for the abnormality seen on the  chest radiograph from  09/05/2019.     Extensive axillary and mediastinal lymphadenopathy is identified. The  largest within the right paratracheal region measures up to 2.1 cm and  demonstrates mild central necrosis. The pulmonary artery is within  normal limits in size. The heart is within normal limits in size. Trace  pericardial effusion is identified. Prominent cardiophrenic lymph nodes  are also noted.     ABDOMEN/PELVIS: Lack of intravenous contrast limits evaluation of  underlying abdominal and pelvic organs. Hypoattenuated region involving  the right hepatic lobe is identified with additional more confluent  hypoattenuating region within the mid right hepatic lobe noted measuring  up to 2.5 cm which is concerning for an additional site of  metastatic  disease. The gallbladder demonstrates mild cholelithiasis. The spleen is  enlarged and otherwise unremarkable. The pancreas does not demonstrate  abnormality. The adrenal glands and kidneys also do not demonstrate  abnormality. No evidence of hydronephrosis. There is mild perinephric  inflammation which requires clinical correlation. Extensive peritoneal  and retroperitoneal lymphadenopathy is identified throughout the  visualized abdomen and pelvis. The urinary bladder is partially  distended and otherwise unremarkable. The stomach is decompressed and  unremarkable. No evidence of small bowel obstruction. No free air or  free fluid identified. Postsurgical changes in the right lower quadrant  are identified and likely from prior appendectomy.  Small fat-filled  right inguinal and left inguinal hernias are noted.     Osseous structures: The visualized bony pelvis appears intact.  Degenerative changes of the bilateral hips are identified.  Re-demonstration of aggressive osseous changes at T8 and T9, previously  documented MRI thoracic spine from 8/21/2019. No convincing evidence of  posterior cortex retropulsion identified. Additional lytic lesion  involving L4 is also noted.       Impression:          Constellation of findings concerning for metastatic disease:      - Interval development of numerous pulmonary nodules throughout the  lungs with the largest measuring 2.0 cm within the right midlung and  concerning for underlying pulmonary metastasis and which may account for  abnormality identified in the chest radiograph performed 09/05/2019.     - Extensive axillary, mediastinal, and abdominopelvic lymphadenopathy as  described above.     - Hypoattenuated regions involving the liver concerning for additional  sites of metastatic disease, although are limited in evaluation  secondary to lack of intravenous contrast. Attention on follow-up  imaging is advised.     - Redemonstration of T8/T9 aggressive  osseous lesions with additional  lytic lesion involving the L4 vertebral body and additional  subcentimeter lytic lesions throughout the thoracolumbar spine  suggested.     Nonspecific perinephric inflammation. Correlate with urinalysis to  exclude underlying pyelonephritis.     DICTATED:   09/05/2019  EDITED/ls :   09/05/2019        CT Abdomen Pelvis Without Contrast [[id number]] Collected:  09/05/19 1551     Updated:  09/05/19 1607    Narrative:       EXAMINATION: CT CHEST WO CONTRAST, CT ABDOMEN/PELVIS WO CONTRAST -  09/05/2019     INDICATION: Cough, sepsis, abnormal chest x-ray.     TECHNIQUE: Unenhanced CT imaging of the chest, abdomen, and pelvis was  performed. Additional coronal and sagittal reformatted imaging was  provided for review.     The radiation dose reduction device was turned on for each scan per the  ALARA (As Low as Reasonably Achievable) protocol.     COMPARISON: MRI of the thoracic and lumbar spine 08/09/2019.     FINDINGS:      CHEST: Dedicated CT imaging of the chest demonstrates interval  development of numerous pulmonary nodules throughout the lungs, the  largest within the right mid lung measuring 2.0 cm. These are concerning  for metastatic foci given the provided clinical history. No focal  pneumonia, pleural effusion, or pneumothorax identified. These pulmonary  nodules account for the abnormality seen on the  chest radiograph from  09/05/2019.     Extensive axillary and mediastinal lymphadenopathy is identified. The  largest within the right paratracheal region measures up to 2.1 cm and  demonstrates mild central necrosis. The pulmonary artery is within  normal limits in size. The heart is within normal limits in size. Trace  pericardial effusion is identified. Prominent cardiophrenic lymph nodes  are also noted.     ABDOMEN/PELVIS: Lack of intravenous contrast limits evaluation of  underlying abdominal and pelvic organs. Hypoattenuated region involving  the right hepatic lobe is  identified with additional more confluent  hypoattenuating region within the mid right hepatic lobe noted measuring  up to 2.5 cm which is concerning for an additional site of metastatic  disease. The gallbladder demonstrates mild cholelithiasis. The spleen is  enlarged and otherwise unremarkable. The pancreas does not demonstrate  abnormality. The adrenal glands and kidneys also do not demonstrate  abnormality. No evidence of hydronephrosis. There is mild perinephric  inflammation which requires clinical correlation. Extensive peritoneal  and retroperitoneal lymphadenopathy is identified throughout the  visualized abdomen and pelvis. The urinary bladder is partially  distended and otherwise unremarkable. The stomach is decompressed and  unremarkable. No evidence of small bowel obstruction. No free air or  free fluid identified. Postsurgical changes in the right lower quadrant  are identified and likely from prior appendectomy.  Small fat-filled  right inguinal and left inguinal hernias are noted.     Osseous structures: The visualized bony pelvis appears intact.  Degenerative changes of the bilateral hips are identified.  Re-demonstration of aggressive osseous changes at T8 and T9, previously  documented MRI thoracic spine from 8/21/2019. No convincing evidence of  posterior cortex retropulsion identified. Additional lytic lesion  involving L4 is also noted.       Impression:          Constellation of findings concerning for metastatic disease:      - Interval development of numerous pulmonary nodules throughout the  lungs with the largest measuring 2.0 cm within the right midlung and  concerning for underlying pulmonary metastasis and which may account for  abnormality identified in the chest radiograph performed 09/05/2019.     - Extensive axillary, mediastinal, and abdominopelvic lymphadenopathy as  described above.     - Hypoattenuated regions involving the liver concerning for additional  sites of metastatic  disease, although are limited in evaluation  secondary to lack of intravenous contrast. Attention on follow-up  imaging is advised.     - Redemonstration of T8/T9 aggressive osseous lesions with additional  lytic lesion involving the L4 vertebral body and additional  subcentimeter lytic lesions throughout the thoracolumbar spine  suggested.     Nonspecific perinephric inflammation. Correlate with urinalysis to  exclude underlying pyelonephritis.     DICTATED:   09/05/2019  EDITED/ls :   09/05/2019        XR Chest 1 View [[id number]] Collected:  09/05/19 1140     Updated:  09/05/19 1148    Narrative:       EXAMINATION: XR CHEST 1 VW- 09/05/2019     INDICATION: SOA triage protocol      COMPARISON: Chest radiograph 08/21/2019     FINDINGS: Single portable chest radiograph is submitted for review.      The heart is top normal size, similar to prior. There has been an  increase in bilateral perihilar airspace changes with interval  development of right mid lung nodular opacities. The visualized upper  abdomen is unrevealing. No acute osseous abnormality.           Impression:       There has been interval worsening of mild perihilar  airspace changes with nodular airspace opacities noted within the right  mid lung of undetermined etiology. Consideration would be for pneumonia  as these are new when compared to 08/21/2019, however underlying  developing pulmonary lesions are not excluded. Consideration for  follow-up CT of the chest without IV contrast should be considered if  clinically appropriate.     D:  09/05/2019  E:  09/05/2019                Assessment and Plan: Inflammatory myocarditis.  Markedly depressed left ventricular ejection fraction/resolved post corticosteroids.  Leukocytosis with bandemia/24.6.  Massive increase in inflammatory markers.  Severe diffuse arthralgias.  Elevated serum ferritin/surrogate marker for stills disease.  Rapid expansion of generalized lymphadenopathy with necrotic mediastinal lymph  node.  High-grade Staphylococcus aureus bacteremia/oxacillin susceptible strain.  Lactic acidosis/3.9.  Hyponatremia.  Protein calorie malnutrition with serum albumin 1.9.  Coagulopathy with PT/INR 18/1.6.  Lytic lesion at T8/T9.  Hepatic hypodensities concerning for metastatic disease.  Maximum temperature over 24 hours 1-2.7.  Currently 97.7.  Transient hypotension with systolic blood pressure of 87/33 mmHg.  Pulse 79/respiratory rate 20.  No arterial desaturation.  Currently on vancomycin and piperacillin-tazobactam.  Immunocompromised host on corticosteroids and Plaquenil.  Plans for a mediastinoscopy and node biopsy today.  Hypotension and worsening lactic acidosis troubling.  On allopurinol for tumor lysis risk.  While there is no definitive causation, there are numerous reports in the literature of adult onset stills disease in association with both solid tumors of breast, lung, kidney, and thyroid gland.  Furthermore, hematologic malignancies including acute myelogenous leukemia and non-Hodgkin's lymphomas are described.  The staph aureus in the patient's bloodstream should be susceptible to both vancomycin and piperacillin-tazobactam.  Therapy could be de-escalated to either a first generation cephalosporin or semisynthetic penicillin.  In light of the patient's hemodynamic instability and lactic acidosis, I would prefer that he remain on broad coverage.  I will see on rounds on Monday morning.  SURAJ ARRIAZA will see this weekend.  Greater than 45 minutes spent in data review, radiographic assessment, culture review, consideration of antimicrobial modification, bedside examination, and in discussion with family.  Noah Dumont MD  9/6/2019      Electronically signed by Noah Dumont MD at 9/6/2019  8:36 AM     Brant, Roxanna HOPSON DO at 9/5/2019  3:16 PM          SEPTIC SHOCK FOCUSED EXAM    *Must be completed by a licensed independent Practitioner within 6 hours of diagnosis for the following conditions  -- Septic Shock or Severe Sepsis with Lactate >/= 4.    Fluid bolus must be completed prior to assessment.      Diagnosis: Septic Shock     Vital Signs (Attestation) Reviewed Temp, HR, RR, BP, SpO2   Shock Index (HR/SBP) (Attestation) Reviewed    Urine Output (Attestation) Reviewed   General resting comfortably, no distress   Respiratory Within Normal Limits   Cardiac/Chest tachycardia   Skin (documentation of skin color is required) pink mucosa and warm/dry   Capillary Refill <3 seconds   Peripheral Pulses Checked                          radial  palpable     † Septic shock is defined by CMS as severe sepsis plus one of the following: persistent hypotension after fluid bolus OR tissue hypoperfusion (Lactic Acid ?4)  †† ONE OF THE FOLLOWING can be done in lieu of the Focused Exam: Measure CVP; Measure ScVO2; Echocardiogram (cardiac echo or cardiac ultrasound); Dynamic assessment of fluid responsiveness with passive leg raise or fluid challenge.    Roxanna Guo DO  09/05/19  3:17 PM      Electronically signed by Roxanna Guo DO at 9/5/2019  3:17 PM          Consult Notes (all)      Brent Brizuela MD at 9/6/2019  7:56 AM      Consult Orders    1. Inpatient Hematology & Oncology Consult [[id number]] ordered by Sanjuanita Ching, RAVINDER, APRN at 09/05/19 1533                HEMATOLOGY/ONCOLOGY INPATIENT CONSULT    REASON FOR CONSULT: Lymphadenopathy    Subjective   HISTORY OF PRESENT ILLNESS; I am asked to see this 44 y.o.  male, referred for progressive lymphadenopathy and new lung nodules with fevers of unknown origin.  The following is my oncology history of present illness:    1. Leukocytosis  2. FUO  3. Decreased ejection fraction with evidence of myocarditis  4. Marked elevation of sedimentation rate and C-reactive protein  5. 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 Elevated triglycerides on fasting  7. Acute renal failure resolved with hydration    -8/23/2019 initial  inpatient [facility] medical oncology consultation.  Asked to see regarding leukocytosis.  He had upper respiratory infection with pneumonia, chest pains, elevated troponins with diagnosis of myocarditis and decreased ejection fraction with fevers to 104.  Tentatively diagnosed with still's disease and treated with prednisone with some improvement in fevers as well as improvement in a rash.  By the end of July, he had severe mid back pain relatively acute and onset that progressed over time with imaging of the area showing progressive abnormality of both bone and marrow signals in the spine that could be malignant marrow involvement or, more likely per radiologist, infectious.  Sedimentation rate was over 100 with a C-reactive protein elevated and white count 20-30,000 for the better part of 2 months.  Slight left shift but no immature forms.  Broad-spectrum serologic testing has not shown pathogens as of the initial consultation.  I ordered bone marrow biopsy and Dr. Jimenez coordinated spine biopsy  -6/24/2019 CT chest abdomen pelvis without contrast showed no parenchymal lung disease but a 1.6 cm mildly enlarged centrally necrotic right paratracheal node with no other adenopathy or hepatosplenomegaly.  Ejection fraction 35% on echocardiogram.  -6/28/2019 MRI brain and cervical spine without contrast normal  -7/9/2019 fluoroscopy guided right joint aspiration unrevealing  -7/10/2019 fluoroscopic guided bronchoscopy showed no pathogens or pathology  -8/9/2019 MRI thoracic and lumbar spine showed low T1 signals in T8 and T9 with increased T2 involvement concerning for aggressive marrow signal findings that could represent lipid poor spinal hemangioma but concerning for metastasis versus multiple myeloma.  Contrast MRI recommended.  -8/21/2019 MRI thoracic spine with and without contrast showed multiple foci of abnormal marrow signal within the thoracic spine was prominent T8 and T9 with interval increase compared to  8/9/2019 with deformity of the endplates at T8 and T9 but no definitive fracture or discitis.  Several smaller additional lesions suspected at T1, T2, T4, T5, and T12 and the differential would include multifocal vertebral osteomyelitis versus metastasis.  -8/23/2019 bone marrow biopsy showed mild polyclonal plasmacytosis with normocellular marrow and trilineage hematopoiesis with mild myeloid expansion.  No lymphoid infiltrate and no organisms for fungi, acid fast bacilli, tumor, or granuloma.  Bennett 2- negative.  -8/24/2019 ejection fraction up to 65%  -8/26/2019 T8 biopsy show focal area of markedly atypical appearance.  There are inflammatory cells with occasional eosinophils and larger cells.  Some focal hemosiderin.  There is equivocal positivity for CD4, , CD45, and Constance-Barr virus.  It is positive for CD30, ALK 1, EMA, and vimentin.  No staining for CD 5, 7, 3, 20, or 15.  No staining for cytokeratin, SOX 10, , or CD1a.  Outside referral to [facility] pending.  -8/30/2019 [facility] outpatient follow-up visit: Anaplastic large cell lymphoma ALK positive just involving the bone would be unusual but not impossible I suppose.  We will get PET scan.  Were this hemophagocytic syndrome I would have expected his marrow to be abnormal along with splenomegaly and cytopenias as would the marrow be abnormal for this a plasma cell dyscrasia and the plasma cells were polyclonal.  I will check his SIEP along with triglycerides, peripheral blood flow cytometry for soluble CD 25, ferritin, and NK cell activity.  Once I have that information, hopefully we will have more information back from [facility] and unlikely to involve my colleagues at the [facility] in this process if this is an ALK positive anaplastic large cell lymphoma without kia involvement given the unusual nature and appearance of this.    -9/6/2019 subsequent oncology reconsultation for readmission: Met patient with his last  admission.  He has had a 4-month history of fevers with upper respiratory infection and possible pneumonia.  Found to have myocarditis with low ejection fraction with extensive infectious disease work-up negative.  A month later readmitted with shortness of breath and arthralgias with some redness in his wrist.  Bronchoscopy was negative and he was treated with some steroids.  On his third admission with relatively recent acute onset back pain he was found to have some spine lesions.  The bone marrow biopsy was unrevealing,  a T8 biopsy by Dr. Wilde showed markedly atypical appearing cells in a background of eosinophils.  The larger cells were positive for ALK 1, CD30, EMA, and vimentin.  No staining for CD 5, 7, 3, 20, or 15.  No staining for cytokeratin, sox 10, , or CD1a.  Tissue has been sent for second opinion at [facility].  Anaplastic large cell lymphoma ALK positive was considered but given that his fevers which started months back included testing with CT chest abdomen pelvis 6/24/2019 only showing mildly enlarged centrally necrotic right lower paratracheal node of indeterminate significance and no other significant adenopathy or splenomegaly and no pulmonary infiltrate or mediastinal adenopathy on repeat CT chest 7/9/2019, PET scan was ordered.  Before PET could be completed and GI evaluation completed for evaluation of possible GI source for ika procurement, the patient on the day he was due for endoscopy continue to have fevers with hypotension and a pulse in the 150s.  9/5/2019 CT chest abdomen and pelvis on admission now shows showed new numerous bilateral pulmonary lung nodules in the right lung measuring 2 cm without focal pneumonia.  There was extensive axillary, mediastinal adenopathy the largest in the right paratracheal area 2.1 cm with central necrosis.  There were hypoattenuating regions in the right hepatic lobe measuring up to 2.5 cm incompletely assessed due to lack of contrast due  to his creatinine on admission 9/5/2019 being up to 1.67 down to 0.94 by the morning of 9/6/2019.  CT also showed new extensive axillary, mediastinal, and abdominopelvic lymphadenopathy.  There was redemonstration of the T8-T9 aggressive osseous lytic lesions involving thoracolumbar spine and multiple levels.  Nonspecific perinephric inflammation also seen possibly representing pyelonephritis.  Laboratory testing on admission 9/5/2019 showed C-reactive protein up to 37.66 with sedimentation rate greater than 130.  Procalcitonin over 38 with lactic acid  4.8.   normal with normal troponin.  Fasting triglyceride up to 269 and peripheral blood flow cytometry sent for NK cell activity as well as CD25 for IL-2 receptor for the possibility of hemophagocytic syndrome which would be unusual without hemophagocytosis bone marrow.  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  We will get echocardiogram and hold on the GI evaluation and I discussed in person with Dr. Benja Can to see for procurement of lymph node.  He is going to go after upper mediastinal lymph node.  Once we have more tissue then we will make further plans for treatment.  His ejection fraction on 8/24/2019 was 62%.  Assuming that we find lymphoma and not some aggressive infectious disease process, he will need a port as well but we will wait on the port until we know what we are dealing with.  My partners are on board and will cover me this weekend and I will check back Monday to see where we stand.  He already has an appointment with Dr. Roosevelt Nguyen at  on the 11th who I was enlisting to help sort this out but we may be able to get our answers now that we have node to go after.  I may yet need his services depending on the ilk of this lymphoma and whether or not we would need to consolidate  aggressively or treat more aggressively than with standard chemotherapy upfront.  I am starting him on allopurinol.  He already has a rash on his upper thighs and heels and around the area of his prior bone marrow biopsy but I suspect that just related to this probable lymphomatous process or perhaps the recent Bactrim started by Dr. Hauser.  His procalcitonin is quite high and I am still concerned about concomitant infection and I am not sure if the procalcitonin it can be falsely dramatically elevated in the face of lymphoma.  If this is anaplastic large cell lymphoma, with his ECOG performance status generally less than 2 and age less than 60 but with at least stage III disease, even if his LDH is high, he would at least have a risk score of 2 which would put him at the low intermediate risk on the IPI index.  Whether we would use Rituxan or not depends on if this turns out to be CD20 positive where his initial bone biopsy was not.  Given that it is CD30 positive, will probably use brentuximab, Cytoxan, Adriamycin, prednisone every 3 weeks x 6 cycles.  This usually is given without vincristine but with the Brentuximab 1.8 mg/kg IV day 1 of each cycle.  A progression free survival was seen both in ALK positive and negative anaplastic large cell lymphomas treated with brentuximab rather than vincristine.  Neuropathy still can be an issue and diarrhea is more common.  If this is anaplastic large cell lymphoma ALK positive, autologous stem cell transplant may be considered in patients over 40 years of age with an IPI score greater than or equal to 3 but generally not if the IPI score is less than 3 so we will assess that very carefully.  Despite the elevated fasting triglyceride recently, I doubt this is hemophagocytic syndrome given the negative bone marrow and now with the blossoming lymphadenopathy.  But we shall see.      Past Medical History:   Diagnosis Date   • Hyperlipidemia    • Pneumonia      Past Surgical  History:   Procedure Laterality Date   • APPENDECTOMY     • BACK SURGERY      L5 S1   • BRONCHOSCOPY N/A 7/10/2019    Procedure: BRONCHOSCOPY WITH ENDOBRONCHIAL ULTRASOUND AND TRANSBRONCHIAL BIOPSY AND FLUORO;  Surgeon: Marvel Sandoval MD;  Location:  [facility];  Service: Pulmonary   • CARDIAC CATHETERIZATION N/A 6/24/2019    Procedure: LEFT HEART CATH;  Surgeon: Deep Muñiz IV, MD;  Location:  [facility];  Service: Cardiovascular   • INTERVENTIONAL RADIOLOGY PROCEDURE N/A 8/26/2019    Procedure: THORACIC SPINE BIOSPY;  Surgeon: Juve Avila MD;  Location:  [facility];  Service: Interventional Radiology       No current facility-administered medications on file prior to encounter.      Current Outpatient Medications on File Prior to Encounter   Medication Sig Dispense Refill   • oxyCODONE-acetaminophen (PERCOCET) 7.5-325 MG per tablet Take 1 tablet PO every 4-6 hours as needed for pain 18 tablet 0   • predniSONE (DELTASONE) 10 MG tablet Take 10 mg by mouth 3 (Three) Times a Day.     • Sod Picosulfate-Mag Ox-Cit Acd 10-3.5-12 MG-GM -GM/160ML solution Take 1 kit by mouth Take As Directed. Follow instructions that were mailed to your home. If you didn't receive these call [phone]. 8 bottle 0   • acetaminophen (TYLENOL) 500 MG tablet Take 1,000 mg by mouth Every 6 (Six) Hours As Needed for Mild Pain . Alternates with Tramadol     • hydroxychloroquine (PLAQUENIL) 200 MG tablet Take 200 mg by mouth 2 (Two) Times a Day.     • ibuprofen (ADVIL,MOTRIN) 600 MG tablet Take 1 tablet by mouth Every 8 (Eight) Hours As Needed for Mild Pain  (use sparingly).     • lactobacillus acidophilus (RISAQUAD) capsule capsule Take 1 capsule by mouth Daily for 30 days. 30 capsule 0   • metoprolol succinate XL (TOPROL-XL) 25 MG 24 hr tablet Take 1 tablet by mouth Daily. 90 tablet 0   • sulfamethoxazole-trimethoprim (BACTRIM DS,SEPTRA DS) 800-160 MG per tablet Take 1 tablet by  "mouth Every 12 (Twelve) Hours.     • traMADol (ULTRAM) 50 MG tablet Take 100 mg by mouth Every 6 (Six) Hours As Needed for Moderate Pain . Alternates with Tylenol         Allergies   Allergen Reactions   • Ciprofloxacin Anxiety     \"Extreme anxiety and paranoia\"   • Sulfa Antibiotics Rash       Social History     Socioeconomic History   • Marital status:      Spouse name: Not on file   • Number of children: Not on file   • Years of education: Not on file   • Highest education level: Not on file   Tobacco Use   • Smoking status: Never Smoker   • Smokeless tobacco: Never Used   Substance and Sexual Activity   • Alcohol use: No     Frequency: Never     Drinks per session: 1 or 2     Comment: socially   • Drug use: No   • Sexual activity: Yes   Social History Narrative    Caffeine use 4-5 servings a day       Family History   Problem Relation Age of Onset   • Hyperlipidemia Mother    • Hypertension Father         white coat   • No Known Problems Maternal Grandmother    • Stroke Maternal Grandfather         related to smoking   • Emphysema Maternal Grandfather    • Stroke Paternal Grandmother    • Cancer Paternal Grandfather         throat and mouth         REVIEW OF SYSTEMS:  A 14 point review of systems was performed and is negative except as noted above.    Objective   PHYSICAL EXAM:    /65   Pulse 79   Temp 97.7 °F (36.5 °C) (Oral)   Resp 20   Ht 182.9 cm (72\")   Wt 88.9 kg (195 lb 15.8 oz)   SpO2 98%   BMI 26.58 kg/m²      ECOG: (1) Restricted in physically strenuous activity, ambulatory and able to do work of light nature  General: well appearing male in no acute distress  HEENT: sclerae anicteric, oropharynx clear  Lymphatics: no cervical, supraclavicular, inguinal, or axillary adenopathy  Neck: Supple. No thyromegaly.  Cardiovascular: regular rate and rhythm, no murmurs  Lungs: clear to auscultation bilaterally. No respiratory distress  Abdomen: soft, nontender, nondistended.  No palpable " organomegaly  Extremities: no lower extremity edema, cyanosis, or clubbing  Skin: Rash on medial thighs and ankles  Neuro: Alert and oriented x3. Moves all extremities.    Results:    Results from last 7 days   Lab Units 09/06/19 0215 09/05/19 1827 09/05/19  1115   WBC 10*3/mm3 24.62* 28.30* 30.17*   HEMOGLOBIN g/dL 9.1* 9.8* 12.7*   PLATELETS 10*3/mm3 162 187 262     Results from last 7 days   Lab Units 09/06/19 0215 09/05/19 1827 09/05/19  1115   SODIUM mmol/L 131* 127* 124*   POTASSIUM mmol/L 4.2 4.4 4.6   CO2 mmol/L 25.0 22.0 25.0   BUN mg/dL 17 23* 29*   CREATININE mg/dL 0.94 1.10 1.67*   GLUCOSE mg/dL 132* 164* 132*     Results from last 7 days   Lab Units 09/06/19 0215 09/05/19 1827 09/05/19  1115   AST (SGOT) U/L 25 30 31   ALT (SGPT) U/L 26 27 35   BILIRUBIN mg/dL 0.3 0.5 0.7   ALK PHOS U/L 110 117 137*         Ct Abdomen Pelvis Without Contrast    Result Date: 9/5/2019   Constellation of findings concerning for metastatic disease:  - Interval development of numerous pulmonary nodules throughout the lungs with the largest measuring 2.0 cm within the right midlung and concerning for underlying pulmonary metastasis and which may account for abnormality identified in the chest radiograph performed 09/05/2019.  - Extensive axillary, mediastinal, and abdominopelvic lymphadenopathy as described above.  - Hypoattenuated regions involving the liver concerning for additional sites of metastatic disease, although are limited in evaluation secondary to lack of intravenous contrast. Attention on follow-up imaging is advised.  - Redemonstration of T8/T9 aggressive osseous lesions with additional lytic lesion involving the L4 vertebral body and additional subcentimeter lytic lesions throughout the thoracolumbar spine suggested.  Nonspecific perinephric inflammation. Correlate with urinalysis to exclude underlying pyelonephritis.  DICTATED:   09/05/2019 EDITED/ls :   09/05/2019      Ct Chest Without  Contrast    Result Date: 9/5/2019   Constellation of findings concerning for metastatic disease:  - Interval development of numerous pulmonary nodules throughout the lungs with the largest measuring 2.0 cm within the right midlung and concerning for underlying pulmonary metastasis and which may account for abnormality identified in the chest radiograph performed 09/05/2019.  - Extensive axillary, mediastinal, and abdominopelvic lymphadenopathy as described above.  - Hypoattenuated regions involving the liver concerning for additional sites of metastatic disease, although are limited in evaluation secondary to lack of intravenous contrast. Attention on follow-up imaging is advised.  - Redemonstration of T8/T9 aggressive osseous lesions with additional lytic lesion involving the L4 vertebral body and additional subcentimeter lytic lesions throughout the thoracolumbar spine suggested.  Nonspecific perinephric inflammation. Correlate with urinalysis to exclude underlying pyelonephritis.  DICTATED:   09/05/2019 EDITED/ls :   09/05/2019      Xr Chest 1 View    Result Date: 9/5/2019  There has been interval worsening of mild perihilar airspace changes with nodular airspace opacities noted within the right mid lung of undetermined etiology. Consideration would be for pneumonia as these are new when compared to 08/21/2019, however underlying developing pulmonary lesions are not excluded. Consideration for follow-up CT of the chest without IV contrast should be considered if clinically appropriate.  D:  09/05/2019 E:  09/05/2019         Assessment    ASSESSMENT & PLAN:    8. Leukocytosis  9. FUO  10. Decreased ejection fraction with evidence of myocarditis  11. Marked elevation of sedimentation rate and C-reactive protein  12. 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 Elevated triglycerides on fasting  14. Acute renal failure resolved with hydration    -8/23/2019 initial inpatient [facility]  medical oncology consultation.  Asked to see regarding leukocytosis.  He had upper respiratory infection with pneumonia, chest pains, elevated troponins with diagnosis of myocarditis and decreased ejection fraction with fevers to 104.  Tentatively diagnosed with still's disease and treated with prednisone with some improvement in fevers as well as improvement in a rash.  By the end of July, he had severe mid back pain relatively acute and onset that progressed over time with imaging of the area showing progressive abnormality of both bone and marrow signals in the spine that could be malignant marrow involvement or, more likely per radiologist, infectious.  Sedimentation rate was over 100 with a C-reactive protein elevated and white count 20-30,000 for the better part of 2 months.  Slight left shift but no immature forms.  Broad-spectrum serologic testing has not shown pathogens as of the initial consultation.  I ordered bone marrow biopsy and Dr. Jimenez coordinated spine biopsy  -6/24/2019 CT chest abdomen pelvis without contrast showed no parenchymal lung disease but a 1.6 cm mildly enlarged centrally necrotic right paratracheal node with no other adenopathy or hepatosplenomegaly.  Ejection fraction 35% on echocardiogram.  -6/28/2019 MRI brain and cervical spine without contrast normal  -7/9/2019 fluoroscopy guided right joint aspiration unrevealing  -7/10/2019 fluoroscopic guided bronchoscopy showed no pathogens or pathology  -8/9/2019 MRI thoracic and lumbar spine showed low T1 signals in T8 and T9 with increased T2 involvement concerning for aggressive marrow signal findings that could represent lipid poor spinal hemangioma but concerning for metastasis versus multiple myeloma.  Contrast MRI recommended.  -8/21/2019 MRI thoracic spine with and without contrast showed multiple foci of abnormal marrow signal within the thoracic spine was prominent T8 and T9 with interval increase compared to 8/9/2019 with  deformity of the endplates at T8 and T9 but no definitive fracture or discitis.  Several smaller additional lesions suspected at T1, T2, T4, T5, and T12 and the differential would include multifocal vertebral osteomyelitis versus metastasis.  -8/23/2019 bone marrow biopsy showed mild polyclonal plasmacytosis with normocellular marrow and trilineage hematopoiesis with mild myeloid expansion.  No lymphoid infiltrate and no organisms for fungi, acid fast bacilli, tumor, or granuloma.  Bennett 2- negative.  -8/24/2019 ejection fraction up to 65%  -8/26/2019 T8 biopsy show focal area of markedly atypical appearance.  There are inflammatory cells with occasional eosinophils and larger cells.  Some focal hemosiderin.  There is equivocal positivity for CD4, , CD45, and Constance-Barr virus.  It is positive for CD30, ALK 1, EMA, and vimentin.  No staining for CD 5, 7, 3, 20, or 15.  No staining for cytokeratin, SOX 10, , or CD1a.  Outside referral to [facility] pending.  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  We will get PET scan.  Were this hemophagocytic syndrome I would have expected his marrow to be abnormal along with splenomegaly and cytopenias as would the marrow be abnormal for this a plasma cell dyscrasia and the plasma cells were polyclonal.  I will check his SIEP along with triglycerides, peripheral blood flow cytometry for soluble CD 25, ferritin, and NK cell activity.  Once I have that information, hopefully we will have more information back from [facility] and unlikely to involve my colleagues at the [facility] in this process if this is an ALK positive anaplastic large cell lymphoma without kia involvement given the unusual nature and appearance of this.    -9/6/2019 subsequent oncology reconsultation for readmission: Met patient with his last admission.  He  has had a 4-month history of fevers with upper respiratory infection and possible pneumonia.  Found to have myocarditis with low ejection fraction with extensive infectious disease work-up negative.  A month later readmitted with shortness of breath and arthralgias with some redness in his wrist.  Bronchoscopy was negative and he was treated with some steroids.  On his third admission with relatively recent acute onset back pain he was found to have some spine lesions.  The bone marrow biopsy was unrevealing,  a T8 biopsy by Dr. Wilde showed markedly atypical appearing cells in a background of eosinophils.  The larger cells were positive for ALK 1, CD30, EMA, and vimentin.  No staining for CD 5, 7, 3, 20, or 15.  No staining for cytokeratin, sox 10, , or CD1a.  Tissue has been sent for second opinion at [facility].  Anaplastic large cell lymphoma ALK positive was considered but given that his fevers which started months back included testing with CT chest abdomen pelvis 6/24/2019 only showing mildly enlarged centrally necrotic right lower paratracheal node of indeterminate significance and no other significant adenopathy or splenomegaly and no pulmonary infiltrate or mediastinal adenopathy on repeat CT chest 7/9/2019, PET scan was ordered.  Before PET could be completed and GI evaluation completed for evaluation of possible GI source for kia procurement, the patient on the day he was due for endoscopy continue to have fevers with hypotension and a pulse in the 150s.  9/5/2019 CT chest abdomen and pelvis on admission now shows showed new numerous bilateral pulmonary lung nodules in the right lung measuring 2 cm without focal pneumonia.  There was extensive axillary, mediastinal adenopathy the largest in the right paratracheal area 2.1 cm with central necrosis.  There were hypoattenuating regions in the right hepatic lobe measuring up to 2.5 cm incompletely assessed due to lack of contrast due to his  creatinine on admission 9/5/2019 being up to 1.67 down to 0.94 by the morning of 9/6/2019.  CT also showed new extensive axillary, mediastinal, and abdominopelvic lymphadenopathy.  There was redemonstration of the T8-T9 aggressive osseous lytic lesions involving thoracolumbar spine and multiple levels.  Nonspecific perinephric inflammation also seen possibly representing pyelonephritis.  Laboratory testing on admission 9/5/2019 showed C-reactive protein up to 37.66 with sedimentation rate greater than 130.  Procalcitonin over 38 with lactic acid  4.8.   normal with normal troponin.  Fasting triglyceride up to 269 and peripheral blood flow cytometry sent for NK cell activity as well as CD25 for IL-2 receptor for the possibility of hemophagocytic syndrome which would be unusual without hemophagocytosis bone marrow.  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  We will get echocardiogram and hold on the GI evaluation and I discussed in person with Dr. Benja Can to see for procurement of lymph node.  He is going to go after upper mediastinal lymph node.  Once we have more tissue then we will make further plans for treatment.  His ejection fraction on 8/24/2019 was 62%.  Assuming that we find lymphoma and not some aggressive infectious disease process, he will need a port as well but we will wait on the port until we know what we are dealing with.  My partners are on board and will cover me this weekend and I will check back Monday to see where we stand.  He already has an appointment with Dr. Roosevelt Nguyen at  on the 11th who I was enlisting to help sort this out but we may be able to get our answers now that we have node to go after.  I may yet need his services depending on the ilk of this lymphoma and whether or not we would need to consolidate aggressively or  treat more aggressively than with standard chemotherapy upfront.  I am starting him on allopurinol.  He already has a rash on his upper thighs and heels and around the area of his prior bone marrow biopsy but I suspect that just related to this probable lymphomatous process or perhaps the recent Bactrim started by Dr. Hauser.  His procalcitonin is quite high and I am still concerned about concomitant infection and I am not sure if the procalcitonin it can be falsely dramatically elevated in the face of lymphoma.  If this is anaplastic large cell lymphoma, with his ECOG performance status generally less than 2 and age less than 60 but with at least stage III disease, even if his LDH is high, he would at least have a risk score of 2 which would put him at the low intermediate risk on the IPI index.  Whether we would use Rituxan or not depends on if this turns out to be CD20 positive where his initial bone biopsy was not.  Given that it is CD30 positive, will probably use brentuximab, Cytoxan, Adriamycin, prednisone every 3 weeks x 6 cycles.  This usually is given without vincristine but with the Brentuximab 1.8 mg/kg IV day 1 of each cycle.  A progression free survival was seen both in ALK positive and negative anaplastic large cell lymphomas treated with brentuximab rather than vincristine.  Neuropathy still can be an issue and diarrhea is more common.  If this is anaplastic large cell lymphoma ALK positive, autologous stem cell transplant may be considered in patients over 40 years of age with an IPI score greater than or equal to 3 but generally not if the IPI score is less than 3 so we will assess that very carefully.  Despite the elevated fasting triglyceride recently, I doubt this is hemophagocytic syndrome given the negative bone marrow and now with the blossoming lymphadenopathy.  But we shall see.    I spent 90 minutes of continuous time evaluating discussing with patient and managing the plan as outlined  above.  Viewed the images with Dr. Can and I have spoken with Dr. Marr and Dr. Hauser about the above plan.  Brent Brizuela MD    9/6/2019                Electronically signed by Brent Brizuela MD at 9/6/2019  7:58 AM     Lazaro Hauser MD at 9/5/2019  8:13 PM      Consult Orders    1. Inpatient Infectious Diseases Consult [[id number]] ordered by Sanjuanita Ching, RAVINDER, APRN at 09/05/19 1533                Stephen Stoll  1975  [id number]  9/5/2019      Referring Provider: No ref. provider found  Reason for Consultation:   Chief Complaint   Patient presents with   • Shortness of Breath   • Rapid Heart Rate         Subjective   History of present illness:    Patient is a very pleasant  44 y.o.  Yr old male with CC of fever  Patient was otherwise well until about4 months ago  Had URI and possible pna episode  Found to have myocarditis with low EF  Extensive work-up negative    Second admission a nonth later with wrist redness and SOA  Bronch negative  Given steroids    Third admission with back pain.  Found to have spine lesions  BM Bx negative  Back Bx suspecious for Lymphoma?  Home of Bactrim    Now with further dyspnea, fever chills weakness and rash  Back pain still there but better  Some cough  Was to have endoscopies today, cancelled          Past Medical History:   Diagnosis Date   • Hyperlipidemia    • Pneumonia        Past Surgical History:   Procedure Laterality Date   • APPENDECTOMY     • BACK SURGERY      L5 S1   • BRONCHOSCOPY N/A 7/10/2019    Procedure: BRONCHOSCOPY WITH ENDOBRONCHIAL ULTRASOUND AND TRANSBRONCHIAL BIOPSY AND FLUORO;  Surgeon: Marvel Sandoval MD;  Location:  [facility];  Service: Pulmonary   • CARDIAC CATHETERIZATION N/A 6/24/2019    Procedure: LEFT HEART CATH;  Surgeon: Deep Muñiz IV, MD;  Location:  [facility];  Service: Cardiovascular   • INTERVENTIONAL RADIOLOGY PROCEDURE N/A 8/26/2019    Procedure: THORACIC SPINE BIOSPY;   "Surgeon: Juve Avila MD;  Location:  [facility];  Service: Interventional Radiology       Pediatric History   Patient Guardian Status   • Not on file     Other Topics Concern   • Not on file   Social History Narrative    Caffeine use 4-5 servings a day       family history includes Cancer in his paternal grandfather; Emphysema in his maternal grandfather; Hyperlipidemia in his mother; Hypertension in his father; No Known Problems in his maternal grandmother; Stroke in his maternal grandfather and paternal grandmother.    Allergies   Allergen Reactions   • Ciprofloxacin Anxiety     \"Extreme anxiety and paranoia\"   • Sulfa Antibiotics Rash       Medication:  Antibiotics:  Anti-Infectives (From admission, onward)    Ordered     Dose/Rate Route Frequency Start Stop    09/05/19 1520  hydroxychloroquine (PLAQUENIL) tablet 200 mg     Ordering Provider:  Roxanna Guo DO    200 mg Oral 2 Times Daily 09/05/19 2100 09/05/19 2012  piperacillin-tazobactam (ZOSYN) 3.375 g in iso-osmotic dextrose 50 ml (premix)     Ordering Provider:  Lazaro Hauser MD    3.375 g  over 30 Minutes Intravenous Every 6 Hours 09/05/19 2100 09/15/19 2059    09/05/19 2012  vancomycin (VANCOCIN) in iso-osmotic dextrose IVPB 1 g (premix) 200 mL     Ordering Provider:  Lazaro Hauser MD    1,000 mg  over 120 Minutes Intravenous Every 12 Hours 09/05/19 2100 09/15/19 2059 09/05/19 1121  vancomycin 1750 mg/500 mL 0.9% NS IVPB ([facility])     Ordering Provider:  Phi Mir DO    20 mg/kg × 93.4 kg  over 120 Minutes Intravenous Once 09/05/19 1123 09/05/19 1416    09/05/19 1121  piperacillin-tazobactam (ZOSYN) 3.375 g in iso-osmotic dextrose 50 ml (premix)     Ordering Provider:  Phi Mir DO    3.375 g  over 30 Minutes Intravenous Once 09/05/19 1122 09/05/19 1215          Please refer to the medical record for a full medication list    Review of Systems    General: fever  HEENT:  NO icterus  Resp:  Co cough  CV: No chest " "pain  ABD:  No diarrhea  :  No dysuria  MS:  co pain  Back  better  Neuro:  Co CVA  Skin:  co rash  Endocrine:  no DM  Psych:  Co anxiety  Hematological  No bleeding      Objective     Physical Exam:   Vital Signs   Temp:  [98.7 °F (37.1 °C)-102.7 °F (39.3 °C)] 98.7 °F (37.1 °C)  Heart Rate:  [106-152] 126  Resp:  [19-24] 24  BP: ()/(33-78) 105/71    Blood pressure 105/71, pulse (!) 126, temperature 98.7 °F (37.1 °C), temperature source Oral, resp. rate 24, height 182.9 cm (72\"), weight 88.9 kg (195 lb 15.8 oz), SpO2 98 %.  GENERAL: Awake and alert, in some distress.   HEENT: Oropharynx without thrush. . Dentition in good repair. No cervical adenopathy. No neck masses  EYES: PERRL. No conjunctival injection. No icterus.   LYMPHATICS: No lymphadenopathy of the neck or axillary or inguinal regions.   HEART: No murmur, gallop, or pericardial friction rub.   LUNGS: Clear to auscultation anteriorly. mild respiratory distress  ABDOMEN: Soft, nontender, nondistended. No appreciable HSM.    SKIN: rash on legs  PSYCHIATRIC: Mental status lucid. No confusion  Seems depressed  EXT:  No cellulitic change        Lab Results   Component Value Date    WBC 28.30 (H) 09/05/2019    HGB 9.8 (L) 09/05/2019    HCT 31.0 (L) 09/05/2019    MCV 83.3 09/05/2019     09/05/2019       Lab Results   Component Value Date    GLUCOSE 132 (H) 09/05/2019    BUN 29 (H) 09/05/2019    CREATININE 1.67 (H) 09/05/2019    EGFRIFNONA 45 (L) 09/05/2019    BCR 17.4 09/05/2019    CO2 25.0 09/05/2019    CALCIUM 8.9 09/05/2019    PROTENTOTREF 8.0 08/30/2019    ALBUMIN 2.80 (L) 09/05/2019    LABIL2 0.6 (L) 08/30/2019    AST 31 09/05/2019    ALT 35 09/05/2019       Estimated Creatinine Clearance: 70.2 mL/min (A) (by C-G formula based on SCr of 1.67 mg/dL (H)).      Microbiology  reviewed      Radiology:  Imaging Results (last 72 hours)     Procedure Component Value Units Date/Time    CT Chest Without Contrast [[id number]] Collected:  09/05/19 1551     " Updated:  09/05/19 1607    Narrative:       EXAMINATION: CT CHEST WO CONTRAST, CT ABDOMEN/PELVIS WO CONTRAST -  09/05/2019     INDICATION: Cough, sepsis, abnormal chest x-ray.     TECHNIQUE: Unenhanced CT imaging of the chest, abdomen, and pelvis was  performed. Additional coronal and sagittal reformatted imaging was  provided for review.     The radiation dose reduction device was turned on for each scan per the  ALARA (As Low as Reasonably Achievable) protocol.     COMPARISON: MRI of the thoracic and lumbar spine 08/09/2019.     FINDINGS:      CHEST: Dedicated CT imaging of the chest demonstrates interval  development of numerous pulmonary nodules throughout the lungs, the  largest within the right mid lung measuring 2.0 cm. These are concerning  for metastatic foci given the provided clinical history. No focal  pneumonia, pleural effusion, or pneumothorax identified. These pulmonary  nodules account for the abnormality seen on the  chest radiograph from  09/05/2019.     Extensive axillary and mediastinal lymphadenopathy is identified. The  largest within the right paratracheal region measures up to 2.1 cm and  demonstrates mild central necrosis. The pulmonary artery is within  normal limits in size. The heart is within normal limits in size. Trace  pericardial effusion is identified. Prominent cardiophrenic lymph nodes  are also noted.     ABDOMEN/PELVIS: Lack of intravenous contrast limits evaluation of  underlying abdominal and pelvic organs. Hypoattenuated region involving  the right hepatic lobe is identified with additional more confluent  hypoattenuating region within the mid right hepatic lobe noted measuring  up to 2.5 cm which is concerning for an additional site of metastatic  disease. The gallbladder demonstrates mild cholelithiasis. The spleen is  enlarged and otherwise unremarkable. The pancreas does not demonstrate  abnormality. The adrenal glands and kidneys also do not demonstrate  abnormality. No  evidence of hydronephrosis. There is mild perinephric  inflammation which requires clinical correlation. Extensive peritoneal  and retroperitoneal lymphadenopathy is identified throughout the  visualized abdomen and pelvis. The urinary bladder is partially  distended and otherwise unremarkable. The stomach is decompressed and  unremarkable. No evidence of small bowel obstruction. No free air or  free fluid identified. Postsurgical changes in the right lower quadrant  are identified and likely from prior appendectomy.  Small fat-filled  right inguinal and left inguinal hernias are noted.     Osseous structures: The visualized bony pelvis appears intact.  Degenerative changes of the bilateral hips are identified.  Re-demonstration of aggressive osseous changes at T8 and T9, previously  documented MRI thoracic spine from 8/21/2019. No convincing evidence of  posterior cortex retropulsion identified. Additional lytic lesion  involving L4 is also noted.       Impression:          Constellation of findings concerning for metastatic disease:      - Interval development of numerous pulmonary nodules throughout the  lungs with the largest measuring 2.0 cm within the right midlung and  concerning for underlying pulmonary metastasis and which may account for  abnormality identified in the chest radiograph performed 09/05/2019.     - Extensive axillary, mediastinal, and abdominopelvic lymphadenopathy as  described above.     - Hypoattenuated regions involving the liver concerning for additional  sites of metastatic disease, although are limited in evaluation  secondary to lack of intravenous contrast. Attention on follow-up  imaging is advised.     - Redemonstration of T8/T9 aggressive osseous lesions with additional  lytic lesion involving the L4 vertebral body and additional  subcentimeter lytic lesions throughout the thoracolumbar spine  suggested.     Nonspecific perinephric inflammation. Correlate with urinalysis  to  exclude underlying pyelonephritis.     DICTATED:   09/05/2019  EDITED/ls :   09/05/2019        CT Abdomen Pelvis Without Contrast [[id number]] Collected:  09/05/19 1551     Updated:  09/05/19 1607    Narrative:       EXAMINATION: CT CHEST WO CONTRAST, CT ABDOMEN/PELVIS WO CONTRAST -  09/05/2019     INDICATION: Cough, sepsis, abnormal chest x-ray.     TECHNIQUE: Unenhanced CT imaging of the chest, abdomen, and pelvis was  performed. Additional coronal and sagittal reformatted imaging was  provided for review.     The radiation dose reduction device was turned on for each scan per the  ALARA (As Low as Reasonably Achievable) protocol.     COMPARISON: MRI of the thoracic and lumbar spine 08/09/2019.     FINDINGS:      CHEST: Dedicated CT imaging of the chest demonstrates interval  development of numerous pulmonary nodules throughout the lungs, the  largest within the right mid lung measuring 2.0 cm. These are concerning  for metastatic foci given the provided clinical history. No focal  pneumonia, pleural effusion, or pneumothorax identified. These pulmonary  nodules account for the abnormality seen on the  chest radiograph from  09/05/2019.     Extensive axillary and mediastinal lymphadenopathy is identified. The  largest within the right paratracheal region measures up to 2.1 cm and  demonstrates mild central necrosis. The pulmonary artery is within  normal limits in size. The heart is within normal limits in size. Trace  pericardial effusion is identified. Prominent cardiophrenic lymph nodes  are also noted.     ABDOMEN/PELVIS: Lack of intravenous contrast limits evaluation of  underlying abdominal and pelvic organs. Hypoattenuated region involving  the right hepatic lobe is identified with additional more confluent  hypoattenuating region within the mid right hepatic lobe noted measuring  up to 2.5 cm which is concerning for an additional site of metastatic  disease. The gallbladder demonstrates mild  cholelithiasis. The spleen is  enlarged and otherwise unremarkable. The pancreas does not demonstrate  abnormality. The adrenal glands and kidneys also do not demonstrate  abnormality. No evidence of hydronephrosis. There is mild perinephric  inflammation which requires clinical correlation. Extensive peritoneal  and retroperitoneal lymphadenopathy is identified throughout the  visualized abdomen and pelvis. The urinary bladder is partially  distended and otherwise unremarkable. The stomach is decompressed and  unremarkable. No evidence of small bowel obstruction. No free air or  free fluid identified. Postsurgical changes in the right lower quadrant  are identified and likely from prior appendectomy.  Small fat-filled  right inguinal and left inguinal hernias are noted.     Osseous structures: The visualized bony pelvis appears intact.  Degenerative changes of the bilateral hips are identified.  Re-demonstration of aggressive osseous changes at T8 and T9, previously  documented MRI thoracic spine from 8/21/2019. No convincing evidence of  posterior cortex retropulsion identified. Additional lytic lesion  involving L4 is also noted.       Impression:          Constellation of findings concerning for metastatic disease:      - Interval development of numerous pulmonary nodules throughout the  lungs with the largest measuring 2.0 cm within the right midlung and  concerning for underlying pulmonary metastasis and which may account for  abnormality identified in the chest radiograph performed 09/05/2019.     - Extensive axillary, mediastinal, and abdominopelvic lymphadenopathy as  described above.     - Hypoattenuated regions involving the liver concerning for additional  sites of metastatic disease, although are limited in evaluation  secondary to lack of intravenous contrast. Attention on follow-up  imaging is advised.     - Redemonstration of T8/T9 aggressive osseous lesions with additional  lytic lesion involving  the L4 vertebral body and additional  subcentimeter lytic lesions throughout the thoracolumbar spine  suggested.     Nonspecific perinephric inflammation. Correlate with urinalysis to  exclude underlying pyelonephritis.     DICTATED:   09/05/2019  EDITED/ls :   09/05/2019        XR Chest 1 View [[id number]] Collected:  09/05/19 1140     Updated:  09/05/19 1148    Narrative:       EXAMINATION: XR CHEST 1 VW- 09/05/2019     INDICATION: SOA triage protocol      COMPARISON: Chest radiograph 08/21/2019     FINDINGS: Single portable chest radiograph is submitted for review.      The heart is top normal size, similar to prior. There has been an  increase in bilateral perihilar airspace changes with interval  development of right mid lung nodular opacities. The visualized upper  abdomen is unrevealing. No acute osseous abnormality.           Impression:       There has been interval worsening of mild perihilar  airspace changes with nodular airspace opacities noted within the right  mid lung of undetermined etiology. Consideration would be for pneumonia  as these are new when compared to 08/21/2019, however underlying  developing pulmonary lesions are not excluded. Consideration for  follow-up CT of the chest without IV contrast should be considered if  clinically appropriate.     D:  09/05/2019  E:  09/05/2019                ASSESSMENT AND PLAN:     -- FUO - working diagnosis was Stills - on tapering Prednisone  -- Dyspnea, with new nodular lung lesions - malignancy?  -- Myocarditis - resolved  -- spine lesions - Bx with possible Lymphoma - [facility] review pending  -- diffuse lymphadenopathy - new - don't feel any, Bx?  -- neutrophillic leukocytosis - ongoing  -- renal insufficiency - new - watch on hydration  -- Rash - new - stills vs Bactrim reaction  - will make allergic to Bactrim         I discussed the patients findings and my recommendations with patient    Continue vancomycin and Zosyn for now    Dr Dumont to  see tomorrow    Thank you for this consult.  Our group would be pleased to follow this patient over the course of their hospitalization and assist with outpatient antimicrobial therapy, as indicated.     Lazaro Hauser MD  9/5/2019            Electronically signed by Lazaro Hauser MD at 9/5/2019  8:22 PM

## 2019-09-06 NOTE — PLAN OF CARE
Problem: Patient Care Overview  Goal: Plan of Care Review  Outcome: Ongoing (interventions implemented as appropriate)   09/06/19 0513   Coping/Psychosocial   Plan of Care Reviewed With patient;spouse   Plan of Care Review   Progress no change   OTHER   Outcome Summary VSS. TMAX 99.3. Topical lotion applied to rash and provided some relief. Regular IS performed. Critical lab reported this AM; positive blood cultures in the aerobic bottle: gram (+) cocci in groups. Adequate UOP. Wife at bedside.       Problem: Sepsis/Septic Shock (Adult)  Goal: Signs and Symptoms of Listed Potential Problems Will be Absent, Minimized or Managed (Sepsis/Septic Shock)  Outcome: Outcome(s) achieved Date Met: 09/06/19      Problem: Fall Risk (Adult)  Goal: Identify Related Risk Factors and Signs and Symptoms  Outcome: Ongoing (interventions implemented as appropriate)    Goal: Absence of Fall  Outcome: Ongoing (interventions implemented as appropriate)

## 2019-09-07 ENCOUNTER — APPOINTMENT (OUTPATIENT)
Dept: GENERAL RADIOLOGY | Facility: HOSPITAL | Age: 44
End: 2019-09-07

## 2019-09-07 PROBLEM — R78.81 STAPHYLOCOCCUS AUREUS BACTEREMIA: Status: ACTIVE | Noted: 2019-09-07

## 2019-09-07 PROBLEM — B95.61 STAPHYLOCOCCUS AUREUS BACTEREMIA: Status: ACTIVE | Noted: 2019-09-07

## 2019-09-07 LAB
ALBUMIN SERPL-MCNC: 2 G/DL (ref 3.5–5.2)
ALBUMIN/GLOB SERPL: 0.5 G/DL
ALP SERPL-CCNC: 133 U/L (ref 39–117)
ALT SERPL W P-5'-P-CCNC: 20 U/L (ref 1–41)
ANION GAP SERPL CALCULATED.3IONS-SCNC: 12 MMOL/L (ref 5–15)
AST SERPL-CCNC: 15 U/L (ref 1–40)
BASOPHILS # BLD AUTO: 0.02 10*3/MM3 (ref 0–0.2)
BASOPHILS NFR BLD AUTO: 0.1 % (ref 0–1.5)
BILIRUB SERPL-MCNC: <0.2 MG/DL (ref 0.2–1.2)
BUN BLD-MCNC: 16 MG/DL (ref 6–20)
BUN/CREAT SERPL: 19.5 (ref 7–25)
CALCIUM SPEC-SCNC: 7.4 MG/DL (ref 8.6–10.5)
CHLORIDE SERPL-SCNC: 98 MMOL/L (ref 98–107)
CO2 SERPL-SCNC: 23 MMOL/L (ref 22–29)
CREAT BLD-MCNC: 0.82 MG/DL (ref 0.76–1.27)
DEPRECATED RDW RBC AUTO: 51.5 FL (ref 37–54)
EOSINOPHIL # BLD AUTO: 0 10*3/MM3 (ref 0–0.4)
EOSINOPHIL NFR BLD AUTO: 0 % (ref 0.3–6.2)
ERYTHROCYTE [DISTWIDTH] IN BLOOD BY AUTOMATED COUNT: 17.1 % (ref 12.3–15.4)
GFR SERPL CREATININE-BSD FRML MDRD: 102 ML/MIN/1.73
GLOBULIN UR ELPH-MCNC: 4 GM/DL
GLUCOSE BLD-MCNC: 208 MG/DL (ref 65–99)
HCT VFR BLD AUTO: 29.1 % (ref 37.5–51)
HGB BLD-MCNC: 9.1 G/DL (ref 13–17.7)
IMM GRANULOCYTES # BLD AUTO: 0.52 10*3/MM3 (ref 0–0.05)
IMM GRANULOCYTES NFR BLD AUTO: 3 % (ref 0–0.5)
LYMPHOCYTES # BLD AUTO: 0.63 10*3/MM3 (ref 0.7–3.1)
LYMPHOCYTES NFR BLD AUTO: 3.6 % (ref 19.6–45.3)
MAGNESIUM SERPL-MCNC: 2.3 MG/DL (ref 1.6–2.6)
MCH RBC QN AUTO: 26 PG (ref 26.6–33)
MCHC RBC AUTO-ENTMCNC: 31.3 G/DL (ref 31.5–35.7)
MCV RBC AUTO: 83.1 FL (ref 79–97)
MONOCYTES # BLD AUTO: 0.97 10*3/MM3 (ref 0.1–0.9)
MONOCYTES NFR BLD AUTO: 5.5 % (ref 5–12)
NEUTROPHILS # BLD AUTO: 15.37 10*3/MM3 (ref 1.7–7)
NEUTROPHILS NFR BLD AUTO: 87.8 % (ref 42.7–76)
NRBC BLD AUTO-RTO: 0 /100 WBC (ref 0–0.2)
PHOSPHATE SERPL-MCNC: 2.3 MG/DL (ref 2.5–4.5)
PLAT MORPH BLD: NORMAL
PLATELET # BLD AUTO: 132 10*3/MM3 (ref 140–450)
PMV BLD AUTO: 10.2 FL (ref 6–12)
POTASSIUM BLD-SCNC: 3.5 MMOL/L (ref 3.5–5.2)
POTASSIUM BLD-SCNC: 4.2 MMOL/L (ref 3.5–5.2)
PROT SERPL-MCNC: 6 G/DL (ref 6–8.5)
RBC # BLD AUTO: 3.5 10*6/MM3 (ref 4.14–5.8)
RBC MORPH BLD: NORMAL
SODIUM BLD-SCNC: 133 MMOL/L (ref 136–145)
WBC MORPH BLD: NORMAL
WBC NRBC COR # BLD: 17.51 10*3/MM3 (ref 3.4–10.8)

## 2019-09-07 PROCEDURE — 99232 SBSQ HOSP IP/OBS MODERATE 35: CPT | Performed by: INTERNAL MEDICINE

## 2019-09-07 PROCEDURE — 85007 BL SMEAR W/DIFF WBC COUNT: CPT | Performed by: INTERNAL MEDICINE

## 2019-09-07 PROCEDURE — 63710000001 PREDNISONE PER 5 MG: Performed by: INTERNAL MEDICINE

## 2019-09-07 PROCEDURE — 25010000003 CEFAZOLIN IN DEXTROSE 2-4 GM/100ML-% SOLUTION: Performed by: INTERNAL MEDICINE

## 2019-09-07 PROCEDURE — 25010000002 PIPERACILLIN SOD-TAZOBACTAM PER 1 G: Performed by: INTERNAL MEDICINE

## 2019-09-07 PROCEDURE — 71045 X-RAY EXAM CHEST 1 VIEW: CPT

## 2019-09-07 PROCEDURE — 25010000002 VANCOMYCIN 10 G RECONSTITUTED SOLUTION

## 2019-09-07 PROCEDURE — 80053 COMPREHEN METABOLIC PANEL: CPT | Performed by: INTERNAL MEDICINE

## 2019-09-07 PROCEDURE — 25010000002 HYDROCORTISONE SODIUM SUCCINATE 100 MG RECONSTITUTED SOLUTION: Performed by: NURSE PRACTITIONER

## 2019-09-07 PROCEDURE — 84132 ASSAY OF SERUM POTASSIUM: CPT | Performed by: INTERNAL MEDICINE

## 2019-09-07 PROCEDURE — 83735 ASSAY OF MAGNESIUM: CPT | Performed by: INTERNAL MEDICINE

## 2019-09-07 PROCEDURE — 85025 COMPLETE CBC W/AUTO DIFF WBC: CPT | Performed by: INTERNAL MEDICINE

## 2019-09-07 PROCEDURE — 84100 ASSAY OF PHOSPHORUS: CPT | Performed by: INTERNAL MEDICINE

## 2019-09-07 RX ORDER — CEFAZOLIN SODIUM 2 G/100ML
2 INJECTION, SOLUTION INTRAVENOUS EVERY 8 HOURS
Status: DISCONTINUED | OUTPATIENT
Start: 2019-09-07 | End: 2019-09-08

## 2019-09-07 RX ORDER — FAMOTIDINE 20 MG/1
20 TABLET, FILM COATED ORAL 2 TIMES DAILY
Status: DISCONTINUED | OUTPATIENT
Start: 2019-09-07 | End: 2019-09-12 | Stop reason: HOSPADM

## 2019-09-07 RX ORDER — POTASSIUM CHLORIDE 1.5 G/1.77G
40 POWDER, FOR SOLUTION ORAL AS NEEDED
Status: DISCONTINUED | OUTPATIENT
Start: 2019-09-07 | End: 2019-09-12 | Stop reason: HOSPADM

## 2019-09-07 RX ORDER — L.ACID,PARA/B.BIFIDUM/S.THERM 8B CELL
1 CAPSULE ORAL DAILY
Status: DISCONTINUED | OUTPATIENT
Start: 2019-09-07 | End: 2019-09-12 | Stop reason: HOSPADM

## 2019-09-07 RX ORDER — METOPROLOL SUCCINATE 25 MG/1
25 TABLET, EXTENDED RELEASE ORAL
Status: DISCONTINUED | OUTPATIENT
Start: 2019-09-07 | End: 2019-09-12 | Stop reason: HOSPADM

## 2019-09-07 RX ORDER — PREDNISONE 10 MG/1
10 TABLET ORAL
Status: DISCONTINUED | OUTPATIENT
Start: 2019-09-07 | End: 2019-09-08

## 2019-09-07 RX ORDER — POTASSIUM CHLORIDE 750 MG/1
40 CAPSULE, EXTENDED RELEASE ORAL AS NEEDED
Status: DISCONTINUED | OUTPATIENT
Start: 2019-09-07 | End: 2019-09-12 | Stop reason: HOSPADM

## 2019-09-07 RX ADMIN — SODIUM CHLORIDE, POTASSIUM CHLORIDE, SODIUM LACTATE AND CALCIUM CHLORIDE 100 ML/HR: 600; 310; 30; 20 INJECTION, SOLUTION INTRAVENOUS at 00:13

## 2019-09-07 RX ADMIN — OXYCODONE HYDROCHLORIDE AND ACETAMINOPHEN 1 TABLET: 7.5; 325 TABLET ORAL at 17:20

## 2019-09-07 RX ADMIN — HYDROXYCHLOROQUINE SULFATE 200 MG: 200 TABLET, FILM COATED ORAL at 20:05

## 2019-09-07 RX ADMIN — HYDROCORTISONE SODIUM SUCCINATE 100 MG: 100 INJECTION, POWDER, FOR SOLUTION INTRAMUSCULAR; INTRAVENOUS at 00:04

## 2019-09-07 RX ADMIN — FAMOTIDINE 20 MG: 20 TABLET ORAL at 20:05

## 2019-09-07 RX ADMIN — PREDNISONE 10 MG: 10 TABLET ORAL at 11:32

## 2019-09-07 RX ADMIN — TAZOBACTAM SODIUM AND PIPERACILLIN SODIUM 3.38 G: 375; 3 INJECTION, SOLUTION INTRAVENOUS at 02:05

## 2019-09-07 RX ADMIN — HYDROXYCHLOROQUINE SULFATE 200 MG: 200 TABLET, FILM COATED ORAL at 08:41

## 2019-09-07 RX ADMIN — OXYCODONE HYDROCHLORIDE AND ACETAMINOPHEN 1 TABLET: 7.5; 325 TABLET ORAL at 04:45

## 2019-09-07 RX ADMIN — SODIUM CHLORIDE, PRESERVATIVE FREE 10 ML: 5 INJECTION INTRAVENOUS at 20:05

## 2019-09-07 RX ADMIN — CEFAZOLIN SODIUM 2 G: 2 INJECTION, SOLUTION INTRAVENOUS at 11:31

## 2019-09-07 RX ADMIN — VANCOMYCIN HYDROCHLORIDE 1500 MG: 10 INJECTION, POWDER, LYOPHILIZED, FOR SOLUTION INTRAVENOUS at 00:04

## 2019-09-07 RX ADMIN — ALLOPURINOL 300 MG: 300 TABLET ORAL at 08:38

## 2019-09-07 RX ADMIN — PREDNISONE 10 MG: 10 TABLET ORAL at 17:12

## 2019-09-07 RX ADMIN — POTASSIUM CHLORIDE 40 MEQ: 750 CAPSULE, EXTENDED RELEASE ORAL at 11:32

## 2019-09-07 RX ADMIN — OXYCODONE HYDROCHLORIDE AND ACETAMINOPHEN 1 TABLET: 7.5; 325 TABLET ORAL at 11:40

## 2019-09-07 RX ADMIN — METOPROLOL SUCCINATE 25 MG: 25 TABLET, EXTENDED RELEASE ORAL at 12:17

## 2019-09-07 RX ADMIN — BENZOCAINE AND MENTHOL 1 LOZENGE: 15; 3.6 LOZENGE ORAL at 20:05

## 2019-09-07 RX ADMIN — TAZOBACTAM SODIUM AND PIPERACILLIN SODIUM 3.38 G: 375; 3 INJECTION, SOLUTION INTRAVENOUS at 08:38

## 2019-09-07 RX ADMIN — PREDNISONE 10 MG: 10 TABLET ORAL at 08:38

## 2019-09-07 RX ADMIN — POTASSIUM & SODIUM PHOSPHATES POWDER PACK 280-160-250 MG 2 PACKET: 280-160-250 PACK at 11:32

## 2019-09-07 RX ADMIN — CEFAZOLIN SODIUM 2 G: 2 INJECTION, SOLUTION INTRAVENOUS at 18:18

## 2019-09-07 RX ADMIN — BENZOCAINE AND MENTHOL 1 LOZENGE: 15; 3.6 LOZENGE ORAL at 17:12

## 2019-09-07 RX ADMIN — Medication 1 CAPSULE: at 08:40

## 2019-09-07 RX ADMIN — BENZOCAINE AND MENTHOL 1 LOZENGE: 15; 3.6 LOZENGE ORAL at 13:13

## 2019-09-07 RX ADMIN — FAMOTIDINE 20 MG: 20 TABLET ORAL at 08:38

## 2019-09-07 NOTE — PROGRESS NOTES
[facility] Progress Note    Admission Date: 9/5/2019    Stephen Stoll  1975  [id number]    Date: 9/7/2019    Antibiotics:  Anti-Infectives (From admission, onward)    Ordered     Dose/Rate Route Frequency Start Stop    09/06/19 0940  ceFAZolin in dextrose (ANCEF) IVPB solution 2 g     Ordering Provider:  Jeffrey Galeas PA    2 g  over 30 Minutes Intravenous Once 09/06/19 0942 09/06/19 1051    09/06/19 0655  Pharmacy to dose vancomycin     Ordering Provider:  Lazaro Hauser MD     Does not apply Continuous PRN 09/06/19 0655 09/16/19 0654    09/05/19 2031  vancomycin 1500 mg/500 mL 0.9% NS IVPB (S)     Joslyn Ochoa [facility] reviewed the order on 09/06/19 0649.   Ordering Provider:  Yesi Martinez [facility]    1,500 mg  over 90 Minutes Intravenous Every 12 Hours 09/06/19 0000 09/15/19 2359    09/05/19 1520  hydroxychloroquine (PLAQUENIL) tablet 200 mg     Joslyn Ochoa [facility] reviewed the order on 09/06/19 0716.   Ordering Provider:  Roxanna Guo DO    200 mg Oral 2 Times Daily 09/05/19 2100      09/05/19 2012  piperacillin-tazobactam (ZOSYN) 3.375 g in iso-osmotic dextrose 50 ml (premix)     Ordering Provider:  Lazaor Hauser MD    3.375 g  over 30 Minutes Intravenous Every 6 Hours 09/05/19 2100 09/15/19 2059    09/05/19 1121  vancomycin 1750 mg/500 mL 0.9% NS IVPB ([facility])     Ordering Provider:  Phi Mir DO    20 mg/kg × 93.4 kg  over 120 Minutes Intravenous Once 09/05/19 1123 09/05/19 1416    09/05/19 1121  piperacillin-tazobactam (ZOSYN) 3.375 g in iso-osmotic dextrose 50 ml (premix)     Ordering Provider:  Phi Mir DO    3.375 g  over 30 Minutes Intravenous Once 09/05/19 1122 09/05/19 1215          CC: Recurrent fevers    HPI:  9/7/2019: Stephen Stoll is a 44-year-old white male patient of Dr. Lazaro Hauser and Dr. Noah Dumont who is seen today for reassessment of his exceedingly complex constellation of medical problems including MSSA bacteremia, high-grade lymphoma, inflammatory myocarditis, septic  shock, and stills disease.  I am covering Dr. Dumont for the weekend.  I discussed his complex situation in detail with Dr. Dumont.  He initially presented with high fevers and an elevated ferritin level and was thought to have still's disease for which he received immunosuppressive therapy.  He has some persistent back pain with a lytic spinal lesion at T8/9 and was also noted to have hepatic hypodensities concerning for metastatic disease.  He had recurrent fevers and was noted to have new lung nodules and progressive adenopathy.  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  Yesterday he underwent bronchoscopy and mediastinoscopy with biopsies.  He is currently on vancomycin/Zosyn antibiotic therapy.  He is now off of vasopressor therapy.  He is off of supplemental oxygen.  He has remained afebrile overnight.  He complains of some chest discomfort but denies dyspnea, increased cough, sputum production.  He denies nausea, vomiting, and diarrhea.  He indicates that some IV sites infiltrated during his last admission and had some slight associated infection.          Full 12 point review of systems reviewed and negative otherwise for acute complaints,       PE:  Vital Signs  Temp  Min: 97.4 °F (36.3 °C)  Max: 98.3 °F (36.8 °C)  BP  Min: 85/67  Max: 128/69  Pulse  Min: 72  Max: 89  Resp  Min: 16  Max: 20  SpO2  Min: 94 %  Max: 99 %    GENERAL: Awake and alert, in no acute distress.   HEENT: Normocephalic, atraumatic.  PERRL. EOMI. No conjunctival injection. No icterus. Oropharynx clear without evidence of thrush or exudate.  NECK: Supple without nuchal rigidity  HEART: RRR; No murmur, rubs, gallops.   LUNGS: Clear to auscultation bilaterally without wheezing, rales, rhonchi. Normal respiratory effort. Nonlabored. No dullness.  ABDOMEN: Soft, nontender, nondistended. Positive bowel sounds. No rebound or guarding. NO mass or  HSM.  EXT: There is no erythema at old IV sites on his upper extremities.  :   Without Fernandes catheter.  MSK: FROM without joint effusions noted arms/legs.    SKIN: Warm and dry without cutaneous eruptions on Inspection/palpation.    NEURO: Oriented to PPT. No focal deficits on motor/sensory exam at arms/legs.    Laboratory Data    Results from last 7 days   Lab Units 09/07/19  0531 09/06/19  0215 09/05/19  1827   WBC 10*3/mm3 17.51* 24.62* 28.30*   HEMOGLOBIN g/dL 9.1* 9.1* 9.8*   HEMATOCRIT % 29.1* 28.2* 31.0*   PLATELETS 10*3/mm3 132* 162 187     Results from last 7 days   Lab Units 09/07/19  0531   SODIUM mmol/L 133*   POTASSIUM mmol/L 3.5   CHLORIDE mmol/L 98   CO2 mmol/L 23.0   BUN mg/dL 16   CREATININE mg/dL 0.82   GLUCOSE mg/dL 208*   CALCIUM mg/dL 7.4*     Results from last 7 days   Lab Units 09/07/19  0531   ALK PHOS U/L 133*   BILIRUBIN mg/dL <0.2*   ALT (SGPT) U/L 20   AST (SGOT) U/L 15     Results from last 7 days   Lab Units 09/05/19  1115   SED RATE mm/hr >130*     Results from last 7 days   Lab Units 09/05/19  1115   CRP mg/dL 37.66*       Estimated Creatinine Clearance: 138.7 mL/min (by C-G formula based on SCr of 0.82 mg/dL).      Microbiology:  Blood cultures from 9/5 are growing MSSA    Radiology:  Imaging Results (last 72 hours)     Procedure Component Value Units Date/Time    XR Chest 1 View [[id number]] Collected:  09/07/19 0751     Updated:  09/07/19 0752    Narrative:          EXAMINATION: XR CHEST 1 VW-      INDICATION: sepsis; R59.0-Localized enlarged lymph nodes; A41.9-Sepsis,  unspecified organism; R06.02-Shortness of breath; R93.89-Abnormal  findings on diagnostic imaging of other specified body structures;  D72.829-Elevated white blood cell count, unspecified      COMPARISON: Chest radiograph 9/6/2019     FINDINGS: Single portable chest radiograph is limited for review. The  heart is prominent size, similar to prior. Multifocal nodular opacities  are identified throughout the lungs as  previously seen on the CT of the  chest from 9/5/2019. No new airspace disease, pleural effusion or  pneumothorax. The visualized upper abdomen is unrevealing. No acute  osseous abnormality.           Impression:          Similar exam when compared to 9/6/2019.          XR Chest 1 View [[id number]] Collected:  09/06/19 0854     Updated:  09/06/19 1527    Narrative:       EXAMINATION: XR CHEST 1 VW-09/06/2019:      INDICATION: F/U infiltrate; A41.9-Sepsis, unspecified organism;  R06.02-Shortness of breath; R93.89-Abnormal findings on diagnostic  imaging of other specified body structures; D72.829-Elevated white blood  cell count, unspecified.      COMPARISON: 09/05/2019.     FINDINGS: Portable chest reveals minimal increased markings improving  within the right midlung. The left lung is grossly clear. The bony  structures are unremarkable. Cardiac and mediastinal silhouettes are  within normal limits.           Impression:       Improvement seen in aeration of the right midlung.     D:  09/06/2019  E:  09/06/2019     This report was finalized on 9/6/2019 3:23 PM by Dr. Ana Paula De La Cruz MD.       XR Chest 1 View [[id number]] Collected:  09/05/19 1140     Updated:  09/06/19 1209    Narrative:       EXAMINATION: XR CHEST 1 VW- 09/05/2019     INDICATION: SOA triage protocol      COMPARISON: Chest radiograph 08/21/2019     FINDINGS: Single portable chest radiograph is submitted for review.      The heart is top normal size, similar to prior. There has been an  increase in bilateral perihilar airspace changes with interval  development of right mid lung nodular opacities. The visualized upper  abdomen is unrevealing. No acute osseous abnormality.           Impression:       There has been interval worsening of mild perihilar  airspace changes with nodular airspace opacities noted within the right  mid lung of undetermined etiology. Consideration would be for pneumonia  as these are new when compared to 08/21/2019, however  underlying  developing pulmonary lesions are not excluded. Consideration for  follow-up CT of the chest without IV contrast should be considered if  clinically appropriate.     D:  09/05/2019  E:  09/05/2019     This report was finalized on 9/6/2019 12:06 PM by Dr. Lionel Jackson MD.       CT Chest Without Contrast [[id number]] Collected:  09/05/19 1551     Updated:  09/05/19 1607    Narrative:       EXAMINATION: CT CHEST WO CONTRAST, CT ABDOMEN/PELVIS WO CONTRAST -  09/05/2019     INDICATION: Cough, sepsis, abnormal chest x-ray.     TECHNIQUE: Unenhanced CT imaging of the chest, abdomen, and pelvis was  performed. Additional coronal and sagittal reformatted imaging was  provided for review.     The radiation dose reduction device was turned on for each scan per the  ALARA (As Low as Reasonably Achievable) protocol.     COMPARISON: MRI of the thoracic and lumbar spine 08/09/2019.     FINDINGS:      CHEST: Dedicated CT imaging of the chest demonstrates interval  development of numerous pulmonary nodules throughout the lungs, the  largest within the right mid lung measuring 2.0 cm. These are concerning  for metastatic foci given the provided clinical history. No focal  pneumonia, pleural effusion, or pneumothorax identified. These pulmonary  nodules account for the abnormality seen on the  chest radiograph from  09/05/2019.     Extensive axillary and mediastinal lymphadenopathy is identified. The  largest within the right paratracheal region measures up to 2.1 cm and  demonstrates mild central necrosis. The pulmonary artery is within  normal limits in size. The heart is within normal limits in size. Trace  pericardial effusion is identified. Prominent cardiophrenic lymph nodes  are also noted.     ABDOMEN/PELVIS: Lack of intravenous contrast limits evaluation of  underlying abdominal and pelvic organs. Hypoattenuated region involving  the right hepatic lobe is identified with additional more  confluent  hypoattenuating region within the mid right hepatic lobe noted measuring  up to 2.5 cm which is concerning for an additional site of metastatic  disease. The gallbladder demonstrates mild cholelithiasis. The spleen is  enlarged and otherwise unremarkable. The pancreas does not demonstrate  abnormality. The adrenal glands and kidneys also do not demonstrate  abnormality. No evidence of hydronephrosis. There is mild perinephric  inflammation which requires clinical correlation. Extensive peritoneal  and retroperitoneal lymphadenopathy is identified throughout the  visualized abdomen and pelvis. The urinary bladder is partially  distended and otherwise unremarkable. The stomach is decompressed and  unremarkable. No evidence of small bowel obstruction. No free air or  free fluid identified. Postsurgical changes in the right lower quadrant  are identified and likely from prior appendectomy.  Small fat-filled  right inguinal and left inguinal hernias are noted.     Osseous structures: The visualized bony pelvis appears intact.  Degenerative changes of the bilateral hips are identified.  Re-demonstration of aggressive osseous changes at T8 and T9, previously  documented MRI thoracic spine from 8/21/2019. No convincing evidence of  posterior cortex retropulsion identified. Additional lytic lesion  involving L4 is also noted.       Impression:          Constellation of findings concerning for metastatic disease:      - Interval development of numerous pulmonary nodules throughout the  lungs with the largest measuring 2.0 cm within the right midlung and  concerning for underlying pulmonary metastasis and which may account for  abnormality identified in the chest radiograph performed 09/05/2019.     - Extensive axillary, mediastinal, and abdominopelvic lymphadenopathy as  described above.     - Hypoattenuated regions involving the liver concerning for additional  sites of metastatic disease, although are limited in  evaluation  secondary to lack of intravenous contrast. Attention on follow-up  imaging is advised.     - Redemonstration of T8/T9 aggressive osseous lesions with additional  lytic lesion involving the L4 vertebral body and additional  subcentimeter lytic lesions throughout the thoracolumbar spine  suggested.     Nonspecific perinephric inflammation. Correlate with urinalysis to  exclude underlying pyelonephritis.     DICTATED:   09/05/2019  EDITED/ls :   09/05/2019        CT Abdomen Pelvis Without Contrast [[id number]] Collected:  09/05/19 1551     Updated:  09/05/19 1607    Narrative:       EXAMINATION: CT CHEST WO CONTRAST, CT ABDOMEN/PELVIS WO CONTRAST -  09/05/2019     INDICATION: Cough, sepsis, abnormal chest x-ray.     TECHNIQUE: Unenhanced CT imaging of the chest, abdomen, and pelvis was  performed. Additional coronal and sagittal reformatted imaging was  provided for review.     The radiation dose reduction device was turned on for each scan per the  ALARA (As Low as Reasonably Achievable) protocol.     COMPARISON: MRI of the thoracic and lumbar spine 08/09/2019.     FINDINGS:      CHEST: Dedicated CT imaging of the chest demonstrates interval  development of numerous pulmonary nodules throughout the lungs, the  largest within the right mid lung measuring 2.0 cm. These are concerning  for metastatic foci given the provided clinical history. No focal  pneumonia, pleural effusion, or pneumothorax identified. These pulmonary  nodules account for the abnormality seen on the  chest radiograph from  09/05/2019.     Extensive axillary and mediastinal lymphadenopathy is identified. The  largest within the right paratracheal region measures up to 2.1 cm and  demonstrates mild central necrosis. The pulmonary artery is within  normal limits in size. The heart is within normal limits in size. Trace  pericardial effusion is identified. Prominent cardiophrenic lymph nodes  are also noted.     ABDOMEN/PELVIS: Lack of  intravenous contrast limits evaluation of  underlying abdominal and pelvic organs. Hypoattenuated region involving  the right hepatic lobe is identified with additional more confluent  hypoattenuating region within the mid right hepatic lobe noted measuring  up to 2.5 cm which is concerning for an additional site of metastatic  disease. The gallbladder demonstrates mild cholelithiasis. The spleen is  enlarged and otherwise unremarkable. The pancreas does not demonstrate  abnormality. The adrenal glands and kidneys also do not demonstrate  abnormality. No evidence of hydronephrosis. There is mild perinephric  inflammation which requires clinical correlation. Extensive peritoneal  and retroperitoneal lymphadenopathy is identified throughout the  visualized abdomen and pelvis. The urinary bladder is partially  distended and otherwise unremarkable. The stomach is decompressed and  unremarkable. No evidence of small bowel obstruction. No free air or  free fluid identified. Postsurgical changes in the right lower quadrant  are identified and likely from prior appendectomy.  Small fat-filled  right inguinal and left inguinal hernias are noted.     Osseous structures: The visualized bony pelvis appears intact.  Degenerative changes of the bilateral hips are identified.  Re-demonstration of aggressive osseous changes at T8 and T9, previously  documented MRI thoracic spine from 8/21/2019. No convincing evidence of  posterior cortex retropulsion identified. Additional lytic lesion  involving L4 is also noted.       Impression:          Constellation of findings concerning for metastatic disease:      - Interval development of numerous pulmonary nodules throughout the  lungs with the largest measuring 2.0 cm within the right midlung and  concerning for underlying pulmonary metastasis and which may account for  abnormality identified in the chest radiograph performed 09/05/2019.     - Extensive axillary, mediastinal, and  abdominopelvic lymphadenopathy as  described above.     - Hypoattenuated regions involving the liver concerning for additional  sites of metastatic disease, although are limited in evaluation  secondary to lack of intravenous contrast. Attention on follow-up  imaging is advised.     - Redemonstration of T8/T9 aggressive osseous lesions with additional  lytic lesion involving the L4 vertebral body and additional  subcentimeter lytic lesions throughout the thoracolumbar spine  suggested.     Nonspecific perinephric inflammation. Correlate with urinalysis to  exclude underlying pyelonephritis.     DICTATED:   09/05/2019  EDITED/ls :   09/05/2019              I personally reviewed the radiographic studies     IMPRESSION:   1.  High-grade lymphoma-he will require chemotherapy in the near future.  Ideally we would like to postpone chemotherapy until his staph infection has been adequately treated but this will not be possible due to the aggressive nature of his high-grade lymphoma.  I discussed his situation with Dr. Bauman today.  We will likely need to start chemotherapy this next week.  2.  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  MSSA bacteremia-the source of his MSSA bacteremia is unclear.  I am concerned about the possibility of inoculation from a prior peripheral IV during his prior admission since he indicates that his peripheral IVs infiltrated and had some slight inflammation.  I suspect that the inflammation was very minimal due to his steroid therapy but I certainly cannot make a definitive diagnosis of peripheral IV related septic phlebitis with secondary bacteremia since there is no evidence of septic phlebitis at this point.  He will be at risk for development of metastatic infection due to his immunocompromise state.  He will require a prolonged course of intravenous antibiotic therapy-probably 6 weeks.  4.  Severe sepsis/septic shock-improved and secondary to MSSA  bacteremia  5.  Protein/calorie malnutrition  6.  Thoracic spine lytic lesion  7.  Inflammatory myocarditis  8.  Leukocytosis/neutrophilia  9.  Lactic acidosis  10.  Acute kidney injury/ATN-improved    RECOMMENDATIONS:   1.  Bone marrow biopsy reviewed-pending at the [facility]  2.  Mediastinal mass/lymph node biopsies-pending  3.  Discontinue vancomycin  4.  Discontinue Zosyn  5.  High-dose intravenous cefazolin  6.  Consider obtaining a transesophageal echocardiogram for evaluation of endocarditis when he is more clinically stable    He has a high risk of complications and mortality.  I discussed his complex situation in detail with Dr. Dumont.  I discussed his complex situation with Dr. Bauman.  I discussed his situation in detail with both him and his wife today and answered multiple questions.  I spent over 65 minutes on his care today.    Irvin Mondragon MD  9/7/2019

## 2019-09-07 NOTE — PLAN OF CARE
Problem: Patient Care Overview  Goal: Plan of Care Review  Outcome: Ongoing (interventions implemented as appropriate)   09/07/19 1228   Coping/Psychosocial   Plan of Care Reviewed With patient;spouse   Plan of Care Review   Progress improving   OTHER   Outcome Summary VSS, home dose Metoprolol ordered, IVF d/c'd. Pain controlled with PRN Percocet. K and Phos replaced. Transfer orders to floor.

## 2019-09-07 NOTE — PROGRESS NOTES
Critical Care Note     LOS: 2 days   Patient Care Team:  Carlos Ramirez MD as PCP - General (Family Medicine)  Lazaro Hauser MD as Consulting Physician (Infectious Diseases)  Mihai Jimenez MD as Surgeon (Neurosurgery)  Roosevelt Carrion MD (Hematology and Oncology)    Chief Complaint/Reason for visit:    Chief Complaint   Patient presents with   • Shortness of Breath   • Rapid Heart Rate       Subjective     44-year-old gentleman now with his fourth hospitalization since June with fever, hypotension, acute renal insufficiency.  He was diagnosed with myocarditis and subsequently with still's disease and started on prednisone, valsartan, metoprolol.  He underwent an MRI of the thoracic spine that revealed multiple abnormal lesions concerning for osteomyelitis versus metastatic disease.  He was evaluated by Dr. Brizuela and Dr. Sims.  Bone biopsy was nondiagnostic.  Bone marrow was worrisome for large cell lymphoma.  He underwent a colonoscopy prep and arrived in endoscopy with a fever of 102.7 and a blood pressure in the 80s, heart rate in the 150s.  Procedure was canceled and he was taken to the emergency room.  Blood cultures are now positive for gram-positive cocci.  CT scan of the chest revealed extensive axillary, mediastinal, abdominopelvic lymphadenopathy.  Interval History:     Yesterday he underwent mediastinoscopy and lymph node biopsies.  He is having some incisional pain.  He is afebrile this morning.  Oxygen saturation is 96% on room air.  He still admits to exertional dyspnea when he is up around the room.     Review of Systems:    All systems were reviewed and negative except as noted in subjective.    Medical history, surgical history, social history, family history reviewed    Objective     Intake/Output:    Intake/Output Summary (Last 24 hours) at 9/7/2019 0751  Last data filed at 9/7/2019 0600  Gross per 24 hour   Intake 4096 ml   Output 1105 ml   Net 2991 ml       Nutrition:  Diet  "Regular    Infusions:    lactated ringers 100 mL/hr Last Rate: 100 mL/hr (09/07/19 0013)   lactated ringers 9 mL/hr Last Rate: 9 mL/hr (09/06/19 1347)   Pharmacy to dose vancomycin     sodium chloride 100 mL/hr          Telemetry: Sinus rhythm             Vital Signs  Blood pressure 122/70, pulse 80, temperature 98 °F (36.7 °C), resp. rate 18, height 182.9 cm (72\"), weight 99 kg (218 lb 4.1 oz), SpO2 96 %.    Physical Exam:  General Appearance:   Middle-aged white male in no distress   Head:   Normocephalic, atraumatic   Eyes:          Pupils equal and reactive, no jaundice   Ears:     Throat:  Oral mucosa moist   Neck:  Trachea midline, no palpable thyroid.  Small incision below the sternal notch dressing intact   Back:      Lungs:    Symmetric chest expansion.  Breath sounds bilateral, equal, clear    Heart:   Regular rhythm, S1, S2 auscultated, no murmur   Abdomen:    Bowel sounds present, nondistended, soft.  No palpable spleen   Rectal:   Deferred   Extremities:  No pitting edema or cyanosis   Pulses:  Pedal pulses present   Skin:  Warm and dry   Lymph nodes:  Palpable axillary adenopathy bilaterally   Neurologic:  Alert and oriented      Results Review:     I reviewed the patient's new clinical results.   Results from last 7 days   Lab Units 09/07/19 0531 09/06/19 0215 09/05/19 1827   SODIUM mmol/L 133* 131* 127*   POTASSIUM mmol/L 3.5 4.2 4.4   CHLORIDE mmol/L 98 94* 90*   CO2 mmol/L 23.0 25.0 22.0   BUN mg/dL 16 17 23*   CREATININE mg/dL 0.82 0.94 1.10   CALCIUM mg/dL 7.4* 7.7* 7.5*   BILIRUBIN mg/dL <0.2* 0.3 0.5   ALK PHOS U/L 133* 110 117   ALT (SGPT) U/L 20 26 27   AST (SGOT) U/L 15 25 30   GLUCOSE mg/dL 208* 132* 164*     Results from last 7 days   Lab Units 09/07/19 0531 09/06/19 0215 09/05/19 1827 09/05/19  1115   WBC 10*3/mm3 17.51* 24.62* 28.30* 30.17*   HEMOGLOBIN g/dL 9.1* 9.1* 9.8* 12.7*   HEMATOCRIT % 29.1* 28.2* 31.0* 38.0   PLATELETS 10*3/mm3 132* 162 187 262   MONOCYTES % %  --   --  " 2.0* 5.0         Lab Results   Component Value Date    BLOODCX No growth at 24 hours 09/05/2019    BLOODCX Staphylococcus aureus (A) 09/05/2019     No results found for: URINECX    I reviewed the patient's new imaging including images and reports.     FINDINGS: Single portable chest radiograph is limited for review. The  heart is prominent size, similar to prior. Multifocal nodular opacities  are identified throughout the lungs as previously seen on the CT of the  chest from 9/5/2019. No new airspace disease, pleural effusion or  pneumothorax. The visualized upper abdomen is unrevealing. No acute  osseous abnormality.         IMPRESSION:     Similar exam when compared to 9/6/2019.            All medications reviewed.     Pharmacy Consult  Does not apply Once   allopurinol 300 mg Oral Daily   hydrocortisone sodium succinate 100 mg Intravenous Q8H   hydroxychloroquine 200 mg Oral BID   lactobacillus acidophilus 1 capsule Oral Daily   piperacillin-tazobactam 3.375 g Intravenous Q6H   sodium chloride 10 mL Intravenous Q12H   vancomycin 1,500 mg Intravenous Q12H         Assessment/Plan       Severe sepsis (CMS/HCC)    Mediastinal lymphadenopathy    Hyponatremia    Anaplastic ALK-positive large cell lymphoma     BEAU (acute kidney injury) (CMS/HCC)    #1 severe sepsis, blood cultures are positive for staph aureus he is currently receiving vancomycin and Zosyn.  Blood pressure and heart rate have improved.  He is not requiring supplemental oxygen.  Renal function has normalized.    #2 mediastinal adenopathy as well as axillary and intra-abdominal adenopathy.  Postoperative day #1 mediastinoscopy.    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  Echocardiogram in August revealed a normal EF at 62% compared to 35% in June.    #4 acute renal insufficiency, resolved    PLAN:  Vancomycin, Zosyn  Transition to oral prednisone  Continue Plaquenil  Mobilize  Continue hydration  Allopurinol for tumor lysis  Ambulate twice  daily  floor    Trista Quiroz MD  09/07/19  7:51 AM      Time: Critical care 25 min  I personally provided care to this critically ill patient as documented above.  Critical care time does not include time spent on separately billed procedures.  Non of my critical care time was concurrent with other critical care providers.

## 2019-09-07 NOTE — PLAN OF CARE
Problem: Patient Care Overview  Goal: Plan of Care Review  Outcome: Ongoing (interventions implemented as appropriate)   09/07/19 0545   Coping/Psychosocial   Plan of Care Reviewed With patient   Plan of Care Review   Progress improving   OTHER   Outcome Summary Pt remains hemodynamically stable. Biopsy site CDI. Percocet given x1. Wife at bedside.       Problem: Fall Risk (Adult)  Goal: Absence of Fall  Outcome: Ongoing (interventions implemented as appropriate)

## 2019-09-07 NOTE — PROGRESS NOTES
"Stephen Stoll  [id number]  1975     LOS: 2 days   Patient Care Team:  Carlos Ramirez MD as PCP - General (Family Medicine)  Lazaro Hauser MD as Consulting Physician (Infectious Diseases)  Mihai Jimenez MD as Surgeon (Neurosurgery)  Roosevelt Carrion MD (Hematology and Oncology)    Chief Complaint: Mediastinal lymphadenopathy      Subjective: No complaints    Objective:     Vital Sign Min/Max for last 24 hours  Temp  Min: 97.4 °F (36.3 °C)  Max: 98.5 °F (36.9 °C)   BP  Min: 85/67  Max: 128/75   Pulse  Min: 72  Max: 100   Resp  Min: 16  Max: 20   SpO2  Min: 94 %  Max: 99 %   No Data Recorded   Weight  Min: 99 kg (218 lb 4.1 oz)  Max: 99 kg (218 lb 4.1 oz)     Flowsheet Rows      First Filed Value   Admission Height  182.9 cm (72\") Documented at 09/05/2019 1110   Admission Weight  93.4 kg (206 lb) Documented at 09/05/2019 1110          Physical Exam:    Wound: Satisfactory    Pulses:     Mediastinal and Chest Tube Drainage:       Results Review:   Results from last 7 days   Lab Units 09/07/19  0531   WBC 10*3/mm3 17.51*   HEMOGLOBIN g/dL 9.1*   HEMATOCRIT % 29.1*   PLATELETS 10*3/mm3 132*     Results from last 7 days   Lab Units 09/07/19  0531   SODIUM mmol/L 133*   POTASSIUM mmol/L 3.5   CHLORIDE mmol/L 98   CO2 mmol/L 23.0   BUN mg/dL 16   CREATININE mg/dL 0.82   GLUCOSE mg/dL 208*   CALCIUM mg/dL 7.4*             Assessment      Severe sepsis (CMS/HCC)    Anaplastic ALK-positive large cell lymphoma     BEAU (acute kidney injury) (CMS/HCC)    Hyponatremia    Mediastinal lymphadenopathy    Staphylococcus aureus bacteremia      Doing satisfactory        El Can MD  09/07/19  8:22 AM      Please note that portions of this note were completed with a voice recognition program. Efforts were made to edit the dictations, but words may be mistranscribed  "

## 2019-09-07 NOTE — PLAN OF CARE
Problem: Patient Care Overview  Goal: Plan of Care Review  Outcome: Ongoing (interventions implemented as appropriate)   09/07/19 1638   Coping/Psychosocial   Plan of Care Reviewed With patient   Plan of Care Review   Progress no change   OTHER   Outcome Summary Patient has had no c/o pain or nausea. VSS. Bilateral RLE and LLE edema. WIll continue to monitor.        Problem: Fall Risk (Adult)  Goal: Absence of Fall  Outcome: Ongoing (interventions implemented as appropriate)   09/07/19 1638   Fall Risk (Adult)   Absence of Fall making progress toward outcome

## 2019-09-07 NOTE — PROGRESS NOTES
"S: doing reasonably well.  No major issues with pain .  Still has back pain and a cough but seems to be relatively controlled.        Past medical history, social history and family history was reviewed and unchanged from prior visit.    Review of Systems:    Review of Systems   A comprehensive 14 point review of systems was performed and was negative except as mentioned.      Medications:  The current medication list was reviewed in the EMR    ALLERGIES:    Allergies   Allergen Reactions   • Ciprofloxacin Anxiety     \"Extreme anxiety and paranoia\"   • Sulfa Antibiotics Rash         Physical Exam    VITAL SIGNS:  /70   Pulse 80   Temp 98 °F (36.7 °C)   Resp 18   Ht 182.9 cm (72\")   Wt 99 kg (218 lb 4.1 oz) Comment: pt flat  SpO2 96%   BMI 29.60 kg/m²   Temp:  [97.4 °F (36.3 °C)-98.5 °F (36.9 °C)] 98 °F (36.7 °C)      Performance Status: 0    Physical Exam    General: well appearing, in no acute distress  HEENT: sclerae anicteric, oropharynx clear, neck is supple  Lymphatics: no cervical, supraclavicular, or axillary adenopathy  Cardiovascular: regular rate and rhythm, no murmurs, rubs or gallops  Lungs: clear to auscultation bilaterally  Abdomen: soft, nontender, nondistended.  No palpable organomegaly  Extremities: no lower extremity edema  Skin: no rashes, lesions, bruising, or petechiae  Msk:  Shows no weakness of the large muscle groups  Psych: Mood is stable        RECENT LABS:    Lab Results   Component Value Date    HGB 9.1 (L) 09/07/2019    HCT 29.1 (L) 09/07/2019    MCV 83.1 09/07/2019     (L) 09/07/2019    WBC 17.51 (H) 09/07/2019    NEUTROABS 15.37 (H) 09/07/2019    LYMPHSABS 0.63 (L) 09/07/2019    MONOSABS 0.97 (H) 09/07/2019    EOSABS 0.00 09/07/2019    BASOSABS 0.02 09/07/2019       Lab Results   Component Value Date    GLUCOSE 208 (H) 09/07/2019    BUN 16 09/07/2019    CREATININE 0.82 09/07/2019     (L) 09/07/2019    K 3.5 09/07/2019    CL 98 09/07/2019    CO2 23.0 09/07/2019 "    CALCIUM 7.4 (L) 09/07/2019    PROTEINTOT 6.0 09/07/2019    ALBUMIN 2.00 (L) 09/07/2019    BILITOT <0.2 (L) 09/07/2019    ALKPHOS 133 (H) 09/07/2019    AST 15 09/07/2019    ALT 20 09/07/2019         Assessment/Plan    1.  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  Awaiting final pathology from the lymph node which was removed yesterday.  Once that is removed Dr. Brizuela and will likely treat him with CHOP plus Brentuximab.  He had multiple questions for me today regarding the plan going forward.  He is relatively stable for now.  Overnight he did not have any significant temperatures.  Likely will require treatment next week.  I suspect most of these fevers were related to his underlying disease.  There is a possibility that he may have extranodal disease involving the heart since he has a history of elevated troponin and concern for myocarditis.  His procalcitonin is fairly elevated.  Its unclear if this is related to a noninfectious source.            Zina Bauman MD  [facility] Hematology and Oncology    9/7/2019     Please note that portions of this note may have been completed with a voice recognition program. Efforts were made to edit the dictations, but occasionally words are mistranscribed.

## 2019-09-08 ENCOUNTER — APPOINTMENT (OUTPATIENT)
Dept: GENERAL RADIOLOGY | Facility: HOSPITAL | Age: 44
End: 2019-09-08

## 2019-09-08 LAB
ALBUMIN SERPL-MCNC: 2 G/DL (ref 3.5–5.2)
ALBUMIN SERPL-MCNC: 2.2 G/DL (ref 3.5–5.2)
ALBUMIN/GLOB SERPL: 0.4 G/DL
ALBUMIN/GLOB SERPL: 0.5 G/DL
ALP SERPL-CCNC: 128 U/L (ref 39–117)
ALP SERPL-CCNC: 129 U/L (ref 39–117)
ALT SERPL W P-5'-P-CCNC: 24 U/L (ref 1–41)
ALT SERPL W P-5'-P-CCNC: 29 U/L (ref 1–41)
ANION GAP SERPL CALCULATED.3IONS-SCNC: 12 MMOL/L (ref 5–15)
ANION GAP SERPL CALCULATED.3IONS-SCNC: 14 MMOL/L (ref 5–15)
ANION GAP SERPL CALCULATED.3IONS-SCNC: 30 MMOL/L (ref 5–15)
AST SERPL-CCNC: 25 U/L (ref 1–40)
AST SERPL-CCNC: 33 U/L (ref 1–40)
BASOPHILS # BLD AUTO: 0.03 10*3/MM3 (ref 0–0.2)
BASOPHILS # BLD AUTO: 0.05 10*3/MM3 (ref 0–0.2)
BASOPHILS NFR BLD AUTO: 0.3 % (ref 0–1.5)
BASOPHILS NFR BLD AUTO: 0.4 % (ref 0–1.5)
BILIRUB SERPL-MCNC: 0.3 MG/DL (ref 0.2–1.2)
BILIRUB SERPL-MCNC: 0.4 MG/DL (ref 0.2–1.2)
BUN BLD-MCNC: 16 MG/DL (ref 6–20)
BUN BLD-MCNC: 16 MG/DL (ref 6–20)
BUN BLD-MCNC: 19 MG/DL (ref 6–20)
BUN/CREAT SERPL: 16.8 (ref 7–25)
BUN/CREAT SERPL: 17.2 (ref 7–25)
BUN/CREAT SERPL: 20 (ref 7–25)
CALCIUM SPEC-SCNC: 7.6 MG/DL (ref 8.6–10.5)
CALCIUM SPEC-SCNC: 7.7 MG/DL (ref 8.6–10.5)
CALCIUM SPEC-SCNC: 7.9 MG/DL (ref 8.6–10.5)
CHLORIDE SERPL-SCNC: 93 MMOL/L (ref 98–107)
CHLORIDE SERPL-SCNC: 96 MMOL/L (ref 98–107)
CHLORIDE SERPL-SCNC: 98 MMOL/L (ref 98–107)
CO2 SERPL-SCNC: 21 MMOL/L (ref 22–29)
CO2 SERPL-SCNC: 23 MMOL/L (ref 22–29)
CO2 SERPL-SCNC: 25 MMOL/L (ref 22–29)
CREAT BLD-MCNC: 0.93 MG/DL (ref 0.76–1.27)
CREAT BLD-MCNC: 0.95 MG/DL (ref 0.76–1.27)
CREAT BLD-MCNC: 0.95 MG/DL (ref 0.76–1.27)
D-LACTATE SERPL-SCNC: 3.2 MMOL/L (ref 0.5–2)
DEPRECATED RDW RBC AUTO: 49.8 FL (ref 37–54)
DEPRECATED RDW RBC AUTO: 51.4 FL (ref 37–54)
EOSINOPHIL # BLD AUTO: 0.02 10*3/MM3 (ref 0–0.4)
EOSINOPHIL # BLD AUTO: 0.02 10*3/MM3 (ref 0–0.4)
EOSINOPHIL NFR BLD AUTO: 0.2 % (ref 0.3–6.2)
EOSINOPHIL NFR BLD AUTO: 0.2 % (ref 0.3–6.2)
ERYTHROCYTE [DISTWIDTH] IN BLOOD BY AUTOMATED COUNT: 17.2 % (ref 12.3–15.4)
ERYTHROCYTE [DISTWIDTH] IN BLOOD BY AUTOMATED COUNT: 17.2 % (ref 12.3–15.4)
GFR SERPL CREATININE-BSD FRML MDRD: 86 ML/MIN/1.73
GFR SERPL CREATININE-BSD FRML MDRD: 86 ML/MIN/1.73
GFR SERPL CREATININE-BSD FRML MDRD: 88 ML/MIN/1.73
GLOBULIN UR ELPH-MCNC: 4.5 GM/DL
GLOBULIN UR ELPH-MCNC: 4.7 GM/DL
GLUCOSE BLD-MCNC: 240 MG/DL (ref 65–99)
GLUCOSE BLD-MCNC: 85 MG/DL (ref 65–99)
GLUCOSE BLD-MCNC: 93 MG/DL (ref 65–99)
GLUCOSE BLDC GLUCOMTR-MCNC: 83 MG/DL (ref 70–130)
GLUCOSE BLDC GLUCOMTR-MCNC: 87 MG/DL (ref 70–130)
HCT VFR BLD AUTO: 31.4 % (ref 37.5–51)
HCT VFR BLD AUTO: 31.6 % (ref 37.5–51)
HGB BLD-MCNC: 10 G/DL (ref 13–17.7)
HGB BLD-MCNC: 10.2 G/DL (ref 13–17.7)
HYPOCHROMIA BLD QL: NORMAL
IMM GRANULOCYTES # BLD AUTO: 0.56 10*3/MM3 (ref 0–0.05)
IMM GRANULOCYTES # BLD AUTO: 0.6 10*3/MM3 (ref 0–0.05)
IMM GRANULOCYTES NFR BLD AUTO: 4.7 % (ref 0–0.5)
IMM GRANULOCYTES NFR BLD AUTO: 5 % (ref 0–0.5)
LYMPHOCYTES # BLD AUTO: 1.04 10*3/MM3 (ref 0.7–3.1)
LYMPHOCYTES # BLD AUTO: 1.19 10*3/MM3 (ref 0.7–3.1)
LYMPHOCYTES NFR BLD AUTO: 10 % (ref 19.6–45.3)
LYMPHOCYTES NFR BLD AUTO: 8.7 % (ref 19.6–45.3)
MCH RBC QN AUTO: 26 PG (ref 26.6–33)
MCH RBC QN AUTO: 26.1 PG (ref 26.6–33)
MCHC RBC AUTO-ENTMCNC: 31.6 G/DL (ref 31.5–35.7)
MCHC RBC AUTO-ENTMCNC: 32.5 G/DL (ref 31.5–35.7)
MCV RBC AUTO: 80.3 FL (ref 79–97)
MCV RBC AUTO: 82.3 FL (ref 79–97)
MONOCYTES # BLD AUTO: 0.87 10*3/MM3 (ref 0.1–0.9)
MONOCYTES # BLD AUTO: 0.92 10*3/MM3 (ref 0.1–0.9)
MONOCYTES NFR BLD AUTO: 7.3 % (ref 5–12)
MONOCYTES NFR BLD AUTO: 7.7 % (ref 5–12)
NEUTROPHILS # BLD AUTO: 9.23 10*3/MM3 (ref 1.7–7)
NEUTROPHILS # BLD AUTO: 9.39 10*3/MM3 (ref 1.7–7)
NEUTROPHILS NFR BLD AUTO: 77.1 % (ref 42.7–76)
NEUTROPHILS NFR BLD AUTO: 78.4 % (ref 42.7–76)
NRBC BLD AUTO-RTO: 0 /100 WBC (ref 0–0.2)
NRBC BLD AUTO-RTO: 0 /100 WBC (ref 0–0.2)
PLAT MORPH BLD: NORMAL
PLAT MORPH BLD: NORMAL
PLATELET # BLD AUTO: 120 10*3/MM3 (ref 140–450)
PLATELET # BLD AUTO: 123 10*3/MM3 (ref 140–450)
PMV BLD AUTO: 10.1 FL (ref 6–12)
PMV BLD AUTO: 10.7 FL (ref 6–12)
POTASSIUM BLD-SCNC: 4.2 MMOL/L (ref 3.5–5.2)
POTASSIUM BLD-SCNC: 4.3 MMOL/L (ref 3.5–5.2)
POTASSIUM BLD-SCNC: 4.3 MMOL/L (ref 3.5–5.2)
PROT SERPL-MCNC: 6.5 G/DL (ref 6–8.5)
PROT SERPL-MCNC: 6.9 G/DL (ref 6–8.5)
RBC # BLD AUTO: 3.84 10*6/MM3 (ref 4.14–5.8)
RBC # BLD AUTO: 3.91 10*6/MM3 (ref 4.14–5.8)
RBC MORPH BLD: NORMAL
SODIUM BLD-SCNC: 131 MMOL/L (ref 136–145)
SODIUM BLD-SCNC: 133 MMOL/L (ref 136–145)
SODIUM BLD-SCNC: 146 MMOL/L (ref 136–145)
VANCOMYCIN SERPL-MCNC: <4 MCG/ML (ref 5–40)
WBC MORPH BLD: NORMAL
WBC MORPH BLD: NORMAL
WBC NRBC COR # BLD: 11.95 10*3/MM3 (ref 3.4–10.8)
WBC NRBC COR # BLD: 11.97 10*3/MM3 (ref 3.4–10.8)

## 2019-09-08 PROCEDURE — 99232 SBSQ HOSP IP/OBS MODERATE 35: CPT | Performed by: INTERNAL MEDICINE

## 2019-09-08 PROCEDURE — 82962 GLUCOSE BLOOD TEST: CPT

## 2019-09-08 PROCEDURE — 99231 SBSQ HOSP IP/OBS SF/LOW 25: CPT | Performed by: THORACIC SURGERY (CARDIOTHORACIC VASCULAR SURGERY)

## 2019-09-08 PROCEDURE — 84295 ASSAY OF SERUM SODIUM: CPT | Performed by: INTERNAL MEDICINE

## 2019-09-08 PROCEDURE — 25010000003 CEFAZOLIN IN DEXTROSE 2-4 GM/100ML-% SOLUTION: Performed by: INTERNAL MEDICINE

## 2019-09-08 PROCEDURE — 63710000001 PREDNISONE PER 5 MG: Performed by: INTERNAL MEDICINE

## 2019-09-08 PROCEDURE — 71045 X-RAY EXAM CHEST 1 VIEW: CPT

## 2019-09-08 PROCEDURE — 80202 ASSAY OF VANCOMYCIN: CPT

## 2019-09-08 PROCEDURE — 85025 COMPLETE CBC W/AUTO DIFF WBC: CPT | Performed by: INTERNAL MEDICINE

## 2019-09-08 PROCEDURE — C1894 INTRO/SHEATH, NON-LASER: HCPCS

## 2019-09-08 PROCEDURE — 83605 ASSAY OF LACTIC ACID: CPT | Performed by: INTERNAL MEDICINE

## 2019-09-08 PROCEDURE — 25010000002 PIPERACILLIN-TAZOBACTAM

## 2019-09-08 PROCEDURE — 80048 BASIC METABOLIC PNL TOTAL CA: CPT | Performed by: INTERNAL MEDICINE

## 2019-09-08 PROCEDURE — 80053 COMPREHEN METABOLIC PANEL: CPT | Performed by: INTERNAL MEDICINE

## 2019-09-08 PROCEDURE — 05HY33Z INSERTION OF INFUSION DEVICE INTO UPPER VEIN, PERCUTANEOUS APPROACH: ICD-10-PCS | Performed by: INTERNAL MEDICINE

## 2019-09-08 PROCEDURE — 99233 SBSQ HOSP IP/OBS HIGH 50: CPT | Performed by: INTERNAL MEDICINE

## 2019-09-08 PROCEDURE — 25010000002 DAPTOMYCIN PER 1 MG: Performed by: INTERNAL MEDICINE

## 2019-09-08 PROCEDURE — 85007 BL SMEAR W/DIFF WBC COUNT: CPT | Performed by: INTERNAL MEDICINE

## 2019-09-08 PROCEDURE — 87040 BLOOD CULTURE FOR BACTERIA: CPT | Performed by: INTERNAL MEDICINE

## 2019-09-08 PROCEDURE — C1751 CATH, INF, PER/CENT/MIDLINE: HCPCS

## 2019-09-08 PROCEDURE — 25010000002 METHYLPREDNISOLONE PER 125 MG: Performed by: INTERNAL MEDICINE

## 2019-09-08 RX ORDER — DEXTROSE MONOHYDRATE 50 MG/ML
100 INJECTION, SOLUTION INTRAVENOUS CONTINUOUS
Status: DISCONTINUED | OUTPATIENT
Start: 2019-09-08 | End: 2019-09-08 | Stop reason: SDUPTHER

## 2019-09-08 RX ORDER — SODIUM CHLORIDE 0.9 % (FLUSH) 0.9 %
10 SYRINGE (ML) INJECTION EVERY 12 HOURS SCHEDULED
Status: DISCONTINUED | OUTPATIENT
Start: 2019-09-08 | End: 2019-09-12 | Stop reason: HOSPADM

## 2019-09-08 RX ORDER — DIPHENHYDRAMINE HYDROCHLORIDE, ZINC ACETATE 2; .1 G/100G; G/100G
CREAM TOPICAL 2 TIMES DAILY PRN
Status: DISCONTINUED | OUTPATIENT
Start: 2019-09-08 | End: 2019-09-12 | Stop reason: HOSPADM

## 2019-09-08 RX ORDER — ACETAMINOPHEN 650 MG/1
650 SUPPOSITORY RECTAL EVERY 4 HOURS PRN
Status: DISCONTINUED | OUTPATIENT
Start: 2019-09-08 | End: 2019-09-12 | Stop reason: HOSPADM

## 2019-09-08 RX ORDER — DEXTROSE MONOHYDRATE 50 MG/ML
100 INJECTION, SOLUTION INTRAVENOUS CONTINUOUS
Status: DISCONTINUED | OUTPATIENT
Start: 2019-09-08 | End: 2019-09-08

## 2019-09-08 RX ORDER — SODIUM CHLORIDE 9 MG/ML
150 INJECTION, SOLUTION INTRAVENOUS CONTINUOUS
Status: DISCONTINUED | OUTPATIENT
Start: 2019-09-08 | End: 2019-09-08

## 2019-09-08 RX ORDER — METHYLPREDNISOLONE SODIUM SUCCINATE 125 MG/2ML
125 INJECTION, POWDER, LYOPHILIZED, FOR SOLUTION INTRAMUSCULAR; INTRAVENOUS EVERY 24 HOURS
Status: DISCONTINUED | OUTPATIENT
Start: 2019-09-08 | End: 2019-09-09

## 2019-09-08 RX ORDER — SODIUM CHLORIDE 0.9 % (FLUSH) 0.9 %
10 SYRINGE (ML) INJECTION AS NEEDED
Status: DISCONTINUED | OUTPATIENT
Start: 2019-09-08 | End: 2019-09-12 | Stop reason: HOSPADM

## 2019-09-08 RX ADMIN — Medication 1 CAPSULE: at 08:50

## 2019-09-08 RX ADMIN — FAMOTIDINE 20 MG: 20 TABLET ORAL at 08:50

## 2019-09-08 RX ADMIN — OXYCODONE HYDROCHLORIDE AND ACETAMINOPHEN 1 TABLET: 7.5; 325 TABLET ORAL at 01:13

## 2019-09-08 RX ADMIN — SODIUM CHLORIDE, PRESERVATIVE FREE 10 ML: 5 INJECTION INTRAVENOUS at 20:03

## 2019-09-08 RX ADMIN — PREDNISONE 10 MG: 10 TABLET ORAL at 08:50

## 2019-09-08 RX ADMIN — SODIUM CHLORIDE 100 ML/HR: 9 INJECTION, SOLUTION INTRAVENOUS at 08:03

## 2019-09-08 RX ADMIN — METHYLPREDNISOLONE SODIUM SUCCINATE 125 MG: 125 INJECTION, POWDER, FOR SOLUTION INTRAMUSCULAR; INTRAVENOUS at 10:27

## 2019-09-08 RX ADMIN — DIPHENHYDRAMINE HYDROCHLORIDE, ZINC ACETATE: 2; .1 CREAM TOPICAL at 21:21

## 2019-09-08 RX ADMIN — SODIUM CHLORIDE 150 ML/HR: 9 INJECTION, SOLUTION INTRAVENOUS at 08:49

## 2019-09-08 RX ADMIN — SODIUM CHLORIDE 1000 ML: 9 INJECTION, SOLUTION INTRAVENOUS at 09:22

## 2019-09-08 RX ADMIN — BENZOCAINE AND MENTHOL 1 LOZENGE: 15; 3.6 LOZENGE ORAL at 21:21

## 2019-09-08 RX ADMIN — FAMOTIDINE 20 MG: 20 TABLET ORAL at 20:02

## 2019-09-08 RX ADMIN — CEFAZOLIN SODIUM 2 G: 2 INJECTION, SOLUTION INTRAVENOUS at 03:11

## 2019-09-08 RX ADMIN — PIPERACILLIN SODIUM,TAZOBACTAM SODIUM 3.38 G: 3; .375 INJECTION, POWDER, FOR SOLUTION INTRAVENOUS at 10:28

## 2019-09-08 RX ADMIN — DEXTROSE MONOHYDRATE 100 ML/HR: 50 INJECTION, SOLUTION INTRAVENOUS at 10:09

## 2019-09-08 RX ADMIN — HYDROXYCHLOROQUINE SULFATE 200 MG: 200 TABLET, FILM COATED ORAL at 20:02

## 2019-09-08 RX ADMIN — ALLOPURINOL 300 MG: 300 TABLET ORAL at 08:50

## 2019-09-08 RX ADMIN — ACETAMINOPHEN 1000 MG: 500 TABLET, FILM COATED ORAL at 08:49

## 2019-09-08 RX ADMIN — OXYCODONE HYDROCHLORIDE AND ACETAMINOPHEN 1 TABLET: 7.5; 325 TABLET ORAL at 10:27

## 2019-09-08 RX ADMIN — HYDROXYCHLOROQUINE SULFATE 200 MG: 200 TABLET, FILM COATED ORAL at 10:27

## 2019-09-08 RX ADMIN — OXYCODONE HYDROCHLORIDE AND ACETAMINOPHEN 1 TABLET: 7.5; 325 TABLET ORAL at 23:04

## 2019-09-08 RX ADMIN — DAPTOMYCIN 700 MG: 500 INJECTION, POWDER, LYOPHILIZED, FOR SOLUTION INTRAVENOUS at 17:01

## 2019-09-08 RX ADMIN — METOPROLOL SUCCINATE 25 MG: 25 TABLET, EXTENDED RELEASE ORAL at 08:48

## 2019-09-08 NOTE — PROGRESS NOTES
"S: Overnight became more confused secondary to high fevers of 103.  Otherwise also has a diffuse rash that is present.  Patient is significantly worse than yesterday.  I spoke with Dr. Mondragon yesterday and felt the MSSA bacteremia can be treated concurrently with chemotherapy.      Past medical history, social history and family history was reviewed and unchanged from prior visit.    Review of Systems:    Review of Systems   Unable to perform ROS: Mental status change      A comprehensive 14 point review of systems was performed and was negative except as mentioned.      Medications:  The current medication list was reviewed in the EMR    ALLERGIES:    Allergies   Allergen Reactions   • Ciprofloxacin Anxiety     \"Extreme anxiety and paranoia\"   • Sulfa Antibiotics Rash         Physical Exam    VITAL SIGNS:  /68 (BP Location: Left arm, Patient Position: Lying)   Pulse 90   Temp 98.8 °F (37.1 °C) (Oral)   Resp 18   Ht 182.9 cm (72\")   Wt 99 kg (218 lb 4.1 oz) Comment: pt flat  SpO2 95%   BMI 29.60 kg/m²   Temp:  [98.1 °F (36.7 °C)-103.5 °F (39.7 °C)] 98.8 °F (37.1 °C)      Performance Status: 1  Physical Exam    General: confused, and febrile  HEENT: sclerae anicteric, oropharynx clear, neck is supple  Lymphatics: no cervical, supraclavicular, or axillary adenopathy  Cardiovascular: regular rate and rhythm, no murmurs, rubs or gallops  Lungs: clear to auscultation bilaterally  Abdomen: soft, nontender, nondistended.  No palpable organomegaly  Extremities: no lower extremity edema  Skin: + rash  Msk:  Shows no weakness of the large muscle groups  Psych: confused today        RECENT LABS:    Lab Results   Component Value Date    HGB 10.2 (L) 09/08/2019    HCT 31.4 (L) 09/08/2019    MCV 80.3 09/08/2019     (L) 09/08/2019    WBC 11.95 (H) 09/08/2019    NEUTROABS 9.23 (H) 09/08/2019    LYMPHSABS 1.19 09/08/2019    MONOSABS 0.92 (H) 09/08/2019    EOSABS 0.02 09/08/2019    BASOSABS 0.03 09/08/2019 "       Lab Results   Component Value Date    GLUCOSE 93 09/08/2019    BUN 16 09/08/2019    CREATININE 0.93 09/08/2019     (H) 09/08/2019    K 4.3 09/08/2019    CL 93 (L) 09/08/2019    CO2 23.0 09/08/2019    CALCIUM 7.7 (L) 09/08/2019    PROTEINTOT 6.9 09/08/2019    ALBUMIN 2.20 (L) 09/08/2019    BILITOT 0.4 09/08/2019    ALKPHOS 128 (H) 09/08/2019    AST 33 09/08/2019    ALT 29 09/08/2019         Assessment/Plan    1.  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  I think this patient really needs to be treated for his lymphoma.  His MSSA bacteremia complicates the situation but does not change it.  I feel that his port placement can be deferred until cycle #2 when he is afebrile and his bacteremia is treated.  At this time we should just put a PICC line in him and go forward with treatment tomorrow.  The patient is clinically getting slightly worse over the last day or so due to his high fevers which is causing mental status changes.  Yesterday he was actually doing much better.  I am going to switch his oral prednisone to methylprednisolone 125 mg once a day starting today.  He would have a  difficult time taking any oral meds if he is confused.    2.  MSSA bacteremia.  Patient will be on long term antibiotics until this is cleared.          Zina Bauman MD  [facility] Hematology and Oncology    9/8/2019     Please note that portions of this note may have been completed with a voice recognition program. Efforts were made to edit the dictations, but occasionally words are mistranscribed.

## 2019-09-08 NOTE — PROGRESS NOTES
"Stephen Stoll  [id number]  1975     LOS: 3 days   Patient Care Team:  Carlos Ramirez MD as PCP - General (Family Medicine)  Lazaro Hauser MD as Consulting Physician (Infectious Diseases)  Mihai Jimenez MD as Surgeon (Neurosurgery)  Roosevelt Carrion MD (Hematology and Oncology)    Chief Complaint: Mediastinal lymphadenopathy      Subjective: Slightly short of breath.    Objective:     Vital Sign Min/Max for last 24 hours  Temp  Min: 98.1 °F (36.7 °C)  Max: 101 °F (38.3 °C)   BP  Min: 113/62  Max: 135/85   Pulse  Min: 76  Max: 126   Resp  Min: 16  Max: 30   SpO2  Min: 95 %  Max: 99 %   No Data Recorded   No Data Recorded     Flowsheet Rows      First Filed Value   Admission Height  182.9 cm (72\") Documented at 09/05/2019 1110   Admission Weight  93.4 kg (206 lb) Documented at 09/05/2019 1110          Physical Exam: Heart rate approximately 120    Wound: Satisfactory.    Pulses:     Mediastinal and Chest Tube Drainage:       Results Review:   Results from last 7 days   Lab Units 09/08/19  0454   WBC 10*3/mm3 11.97*   HEMOGLOBIN g/dL 10.0*   HEMATOCRIT % 31.6*   PLATELETS 10*3/mm3 123*     Results from last 7 days   Lab Units 09/08/19  0454   SODIUM mmol/L 133*   POTASSIUM mmol/L 4.3   CHLORIDE mmol/L 96*   CO2 mmol/L 25.0   BUN mg/dL 16   CREATININE mg/dL 0.95   GLUCOSE mg/dL 85   CALCIUM mg/dL 7.9*             Assessment      Severe sepsis (CMS/HCC)    Anaplastic ALK-positive large cell lymphoma     BEAU (acute kidney injury) (CMS/HCC)    Hyponatremia    Mediastinal lymphadenopathy    Staphylococcus aureus bacteremia      Seen patient for probable malignancy and mediastinal lymphadenopathy.  Patient's incision is well.  Patient slightly short of breath with tachycardia.  We are checking with the hospitalist in regards to this.  Patient will probably need port next week.        El Can MD  09/08/19  8:00 AM      Please note that portions of this note were completed with a voice " recognition program. Efforts were made to edit the dictations, but words may be mistranscribed

## 2019-09-08 NOTE — PROGRESS NOTES
Pharmacy Consult-Vancomycin Dosing  Stephen Stoll is a  44 y.o. male receiving vancomycin therapy.     Indication: sepsis  Consulting Provider: hospitalist  ID Consult: Y    Goal Trough: 15-20mcg/mL    Current Antimicrobial Therapy  Anti-Infectives (From admission, onward)      Ordered     Dose/Rate Route Frequency Start Stop    09/08/19 0922  ! Vanc trough 9/10 @1030. Please hold dose for pharmacy to evaluate     Ordering Provider:  Isiah Bennett RPH     Does not apply Once 09/10/19 1000      09/08/19 0922  vancomycin 1500 mg/500 mL 0.9% NS IVPB ([facility])     Ordering Provider:  Isiah Bennett RPH    15 mg/kg × 99 kg  over 90 Minutes Intravenous Every 12 Hours 09/08/19 2300 09/18/19 2259    09/08/19 0922  vancomycin 2000 mg/500 mL 0.9% NS IVPB ([facility])     Ordering Provider:  Isiah Bennett RPH    2,000 mg  over 120 Minutes Intravenous Once 09/08/19 1100      09/08/19 0822  piperacillin-tazobactam (ZOSYN) 3.375 g/100 mL 0.9% NS IVPB ([facility])     Ordering Provider:  Isiah Bennett RPH    3.375 g  over 4 Hours Intravenous Every 6 Hours 09/08/19 1000 09/18/19 0959    09/08/19 0809  Pharmacy to dose vancomycin     Addie Arzate [facility] reviewed the order on 09/08/19 0910.   Ordering Provider:  Guadalupe Renee MD     Does not apply Continuous PRN 09/08/19 0807 09/18/19 0806    09/06/19 0940  ceFAZolin in dextrose (ANCEF) IVPB solution 2 g     Ordering Provider:  Jeffrey Galeas PA    2 g  over 30 Minutes Intravenous Once 09/06/19 0942 09/06/19 1051    09/05/19 1520  hydroxychloroquine (PLAQUENIL) tablet 200 mg     Joslyn Ochoa [facility] reviewed the order on 09/06/19 0716.   Ordering Provider:  Brant Roxanna V., DO    200 mg Oral 2 Times Daily 09/05/19 2100      09/05/19 1121  vancomycin 1750 mg/500 mL 0.9% NS IVPB ([facility])     Ordering Provider:  Phi Mir DO    20 mg/kg × 93.4 kg  over 120 Minutes Intravenous Once 09/05/19 1123 09/05/19 1416    09/05/19 1121  piperacillin-tazobactam (ZOSYN) 3.375 g in iso-osmotic dextrose 50  "ml (premix)     Ordering Provider:  Phi Mir,     3.375 g  over 30 Minutes Intravenous Once 09/05/19 1122 09/05/19 1215            Allergies  Allergies as of 09/05/2019 - Reviewed 09/05/2019   Allergen Reaction Noted   • Ciprofloxacin Anxiety 06/24/2019   • Sulfa antibiotics Rash 09/05/2019       Labs    Results from last 7 days   Lab Units 09/08/19  0818 09/08/19  0454 09/07/19  0531   BUN mg/dL 16 16 16   CREATININE mg/dL 0.93 0.95 0.82       Results from last 7 days   Lab Units 09/08/19  0818 09/08/19  0454 09/07/19  0531   WBC 10*3/mm3 11.95* 11.97* 17.51*       Evaluation of Dosing     Last Dose Received in the ED/Outside Facility: patient was previously on vancomycin on this admission. Last dose of 1500mg IV was given ~midnight 9/7.  Is Patient on Dialysis or Renal Replacement: no    Ht - 182.9 cm (72\")  Wt - 99 kg (218 lb 4.1 oz)    Estimated Creatinine Clearance: 122.3 mL/min (by C-G formula based on SCr of 0.93 mg/dL).    Intake & Output (last 3 days)         09/05 0701 - 09/06 0700 09/06 0701 - 09/07 0700 09/07 0701 - 09/08 0700 09/08 0701 - 09/09 0700    P.O.  990 320     I.V. (mL/kg) 928 (10.5) 1956 (19.8) 569.6 (5.8)     IV Piggyback 1650 1150 100     Total Intake(mL/kg) 2578 (29.1) 4096 (41.4) 989.6 (10)     Urine (mL/kg/hr) 2925 1100 (0.5) 600 (0.3)     Stool 0 0 0     Blood  5      Total Output 2925 1105 600     Net -347 +2991 +389.6             Urine Unmeasured Occurrence 1 x 1 x 1 x     Stool Unmeasured Occurrence 3 x 3 x 1 x             Microbiology and Radiology  Microbiology Results (last 10 days)       Procedure Component Value - Date/Time    Blood Culture - Blood, Arm, Left [[id number]] Collected:  09/05/19 1115    Lab Status:  Preliminary result Specimen:  Blood from Arm, Left Updated:  09/07/19 1215     Blood Culture No growth at 2 days    Blood Culture - Blood, Arm, Right [[id number]]  (Abnormal) Collected:  09/05/19 1115    Lab Status:  Preliminary result Specimen:  Blood from Arm, " Right Updated:  09/08/19 0742     Blood Culture Staphylococcus aureus     Comment:   Infectious disease consultation is highly recommended to rule out distant foci of infection.        Isolated from Aerobic Bottle     Gram Stain Aerobic Bottle Gram positive cocci in groups    Blood Culture ID, PCR - Blood, Arm, Right [[id number]]  (Abnormal) Collected:  09/05/19 1115    Lab Status:  Final result Specimen:  Blood from Arm, Right Updated:  09/06/19 0746     BCID, PCR Staphylococcus aureus, not MRSA. Identification by BCID PCR.    Clostridium Difficile Toxin, PCR - Stool, Per Rectum [[id number]]  (Normal) Collected:  09/03/19 1301    Lab Status:  Final result Specimen:  Stool from Per Rectum Updated:  09/03/19 1433     C. Difficile Toxins by PCR Not Detected    Narrative:       Performance characteristics of test not established for patients <2 years of age.  Negative for Toxigenic C. Difficile            Evaluation of Level      Results from last 7 days   Lab Units 09/08/19  0818   VANCOMYCIN RM mcg/mL <4.00*                    Assessment/Plan:  1. Pharmacy to dose vancomycin for sepsis with a goal trough of 15-20 mcg/mL.    2. Patient was previously on vancomycin 1500mg every 12 hours this admission. It was discontinued after BCID of blood showed S. Aureus not MRSA and the patient was started on cefazolin. Pharmacy asked by hospitalist to restart vancomycin due to declining clinical status. Random level ~28hour level of <4mcg/mL shows patient is clearing well.    3. Will reload the patient with vancomycin 2000mg IV and begin a maintenance regimen of 1500mg IV(16mg/kg) every 12 hours.  4. Vancomycin trough ordered for 9/10 @ 1030 prior to the 5th dose.   5. Monitor renal function, cultures and sensitivities, and clinical status, and adjust regimen as necessary.    Pharmacy will continue to follow.    Thanks,    Isiah SimonD, JUDE  Pharmacy Resident  9/8/2019  9:28 AM

## 2019-09-08 NOTE — PLAN OF CARE
Problem: Patient Care Overview  Goal: Plan of Care Review  Outcome: Ongoing (interventions implemented as appropriate)   09/08/19 0405   Coping/Psychosocial   Plan of Care Reviewed With patient   Plan of Care Review   Progress no change   OTHER   Outcome Summary frequent strong coughing with productive sputum, pain med given for c/o right hip pain. to monitor.     Goal: Individualization and Mutuality  Outcome: Ongoing (interventions implemented as appropriate)    Goal: Discharge Needs Assessment  Outcome: Ongoing (interventions implemented as appropriate)    Goal: Interprofessional Rounds/Family Conf  Outcome: Ongoing (interventions implemented as appropriate)      Problem: Fall Risk (Adult)  Goal: Absence of Fall  Outcome: Ongoing (interventions implemented as appropriate)      Problem: Oncology Care (Adult)  Goal: Signs and Symptoms of Listed Potential Problems Will be Absent, Minimized or Managed (Oncology Care)  Outcome: Ongoing (interventions implemented as appropriate)

## 2019-09-08 NOTE — PROGRESS NOTES
[facility] Medicine Services  PROGRESS NOTE    Patient Name: Stephen Stoll  : 1975  MRN: [id number]    Date of Admission: 2019  Length of Stay: 3  Primary Care Physician: Carlos Ramirez MD    Subjective   Subjective     CC: ICU follow-up for severe sepsis and high-grade lymphoma    HPI: Patient has significant clinical decline this morning, he is is tachycardic, febrile, tachypneic, he does respond to queries but not very verbal.    Review of Systems  Gen- No fevers, chills  CV- No chest pain, palpitations  Resp- No cough, dyspnea  GI- No N/V/D, abd pain    All other systems reviewed and negative except any additional pertinent positives and negatives discussed in HPI.     Objective   Objective     Vital Signs:   Temp:  [98.1 °F (36.7 °C)-101 °F (38.3 °C)] 101 °F (38.3 °C)  Heart Rate:  [] 122  Resp:  [16-30] 30  BP: (113-135)/(62-85) 135/85        Physical Exam:  Constitutional: No acute distress, awake, alert  HENT: NCAT, mucous membranes moist  Respiratory: Clear to auscultation bilaterally, respiratory effort normal   Cardiovascular: RRR, no murmurs, rubs, or gallops, palpable pedal pulses bilaterally  Gastrointestinal: Positive bowel sounds, soft, nontender, nondistended  Musculoskeletal: No bilateral ankle edema  Psychiatric: Appropriate affect, cooperative  Neurologic: Oriented x 3, no focal deficits skin: No rashes    Results Reviewed:    Results from last 7 days   Lab Units 19  0454 19  0531 19  0215  19  1552 19  1115   WBC 10*3/mm3 11.97* 17.51* 24.62*   < >  --  30.17*   HEMOGLOBIN g/dL 10.0* 9.1* 9.1*   < >  --  12.7*   HEMATOCRIT % 31.6* 29.1* 28.2*   < >  --  38.0   PLATELETS 10*3/mm3 123* 132* 162   < >  --  262   INR   --   --   --   --  1.55*  --    PROCALCITONIN ng/mL  --   --   --   --   --  38.17*    < > = values in this interval not displayed.     Results from last 7 days   Lab Units 19  0454 19  2086  09/07/19 0531 09/06/19  0215  09/05/19  1115   SODIUM mmol/L 133*  --  133* 131*   < > 124*   POTASSIUM mmol/L 4.3 4.2 3.5 4.2   < > 4.6   CHLORIDE mmol/L 96*  --  98 94*   < > 81*   CO2 mmol/L 25.0  --  23.0 25.0   < > 25.0   BUN mg/dL 16  --  16 17   < > 29*   CREATININE mg/dL 0.95  --  0.82 0.94   < > 1.67*   GLUCOSE mg/dL 85  --  208* 132*   < > 132*   CALCIUM mg/dL 7.9*  --  7.4* 7.7*   < > 8.9   ALT (SGPT) U/L 24  --  20 26   < > 35   AST (SGOT) U/L 25  --  15 25   < > 31   TROPONIN T ng/mL  --   --   --   --   --  0.017   PROBNP pg/mL  --   --   --   --   --  400.9    < > = values in this interval not displayed.     Estimated Creatinine Clearance: 119.7 mL/min (by C-G formula based on SCr of 0.95 mg/dL).    Microbiology Results Abnormal     Procedure Component Value - Date/Time    Blood Culture - Blood, Arm, Right [[id number]]  (Abnormal) Collected:  09/05/19 1115    Lab Status:  Preliminary result Specimen:  Blood from Arm, Right Updated:  09/08/19 0742     Blood Culture Staphylococcus aureus     Comment:   Infectious disease consultation is highly recommended to rule out distant foci of infection.        Isolated from Aerobic Bottle     Gram Stain Aerobic Bottle Gram positive cocci in groups    Blood Culture - Blood, Arm, Left [[id number]] Collected:  09/05/19 1115    Lab Status:  Preliminary result Specimen:  Blood from Arm, Left Updated:  09/07/19 1215     Blood Culture No growth at 2 days    Blood Culture ID, PCR - Blood, Arm, Right [[id number]]  (Abnormal) Collected:  09/05/19 1115    Lab Status:  Final result Specimen:  Blood from Arm, Right Updated:  09/06/19 0746     BCID, PCR Staphylococcus aureus, not MRSA. Identification by BCID PCR.          Imaging Results (last 24 hours)     Procedure Component Value Units Date/Time    XR Chest 1 View [[id number]] Collected:  09/07/19 0751     Updated:  09/07/19 1736    Narrative:          EXAMINATION: XR CHEST 1 VW - 09/07/2019     INDICATION: R59.0-Localized  enlarged lymph nodes; A41.9-Sepsis,  unspecified organism; R06.02-Shortness of breath; R93.89-Abnormal  findings on diagnostic imaging of other specified body structures;  D72.829-Elevated white blood cell count, unspecified.      COMPARISON: Chest radiograph 09/06/2019.     FINDINGS: Single portable chest radiograph is submitted for review. The  heart is prominent in size, similar to prior. Multifocal nodular  opacities are identified throughout the lungs as previously seen on the  CT of the chest from 09/05/2019.  No new airspace disease, pleural  effusion or pneumothorax. The visualized upper abdomen is unrevealing.  No acute osseous abnormality.           Impression:       Similar exam when compared to 09/06/2019.     DICTATED:   09/07/2019  EDITED/ls :   09/07/2019                 Results for orders placed during the hospital encounter of 08/21/19   Adult Transthoracic Echo Complete W/ Cont if Necessary Per Protocol    Narrative · Estimated EF = 62%.  · Left ventricular systolic function is normal.  · Left ventricular diastolic function is normal.  · Normal right ventricular cavity size, wall thickness, systolic function   and septal motion noted.  · No evidence of pulmonary hypertension is present.  · There is no evidence of pericardial effusion.  · No significant structural valvular abnormality demonstrated.          I have reviewed the medications:  Scheduled Meds:    allopurinol 300 mg Oral Daily   ceFAZolin 2 g Intravenous Q8H   famotidine 20 mg Oral BID   hydroxychloroquine 200 mg Oral BID   lactobacillus acidophilus 1 capsule Oral Daily   metoprolol succinate XL 25 mg Oral Q24H   predniSONE 10 mg Oral TID With Meals   sodium chloride 10 mL Intravenous Q12H     Continuous Infusions:    Pharmacy Consult    sodium chloride 100 mL/hr     PRN Meds:.•  acetaminophen  •  benzocaine-menthol  •  oxyCODONE-acetaminophen  •  Pharmacy Consult  •  potassium & sodium phosphates **OR** potassium & sodium phosphates  •   potassium chloride  •  potassium chloride  •  sodium chloride  •  sodium chloride      Assessment/Plan   Assessment / Plan     Active Hospital Problems    Diagnosis  POA   • **Severe sepsis (CMS/HCC) [A41.9, R65.20]  Yes   • Staphylococcus aureus bacteremia [R78.81]  Yes   • BEAU (acute kidney injury) (CMS/HCC) [N17.9]  Yes   • Hyponatremia [E87.1]  Yes   • Mediastinal lymphadenopathy [R59.0]  Yes   • 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 male past complex medical history, this is his fourth hospitalization since June.  Briefly, in June patient presented with persistent cough and fevers, he was found to have elevated troponin and chest pain was diagnosed with myocarditis and adult still's disease and was started on prednisone, valsartan and metoprolol.  In July, he developed severe back pain, MRI T-spine at that time did show lesions around T8-T9 that were was concerning for either osteomyelitis vs metastatic disease.  Spine biopsy was unrevealing, he underwent a bone marrow was concerning for large cell lymphoma.  He was scheduled for outpatient EGD and colonoscopy to evaluate for possible GI lymphoma.  On presentation to endoscopy, patient was found to be hypotensive, tachycardic, and febrile.  As such was sent to the ED and was subsequently admitted with severe sepsis requiring ICU care.  CT scans did revealed mediastinal  and intra-abdominal adenopathy.  He did undergo mediastinoscopy per CT surgery, blood cultures were positive for staph aureus, patient was started on broad-spectrum antibiotics and ID consulted.  He was then deemed stable for transfer to floor to continue his care.    Plan  -Severe sepsis, secondary to MSSA bacteremia, this had improved, however, this morning patient is febrile T-max 102,  tachycardic, tachypneic.  This could as well be his underlying lymphoma, but currently cannot rule out any recurring infectious process.  We will move patient to telemetry for now, but on low threshold for ICU transfer.  Get stat CMP, CBC, lactic acid, will restart vancomycin and Zosyn, IV fluids normal saline at 100 mL/hr.  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 He will need initiation of chemotherapy considering the aggressive nature of his lymphoma.  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    Addendum: repeat labs reviewed, he has a lactic acid of 3.2, there has been a significant rise in his Na+ compared to 3 hrs ago, this has jumped from 133 to 146, will d/c NS infusions, start D5W @ 100 ml/hr repeat Na+ q4hrs.    DVT Prophylaxis: Mechanical    Disposition: TBD    CODE STATUS:   Code Status and Medical Interventions:   Ordered at: 09/05/19 1533     Code Status:    CPR     Medical Interventions (Level of Support Prior to Arrest):    Full       Electronically signed by Guadalupe Renee MD, 09/08/19, 8:00 AM.

## 2019-09-08 NOTE — PROGRESS NOTES
[facility] Progress Note    Admission Date: 9/5/2019    Stephen Stoll  1975  [id number]    Date: 9/8/2019    Antibiotics:  Anti-Infectives (From admission, onward)    Ordered     Dose/Rate Route Frequency Start Stop    09/08/19 0922  vancomycin 1500 mg/500 mL 0.9% NS IVPB ([facility])     Ordering Provider:  Isiah Bennett RPH    15 mg/kg × 99 kg  over 90 Minutes Intravenous Every 12 Hours 09/08/19 2300 09/18/19 2259    09/08/19 1505  DAPTOmycin (CUBICIN) 700 mg in sodium chloride 0.9 % 50 mL IVPB     Ordering Provider:  Irvin Mondragon MD    8 mg/kg × 86.2 kg (Adjusted)  100 mL/hr over 30 Minutes Intravenous Every 24 Hours 09/08/19 1600 10/08/19 1559    09/08/19 0809  Pharmacy to dose vancomycin     Addie Arzate [facility] reviewed the order on 09/08/19 0910.   Ordering Provider:  Guadalupe Renee MD     Does not apply Continuous PRN 09/08/19 0807 09/18/19 0806    09/06/19 0940  ceFAZolin in dextrose (ANCEF) IVPB solution 2 g     Ordering Provider:  Jeffrey Galeas PA    2 g  over 30 Minutes Intravenous Once 09/06/19 0942 09/06/19 1051    09/05/19 1520  hydroxychloroquine (PLAQUENIL) tablet 200 mg     Joslyn Ochoa [facility] reviewed the order on 09/06/19 0716.   Ordering Provider:  Roxanna Guo, DO    200 mg Oral 2 Times Daily 09/05/19 2100      09/05/19 1121  vancomycin 1750 mg/500 mL 0.9% NS IVPB ([facility])     Ordering Provider:  Phi Mir DO    20 mg/kg × 93.4 kg  over 120 Minutes Intravenous Once 09/05/19 1123 09/05/19 1416    09/05/19 1121  piperacillin-tazobactam (ZOSYN) 3.375 g in iso-osmotic dextrose 50 ml (premix)     Ordering Provider:  Phi Mir DO    3.375 g  over 30 Minutes Intravenous Once 09/05/19 1122 09/05/19 1215          CC: Recurrent fevers    HPI:  9/7/2019: Stephen Stoll is a 44-year-old white male patient of Dr. Lazaro Hauser and Dr. Noah Dumont who is seen today for reassessment of his exceedingly complex constellation of medical problems including MSSA bacteremia, high-grade lymphoma,  inflammatory myocarditis, septic shock, and stills disease.  I am covering Dr. Dumont for the weekend.  I discussed his complex situation in detail with Dr. Dumont.  He initially presented with high fevers and an elevated ferritin level and was thought to have still's disease for which he received immunosuppressive therapy.  He has some persistent back pain with a lytic spinal lesion at T8/9 and was also noted to have hepatic hypodensities concerning for metastatic disease.  He had recurrent fevers and was noted to have new lung nodules and progressive adenopathy.  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  Yesterday he underwent bronchoscopy and mediastinoscopy with biopsies.  He is currently on vancomycin/Zosyn antibiotic therapy.  He is now off of vasopressor therapy.  He is off of supplemental oxygen.  He has remained afebrile overnight.  He complains of some chest discomfort but denies dyspnea, increased cough, sputum production.  He denies nausea, vomiting, and diarrhea.  He indicates that some IV sites infiltrated during his last admission and had some slight associated infection.    9/8/2019: He had a fever to 103.5 degrees this morning with associated delirium.  Orders were given to switch his cefazolin to vancomycin/Zosyn.  I had just discontinued vancomycin/Zosyn late yesterday.  He now has a worsening diffuse erythematous rash which is most prominent on his extremities.  He was delirious this morning but his delirium has now resolved and he is alert and appropriate.  He denies severe headache, earache, and sore throat.  He denies nausea, vomiting, and severe diarrhea.  He and his wife report that Dr. Bauman thought his high fevers were most likely due to his high-grade lymphoma.  Plans are in place for him to start on chemotherapy tomorrow.  He just underwent PICC line placement.  He is now on intravenous  steroid therapy.  His father is indicated that he  had a slightly inflamed right wrist IV site during his last admission.            PE:  Vital Signs  Temp  Min: 98.7 °F (37.1 °C)  Max: 103.5 °F (39.7 °C)  BP  Min: 113/62  Max: 162/90  Pulse  Min: 80  Max: 126  Resp  Min: 16  Max: 30  SpO2  Min: 95 %  Max: 98 %    GENERAL: Awake and alert, in no acute distress.   HEENT: Normocephalic, atraumatic.  PERRL. EOMI. No conjunctival injection. No icterus. Oropharynx clear without evidence of thrush or exudate.  NECK: Supple without nuchal rigidity  HEART: RRR; No murmur, rubs, gallops.   LUNGS: Clear to auscultation bilaterally without wheezing, rales, rhonchi. Normal respiratory effort. Nonlabored. No dullness.  ABDOMEN: Soft, nontender, nondistended. Positive bowel sounds. No rebound or guarding. NO mass or HSM.  EXT: There is no erythema at old IV sites on his upper extremities.  He now has a left arm PICC line in place.  :   Without Fernandes catheter.  MSK: FROM without joint effusions noted arms/legs.    SKIN: He has multiple erythematous macular lesions that are most prominent over his lower extremities.  He has less prominent erythematous smaller macules on his torso.  NEURO: Oriented to PPT. No focal deficits on motor/sensory exam at arms/legs.    Laboratory Data    Results from last 7 days   Lab Units 09/08/19  0818 09/08/19  0454 09/07/19  0531   WBC 10*3/mm3 11.95* 11.97* 17.51*   HEMOGLOBIN g/dL 10.2* 10.0* 9.1*   HEMATOCRIT % 31.4* 31.6* 29.1*   PLATELETS 10*3/mm3 120* 123* 132*     Results from last 7 days   Lab Units 09/08/19  1347 09/08/19  0818   SODIUM mmol/L 131* 146*   POTASSIUM mmol/L  --  4.3   CHLORIDE mmol/L  --  93*   CO2 mmol/L  --  23.0   BUN mg/dL  --  16   CREATININE mg/dL  --  0.93   GLUCOSE mg/dL  --  93   CALCIUM mg/dL  --  7.7*     Results from last 7 days   Lab Units 09/08/19  0818   ALK PHOS U/L 128*   BILIRUBIN mg/dL 0.4   ALT (SGPT) U/L 29   AST (SGOT) U/L 33     Results from last 7 days    Lab Units 09/05/19  1115   SED RATE mm/hr >130*     Results from last 7 days   Lab Units 09/05/19  1115   CRP mg/dL 37.66*       Estimated Creatinine Clearance: 122.3 mL/min (by C-G formula based on SCr of 0.93 mg/dL).      Microbiology:  Blood cultures from 9/5 are growing MSSA    Radiology:  Imaging Results (last 72 hours)     Procedure Component Value Units Date/Time    XR Chest 1 View [[id number]] Collected:  09/07/19 0751     Updated:  09/07/19 0752    Narrative:          EXAMINATION: XR CHEST 1 VW-      INDICATION: sepsis; R59.0-Localized enlarged lymph nodes; A41.9-Sepsis,  unspecified organism; R06.02-Shortness of breath; R93.89-Abnormal  findings on diagnostic imaging of other specified body structures;  D72.829-Elevated white blood cell count, unspecified      COMPARISON: Chest radiograph 9/6/2019     FINDINGS: Single portable chest radiograph is limited for review. The  heart is prominent size, similar to prior. Multifocal nodular opacities  are identified throughout the lungs as previously seen on the CT of the  chest from 9/5/2019. No new airspace disease, pleural effusion or  pneumothorax. The visualized upper abdomen is unrevealing. No acute  osseous abnormality.           Impression:          Similar exam when compared to 9/6/2019.          XR Chest 1 View [[id number]] Collected:  09/06/19 0854     Updated:  09/06/19 1527    Narrative:       EXAMINATION: XR CHEST 1 VW-09/06/2019:      INDICATION: F/U infiltrate; A41.9-Sepsis, unspecified organism;  R06.02-Shortness of breath; R93.89-Abnormal findings on diagnostic  imaging of other specified body structures; D72.829-Elevated white blood  cell count, unspecified.      COMPARISON: 09/05/2019.     FINDINGS: Portable chest reveals minimal increased markings improving  within the right midlung. The left lung is grossly clear. The bony  structures are unremarkable. Cardiac and mediastinal silhouettes are  within normal limits.           Impression:        Improvement seen in aeration of the right midlung.     D:  09/06/2019  E:  09/06/2019     This report was finalized on 9/6/2019 3:23 PM by Dr. Ana Paula De La Cruz MD.       XR Chest 1 View [[id number]] Collected:  09/05/19 1140     Updated:  09/06/19 1209    Narrative:       EXAMINATION: XR CHEST 1 VW- 09/05/2019     INDICATION: SOA triage protocol      COMPARISON: Chest radiograph 08/21/2019     FINDINGS: Single portable chest radiograph is submitted for review.      The heart is top normal size, similar to prior. There has been an  increase in bilateral perihilar airspace changes with interval  development of right mid lung nodular opacities. The visualized upper  abdomen is unrevealing. No acute osseous abnormality.           Impression:       There has been interval worsening of mild perihilar  airspace changes with nodular airspace opacities noted within the right  mid lung of undetermined etiology. Consideration would be for pneumonia  as these are new when compared to 08/21/2019, however underlying  developing pulmonary lesions are not excluded. Consideration for  follow-up CT of the chest without IV contrast should be considered if  clinically appropriate.     D:  09/05/2019  E:  09/05/2019     This report was finalized on 9/6/2019 12:06 PM by Dr. Lionel Jackson MD.       CT Chest Without Contrast [[id number]] Collected:  09/05/19 1551     Updated:  09/05/19 1607    Narrative:       EXAMINATION: CT CHEST WO CONTRAST, CT ABDOMEN/PELVIS WO CONTRAST -  09/05/2019     INDICATION: Cough, sepsis, abnormal chest x-ray.     TECHNIQUE: Unenhanced CT imaging of the chest, abdomen, and pelvis was  performed. Additional coronal and sagittal reformatted imaging was  provided for review.     The radiation dose reduction device was turned on for each scan per the  ALARA (As Low as Reasonably Achievable) protocol.     COMPARISON: MRI of the thoracic and lumbar spine 08/09/2019.     FINDINGS:      CHEST: Dedicated CT  imaging of the chest demonstrates interval  development of numerous pulmonary nodules throughout the lungs, the  largest within the right mid lung measuring 2.0 cm. These are concerning  for metastatic foci given the provided clinical history. No focal  pneumonia, pleural effusion, or pneumothorax identified. These pulmonary  nodules account for the abnormality seen on the  chest radiograph from  09/05/2019.     Extensive axillary and mediastinal lymphadenopathy is identified. The  largest within the right paratracheal region measures up to 2.1 cm and  demonstrates mild central necrosis. The pulmonary artery is within  normal limits in size. The heart is within normal limits in size. Trace  pericardial effusion is identified. Prominent cardiophrenic lymph nodes  are also noted.     ABDOMEN/PELVIS: Lack of intravenous contrast limits evaluation of  underlying abdominal and pelvic organs. Hypoattenuated region involving  the right hepatic lobe is identified with additional more confluent  hypoattenuating region within the mid right hepatic lobe noted measuring  up to 2.5 cm which is concerning for an additional site of metastatic  disease. The gallbladder demonstrates mild cholelithiasis. The spleen is  enlarged and otherwise unremarkable. The pancreas does not demonstrate  abnormality. The adrenal glands and kidneys also do not demonstrate  abnormality. No evidence of hydronephrosis. There is mild perinephric  inflammation which requires clinical correlation. Extensive peritoneal  and retroperitoneal lymphadenopathy is identified throughout the  visualized abdomen and pelvis. The urinary bladder is partially  distended and otherwise unremarkable. The stomach is decompressed and  unremarkable. No evidence of small bowel obstruction. No free air or  free fluid identified. Postsurgical changes in the right lower quadrant  are identified and likely from prior appendectomy.  Small fat-filled  right inguinal and left  inguinal hernias are noted.     Osseous structures: The visualized bony pelvis appears intact.  Degenerative changes of the bilateral hips are identified.  Re-demonstration of aggressive osseous changes at T8 and T9, previously  documented MRI thoracic spine from 8/21/2019. No convincing evidence of  posterior cortex retropulsion identified. Additional lytic lesion  involving L4 is also noted.       Impression:          Constellation of findings concerning for metastatic disease:      - Interval development of numerous pulmonary nodules throughout the  lungs with the largest measuring 2.0 cm within the right midlung and  concerning for underlying pulmonary metastasis and which may account for  abnormality identified in the chest radiograph performed 09/05/2019.     - Extensive axillary, mediastinal, and abdominopelvic lymphadenopathy as  described above.     - Hypoattenuated regions involving the liver concerning for additional  sites of metastatic disease, although are limited in evaluation  secondary to lack of intravenous contrast. Attention on follow-up  imaging is advised.     - Redemonstration of T8/T9 aggressive osseous lesions with additional  lytic lesion involving the L4 vertebral body and additional  subcentimeter lytic lesions throughout the thoracolumbar spine  suggested.     Nonspecific perinephric inflammation. Correlate with urinalysis to  exclude underlying pyelonephritis.     DICTATED:   09/05/2019  EDITED/ls :   09/05/2019        CT Abdomen Pelvis Without Contrast [[id number]] Collected:  09/05/19 1551     Updated:  09/05/19 1607    Narrative:       EXAMINATION: CT CHEST WO CONTRAST, CT ABDOMEN/PELVIS WO CONTRAST -  09/05/2019     INDICATION: Cough, sepsis, abnormal chest x-ray.     TECHNIQUE: Unenhanced CT imaging of the chest, abdomen, and pelvis was  performed. Additional coronal and sagittal reformatted imaging was  provided for review.     The radiation dose reduction device was turned on for  each scan per the  ALARA (As Low as Reasonably Achievable) protocol.     COMPARISON: MRI of the thoracic and lumbar spine 08/09/2019.     FINDINGS:      CHEST: Dedicated CT imaging of the chest demonstrates interval  development of numerous pulmonary nodules throughout the lungs, the  largest within the right mid lung measuring 2.0 cm. These are concerning  for metastatic foci given the provided clinical history. No focal  pneumonia, pleural effusion, or pneumothorax identified. These pulmonary  nodules account for the abnormality seen on the  chest radiograph from  09/05/2019.     Extensive axillary and mediastinal lymphadenopathy is identified. The  largest within the right paratracheal region measures up to 2.1 cm and  demonstrates mild central necrosis. The pulmonary artery is within  normal limits in size. The heart is within normal limits in size. Trace  pericardial effusion is identified. Prominent cardiophrenic lymph nodes  are also noted.     ABDOMEN/PELVIS: Lack of intravenous contrast limits evaluation of  underlying abdominal and pelvic organs. Hypoattenuated region involving  the right hepatic lobe is identified with additional more confluent  hypoattenuating region within the mid right hepatic lobe noted measuring  up to 2.5 cm which is concerning for an additional site of metastatic  disease. The gallbladder demonstrates mild cholelithiasis. The spleen is  enlarged and otherwise unremarkable. The pancreas does not demonstrate  abnormality. The adrenal glands and kidneys also do not demonstrate  abnormality. No evidence of hydronephrosis. There is mild perinephric  inflammation which requires clinical correlation. Extensive peritoneal  and retroperitoneal lymphadenopathy is identified throughout the  visualized abdomen and pelvis. The urinary bladder is partially  distended and otherwise unremarkable. The stomach is decompressed and  unremarkable. No evidence of small bowel obstruction. No free air  or  free fluid identified. Postsurgical changes in the right lower quadrant  are identified and likely from prior appendectomy.  Small fat-filled  right inguinal and left inguinal hernias are noted.     Osseous structures: The visualized bony pelvis appears intact.  Degenerative changes of the bilateral hips are identified.  Re-demonstration of aggressive osseous changes at T8 and T9, previously  documented MRI thoracic spine from 8/21/2019. No convincing evidence of  posterior cortex retropulsion identified. Additional lytic lesion  involving L4 is also noted.       Impression:          Constellation of findings concerning for metastatic disease:      - Interval development of numerous pulmonary nodules throughout the  lungs with the largest measuring 2.0 cm within the right midlung and  concerning for underlying pulmonary metastasis and which may account for  abnormality identified in the chest radiograph performed 09/05/2019.     - Extensive axillary, mediastinal, and abdominopelvic lymphadenopathy as  described above.     - Hypoattenuated regions involving the liver concerning for additional  sites of metastatic disease, although are limited in evaluation  secondary to lack of intravenous contrast. Attention on follow-up  imaging is advised.     - Redemonstration of T8/T9 aggressive osseous lesions with additional  lytic lesion involving the L4 vertebral body and additional  subcentimeter lytic lesions throughout the thoracolumbar spine  suggested.     Nonspecific perinephric inflammation. Correlate with urinalysis to  exclude underlying pyelonephritis.     DICTATED:   09/05/2019  EDITED/ls :   09/05/2019              I personally reviewed the radiographic studies     IMPRESSION:   1.  High-grade lymphoma-he will require chemotherapy in the near future.  Ideally we would like to postpone chemotherapy until his staph infection has been adequately treated but this will not be possible due to the aggressive nature  of his high-grade lymphoma.  I discussed his situation with Dr. Bauman again today.  He is currently scheduled to start on chemotherapy tomorrow.  2.  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  MSSA bacteremia-the source of his MSSA bacteremia is unclear.  I am concerned about the possibility of inoculation from a prior peripheral IV during his prior admission since he indicates that his peripheral IVs infiltrated and had some slight inflammation.  I suspect that the inflammation was very minimal due to his steroid therapy but I certainly cannot make a definitive diagnosis of peripheral IV related septic phlebitis with secondary bacteremia since there is no evidence of septic phlebitis at this point.  He will be at risk for development of metastatic infection due to his immunocompromise state.  He will require a prolonged course of intravenous antibiotic therapy-probably 6 weeks.  4.  Severe sepsis/septic shock-improved and secondary to MSSA bacteremia  5.  Recurrent fevers-his fevers this morning were associated with delirium.  He has multiple potential sources for fever including his high-grade lymphoma, drug fever with a drug-induced rash (secondary to antibiotics versus allopurinol), and uncontrolled staphylococcal infection.  We will also need to be on the look out for a new infection.  Repeat blood cultures are pending.  6.  Rash- he has a fairly extensive rash.  This appears to be different from the rash that he had with Still's disease.  He may be developing a new drug-induced rash with the potential for antibiotics and allopurinol as culprits.  I would like to avoid switching antibiotics repeatedly since we will not be able to determine which medication might be inducing a drug eruption.  I will plan to switch his antibiotic therapy to daptomycin today.  7.  Inflammatory myocarditis  8.  Leukocytosis/neutrophilia  9.  Lactic acidosis-this may be secondary to his aggressive  lymphoma.  10.  Acute kidney injury/ATN-improved  11.  Protein/calorie malnutrition-this is severe with severe hypoalbuminemia.  I discussed increased protein intake in detail with him today.  12.  Thoracic spine lytic lesion    RECOMMENDATIONS:   1.  Repeat blood cultures  2.  High-dose intravenous daptomycin  3.  Mediastinal mass/lymph node biopsies-pending  4.  Discontinue the vancomycin again  5.  Discontinue the Zosyn again  6.  Continue off of cefazolin for the time being      He has a high risk of complications and mortality.  I discussed his complex situation in detail with Dr. Dumont again today  I discussed his complex situation with Dr. Bauman on 9/7  I discussed his complex situation in detail with Dr. Renee today  I discussed his situation with the nursing staff today.  I discussed his situation in detail with both him and his wife again today and answered multiple questions.  I spent over 65 minutes on his care today.  Over 50% of the time was spent in conference and care coordination    Irvin Mondragon MD  9/8/2019

## 2019-09-08 NOTE — PLAN OF CARE
Problem: Patient Care Overview  Goal: Plan of Care Review  Outcome: Ongoing (interventions implemented as appropriate)   09/08/19 5180   OTHER   Outcome Summary When pt arrived to floor this morning he was not responding very well to questions, his heart rate was tachy, and he had a fever of 103.5. We were able to get him to answer some questions after repeat stimulation, but some answers were not appropriate. After fever was treated and fluids were given, pt was more oriented and able to respond appropriately to questions. Wife at bedside. Antibiotics changed per ID. Red rash all over body, and some swelling in ankles/feet. BERNARDINO PICC placed for chemo tomorrow. Complaints of Left rib pain, med given as ordered. VSS will cont to monitor.        Problem: Fall Risk (Adult)  Goal: Absence of Fall  Outcome: Ongoing (interventions implemented as appropriate)      Problem: Oncology Care (Adult)  Goal: Signs and Symptoms of Listed Potential Problems Will be Absent, Minimized or Managed (Oncology Care)  Outcome: Ongoing (interventions implemented as appropriate)

## 2019-09-09 ENCOUNTER — TELEPHONE (OUTPATIENT)
Dept: CARDIAC SURGERY | Facility: CLINIC | Age: 44
End: 2019-09-09

## 2019-09-09 PROBLEM — N17.9 AKI (ACUTE KIDNEY INJURY) (HCC): Status: RESOLVED | Noted: 2019-09-05 | Resolved: 2019-09-09

## 2019-09-09 PROBLEM — R65.20 SEVERE SEPSIS (HCC): Status: RESOLVED | Noted: 2019-06-24 | Resolved: 2019-09-09

## 2019-09-09 PROBLEM — A41.9 SEVERE SEPSIS: Status: RESOLVED | Noted: 2019-06-24 | Resolved: 2019-09-09

## 2019-09-09 LAB
ALBUMIN SERPL-MCNC: 1.9 G/DL (ref 3.5–5.2)
ALBUMIN/GLOB SERPL: 0.5 G/DL
ALP SERPL-CCNC: 139 U/L (ref 39–117)
ALT SERPL W P-5'-P-CCNC: 34 U/L (ref 1–41)
ANION GAP SERPL CALCULATED.3IONS-SCNC: 12 MMOL/L (ref 5–15)
AST SERPL-CCNC: 30 U/L (ref 1–40)
BACTERIA SPEC AEROBE CULT: ABNORMAL
BASOPHILS # BLD MANUAL: 0 10*3/MM3 (ref 0–0.2)
BASOPHILS NFR BLD AUTO: 0 % (ref 0–1.5)
BILIRUB SERPL-MCNC: 0.4 MG/DL (ref 0.2–1.2)
BUN BLD-MCNC: 18 MG/DL (ref 6–20)
BUN/CREAT SERPL: 21.4 (ref 7–25)
CALCIUM SPEC-SCNC: 7.6 MG/DL (ref 8.6–10.5)
CHLORIDE SERPL-SCNC: 98 MMOL/L (ref 98–107)
CO2 SERPL-SCNC: 23 MMOL/L (ref 22–29)
CREAT BLD-MCNC: 0.84 MG/DL (ref 0.76–1.27)
DEPRECATED RDW RBC AUTO: 51.1 FL (ref 37–54)
DX PRELIMINARY: NORMAL
EOSINOPHIL # BLD MANUAL: 0 10*3/MM3 (ref 0–0.4)
EOSINOPHIL NFR BLD MANUAL: 0 % (ref 0.3–6.2)
ERYTHROCYTE [DISTWIDTH] IN BLOOD BY AUTOMATED COUNT: 17.3 % (ref 12.3–15.4)
GFR SERPL CREATININE-BSD FRML MDRD: 99 ML/MIN/1.73
GLOBULIN UR ELPH-MCNC: 4.2 GM/DL
GLUCOSE BLD-MCNC: 178 MG/DL (ref 65–99)
GRAM STN SPEC: ABNORMAL
HCT VFR BLD AUTO: 28.1 % (ref 37.5–51)
HGB BLD-MCNC: 9.1 G/DL (ref 13–17.7)
LAB AP ASPIRATE SMEAR: NORMAL
LAB AP CASE REPORT: NORMAL
LAB AP CBC AND DIFFERENTIAL: NORMAL
LAB AP CLINICAL INFORMATION: NORMAL
LAB AP CLOT SECTION: NORMAL
LAB AP CORE BIOPSY: NORMAL
LAB AP CYTOGENETICS REPORT,ADDENDUM: NORMAL
LAB AP DIAGNOSIS COMMENT: NORMAL
LAB AP FLOW CYTOMETRY SUMMARY: NORMAL
LAB AP INTEGRATED ONCOLOGY, ADDENDUM: NORMAL
LYMPHOCYTES # BLD MANUAL: 0.83 10*3/MM3 (ref 0.7–3.1)
LYMPHOCYTES NFR BLD MANUAL: 5 % (ref 19.6–45.3)
LYMPHOCYTES NFR BLD MANUAL: 5 % (ref 5–12)
MCH RBC QN AUTO: 26.4 PG (ref 26.6–33)
MCHC RBC AUTO-ENTMCNC: 32.4 G/DL (ref 31.5–35.7)
MCV RBC AUTO: 81.4 FL (ref 79–97)
MONOCYTES # BLD AUTO: 0.83 10*3/MM3 (ref 0.1–0.9)
MYELOCYTES NFR BLD MANUAL: 1 % (ref 0–0)
NEUTROPHILS # BLD AUTO: 14.73 10*3/MM3 (ref 1.7–7)
NEUTROPHILS NFR BLD MANUAL: 83 % (ref 42.7–76)
NEUTS BAND NFR BLD MANUAL: 6 % (ref 0–5)
PATH REPORT.FINAL DX SPEC: NORMAL
PATH REPORT.GROSS SPEC: NORMAL
PHOSPHATE SERPL-MCNC: 2.8 MG/DL (ref 2.5–4.5)
PLAT MORPH BLD: NORMAL
PLATELET # BLD AUTO: 81 10*3/MM3 (ref 140–450)
PMV BLD AUTO: 10.1 FL (ref 6–12)
POTASSIUM BLD-SCNC: 3.9 MMOL/L (ref 3.5–5.2)
PROT SERPL-MCNC: 6.1 G/DL (ref 6–8.5)
RBC # BLD AUTO: 3.45 10*6/MM3 (ref 4.14–5.8)
RBC MORPH BLD: NORMAL
SODIUM BLD-SCNC: 129 MMOL/L (ref 136–145)
SODIUM BLD-SCNC: 131 MMOL/L (ref 136–145)
SODIUM BLD-SCNC: 133 MMOL/L (ref 136–145)
WBC MORPH BLD: NORMAL
WBC NRBC COR # BLD: 16.55 10*3/MM3 (ref 3.4–10.8)

## 2019-09-09 PROCEDURE — 99231 SBSQ HOSP IP/OBS SF/LOW 25: CPT | Performed by: THORACIC SURGERY (CARDIOTHORACIC VASCULAR SURGERY)

## 2019-09-09 PROCEDURE — 63710000001 PREDNISONE PER 5 MG: Performed by: INTERNAL MEDICINE

## 2019-09-09 PROCEDURE — 85025 COMPLETE CBC W/AUTO DIFF WBC: CPT | Performed by: INTERNAL MEDICINE

## 2019-09-09 PROCEDURE — 99233 SBSQ HOSP IP/OBS HIGH 50: CPT | Performed by: FAMILY MEDICINE

## 2019-09-09 PROCEDURE — 80053 COMPREHEN METABOLIC PANEL: CPT | Performed by: INTERNAL MEDICINE

## 2019-09-09 PROCEDURE — 25010000002 DOXORUBICIN PER 10 MG: Performed by: INTERNAL MEDICINE

## 2019-09-09 PROCEDURE — 25010000002 DAPTOMYCIN PER 1 MG: Performed by: INTERNAL MEDICINE

## 2019-09-09 PROCEDURE — 25010000002 DEXAMETHASONE PER 1 MG: Performed by: INTERNAL MEDICINE

## 2019-09-09 PROCEDURE — 25010000002 CYCLOPHOSPHAMIDE PER 100 MG: Performed by: INTERNAL MEDICINE

## 2019-09-09 PROCEDURE — 84295 ASSAY OF SERUM SODIUM: CPT | Performed by: INTERNAL MEDICINE

## 2019-09-09 PROCEDURE — 3E03305 INTRODUCTION OF OTHER ANTINEOPLASTIC INTO PERIPHERAL VEIN, PERCUTANEOUS APPROACH: ICD-10-PCS | Performed by: INTERNAL MEDICINE

## 2019-09-09 PROCEDURE — 25010000002 ONDANSETRON PER 1 MG: Performed by: INTERNAL MEDICINE

## 2019-09-09 PROCEDURE — 25010000002 FOSAPREPITANT PER 1 MG: Performed by: INTERNAL MEDICINE

## 2019-09-09 PROCEDURE — 93005 ELECTROCARDIOGRAM TRACING: CPT | Performed by: FAMILY MEDICINE

## 2019-09-09 PROCEDURE — 85007 BL SMEAR W/DIFF WBC COUNT: CPT | Performed by: INTERNAL MEDICINE

## 2019-09-09 PROCEDURE — 25010000002 BRENTUXIMAB 50 MG RECONSTITUTED SOLUTION 1 EACH VIAL: Performed by: INTERNAL MEDICINE

## 2019-09-09 PROCEDURE — 84100 ASSAY OF PHOSPHORUS: CPT | Performed by: INTERNAL MEDICINE

## 2019-09-09 PROCEDURE — 99233 SBSQ HOSP IP/OBS HIGH 50: CPT | Performed by: INTERNAL MEDICINE

## 2019-09-09 RX ORDER — SODIUM CHLORIDE 9 MG/ML
250 INJECTION, SOLUTION INTRAVENOUS ONCE
Status: COMPLETED | OUTPATIENT
Start: 2019-09-09 | End: 2019-09-09

## 2019-09-09 RX ORDER — METOPROLOL TARTRATE 5 MG/5ML
10 INJECTION INTRAVENOUS ONCE
Status: COMPLETED | OUTPATIENT
Start: 2019-09-09 | End: 2019-09-09

## 2019-09-09 RX ORDER — DOXORUBICIN HYDROCHLORIDE 2 MG/ML
110 INJECTION, SOLUTION INTRAVENOUS ONCE
Status: COMPLETED | OUTPATIENT
Start: 2019-09-09 | End: 2019-09-09

## 2019-09-09 RX ORDER — PREDNISONE 50 MG/1
100 TABLET ORAL
Status: DISCONTINUED | OUTPATIENT
Start: 2019-09-09 | End: 2019-09-12 | Stop reason: HOSPADM

## 2019-09-09 RX ORDER — PALONOSETRON 0.05 MG/ML
0.25 INJECTION, SOLUTION INTRAVENOUS ONCE
Status: DISCONTINUED | OUTPATIENT
Start: 2019-09-09 | End: 2019-09-09

## 2019-09-09 RX ADMIN — SODIUM CHLORIDE 150 MG: 9 INJECTION, SOLUTION INTRAVENOUS at 10:42

## 2019-09-09 RX ADMIN — DIPHENHYDRAMINE HYDROCHLORIDE, ZINC ACETATE: 2; .1 CREAM TOPICAL at 10:39

## 2019-09-09 RX ADMIN — DIPHENHYDRAMINE HYDROCHLORIDE, ZINC ACETATE: 2; .1 CREAM TOPICAL at 21:20

## 2019-09-09 RX ADMIN — ALLOPURINOL 300 MG: 300 TABLET ORAL at 10:41

## 2019-09-09 RX ADMIN — FAMOTIDINE 20 MG: 20 TABLET ORAL at 20:56

## 2019-09-09 RX ADMIN — DOXORUBICIN HYDROCHLORIDE 110 MG: 2 INJECTION, SOLUTION INTRAVENOUS at 13:00

## 2019-09-09 RX ADMIN — SODIUM CHLORIDE, PRESERVATIVE FREE 10 ML: 5 INJECTION INTRAVENOUS at 10:46

## 2019-09-09 RX ADMIN — SODIUM CHLORIDE, PRESERVATIVE FREE 10 ML: 5 INJECTION INTRAVENOUS at 20:56

## 2019-09-09 RX ADMIN — BENZOCAINE AND MENTHOL 1 LOZENGE: 15; 3.6 LOZENGE ORAL at 10:40

## 2019-09-09 RX ADMIN — SODIUM CHLORIDE, PRESERVATIVE FREE 10 ML: 5 INJECTION INTRAVENOUS at 20:58

## 2019-09-09 RX ADMIN — CYCLOPHOSPHAMIDE 1500 MG: 1 INJECTION, POWDER, FOR SOLUTION INTRAVENOUS; ORAL at 13:18

## 2019-09-09 RX ADMIN — BRENTUXIMAB VEDOTIN 180 MG: 50 INJECTION, POWDER, LYOPHILIZED, FOR SOLUTION INTRAVENOUS at 12:00

## 2019-09-09 RX ADMIN — Medication 1 CAPSULE: at 10:40

## 2019-09-09 RX ADMIN — OXYCODONE HYDROCHLORIDE AND ACETAMINOPHEN 1 TABLET: 7.5; 325 TABLET ORAL at 16:27

## 2019-09-09 RX ADMIN — PREDNISONE 100 MG: 50 TABLET ORAL at 10:40

## 2019-09-09 RX ADMIN — OXYCODONE HYDROCHLORIDE AND ACETAMINOPHEN 1 TABLET: 7.5; 325 TABLET ORAL at 22:35

## 2019-09-09 RX ADMIN — SODIUM CHLORIDE 250 ML: 9 INJECTION, SOLUTION INTRAVENOUS at 10:46

## 2019-09-09 RX ADMIN — OXYCODONE HYDROCHLORIDE AND ACETAMINOPHEN 1 TABLET: 7.5; 325 TABLET ORAL at 10:43

## 2019-09-09 RX ADMIN — METOPROLOL TARTRATE 10 MG: 5 INJECTION INTRAVENOUS at 15:41

## 2019-09-09 RX ADMIN — BENZOCAINE AND MENTHOL 1 LOZENGE: 15; 3.6 LOZENGE ORAL at 16:47

## 2019-09-09 RX ADMIN — ACETAMINOPHEN 1000 MG: 500 TABLET, FILM COATED ORAL at 12:28

## 2019-09-09 RX ADMIN — DEXAMETHASONE SODIUM PHOSPHATE 12 MG: 4 INJECTION, SOLUTION INTRAMUSCULAR; INTRAVENOUS at 10:42

## 2019-09-09 RX ADMIN — FAMOTIDINE 20 MG: 20 TABLET ORAL at 10:40

## 2019-09-09 RX ADMIN — METOPROLOL SUCCINATE 25 MG: 25 TABLET, EXTENDED RELEASE ORAL at 10:40

## 2019-09-09 RX ADMIN — DAPTOMYCIN 700 MG: 500 INJECTION, POWDER, LYOPHILIZED, FOR SOLUTION INTRAVENOUS at 15:41

## 2019-09-09 RX ADMIN — ONDANSETRON 16 MG: 2 INJECTION INTRAMUSCULAR; INTRAVENOUS at 10:41

## 2019-09-09 NOTE — PAYOR COMM NOTE
"Ref # [id number]  Evelyn Jarrett RN, BSN  Phone # [phone]  Fax # [phone]  Stephen Banks (44 y.o. Male)     Date of Birth Social Security Number Address [place] Phone MRN    1975  [address] [facility] [place] [address] [phone] [id number]    [place] Marital Status          Roman [employer]        Admission Date Admission Type Admitting Provider Attending Provider Department, Room/Bed    9/5/19 Emergency Leatha Gongora MD Hall, Holly, MD [facility] 6B, N629/1    Discharge Date Discharge Disposition Discharge Destination                       Attending Provider:  Leatha Gongora MD    Allergies:  Ciprofloxacin, Sulfa Antibiotics    Isolation:  None   Infection:  None   Code Status:  CPR    Ht:  182.9 cm (72\")   Wt:  99.1 kg (218 lb 7.6 oz)    Admission Cmt:  None   Principal Problem:  Severe sepsis (CMS/HCC) [A41.9,R65.20]                 Active Insurance as of 9/5/2019     Primary Coverage     Payor Plan Insurance Group Employer/Plan Group    [employer] PPO [id number]     Payor Plan Address Payor Plan Phone Number Payor Plan Fax Number Effective Dates    [phone]  1/1/2018 - None Entered    [place] [address]       Subscriber Name Subscriber Birth Date Member ID       STEPHEN BANKS 1975 [id number]               Physician Progress Notes (last 48 hours) (Notes from 9/7/2019 10:42 AM through 9/9/2019 10:42 AM)      Noah Dumont MD at 9/9/2019  9:48 AM          1       Stephen Banks  1975  [id number]  9/9/2019    CC: Fever of unknown origin with hilar and mediastinal lymphadenopathy    Stephen Banks is a 44 y.o. 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  New diagnosis of anaplastic large cell lymphoma.      Past medical history:  Past Medical History:   Diagnosis Date   • Acute kidney injury (CMS/HCC)    • Anaplastic " ALK-positive large cell lymphoma (CMS/HCC)    • Bacteremia    • Hyperlipidemia    • Myocarditis (CMS/HCC)    • Pneumonia    • Severe sepsis (CMS/HCC)        Medications:   Current Facility-Administered Medications:   •  acetaminophen (TYLENOL) suppository 650 mg, 650 mg, Rectal, Q4H PRN, Guadalupe Renee MD  •  acetaminophen (TYLENOL) tablet 1,000 mg, 1,000 mg, Oral, Q6H PRN, Case, Roxanna V., DO, 1,000 mg at 09/08/19 0849  •  allopurinol (ZYLOPRIM) tablet 300 mg, 300 mg, Oral, Daily, Brent Brizuela MD, 300 mg at 09/08/19 0850  •  benzocaine-menthol (CEPACOL) lozenge 1 lozenge, 1 lozenge, Mouth/Throat, Q2H PRN, [place] MD, 1 lozenge at 09/08/19 2121  •  brentuximab (ADCETRIS) 180 mg in sodium chloride 0.9 % 100 mL chemo IVPB, 180 mg, Intravenous, Once, Brent Brizuela MD  •  cyclophosphamide (CYTOXAN) 1,500 mg in sodium chloride 0.9 % 350 mL chemo IVPB, 1,500 mg, Intravenous, Once, Brent Brizuela MD  •  DAPTOmycin (CUBICIN) 700 mg in sodium chloride 0.9 % 50 mL IVPB, 8 mg/kg (Adjusted), Intravenous, Q24H, Irvin Mondragon MD, Last Rate: 100 mL/hr at 09/08/19 1701, 700 mg at 09/08/19 1701  •  dexamethasone (DECADRON) 12 mg in sodium chloride 0.9 % IVPB, 12 mg, Intravenous, Once, Brent Brizuela MD  •  diphenhydrAMINE-zinc acetate 2-0.1 % cream, , Topical, BID PRN, Adriana Lee, APRN  •  DOXOrubicin (ADRIAMYCIN) chemo injection 110 mg, 110 mg, Intravenous, Once, Brent Brizuela MD  •  famotidine (PEPCID) tablet 20 mg, 20 mg, Oral, BID, Trista Quiroz MD, 20 mg at 09/08/19 2002  •  fosaprepitant (EMEND) 150 mg/100mL NS (ENZO/PAD ONC), 150 mg, Intravenous, Once, Brent Brizuela MD  •  hydroxychloroquine (PLAQUENIL) tablet 200 mg, 200 mg, Oral, BID, Case, Roxanna V., DO, 200 mg at 09/08/19 2002  •  lactobacillus acidophilus (RISAQUAD) capsule 1 capsule, 1 capsule, Oral, Daily, Trista Quiroz MD, 1 capsule at 09/08/19 0850  •  metoprolol succinate XL (TOPROL-XL) 24 hr tablet 25 mg, 25 mg, Oral, Q24H,  Lazaro Dunbar MD, 25 mg at 09/08/19 0848  •  ondansetron (ZOFRAN) 16 mg in sodium chloride 0.9 % 50 mL IVPB, 16 mg, Intravenous, Once, Brent Brizuela MD  •  oxyCODONE-acetaminophen (PERCOCET) 7.5-325 MG per tablet 1 tablet, 1 tablet, Oral, Q6H PRN, Case, Roxanna V., DO, 1 tablet at 09/08/19 2304  •  potassium & sodium phosphates (PHOS-NAK) 280-160-250 MG packet - for Phosphorus less than 1.25 mg/dL, 2 packet, Oral, Q6H PRN **OR** potassium & sodium phosphates (PHOS-NAK) 280-160-250 MG packet - for Phosphorus 1.25 - 2.5 mg/dL, 2 packet, Oral, Q6H PRN, Patiño, Napoleon A, RPH, 2 packet at 09/07/19 1132  •  potassium chloride (KLOR-CON) packet 40 mEq, 40 mEq, Oral, PRN, Patiño, Napoleon A, RPH  •  potassium chloride (MICRO-K) CR capsule 40 mEq, 40 mEq, Oral, PRN, Patiño, Napoleon A, RPH, 40 mEq at 09/07/19 1132  •  predniSONE (DELTASONE) tablet 100 mg, 100 mg, Oral, Daily With Breakfast, Brent Brizulea MD  •  sodium chloride 0.9 % flush 10 mL, 10 mL, Intravenous, PRN, Kimberton, Phi, DO  •  sodium chloride 0.9 % flush 10 mL, 10 mL, Intravenous, Q12H, Sanjuanita Ching DNP, APRN, 10 mL at 09/08/19 2003  •  sodium chloride 0.9 % flush 10 mL, 10 mL, Intravenous, PRN, Sanjuanita Ching DNP, APRN  •  sodium chloride 0.9 % flush 10 mL, 10 mL, Intravenous, Q12H, Zina Bauman MD, 10 mL at 09/08/19 2003  •  sodium chloride 0.9 % flush 10 mL, 10 mL, Intravenous, PRN, Zina Bauman MD  •  sodium chloride 0.9 % infusion 250 mL, 250 mL, Intravenous, Once, Brent Brizuela MD  Antibiotics:  Anti-Infectives (From admission, onward)    Ordered     Dose/Rate Route Frequency Start Stop    09/08/19 1505  DAPTOmycin (CUBICIN) 700 mg in sodium chloride 0.9 % 50 mL IVPB     Ordering Provider:  Irvin Mondragon MD    8 mg/kg × 86.2 kg (Adjusted)  100 mL/hr over 30 Minutes Intravenous Every 24 Hours 09/08/19 1600 10/08/19 1559    09/06/19 0940  ceFAZolin in dextrose (ANCEF) IVPB solution 2 g     Ordering Provider:  Adal  "MALLORIE Mckeon    2 g  over 30 Minutes Intravenous Once 09/06/19 0942 09/06/19 1051    09/05/19 1520  hydroxychloroquine (PLAQUENIL) tablet 200 mg     Joslyn Ochoa [facility] reviewed the order on 09/06/19 0716.   Ordering Provider:  Roxanna Guo, DO    200 mg Oral 2 Times Daily 09/05/19 2100      09/05/19 1121  vancomycin 1750 mg/500 mL 0.9% NS IVPB ([facility])     Ordering Provider:  Phi Mir DO    20 mg/kg × 93.4 kg  over 120 Minutes Intravenous Once 09/05/19 1123 09/05/19 1416    09/05/19 1121  piperacillin-tazobactam (ZOSYN) 3.375 g in iso-osmotic dextrose 50 ml (premix)     Ordering Provider:  [place] vi[place] DO    3.375 g  over 30 Minutes Intravenous Once 09/05/19 1122 09/05/19 1215          Allergies:  is allergic to ciprofloxacin and sulfa antibiotics.    Review of Systems: All other reviewed and negative except as per HPI    Blood pressure 143/85, pulse 84, temperature 97.3 °F (36.3 °C), temperature source Oral, resp. rate 18, height 182.9 cm (72\"), weight 99.1 kg (218 lb 7.6 oz), SpO2 95 %.  GENERAL: Ill-appearing.  Depressed affect.  HEENT: Oropharynx without thrush. Sinuses nontender. Dentition in good repair. No cervical adenopathy. No carotid bruits/ jugular venous distention.   EYES: PERRL. No conjunctival injection. No icterus. EOMI.  LYMPHATICS: + lymphadenopathy of the neck, axillary and inguinal regions.   HEART: No murmur, gallop, or pericardial friction rub.   LUNGS: Clear to auscultation anteriorly. No percussion dullness.   ABDOMEN: Soft, nontender, nondistended.  Spleen tip palpable with deep inspiration..    SKIN: Generalized erythematous rash which is different from his initial presenting rash which was reticular, salmon-colored, and had a sandpaperlike consistency.  PSYCHIATRIC: Some altered mental status with hectic fever over the weekend.  Currently lucid.      DIAGNOSTICS:  Lab Results   Component Value Date    WBC 16.55 (H) 09/09/2019    HGB 9.1 (L) 09/09/2019    HCT 28.1 (L) 09/09/2019    " PLT 81 (L) 09/09/2019     Lab Results   Component Value Date    CRP 37.66 (H) 09/05/2019     Lab Results   Component Value Date    SEDRATE >130 (H) 09/05/2019     Lab Results   Component Value Date    GLUCOSE 178 (H) 09/09/2019    BUN 18 09/09/2019    CREATININE 0.84 09/09/2019    EGFRIFNONA 99 09/09/2019    BCR 21.4 09/09/2019    CO2 23.0 09/09/2019    CALCIUM 7.6 (L) 09/09/2019    PROTENTOTREF 8.0 08/30/2019    ALBUMIN 1.90 (L) 09/09/2019    LABIL2 0.6 (L) 08/30/2019    AST 30 09/09/2019    ALT 34 09/09/2019       Microbiology: Oxacillin susceptible staph aureus bacteremia/2 of 2 blood cultures positive on admission.  Follow-up cultures from 9/8 on therapy, negative at 24 hours of incubation.    RADIOLOGY:  Imaging Results (last 72 hours)     Procedure Component Value Units Date/Time    XR Chest 1 View [[id number]] Collected:  09/08/19 0845     Updated:  09/08/19 1751    Narrative:          EXAMINATION: XR CHEST 1 VW - 09/08/2019     INDICATION: R59.0-Localized enlarged lymph nodes; A41.9-Sepsis,  unspecified organism; R06.02-Shortness of breath; R93.89-Abnormal  findings on diagnostic imaging of other specified body structures;  D72.829-Elevated white blood cell count, unspecified.     COMPARISON: Chest radiograph of 09/07/2019.     FINDINGS: Single portable chest radiograph was submitted in 2  acquisitions to image the entire chest. Mild cardiomegaly, similar to  prior. Known pulmonary nodules/lesions are seen but better evaluated on  prior imaging. No superimposed airspace disease, pneumothorax, or  pleural effusion. Please note the left costophrenic angle/left lower  lung is not entirely imaged secondary to patient positioning. The  visualized upper abdomen is unrelenting. No acute osseous abnormality is  identified.       Impression:          Limited exam secondary to patient positioning, however similar in  appearance when compared to 09/07/2019.     DICTATED:   09/08/2019  EDITED/ls :   09/08/2019        XR  Chest 1 View [[id number]] Collected:  09/07/19 0751     Updated:  09/07/19 1736    Narrative:          EXAMINATION: XR CHEST 1 VW - 09/07/2019     INDICATION: R59.0-Localized enlarged lymph nodes; A41.9-Sepsis,  unspecified organism; R06.02-Shortness of breath; R93.89-Abnormal  findings on diagnostic imaging of other specified body structures;  D72.829-Elevated white blood cell count, unspecified.      COMPARISON: Chest radiograph 09/06/2019.     FINDINGS: Single portable chest radiograph is submitted for review. The  heart is prominent in size, similar to prior. Multifocal nodular  opacities are identified throughout the lungs as previously seen on the  CT of the chest from 09/05/2019.  No new airspace disease, pleural  effusion or pneumothorax. The visualized upper abdomen is unrevealing.  No acute osseous abnormality.           Impression:       Similar exam when compared to 09/06/2019.     DICTATED:   09/07/2019  EDITED/ls :   09/07/2019           XR Chest 1 View [[id number]] Collected:  09/06/19 0854     Updated:  09/06/19 1527    Narrative:       EXAMINATION: XR CHEST 1 VW-09/06/2019:      INDICATION: F/U infiltrate; A41.9-Sepsis, unspecified organism;  R06.02-Shortness of breath; R93.89-Abnormal findings on diagnostic  imaging of other specified body structures; D72.829-Elevated white blood  cell count, unspecified.      COMPARISON: 09/05/2019.     FINDINGS: Portable chest reveals minimal increased markings improving  within the right midlung. The left lung is grossly clear. The bony  structures are unremarkable. Cardiac and mediastinal silhouettes are  within normal limits.           Impression:       Improvement seen in aeration of the right midlung.     D:  09/06/2019  E:  09/06/2019     This report was finalized on 9/6/2019 3:23 PM by Dr. Ana Paula De La Cruz MD.       XR Chest 1 View [[id number]] Collected:  09/05/19 1140     Updated:  09/06/19 1209    Narrative:       EXAMINATION: XR CHEST 1 VW-  09/05/2019     INDICATION: SOA triage protocol      COMPARISON: Chest radiograph 08/21/2019     FINDINGS: Single portable chest radiograph is submitted for review.      The heart is top normal size, similar to prior. There has been an  increase in bilateral perihilar airspace changes with interval  development of right mid lung nodular opacities. The visualized upper  abdomen is unrevealing. No acute osseous abnormality.           Impression:       There has been interval worsening of mild perihilar  airspace changes with nodular airspace opacities noted within the right  mid lung of undetermined etiology. Consideration would be for pneumonia  as these are new when compared to 08/21/2019, however underlying  developing pulmonary lesions are not excluded. Consideration for  follow-up CT of the chest without IV contrast should be considered if  clinically appropriate.     D:  09/05/2019  E:  09/05/2019     This report was finalized on 9/6/2019 12:06 PM by Dr. Lionel Jackson MD.             Assessment and Plan: Inlammatory myocarditis.  Depressed left ventricular ejection fraction.  Markedly increased sed rate and C-reactive protein.  Diffuse arthralgias with salmon-colored macular eruption on chest and abdomen.  Markedly elevated serum ferritin/clinical syndrome consistent with Still's disease.  Recurrent fever through corticosteroids and Plaquenil.  New evolution of hilar, mediastinal, axillary, and intra-abdominal lymphadenopathy with diffuse pulmonary nodules.  Lytic lesion at T8.  Status post mediastinoscopy and biopsy/anaplastic large cell lymphoma.  Drug eruption/paraneoplastic versus beta-lactam antimicrobials versus allopurinol/change in antimicrobial therapy to daptomycin.  Progressive anemia and thrombocytopenia.  Mental status alterations.  Oxacillin susceptible staph aureus bacteremia/questionable complication of thoracic spine vertebral biopsy or occult septic phlebitis given recent hospitalization.   "Maximum temperature over 24 hours 103.5.  Currently 97.3.  Pulse 84, respiratory rate 18, blood pressure 143/85 mmHg.  No arterial desaturation.  Plans for monoclonal antibody therapy in addition to CAP.  Anticipate profound and prolonged neutropenia with further opportunistic infection risk.  Continue daptomycin for Staphylococcus aureus bacteremia with clinical concerns for beta-lactam drug eruption.  Will undoubtedly have to expand antimicrobial regimen in the setting of neutropenia for broad gram-negative, anti-pseudomonal, and anaerobic activity.  If fever persists for 4 days during neutropenia on broad-spectrum antimicrobial therapy, empiric addition of deep fungal coverage.  Single dose Decadron given this morning which should be lytic for most lymphoreticular malignancies and also should provide some  patient comfort in terms of neoplastic fever suppression.  Greater than 35 minutes spent in data review, bedside examination, culture assessment, radiographic review and in discussion with patient and family.    Noah Dumont MD  9/9/2019      Electronically signed by Noah Dumont MD at 9/9/2019 10:03 AM     Brent Brizuela MD at 9/9/2019  7:39 AM          HEMATOLOGY/ONCOLOGY PROGRESS NOTE    Subjective      CC: Febrile illness with high-grade lymphoma    SUBJECTIVE: Yesterday morning had a rough time with temperatures up to 103 and altered mental status.  Since then, the fevers have subsided and he is much more lucid and feeling back to himself to the majority of yesterday and certainly doing fine this morning.        Past Medical History, Past Surgical History, Social History, Family History have been reviewed and are without significant changes except as mentioned.      Medications:  The current medication list was reviewed in the EMR    ALLERGIES:   Allergies   Allergen Reactions   • Ciprofloxacin Anxiety     \"Extreme anxiety and paranoia\"   • Sulfa Antibiotics Rash       ROS:  A comprehensive 14 " point review of systems was performed and was negative except as mentioned.      Objective      Vitals:    09/08/19 2005 09/09/19 0005 09/09/19 0352 09/09/19 0535   BP: 128/77 120/54 143/85    BP Location: Right arm Right arm Right arm    Patient Position: Lying Lying Lying    Pulse: 83 74 84    Resp: 18 16 18    Temp: 98.5 °F (36.9 °C) 98.2 °F (36.8 °C) 97.3 °F (36.3 °C)    TempSrc: Oral Oral Oral    SpO2:       Weight:    99.1 kg (218 lb 7.6 oz)   Height:            ECOG: (1) Restricted in physically strenuous activity, ambulatory and able to do work of light nature    General: well appearing, in no acute distress  HEENT: sclerae anicteric, oropharynx clear  Lymphatics: no cervical, supraclavicular, inguinal, or axillary adenopathy  Cardiovascular: regular rate and rhythm, no murmurs  Lungs: clear to auscultation bilaterally  Abdomen: soft, nontender, nondistended.  No palpable organomegaly  Extremities: no lower extremity edema  Skin: no rashes, lesions, bruising, or petechiae  Neuro: Alert and oriented x 3. Moves all extremities.    RECENT LABS:    Results from last 7 days   Lab Units 09/09/19 0427 09/08/19 0818 09/08/19 0454   WBC 10*3/mm3 16.55* 11.95* 11.97*   HEMOGLOBIN g/dL 9.1* 10.2* 10.0*   PLATELETS 10*3/mm3 81* 120* 123*     Results from last 7 days   Lab Units 09/09/19 0427 09/08/19  2300 09/08/19  1856  09/08/19 0818   SODIUM mmol/L 133* 133* 133*   < > 146*   POTASSIUM mmol/L 3.9  --  4.2  --  4.3   CO2 mmol/L 23.0  --  21.0*  --  23.0   BUN mg/dL 18  --  19  --  16   CREATININE mg/dL 0.84  --  0.95  --  0.93   GLUCOSE mg/dL 178*  --  240*  --  93    < > = values in this interval not displayed.     Results from last 7 days   Lab Units 09/09/19 0427 09/08/19 0818 09/08/19  0454   AST (SGOT) U/L 30 33 25   ALT (SGPT) U/L 34 29 24   BILIRUBIN mg/dL 0.4 0.4 0.3   ALK PHOS U/L 139* 128* 129*         Ct Abdomen Pelvis Without Contrast    Result Date: 9/5/2019   Constellation of findings concerning  for metastatic disease:  - Interval development of numerous pulmonary nodules throughout the lungs with the largest measuring 2.0 cm within the right midlung and concerning for underlying pulmonary metastasis and which may account for abnormality identified in the chest radiograph performed 09/05/2019.  - Extensive axillary, mediastinal, and abdominopelvic lymphadenopathy as described above.  - Hypoattenuated regions involving the liver concerning for additional sites of metastatic disease, although are limited in evaluation secondary to lack of intravenous contrast. Attention on follow-up imaging is advised.  - Redemonstration of T8/T9 aggressive osseous lesions with additional lytic lesion involving the L4 vertebral body and additional subcentimeter lytic lesions throughout the thoracolumbar spine suggested.  Nonspecific perinephric inflammation. Correlate with urinalysis to exclude underlying pyelonephritis.  DICTATED:   09/05/2019 EDITED/ls :   09/05/2019      Ct Chest Without Contrast    Result Date: 9/5/2019   Constellation of findings concerning for metastatic disease:  - Interval development of numerous pulmonary nodules throughout the lungs with the largest measuring 2.0 cm within the right midlung and concerning for underlying pulmonary metastasis and which may account for abnormality identified in the chest radiograph performed 09/05/2019.  - Extensive axillary, mediastinal, and abdominopelvic lymphadenopathy as described above.  - Hypoattenuated regions involving the liver concerning for additional sites of metastatic disease, although are limited in evaluation secondary to lack of intravenous contrast. Attention on follow-up imaging is advised.  - Redemonstration of T8/T9 aggressive osseous lesions with additional lytic lesion involving the L4 vertebral body and additional subcentimeter lytic lesions throughout the thoracolumbar spine suggested.  Nonspecific perinephric inflammation. Correlate with  urinalysis to exclude underlying pyelonephritis.  DICTATED:   09/05/2019 EDITED/ls :   09/05/2019      Xr Chest 1 View    Result Date: 9/8/2019   Limited exam secondary to patient positioning, however similar in appearance when compared to 09/07/2019.  DICTATED:   09/08/2019 EDITED/ls :   09/08/2019      Xr Chest 1 View    Result Date: 9/7/2019  Similar exam when compared to 09/06/2019.  DICTATED:   09/07/2019 EDITED/ls :   09/07/2019       Xr Chest 1 View    Result Date: 9/6/2019  Improvement seen in aeration of the right midlung.  D:  09/06/2019 E:  09/06/2019  This report was finalized on 9/6/2019 3:23 PM by Dr. Ana Paula De La Cruz MD.      Xr Chest 1 View    Result Date: 9/6/2019  There has been interval worsening of mild perihilar airspace changes with nodular airspace opacities noted within the right mid lung of undetermined etiology. Consideration would be for pneumonia as these are new when compared to 08/21/2019, however underlying developing pulmonary lesions are not excluded. Consideration for follow-up CT of the chest without IV contrast should be considered if clinically appropriate.  D:  09/05/2019 E:  09/05/2019  This report was finalized on 9/6/2019 12:06 PM by Dr. Lionel Jackson MD.                Assessment   ASSESSMENT & PLAN:    1. 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 Progressive pancytopenia with platelets down to 81,000 and hemoglobin 9.1 with white count 16,550 and absolute neutrophil count of 14,730.  3. Sepsis/Methicillin sensitive staph aureus septic shock  4. 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 Recurrent fevers  6. Rash predating beginning of allopurinol  7. 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      Discussion: with his  elevated procalcitonin and white count, he is still at a huge risk of infection with immunosuppression but he is already immunocompromised by his disease as well as by the chronic steroids for his stills.  I would hold his Plaquenil, start the Brentuximab, Cytoxan, vincristine, prednisone through PICC line for easy removal in the event of seeding of the deep line with his MSSA.  Will not immediately use Neupogen given the leukocytosis and the difficulty in interpreting that in light of his potential Neupogen but if he become significantly neutropenic with these fevers I would add that into our medics.  We will watch his renal function and continue the allopurinol which was not started until after his rash began as far as I can tell but we will readdress that if it does not improve with the switch from vancomycin to daptomycin by Dr. Irvin Mondragon.  He will get his chemotherapy education today and I have notified Bailee Cooper.  Discussed with patient and wife for 1 hour greater than 50% spent counseling regarding this very complex decision tree with 90 minutes of elapsed time spent caring for the patient and getting his orders straight and communicating with nurse.                  Brent Brizuela MD    9/9/2019                         Electronically signed by Brent Brizuela MD at 9/9/2019  7:46 AM     [place] MD at 9/9/2019  7:22 AM          Stephen Stoll  [id number]  1975     LOS: 4 days   Patient Care Team:  Carlos Ramirez MD as PCP - General (Family Medicine)  Lazaro Hauser MD as Consulting Physician (Infectious Diseases)  Mihai Jimenez MD as Surgeon (Neurosurgery)  Roosevelt Carrion MD (Hematology and Oncology)    Chief Complaint: Lymphoma      Subjective: No specific complaints today.    Objective:     Vital Sign Min/Max for last 24 hours  Temp  Min: 97.3 °F (36.3 °C)  Max: 103.5 °F (39.7 °C)   BP  Min: 106/67  Max: 162/90   Pulse  Min: 72  Max: 126   Resp  Min: 16  Max: 30  "  SpO2  Min: 95 %  Max: 95 %   No Data Recorded   Weight  Min: 99.1 kg (218 lb 7.6 oz)  Max: 99.1 kg (218 lb 7.6 oz)     Flowsheet Rows      First Filed Value   Admission Height  182.9 cm (72\") Documented at 09/05/2019 1110   Admission Weight  93.4 kg (206 lb) Documented at 09/05/2019 1110          Physical Exam:    Wound: Satisfactory    Pulses:     Mediastinal and Chest Tube Drainage:       Results Review:   Results from last 7 days   Lab Units 09/09/19  0427   WBC 10*3/mm3 16.55*   HEMOGLOBIN g/dL 9.1*   HEMATOCRIT % 28.1*   PLATELETS 10*3/mm3 81*     Results from last 7 days   Lab Units 09/09/19  0427   SODIUM mmol/L 133*   POTASSIUM mmol/L 3.9   CHLORIDE mmol/L 98   CO2 mmol/L 23.0   BUN mg/dL 18   CREATININE mg/dL 0.84   GLUCOSE mg/dL 178*   CALCIUM mg/dL 7.6*             Assessment      Severe sepsis (CMS/HCC)    Anaplastic ALK-positive large cell lymphoma     BEAU (acute kidney injury) (CMS/HCC)    Hyponatremia    Mediastinal lymphadenopathy    Staphylococcus aureus bacteremia      Patient continues to run a fever.  He has staph septicemia.  Certainly with the addition of a PICC line we can hold on the port.  I will be available on a as needed basis.  Discussed with family.        El Can MD  09/09/19  7:23 AM      Please note that portions of this note were completed with a voice recognition program. Efforts were made to edit the dictations, but words may be mistranscribed    Electronically signed by El Can MD at 9/9/2019  7:24 AM     Irvin Mondragon MD at 9/8/2019  3:05 PM          [facility] Progress Note    Admission Date: 9/5/2019    Stephen Stoll  1975  [id number]    Date: 9/8/2019    Antibiotics:  Anti-Infectives (From admission, onward)    Ordered     Dose/Rate Route Frequency Start Stop    09/08/19 0922  vancomycin 1500 mg/500 mL 0.9% NS IVPB ([facility])     Ordering Provider:  Isiah Bennett RPH    15 mg/kg × 99 kg  over 90 Minutes Intravenous Every 12 Hours 09/08/19 2300 " 09/18/19 2259    09/08/19 1505  DAPTOmycin (CUBICIN) 700 mg in sodium chloride 0.9 % 50 mL IVPB     Ordering Provider:  Irvin Mondragon MD    8 mg/kg × 86.2 kg (Adjusted)  100 mL/hr over 30 Minutes Intravenous Every 24 Hours 09/08/19 1600 10/08/19 1559    09/08/19 0809  Pharmacy to dose vancomycin     Addie Arzate [facility] reviewed the order on 09/08/19 0910.   Ordering Provider:  Guadalupe Renee MD     Does not apply Continuous PRN 09/08/19 0807 09/18/19 0806    09/06/19 0940  ceFAZolin in dextrose (ANCEF) IVPB solution 2 g     Ordering Provider:  Jeffrey Galeas PA    2 g  over 30 Minutes Intravenous Once 09/06/19 0942 09/06/19 1051    09/05/19 1520  hydroxychloroquine (PLAQUENIL) tablet 200 mg     Joslyn Ochoa [facility] reviewed the order on 09/06/19 0716.   Ordering Provider:  Roxanna Guo DO    200 mg Oral 2 Times Daily 09/05/19 2100      09/05/19 1121  vancomycin 1750 mg/500 mL 0.9% NS IVPB ([facility])     Ordering Provider:  Phi Mir DO    20 mg/kg × 93.4 kg  over 120 Minutes Intravenous Once 09/05/19 1123 09/05/19 1416    09/05/19 1121  piperacillin-tazobactam (ZOSYN) 3.375 g in iso-osmotic dextrose 50 ml (premix)     Ordering Provider:  Phi Mir DO    3.375 g  over 30 Minutes Intravenous Once 09/05/19 1122 09/05/19 1215          CC: Recurrent fevers    HPI:  9/7/2019: Stephen Stoll is a 44-year-old white male patient of Dr. Lazaro Hauser and Dr. Noah Dumont who is seen today for reassessment of his exceedingly complex constellation of medical problems including MSSA bacteremia, high-grade lymphoma, inflammatory myocarditis, septic shock, and stills disease.  I am covering Dr. Dumont for the weekend.  I discussed his complex situation in detail with Dr. Dumont.  He initially presented with high fevers and an elevated ferritin level and was thought to have still's disease for which he received immunosuppressive therapy.  He has some persistent back pain with a lytic spinal lesion at T8/9 and was  also noted to have hepatic hypodensities concerning for metastatic disease.  He had recurrent fevers and was noted to have new lung nodules and progressive adenopathy.  A bone marrow biopsy has demonstrated a possible anaplastic large cell lymphoma with pathology review pending at the [facility].  Yesterday he underwent bronchoscopy and mediastinoscopy with biopsies.  He is currently on vancomycin/Zosyn antibiotic therapy.  He is now off of vasopressor therapy.  He is off of supplemental oxygen.  He has remained afebrile overnight.  He complains of some chest discomfort but denies dyspnea, increased cough, sputum production.  He denies nausea, vomiting, and diarrhea.  He indicates that some IV sites infiltrated during his last admission and had some slight associated infection.    9/8/2019: He had a fever to 103.5 degrees this morning with associated delirium.  Orders were given to switch his cefazolin to vancomycin/Zosyn.  I had just discontinued vancomycin/Zosyn late yesterday.  He now has a worsening diffuse erythematous rash which is most prominent on his extremities.  He was delirious this morning but his delirium has now resolved and he is alert and appropriate.  He denies severe headache, earache, and sore throat.  He denies nausea, vomiting, and severe diarrhea.  He and his wife report that Dr. Bauman thought his high fevers were most likely due to his high-grade lymphoma.  Plans are in place for him to start on chemotherapy tomorrow.  He just underwent PICC line placement.  He is now on intravenous steroid therapy.  His father is indicated that he  had a slightly inflamed right wrist IV site during his last admission.            PE:  Vital Signs  Temp  Min: 98.7 °F (37.1 °C)  Max: 103.5 °F (39.7 °C)  BP  Min: 113/62  Max: 162/90  Pulse  Min: 80  Max: 126  Resp  Min: 16  Max: 30  SpO2  Min: 95 %  Max: 98 %    GENERAL: Awake and alert, in no acute distress.   HEENT: Normocephalic, atraumatic.  PERRL.  EOMI. No conjunctival injection. No icterus. Oropharynx clear without evidence of thrush or exudate.  NECK: Supple without nuchal rigidity  HEART: RRR; No murmur, rubs, gallops.   LUNGS: Clear to auscultation bilaterally without wheezing, rales, rhonchi. Normal respiratory effort. Nonlabored. No dullness.  ABDOMEN: Soft, nontender, nondistended. Positive bowel sounds. No rebound or guarding. NO mass or HSM.  EXT: There is no erythema at old IV sites on his upper extremities.  He now has a left arm PICC line in place.  :   Without Fernandes catheter.  MSK: FROM without joint effusions noted arms/legs.    SKIN: He has multiple erythematous macular lesions that are most prominent over his lower extremities.  He has less prominent erythematous smaller macules on his torso.  NEURO: Oriented to PPT. No focal deficits on motor/sensory exam at arms/legs.    Laboratory Data    Results from last 7 days   Lab Units 09/08/19  0818 09/08/19  0454 09/07/19  0531   WBC 10*3/mm3 11.95* 11.97* 17.51*   HEMOGLOBIN g/dL 10.2* 10.0* 9.1*   HEMATOCRIT % 31.4* 31.6* 29.1*   PLATELETS 10*3/mm3 120* 123* 132*     Results from last 7 days   Lab Units 09/08/19  1347 09/08/19  0818   SODIUM mmol/L 131* 146*   POTASSIUM mmol/L  --  4.3   CHLORIDE mmol/L  --  93*   CO2 mmol/L  --  23.0   BUN mg/dL  --  16   CREATININE mg/dL  --  0.93   GLUCOSE mg/dL  --  93   CALCIUM mg/dL  --  7.7*     Results from last 7 days   Lab Units 09/08/19  0818   ALK PHOS U/L 128*   BILIRUBIN mg/dL 0.4   ALT (SGPT) U/L 29   AST (SGOT) U/L 33     Results from last 7 days   Lab Units 09/05/19  1115   SED RATE mm/hr >130*     Results from last 7 days   Lab Units 09/05/19  1115   CRP mg/dL 37.66*       Estimated Creatinine Clearance: 122.3 mL/min (by C-G formula based on SCr of 0.93 mg/dL).      Microbiology:  Blood cultures from 9/5 are growing MSSA    Radiology:  Imaging Results (last 72 hours)     Procedure Component Value Units Date/Time    XR Chest 1 View [[id number]]  Collected:  09/07/19 0751     Updated:  09/07/19 0752    Narrative:          EXAMINATION: XR CHEST 1 VW-      INDICATION: sepsis; R59.0-Localized enlarged lymph nodes; A41.9-Sepsis,  unspecified organism; R06.02-Shortness of breath; R93.89-Abnormal  findings on diagnostic imaging of other specified body structures;  D72.829-Elevated white blood cell count, unspecified      COMPARISON: Chest radiograph 9/6/2019     FINDINGS: Single portable chest radiograph is limited for review. The  heart is prominent size, similar to prior. Multifocal nodular opacities  are identified throughout the lungs as previously seen on the CT of the  chest from 9/5/2019. No new airspace disease, pleural effusion or  pneumothorax. The visualized upper abdomen is unrevealing. No acute  osseous abnormality.           Impression:          Similar exam when compared to 9/6/2019.          XR Chest 1 View [[id number]] Collected:  09/06/19 0854     Updated:  09/06/19 1527    Narrative:       EXAMINATION: XR CHEST 1 VW-09/06/2019:      INDICATION: F/U infiltrate; A41.9-Sepsis, unspecified organism;  R06.02-Shortness of breath; R93.89-Abnormal findings on diagnostic  imaging of other specified body structures; D72.829-Elevated white blood  cell count, unspecified.      COMPARISON: 09/05/2019.     FINDINGS: Portable chest reveals minimal increased markings improving  within the right midlung. The left lung is grossly clear. The bony  structures are unremarkable. Cardiac and mediastinal silhouettes are  within normal limits.           Impression:       Improvement seen in aeration of the right midlung.     D:  09/06/2019  E:  09/06/2019     This report was finalized on 9/6/2019 3:23 PM by Dr. Ana Paula De La Cruz MD.       XR Chest 1 View [[id number]] Collected:  09/05/19 1140     Updated:  09/06/19 1209    Narrative:       EXAMINATION: XR CHEST 1 VW- 09/05/2019     INDICATION: SOA triage protocol      COMPARISON: Chest radiograph 08/21/2019      FINDINGS: Single portable chest radiograph is submitted for review.      The heart is top normal size, similar to prior. There has been an  increase in bilateral perihilar airspace changes with interval  development of right mid lung nodular opacities. The visualized upper  abdomen is unrevealing. No acute osseous abnormality.           Impression:       There has been interval worsening of mild perihilar  airspace changes with nodular airspace opacities noted within the right  mid lung of undetermined etiology. Consideration would be for pneumonia  as these are new when compared to 08/21/2019, however underlying  developing pulmonary lesions are not excluded. Consideration for  follow-up CT of the chest without IV contrast should be considered if  clinically appropriate.     D:  09/05/2019  E:  09/05/2019     This report was finalized on 9/6/2019 12:06 PM by Dr. Lionel Jackson MD.       CT Chest Without Contrast [[id number]] Collected:  09/05/19 1551     Updated:  09/05/19 1607    Narrative:       EXAMINATION: CT CHEST WO CONTRAST, CT ABDOMEN/PELVIS WO CONTRAST -  09/05/2019     INDICATION: Cough, sepsis, abnormal chest x-ray.     TECHNIQUE: Unenhanced CT imaging of the chest, abdomen, and pelvis was  performed. Additional coronal and sagittal reformatted imaging was  provided for review.     The radiation dose reduction device was turned on for each scan per the  ALARA (As Low as Reasonably Achievable) protocol.     COMPARISON: MRI of the thoracic and lumbar spine 08/09/2019.     FINDINGS:      CHEST: Dedicated CT imaging of the chest demonstrates interval  development of numerous pulmonary nodules throughout the lungs, the  largest within the right mid lung measuring 2.0 cm. These are concerning  for metastatic foci given the provided clinical history. No focal  pneumonia, pleural effusion, or pneumothorax identified. These pulmonary  nodules account for the abnormality seen on the  chest radiograph  from  09/05/2019.     Extensive axillary and mediastinal lymphadenopathy is identified. The  largest within the right paratracheal region measures up to 2.1 cm and  demonstrates mild central necrosis. The pulmonary artery is within  normal limits in size. The heart is within normal limits in size. Trace  pericardial effusion is identified. Prominent cardiophrenic lymph nodes  are also noted.     ABDOMEN/PELVIS: Lack of intravenous contrast limits evaluation of  underlying abdominal and pelvic organs. Hypoattenuated region involving  the right hepatic lobe is identified with additional more confluent  hypoattenuating region within the mid right hepatic lobe noted measuring  up to 2.5 cm which is concerning for an additional site of metastatic  disease. The gallbladder demonstrates mild cholelithiasis. The spleen is  enlarged and otherwise unremarkable. The pancreas does not demonstrate  abnormality. The adrenal glands and kidneys also do not demonstrate  abnormality. No evidence of hydronephrosis. There is mild perinephric  inflammation which requires clinical correlation. Extensive peritoneal  and retroperitoneal lymphadenopathy is identified throughout the  visualized abdomen and pelvis. The urinary bladder is partially  distended and otherwise unremarkable. The stomach is decompressed and  unremarkable. No evidence of small bowel obstruction. No free air or  free fluid identified. Postsurgical changes in the right lower quadrant  are identified and likely from prior appendectomy.  Small fat-filled  right inguinal and left inguinal hernias are noted.     Osseous structures: The visualized bony pelvis appears intact.  Degenerative changes of the bilateral hips are identified.  Re-demonstration of aggressive osseous changes at T8 and T9, previously  documented MRI thoracic spine from 8/21/2019. No convincing evidence of  posterior cortex retropulsion identified. Additional lytic lesion  involving L4 is also noted.        Impression:          Constellation of findings concerning for metastatic disease:      - Interval development of numerous pulmonary nodules throughout the  lungs with the largest measuring 2.0 cm within the right midlung and  concerning for underlying pulmonary metastasis and which may account for  abnormality identified in the chest radiograph performed 09/05/2019.     - Extensive axillary, mediastinal, and abdominopelvic lymphadenopathy as  described above.     - Hypoattenuated regions involving the liver concerning for additional  sites of metastatic disease, although are limited in evaluation  secondary to lack of intravenous contrast. Attention on follow-up  imaging is advised.     - Redemonstration of T8/T9 aggressive osseous lesions with additional  lytic lesion involving the L4 vertebral body and additional  subcentimeter lytic lesions throughout the thoracolumbar spine  suggested.     Nonspecific perinephric inflammation. Correlate with urinalysis to  exclude underlying pyelonephritis.     DICTATED:   09/05/2019  EDITED/ls :   09/05/2019        CT Abdomen Pelvis Without Contrast [[id number]] Collected:  09/05/19 1551     Updated:  09/05/19 1607    Narrative:       EXAMINATION: CT CHEST WO CONTRAST, CT ABDOMEN/PELVIS WO CONTRAST -  09/05/2019     INDICATION: Cough, sepsis, abnormal chest x-ray.     TECHNIQUE: Unenhanced CT imaging of the chest, abdomen, and pelvis was  performed. Additional coronal and sagittal reformatted imaging was  provided for review.     The radiation dose reduction device was turned on for each scan per the  ALARA (As Low as Reasonably Achievable) protocol.     COMPARISON: MRI of the thoracic and lumbar spine 08/09/2019.     FINDINGS:      CHEST: Dedicated CT imaging of the chest demonstrates interval  development of numerous pulmonary nodules throughout the lungs, the  largest within the right mid lung measuring 2.0 cm. These are concerning  for metastatic foci given the provided  clinical history. No focal  pneumonia, pleural effusion, or pneumothorax identified. These pulmonary  nodules account for the abnormality seen on the  chest radiograph from  09/05/2019.     Extensive axillary and mediastinal lymphadenopathy is identified. The  largest within the right paratracheal region measures up to 2.1 cm and  demonstrates mild central necrosis. The pulmonary artery is within  normal limits in size. The heart is within normal limits in size. Trace  pericardial effusion is identified. Prominent cardiophrenic lymph nodes  are also noted.     ABDOMEN/PELVIS: Lack of intravenous contrast limits evaluation of  underlying abdominal and pelvic organs. Hypoattenuated region involving  the right hepatic lobe is identified with additional more confluent  hypoattenuating region within the mid right hepatic lobe noted measuring  up to 2.5 cm which is concerning for an additional site of metastatic  disease. The gallbladder demonstrates mild cholelithiasis. The spleen is  enlarged and otherwise unremarkable. The pancreas does not demonstrate  abnormality. The adrenal glands and kidneys also do not demonstrate  abnormality. No evidence of hydronephrosis. There is mild perinephric  inflammation which requires clinical correlation. Extensive peritoneal  and retroperitoneal lymphadenopathy is identified throughout the  visualized abdomen and pelvis. The urinary bladder is partially  distended and otherwise unremarkable. The stomach is decompressed and  unremarkable. No evidence of small bowel obstruction. No free air or  free fluid identified. Postsurgical changes in the right lower quadrant  are identified and likely from prior appendectomy.  Small fat-filled  right inguinal and left inguinal hernias are noted.     Osseous structures: The visualized bony pelvis appears intact.  Degenerative changes of the bilateral hips are identified.  Re-demonstration of aggressive osseous changes at T8 and T9,  previously  documented MRI thoracic spine from 8/21/2019. No convincing evidence of  posterior cortex retropulsion identified. Additional lytic lesion  involving L4 is also noted.       Impression:          Constellation of findings concerning for metastatic disease:      - Interval development of numerous pulmonary nodules throughout the  lungs with the largest measuring 2.0 cm within the right midlung and  concerning for underlying pulmonary metastasis and which may account for  abnormality identified in the chest radiograph performed 09/05/2019.     - Extensive axillary, mediastinal, and abdominopelvic lymphadenopathy as  described above.     - Hypoattenuated regions involving the liver concerning for additional  sites of metastatic disease, although are limited in evaluation  secondary to lack of intravenous contrast. Attention on follow-up  imaging is advised.     - Redemonstration of T8/T9 aggressive osseous lesions with additional  lytic lesion involving the L4 vertebral body and additional  subcentimeter lytic lesions throughout the thoracolumbar spine  suggested.     Nonspecific perinephric inflammation. Correlate with urinalysis to  exclude underlying pyelonephritis.     DICTATED:   09/05/2019  EDITED/ls :   09/05/2019              I personally reviewed the radiographic studies     IMPRESSION:   1.  High-grade lymphoma-he will require chemotherapy in the near future.  Ideally we would like to postpone chemotherapy until his staph infection has been adequately treated but this will not be possible due to the aggressive nature of his high-grade lymphoma.  I discussed his situation with Dr. Bauman again today.  He is currently scheduled to start on chemotherapy tomorrow.  2.  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  MSSA bacteremia-the source of his MSSA bacteremia is unclear.  I am concerned about the possibility of inoculation from a prior peripheral IV during his prior  admission since he indicates that his peripheral IVs infiltrated and had some slight inflammation.  I suspect that the inflammation was very minimal due to his steroid therapy but I certainly cannot make a definitive diagnosis of peripheral IV related septic phlebitis with secondary bacteremia since there is no evidence of septic phlebitis at this point.  He will be at risk for development of metastatic infection due to his immunocompromise state.  He will require a prolonged course of intravenous antibiotic therapy-probably 6 weeks.  4.  Severe sepsis/septic shock-improved and secondary to MSSA bacteremia  5.  Recurrent fevers-his fevers this morning were associated with delirium.  He has multiple potential sources for fever including his high-grade lymphoma, drug fever with a drug-induced rash (secondary to antibiotics versus allopurinol), and uncontrolled staphylococcal infection.  We will also need to be on the look out for a new infection.  Repeat blood cultures are pending.  6.  Rash- he has a fairly extensive rash.  This appears to be different from the rash that he had with Still's disease.  He may be developing a new drug-induced rash with the potential for antibiotics and allopurinol as culprits.  I would like to avoid switching antibiotics repeatedly since we will not be able to determine which medication might be inducing a drug eruption.  I will plan to switch his antibiotic therapy to daptomycin today.  7.  Inflammatory myocarditis  8.  Leukocytosis/neutrophilia  9.  Lactic acidosis-this may be secondary to his aggressive lymphoma.  10.  Acute kidney injury/ATN-improved  11.  Protein/calorie malnutrition-this is severe with severe hypoalbuminemia.  I discussed increased protein intake in detail with him today.  12.  Thoracic spine lytic lesion    RECOMMENDATIONS:   1.  Repeat blood cultures  2.  High-dose intravenous daptomycin  3.  Mediastinal mass/lymph node biopsies-pending  4.  Discontinue the  "vancomycin again  5.  Discontinue the Zosyn again  6.  Continue off of cefazolin for the time being      He has a high risk of complications and mortality.  I discussed his complex situation in detail with Dr. Dumont again today  I discussed his complex situation with Dr. Bauman on 9/7  I discussed his complex situation in detail with Dr. Renee today  I discussed his situation with the nursing staff today.  I discussed his situation in detail with both him and his wife again today and answered multiple questions.  I spent over 65 minutes on his care today.  Over 50% of the time was spent in conference and care coordination    Irvin Mondragon MD  9/8/2019          Electronically signed by Irvin Mondragon MD at 9/8/2019  3:14 PM     Zina Bauman MD at 9/8/2019 11:26 AM          S: Overnight became more confused secondary to high fevers of 103.  Otherwise also has a diffuse rash that is present.  Patient is significantly worse than yesterday.  I spoke with Dr. Mondragon yesterday and felt the MSSA bacteremia can be treated concurrently with chemotherapy.      Past medical history, social history and family history was reviewed and unchanged from prior visit.    Review of Systems:    Review of Systems   Unable to perform ROS: Mental status change      A comprehensive 14 point review of systems was performed and was negative except as mentioned.      Medications:  The current medication list was reviewed in the EMR    ALLERGIES:    Allergies   Allergen Reactions   • Ciprofloxacin Anxiety     \"Extreme anxiety and paranoia\"   • Sulfa Antibiotics Rash         Physical Exam    VITAL SIGNS:  /68 (BP Location: Left arm, Patient Position: Lying)   Pulse 90   Temp 98.8 °F (37.1 °C) (Oral)   Resp 18   Ht 182.9 cm (72\")   Wt 99 kg (218 lb 4.1 oz) Comment: pt flat  SpO2 95%   BMI 29.60 kg/m²    Temp:  [98.1 °F (36.7 °C)-103.5 °F (39.7 °C)] 98.8 °F (37.1 °C)      Performance Status: 1  Physical " Exam    General: confused, and febrile  HEENT: sclerae anicteric, oropharynx clear, neck is supple  Lymphatics: no cervical, supraclavicular, or axillary adenopathy  Cardiovascular: regular rate and rhythm, no murmurs, rubs or gallops  Lungs: clear to auscultation bilaterally  Abdomen: soft, nontender, nondistended.  No palpable organomegaly  Extremities: no lower extremity edema  Skin: + rash  Msk:  Shows no weakness of the large muscle groups  Psych: confused today        RECENT LABS:    Lab Results   Component Value Date    HGB 10.2 (L) 09/08/2019    HCT 31.4 (L) 09/08/2019    MCV 80.3 09/08/2019     (L) 09/08/2019    WBC 11.95 (H) 09/08/2019    NEUTROABS 9.23 (H) 09/08/2019    LYMPHSABS 1.19 09/08/2019    MONOSABS 0.92 (H) 09/08/2019    EOSABS 0.02 09/08/2019    BASOSABS 0.03 09/08/2019       Lab Results   Component Value Date    GLUCOSE 93 09/08/2019    BUN 16 09/08/2019    CREATININE 0.93 09/08/2019     (H) 09/08/2019    K 4.3 09/08/2019    CL 93 (L) 09/08/2019    CO2 23.0 09/08/2019    CALCIUM 7.7 (L) 09/08/2019    PROTEINTOT 6.9 09/08/2019    ALBUMIN 2.20 (L) 09/08/2019    BILITOT 0.4 09/08/2019    ALKPHOS 128 (H) 09/08/2019    AST 33 09/08/2019    ALT 29 09/08/2019         Assessment/Plan    1.  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  I think this patient really needs to be treated for his lymphoma.  His MSSA bacteremia complicates the situation but does not change it.  I feel that his port placement can be deferred until cycle #2 when he is afebrile and his bacteremia is treated.  At this time we should just put a PICC line in him and go forward with treatment tomorrow.  The patient is clinically getting slightly worse over the last day or so due to his high fevers which is causing mental status changes.  Yesterday he was actually doing much better.  I am going to switch his oral prednisone to methylprednisolone 125 mg once a day starting today.  He would have a  difficult time taking  "any oral meds if he is confused.    2.  MSSA bacteremia.  Patient will be on long term antibiotics until this is cleared.          Zina Bauman MD  [facility] Hematology and Oncology    9/8/2019     Please note that portions of this note may have been completed with a voice recognition program. Efforts were made to edit the dictations, but occasionally words are mistranscribed.    Electronically signed by Zina Bauman MD at 9/8/2019 11:30 AM     El Can MD at 9/8/2019  8:00 AM          Stephen Stoll  [id number]  1975     LOS: 3 days   Patient Care Team:  Carlos Ramirez MD as PCP - General (Family Medicine)  Lazaro Hauser MD as Consulting Physician (Infectious Diseases)  Mihai Jimenez MD as Surgeon (Neurosurgery)  Roosevelt Carrion MD (Hematology and Oncology)    Chief Complaint: Mediastinal lymphadenopathy      Subjective: Slightly short of breath.    Objective:     Vital Sign Min/Max for last 24 hours  Temp  Min: 98.1 °F (36.7 °C)  Max: 101 °F (38.3 °C)   BP  Min: 113/62  Max: 135/85   Pulse  Min: 76  Max: 126   Resp  Min: 16  Max: 30   SpO2  Min: 95 %  Max: 99 %   No Data Recorded   No Data Recorded     Flowsheet Rows      First Filed Value   Admission Height  182.9 cm (72\") Documented at 09/05/2019 1110   Admission Weight  93.4 kg (206 lb) Documented at 09/05/2019 1110          Physical Exam: Heart rate approximately 120    Wound: Satisfactory.    Pulses:     Mediastinal and Chest Tube Drainage:       Results Review:   Results from last 7 days   Lab Units 09/08/19  0454   WBC 10*3/mm3 11.97*   HEMOGLOBIN g/dL 10.0*   HEMATOCRIT % 31.6*   PLATELETS 10*3/mm3 123*     Results from last 7 days   Lab Units 09/08/19  0454   SODIUM mmol/L 133*   POTASSIUM mmol/L 4.3   CHLORIDE mmol/L 96*   CO2 mmol/L 25.0   BUN mg/dL 16   CREATININE mg/dL 0.95   GLUCOSE mg/dL 85   CALCIUM mg/dL 7.9*             Assessment      Severe sepsis (CMS/HCC)    Anaplastic ALK-positive " large cell lymphoma     BEAU (acute kidney injury) (CMS/HCC)    Hyponatremia    Mediastinal lymphadenopathy    Staphylococcus aureus bacteremia      Seen patient for probable malignancy and mediastinal lymphadenopathy.  Patient's incision is well.  Patient slightly short of breath with tachycardia.  We are checking with the hospitalist in regards to this.  Patient will probably need port next week.        El Can MD  19  8:00 AM      Please note that portions of this note were completed with a voice recognition program. Efforts were made to edit the dictations, but words may be mistranscribed    Electronically signed by El Can MD at 2019  8:01 AM     Guadalupe Renee MD at 2019  7:55 AM              [facility] Medicine Services  PROGRESS NOTE    Patient Name: Stephen Stoll  : 1975  MRN: [id number]    Date of Admission: 2019  Length of Stay: 3  Primary Care Physician: Carlos Ramirez MD    Subjective   Subjective     CC: ICU follow-up for severe sepsis and high-grade lymphoma    HPI: Patient has significant clinical decline this morning, he is is tachycardic, febrile, tachypneic, he does respond to queries but not very verbal.    Review of Systems  Gen- No fevers, chills  CV- No chest pain, palpitations  Resp- No cough, dyspnea  GI- No N/V/D, abd pain    All other systems reviewed and negative except any additional pertinent positives and negatives discussed in HPI.     Objective   Objective     Vital Signs:   Temp:  [98.1 °F (36.7 °C)-101 °F (38.3 °C)] 101 °F (38.3 °C)  Heart Rate:  [] 122  Resp:  [16-30] 30  BP: (113-135)/(62-85) 135/85        Physical Exam:  Constitutional: No acute distress, awake, alert  HENT: NCAT, mucous membranes moist  Respiratory: Clear to auscultation bilaterally, respiratory effort normal   Cardiovascular: RRR, no murmurs, rubs, or gallops, palpable pedal pulses bilaterally  Gastrointestinal: Positive bowel  sounds, soft, nontender, nondistended  Musculoskeletal: No bilateral ankle edema  Psychiatric: Appropriate affect, cooperative  Neurologic: Oriented x 3, no focal deficits skin: No rashes    Results Reviewed:    Results from last 7 days   Lab Units 09/08/19 0454 09/07/19 0531 09/06/19 0215 09/05/19  1552 09/05/19  1115   WBC 10*3/mm3 11.97* 17.51* 24.62*   < >  --  30.17*   HEMOGLOBIN g/dL 10.0* 9.1* 9.1*   < >  --  12.7*   HEMATOCRIT % 31.6* 29.1* 28.2*   < >  --  38.0   PLATELETS 10*3/mm3 123* 132* 162   < >  --  262   INR   --   --   --   --  1.55*  --    PROCALCITONIN ng/mL  --   --   --   --   --  38.17*    < > = values in this interval not displayed.     Results from last 7 days   Lab Units 09/08/19 0454 09/07/19  1621 09/07/19 0531 09/06/19  0215 09/05/19  1115   SODIUM mmol/L 133*  --  133* 131*   < > 124*   POTASSIUM mmol/L 4.3 4.2 3.5 4.2   < > 4.6   CHLORIDE mmol/L 96*  --  98 94*   < > 81*   CO2 mmol/L 25.0  --  23.0 25.0   < > 25.0   BUN mg/dL 16  --  16 17   < > 29*   CREATININE mg/dL 0.95  --  0.82 0.94   < > 1.67*   GLUCOSE mg/dL 85  --  208* 132*   < > 132*   CALCIUM mg/dL 7.9*  --  7.4* 7.7*   < > 8.9   ALT (SGPT) U/L 24  --  20 26   < > 35   AST (SGOT) U/L 25  --  15 25   < > 31   TROPONIN T ng/mL  --   --   --   --   --  0.017   PROBNP pg/mL  --   --   --   --   --  400.9    < > = values in this interval not displayed.     Estimated Creatinine Clearance: 119.7 mL/min (by C-G formula based on SCr of 0.95 mg/dL).    Microbiology Results Abnormal     Procedure Component Value - Date/Time    Blood Culture - Blood, Arm, Right [[id number]]  (Abnormal) Collected:  09/05/19 1115    Lab Status:  Preliminary result Specimen:  Blood from Arm, Right Updated:  09/08/19 0742     Blood Culture Staphylococcus aureus     Comment:   Infectious disease consultation is highly recommended to rule out distant foci of infection.        Isolated from Aerobic Bottle     Gram Stain Aerobic Bottle Gram positive cocci  in groups    Blood Culture - Blood, Arm, Left [[id number]] Collected:  09/05/19 1115    Lab Status:  Preliminary result Specimen:  Blood from Arm, Left Updated:  09/07/19 1215     Blood Culture No growth at 2 days    Blood Culture ID, PCR - Blood, Arm, Right [[id number]]  (Abnormal) Collected:  09/05/19 1115    Lab Status:  Final result Specimen:  Blood from Arm, Right Updated:  09/06/19 0746     BCID, PCR Staphylococcus aureus, not MRSA. Identification by BCID PCR.          Imaging Results (last 24 hours)     Procedure Component Value Units Date/Time    XR Chest 1 View [[id number]] Collected:  09/07/19 0751     Updated:  09/07/19 1736    Narrative:          EXAMINATION: XR CHEST 1 VW - 09/07/2019     INDICATION: R59.0-Localized enlarged lymph nodes; A41.9-Sepsis,  unspecified organism; R06.02-Shortness of breath; R93.89-Abnormal  findings on diagnostic imaging of other specified body structures;  D72.829-Elevated white blood cell count, unspecified.      COMPARISON: Chest radiograph 09/06/2019.     FINDINGS: Single portable chest radiograph is submitted for review. The  heart is prominent in size, similar to prior. Multifocal nodular  opacities are identified throughout the lungs as previously seen on the  CT of the chest from 09/05/2019.  No new airspace disease, pleural  effusion or pneumothorax. The visualized upper abdomen is unrevealing.  No acute osseous abnormality.           Impression:       Similar exam when compared to 09/06/2019.     DICTATED:   09/07/2019  EDITED/ls :   09/07/2019                 Results for orders placed during the hospital encounter of 08/21/19   Adult Transthoracic Echo Complete W/ Cont if Necessary Per Protocol    Narrative · Estimated EF = 62%.  · Left ventricular systolic function is normal.  · Left ventricular diastolic function is normal.  · Normal right ventricular cavity size, wall thickness, systolic function   and septal motion noted.  · No evidence of pulmonary hypertension  is present.  · There is no evidence of pericardial effusion.  · No significant structural valvular abnormality demonstrated.          I have reviewed the medications:  Scheduled Meds:    allopurinol 300 mg Oral Daily   ceFAZolin 2 g Intravenous Q8H   famotidine 20 mg Oral BID   hydroxychloroquine 200 mg Oral BID   lactobacillus acidophilus 1 capsule Oral Daily   metoprolol succinate XL 25 mg Oral Q24H   predniSONE 10 mg Oral TID With Meals   sodium chloride 10 mL Intravenous Q12H     Continuous Infusions:    Pharmacy Consult    sodium chloride 100 mL/hr     PRN Meds:.•  acetaminophen  •  benzocaine-menthol  •  oxyCODONE-acetaminophen  •  Pharmacy Consult  •  potassium & sodium phosphates **OR** potassium & sodium phosphates  •  potassium chloride  •  potassium chloride  •  sodium chloride  •  sodium chloride      Assessment/Plan   Assessment / Plan     Active Hospital Problems    Diagnosis  POA   • **Severe sepsis (CMS/HCC) [A41.9, R65.20]  Yes   • Staphylococcus aureus bacteremia [R78.81]  Yes   • BEAU (acute kidney injury) (CMS/HCC) [N17.9]  Yes   • Hyponatremia [E87.1]  Yes   • Mediastinal lymphadenopathy [R59.0]  Yes   • 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 male past complex medical history, this is his fourth hospitalization since June.  Briefly, in June patient presented with persistent cough and fevers, he was found to have elevated troponin and chest pain was diagnosed with myocarditis and adult still's disease and was started on prednisone, valsartan and metoprolol.  In July, he developed severe back pain, MRI T-spine at that time did show lesions around T8-T9 that were was concerning for either osteomyelitis vs metastatic disease.  Spine biopsy was unrevealing, he underwent a bone marrow was concerning for  large cell lymphoma.  He was scheduled for outpatient EGD and colonoscopy to evaluate for possible GI lymphoma.  On presentation to endoscopy, patient was found to be hypotensive, tachycardic, and febrile.  As such was sent to the ED and was subsequently admitted with severe sepsis requiring ICU care.  CT scans did revealed mediastinal  and intra-abdominal adenopathy.  He did undergo mediastinoscopy per CT surgery, blood cultures were positive for staph aureus, patient was started on broad-spectrum antibiotics and ID consulted.  He was then deemed stable for transfer to floor to continue his care.    Plan  -Severe sepsis, secondary to MSSA bacteremia, this had improved, however, this morning patient is febrile T-max 102, tachycardic, tachypneic.  This could as well be his underlying lymphoma, but currently cannot rule out any recurring infectious process.  We will move patient to telemetry for now, but on low threshold for ICU transfer.  Get stat CMP, CBC, lactic acid, will restart vancomycin and Zosyn, IV fluids normal saline at 100 mL/hr.  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 He will need initiation of chemotherapy considering the aggressive nature of his lymphoma.  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    Addendum: repeat labs reviewed, he has a lactic acid of 3.2, there has been a significant rise in his Na+ compared to 3 hrs ago, this has jumped from 133 to 146, will d/c NS infusions, start D5W @ 100 ml/hr repeat Na+ q4hrs.    DVT Prophylaxis: Mechanical    Disposition: TBD    CODE STATUS:   Code Status and Medical Interventions:   Ordered at: 09/05/19 1533     Code Status:    CPR     Medical Interventions (Level of Support  Prior to Arrest):    Full       Electronically signed by Guadalupe Renee MD, 09/08/19, 8:00 AM.      Electronically signed by Guadalupe Renee MD at 9/8/2019 10:08 AM

## 2019-09-09 NOTE — NURSING NOTE
Outpatient Infusion • [address] • [address] • [place] [id number] • [phone]      CHEMOTHERAPY EDUCATION SHEET    NAME:  Stephen Stoll      : 1975           DATE: 19    Booklets Given: Chemotherapy and You [x]  Eating Hints [x]    Sexuality/Fertility Books []     Chemotherapy/Biotherapy Education Sheets: (list all that apply)  Brentuximab, Doxorubicin, Cyclophosphamide, Prednisone                                                                                                                                                                Chemotherapy Regimen: Brentuximab, Doxorubicin, Cyclophosphamide, Prednisone     TOPICS EDUCATION PROVIDED EDUCATION REINFORCED COMMENTS   ANEMIA:  role of RBC, cause, s/s, ways to manage, role of transfusion [x] []    THROMBOCYTOPENIA:  role of platelet, cause, s/s, ways to prevent bleeding, things to avoid, when to seek help [x] []    NEUTROPENIA:  role of WBC, cause, infection precautions, s/s of infection, when to call MD [x] []    NUTRITION & APPETITE CHANGES:  importance of maintaining healthy diet & weight, ways to manage to improve intake, dietary consult, exercise regimen [x] []    DIARRHEA:  causes, s/s of dehydration, ways to manage, dietary changes, when to call MD [x] []    CONSTIPATION:  causes, ways to manage, dietary changes, when to call MD [] []    NAUSEA & VOMITING:  cause, use of antiemetics, dietary changes, when to call MD [x] []    MOUTH SORES:  causes, oral care, ways to manage [] []    ALOPECIA:  cause, ways to manage, resources [x] []    INFERTILITY & SEXUALITY:  causes, fertility preservation options, sexuality changes, ways to manage, importance of birth control [x] []    NERVOUS SYSTEM CHANGES:  causes, s/s, neuropathies, cognitive changes, ways to manage [x] []    PAIN:  causes, ways to manage [] [] ????   SKIN & NAIL CHANGES:  cause, s/s, ways to manage [x] []    ORGAN TOXICITIES:  cause, s/s, need for diagnostic tests,  labs, when to notify MD [x] []    SURVIVORSHIP:  distress, distress assessment, secondary malignancies, early/late effects, follow-up, social issues, social support [] []    HOME CARE:  use of spill kits, storing of PO chemo, how to manage bodily fluids [] []    MISCELLANEOUS:  drug interactions, administration, vesicant, et [] []      Referrals:    None at this time    Notes:   Patient, wife and sister participated in education. Chemo folder and education sheets on each medication reviewed. Provided instruction on s/s and when to notify nurse and/or MD. Patient declined need for sperm banking.

## 2019-09-09 NOTE — PROGRESS NOTES
"Stephen Stoll  [id number]  1975     LOS: 4 days   Patient Care Team:  Carlos Ramirez MD as PCP - General (Family Medicine)  Lazaro Hauser MD as Consulting Physician (Infectious Diseases)  Mihai Jimenez MD as Surgeon (Neurosurgery)  Roosevelt Carrion MD (Hematology and Oncology)    Chief Complaint: Lymphoma      Subjective: No specific complaints today.    Objective:     Vital Sign Min/Max for last 24 hours  Temp  Min: 97.3 °F (36.3 °C)  Max: 103.5 °F (39.7 °C)   BP  Min: 106/67  Max: 162/90   Pulse  Min: 72  Max: 126   Resp  Min: 16  Max: 30   SpO2  Min: 95 %  Max: 95 %   No Data Recorded   Weight  Min: 99.1 kg (218 lb 7.6 oz)  Max: 99.1 kg (218 lb 7.6 oz)     Flowsheet Rows      First Filed Value   Admission Height  182.9 cm (72\") Documented at 09/05/2019 1110   Admission Weight  93.4 kg (206 lb) Documented at 09/05/2019 1110          Physical Exam:    Wound: Satisfactory    Pulses:     Mediastinal and Chest Tube Drainage:       Results Review:   Results from last 7 days   Lab Units 09/09/19  0427   WBC 10*3/mm3 16.55*   HEMOGLOBIN g/dL 9.1*   HEMATOCRIT % 28.1*   PLATELETS 10*3/mm3 81*     Results from last 7 days   Lab Units 09/09/19  0427   SODIUM mmol/L 133*   POTASSIUM mmol/L 3.9   CHLORIDE mmol/L 98   CO2 mmol/L 23.0   BUN mg/dL 18   CREATININE mg/dL 0.84   GLUCOSE mg/dL 178*   CALCIUM mg/dL 7.6*             Assessment      Severe sepsis (CMS/HCC)    Anaplastic ALK-positive large cell lymphoma     BEAU (acute kidney injury) (CMS/HCC)    Hyponatremia    Mediastinal lymphadenopathy    Staphylococcus aureus bacteremia      Patient continues to run a fever.  He has staph septicemia.  Certainly with the addition of a PICC line we can hold on the port.  I will be available on a as needed basis.  Discussed with family.        El Can MD  09/09/19  7:23 AM      Please note that portions of this note were completed with a voice recognition program. Efforts were made to edit the " dictations, but words may be mistranscribed

## 2019-09-09 NOTE — PROGRESS NOTES
Continued Stay Note  [facility]     Patient Name: Stephen Stoll  MRN: [id number]  Today's Date: 9/9/2019    Admit Date: 9/5/2019    Discharge Plan     Row Name 09/09/19 1128       Plan    Plan  Home at discharge    Patient/Family in Agreement with Plan  yes    Plan Comments  Patient is not ready for discharge at this time - chemo will be started today. Per patient, goal remains to return home with his spouse in [facility] when medically ready. He remains independent of ADL's. CM following - asa [phone]         Discharge Codes    No documentation.       Expected Discharge Date and Time     Expected Discharge Date Expected Discharge Time    Sep 10, 2019             Asa Clay RN

## 2019-09-09 NOTE — PLAN OF CARE
Problem: Patient Care Overview  Goal: Plan of Care Review  Outcome: Ongoing (interventions implemented as appropriate)  Pt a/o, chemo treatment today, tolerating well during infusion.  Developed severe chills approx 2 hr post chemo tx. BP and HR elevated,  MD notified, see orders. Continue monitoring.  Goal: Individualization and Mutuality  Outcome: Ongoing (interventions implemented as appropriate)    Goal: Discharge Needs Assessment  Outcome: Ongoing (interventions implemented as appropriate)    Goal: Interprofessional Rounds/Family Conf  Outcome: Ongoing (interventions implemented as appropriate)      Problem: Fall Risk (Adult)  Goal: Absence of Fall  Outcome: Ongoing (interventions implemented as appropriate)      Problem: Oncology Care (Adult)  Goal: Signs and Symptoms of Listed Potential Problems Will be Absent, Minimized or Managed (Oncology Care)  Outcome: Ongoing (interventions implemented as appropriate)

## 2019-09-09 NOTE — PROGRESS NOTES
"HEMATOLOGY/ONCOLOGY PROGRESS NOTE    Subjective      CC: Febrile illness with high-grade lymphoma    SUBJECTIVE: Yesterday morning had a rough time with temperatures up to 103 and altered mental status.  Since then, the fevers have subsided and he is much more lucid and feeling back to himself to the majority of yesterday and certainly doing fine this morning.        Past Medical History, Past Surgical History, Social History, Family History have been reviewed and are without significant changes except as mentioned.      Medications:  The current medication list was reviewed in the EMR    ALLERGIES:   Allergies   Allergen Reactions   • Ciprofloxacin Anxiety     \"Extreme anxiety and paranoia\"   • Sulfa Antibiotics Rash       ROS:  A comprehensive 14 point review of systems was performed and was negative except as mentioned.      Objective      Vitals:    09/08/19 2005 09/09/19 0005 09/09/19 0352 09/09/19 0535   BP: 128/77 120/54 143/85    BP Location: Right arm Right arm Right arm    Patient Position: Lying Lying Lying    Pulse: 83 74 84    Resp: 18 16 18    Temp: 98.5 °F (36.9 °C) 98.2 °F (36.8 °C) 97.3 °F (36.3 °C)    TempSrc: Oral Oral Oral    SpO2:       Weight:    99.1 kg (218 lb 7.6 oz)   Height:            ECOG: (1) Restricted in physically strenuous activity, ambulatory and able to do work of light nature    General: well appearing, in no acute distress  HEENT: sclerae anicteric, oropharynx clear  Lymphatics: no cervical, supraclavicular, inguinal, or axillary adenopathy  Cardiovascular: regular rate and rhythm, no murmurs  Lungs: clear to auscultation bilaterally  Abdomen: soft, nontender, nondistended.  No palpable organomegaly  Extremities: no lower extremity edema  Skin: no rashes, lesions, bruising, or petechiae  Neuro: Alert and oriented x 3. Moves all extremities.    RECENT LABS:    Results from last 7 days   Lab Units 09/09/19  0427 09/08/19 0818 09/08/19  0454   WBC 10*3/mm3 16.55* 11.95* 11.97* "   HEMOGLOBIN g/dL 9.1* 10.2* 10.0*   PLATELETS 10*3/mm3 81* 120* 123*     Results from last 7 days   Lab Units 09/09/19  0427 09/08/19  2300 09/08/19  1856  09/08/19  0818   SODIUM mmol/L 133* 133* 133*   < > 146*   POTASSIUM mmol/L 3.9  --  4.2  --  4.3   CO2 mmol/L 23.0  --  21.0*  --  23.0   BUN mg/dL 18  --  19  --  16   CREATININE mg/dL 0.84  --  0.95  --  0.93   GLUCOSE mg/dL 178*  --  240*  --  93    < > = values in this interval not displayed.     Results from last 7 days   Lab Units 09/09/19 0427 09/08/19 0818 09/08/19  0454   AST (SGOT) U/L 30 33 25   ALT (SGPT) U/L 34 29 24   BILIRUBIN mg/dL 0.4 0.4 0.3   ALK PHOS U/L 139* 128* 129*         Ct Abdomen Pelvis Without Contrast    Result Date: 9/5/2019   Constellation of findings concerning for metastatic disease:  - Interval development of numerous pulmonary nodules throughout the lungs with the largest measuring 2.0 cm within the right midlung and concerning for underlying pulmonary metastasis and which may account for abnormality identified in the chest radiograph performed 09/05/2019.  - Extensive axillary, mediastinal, and abdominopelvic lymphadenopathy as described above.  - Hypoattenuated regions involving the liver concerning for additional sites of metastatic disease, although are limited in evaluation secondary to lack of intravenous contrast. Attention on follow-up imaging is advised.  - Redemonstration of T8/T9 aggressive osseous lesions with additional lytic lesion involving the L4 vertebral body and additional subcentimeter lytic lesions throughout the thoracolumbar spine suggested.  Nonspecific perinephric inflammation. Correlate with urinalysis to exclude underlying pyelonephritis.  DICTATED:   09/05/2019 EDITED/ls :   09/05/2019      Ct Chest Without Contrast    Result Date: 9/5/2019   Constellation of findings concerning for metastatic disease:  - Interval development of numerous pulmonary nodules throughout the lungs with the largest  measuring 2.0 cm within the right midlung and concerning for underlying pulmonary metastasis and which may account for abnormality identified in the chest radiograph performed 09/05/2019.  - Extensive axillary, mediastinal, and abdominopelvic lymphadenopathy as described above.  - Hypoattenuated regions involving the liver concerning for additional sites of metastatic disease, although are limited in evaluation secondary to lack of intravenous contrast. Attention on follow-up imaging is advised.  - Redemonstration of T8/T9 aggressive osseous lesions with additional lytic lesion involving the L4 vertebral body and additional subcentimeter lytic lesions throughout the thoracolumbar spine suggested.  Nonspecific perinephric inflammation. Correlate with urinalysis to exclude underlying pyelonephritis.  DICTATED:   09/05/2019 EDITED/ls :   09/05/2019      Xr Chest 1 View    Result Date: 9/8/2019   Limited exam secondary to patient positioning, however similar in appearance when compared to 09/07/2019.  DICTATED:   09/08/2019 EDITED/ls :   09/08/2019      Xr Chest 1 View    Result Date: 9/7/2019  Similar exam when compared to 09/06/2019.  DICTATED:   09/07/2019 EDITED/ls :   09/07/2019       Xr Chest 1 View    Result Date: 9/6/2019  Improvement seen in aeration of the right midlung.  D:  09/06/2019 E:  09/06/2019  This report was finalized on 9/6/2019 3:23 PM by Dr. Ana Paula De La Cruz MD.      Xr Chest 1 View    Result Date: 9/6/2019  There has been interval worsening of mild perihilar airspace changes with nodular airspace opacities noted within the right mid lung of undetermined etiology. Consideration would be for pneumonia as these are new when compared to 08/21/2019, however underlying developing pulmonary lesions are not excluded. Consideration for follow-up CT of the chest without IV contrast should be considered if clinically appropriate.  D:  09/05/2019 E:  09/05/2019  This report was finalized on 9/6/2019 12:06  PM by Dr. Lionel Jackson MD.                Assessment   ASSESSMENT & PLAN:    1. 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 Progressive pancytopenia with platelets down to 81,000 and hemoglobin 9.1 with white count 16,550 and absolute neutrophil count of 14,730.  3. Sepsis/Methicillin sensitive staph aureus septic shock  4. 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 Recurrent fevers  6. Rash predating beginning of allopurinol  7. 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      Discussion: with his elevated procalcitonin and white count, he is still at a huge risk of infection with immunosuppression but he is already immunocompromised by his disease as well as by the chronic steroids for his stills.  I would hold his Plaquenil, start the Brentuximab, Cytoxan, vincristine, prednisone through PICC line for easy removal in the event of seeding of the deep line with his MSSA.  Will not immediately use Neupogen given the leukocytosis and the difficulty in interpreting that in light of his potential Neupogen but if he become significantly neutropenic with these fevers I would add that into our medics.  We will watch his renal function and continue the allopurinol which was not started until after his rash began as far as I can tell but we will readdress that if it does not improve with the switch from vancomycin to daptomycin by Dr. Irvin Mondragon.  He will get his chemotherapy education today and I have notified Bailee Cooper.  Discussed with patient and wife for 1 hour greater than 50% spent counseling regarding this very complex decision tree with 90 minutes of elapsed time spent caring for the patient and getting his orders straight and communicating with  nurse.                  Brent Brizuela MD    9/9/2019

## 2019-09-09 NOTE — PROGRESS NOTES
1       Stephen Stoll  1975  [id number]  9/9/2019    CC: Fever of unknown origin with hilar and mediastinal lymphadenopathy    Stephen Stoll is a 44 y.o. 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  New diagnosis of anaplastic large cell lymphoma.      Past medical history:  Past Medical History:   Diagnosis Date   • Acute kidney injury (CMS/HCC)    • Anaplastic ALK-positive large cell lymphoma (CMS/HCC)    • Bacteremia    • Hyperlipidemia    • Myocarditis (CMS/HCC)    • Pneumonia    • Severe sepsis (CMS/HCC)        Medications:   Current Facility-Administered Medications:   •  acetaminophen (TYLENOL) suppository 650 mg, 650 mg, Rectal, Q4H PRN, Guadalupe Renee MD  •  acetaminophen (TYLENOL) tablet 1,000 mg, 1,000 mg, Oral, Q6H PRN, Case, Roxanna V., DO, 1,000 mg at 09/08/19 0849  •  allopurinol (ZYLOPRIM) tablet 300 mg, 300 mg, Oral, Daily, Brent Brizuela MD, 300 mg at 09/08/19 0850  •  benzocaine-menthol (CEPACOL) lozenge 1 lozenge, 1 lozenge, Mouth/Throat, Q2H PRN, El Can MD, 1 lozenge at 09/08/19 2121  •  brentuximab (ADCETRIS) 180 mg in sodium chloride 0.9 % 100 mL chemo IVPB, 180 mg, Intravenous, Once, Brent Brizuela MD  •  cyclophosphamide (CYTOXAN) 1,500 mg in sodium chloride 0.9 % 350 mL chemo IVPB, 1,500 mg, Intravenous, Once, Brent Brizuela MD  •  DAPTOmycin (CUBICIN) 700 mg in sodium chloride 0.9 % 50 mL IVPB, 8 mg/kg (Adjusted), Intravenous, Q24H, Irvin Mondragon MD, Last Rate: 100 mL/hr at 09/08/19 1701, 700 mg at 09/08/19 1701  •  dexamethasone (DECADRON) 12 mg in sodium chloride 0.9 % IVPB, 12 mg, Intravenous, Once, Brent Brizuela MD  •  diphenhydrAMINE-zinc acetate 2-0.1 % cream, , Topical, BID PRN, Adriana Lee, APRN  •  DOXOrubicin (ADRIAMYCIN) chemo injection 110 mg, 110 mg, Intravenous, Once, Brent Brizuela MD  •  famotidine (PEPCID) tablet 20 mg, 20 mg, Oral, BID, Trista Quiroz MD, 20 mg at  09/08/19 2002  •  fosaprepitant (EMEND) 150 mg/100mL NS (ENZO/PAD ONC), 150 mg, Intravenous, Once, Brent Brizuela MD  •  hydroxychloroquine (PLAQUENIL) tablet 200 mg, 200 mg, Oral, BID, Case, Roxanna V., DO, 200 mg at 09/08/19 2002  •  lactobacillus acidophilus (RISAQUAD) capsule 1 capsule, 1 capsule, Oral, Daily, Trista Quiroz MD, 1 capsule at 09/08/19 0850  •  metoprolol succinate XL (TOPROL-XL) 24 hr tablet 25 mg, 25 mg, Oral, Q24H, Lazaro Dunbar MD, 25 mg at 09/08/19 0848  •  ondansetron (ZOFRAN) 16 mg in sodium chloride 0.9 % 50 mL IVPB, 16 mg, Intravenous, Once, Brent Brizuela MD  •  oxyCODONE-acetaminophen (PERCOCET) 7.5-325 MG per tablet 1 tablet, 1 tablet, Oral, Q6H PRN, Case, Roxanna V., DO, 1 tablet at 09/08/19 2304  •  potassium & sodium phosphates (PHOS-NAK) 280-160-250 MG packet - for Phosphorus less than 1.25 mg/dL, 2 packet, Oral, Q6H PRN **OR** potassium & sodium phosphates (PHOS-NAK) 280-160-250 MG packet - for Phosphorus 1.25 - 2.5 mg/dL, 2 packet, Oral, Q6H PRN, Napoleon Patiño, RPH, 2 packet at 09/07/19 1132  •  potassium chloride (KLOR-CON) packet 40 mEq, 40 mEq, Oral, PRN, Patiño Napoleon A, RPH  •  potassium chloride (MICRO-K) CR capsule 40 mEq, 40 mEq, Oral, PRN, Jose Patiñohen A, RPH, 40 mEq at 09/07/19 1132  •  predniSONE (DELTASONE) tablet 100 mg, 100 mg, Oral, Daily With Breakfast, Brent Brizuela MD  •  sodium chloride 0.9 % flush 10 mL, 10 mL, Intravenous, PRN, Phi Mir, DO  •  sodium chloride 0.9 % flush 10 mL, 10 mL, Intravenous, Q12H, Sanjuanita Ching DNP, APRN, 10 mL at 09/08/19 2003  •  sodium chloride 0.9 % flush 10 mL, 10 mL, Intravenous, PRN, Sanjuanita Ching DNP, APRN  •  sodium chloride 0.9 % flush 10 mL, 10 mL, Intravenous, Q12H, Zina Bauman MD, 10 mL at 09/08/19 2003  •  sodium chloride 0.9 % flush 10 mL, 10 mL, Intravenous, PRN, Zina Bauman MD  •  sodium chloride 0.9 % infusion 250 mL, 250 mL, Intravenous, Once, Brent Brizuela,  "MD  Antibiotics:  Anti-Infectives (From admission, onward)    Ordered     Dose/Rate Route Frequency Start Stop    09/08/19 1505  DAPTOmycin (CUBICIN) 700 mg in sodium chloride 0.9 % 50 mL IVPB     Ordering Provider:  Irvin Mondragon MD    8 mg/kg × 86.2 kg (Adjusted)  100 mL/hr over 30 Minutes Intravenous Every 24 Hours 09/08/19 1600 10/08/19 1559    09/06/19 0940  ceFAZolin in dextrose (ANCEF) IVPB solution 2 g     Ordering Provider:  Jeffrey Galeas PA    2 g  over 30 Minutes Intravenous Once 09/06/19 0942 09/06/19 1051    09/05/19 1520  hydroxychloroquine (PLAQUENIL) tablet 200 mg     Joslyn Ochoa [facility] reviewed the order on 09/06/19 0716.   Ordering Provider:  Roxanna GuoRiley DO    200 mg Oral 2 Times Daily 09/05/19 2100      09/05/19 1121  vancomycin 1750 mg/500 mL 0.9% NS IVPB ([facility])     Ordering Provider:  Phi Mir DO    20 mg/kg × 93.4 kg  over 120 Minutes Intravenous Once 09/05/19 1123 09/05/19 1416    09/05/19 1121  piperacillin-tazobactam (ZOSYN) 3.375 g in iso-osmotic dextrose 50 ml (premix)     Ordering Provider:  Phi Mir DO    3.375 g  over 30 Minutes Intravenous Once 09/05/19 1122 09/05/19 1215          Allergies:  is allergic to ciprofloxacin and sulfa antibiotics.    Review of Systems: All other reviewed and negative except as per HPI    Blood pressure 143/85, pulse 84, temperature 97.3 °F (36.3 °C), temperature source Oral, resp. rate 18, height 182.9 cm (72\"), weight 99.1 kg (218 lb 7.6 oz), SpO2 95 %.  GENERAL: Ill-appearing.  Depressed affect.  HEENT: Oropharynx without thrush. Sinuses nontender. Dentition in good repair. No cervical adenopathy. No carotid bruits/ jugular venous distention.   EYES: PERRL. No conjunctival injection. No icterus. EOMI.  LYMPHATICS: + lymphadenopathy of the neck, axillary and inguinal regions.   HEART: No murmur, gallop, or pericardial friction rub.   LUNGS: Clear to auscultation anteriorly. No percussion dullness.   ABDOMEN: Soft, nontender, " nondistended.  Spleen tip palpable with deep inspiration..    SKIN: Generalized erythematous rash which is different from his initial presenting rash which was reticular, salmon-colored, and had a sandpaperlike consistency.  PSYCHIATRIC: Some altered mental status with hectic fever over the weekend.  Currently lucid.      DIAGNOSTICS:  Lab Results   Component Value Date    WBC 16.55 (H) 09/09/2019    HGB 9.1 (L) 09/09/2019    HCT 28.1 (L) 09/09/2019    PLT 81 (L) 09/09/2019     Lab Results   Component Value Date    CRP 37.66 (H) 09/05/2019     Lab Results   Component Value Date    SEDRATE >130 (H) 09/05/2019     Lab Results   Component Value Date    GLUCOSE 178 (H) 09/09/2019    BUN 18 09/09/2019    CREATININE 0.84 09/09/2019    EGFRIFNONA 99 09/09/2019    BCR 21.4 09/09/2019    CO2 23.0 09/09/2019    CALCIUM 7.6 (L) 09/09/2019    PROTENTOTREF 8.0 08/30/2019    ALBUMIN 1.90 (L) 09/09/2019    LABIL2 0.6 (L) 08/30/2019    AST 30 09/09/2019    ALT 34 09/09/2019       Microbiology: Oxacillin susceptible staph aureus bacteremia/2 of 2 blood cultures positive on admission.  Follow-up cultures from 9/8 on therapy, negative at 24 hours of incubation.    RADIOLOGY:  Imaging Results (last 72 hours)     Procedure Component Value Units Date/Time    XR Chest 1 View [[id number]] Collected:  09/08/19 0845     Updated:  09/08/19 1753    Narrative:          EXAMINATION: XR CHEST 1 VW - 09/08/2019     INDICATION: R59.0-Localized enlarged lymph nodes; A41.9-Sepsis,  unspecified organism; R06.02-Shortness of breath; R93.89-Abnormal  findings on diagnostic imaging of other specified body structures;  D72.829-Elevated white blood cell count, unspecified.     COMPARISON: Chest radiograph of 09/07/2019.     FINDINGS: Single portable chest radiograph was submitted in 2  acquisitions to image the entire chest. Mild cardiomegaly, similar to  prior. Known pulmonary nodules/lesions are seen but better evaluated on  prior imaging. No superimposed  airspace disease, pneumothorax, or  pleural effusion. Please note the left costophrenic angle/left lower  lung is not entirely imaged secondary to patient positioning. The  visualized upper abdomen is unrelenting. No acute osseous abnormality is  identified.       Impression:          Limited exam secondary to patient positioning, however similar in  appearance when compared to 09/07/2019.     DICTATED:   09/08/2019  EDITED/ls :   09/08/2019        XR Chest 1 View [[id number]] Collected:  09/07/19 0751     Updated:  09/07/19 1736    Narrative:          EXAMINATION: XR CHEST 1 VW - 09/07/2019     INDICATION: R59.0-Localized enlarged lymph nodes; A41.9-Sepsis,  unspecified organism; R06.02-Shortness of breath; R93.89-Abnormal  findings on diagnostic imaging of other specified body structures;  D72.829-Elevated white blood cell count, unspecified.      COMPARISON: Chest radiograph 09/06/2019.     FINDINGS: Single portable chest radiograph is submitted for review. The  heart is prominent in size, similar to prior. Multifocal nodular  opacities are identified throughout the lungs as previously seen on the  CT of the chest from 09/05/2019.  No new airspace disease, pleural  effusion or pneumothorax. The visualized upper abdomen is unrevealing.  No acute osseous abnormality.           Impression:       Similar exam when compared to 09/06/2019.     DICTATED:   09/07/2019  EDITED/ls :   09/07/2019           XR Chest 1 View [[id number]] Collected:  09/06/19 0854     Updated:  09/06/19 1527    Narrative:       EXAMINATION: XR CHEST 1 VW-09/06/2019:      INDICATION: F/U infiltrate; A41.9-Sepsis, unspecified organism;  R06.02-Shortness of breath; R93.89-Abnormal findings on diagnostic  imaging of other specified body structures; D72.829-Elevated white blood  cell count, unspecified.      COMPARISON: 09/05/2019.     FINDINGS: Portable chest reveals minimal increased markings improving  within the right midlung. The left lung is  grossly clear. The bony  structures are unremarkable. Cardiac and mediastinal silhouettes are  within normal limits.           Impression:       Improvement seen in aeration of the right midlung.     D:  09/06/2019  E:  09/06/2019     This report was finalized on 9/6/2019 3:23 PM by Dr. Ana Paula De La Cruz MD.       XR Chest 1 View [[id number]] Collected:  09/05/19 1140     Updated:  09/06/19 1209    Narrative:       EXAMINATION: XR CHEST 1 VW- 09/05/2019     INDICATION: SOA triage protocol      COMPARISON: Chest radiograph 08/21/2019     FINDINGS: Single portable chest radiograph is submitted for review.      The heart is top normal size, similar to prior. There has been an  increase in bilateral perihilar airspace changes with interval  development of right mid lung nodular opacities. The visualized upper  abdomen is unrevealing. No acute osseous abnormality.           Impression:       There has been interval worsening of mild perihilar  airspace changes with nodular airspace opacities noted within the right  mid lung of undetermined etiology. Consideration would be for pneumonia  as these are new when compared to 08/21/2019, however underlying  developing pulmonary lesions are not excluded. Consideration for  follow-up CT of the chest without IV contrast should be considered if  clinically appropriate.     D:  09/05/2019  E:  09/05/2019     This report was finalized on 9/6/2019 12:06 PM by Dr. Lionel Jackson MD.             Assessment and Plan: Inlammatory myocarditis.  Depressed left ventricular ejection fraction.  Markedly increased sed rate and C-reactive protein.  Diffuse arthralgias with salmon-colored macular eruption on chest and abdomen.  Markedly elevated serum ferritin/clinical syndrome consistent with Still's disease.  Recurrent fever through corticosteroids and Plaquenil.  New evolution of hilar, mediastinal, axillary, and intra-abdominal lymphadenopathy with diffuse pulmonary nodules.  Lytic  lesion at T8.  Status post mediastinoscopy and biopsy/anaplastic large cell lymphoma.  Drug eruption/paraneoplastic versus beta-lactam antimicrobials versus allopurinol/change in antimicrobial therapy to daptomycin.  Progressive anemia and thrombocytopenia.  Mental status alterations.  Oxacillin susceptible staph aureus bacteremia/questionable complication of thoracic spine vertebral biopsy or occult septic phlebitis given recent hospitalization.  Maximum temperature over 24 hours 103.5.  Currently 97.3.  Pulse 84, respiratory rate 18, blood pressure 143/85 mmHg.  No arterial desaturation.  Plans for monoclonal antibody therapy in addition to CAP.  Anticipate profound and prolonged neutropenia with further opportunistic infection risk.  Continue daptomycin for Staphylococcus aureus bacteremia with clinical concerns for beta-lactam drug eruption.  Will undoubtedly have to expand antimicrobial regimen in the setting of neutropenia for broad gram-negative, anti-pseudomonal, and anaerobic activity.  If fever persists for 4 days during neutropenia on broad-spectrum antimicrobial therapy, empiric addition of deep fungal coverage.  Single dose Decadron given this morning which should be lytic for most lymphoreticular malignancies and also should provide some  patient comfort in terms of neoplastic fever suppression.  Greater than 35 minutes spent in data review, bedside examination, culture assessment, radiographic review and in discussion with patient and family.    Noah Dumont MD  9/9/2019

## 2019-09-09 NOTE — PROGRESS NOTES
[facility] Medicine Services  PROGRESS NOTE    Patient Name: Stephen Stoll  : 1975  MRN: [id number]    Date of Admission: 2019  Length of Stay: 4  Primary Care Physician: Carlos Ramirez MD    Subjective   Subjective     CC: Severe sepsis and high-grade lymphoma    HPI: S/p chemo initiation this afternoon, approximately 1-1/2 to 2 hours after infusion patient developed dry incessant cough and noted to be tachycardic in the 160s with elevated blood pressures.  Denies chest pain, dyspnea per se.    Review of Systems  Gen- No fevers, chills  CV- No chest pain, palpitations  Resp- (+) cough, no dyspnea  GI- No N/V/D, abd pain    All other systems reviewed and negative except any additional pertinent positives and negatives discussed in HPI.     Objective   Objective     Vital Signs:   Temp:  [97.3 °F (36.3 °C)-100.2 °F (37.9 °C)] 98.6 °F (37 °C)  Heart Rate:  [] 94  Resp:  [16-18] 18  BP: (120-179)/() 179/134        Physical Exam:  Constitutional: No acute distress, awake, alert, nontoxic, normal body habitus, diaphoretic  Respiratory: Clear to auscultation bilaterally, good effort, nonlabored respirations, cough noted   Cardiovascular: RRR, tachy 100's sinus  Musculoskeletal: No peripheral edema, normal muscle tone for age  Psychiatric: Appropriate affect, good insight and judgement, cooperative  Skin: No rashes, no jaundice, no petechiae, no mottling      Results Reviewed:    Results from last 7 days   Lab Units 19  0427 19  0818 19  0454  19  1552 19  1115   WBC 10*3/mm3 16.55* 11.95* 11.97*   < >  --  30.17*   HEMOGLOBIN g/dL 9.1* 10.2* 10.0*   < >  --  12.7*   HEMATOCRIT % 28.1* 31.4* 31.6*   < >  --  38.0   PLATELETS 10*3/mm3 81* 120* 123*   < >  --  262   INR   --   --   --   --  1.55*  --    PROCALCITONIN ng/mL  --   --   --   --   --  38.17*    < > = values in this interval not displayed.     Results from last 7 days   Lab  Units 09/09/19  1202 09/09/19  0933 09/09/19  0427  09/08/19  1856  09/08/19  0818 09/08/19  0454  09/05/19  1115   SODIUM mmol/L 129* 131* 133*   < > 133*   < > 146* 133*   < > 124*   POTASSIUM mmol/L  --   --  3.9  --  4.2  --  4.3 4.3   < > 4.6   CHLORIDE mmol/L  --   --  98  --  98  --  93* 96*   < > 81*   CO2 mmol/L  --   --  23.0  --  21.0*  --  23.0 25.0   < > 25.0   BUN mg/dL  --   --  18  --  19  --  16 16   < > 29*   CREATININE mg/dL  --   --  0.84  --  0.95  --  0.93 0.95   < > 1.67*   GLUCOSE mg/dL  --   --  178*  --  240*  --  93 85   < > 132*   CALCIUM mg/dL  --   --  7.6*  --  7.6*  --  7.7* 7.9*   < > 8.9   ALT (SGPT) U/L  --   --  34  --   --   --  29 24   < > 35   AST (SGOT) U/L  --   --  30  --   --   --  33 25   < > 31   TROPONIN T ng/mL  --   --   --   --   --   --   --   --   --  0.017   PROBNP pg/mL  --   --   --   --   --   --   --   --   --  400.9    < > = values in this interval not displayed.     Estimated Creatinine Clearance: 135.4 mL/min (by C-G formula based on SCr of 0.84 mg/dL).    Microbiology Results Abnormal     Procedure Component Value - Date/Time    Blood Culture - Blood, Arm, Left [[id number]] Collected:  09/05/19 1115    Lab Status:  Preliminary result Specimen:  Blood from Arm, Left Updated:  09/09/19 1215     Blood Culture No growth at 4 days    Blood Culture - Blood, Arm, Right [[id number]] Collected:  09/08/19 0818    Lab Status:  Preliminary result Specimen:  Blood from Arm, Right Updated:  09/09/19 0901     Blood Culture No growth at 24 hours    Blood Culture - Blood, Hand, Right [[id number]] Collected:  09/08/19 0818    Lab Status:  Preliminary result Specimen:  Blood from Hand, Right Updated:  09/09/19 0901     Blood Culture No growth at 24 hours    Blood Culture - Blood, Arm, Right [[id number]]  (Abnormal)  (Susceptibility) Collected:  09/05/19 1115    Lab Status:  Final result Specimen:  Blood from Arm, Right Updated:  09/09/19 0613     Blood Culture Staphylococcus  aureus     Comment: Infectious disease consultation is highly recommended to rule out distant foci of infection.        Gram Stain Aerobic Bottle Gram positive cocci in groups    Susceptibility      Staphylococcus aureus     MARTIR     Gentamicin Susceptible     Oxacillin Susceptible     Rifampin Susceptible     Vancomycin Susceptible                Susceptibility Comments     Staphylococcus aureus    This isolate does not demonstrate inducible clindamycin resistance in vitro.               Blood Culture ID, PCR - Blood, Arm, Right [[id number]]  (Abnormal) Collected:  09/05/19 1115    Lab Status:  Final result Specimen:  Blood from Arm, Right Updated:  09/06/19 0746     BCID, PCR Staphylococcus aureus, not MRSA. Identification by BCID PCR.          Imaging Results (last 24 hours)     Procedure Component Value Units Date/Time    XR Chest 1 View [[id number]] Collected:  09/08/19 0845     Updated:  09/08/19 1755    Narrative:          EXAMINATION: XR CHEST 1 VW - 09/08/2019     INDICATION: R59.0-Localized enlarged lymph nodes; A41.9-Sepsis,  unspecified organism; R06.02-Shortness of breath; R93.89-Abnormal  findings on diagnostic imaging of other specified body structures;  D72.829-Elevated white blood cell count, unspecified.     COMPARISON: Chest radiograph of 09/07/2019.     FINDINGS: Single portable chest radiograph was submitted in 2  acquisitions to image the entire chest. Mild cardiomegaly, similar to  prior. Known pulmonary nodules/lesions are seen but better evaluated on  prior imaging. No superimposed airspace disease, pneumothorax, or  pleural effusion. Please note the left costophrenic angle/left lower  lung is not entirely imaged secondary to patient positioning. The  visualized upper abdomen is unrelenting. No acute osseous abnormality is  identified.       Impression:          Limited exam secondary to patient positioning, however similar in  appearance when compared to 09/07/2019.     DICTATED:    09/08/2019  EDITED/ls :   09/08/2019              Results for orders placed during the hospital encounter of 08/21/19   Adult Transthoracic Echo Complete W/ Cont if Necessary Per Protocol    Narrative · Estimated EF = 62%.  · Left ventricular systolic function is normal.  · Left ventricular diastolic function is normal.  · Normal right ventricular cavity size, wall thickness, systolic function   and septal motion noted.  · No evidence of pulmonary hypertension is present.  · There is no evidence of pericardial effusion.  · No significant structural valvular abnormality demonstrated.          I have reviewed the medications:  Scheduled Meds:    allopurinol 300 mg Oral Daily   DAPTOmycin 8 mg/kg (Adjusted) Intravenous Q24H   famotidine 20 mg Oral BID   hydroxychloroquine 200 mg Oral BID   lactobacillus acidophilus 1 capsule Oral Daily   metoprolol succinate XL 25 mg Oral Q24H   metoprolol tartrate 10 mg Intravenous Once   predniSONE 100 mg Oral Daily With Breakfast   sodium chloride 10 mL Intravenous Q12H   sodium chloride 10 mL Intravenous Q12H     Continuous Infusions:     PRN Meds:.•  acetaminophen  •  acetaminophen  •  benzocaine-menthol  •  diphenhydrAMINE-zinc acetate  •  oxyCODONE-acetaminophen  •  potassium & sodium phosphates **OR** potassium & sodium phosphates  •  potassium chloride  •  potassium chloride  •  sodium chloride  •  sodium chloride  •  sodium chloride      Assessment/Plan   Assessment / Plan     Active Hospital Problems    Diagnosis  POA   • Staphylococcus aureus bacteremia [R78.81]  Yes   • Hyponatremia [E87.1]  Yes   • Mediastinal lymphadenopathy [R59.0]  Yes   • 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 male past complex medical history, this is his fourth hospitalization since June.  Briefly, in June patient presented with persistent cough and fevers, he was found to have elevated troponin and chest pain was diagnosed with myocarditis and adult still's disease and was started on prednisone, valsartan and metoprolol.  In July, he developed severe back pain, MRI T-spine at that time did show lesions around T8-T9 that were was concerning for either osteomyelitis vs metastatic disease.  Spine biopsy was unrevealing, he underwent a bone marrow was concerning for large cell lymphoma.  He was scheduled for outpatient EGD and colonoscopy to evaluate for possible GI lymphoma.  On presentation to endoscopy, patient was found to be hypotensive, tachycardic, and febrile.  As such was sent to the ED and was subsequently admitted with severe sepsis requiring ICU care.      His blood cultures were positive for MSSA with ID consulted for IV antibiotic management. Source of bacteremia presumed to be septic phlebitis related to infiltrated IV on recent hosp[ital stay but did not have active phlebitis at presentation to confirm dx.     CT scans revealed mediastinal and intra-abdominal adenopathy, he therefore underwent mediastinoscopy per CT surgery and now has new pathology proven dx of high-grade lymphoma.  Oncology was consulted and due to aggressive nature of disease, he has been initiated on chemotherapy during this hospitalization.       Plan  -Severe sepsis, secondary to MSSA bacteremia (possible source recent phlebitis from prior hosp stay). ID following, changed to Dapto 9/8/19 after rash exanthem with Zosyn although could also be related to lymphoma or new Rx allopurinol as well.  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  -S/p chemo initiation today developed tachycardia 160's and increase BP ~2hrs after infusion, no other complaints -->  's sinus tachy but suspect had transient SVT, now s/p Lopressor 10mg IV push x1 with 's and resolving BP's.      DVT Prophylaxis: Mechanical    Disposition: TBD    CODE STATUS:   Code Status and Medical Interventions:   Ordered at: 09/05/19 1533     Code Status:    CPR     Medical Interventions (Level of Support Prior to Arrest):    Full       Electronically signed by Leatha Gongora MD, 09/09/19, 3:36 PM.

## 2019-09-09 NOTE — PLAN OF CARE
Problem: Patient Care Overview  Goal: Plan of Care Review  Outcome: Ongoing (interventions implemented as appropriate)   09/09/19 1784   Coping/Psychosocial   Plan of Care Reviewed With patient   OTHER   Outcome Summary Patient has remained alert and oriented through out the night with minimal complaints. The fever has not returned and benadryl cream was ordered due to itching caused from a rash. VS remained stable and will continue to monitor.

## 2019-09-10 LAB
ABO + RH BLD: NORMAL
ANION GAP SERPL CALCULATED.3IONS-SCNC: 11 MMOL/L (ref 5–15)
BACTERIA SPEC AEROBE CULT: NORMAL
BH BB BLOOD EXPIRATION DATE: NORMAL
BH BB BLOOD TYPE BARCODE: 5100
BH BB DISPENSE STATUS: NORMAL
BH BB PRODUCT CODE: NORMAL
BH BB UNIT NUMBER: NORMAL
BUN BLD-MCNC: 16 MG/DL (ref 6–20)
BUN/CREAT SERPL: 25.8 (ref 7–25)
CALCIUM SPEC-SCNC: 7.2 MG/DL (ref 8.6–10.5)
CHLORIDE SERPL-SCNC: 99 MMOL/L (ref 98–107)
CO2 SERPL-SCNC: 23 MMOL/L (ref 22–29)
CREAT BLD-MCNC: 0.62 MG/DL (ref 0.76–1.27)
CYTO UR: NORMAL
DX PRELIMINARY: NORMAL
GFR SERPL CREATININE-BSD FRML MDRD: 140 ML/MIN/1.73
GLUCOSE BLD-MCNC: 205 MG/DL (ref 65–99)
LAB AP CASE REPORT: NORMAL
LAB AP CLINICAL INFORMATION: NORMAL
LAB AP DIAGNOSIS COMMENT: NORMAL
LAB AP FLOW CYTOMETRY SUMMARY: NORMAL
Lab: NORMAL
PATH REPORT.FINAL DX SPEC: NORMAL
PATH REPORT.GROSS SPEC: NORMAL
POTASSIUM BLD-SCNC: 3.9 MMOL/L (ref 3.5–5.2)
SODIUM BLD-SCNC: 133 MMOL/L (ref 136–145)
UNIT  ABO: NORMAL
UNIT  RH: NORMAL

## 2019-09-10 PROCEDURE — 25010000002 FUROSEMIDE PER 20 MG: Performed by: FAMILY MEDICINE

## 2019-09-10 PROCEDURE — 93010 ELECTROCARDIOGRAM REPORT: CPT | Performed by: INTERNAL MEDICINE

## 2019-09-10 PROCEDURE — 63710000001 PREDNISONE PER 5 MG: Performed by: INTERNAL MEDICINE

## 2019-09-10 PROCEDURE — 99233 SBSQ HOSP IP/OBS HIGH 50: CPT | Performed by: INTERNAL MEDICINE

## 2019-09-10 PROCEDURE — 25010000002 DAPTOMYCIN PER 1 MG: Performed by: INTERNAL MEDICINE

## 2019-09-10 PROCEDURE — 99232 SBSQ HOSP IP/OBS MODERATE 35: CPT | Performed by: FAMILY MEDICINE

## 2019-09-10 PROCEDURE — 80048 BASIC METABOLIC PNL TOTAL CA: CPT | Performed by: FAMILY MEDICINE

## 2019-09-10 RX ORDER — FUROSEMIDE 10 MG/ML
40 INJECTION INTRAMUSCULAR; INTRAVENOUS ONCE
Status: COMPLETED | OUTPATIENT
Start: 2019-09-10 | End: 2019-09-10

## 2019-09-10 RX ADMIN — FUROSEMIDE 40 MG: 10 INJECTION, SOLUTION INTRAMUSCULAR; INTRAVENOUS at 14:30

## 2019-09-10 RX ADMIN — DIPHENHYDRAMINE HYDROCHLORIDE, ZINC ACETATE: 2; .1 CREAM TOPICAL at 20:20

## 2019-09-10 RX ADMIN — BENZOCAINE AND MENTHOL 1 LOZENGE: 15; 3.6 LOZENGE ORAL at 09:50

## 2019-09-10 RX ADMIN — PREDNISONE 100 MG: 50 TABLET ORAL at 08:57

## 2019-09-10 RX ADMIN — OXYCODONE HYDROCHLORIDE AND ACETAMINOPHEN 1 TABLET: 7.5; 325 TABLET ORAL at 11:37

## 2019-09-10 RX ADMIN — ALLOPURINOL 300 MG: 300 TABLET ORAL at 08:57

## 2019-09-10 RX ADMIN — Medication 1 CAPSULE: at 08:57

## 2019-09-10 RX ADMIN — SODIUM CHLORIDE, PRESERVATIVE FREE 10 ML: 5 INJECTION INTRAVENOUS at 21:04

## 2019-09-10 RX ADMIN — SODIUM CHLORIDE, PRESERVATIVE FREE 10 ML: 5 INJECTION INTRAVENOUS at 08:57

## 2019-09-10 RX ADMIN — FAMOTIDINE 20 MG: 20 TABLET ORAL at 21:03

## 2019-09-10 RX ADMIN — METOPROLOL SUCCINATE 25 MG: 25 TABLET, EXTENDED RELEASE ORAL at 08:57

## 2019-09-10 RX ADMIN — OXYCODONE HYDROCHLORIDE AND ACETAMINOPHEN 1 TABLET: 7.5; 325 TABLET ORAL at 21:03

## 2019-09-10 RX ADMIN — DIPHENHYDRAMINE HYDROCHLORIDE, ZINC ACETATE: 2; .1 CREAM TOPICAL at 08:57

## 2019-09-10 RX ADMIN — BENZOCAINE AND MENTHOL 1 LOZENGE: 15; 3.6 LOZENGE ORAL at 13:24

## 2019-09-10 RX ADMIN — DAPTOMYCIN 700 MG: 500 INJECTION, POWDER, LYOPHILIZED, FOR SOLUTION INTRAVENOUS at 15:47

## 2019-09-10 RX ADMIN — FAMOTIDINE 20 MG: 20 TABLET ORAL at 08:57

## 2019-09-10 NOTE — PLAN OF CARE
Problem: Patient Care Overview  Goal: Plan of Care Review  Outcome: Ongoing (interventions implemented as appropriate)   09/10/19 0418   Coping/Psychosocial   Plan of Care Reviewed With patient   Plan of Care Review   Progress improving   OTHER   Outcome Summary VSS overnight. No fever noted. Occassional c/o pain, PRN pain medication given. Wife at bedside. Will continue to monitor.       Problem: Fall Risk (Adult)  Goal: Absence of Fall  Outcome: Ongoing (interventions implemented as appropriate)   09/10/19 0418   Fall Risk (Adult)   Absence of Fall making progress toward outcome       Problem: Oncology Care (Adult)  Goal: Signs and Symptoms of Listed Potential Problems Will be Absent, Minimized or Managed (Oncology Care)  Outcome: Ongoing (interventions implemented as appropriate)   09/10/19 0418   Goal/Outcome Evaluation   Problems Assessed (Oncology Care) all   Problems Present (Oncology Care) fatigue;pain

## 2019-09-10 NOTE — PROGRESS NOTES
1       Stephen Stoll  1975  [id number]  9/10/2019    CC: Fever    Stephen Stoll is a 44 y.o. male here for bilateral pulmonary nodules, bulky hilar and mediastinal lymphadenopathy, marked leukocytosis, and increased inflammatory markers.  Recent diagnosis of adult still's disease/immunocompromised host on prednisone and Plaquenil.  New diagnosis of large cell anaplastic lymphoma.  Complicating Staphylococcus aureus bacteremia.      Past medical history:  Past Medical History:   Diagnosis Date   • Acute kidney injury (CMS/HCC)    • Anaplastic ALK-positive large cell lymphoma (CMS/HCC)    • Bacteremia    • Hyperlipidemia    • Myocarditis (CMS/HCC)    • Pneumonia    • Severe sepsis (CMS/HCC)        Medications:   Current Facility-Administered Medications:   •  acetaminophen (TYLENOL) suppository 650 mg, 650 mg, Rectal, Q4H PRN, Guadalupe Renee MD  •  acetaminophen (TYLENOL) tablet 1,000 mg, 1,000 mg, Oral, Q6H PRN, Case, Roxanna V., DO, 1,000 mg at 09/09/19 1228  •  allopurinol (ZYLOPRIM) tablet 300 mg, 300 mg, Oral, Daily, Brent Brizuela MD, 300 mg at 09/09/19 1041  •  benzocaine-menthol (CEPACOL) lozenge 1 lozenge, 1 lozenge, Mouth/Throat, Q2H PRN, El Can MD, 1 lozenge at 09/09/19 1647  •  DAPTOmycin (CUBICIN) 700 mg in sodium chloride 0.9 % 50 mL IVPB, 8 mg/kg (Adjusted), Intravenous, Q24H, Irvin Mondragon MD, Last Rate: 100 mL/hr at 09/09/19 1541, 700 mg at 09/09/19 1541  •  diphenhydrAMINE-zinc acetate 2-0.1 % cream, , Topical, BID PRN, Adriana Lee, APRN  •  famotidine (PEPCID) tablet 20 mg, 20 mg, Oral, BID, Trista Quiroz MD, 20 mg at 09/09/19 2056  •  hydroxychloroquine (PLAQUENIL) tablet 200 mg, 200 mg, Oral, BID, Case, Roxanna V., DO, Stopped at 09/09/19 1041  •  lactobacillus acidophilus (RISAQUAD) capsule 1 capsule, 1 capsule, Oral, Daily, Trista Quiroz MD, 1 capsule at 09/09/19 1040  •  metoprolol succinate XL (TOPROL-XL) 24 hr tablet 25 mg, 25 mg,  Oral, Q24H, Lazaro Dunbar MD, 25 mg at 09/09/19 1040  •  oxyCODONE-acetaminophen (PERCOCET) 7.5-325 MG per tablet 1 tablet, 1 tablet, Oral, Q6H PRN, Case, Roxanna AUGUSTINE., DO, 1 tablet at 09/09/19 2235  •  potassium & sodium phosphates (PHOS-NAK) 280-160-250 MG packet - for Phosphorus less than 1.25 mg/dL, 2 packet, Oral, Q6H PRN **OR** potassium & sodium phosphates (PHOS-NAK) 280-160-250 MG packet - for Phosphorus 1.25 - 2.5 mg/dL, 2 packet, Oral, Q6H PRN, Patiño, Napoleon A, RPH, 2 packet at 09/07/19 1132  •  potassium chloride (KLOR-CON) packet 40 mEq, 40 mEq, Oral, PRN, Patiño, Napoleon A, RPH  •  potassium chloride (MICRO-K) CR capsule 40 mEq, 40 mEq, Oral, PRN, Patiño, Napoleon A, RPH, 40 mEq at 09/07/19 1132  •  predniSONE (DELTASONE) tablet 100 mg, 100 mg, Oral, Daily With Breakfast, Brent Brizuela MD, 100 mg at 09/09/19 1040  •  sodium chloride 0.9 % flush 10 mL, 10 mL, Intravenous, PRN, Belmond, Phi, DO  •  sodium chloride 0.9 % flush 10 mL, 10 mL, Intravenous, Q12H, Sanjuanita Ching DNP, APRN, 10 mL at 09/09/19 2058  •  sodium chloride 0.9 % flush 10 mL, 10 mL, Intravenous, PRN, Sanjuanita Ching DNP, APRN  •  sodium chloride 0.9 % flush 10 mL, 10 mL, Intravenous, Q12H, Zina Bauman MD, 10 mL at 09/09/19 2056  •  sodium chloride 0.9 % flush 10 mL, 10 mL, Intravenous, PRN, Zina Bauman MD  Antibiotics:  Anti-Infectives (From admission, onward)    Ordered     Dose/Rate Route Frequency Start Stop    09/08/19 1505  DAPTOmycin (CUBICIN) 700 mg in sodium chloride 0.9 % 50 mL IVPB     Ordering Provider:  Irvin Mondragon MD    8 mg/kg × 86.2 kg (Adjusted)  100 mL/hr over 30 Minutes Intravenous Every 24 Hours 09/08/19 1600 10/08/19 1559    09/06/19 0940  ceFAZolin in dextrose (ANCEF) IVPB solution 2 g     Ordering Provider:  Jeffrey Galeas PA    2 g  over 30 Minutes Intravenous Once 09/06/19 0942 09/06/19 1051    09/05/19 1520  hydroxychloroquine (PLAQUENIL) tablet 200 mg     Joslyn Ochoa  "MELISA [facility] reviewed the order on 09/06/19 0716.   Ordering Provider:  Roxanna Guo DO    200 mg Oral 2 Times Daily 09/05/19 2100      09/05/19 1121  vancomycin 1750 mg/500 mL 0.9% NS IVPB ([facility])     Ordering Provider:  Phi Mir DO    20 mg/kg × 93.4 kg  over 120 Minutes Intravenous Once 09/05/19 1123 09/05/19 1416    09/05/19 1121  piperacillin-tazobactam (ZOSYN) 3.375 g in iso-osmotic dextrose 50 ml (premix)     Ordering Provider:  Phi Mir DO    3.375 g  over 30 Minutes Intravenous Once 09/05/19 1122 09/05/19 1215          Allergies:  is allergic to ciprofloxacin and sulfa antibiotics.    Review of Systems: All other reviewed and negative except as per HPI    Blood pressure 111/75, pulse 62, temperature 97.3 °F (36.3 °C), temperature source Oral, resp. rate 18, height 182.9 cm (72\"), weight 99.3 kg (219 lb), SpO2 95 %.  GENERAL:  Wife present during exam and interview.  Episode of shaking Reiger's and marked hypertension during chemotherapy infusion yesterday.  Denies mid face pressure or sinus symptoms.  No cough or purulent sputum production.  Unaware of nausea, vomiting, or diarrhea.  No genitourinary tract symptoms.  HEENT: Oropharynx without thrush. Sinuses nontender. Dentition in good repair. No cervical adenopathy. No carotid bruits/ jugular venous distention.  Suprasternal notch mediastinoscopy incision clean and dry.  EYES: PERRL. No conjunctival injection. No icterus. EOMI.  LYMPHATICS: No lymphadenopathy of the neck or axillary or inguinal regions.   HEART: No murmur, gallop, or pericardial friction rub.  Resting tachycardia.  Left antecubital PIC site without phlebitis.  LUNGS: Clear to auscultation anteriorly. No percussion dullness.   ABDOMEN: Soft, nontender, nondistended.  Spleen tip palpable with deep inspiration.  SKIN: Cutaneous rash noted.  No embolic stigmata.   PSYCHIATRIC: Mental status lucid. Cranial nerve function intact.  Confusion resolved.      DIAGNOSTICS:  Lab Results "   Component Value Date    WBC 16.55 (H) 09/09/2019    HGB 9.1 (L) 09/09/2019    HCT 28.1 (L) 09/09/2019    PLT 81 (L) 09/09/2019     Lab Results   Component Value Date    CRP 37.66 (H) 09/05/2019     Lab Results   Component Value Date    SEDRATE >130 (H) 09/05/2019     Lab Results   Component Value Date    GLUCOSE 205 (H) 09/10/2019    BUN 16 09/10/2019    CREATININE 0.62 (L) 09/10/2019    EGFRIFNONA 140 09/10/2019    BCR 25.8 (H) 09/10/2019    CO2 23.0 09/10/2019    CALCIUM 7.2 (L) 09/10/2019    PROTENTOTREF 8.0 08/30/2019    ALBUMIN 1.90 (L) 09/09/2019    LABIL2 0.6 (L) 08/30/2019    AST 30 09/09/2019    ALT 34 09/09/2019       Microbiology: 2/2 admission blood cultures with oxacillin susceptible staph aureus    RADIOLOGY:  Imaging Results (last 72 hours)     Procedure Component Value Units Date/Time    XR Chest 1 View [[id number]] Collected:  09/08/19 0845     Updated:  09/08/19 1751    Narrative:          EXAMINATION: XR CHEST 1 VW - 09/08/2019     INDICATION: R59.0-Localized enlarged lymph nodes; A41.9-Sepsis,  unspecified organism; R06.02-Shortness of breath; R93.89-Abnormal  findings on diagnostic imaging of other specified body structures;  D72.829-Elevated white blood cell count, unspecified.     COMPARISON: Chest radiograph of 09/07/2019.     FINDINGS: Single portable chest radiograph was submitted in 2  acquisitions to image the entire chest. Mild cardiomegaly, similar to  prior. Known pulmonary nodules/lesions are seen but better evaluated on  prior imaging. No superimposed airspace disease, pneumothorax, or  pleural effusion. Please note the left costophrenic angle/left lower  lung is not entirely imaged secondary to patient positioning. The  visualized upper abdomen is unrelenting. No acute osseous abnormality is  identified.       Impression:          Limited exam secondary to patient positioning, however similar in  appearance when compared to 09/07/2019.     DICTATED:   09/08/2019  EDITED/ls :    09/08/2019        XR Chest 1 View [[id number]] Collected:  09/07/19 0751     Updated:  09/07/19 1736    Narrative:          EXAMINATION: XR CHEST 1 VW - 09/07/2019     INDICATION: R59.0-Localized enlarged lymph nodes; A41.9-Sepsis,  unspecified organism; R06.02-Shortness of breath; R93.89-Abnormal  findings on diagnostic imaging of other specified body structures;  D72.829-Elevated white blood cell count, unspecified.      COMPARISON: Chest radiograph 09/06/2019.     FINDINGS: Single portable chest radiograph is submitted for review. The  heart is prominent in size, similar to prior. Multifocal nodular  opacities are identified throughout the lungs as previously seen on the  CT of the chest from 09/05/2019.  No new airspace disease, pleural  effusion or pneumothorax. The visualized upper abdomen is unrevealing.  No acute osseous abnormality.           Impression:       Similar exam when compared to 09/06/2019.     DICTATED:   09/07/2019  EDITED/ls :   09/07/2019                 Assessment and Plan: Inflammatory myocarditis.  Left heart catheterization without occlusive coronary disease.  Hectic fever.  Leukocytosis with bandemia.  Marked increase in inflammatory markers.  Elevated serum ferritin.  Diffuse arthralgias with transient macular rash on abdomen and chest/adult onset Stills disease.  Recurrent fever with generalized lymphadenopathy and pulmonary infiltrates while on prednisone and Plaquenil/necrotic mediastinal lymph node and lytic lesion at T8 with anaplastic large cell lymphoma.  Maximum temperature over 24 hours 101 degrees.  Currently 97.3.  Pulse 62, respiratory rate 18, blood pressure 111/75 mmHg.  No recorded O2 saturations.  CBC from this morning pending.  Plasma creatinine 0.62.  Random glucose 205 mg percent which likely reflects high-dose Decadron and dysglycemia.  Yesterday's peripheral leukocyte count 16.6 with 83% segmented neutrophils/preserved ANC.  Remains on intravenous daptomycin.  No  significant eosinophilia or liver function abnormalities.  Guarded prognosis.  Discussed with family at length.      Noah Dumont MD  9/10/2019

## 2019-09-10 NOTE — PROGRESS NOTES
[facility] Medicine Services  PROGRESS NOTE    Patient Name: Stephen Stoll  : 1975  MRN: [id number]    Date of Admission: 2019  Length of Stay: 5  Primary Care Physician: Carlos Ramirez MD    Subjective   Subjective     CC: Severe sepsis and high-grade lymphoma    HPI: Increased BLE edema, has been receiving IV abx with piggy backs and had multiple chemo infusion yesterday --> give IV Lasix x1, rapid bilateral lower extremities with Ace bandage, encourage ambulation.      Review of Systems  Gen- No fevers, chills  CV- No chest pain, palpitations  Resp- no dyspnea  GI- No N/V/D, abd pain    All other systems reviewed and negative except any additional pertinent positives and negatives discussed in HPI.     Objective   Objective     Vital Signs:   Temp:  [97.3 °F (36.3 °C)-101 °F (38.3 °C)] 97.5 °F (36.4 °C)  Heart Rate:  [] 75  Resp:  [18-22] 18  BP: (111-146)/(66-84) 116/80        Physical Exam:  Constitutional: No acute distress, awake, alert, nontoxic, normal body habitus, diaphoretic  Respiratory: Clear to auscultation bilaterally, good effort, nonlabored respirations, cough noted   Cardiovascular: RRR, tachy 100's sinus  Musculoskeletal: No peripheral edema, normal muscle tone for age  Psychiatric: Appropriate affect, good insight and judgement, cooperative  Skin: No rashes, no jaundice, no petechiae, no mottling      Results Reviewed:    Results from last 7 days   Lab Units 19  0427 19  0818 19  0454  19  1552 19  1115   WBC 10*3/mm3 16.55* 11.95* 11.97*   < >  --  30.17*   HEMOGLOBIN g/dL 9.1* 10.2* 10.0*   < >  --  12.7*   HEMATOCRIT % 28.1* 31.4* 31.6*   < >  --  38.0   PLATELETS 10*3/mm3 81* 120* 123*   < >  --  262   INR   --   --   --   --  1.55*  --    PROCALCITONIN ng/mL  --   --   --   --   --  38.17*    < > = values in this interval not displayed.     Results from last 7 days   Lab Units 09/10/19  0440 19  1201  09/09/19  0933 09/09/19  0427  09/08/19  1856  09/08/19  0818 09/08/19  0454  09/05/19  1115   SODIUM mmol/L 133* 129* 131* 133*   < > 133*   < > 146* 133*   < > 124*   POTASSIUM mmol/L 3.9  --   --  3.9  --  4.2  --  4.3 4.3   < > 4.6   CHLORIDE mmol/L 99  --   --  98  --  98  --  93* 96*   < > 81*   CO2 mmol/L 23.0  --   --  23.0  --  21.0*  --  23.0 25.0   < > 25.0   BUN mg/dL 16  --   --  18  --  19  --  16 16   < > 29*   CREATININE mg/dL 0.62*  --   --  0.84  --  0.95  --  0.93 0.95   < > 1.67*   GLUCOSE mg/dL 205*  --   --  178*  --  240*  --  93 85   < > 132*   CALCIUM mg/dL 7.2*  --   --  7.6*  --  7.6*  --  7.7* 7.9*   < > 8.9   ALT (SGPT) U/L  --   --   --  34  --   --   --  29 24   < > 35   AST (SGOT) U/L  --   --   --  30  --   --   --  33 25   < > 31   TROPONIN T ng/mL  --   --   --   --   --   --   --   --   --   --  0.017   PROBNP pg/mL  --   --   --   --   --   --   --   --   --   --  400.9    < > = values in this interval not displayed.     Estimated Creatinine Clearance: 183.7 mL/min (A) (by C-G formula based on SCr of 0.62 mg/dL (L)).    Microbiology Results Abnormal     Procedure Component Value - Date/Time    Blood Culture - Blood, Arm, Left [[id number]] Collected:  09/05/19 1115    Lab Status:  Final result Specimen:  Blood from Arm, Left Updated:  09/10/19 1229     Blood Culture No growth at 5 days    Blood Culture - Blood, Arm, Right [[id number]] Collected:  09/08/19 0818    Lab Status:  Preliminary result Specimen:  Blood from Arm, Right Updated:  09/10/19 0901     Blood Culture No growth at 2 days    Blood Culture - Blood, Hand, Right [[id number]] Collected:  09/08/19 0818    Lab Status:  Preliminary result Specimen:  Blood from Hand, Right Updated:  09/10/19 0901     Blood Culture No growth at 2 days    Blood Culture - Blood, Arm, Right [[id number]]  (Abnormal)  (Susceptibility) Collected:  09/05/19 1115    Lab Status:  Final result Specimen:  Blood from Arm, Right Updated:  09/09/19 0691      Blood Culture Staphylococcus aureus     Comment: Infectious disease consultation is highly recommended to rule out distant foci of infection.        Gram Stain Aerobic Bottle Gram positive cocci in groups    Susceptibility      Staphylococcus aureus     MARTIR     Gentamicin Susceptible     Oxacillin Susceptible     Rifampin Susceptible     Vancomycin Susceptible                Susceptibility Comments     Staphylococcus aureus    This isolate does not demonstrate inducible clindamycin resistance in vitro.               Blood Culture ID, PCR - Blood, Arm, Right [[id number]]  (Abnormal) Collected:  09/05/19 1115    Lab Status:  Final result Specimen:  Blood from Arm, Right Updated:  09/06/19 0746     BCID, PCR Staphylococcus aureus, not MRSA. Identification by BCID PCR.          Imaging Results (last 24 hours)     Procedure Component Value Units Date/Time    CT Abdomen Pelvis Without Contrast [[id number]] Collected:  09/05/19 1551     Updated:  09/10/19 1354    Narrative:       EXAMINATION: CT CHEST WO CONTRAST, CT ABDOMEN/PELVIS WO CONTRAST -  09/05/2019     INDICATION: Cough, sepsis, abnormal chest x-ray.     TECHNIQUE: Unenhanced CT imaging of the chest, abdomen, and pelvis was  performed. Additional coronal and sagittal reformatted imaging was  provided for review.     The radiation dose reduction device was turned on for each scan per the  ALARA (As Low as Reasonably Achievable) protocol.     COMPARISON: MRI of the thoracic and lumbar spine 08/09/2019.     FINDINGS:      CHEST: Dedicated CT imaging of the chest demonstrates interval  development of numerous pulmonary nodules throughout the lungs, the  largest within the right mid lung measuring 2.0 cm. These are concerning  for metastatic foci given the provided clinical history. No focal  pneumonia, pleural effusion, or pneumothorax identified. These pulmonary  nodules account for the abnormality seen on the  chest radiograph from  09/05/2019.     Extensive axillary  and mediastinal lymphadenopathy is identified. The  largest within the right paratracheal region measures up to 2.1 cm and  demonstrates mild central necrosis. The pulmonary artery is within  normal limits in size. The heart is within normal limits in size. Trace  pericardial effusion is identified. Prominent cardiophrenic lymph nodes  are also noted.     ABDOMEN/PELVIS: Lack of intravenous contrast limits evaluation of  underlying abdominal and pelvic organs. Hypoattenuated region involving  the right hepatic lobe is identified with additional more confluent  hypoattenuating region within the mid right hepatic lobe noted measuring  up to 2.5 cm which is concerning for an additional site of metastatic  disease. The gallbladder demonstrates mild cholelithiasis. The spleen is  enlarged and otherwise unremarkable. The pancreas does not demonstrate  abnormality. The adrenal glands and kidneys also do not demonstrate  abnormality. No evidence of hydronephrosis. There is mild perinephric  inflammation which requires clinical correlation. Extensive peritoneal  and retroperitoneal lymphadenopathy is identified throughout the  visualized abdomen and pelvis. The urinary bladder is partially  distended and otherwise unremarkable. The stomach is decompressed and  unremarkable. No evidence of small bowel obstruction. No free air or  free fluid identified. Postsurgical changes in the right lower quadrant  are identified and likely from prior appendectomy.  Small fat-filled  right inguinal and left inguinal hernias are noted.     Osseous structures: The visualized bony pelvis appears intact.  Degenerative changes of the bilateral hips are identified.  Re-demonstration of aggressive osseous changes at T8 and T9, previously  documented MRI thoracic spine from 8/21/2019. No convincing evidence of  posterior cortex retropulsion identified. Additional lytic lesion  involving L4 is also noted.       Impression:          Constellation of  findings concerning for metastatic disease:      - Interval development of numerous pulmonary nodules throughout the  lungs with the largest measuring 2.0 cm within the right midlung and  concerning for underlying pulmonary metastasis and which may account for  abnormality identified in the chest radiograph performed 09/05/2019.     - Extensive axillary, mediastinal, and abdominopelvic lymphadenopathy as  described above.     - Hypoattenuated regions involving the liver concerning for additional  sites of metastatic disease, although are limited in evaluation  secondary to lack of intravenous contrast. Attention on follow-up  imaging is advised.     - Redemonstration of T8/T9 aggressive osseous lesions with additional  lytic lesion involving the L4 vertebral body and additional  subcentimeter lytic lesions throughout the thoracolumbar spine  suggested.     Nonspecific perinephric inflammation. Correlate with urinalysis to  exclude underlying pyelonephritis.     DICTATED:   09/05/2019  EDITED/ls :   09/05/2019      This report was finalized on 9/10/2019 1:50 PM by Dr. Lionel Jackson MD.       CT Chest Without Contrast [[id number]] Collected:  09/05/19 1551     Updated:  09/10/19 1354    Narrative:       EXAMINATION: CT CHEST WO CONTRAST, CT ABDOMEN/PELVIS WO CONTRAST -  09/05/2019     INDICATION: Cough, sepsis, abnormal chest x-ray.     TECHNIQUE: Unenhanced CT imaging of the chest, abdomen, and pelvis was  performed. Additional coronal and sagittal reformatted imaging was  provided for review.     The radiation dose reduction device was turned on for each scan per the  ALARA (As Low as Reasonably Achievable) protocol.     COMPARISON: MRI of the thoracic and lumbar spine 08/09/2019.     FINDINGS:      CHEST: Dedicated CT imaging of the chest demonstrates interval  development of numerous pulmonary nodules throughout the lungs, the  largest within the right mid lung measuring 2.0 cm. These are concerning  for  metastatic foci given the provided clinical history. No focal  pneumonia, pleural effusion, or pneumothorax identified. These pulmonary  nodules account for the abnormality seen on the  chest radiograph from  09/05/2019.     Extensive axillary and mediastinal lymphadenopathy is identified. The  largest within the right paratracheal region measures up to 2.1 cm and  demonstrates mild central necrosis. The pulmonary artery is within  normal limits in size. The heart is within normal limits in size. Trace  pericardial effusion is identified. Prominent cardiophrenic lymph nodes  are also noted.     ABDOMEN/PELVIS: Lack of intravenous contrast limits evaluation of  underlying abdominal and pelvic organs. Hypoattenuated region involving  the right hepatic lobe is identified with additional more confluent  hypoattenuating region within the mid right hepatic lobe noted measuring  up to 2.5 cm which is concerning for an additional site of metastatic  disease. The gallbladder demonstrates mild cholelithiasis. The spleen is  enlarged and otherwise unremarkable. The pancreas does not demonstrate  abnormality. The adrenal glands and kidneys also do not demonstrate  abnormality. No evidence of hydronephrosis. There is mild perinephric  inflammation which requires clinical correlation. Extensive peritoneal  and retroperitoneal lymphadenopathy is identified throughout the  visualized abdomen and pelvis. The urinary bladder is partially  distended and otherwise unremarkable. The stomach is decompressed and  unremarkable. No evidence of small bowel obstruction. No free air or  free fluid identified. Postsurgical changes in the right lower quadrant  are identified and likely from prior appendectomy.  Small fat-filled  right inguinal and left inguinal hernias are noted.     Osseous structures: The visualized bony pelvis appears intact.  Degenerative changes of the bilateral hips are identified.  Re-demonstration of aggressive osseous  changes at T8 and T9, previously  documented MRI thoracic spine from 8/21/2019. No convincing evidence of  posterior cortex retropulsion identified. Additional lytic lesion  involving L4 is also noted.       Impression:          Constellation of findings concerning for metastatic disease:      - Interval development of numerous pulmonary nodules throughout the  lungs with the largest measuring 2.0 cm within the right midlung and  concerning for underlying pulmonary metastasis and which may account for  abnormality identified in the chest radiograph performed 09/05/2019.     - Extensive axillary, mediastinal, and abdominopelvic lymphadenopathy as  described above.     - Hypoattenuated regions involving the liver concerning for additional  sites of metastatic disease, although are limited in evaluation  secondary to lack of intravenous contrast. Attention on follow-up  imaging is advised.     - Redemonstration of T8/T9 aggressive osseous lesions with additional  lytic lesion involving the L4 vertebral body and additional  subcentimeter lytic lesions throughout the thoracolumbar spine  suggested.     Nonspecific perinephric inflammation. Correlate with urinalysis to  exclude underlying pyelonephritis.     DICTATED:   09/05/2019  EDITED/ls :   09/05/2019      This report was finalized on 9/10/2019 1:50 PM by Dr. Lionel Jackson MD.             Results for orders placed during the hospital encounter of 08/21/19   Adult Transthoracic Echo Complete W/ Cont if Necessary Per Protocol    Narrative · Estimated EF = 62%.  · Left ventricular systolic function is normal.  · Left ventricular diastolic function is normal.  · Normal right ventricular cavity size, wall thickness, systolic function   and septal motion noted.  · No evidence of pulmonary hypertension is present.  · There is no evidence of pericardial effusion.  · No significant structural valvular abnormality demonstrated.          I have reviewed the  medications:  Scheduled Meds:    allopurinol 300 mg Oral Daily   DAPTOmycin 8 mg/kg (Adjusted) Intravenous Q24H   famotidine 20 mg Oral BID   lactobacillus acidophilus 1 capsule Oral Daily   metoprolol succinate XL 25 mg Oral Q24H   predniSONE 100 mg Oral Daily With Breakfast   sodium chloride 10 mL Intravenous Q12H   sodium chloride 10 mL Intravenous Q12H     Continuous Infusions:     PRN Meds:.•  acetaminophen  •  acetaminophen  •  benzocaine-menthol  •  diphenhydrAMINE-zinc acetate  •  oxyCODONE-acetaminophen  •  potassium & sodium phosphates **OR** potassium & sodium phosphates  •  potassium chloride  •  potassium chloride  •  sodium chloride  •  sodium chloride  •  sodium chloride      Assessment/Plan   Assessment / Plan     Active Hospital Problems    Diagnosis  POA   • Staphylococcus aureus bacteremia [R78.81]  Yes   • Hyponatremia [E87.1]  Yes   • Mediastinal lymphadenopathy [R59.0]  Yes   • 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 male past complex medical history, this is his fourth hospitalization since June.  Briefly, in June patient presented with persistent cough and fevers, he was found to have elevated troponin and chest pain was diagnosed with myocarditis and adult still's disease and was started on prednisone, valsartan and metoprolol.  In July, he developed severe back pain, MRI T-spine at that time did show lesions around T8-T9 that were was concerning for either osteomyelitis vs metastatic disease.  Spine biopsy was unrevealing, he underwent a bone marrow was concerning for large cell lymphoma.  He was scheduled for outpatient EGD and  colonoscopy to evaluate for possible GI lymphoma.  On presentation to endoscopy, patient was found to be hypotensive, tachycardic, and febrile.  As such was sent to the ED and was subsequently admitted with severe sepsis requiring ICU care.      His blood cultures were positive for MSSA with ID consulted for IV antibiotic management. Source of bacteremia presumed to be septic phlebitis related to infiltrated IV on recent hospital stay but did not have active phlebitis at presentation to confirm dx.     CT scans revealed mediastinal and intra-abdominal adenopathy, he therefore underwent mediastinoscopy per CT surgery and now has new pathology proven dx of high-grade lymphoma.  Oncology was consulted and due to aggressive nature of disease, he has been initiated on chemotherapy during this hospitalization.       Plan  -Severe sepsis, secondary to MSSA bacteremia (possible source recent phlebitis from prior hosp stay). ID following, changed to Dapto 9/8/19 after rash exanthem with Zosyn although could also be related to lymphoma.  ID is arranging home infusion daptomycin in preparation for planned discharge tomorrow.  -ALK positive CD30 positive high-grade lymphoma, heme-onc following --> 9/9/19 initiation of chemotherapy.  At discharge, ensure close oncology follow-up with Dr. Brizuela and serial outpatient labs since initiated on chemo to watch for neutropenia ([facility] can alert Dr. Cloud's clinic nurse of d/c who can help arrange) -- complex case given current hectic fevers of lymphoma/bacteremia.  -Still's disease, suspected to be likely manifestation of his lymphoma -->  Plaquenil now STOPPED in favor of chemo initiation and high dose steroids  -Myocarditis, ECHO done in August revealed EF of 60% markedly improved from the one done in June, continue metoprolol.      DVT Prophylaxis: Mechanical    Disposition: TBD    CODE STATUS:   Code Status and Medical Interventions:   Ordered at: 09/05/19 1533     Code Status:    CPR      Medical Interventions (Level of Support Prior to Arrest):    Full       Electronically signed by Leatha Gongora MD, 09/10/19, 4:37 PM.

## 2019-09-10 NOTE — PLAN OF CARE
Problem: Patient Care Overview  Goal: Plan of Care Review  Outcome: Ongoing (interventions implemented as appropriate)  Pt a/o, VSS RA. PRN pain med adm according MD order. Edema noted lower extremity, see MD orders. Continue monitoring.    Problem: Fall Risk (Adult)  Goal: Absence of Fall  Outcome: Ongoing (interventions implemented as appropriate)      Problem: Oncology Care (Adult)  Goal: Signs and Symptoms of Listed Potential Problems Will be Absent, Minimized or Managed (Oncology Care)  Outcome: Ongoing (interventions implemented as appropriate)

## 2019-09-10 NOTE — PROGRESS NOTES
"HEMATOLOGY/ONCOLOGY PROGRESS NOTE    Subjective      CC: Fatigue    SUBJECTIVE: Overall feeling better than he did over the weekend but still fatigued.  No fevers today as far as he is aware.  Brief febrile episode following Brentuximab yesterday but unlikely to be an infusion reaction.  No subsequent sequelae and felt fine since.        Past Medical History, Past Surgical History, Social History, Family History have been reviewed and are without significant changes except as mentioned.      Medications:  The current medication list was reviewed in the EMR    ALLERGIES:   Allergies   Allergen Reactions   • Ciprofloxacin Anxiety     \"Extreme anxiety and paranoia\"   • Sulfa Antibiotics Rash       ROS:  A comprehensive 14 point review of systems was performed and was negative except as mentioned.      Objective      Vitals:    09/10/19 0557 09/10/19 0720 09/10/19 1145 09/10/19 1230   BP:  124/84 116/80    BP Location:  Right arm Right arm    Patient Position:  Lying Lying    Pulse:  74 73 75   Resp:  18 18    Temp:  97.5 °F (36.4 °C) 97.5 °F (36.4 °C)    TempSrc:  Oral Axillary    SpO2:   100% 99%   Weight: 99.3 kg (219 lb)      Height:            ECOG: (2) Ambulatory and capable of self care, unable to carry out work activity, up and about > 50% or waking hours    General: well appearing, in no acute distress  HEENT: sclerae anicteric, oropharynx clear  Lymphatics: no cervical, supraclavicular, inguinal, or axillary adenopathy  Cardiovascular: regular rate and rhythm, no murmurs  Lungs: clear to auscultation bilaterally  Abdomen: soft, nontender, nondistended.  No palpable organomegaly  Extremities: no lower extremity edema  Skin: no rashes, lesions, bruising, or petechiae  Neuro: Alert and oriented x 3. Moves all extremities.    RECENT LABS:    Results from last 7 days   Lab Units 09/09/19  0427 09/08/19  0818 09/08/19  0454   WBC 10*3/mm3 16.55* 11.95* 11.97*   HEMOGLOBIN g/dL 9.1* 10.2* 10.0*   PLATELETS 10*3/mm3 " 81* 120* 123*     Results from last 7 days   Lab Units 09/10/19  0440 09/09/19  1202 09/09/19  0933 09/09/19  0427  09/08/19  1856   SODIUM mmol/L 133* 129* 131* 133*   < > 133*   POTASSIUM mmol/L 3.9  --   --  3.9  --  4.2   CO2 mmol/L 23.0  --   --  23.0  --  21.0*   BUN mg/dL 16  --   --  18  --  19   CREATININE mg/dL 0.62*  --   --  0.84  --  0.95   GLUCOSE mg/dL 205*  --   --  178*  --  240*    < > = values in this interval not displayed.     Results from last 7 days   Lab Units 09/09/19  0427 09/08/19  0818 09/08/19  0454   AST (SGOT) U/L 30 33 25   ALT (SGPT) U/L 34 29 24   BILIRUBIN mg/dL 0.4 0.4 0.3   ALK PHOS U/L 139* 128* 129*         Ct Abdomen Pelvis Without Contrast    Result Date: 9/10/2019   Constellation of findings concerning for metastatic disease:  - Interval development of numerous pulmonary nodules throughout the lungs with the largest measuring 2.0 cm within the right midlung and concerning for underlying pulmonary metastasis and which may account for abnormality identified in the chest radiograph performed 09/05/2019.  - Extensive axillary, mediastinal, and abdominopelvic lymphadenopathy as described above.  - Hypoattenuated regions involving the liver concerning for additional sites of metastatic disease, although are limited in evaluation secondary to lack of intravenous contrast. Attention on follow-up imaging is advised.  - Redemonstration of T8/T9 aggressive osseous lesions with additional lytic lesion involving the L4 vertebral body and additional subcentimeter lytic lesions throughout the thoracolumbar spine suggested.  Nonspecific perinephric inflammation. Correlate with urinalysis to exclude underlying pyelonephritis.  DICTATED:   09/05/2019 EDITED/ls :   09/05/2019  This report was finalized on 9/10/2019 1:50 PM by Dr. Lionel Jackson MD.      Ct Chest Without Contrast    Result Date: 9/10/2019   Constellation of findings concerning for metastatic disease:  - Interval development  of numerous pulmonary nodules throughout the lungs with the largest measuring 2.0 cm within the right midlung and concerning for underlying pulmonary metastasis and which may account for abnormality identified in the chest radiograph performed 09/05/2019.  - Extensive axillary, mediastinal, and abdominopelvic lymphadenopathy as described above.  - Hypoattenuated regions involving the liver concerning for additional sites of metastatic disease, although are limited in evaluation secondary to lack of intravenous contrast. Attention on follow-up imaging is advised.  - Redemonstration of T8/T9 aggressive osseous lesions with additional lytic lesion involving the L4 vertebral body and additional subcentimeter lytic lesions throughout the thoracolumbar spine suggested.  Nonspecific perinephric inflammation. Correlate with urinalysis to exclude underlying pyelonephritis.  DICTATED:   09/05/2019 EDITED/ls :   09/05/2019  This report was finalized on 9/10/2019 1:50 PM by Dr. Lionel Jackson MD.      Xr Chest 1 View    Result Date: 9/8/2019   Limited exam secondary to patient positioning, however similar in appearance when compared to 09/07/2019.  DICTATED:   09/08/2019 EDITED/ls :   09/08/2019      Xr Chest 1 View    Result Date: 9/7/2019  Similar exam when compared to 09/06/2019.  DICTATED:   09/07/2019 EDITED/ls :   09/07/2019       Xr Chest 1 View    Result Date: 9/6/2019  Improvement seen in aeration of the right midlung.  D:  09/06/2019 E:  09/06/2019  This report was finalized on 9/6/2019 3:23 PM by Dr. Ana Paula De La Cruz MD.      Xr Chest 1 View    Result Date: 9/6/2019  There has been interval worsening of mild perihilar airspace changes with nodular airspace opacities noted within the right mid lung of undetermined etiology. Consideration would be for pneumonia as these are new when compared to 08/21/2019, however underlying developing pulmonary lesions are not excluded. Consideration for follow-up CT of the chest  without IV contrast should be considered if clinically appropriate.  D:  09/05/2019 E:  09/05/2019  This report was finalized on 9/6/2019 12:06 PM by Dr. Lionel Jackson MD.                Assessment   ASSESSMENT & PLAN:    1. 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 Progressive pancytopenia with platelets down to 81,000 and hemoglobin 9.1 with white count 16,550 and absolute neutrophil count of 14,730.  3. Sepsis/Methicillin sensitive staph aureus septic shock  4. 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 Recurrent fevers  6. Rash predating beginning of allopurinol  7. 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      Discussion: tolerated Brentuximab Cytoxan Adriamycin and prednisone.  I misspoke yesterday and said he got vincristine but that was a mistake.  Brentuximab may well because neuropathy but none thus far.  If he is afebrile and without evidence of tumor lysis syndrome on laboratory testing with daily BMP, magnesium, phosphorus, calcium, LDH, and uric acid, then perhaps by Thursday we can think about letting him get home.  I will arrange for course #2 of chemotherapy to commence 3 weeks from yesterday and I would like for him to get a dose of Neulasta as an outpatient the day he gets discharged assuming no febrile illnesses in the interim.  He already has a baseline elevation in his white count with a decreased platelet count before we even started treatment but no marrow involvement and I suspect this is consumptive.  We will watch that as well.  I would want him to continue the prednisone 100 mg daily for a full 5 days Monday through Friday of this week and that can be accomplished upon discharge if we let him go on Thursday and then subsequently go  back to his low-dose prednisone which he was taking for his stills disease.  I suspect, as stated by Dr. Dumont, that the stills was the presage to his anaplastic large cell ALK positive CD30 positive lymphoma.  Whether or not we consolidate this with stem cell transplant depends first of all on whether or not we can get a great response and then there are algorithms based on risk criteria which frankly he is borderline for and this will be a difficult decision to make and I will enlist Dr. Carrion upon discharge to help with that process.  That of course will not come until we are several courses out and subsequent PET scan presumably shows excellent response in the lungs and nodes.  The bones may take longer to show changes and it may be that radiographic abnormalities persist even if PET scan becomes quiesced sent in the bones.  We had a long discussion about whether to radiate anything at this junction but I do not think there is any particular focus that is of grave risk for pathologic fracture but he knows to immediately seek attention at the emergency room should he have focal weakness in his lower extremities.  He certainly does not have that presently.  I discussed with patient and his sister going over all these details for 90 minutes of continuous time    I would like to keep him in the hospital through at least Thursday to ensure he does not develop tumor lysis or other posttreatment febrile complications and aim for discharge Thursday afternoon with Neulasta shot either on Thursday afternoon or on Friday as an outpatient in our cancer center.        Brent Brizuela MD    9/10/2019

## 2019-09-11 LAB
ANION GAP SERPL CALCULATED.3IONS-SCNC: 8 MMOL/L (ref 5–15)
BASOPHILS # BLD MANUAL: 0 10*3/MM3 (ref 0–0.2)
BASOPHILS NFR BLD AUTO: 0 % (ref 0–1.5)
BUN BLD-MCNC: 18 MG/DL (ref 6–20)
BUN/CREAT SERPL: 30.5 (ref 7–25)
CALCIUM SPEC-SCNC: 7.1 MG/DL (ref 8.6–10.5)
CHLORIDE SERPL-SCNC: 100 MMOL/L (ref 98–107)
CO2 SERPL-SCNC: 25 MMOL/L (ref 22–29)
CREAT BLD-MCNC: 0.59 MG/DL (ref 0.76–1.27)
DEPRECATED RDW RBC AUTO: 51.8 FL (ref 37–54)
EOSINOPHIL # BLD MANUAL: 0.1 10*3/MM3 (ref 0–0.4)
EOSINOPHIL NFR BLD MANUAL: 1 % (ref 0.3–6.2)
ERYTHROCYTE [DISTWIDTH] IN BLOOD BY AUTOMATED COUNT: 17.5 % (ref 12.3–15.4)
GFR SERPL CREATININE-BSD FRML MDRD: 149 ML/MIN/1.73
GLUCOSE BLD-MCNC: 229 MG/DL (ref 65–99)
HCT VFR BLD AUTO: 26.1 % (ref 37.5–51)
HGB BLD-MCNC: 8.3 G/DL (ref 13–17.7)
LDH SERPL-CCNC: 288 U/L (ref 135–225)
LYMPHOCYTES # BLD MANUAL: 0.3 10*3/MM3 (ref 0.7–3.1)
LYMPHOCYTES NFR BLD MANUAL: 1 % (ref 5–12)
LYMPHOCYTES NFR BLD MANUAL: 3 % (ref 19.6–45.3)
MAGNESIUM SERPL-MCNC: 2.3 MG/DL (ref 1.6–2.6)
MCH RBC QN AUTO: 26 PG (ref 26.6–33)
MCHC RBC AUTO-ENTMCNC: 31.8 G/DL (ref 31.5–35.7)
MCV RBC AUTO: 81.8 FL (ref 79–97)
MONOCYTES # BLD AUTO: 0.1 10*3/MM3 (ref 0.1–0.9)
NEUTROPHILS # BLD AUTO: 9.5 10*3/MM3 (ref 1.7–7)
NEUTROPHILS NFR BLD MANUAL: 91 % (ref 42.7–76)
NEUTS BAND NFR BLD MANUAL: 4 % (ref 0–5)
PHOSPHATE SERPL-MCNC: 2.2 MG/DL (ref 2.5–4.5)
PLAT MORPH BLD: NORMAL
PLATELET # BLD AUTO: 80 10*3/MM3 (ref 140–450)
PMV BLD AUTO: 12.2 FL (ref 6–12)
POTASSIUM BLD-SCNC: 4 MMOL/L (ref 3.5–5.2)
RBC # BLD AUTO: 3.19 10*6/MM3 (ref 4.14–5.8)
RBC MORPH BLD: NORMAL
SODIUM BLD-SCNC: 133 MMOL/L (ref 136–145)
URATE SERPL-MCNC: 3 MG/DL (ref 3.4–7)
WBC MORPH BLD: NORMAL
WBC NRBC COR # BLD: 10 10*3/MM3 (ref 3.4–10.8)

## 2019-09-11 PROCEDURE — 83735 ASSAY OF MAGNESIUM: CPT | Performed by: INTERNAL MEDICINE

## 2019-09-11 PROCEDURE — 63710000001 PREDNISONE PER 5 MG: Performed by: INTERNAL MEDICINE

## 2019-09-11 PROCEDURE — 83615 LACTATE (LD) (LDH) ENZYME: CPT | Performed by: INTERNAL MEDICINE

## 2019-09-11 PROCEDURE — 84100 ASSAY OF PHOSPHORUS: CPT | Performed by: INTERNAL MEDICINE

## 2019-09-11 PROCEDURE — 85025 COMPLETE CBC W/AUTO DIFF WBC: CPT | Performed by: INTERNAL MEDICINE

## 2019-09-11 PROCEDURE — 84550 ASSAY OF BLOOD/URIC ACID: CPT | Performed by: INTERNAL MEDICINE

## 2019-09-11 PROCEDURE — 25010000002 DAPTOMYCIN PER 1 MG: Performed by: INTERNAL MEDICINE

## 2019-09-11 PROCEDURE — 97535 SELF CARE MNGMENT TRAINING: CPT

## 2019-09-11 PROCEDURE — 85007 BL SMEAR W/DIFF WBC COUNT: CPT | Performed by: INTERNAL MEDICINE

## 2019-09-11 PROCEDURE — 80048 BASIC METABOLIC PNL TOTAL CA: CPT | Performed by: INTERNAL MEDICINE

## 2019-09-11 PROCEDURE — 97161 PT EVAL LOW COMPLEX 20 MIN: CPT

## 2019-09-11 PROCEDURE — 99232 SBSQ HOSP IP/OBS MODERATE 35: CPT | Performed by: NURSE PRACTITIONER

## 2019-09-11 RX ADMIN — BENZOCAINE AND MENTHOL 1 LOZENGE: 15; 3.6 LOZENGE ORAL at 19:42

## 2019-09-11 RX ADMIN — SODIUM CHLORIDE, PRESERVATIVE FREE 10 ML: 5 INJECTION INTRAVENOUS at 21:30

## 2019-09-11 RX ADMIN — METOPROLOL SUCCINATE 25 MG: 25 TABLET, EXTENDED RELEASE ORAL at 09:10

## 2019-09-11 RX ADMIN — Medication 1 CAPSULE: at 09:11

## 2019-09-11 RX ADMIN — SODIUM CHLORIDE, PRESERVATIVE FREE 10 ML: 5 INJECTION INTRAVENOUS at 21:29

## 2019-09-11 RX ADMIN — SODIUM CHLORIDE, PRESERVATIVE FREE 10 ML: 5 INJECTION INTRAVENOUS at 09:11

## 2019-09-11 RX ADMIN — DIPHENHYDRAMINE HYDROCHLORIDE, ZINC ACETATE: 2; .1 CREAM TOPICAL at 10:11

## 2019-09-11 RX ADMIN — PREDNISONE 100 MG: 50 TABLET ORAL at 09:11

## 2019-09-11 RX ADMIN — DIPHENHYDRAMINE HYDROCHLORIDE, ZINC ACETATE: 2; .1 CREAM TOPICAL at 21:29

## 2019-09-11 RX ADMIN — DAPTOMYCIN 700 MG: 500 INJECTION, POWDER, LYOPHILIZED, FOR SOLUTION INTRAVENOUS at 17:11

## 2019-09-11 RX ADMIN — ALLOPURINOL 300 MG: 300 TABLET ORAL at 09:10

## 2019-09-11 RX ADMIN — BENZOCAINE AND MENTHOL 1 LOZENGE: 15; 3.6 LOZENGE ORAL at 17:11

## 2019-09-11 RX ADMIN — FAMOTIDINE 20 MG: 20 TABLET ORAL at 09:11

## 2019-09-11 RX ADMIN — BENZOCAINE AND MENTHOL 1 LOZENGE: 15; 3.6 LOZENGE ORAL at 10:10

## 2019-09-11 RX ADMIN — OXYCODONE HYDROCHLORIDE AND ACETAMINOPHEN 1 TABLET: 7.5; 325 TABLET ORAL at 17:10

## 2019-09-11 RX ADMIN — FAMOTIDINE 20 MG: 20 TABLET ORAL at 21:29

## 2019-09-11 NOTE — PLAN OF CARE
Problem: Patient Care Overview  Goal: Plan of Care Review  Outcome: Ongoing (interventions implemented as appropriate)   09/11/19 0359   Coping/Psychosocial   Plan of Care Reviewed With patient   Plan of Care Review   Progress improving   OTHER   Outcome Summary VSS. Just occassional c/o pain and PRN pain medication given. Dad at bedside. Will continue to monitor.        Problem: Fall Risk (Adult)  Goal: Absence of Fall  Outcome: Ongoing (interventions implemented as appropriate)   09/11/19 0359   Fall Risk (Adult)   Absence of Fall making progress toward outcome       Problem: Oncology Care (Adult)  Goal: Signs and Symptoms of Listed Potential Problems Will be Absent, Minimized or Managed (Oncology Care)  Outcome: Ongoing (interventions implemented as appropriate)   09/11/19 0359   Goal/Outcome Evaluation   Problems Assessed (Oncology Care) all   Problems Present (Oncology Care) fatigue;pain

## 2019-09-11 NOTE — PLAN OF CARE
Problem: Patient Care Overview  Goal: Plan of Care Review  Outcome: Ongoing (interventions implemented as appropriate)   09/11/19 1125   Coping/Psychosocial   Plan of Care Reviewed With patient;family   OTHER   Outcome Summary PT wound care consulted for potential BLE compression needs. Pt has trace/no remaining edema to the BLE, and his rash is resolving with current POC. Pt does not require compression at this time. PT will check back in 1-2 days to determine any further compression needs.

## 2019-09-11 NOTE — THERAPY WOUND CARE TREATMENT
Acute Care - Wound/Debridement Initial Evaluation  [facility]     Patient Name: Stephen Stoll  : 1975  MRN: [id number]  Today's Date: 2019  Onset of Illness/Injury or Date of Surgery: 19  Date of Referral to PT: 09/10/19   Referring Physician: MD Rolan      Admit Date: 2019    Visit Dx:    ICD-10-CM ICD-9-CM   1. Mediastinal lymphadenopathy R59.0 785.6   2. Sepsis, due to unspecified organism (CMS/HCC) A41.9 038.9     995.91   3. Shortness of breath R06.02 786.05   4. Abnormal chest x-ray R93.89 793.2   5. Leukocytosis, unspecified type D72.829 288.60   6. Anaplastic ALK-positive large cell lymphoma, unspecified body region (CMS/HCC) C84.60 200.60       Patient Active Problem List   Diagnosis   • Dyslipidemia   • Coronary artery disease involving native coronary artery of native heart with angina pectoris (CMS/HCC)   • Leukocytosis (leucocytosis)   • Myocarditis (CMS/HCC)   • Fever   • Still's disease (CMS/HCC)   • Vertebral osteomyelitis (CMS/HCC)   • Anaplastic ALK-positive large cell lymphoma    • Hyponatremia   • Mediastinal lymphadenopathy   • Staphylococcus aureus bacteremia        Past Medical History:   Diagnosis Date   • Acute kidney injury (CMS/HCC)    • Anaplastic ALK-positive large cell lymphoma (CMS/HCC)    • Bacteremia    • Hyperlipidemia    • Myocarditis (CMS/HCC)    • Pneumonia    • Severe sepsis (CMS/HCC)         Past Surgical History:   Procedure Laterality Date   • APPENDECTOMY     • BACK SURGERY      L5 S1   • BRONCHOSCOPY N/A 7/10/2019    Procedure: BRONCHOSCOPY WITH ENDOBRONCHIAL ULTRASOUND AND TRANSBRONCHIAL BIOPSY AND FLUORO;  Surgeon: Marvel Sandoval MD;  Location:  [facility];  Service: Pulmonary   • CARDIAC CATHETERIZATION N/A 2019    Procedure: LEFT HEART CATH;  Surgeon: Deep Muñiz IV, MD;  Location:  [facility];  Service: Cardiovascular   • INTERVENTIONAL RADIOLOGY PROCEDURE N/A 2019    Procedure: THORACIC  SPINE BIOSPY;  Surgeon: Juve Avila MD;  Location:  [facility] INV[facility];  Service: Interventional Radiology   • MEDIASTINOSCOPY, BRONCHOSCOPY N/A 9/6/2019    Procedure: BRONCHOSCOPY, MEDIASTINOSCOPY WITH BIOPSY;  Surgeon: El Can MD;  Location:  [facility];  Service: Cardiothoracic           Rash 09/05/19 1630 (Active)   Distribution generalized 9/11/2019  8:00 AM   Configuration/Shape round;asymmetric 9/11/2019  8:00 AM   Borders irregular 9/11/2019  8:00 AM   Characteristics itching 9/11/2019  8:00 AM   Color red 9/11/2019  8:00 AM   Lesion Size greater than 1 cm 9/11/2019  8:00 AM       Wound 09/06/19 1152 chest Incision (Active)   Dressing Appearance dry;intact 9/11/2019  8:00 AM   Closure TERESA 9/11/2019  8:00 AM   Base dressing in place, unable to visualize 9/11/2019  8:00 AM   Periwound intact;dry;blanchable 9/10/2019  8:00 PM   Periwound Temperature warm 9/10/2019  8:00 PM   Periwound Skin Turgor soft 9/10/2019  8:00 PM   Drainage Amount none 9/11/2019  8:00 AM   Dressing Care, Wound gauze 9/11/2019  8:00 AM     Lymphedema     Row Name 09/11/19 1125             Lymphedema Edema Assessment    Ptting Edema Category  By severity  -      Pitting Edema  Other (comment) trace/none  -         Skin Changes/Observations    Location/Assessment  Lower Extremity  -      Lower Extremity Conditions  bilateral:;intact;clean;dry;inflamed rash, being addressed with ointment  -      Lower Extremity Color/Pigment  bilateral:;red rash  -      Lower Extremity Skin Details  No open areas noted  -         Lymphedema Pulses/Capillary Refill    Lymphedema Pulses/Capillary Refill  lower extremity pulses;capillary refill  -      Dorsalis Pedis Pulse  right:;left:;+2 normal  -      Posterior Tibialis Pulse  right:;left:;+2 normal  -      Capillary Refill  lower extremity capillary refill  -      Lower Extremity Capillary Refill  right:;left:;less than 3 seconds  -         Compression/Skin Care     Compression/Skin Care  skin care;wrapping location;bandaging  -      Wrapping Comments  Pt with trace/no remaining edema to the BLE. Rash is improving. Pt does not require compression at this time. Will check back in 1-2 days to determine any additional needs.  -        User Key  (r) = Recorded By, (t) = Taken By, (c) = Cosigned By    Initials Name Provider Type     Keshia Moran, PT Physical Therapist          WOUND DEBRIDEMENT                        PT ASSESSMENT (last 12 hours)      Physical Therapy Evaluation     Row Name 09/11/19 1125          PT Evaluation Time/Intention    Subjective Information  no complaints  -     Document Type  wound care;evaluation  -     Row Name 09/11/19 1125          General Information    Patient Profile Reviewed?  yes  -     Onset of Illness/Injury or Date of Surgery  09/05/19  -     Referring Physician  MD Rolan  -     Patient Observations  alert;cooperative;agree to therapy  -     General Observations of Patient  Pt supine with HOB elevated, family present.  -     Pertinent History of Current Functional Problem  Pt with BLE edema.  -     Existing Precautions/Restrictions  other (see comments) chemo  -     Risks Reviewed  patient:;family:;increased discomfort  -     Benefits Reviewed  patient:;family:;improve skin integrity  -     Barriers to Rehab  medically complex  -     Row Name 09/11/19 1125          Cognitive Assessment/Intervention- PT/OT    Orientation Status (Cognition)  oriented x 3  -     Follows Commands (Cognition)  WFL  -     Row Name 09/11/19 1125          Pain Assessment    Additional Documentation  Pain Scale: Numbers Pre/Post-Treatment (Group)  -     Row Name 09/11/19 1125          Pain Scale: Numbers Pre/Post-Treatment    Pain Scale: Numbers, Pretreatment  0/10 - no pain  -     Pain Scale: Numbers, Post-Treatment  0/10 - no pain  -     Row Name             Wound 09/06/19 1152 chest Incision    Wound - Properties  Group Date first assessed: 09/06/19  -EL Time first assessed: 1152  -EL Location: chest  -EL Primary Wound Type: Incision  -EL    Row Name 09/11/19 1125          Coping    Observed Emotional State  accepting;calm;cooperative  -     Verbalized Emotional State  acceptance  -     Row Name 09/11/19 1125          Plan of Care Review    Plan of Care Reviewed With  patient;family  -     Row Name 09/11/19 1125          Physical Therapy Clinical Impression    Date of Referral to PT  09/10/19  -     PT Diagnosis (PT Clinical Impression)  impaired integumentary integrity  -     Patient/Family Goals Statement (PT Clinical Impression)  Pt did not state  -     Criteria for Skilled Interventions Met (PT Clinical Impression)  yes  -     Care Plan Review (PT)  evaluation/treatment results reviewed;care plan/treatment goals reviewed;risks/benefits reviewed;current/potential barriers reviewed;patient/other agree to care plan  -     Care Plan Review, Other Participant (PT Clinical Impression)  family  -     Row Name 09/11/19 1125          Physical Therapy Goals    Wound Care Goal Selection (PT)  wound care, PT goal 1  -     Additional Documentation  Wound Care Goal Selection (PT) (Row)  -     Row Name 09/11/19 1125          Wound Care Goal 1 (PT)    Wound Care Goal 1 (PT)  Pt/caregiver will demonstrate independence with use of compression system if appropriate.  -     Time Frame (Wound Care Goal 1, PT)  10 days  -     Row Name 09/11/19 1125          Plan for Physical Therapy Intervention    66705 - PT Self Care/Mgmt Minutes  10  -     Row Name 09/11/19 1125          Positioning and Restraints    Pre-Treatment Position  in bed  -     Post Treatment Position  bed  -     In Bed  supine;call light within reach;encouraged to call for assist;with family/caregiver;heels elevated  -       User Key  (r) = Recorded By, (t) = Taken By, (c) = Cosigned By    Initials Name Provider Type    Keshia Gong, PT  Physical Therapist    Rena Martinez, RN Registered Nurse            Recommendation and Plan  Anticipated Discharge Disposition (PT): home with assist  Plan of Care Reviewed With: patient, family       Outcome Summary/Treatment Plan (PT)  Anticipated Discharge Disposition (PT): home with assist   Outcome Summary: PT wound care consulted for potential BLE compression needs. Pt has trace/no remaining edema to the BLE, and his rash is resolving with current POC. Pt does not require compression at this time. PT will check back in 1-2 days to determine any further compression needs.  Plan of Care Reviewed With: patient, family            Time Calculation  PT Charges     Row Name 09/11/19 1125             Time Calculation    Start Time  1125  -MC      PT Goal Re-Cert Due Date  09/21/19  -         Timed Charges    76098 - PT Self Care/Mgmt Minutes  10  -MC        User Key  (r) = Recorded By, (t) = Taken By, (c) = Cosigned By    Initials Name Provider Type     Keshia Moran, PT Physical Therapist            Therapy Charges for Today     Code Description Service Date Service Provider Modifiers Qty    11344702004 HC PT SELF CARE/MGMT/TRAIN EA 15 MIN 9/11/2019 Keshia Moran, PT GP 1    83371644180 HC PT EVAL LOW COMPLEXITY 2 9/11/2019 Keshia Moran, PT GP 1                    Keshia Moran PT  9/11/2019

## 2019-09-11 NOTE — PLAN OF CARE
Problem: Patient Care Overview  Goal: Plan of Care Review  Outcome: Ongoing (interventions implemented as appropriate)   09/11/19 2074   OTHER   Outcome Summary Pt pleasant and cooperative. No acute issues during shift. Rash still present, cream applied as ordered. Antibiotics continued. Possible d/c home phillip with IV abx. VSS will cont ti monitor.     Goal: Individualization and Mutuality  Outcome: Ongoing (interventions implemented as appropriate)      Problem: Fall Risk (Adult)  Goal: Absence of Fall  Outcome: Ongoing (interventions implemented as appropriate)      Problem: Oncology Care (Adult)  Goal: Signs and Symptoms of Listed Potential Problems Will be Absent, Minimized or Managed (Oncology Care)  Outcome: Ongoing (interventions implemented as appropriate)

## 2019-09-11 NOTE — PROGRESS NOTES
1       Stephen Stoll  1975  [id number]  9/11/2019    CC: Fever of unknown origin    Stephen Stoll is a 44 y.o. 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  Status post biopsy of T8 vertebral body and suprasternal notch mediastinoscopy with new diagnosis of anaplastic large cell lymphoma.      Past medical history:  Past Medical History:   Diagnosis Date   • Acute kidney injury (CMS/HCC)    • Anaplastic ALK-positive large cell lymphoma (CMS/HCC)    • Bacteremia    • Hyperlipidemia    • Myocarditis (CMS/HCC)    • Pneumonia    • Severe sepsis (CMS/HCC)        Medications:   Current Facility-Administered Medications:   •  acetaminophen (TYLENOL) suppository 650 mg, 650 mg, Rectal, Q4H PRN, Guadalupe Renee MD  •  acetaminophen (TYLENOL) tablet 1,000 mg, 1,000 mg, Oral, Q6H PRN, Roxanna Guo DO, 1,000 mg at 09/09/19 1228  •  allopurinol (ZYLOPRIM) tablet 300 mg, 300 mg, Oral, Daily, Brent Brizuela MD, 300 mg at 09/10/19 0857  •  benzocaine-menthol (CEPACOL) lozenge 1 lozenge, 1 lozenge, Mouth/Throat, Q2H PRN, El Can MD, 1 lozenge at 09/10/19 1324  •  DAPTOmycin (CUBICIN) 700 mg in sodium chloride 0.9 % 50 mL IVPB, 8 mg/kg (Adjusted), Intravenous, Q24H, Irvin Mondragon MD, Last Rate: 100 mL/hr at 09/10/19 1547, 700 mg at 09/10/19 1547  •  diphenhydrAMINE-zinc acetate 2-0.1 % cream, , Topical, BID PRN, Adriana Lee, APRN  •  famotidine (PEPCID) tablet 20 mg, 20 mg, Oral, BID, Trista Quiroz MD, 20 mg at 09/10/19 2103  •  lactobacillus acidophilus (RISAQUAD) capsule 1 capsule, 1 capsule, Oral, Daily, Trista Quiroz MD, 1 capsule at 09/10/19 0857  •  metoprolol succinate XL (TOPROL-XL) 24 hr tablet 25 mg, 25 mg, Oral, Q24H, Lazaro Dunbar MD, 25 mg at 09/10/19 0857  •  oxyCODONE-acetaminophen  (PERCOCET) 7.5-325 MG per tablet 1 tablet, 1 tablet, Oral, Q6H PRN, Case, Roxanna V., DO, 1 tablet at 09/10/19 2103  •  potassium & sodium phosphates (PHOS-NAK) 280-160-250 MG packet - for Phosphorus less than 1.25 mg/dL, 2 packet, Oral, Q6H PRN **OR** potassium & sodium phosphates (PHOS-NAK) 280-160-250 MG packet - for Phosphorus 1.25 - 2.5 mg/dL, 2 packet, Oral, Q6H PRN, Patiño, Napoleon A, RPH, 2 packet at 09/07/19 1132  •  potassium chloride (KLOR-CON) packet 40 mEq, 40 mEq, Oral, PRN, Patiño, Napoleon A, RPH  •  potassium chloride (MICRO-K) CR capsule 40 mEq, 40 mEq, Oral, PRN, Patiño, Napoleon A, RPH, 40 mEq at 09/07/19 1132  •  predniSONE (DELTASONE) tablet 100 mg, 100 mg, Oral, Daily With Breakfast, Brent Brizuela MD, 100 mg at 09/10/19 0857  •  sodium chloride 0.9 % flush 10 mL, 10 mL, Intravenous, PRN, Polson, Phi, DO  •  sodium chloride 0.9 % flush 10 mL, 10 mL, Intravenous, Q12H, Sanjuanita Ching DNP, APRN, 10 mL at 09/09/19 2058  •  sodium chloride 0.9 % flush 10 mL, 10 mL, Intravenous, PRN, Sanjuanita Ching DNP, APRN  •  sodium chloride 0.9 % flush 10 mL, 10 mL, Intravenous, Q12H, Zina Bauman MD, 10 mL at 09/10/19 2104  •  sodium chloride 0.9 % flush 10 mL, 10 mL, Intravenous, PRN, Zina Bauman MD  Antibiotics:  Anti-Infectives (From admission, onward)    Ordered     Dose/Rate Route Frequency Start Stop    09/08/19 1505  DAPTOmycin (CUBICIN) 700 mg in sodium chloride 0.9 % 50 mL IVPB     Ordering Provider:  Irvin Mondragon MD    8 mg/kg × 86.2 kg (Adjusted)  100 mL/hr over 30 Minutes Intravenous Every 24 Hours 09/08/19 1600 10/08/19 1559    09/06/19 0940  ceFAZolin in dextrose (ANCEF) IVPB solution 2 g     Ordering Provider:  Jeffrey Galeas PA    2 g  over 30 Minutes Intravenous Once 09/06/19 0942 09/06/19 1051    09/05/19 1121  vancomycin 1750 mg/500 mL 0.9% NS IVPB ([facility])     Ordering Provider:  Phi Mir DO    20 mg/kg × 93.4 kg  over 120 Minutes Intravenous Once  "09/05/19 1123 09/05/19 1416    09/05/19 1121  piperacillin-tazobactam (ZOSYN) 3.375 g in iso-osmotic dextrose 50 ml (premix)     Ordering Provider:  Phi Mir DO    3.375 g  over 30 Minutes Intravenous Once 09/05/19 1122 09/05/19 1215          Allergies:  is allergic to ciprofloxacin and sulfa antibiotics.    Review of Systems: All other reviewed and negative except as per HPI    Blood pressure 104/67, pulse 61, temperature 97.5 °F (36.4 °C), temperature source Oral, resp. rate 16, height 182.9 cm (72\"), weight 99.8 kg (220 lb), SpO2 95 %.  GENERAL: Awake and alert, in no acute distress.   HEENT: Oropharynx without thrush. Sinuses nontender. Dentition in good repair. No cervical adenopathy. No carotid bruits/ jugular venous distention.   EYES: PERRL. No conjunctival injection. No icterus. EOMI.  LYMPHATICS: No lymphadenopathy of the neck or axillary or inguinal regions.   HEART: No murmur, gallop, or pericardial friction rub.  PICC exit benign.  LUNGS: Clear to auscultation anteriorly. No percussion dullness.   ABDOMEN: Soft, nontender, nondistended. No appreciable HSM.    SKIN: Warm and dry without cutaneous eruptions. No embolic stigmata.  Previous rash on left dorsal foot, left medial thigh, and trunk improved.  PSYCHIATRIC: Mental status lucid. Cranial nerve function intact.  No further confusion.    DIAGNOSTICS:  Lab Results   Component Value Date    WBC 16.55 (H) 09/09/2019    HGB 9.1 (L) 09/09/2019    HCT 28.1 (L) 09/09/2019    PLT 81 (L) 09/09/2019     Lab Results   Component Value Date    CRP 37.66 (H) 09/05/2019     Lab Results   Component Value Date    SEDRATE >130 (H) 09/05/2019     Lab Results   Component Value Date    GLUCOSE 205 (H) 09/10/2019    BUN 16 09/10/2019    CREATININE 0.62 (L) 09/10/2019    EGFRIFNONA 140 09/10/2019    BCR 25.8 (H) 09/10/2019    CO2 23.0 09/10/2019    CALCIUM 7.2 (L) 09/10/2019    PROTENTOTREF 8.0 08/30/2019    ALBUMIN 1.90 (L) 09/09/2019    LABIL2 0.6 (L) 08/30/2019    " AST 30 09/09/2019    ALT 34 09/09/2019       Microbiology: Oxacillin susceptible staph aureus bacteremia.    RADIOLOGY:  Imaging Results (last 72 hours)     Procedure Component Value Units Date/Time    XR Chest 1 View [[id number]] Collected:  09/07/19 0751     Updated:  09/11/19 0853    Narrative:          EXAMINATION: XR CHEST 1 VW - 09/07/2019     INDICATION: R59.0-Localized enlarged lymph nodes; A41.9-Sepsis,  unspecified organism; R06.02-Shortness of breath; R93.89-Abnormal  findings on diagnostic imaging of other specified body structures;  D72.829-Elevated white blood cell count, unspecified.      COMPARISON: Chest radiograph 09/06/2019.     FINDINGS: Single portable chest radiograph is submitted for review. The  heart is prominent in size, similar to prior. Multifocal nodular  opacities are identified throughout the lungs as previously seen on the  CT of the chest from 09/05/2019.  No new airspace disease, pleural  effusion or pneumothorax. The visualized upper abdomen is unrevealing.  No acute osseous abnormality.           Impression:       Similar exam when compared to 09/06/2019.     DICTATED:   09/07/2019  EDITED/ls :   09/07/2019      This report was finalized on 9/11/2019 8:50 AM by Dr. Lionel Jackson MD.       CT Abdomen Pelvis Without Contrast [[id number]] Collected:  09/05/19 1551     Updated:  09/10/19 1354    Narrative:       EXAMINATION: CT CHEST WO CONTRAST, CT ABDOMEN/PELVIS WO CONTRAST -  09/05/2019     INDICATION: Cough, sepsis, abnormal chest x-ray.     TECHNIQUE: Unenhanced CT imaging of the chest, abdomen, and pelvis was  performed. Additional coronal and sagittal reformatted imaging was  provided for review.     The radiation dose reduction device was turned on for each scan per the  ALARA (As Low as Reasonably Achievable) protocol.     COMPARISON: MRI of the thoracic and lumbar spine 08/09/2019.     FINDINGS:      CHEST: Dedicated CT imaging of the chest demonstrates  interval  development of numerous pulmonary nodules throughout the lungs, the  largest within the right mid lung measuring 2.0 cm. These are concerning  for metastatic foci given the provided clinical history. No focal  pneumonia, pleural effusion, or pneumothorax identified. These pulmonary  nodules account for the abnormality seen on the  chest radiograph from  09/05/2019.     Extensive axillary and mediastinal lymphadenopathy is identified. The  largest within the right paratracheal region measures up to 2.1 cm and  demonstrates mild central necrosis. The pulmonary artery is within  normal limits in size. The heart is within normal limits in size. Trace  pericardial effusion is identified. Prominent cardiophrenic lymph nodes  are also noted.     ABDOMEN/PELVIS: Lack of intravenous contrast limits evaluation of  underlying abdominal and pelvic organs. Hypoattenuated region involving  the right hepatic lobe is identified with additional more confluent  hypoattenuating region within the mid right hepatic lobe noted measuring  up to 2.5 cm which is concerning for an additional site of metastatic  disease. The gallbladder demonstrates mild cholelithiasis. The spleen is  enlarged and otherwise unremarkable. The pancreas does not demonstrate  abnormality. The adrenal glands and kidneys also do not demonstrate  abnormality. No evidence of hydronephrosis. There is mild perinephric  inflammation which requires clinical correlation. Extensive peritoneal  and retroperitoneal lymphadenopathy is identified throughout the  visualized abdomen and pelvis. The urinary bladder is partially  distended and otherwise unremarkable. The stomach is decompressed and  unremarkable. No evidence of small bowel obstruction. No free air or  free fluid identified. Postsurgical changes in the right lower quadrant  are identified and likely from prior appendectomy.  Small fat-filled  right inguinal and left inguinal hernias are noted.     Osseous  structures: The visualized bony pelvis appears intact.  Degenerative changes of the bilateral hips are identified.  Re-demonstration of aggressive osseous changes at T8 and T9, previously  documented MRI thoracic spine from 8/21/2019. No convincing evidence of  posterior cortex retropulsion identified. Additional lytic lesion  involving L4 is also noted.       Impression:          Constellation of findings concerning for metastatic disease:      - Interval development of numerous pulmonary nodules throughout the  lungs with the largest measuring 2.0 cm within the right midlung and  concerning for underlying pulmonary metastasis and which may account for  abnormality identified in the chest radiograph performed 09/05/2019.     - Extensive axillary, mediastinal, and abdominopelvic lymphadenopathy as  described above.     - Hypoattenuated regions involving the liver concerning for additional  sites of metastatic disease, although are limited in evaluation  secondary to lack of intravenous contrast. Attention on follow-up  imaging is advised.     - Redemonstration of T8/T9 aggressive osseous lesions with additional  lytic lesion involving the L4 vertebral body and additional  subcentimeter lytic lesions throughout the thoracolumbar spine  suggested.     Nonspecific perinephric inflammation. Correlate with urinalysis to  exclude underlying pyelonephritis.     DICTATED:   09/05/2019  EDITED/ls :   09/05/2019      This report was finalized on 9/10/2019 1:50 PM by Dr. Lionel Jackson MD.       CT Chest Without Contrast [[id number]] Collected:  09/05/19 1551     Updated:  09/10/19 1354    Narrative:       EXAMINATION: CT CHEST WO CONTRAST, CT ABDOMEN/PELVIS WO CONTRAST -  09/05/2019     INDICATION: Cough, sepsis, abnormal chest x-ray.     TECHNIQUE: Unenhanced CT imaging of the chest, abdomen, and pelvis was  performed. Additional coronal and sagittal reformatted imaging was  provided for review.     The radiation dose  reduction device was turned on for each scan per the  ALARA (As Low as Reasonably Achievable) protocol.     COMPARISON: MRI of the thoracic and lumbar spine 08/09/2019.     FINDINGS:      CHEST: Dedicated CT imaging of the chest demonstrates interval  development of numerous pulmonary nodules throughout the lungs, the  largest within the right mid lung measuring 2.0 cm. These are concerning  for metastatic foci given the provided clinical history. No focal  pneumonia, pleural effusion, or pneumothorax identified. These pulmonary  nodules account for the abnormality seen on the  chest radiograph from  09/05/2019.     Extensive axillary and mediastinal lymphadenopathy is identified. The  largest within the right paratracheal region measures up to 2.1 cm and  demonstrates mild central necrosis. The pulmonary artery is within  normal limits in size. The heart is within normal limits in size. Trace  pericardial effusion is identified. Prominent cardiophrenic lymph nodes  are also noted.     ABDOMEN/PELVIS: Lack of intravenous contrast limits evaluation of  underlying abdominal and pelvic organs. Hypoattenuated region involving  the right hepatic lobe is identified with additional more confluent  hypoattenuating region within the mid right hepatic lobe noted measuring  up to 2.5 cm which is concerning for an additional site of metastatic  disease. The gallbladder demonstrates mild cholelithiasis. The spleen is  enlarged and otherwise unremarkable. The pancreas does not demonstrate  abnormality. The adrenal glands and kidneys also do not demonstrate  abnormality. No evidence of hydronephrosis. There is mild perinephric  inflammation which requires clinical correlation. Extensive peritoneal  and retroperitoneal lymphadenopathy is identified throughout the  visualized abdomen and pelvis. The urinary bladder is partially  distended and otherwise unremarkable. The stomach is decompressed and  unremarkable. No evidence of small  bowel obstruction. No free air or  free fluid identified. Postsurgical changes in the right lower quadrant  are identified and likely from prior appendectomy.  Small fat-filled  right inguinal and left inguinal hernias are noted.     Osseous structures: The visualized bony pelvis appears intact.  Degenerative changes of the bilateral hips are identified.  Re-demonstration of aggressive osseous changes at T8 and T9, previously  documented MRI thoracic spine from 8/21/2019. No convincing evidence of  posterior cortex retropulsion identified. Additional lytic lesion  involving L4 is also noted.       Impression:          Constellation of findings concerning for metastatic disease:      - Interval development of numerous pulmonary nodules throughout the  lungs with the largest measuring 2.0 cm within the right midlung and  concerning for underlying pulmonary metastasis and which may account for  abnormality identified in the chest radiograph performed 09/05/2019.     - Extensive axillary, mediastinal, and abdominopelvic lymphadenopathy as  described above.     - Hypoattenuated regions involving the liver concerning for additional  sites of metastatic disease, although are limited in evaluation  secondary to lack of intravenous contrast. Attention on follow-up  imaging is advised.     - Redemonstration of T8/T9 aggressive osseous lesions with additional  lytic lesion involving the L4 vertebral body and additional  subcentimeter lytic lesions throughout the thoracolumbar spine  suggested.     Nonspecific perinephric inflammation. Correlate with urinalysis to  exclude underlying pyelonephritis.     DICTATED:   09/05/2019  EDITED/ls :   09/05/2019      This report was finalized on 9/10/2019 1:50 PM by Dr. Lionel Jackson MD.       XR Chest 1 View [[id number]] Collected:  09/08/19 0845     Updated:  09/08/19 1751    Narrative:          EXAMINATION: XR CHEST 1 VW - 09/08/2019     INDICATION: R59.0-Localized enlarged lymph  nodes; A41.9-Sepsis,  unspecified organism; R06.02-Shortness of breath; R93.89-Abnormal  findings on diagnostic imaging of other specified body structures;  D72.829-Elevated white blood cell count, unspecified.     COMPARISON: Chest radiograph of 09/07/2019.     FINDINGS: Single portable chest radiograph was submitted in 2  acquisitions to image the entire chest. Mild cardiomegaly, similar to  prior. Known pulmonary nodules/lesions are seen but better evaluated on  prior imaging. No superimposed airspace disease, pneumothorax, or  pleural effusion. Please note the left costophrenic angle/left lower  lung is not entirely imaged secondary to patient positioning. The  visualized upper abdomen is unrelenting. No acute osseous abnormality is  identified.       Impression:          Limited exam secondary to patient positioning, however similar in  appearance when compared to 09/07/2019.     DICTATED:   09/08/2019  EDITED/ls :   09/08/2019              Assessment and Plan: Adult onset juvenile rheumatoid arthritis.  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  Status post prednisone and Plaquenil.  Recurrence of hectic fever.  Leukocytosis.  Massive increase in inflammatory markers.  Lytic lesion at T8 vertebral body.  Generalized intra-abdominal and thoracic lymphadenopathy.  Bone marrow, T8 vertebral body, and necrotic mediastinal lymph node all consistent with anaplastic large cell lymphoma.  Status post induction chemotherapy with CAP and monoclonal antibody therapy.  Staphylococcus aureus bacteremia without removable focus/oxacillin susceptible. Maximum temperature over 24 hours 97.5.  Currently same.  Pulse 61, respiratory 16, blood pressure 104/67 mmHg.  No arterial desaturation.  No new CBC.  Yesterday's plasma creatinine 0.62.  Cultures reviewed.  Utilization management: Anticipate discharge home this afternoon on daptomycin 500 mg IV every 24 hours.  Our infusion nurses  will dispense and teach the patient's wife.  Follow-up with me next Tuesday clinic with CBC with WBC differential, CMP, and uric acid level.  Anticipate karen in absolute neutrophil count the middle of next week.  Likely to require G-CSF.  If persistent neutropenic fever, likely will add Carbapenem antimicrobial and deep fungal coverage depending upon the duration of fever on broad-spectrum antibiotics.  Discussed with Dr. Gongora of hospital medicine at length yesterday afternoon.  Discussed with patient and family at length this morning.  Complex medical decision making.  Guarded prognosis.    Noah Dumont MD  9/11/2019

## 2019-09-11 NOTE — PROGRESS NOTES
[facility] Medicine Services  PROGRESS NOTE    Patient Name: Stephen Stoll  : 1975  MRN: [id number]    Date of Admission: 2019  Length of Stay: 6  Primary Care Physician: Carlos Raimrez MD    Subjective   Subjective     CC: Severe sepsis and high-grade lymphoma    HPI: Resting comfortably in bed this morning with family at bedside.  No side effects from chemo so far except for fatigue.  Eating and drinking well.  No nausea.  Requesting a throat lozenge and his cream for his rash.    Review of Systems  Gen- No fevers, chills  CV- No chest pain, palpitations  Resp- no dyspnea  GI- No N/V/D, abd pain    All other systems reviewed and negative except any additional pertinent positives and negatives discussed in HPI.     Objective   Objective     Vital Signs:   Temp:  [97.3 °F (36.3 °C)-97.5 °F (36.4 °C)] 97.5 °F (36.4 °C)  Heart Rate:  [56-75] 59  Resp:  [16-18] 16  BP: (104-133)/(67-93) 104/67        Physical Exam:  Constitutional: No acute distress, awake, alert, resting in bed with family at bedside.   HENT: NCAT, mucous membranes moist  Respiratory: Clear to auscultation bilaterally, respiratory effort normal on room air  Cardiovascular: RRR, no murmurs, rubs, or gallops, palpable pedal pulses bilaterally  Gastrointestinal: Positive bowel sounds, soft, nontender, nondistended  Musculoskeletal: No bilateral ankle edema  Psychiatric: Appropriate affect, cooperative  Neurologic: Oriented x 3, strength symmetric in all extremities, Cranial Nerves grossly intact to confrontation, speech clear  Skin: Diffuse rash to upper and lower extremities        Results Reviewed:    Results from last 7 days   Lab Units 19  0427 19  0818 19  0454  19  1552 19  1115   WBC 10*3/mm3 16.55* 11.95* 11.97*   < >  --  30.17*   HEMOGLOBIN g/dL 9.1* 10.2* 10.0*   < >  --  12.7*   HEMATOCRIT % 28.1* 31.4* 31.6*   < >  --  38.0   PLATELETS 10*3/mm3 81* 120* 123*   < >   --  262   INR   --   --   --   --  1.55*  --    PROCALCITONIN ng/mL  --   --   --   --   --  38.17*    < > = values in this interval not displayed.     Results from last 7 days   Lab Units 09/10/19  0440 09/09/19  1202 09/09/19  0933 09/09/19  0427  09/08/19  1856  09/08/19  0818 09/08/19  0454  09/05/19  1115   SODIUM mmol/L 133* 129* 131* 133*   < > 133*   < > 146* 133*   < > 124*   POTASSIUM mmol/L 3.9  --   --  3.9  --  4.2  --  4.3 4.3   < > 4.6   CHLORIDE mmol/L 99  --   --  98  --  98  --  93* 96*   < > 81*   CO2 mmol/L 23.0  --   --  23.0  --  21.0*  --  23.0 25.0   < > 25.0   BUN mg/dL 16  --   --  18  --  19  --  16 16   < > 29*   CREATININE mg/dL 0.62*  --   --  0.84  --  0.95  --  0.93 0.95   < > 1.67*   GLUCOSE mg/dL 205*  --   --  178*  --  240*  --  93 85   < > 132*   CALCIUM mg/dL 7.2*  --   --  7.6*  --  7.6*  --  7.7* 7.9*   < > 8.9   ALT (SGPT) U/L  --   --   --  34  --   --   --  29 24   < > 35   AST (SGOT) U/L  --   --   --  30  --   --   --  33 25   < > 31   TROPONIN T ng/mL  --   --   --   --   --   --   --   --   --   --  0.017   PROBNP pg/mL  --   --   --   --   --   --   --   --   --   --  400.9    < > = values in this interval not displayed.     Estimated Creatinine Clearance: 184.1 mL/min (A) (by C-G formula based on SCr of 0.62 mg/dL (L)).    Microbiology Results Abnormal     Procedure Component Value - Date/Time    Blood Culture - Blood, Arm, Right [[id number]] Collected:  09/08/19 0818    Lab Status:  Preliminary result Specimen:  Blood from Arm, Right Updated:  09/11/19 0901     Blood Culture No growth at 3 days    Blood Culture - Blood, Hand, Right [[id number]] Collected:  09/08/19 0818    Lab Status:  Preliminary result Specimen:  Blood from Hand, Right Updated:  09/11/19 0901     Blood Culture No growth at 3 days    Blood Culture - Blood, Arm, Left [[id number]] Collected:  09/05/19 1115    Lab Status:  Final result Specimen:  Blood from Arm, Left Updated:  09/10/19 1229     Blood  Culture No growth at 5 days    Blood Culture - Blood, Arm, Right [[id number]]  (Abnormal)  (Susceptibility) Collected:  09/05/19 1115    Lab Status:  Final result Specimen:  Blood from Arm, Right Updated:  09/09/19 0613     Blood Culture Staphylococcus aureus     Comment: Infectious disease consultation is highly recommended to rule out distant foci of infection.        Gram Stain Aerobic Bottle Gram positive cocci in groups    Susceptibility      Staphylococcus aureus     MARTIR     Gentamicin Susceptible     Oxacillin Susceptible     Rifampin Susceptible     Vancomycin Susceptible                Susceptibility Comments     Staphylococcus aureus    This isolate does not demonstrate inducible clindamycin resistance in vitro.               Blood Culture ID, PCR - Blood, Arm, Right [[id number]]  (Abnormal) Collected:  09/05/19 1115    Lab Status:  Final result Specimen:  Blood from Arm, Right Updated:  09/06/19 0746     BCID, PCR Staphylococcus aureus, not MRSA. Identification by BCID PCR.          Imaging Results (last 24 hours)     Procedure Component Value Units Date/Time    XR Chest 1 View [[id number]] Collected:  09/07/19 0751     Updated:  09/11/19 0853    Narrative:          EXAMINATION: XR CHEST 1 VW - 09/07/2019     INDICATION: R59.0-Localized enlarged lymph nodes; A41.9-Sepsis,  unspecified organism; R06.02-Shortness of breath; R93.89-Abnormal  findings on diagnostic imaging of other specified body structures;  D72.829-Elevated white blood cell count, unspecified.      COMPARISON: Chest radiograph 09/06/2019.     FINDINGS: Single portable chest radiograph is submitted for review. The  heart is prominent in size, similar to prior. Multifocal nodular  opacities are identified throughout the lungs as previously seen on the  CT of the chest from 09/05/2019.  No new airspace disease, pleural  effusion or pneumothorax. The visualized upper abdomen is unrevealing.  No acute osseous abnormality.           Impression:        Similar exam when compared to 09/06/2019.     DICTATED:   09/07/2019  EDITED/ls :   09/07/2019      This report was finalized on 9/11/2019 8:50 AM by Dr. Lionel Jackson MD.       CT Abdomen Pelvis Without Contrast [[id number]] Collected:  09/05/19 1551     Updated:  09/10/19 1354    Narrative:       EXAMINATION: CT CHEST WO CONTRAST, CT ABDOMEN/PELVIS WO CONTRAST -  09/05/2019     INDICATION: Cough, sepsis, abnormal chest x-ray.     TECHNIQUE: Unenhanced CT imaging of the chest, abdomen, and pelvis was  performed. Additional coronal and sagittal reformatted imaging was  provided for review.     The radiation dose reduction device was turned on for each scan per the  ALARA (As Low as Reasonably Achievable) protocol.     COMPARISON: MRI of the thoracic and lumbar spine 08/09/2019.     FINDINGS:      CHEST: Dedicated CT imaging of the chest demonstrates interval  development of numerous pulmonary nodules throughout the lungs, the  largest within the right mid lung measuring 2.0 cm. These are concerning  for metastatic foci given the provided clinical history. No focal  pneumonia, pleural effusion, or pneumothorax identified. These pulmonary  nodules account for the abnormality seen on the  chest radiograph from  09/05/2019.     Extensive axillary and mediastinal lymphadenopathy is identified. The  largest within the right paratracheal region measures up to 2.1 cm and  demonstrates mild central necrosis. The pulmonary artery is within  normal limits in size. The heart is within normal limits in size. Trace  pericardial effusion is identified. Prominent cardiophrenic lymph nodes  are also noted.     ABDOMEN/PELVIS: Lack of intravenous contrast limits evaluation of  underlying abdominal and pelvic organs. Hypoattenuated region involving  the right hepatic lobe is identified with additional more confluent  hypoattenuating region within the mid right hepatic lobe noted measuring  up to 2.5 cm which is concerning for an  additional site of metastatic  disease. The gallbladder demonstrates mild cholelithiasis. The spleen is  enlarged and otherwise unremarkable. The pancreas does not demonstrate  abnormality. The adrenal glands and kidneys also do not demonstrate  abnormality. No evidence of hydronephrosis. There is mild perinephric  inflammation which requires clinical correlation. Extensive peritoneal  and retroperitoneal lymphadenopathy is identified throughout the  visualized abdomen and pelvis. The urinary bladder is partially  distended and otherwise unremarkable. The stomach is decompressed and  unremarkable. No evidence of small bowel obstruction. No free air or  free fluid identified. Postsurgical changes in the right lower quadrant  are identified and likely from prior appendectomy.  Small fat-filled  right inguinal and left inguinal hernias are noted.     Osseous structures: The visualized bony pelvis appears intact.  Degenerative changes of the bilateral hips are identified.  Re-demonstration of aggressive osseous changes at T8 and T9, previously  documented MRI thoracic spine from 8/21/2019. No convincing evidence of  posterior cortex retropulsion identified. Additional lytic lesion  involving L4 is also noted.       Impression:          Constellation of findings concerning for metastatic disease:      - Interval development of numerous pulmonary nodules throughout the  lungs with the largest measuring 2.0 cm within the right midlung and  concerning for underlying pulmonary metastasis and which may account for  abnormality identified in the chest radiograph performed 09/05/2019.     - Extensive axillary, mediastinal, and abdominopelvic lymphadenopathy as  described above.     - Hypoattenuated regions involving the liver concerning for additional  sites of metastatic disease, although are limited in evaluation  secondary to lack of intravenous contrast. Attention on follow-up  imaging is advised.     - Redemonstration of  T8/T9 aggressive osseous lesions with additional  lytic lesion involving the L4 vertebral body and additional  subcentimeter lytic lesions throughout the thoracolumbar spine  suggested.     Nonspecific perinephric inflammation. Correlate with urinalysis to  exclude underlying pyelonephritis.     DICTATED:   09/05/2019  EDITED/ls :   09/05/2019      This report was finalized on 9/10/2019 1:50 PM by Dr. Lionel Jackson MD.       CT Chest Without Contrast [[id number]] Collected:  09/05/19 1551     Updated:  09/10/19 1354    Narrative:       EXAMINATION: CT CHEST WO CONTRAST, CT ABDOMEN/PELVIS WO CONTRAST -  09/05/2019     INDICATION: Cough, sepsis, abnormal chest x-ray.     TECHNIQUE: Unenhanced CT imaging of the chest, abdomen, and pelvis was  performed. Additional coronal and sagittal reformatted imaging was  provided for review.     The radiation dose reduction device was turned on for each scan per the  ALARA (As Low as Reasonably Achievable) protocol.     COMPARISON: MRI of the thoracic and lumbar spine 08/09/2019.     FINDINGS:      CHEST: Dedicated CT imaging of the chest demonstrates interval  development of numerous pulmonary nodules throughout the lungs, the  largest within the right mid lung measuring 2.0 cm. These are concerning  for metastatic foci given the provided clinical history. No focal  pneumonia, pleural effusion, or pneumothorax identified. These pulmonary  nodules account for the abnormality seen on the  chest radiograph from  09/05/2019.     Extensive axillary and mediastinal lymphadenopathy is identified. The  largest within the right paratracheal region measures up to 2.1 cm and  demonstrates mild central necrosis. The pulmonary artery is within  normal limits in size. The heart is within normal limits in size. Trace  pericardial effusion is identified. Prominent cardiophrenic lymph nodes  are also noted.     ABDOMEN/PELVIS: Lack of intravenous contrast limits evaluation of  underlying  abdominal and pelvic organs. Hypoattenuated region involving  the right hepatic lobe is identified with additional more confluent  hypoattenuating region within the mid right hepatic lobe noted measuring  up to 2.5 cm which is concerning for an additional site of metastatic  disease. The gallbladder demonstrates mild cholelithiasis. The spleen is  enlarged and otherwise unremarkable. The pancreas does not demonstrate  abnormality. The adrenal glands and kidneys also do not demonstrate  abnormality. No evidence of hydronephrosis. There is mild perinephric  inflammation which requires clinical correlation. Extensive peritoneal  and retroperitoneal lymphadenopathy is identified throughout the  visualized abdomen and pelvis. The urinary bladder is partially  distended and otherwise unremarkable. The stomach is decompressed and  unremarkable. No evidence of small bowel obstruction. No free air or  free fluid identified. Postsurgical changes in the right lower quadrant  are identified and likely from prior appendectomy.  Small fat-filled  right inguinal and left inguinal hernias are noted.     Osseous structures: The visualized bony pelvis appears intact.  Degenerative changes of the bilateral hips are identified.  Re-demonstration of aggressive osseous changes at T8 and T9, previously  documented MRI thoracic spine from 8/21/2019. No convincing evidence of  posterior cortex retropulsion identified. Additional lytic lesion  involving L4 is also noted.       Impression:          Constellation of findings concerning for metastatic disease:      - Interval development of numerous pulmonary nodules throughout the  lungs with the largest measuring 2.0 cm within the right midlung and  concerning for underlying pulmonary metastasis and which may account for  abnormality identified in the chest radiograph performed 09/05/2019.     - Extensive axillary, mediastinal, and abdominopelvic lymphadenopathy as  described above.     -  Hypoattenuated regions involving the liver concerning for additional  sites of metastatic disease, although are limited in evaluation  secondary to lack of intravenous contrast. Attention on follow-up  imaging is advised.     - Redemonstration of T8/T9 aggressive osseous lesions with additional  lytic lesion involving the L4 vertebral body and additional  subcentimeter lytic lesions throughout the thoracolumbar spine  suggested.     Nonspecific perinephric inflammation. Correlate with urinalysis to  exclude underlying pyelonephritis.     DICTATED:   09/05/2019  EDITED/ls :   09/05/2019      This report was finalized on 9/10/2019 1:50 PM by Dr. Lionel Jackson MD.             Results for orders placed during the hospital encounter of 08/21/19   Adult Transthoracic Echo Complete W/ Cont if Necessary Per Protocol    Narrative · Estimated EF = 62%.  · Left ventricular systolic function is normal.  · Left ventricular diastolic function is normal.  · Normal right ventricular cavity size, wall thickness, systolic function   and septal motion noted.  · No evidence of pulmonary hypertension is present.  · There is no evidence of pericardial effusion.  · No significant structural valvular abnormality demonstrated.          I have reviewed the medications:  Scheduled Meds:    allopurinol 300 mg Oral Daily   DAPTOmycin 8 mg/kg (Adjusted) Intravenous Q24H   famotidine 20 mg Oral BID   lactobacillus acidophilus 1 capsule Oral Daily   metoprolol succinate XL 25 mg Oral Q24H   predniSONE 100 mg Oral Daily With Breakfast   sodium chloride 10 mL Intravenous Q12H   sodium chloride 10 mL Intravenous Q12H     Continuous Infusions:     PRN Meds:.•  acetaminophen  •  acetaminophen  •  benzocaine-menthol  •  diphenhydrAMINE-zinc acetate  •  oxyCODONE-acetaminophen  •  potassium & sodium phosphates **OR** potassium & sodium phosphates  •  potassium chloride  •  potassium chloride  •  sodium chloride  •  sodium chloride  •  sodium  chloride      Assessment/Plan   Assessment / Plan     Active Hospital Problems    Diagnosis  POA   • Staphylococcus aureus bacteremia [R78.81]  Yes   • Hyponatremia [E87.1]  Yes   • Mediastinal lymphadenopathy [R59.0]  Yes   • 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 male past complex medical history, this is his fourth hospitalization since June.  Briefly, in June patient presented with persistent cough and fevers, he was found to have elevated troponin and chest pain was diagnosed with myocarditis and adult still's disease and was started on prednisone, valsartan and metoprolol.  In July, he developed severe back pain, MRI T-spine at that time did show lesions around T8-T9 that were was concerning for either osteomyelitis vs metastatic disease.  Spine biopsy was unrevealing, he underwent a bone marrow was concerning for large cell lymphoma.  He was scheduled for outpatient EGD and colonoscopy to evaluate for possible GI lymphoma.  On presentation to endoscopy, patient was found to be hypotensive, tachycardic, and febrile.  As such was sent to the ED and was subsequently admitted with severe sepsis requiring ICU care.      His blood cultures were positive for MSSA with ID consulted for IV antibiotic management. Source of bacteremia presumed to be septic phlebitis related to infiltrated IV on recent hospital stay but did not have active phlebitis at presentation to confirm dx.     CT scans revealed mediastinal and intra-abdominal adenopathy, he therefore underwent mediastinoscopy per CT surgery and now has new pathology proven dx of high-grade lymphoma.  Oncology  was consulted and due to aggressive nature of disease, he has been initiated on chemotherapy during this hospitalization.       Plan  -Severe sepsis, secondary to MSSA bacteremia (possible source recent phlebitis from prior hosp stay). ID following, changed to Dapto 9/8/19 after rash exanthem with Zosyn although could also be related to lymphoma.  ID is arranging home infusion daptomycin in preparation for planned discharge on Thursday.    -Dr. Brizuela wants the patient to remain hospitalized at least until Thursday to monitor for tumor lysis syndrome.    -ALK positive CD30 positive high-grade lymphoma, heme-onc following --> 9/9/19 initiation of chemotherapy.  At discharge, ensure close oncology follow-up with Dr. Brizuela and serial outpatient labs since initiated on chemo to watch for neutropenia ([facility] can alert Dr. Cloud's clinic nurse of d/c who can help arrange)   -- complex case given current hectic fevers of lymphoma/bacteremia.  -- Patient is to discharge and go directly to oncology office for humira injection.    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  Prednisone 100 mg daily x 5 days (will complete Friday) and then he will go back to his normal prednisone dose.   -Myocarditis, ECHO done in August revealed EF of 60% markedly improved from the one done in June, continue metoprolol.      DVT Prophylaxis: Mechanical    Disposition: Likely Thursday if okay with oncology.     CODE STATUS:   Code Status and Medical Interventions:   Ordered at: 09/05/19 1533     Code Status:    CPR     Medical Interventions (Level of Support Prior to Arrest):    Full       Electronically signed by AILYN Grimes, 09/11/19, 11:01 AM.

## 2019-09-11 NOTE — PROGRESS NOTES
Continued Stay Note   Lanie     Patient Name: Stephen Stoll  MRN: [id number]  Today's Date: 9/11/2019    Admit Date: 9/5/2019    Discharge Plan     Row Name 09/11/19 1354       Plan    Plan  Home at discharge     Patient/Family in Agreement with Plan  yes    Plan Comments  Patient is not ready for discharge at this time - he is to remain her until tomorrow to monitor for tumor lysis syndrome. Plan is for IV antibiotics with LIDC at home. CM to page the OPAT nurse upon discharge orders for bedside IV antbx teaching. Follow up with Dr Dumont already made. Patient will return home when medically ready - asa [phone]        Discharge Codes    No documentation.       Expected Discharge Date and Time     Expected Discharge Date Expected Discharge Time    Sep 10, 2019             Asa Clay RN

## 2019-09-12 ENCOUNTER — MDT ASSESSMENT (OUTPATIENT)
Dept: ONCOLOGY | Facility: CLINIC | Age: 44
End: 2019-09-12

## 2019-09-12 VITALS
DIASTOLIC BLOOD PRESSURE: 93 MMHG | WEIGHT: 234.4 LBS | SYSTOLIC BLOOD PRESSURE: 137 MMHG | OXYGEN SATURATION: 96 % | BODY MASS INDEX: 31.75 KG/M2 | RESPIRATION RATE: 18 BRPM | HEART RATE: 63 BPM | TEMPERATURE: 97.4 F | HEIGHT: 72 IN

## 2019-09-12 DIAGNOSIS — C84.60 ANAPLASTIC ALK-POSITIVE LARGE CELL LYMPHOMA, UNSPECIFIED BODY REGION (HCC): ICD-10-CM

## 2019-09-12 LAB
ANION GAP SERPL CALCULATED.3IONS-SCNC: 8 MMOL/L (ref 5–15)
BASOPHILS # BLD AUTO: 0.01 10*3/MM3 (ref 0–0.2)
BASOPHILS NFR BLD AUTO: 0.1 % (ref 0–1.5)
BUN BLD-MCNC: 16 MG/DL (ref 6–20)
BUN/CREAT SERPL: 31.4 (ref 7–25)
CALCIUM SPEC-SCNC: 7.4 MG/DL (ref 8.6–10.5)
CHLORIDE SERPL-SCNC: 101 MMOL/L (ref 98–107)
CO2 SERPL-SCNC: 27 MMOL/L (ref 22–29)
CREAT BLD-MCNC: 0.51 MG/DL (ref 0.76–1.27)
DEPRECATED RDW RBC AUTO: 52 FL (ref 37–54)
EOSINOPHIL # BLD AUTO: 0.04 10*3/MM3 (ref 0–0.4)
EOSINOPHIL NFR BLD AUTO: 0.6 % (ref 0.3–6.2)
ERYTHROCYTE [DISTWIDTH] IN BLOOD BY AUTOMATED COUNT: 17.5 % (ref 12.3–15.4)
GFR SERPL CREATININE-BSD FRML MDRD: >150 ML/MIN/1.73
GLUCOSE BLD-MCNC: 106 MG/DL (ref 65–99)
HCT VFR BLD AUTO: 25.8 % (ref 37.5–51)
HGB BLD-MCNC: 8.2 G/DL (ref 13–17.7)
IMM GRANULOCYTES # BLD AUTO: 0.02 10*3/MM3 (ref 0–0.05)
IMM GRANULOCYTES NFR BLD AUTO: 0.3 % (ref 0–0.5)
LDH SERPL-CCNC: 245 U/L (ref 135–225)
LYMPHOCYTES # BLD AUTO: 0.61 10*3/MM3 (ref 0.7–3.1)
LYMPHOCYTES NFR BLD AUTO: 8.9 % (ref 19.6–45.3)
MAGNESIUM SERPL-MCNC: 2.4 MG/DL (ref 1.6–2.6)
MCH RBC QN AUTO: 25.9 PG (ref 26.6–33)
MCHC RBC AUTO-ENTMCNC: 31.8 G/DL (ref 31.5–35.7)
MCV RBC AUTO: 81.4 FL (ref 79–97)
MONOCYTES # BLD AUTO: 0.09 10*3/MM3 (ref 0.1–0.9)
MONOCYTES NFR BLD AUTO: 1.3 % (ref 5–12)
NEUTROPHILS # BLD AUTO: 6.07 10*3/MM3 (ref 1.7–7)
NEUTROPHILS NFR BLD AUTO: 88.8 % (ref 42.7–76)
NRBC BLD AUTO-RTO: 0 /100 WBC (ref 0–0.2)
PHOSPHATE SERPL-MCNC: 2.5 MG/DL (ref 2.5–4.5)
PLAT MORPH BLD: NORMAL
PLATELET # BLD AUTO: 110 10*3/MM3 (ref 140–450)
PMV BLD AUTO: 11.4 FL (ref 6–12)
POTASSIUM BLD-SCNC: 4.1 MMOL/L (ref 3.5–5.2)
RBC # BLD AUTO: 3.17 10*6/MM3 (ref 4.14–5.8)
RBC MORPH BLD: NORMAL
REF LAB TEST METHOD: NORMAL
SODIUM BLD-SCNC: 136 MMOL/L (ref 136–145)
URATE SERPL-MCNC: 3 MG/DL (ref 3.4–7)
WBC MORPH BLD: NORMAL
WBC NRBC COR # BLD: 6.84 10*3/MM3 (ref 3.4–10.8)

## 2019-09-12 PROCEDURE — 84550 ASSAY OF BLOOD/URIC ACID: CPT | Performed by: INTERNAL MEDICINE

## 2019-09-12 PROCEDURE — 85025 COMPLETE CBC W/AUTO DIFF WBC: CPT | Performed by: INTERNAL MEDICINE

## 2019-09-12 PROCEDURE — 63710000001 PREDNISONE PER 5 MG: Performed by: INTERNAL MEDICINE

## 2019-09-12 PROCEDURE — 97535 SELF CARE MNGMENT TRAINING: CPT

## 2019-09-12 PROCEDURE — 80048 BASIC METABOLIC PNL TOTAL CA: CPT | Performed by: INTERNAL MEDICINE

## 2019-09-12 PROCEDURE — 99232 SBSQ HOSP IP/OBS MODERATE 35: CPT | Performed by: INTERNAL MEDICINE

## 2019-09-12 PROCEDURE — 83735 ASSAY OF MAGNESIUM: CPT | Performed by: INTERNAL MEDICINE

## 2019-09-12 PROCEDURE — 85007 BL SMEAR W/DIFF WBC COUNT: CPT | Performed by: INTERNAL MEDICINE

## 2019-09-12 PROCEDURE — 83615 LACTATE (LD) (LDH) ENZYME: CPT | Performed by: INTERNAL MEDICINE

## 2019-09-12 PROCEDURE — 84100 ASSAY OF PHOSPHORUS: CPT | Performed by: INTERNAL MEDICINE

## 2019-09-12 PROCEDURE — 99239 HOSP IP/OBS DSCHRG MGMT >30: CPT | Performed by: NURSE PRACTITIONER

## 2019-09-12 PROCEDURE — 25010000002 DAPTOMYCIN PER 1 MG: Performed by: INTERNAL MEDICINE

## 2019-09-12 RX ORDER — OXYCODONE AND ACETAMINOPHEN 7.5; 325 MG/1; MG/1
1 TABLET ORAL EVERY 6 HOURS PRN
Start: 2019-09-12

## 2019-09-12 RX ORDER — FAMOTIDINE 20 MG/1
20 TABLET, FILM COATED ORAL 2 TIMES DAILY
Qty: 60 TABLET | Refills: 0 | Status: SHIPPED | OUTPATIENT
Start: 2019-09-12 | End: 2020-02-04

## 2019-09-12 RX ORDER — PREDNISONE 50 MG/1
100 TABLET ORAL
Qty: 2 TABLET | Refills: 0 | Status: SHIPPED | OUTPATIENT
Start: 2019-09-13 | End: 2019-09-14

## 2019-09-12 RX ORDER — DIPHENHYDRAMINE HYDROCHLORIDE, ZINC ACETATE 2; .1 G/100G; G/100G
CREAM TOPICAL 2 TIMES DAILY PRN
Qty: 15 G | Refills: 0 | Status: SHIPPED | OUTPATIENT
Start: 2019-09-12 | End: 2020-02-04

## 2019-09-12 RX ORDER — ALLOPURINOL 300 MG/1
300 TABLET ORAL DAILY
Qty: 30 TABLET | Refills: 0 | Status: SHIPPED | OUTPATIENT
Start: 2019-09-13 | End: 2020-02-04

## 2019-09-12 RX ORDER — PREDNISONE 10 MG/1
10 TABLET ORAL 3 TIMES DAILY
Start: 2019-09-14 | End: 2020-02-04

## 2019-09-12 RX ADMIN — ALLOPURINOL 300 MG: 300 TABLET ORAL at 08:56

## 2019-09-12 RX ADMIN — METOPROLOL SUCCINATE 25 MG: 25 TABLET, EXTENDED RELEASE ORAL at 08:56

## 2019-09-12 RX ADMIN — FAMOTIDINE 20 MG: 20 TABLET ORAL at 08:56

## 2019-09-12 RX ADMIN — PREDNISONE 100 MG: 50 TABLET ORAL at 08:56

## 2019-09-12 RX ADMIN — DAPTOMYCIN 700 MG: 500 INJECTION, POWDER, LYOPHILIZED, FOR SOLUTION INTRAVENOUS at 12:41

## 2019-09-12 RX ADMIN — OXYCODONE HYDROCHLORIDE AND ACETAMINOPHEN 1 TABLET: 7.5; 325 TABLET ORAL at 01:10

## 2019-09-12 RX ADMIN — Medication 1 CAPSULE: at 08:56

## 2019-09-12 NOTE — PROGRESS NOTES
1       Stephen Stoll  1975  [id number]  9/12/2019    CC: Fever of unknown origin    Stephen Stoll is a 44 y.o. male here for bilateral pulmonary nodules, hilar, mediastinal, and periaortic lymphadenopathy, adult onset of juvenile rheumatoid arthritis, and new diagnosis of anaplastic large cell lymphoma.      Past medical history:  Past Medical History:   Diagnosis Date   • Acute kidney injury (CMS/HCC)    • Anaplastic ALK-positive large cell lymphoma (CMS/HCC)    • Bacteremia    • Hyperlipidemia    • Myocarditis (CMS/HCC)    • Pneumonia    • Severe sepsis (CMS/HCC)        Medications:   Current Facility-Administered Medications:   •  acetaminophen (TYLENOL) suppository 650 mg, 650 mg, Rectal, Q4H PRN, Guadalupe Renee MD  •  acetaminophen (TYLENOL) tablet 1,000 mg, 1,000 mg, Oral, Q6H PRN, Case, Roxanna V., DO, 1,000 mg at 09/09/19 1228  •  allopurinol (ZYLOPRIM) tablet 300 mg, 300 mg, Oral, Daily, Brent Brizuela MD, 300 mg at 09/11/19 0910  •  benzocaine-menthol (CEPACOL) lozenge 1 lozenge, 1 lozenge, Mouth/Throat, Q2H PRN, [place] MD, 1 lozenge at 09/11/19 1942  •  DAPTOmycin (CUBICIN) 700 mg in sodium chloride 0.9 % 50 mL IVPB, 8 mg/kg (Adjusted), Intravenous, Q24H, Irvin Mondragon MD, Last Rate: 100 mL/hr at 09/11/19 1711, 700 mg at 09/11/19 1711  •  diphenhydrAMINE-zinc acetate 2-0.1 % cream, , Topical, BID PRN, Adriana Lee, APRN  •  famotidine (PEPCID) tablet 20 mg, 20 mg, Oral, BID, Tritsa Quiroz MD, 20 mg at 09/11/19 2129  •  lactobacillus acidophilus (RISAQUAD) capsule 1 capsule, 1 capsule, Oral, Daily, Trista Quiroz MD, 1 capsule at 09/11/19 0911  •  metoprolol succinate XL (TOPROL-XL) 24 hr tablet 25 mg, 25 mg, Oral, Q24H, Lazaro Dunbar MD, 25 mg at 09/11/19 0910  •  oxyCODONE-acetaminophen (PERCOCET) 7.5-325 MG per tablet 1 tablet, 1 tablet, Oral, Q6H PRN, Roxanna Guo DO, 1 tablet at 09/12/19 0110  •  potassium & sodium phosphates (PHOS-NAK)  280-160-250 MG packet - for Phosphorus less than 1.25 mg/dL, 2 packet, Oral, Q6H PRN **OR** potassium & sodium phosphates (PHOS-NAK) 280-160-250 MG packet - for Phosphorus 1.25 - 2.5 mg/dL, 2 packet, Oral, Q6H PRN, Patiño, Napoleon A, RPH, 2 packet at 09/07/19 1132  •  potassium chloride (KLOR-CON) packet 40 mEq, 40 mEq, Oral, PRN, Patiño, Napoleon A, RPH  •  potassium chloride (MICRO-K) CR capsule 40 mEq, 40 mEq, Oral, PRN, Patiño, Napoleon A, RPH, 40 mEq at 09/07/19 1132  •  predniSONE (DELTASONE) tablet 100 mg, 100 mg, Oral, Daily With Breakfast, Brent Brizuela MD, 100 mg at 09/11/19 0911  •  sodium chloride 0.9 % flush 10 mL, 10 mL, Intravenous, PRN, Phi Mir, DO  •  sodium chloride 0.9 % flush 10 mL, 10 mL, Intravenous, Q12H, Sanjuanita Ching DNP, APRN, 10 mL at 09/11/19 2130  •  sodium chloride 0.9 % flush 10 mL, 10 mL, Intravenous, PRN, Sanjuanita Ching DNP, APRN  •  sodium chloride 0.9 % flush 10 mL, 10 mL, Intravenous, Q12H, Zina Bauman MD, 10 mL at 09/11/19 2129  •  sodium chloride 0.9 % flush 10 mL, 10 mL, Intravenous, PRN, Zina Bauman MD  Antibiotics:  Anti-Infectives (From admission, onward)    Ordered     Dose/Rate Route Frequency Start Stop    09/08/19 1505  DAPTOmycin (CUBICIN) 700 mg in sodium chloride 0.9 % 50 mL IVPB     Ordering Provider:  Irvin Mondragon MD    8 mg/kg × 86.2 kg (Adjusted)  100 mL/hr over 30 Minutes Intravenous Every 24 Hours 09/08/19 1600 10/08/19 1559    09/06/19 0940  ceFAZolin in dextrose (ANCEF) IVPB solution 2 g     Ordering Provider:  Jeffrey Galeas PA    2 g  over 30 Minutes Intravenous Once 09/06/19 0942 09/06/19 1051    09/05/19 1121  vancomycin 1750 mg/500 mL 0.9% NS IVPB ([facility])     Ordering Provider:  Phi Mir DO    20 mg/kg × 93.4 kg  over 120 Minutes Intravenous Once 09/05/19 1123 09/05/19 1416    09/05/19 1121  piperacillin-tazobactam (ZOSYN) 3.375 g in iso-osmotic dextrose 50 ml (premix)     Ordering Provider:  Phi Mir DO  "   3.375 g  over 30 Minutes Intravenous Once 09/05/19 1122 09/05/19 1215          Allergies:  is allergic to ciprofloxacin and sulfa antibiotics.    Review of Systems: All other reviewed and negative except as per HPI    Blood pressure 137/93, pulse 63, temperature 97.4 °F (36.3 °C), temperature source Oral, resp. rate 18, height 182.9 cm (72\"), weight 106 kg (234 lb 6.4 oz), SpO2 96 %.  GENERAL: Awake and alert, in no acute distress.  Appears somewhat fatigued and chronically ill.  HEENT: Oropharynx without thrush. Sinuses nontender. Dentition in good repair. No cervical adenopathy. No carotid bruits/ jugular venous distention.   EYES: PERRL. No conjunctival injection. No icterus. EOMI.  LYMPHATICS: No lymphadenopathy of the neck or axillary or inguinal regions.   HEART: No murmur, gallop, or pericardial friction rub.  Left antecubital Bell exit site benign.  Suprasternal notch mediastinoscopy incision clean and dry.  LUNGS: Clear to auscultation anteriorly. No percussion dullness.   ABDOMEN: Soft, nontender, nondistended.  Spleen tip palpable.  SKIN: Warm and dry without cutaneous eruptions. No embolic stigmata.   PSYCHIATRIC: Mental status lucid. Cranial nerve function intact.       DIAGNOSTICS:  Lab Results   Component Value Date    WBC 10.00 09/11/2019    HGB 8.3 (L) 09/11/2019    HCT 26.1 (L) 09/11/2019    PLT 80 (L) 09/11/2019     Lab Results   Component Value Date    CRP 37.66 (H) 09/05/2019     Lab Results   Component Value Date    SEDRATE >130 (H) 09/05/2019     Lab Results   Component Value Date    GLUCOSE 106 (H) 09/12/2019    BUN 16 09/12/2019    CREATININE 0.51 (L) 09/12/2019    EGFRIFNONA >150 09/12/2019    BCR 31.4 (H) 09/12/2019    CO2 27.0 09/12/2019    CALCIUM 7.4 (L) 09/12/2019    PROTENTOTREF 8.0 08/30/2019    ALBUMIN 1.90 (L) 09/09/2019    LABIL2 0.6 (L) 08/30/2019    AST 30 09/09/2019    ALT 34 09/09/2019       Microbiology: Oxacillin susceptible staph aureus bacteremia on " admission.    RADIOLOGY:  Imaging Results (last 72 hours)     Procedure Component Value Units Date/Time    XR Chest 1 View [[id number]] Collected:  09/08/19 0845     Updated:  09/11/19 1118    Narrative:          EXAMINATION: XR CHEST 1 VW - 09/08/2019     INDICATION: R59.0-Localized enlarged lymph nodes; A41.9-Sepsis,  unspecified organism; R06.02-Shortness of breath; R93.89-Abnormal  findings on diagnostic imaging of other specified body structures;  D72.829-Elevated white blood cell count, unspecified.     COMPARISON: Chest radiograph of 09/07/2019.     FINDINGS: Single portable chest radiograph was submitted in 2  acquisitions to image the entire chest. Mild cardiomegaly, similar to  prior. Known pulmonary nodules/lesions are seen but better evaluated on  prior imaging. No superimposed airspace disease, pneumothorax, or  pleural effusion. Please note the left costophrenic angle/left lower  lung is not entirely imaged secondary to patient positioning. The  visualized upper abdomen is unrelenting. No acute osseous abnormality is  identified.       Impression:          Limited exam secondary to patient positioning, however similar in  appearance when compared to 09/07/2019.     DICTATED:   09/08/2019  EDITED/ls :   09/08/2019      This report was finalized on 9/11/2019 11:15 AM by Dr. Lionel Jackson MD.       XR Chest 1 View [[id number]] Collected:  09/07/19 0751     Updated:  09/11/19 0853    Narrative:          EXAMINATION: XR CHEST 1 VW - 09/07/2019     INDICATION: R59.0-Localized enlarged lymph nodes; A41.9-Sepsis,  unspecified organism; R06.02-Shortness of breath; R93.89-Abnormal  findings on diagnostic imaging of other specified body structures;  D72.829-Elevated white blood cell count, unspecified.      COMPARISON: Chest radiograph 09/06/2019.     FINDINGS: Single portable chest radiograph is submitted for review. The  heart is prominent in size, similar to prior. Multifocal nodular  opacities are  identified throughout the lungs as previously seen on the  CT of the chest from 09/05/2019.  No new airspace disease, pleural  effusion or pneumothorax. The visualized upper abdomen is unrevealing.  No acute osseous abnormality.           Impression:       Similar exam when compared to 09/06/2019.     DICTATED:   09/07/2019  EDITED/ls :   09/07/2019      This report was finalized on 9/11/2019 8:50 AM by Dr. Lionel Jackson MD.       CT Abdomen Pelvis Without Contrast [[id number]] Collected:  09/05/19 1551     Updated:  09/10/19 1354    Narrative:       EXAMINATION: CT CHEST WO CONTRAST, CT ABDOMEN/PELVIS WO CONTRAST -  09/05/2019     INDICATION: Cough, sepsis, abnormal chest x-ray.     TECHNIQUE: Unenhanced CT imaging of the chest, abdomen, and pelvis was  performed. Additional coronal and sagittal reformatted imaging was  provided for review.     The radiation dose reduction device was turned on for each scan per the  ALARA (As Low as Reasonably Achievable) protocol.     COMPARISON: MRI of the thoracic and lumbar spine 08/09/2019.     FINDINGS:      CHEST: Dedicated CT imaging of the chest demonstrates interval  development of numerous pulmonary nodules throughout the lungs, the  largest within the right mid lung measuring 2.0 cm. These are concerning  for metastatic foci given the provided clinical history. No focal  pneumonia, pleural effusion, or pneumothorax identified. These pulmonary  nodules account for the abnormality seen on the  chest radiograph from  09/05/2019.     Extensive axillary and mediastinal lymphadenopathy is identified. The  largest within the right paratracheal region measures up to 2.1 cm and  demonstrates mild central necrosis. The pulmonary artery is within  normal limits in size. The heart is within normal limits in size. Trace  pericardial effusion is identified. Prominent cardiophrenic lymph nodes  are also noted.     ABDOMEN/PELVIS: Lack of intravenous contrast limits evaluation  of  underlying abdominal and pelvic organs. Hypoattenuated region involving  the right hepatic lobe is identified with additional more confluent  hypoattenuating region within the mid right hepatic lobe noted measuring  up to 2.5 cm which is concerning for an additional site of metastatic  disease. The gallbladder demonstrates mild cholelithiasis. The spleen is  enlarged and otherwise unremarkable. The pancreas does not demonstrate  abnormality. The adrenal glands and kidneys also do not demonstrate  abnormality. No evidence of hydronephrosis. There is mild perinephric  inflammation which requires clinical correlation. Extensive peritoneal  and retroperitoneal lymphadenopathy is identified throughout the  visualized abdomen and pelvis. The urinary bladder is partially  distended and otherwise unremarkable. The stomach is decompressed and  unremarkable. No evidence of small bowel obstruction. No free air or  free fluid identified. Postsurgical changes in the right lower quadrant  are identified and likely from prior appendectomy.  Small fat-filled  right inguinal and left inguinal hernias are noted.     Osseous structures: The visualized bony pelvis appears intact.  Degenerative changes of the bilateral hips are identified.  Re-demonstration of aggressive osseous changes at T8 and T9, previously  documented MRI thoracic spine from 8/21/2019. No convincing evidence of  posterior cortex retropulsion identified. Additional lytic lesion  involving L4 is also noted.       Impression:          Constellation of findings concerning for metastatic disease:      - Interval development of numerous pulmonary nodules throughout the  lungs with the largest measuring 2.0 cm within the right midlung and  concerning for underlying pulmonary metastasis and which may account for  abnormality identified in the chest radiograph performed 09/05/2019.     - Extensive axillary, mediastinal, and abdominopelvic lymphadenopathy as  described  above.     - Hypoattenuated regions involving the liver concerning for additional  sites of metastatic disease, although are limited in evaluation  secondary to lack of intravenous contrast. Attention on follow-up  imaging is advised.     - Redemonstration of T8/T9 aggressive osseous lesions with additional  lytic lesion involving the L4 vertebral body and additional  subcentimeter lytic lesions throughout the thoracolumbar spine  suggested.     Nonspecific perinephric inflammation. Correlate with urinalysis to  exclude underlying pyelonephritis.     DICTATED:   09/05/2019  EDITED/ls :   09/05/2019      This report was finalized on 9/10/2019 1:50 PM by Dr. Lionel Jackson MD.       CT Chest Without Contrast [[id number]] Collected:  09/05/19 1551     Updated:  09/10/19 1354    Narrative:       EXAMINATION: CT CHEST WO CONTRAST, CT ABDOMEN/PELVIS WO CONTRAST -  09/05/2019     INDICATION: Cough, sepsis, abnormal chest x-ray.     TECHNIQUE: Unenhanced CT imaging of the chest, abdomen, and pelvis was  performed. Additional coronal and sagittal reformatted imaging was  provided for review.     The radiation dose reduction device was turned on for each scan per the  ALARA (As Low as Reasonably Achievable) protocol.     COMPARISON: MRI of the thoracic and lumbar spine 08/09/2019.     FINDINGS:      CHEST: Dedicated CT imaging of the chest demonstrates interval  development of numerous pulmonary nodules throughout the lungs, the  largest within the right mid lung measuring 2.0 cm. These are concerning  for metastatic foci given the provided clinical history. No focal  pneumonia, pleural effusion, or pneumothorax identified. These pulmonary  nodules account for the abnormality seen on the  chest radiograph from  09/05/2019.     Extensive axillary and mediastinal lymphadenopathy is identified. The  largest within the right paratracheal region measures up to 2.1 cm and  demonstrates mild central necrosis. The pulmonary artery  is within  normal limits in size. The heart is within normal limits in size. Trace  pericardial effusion is identified. Prominent cardiophrenic lymph nodes  are also noted.     ABDOMEN/PELVIS: Lack of intravenous contrast limits evaluation of  underlying abdominal and pelvic organs. Hypoattenuated region involving  the right hepatic lobe is identified with additional more confluent  hypoattenuating region within the mid right hepatic lobe noted measuring  up to 2.5 cm which is concerning for an additional site of metastatic  disease. The gallbladder demonstrates mild cholelithiasis. The spleen is  enlarged and otherwise unremarkable. The pancreas does not demonstrate  abnormality. The adrenal glands and kidneys also do not demonstrate  abnormality. No evidence of hydronephrosis. There is mild perinephric  inflammation which requires clinical correlation. Extensive peritoneal  and retroperitoneal lymphadenopathy is identified throughout the  visualized abdomen and pelvis. The urinary bladder is partially  distended and otherwise unremarkable. The stomach is decompressed and  unremarkable. No evidence of small bowel obstruction. No free air or  free fluid identified. Postsurgical changes in the right lower quadrant  are identified and likely from prior appendectomy.  Small fat-filled  right inguinal and left inguinal hernias are noted.     Osseous structures: The visualized bony pelvis appears intact.  Degenerative changes of the bilateral hips are identified.  Re-demonstration of aggressive osseous changes at T8 and T9, previously  documented MRI thoracic spine from 8/21/2019. No convincing evidence of  posterior cortex retropulsion identified. Additional lytic lesion  involving L4 is also noted.       Impression:          Constellation of findings concerning for metastatic disease:      - Interval development of numerous pulmonary nodules throughout the  lungs with the largest measuring 2.0 cm within the right  midlung and  concerning for underlying pulmonary metastasis and which may account for  abnormality identified in the chest radiograph performed 09/05/2019.     - Extensive axillary, mediastinal, and abdominopelvic lymphadenopathy as  described above.     - Hypoattenuated regions involving the liver concerning for additional  sites of metastatic disease, although are limited in evaluation  secondary to lack of intravenous contrast. Attention on follow-up  imaging is advised.     - Redemonstration of T8/T9 aggressive osseous lesions with additional  lytic lesion involving the L4 vertebral body and additional  subcentimeter lytic lesions throughout the thoracolumbar spine  suggested.     Nonspecific perinephric inflammation. Correlate with urinalysis to  exclude underlying pyelonephritis.     DICTATED:   09/05/2019  EDITED/ls :   09/05/2019      This report was finalized on 9/10/2019 1:50 PM by Dr. Lionel Jackson MD.             Assessment and Plan: Fever of unknown origin.  Diffuse visceral lymphadenopathy.  Increased inflammatory markers.  Leukocytosis.  Adult onset juvenile rheumatoid arthritis.  New diagnosis of anaplastic large cell lymphoma from bone marrow, T8 vertebral body biopsy, and necrotic mediastinal lymph node.  Thrombocytopenia.  Anemia.  Status post induction chemotherapy with CAP and monoclonal antibodies.  Maximum temperature over 24 hours 97.7.  Currently 97.4.  Pulse 63, respiratory rate 18, blood pressure 137/93 mmHg.  No arterial desaturation.  Plasma creatinine 0.5.  Peripheral leukocyte count 10,000 with 91% segmented neutrophils.  H&H 8.3 g / 26%.  Platelet count 80,000.  Utilization management: Anticipate discharge home this afternoon on intravenous daptomycin for an additional 14 days.  Office follow-up with me next Tuesday.  Likely to experience karen in WBCs the middle of next week with requirements for Neupogen and expanded gram-negative/anaerobic coverage.      Noah Dumont,  MD  9/12/2019

## 2019-09-12 NOTE — PLAN OF CARE
Problem: Patient Care Overview  Goal: Plan of Care Review  Outcome: Ongoing (interventions implemented as appropriate)   09/12/19 2628   Coping/Psychosocial   Plan of Care Reviewed With patient   Plan of Care Review   Progress no change   OTHER   Outcome Summary No acute overnight events. Cream for rash applied x1 this shift. Percocet given x1 for back pain. Plan to D/C home today if OK with Brizuela. Continue current POC       Problem: Fall Risk (Adult)  Goal: Absence of Fall  Outcome: Ongoing (interventions implemented as appropriate)      Problem: Oncology Care (Adult)  Goal: Signs and Symptoms of Listed Potential Problems Will be Absent, Minimized or Managed (Oncology Care)  Outcome: Ongoing (interventions implemented as appropriate)

## 2019-09-12 NOTE — PROGRESS NOTES
MULTIDISCIPLINARY CONFERENCE INITIAL TREATMENT PLAN    PRESENTER:  Brent Brizuela  DATE: 19  SITE:  Anaplastic Large Cell Lymphoma    Stephen Stoll  :  1975  [address] [facility] [place] [address]  Home phone: [phone]  Mobile phone: [phone]  Work phone: Work phone not available  MRN:  [id number]    CLINICAL HISTORY:        HPI:  44 YOWM who presented to the ED with acute mid-back pain, which radiated to the glutes and becomes “rock hard” and upper back muscles feel rigid like spasms.  On 19, the patient initially presented to [facility] ED due to concerns for STEMI.         REFERRING PROVIDER:  In-patient consultation (Lazaro Hauser M.D. – Infectious Disease).         PLACE OF RESIDENCE:  [place], [place].       SOCIAL HISTORY:  Never smoked.       FAMILY HISTORY:  Non-contributory.       PAST MEDICAL HISTORY:  Still’s disease (2019), pneumonia (2019), acute myocarditis (2019).       PAST SURGICAL HISTORY:  Cardiac catheterization (2019), L5-S1 surgery, appendectomy.    IMAGIN/24/19 ([facility]): CT chest - A 1.6 cm mildly enlarged and central low attenuation concerning for central necrotic right lower paratracheal lymph node of indeterminate significance.       19 ([facility]):  MRI thoracic spine - Abnormal ill-defined low T1 signal lesions in the T8 and T9  vertebral bodies with increased T2 involvement concerning for aggressive bone marrow signal findings and although this may represent lipid poor spinal hemangiomas concern for metastasis versus myeloma remains a consideration given overall appearance on this non-contrast evaluation.       19 ([facility]):  MRI thoracic spine – Suspected diffusely cellular marrow with slightly decreased T1 and T2 marrow signal throughout the visualized spine. This is nonspecific but can be seen with anemias and myeloproliferative disorders.  Abnormal marrow signal within the T8 and T9 vertebral bodies demonstrating a low T1 and high T2 signal. This  appears to enhance post-contrast. When compared to the 8/9/2019 study, both of these lesions appear to have increased. The lesion at T8 measures up to 2.4 cm and the lesion at T9 measures up to 2.3 cm. There is some irregularity of the superior endplate at T8 and T9 no definitive fracture or osteolyses.  Subtle foci of STIR hyperintensity are noted along the superior aspect of T1, superior aspect of T2 posterior body of T4 posterior body of T5 and posterior vertebral body at T12. The largest of these measures up to 8 mm.    BIOPSY/STAGING RESULTS  Facility: [facility]   Date: 08/26/19  Histologic type:  Large cell lymphoma  Differentiation:   Specimen site and character: T8   Lymphovascular invasion:   Tumor marker date/level:     Reviewed department report:   Reviewed department slides:      CLINICAL STAGING  No matching staging information was found for the patient.    RECOMMENDED TREATMENT  Neoadjuvant therapy:   Recommended Neoadjuvant regimen and duration of therapy:   Anticipated surgical procedure:  Timing of procedure:   Surgical approach:   Anticipated procedure:   Anticipated hospital stay:   Anticipated adjuvant chemotherapy or definitive chemotherapy:   Regimen:   Duration:   Anticipated imaging:      GUIDELINE CONCORDANCE  Recommended treatment is in a concordance with National Comprehensive Cancer Network (NCCN):      If not, rationale for novel treatment recommendation is based on the following criteria:     REFERRAL SUPPORT  Surgery recommended: No  Medical Oncology recommended: Yes [facility]  Radiation Oncology recommended: No  Clinical Trial recommended: No  Genetics Consultation: No  Palliative Care Consult recommended: No  Barriers to Care: No  Social Work Consult recommended: No  Behavioral Health Consult recommended: No  Notes:   Electronically signed by:  Gabriela Brown  09/12/19  8:26 AM  Privileged and Confidential Patient Safety Work Product:  The information contained herein  has been compiled as part of the [facility] Patient Safety Evaluation System and is deemed to be a Patient Safety Work Product and is privileged and confidential.  Disclaimer:  Multidisciplinary cancer conferences provide consultative services to formulate an effective treatment plan for patients and include physician representation from medical oncology, radiation oncology, radiology, surgery, and pathology.  AJCC or other appropriate staging is discussed, along with site-specific prognostic indicators and treatment planning used by evidence-based treatment guidelines.  Treatment plans which are discussed during conference may/may not necessarily be followed by the patient’s managing physician(s), as there may be other factors taken into consideration which impact the treatment plan.  The specific treatment plan is ultimately determined on an individual basis by the patient and the physician(s) involved in his/her care.

## 2019-09-12 NOTE — NURSING NOTE
[facility] Education Progress Note    Stephen Stoll  1975 - male    Education Date:  09/12/19  Provider: Sharlene Perdomo RN  Location:  [facility]      Summary of Education Session:  Met with Stephen Stoll to review assembly and administration of IV antibiotic.  Demonstrated with proficiency.  Reviewed side effects of medication, care and complications of central line, use of probiotics, and 24 hour availability of RN for support.  Reviewed appointment date and time to include lab arrangements and providing supplies at each appointment.    Anticipated Discharge Date: 09/12/2019    Follow Up Date: 09/17/2019    Sharlene Perdomo RN, 09/12/19 - 11:25 AM

## 2019-09-12 NOTE — THERAPY WOUND CARE TREATMENT
[id number] Care - Wound/Debridement Treatment Note/Discharge  [facility]     Patient Name: Stephen Stoll  : 1975  MRN: [id number]  Today's Date: 2019   Onset of Illness/Injury or Date of Surgery: 19  Date of Referral to PT: 09/10/19  Referring Physician: MD Rolan       Admit Date: 2019    Visit Dx:    ICD-10-CM ICD-9-CM   1. Mediastinal lymphadenopathy R59.0 785.6   2. Sepsis, due to unspecified organism (CMS/[facility]) A41.9 038.9     995.91   3. Shortness of breath R06.02 786.05   4. Abnormal chest x-ray R93.89 793.2   5. Leukocytosis, unspecified type D72.829 288.60   6. 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[facility])   • Vertebral osteomyelitis (CMS/[facility])   • Anaplastic ALK-positive large cell lymphoma    • Hyponatremia   • Mediastinal lymphadenopathy   • Staphylococcus aureus bacteremia           Lymphedema     Row Name 19 0835             Lymphedema Edema Assessment    Ptting Edema Category  By severity  -      Pitting Edema  Other (comment) trace/none  -         Skin Changes/Observations    Lower Extremity Conditions  bilateral:;intact;clean;dry;inflamed  -      Lower Extremity Color/Pigment  bilateral:;red rash improving  -         Lymphedema Pulses/Capillary Refill    Lower Extremity Capillary Refill  right:;left:;less than 3 seconds  -         Compression/Skin Care    Wrapping Comments  Continues to have no/little BLE edema. No compression or skilled PT wound care needs at this time.  -        User Key  (r) = Recorded By, (t) = Taken By, (c) = Cosigned By    Initials Name Provider Type    Keshia Gong, PT Physical Therapist            WOUND  DEBRIDEMENT              Rash 09/05/19 1630 (Active)   Distribution generalized 9/12/2019  8:00 AM   Configuration/Shape round;asymmetric 9/12/2019  8:00 AM   Borders irregular 9/12/2019  8:00 AM   Characteristics itching 9/12/2019  8:00 AM   Color red 9/12/2019  8:00 AM   Lesion Size greater than 1 cm 9/12/2019  8:00 AM   Care, Rash open to air 9/12/2019  8:00 AM       Wound 09/06/19 1152 chest Incision (Active)   Dressing Appearance dry;intact 9/12/2019  8:00 AM   Closure TERESA 9/12/2019  8:00 AM   Base dressing in place, unable to visualize 9/12/2019  8:00 AM   Drainage Amount none 9/12/2019  8:00 AM   Dressing Care, Wound gauze 9/12/2019  8:00 AM       Therapy Treatment    Rehabilitation Treatment Summary     Row Name 09/12/19 0835             Treatment Time/Intention    Discipline  physical therapist  -      Document Type  wound care;discharge treatment  -      Subjective Information  no complaints  -      Care Plan Review  care plan/treatment goals reviewed;risks/benefits reviewed;current/potential barriers reviewed;patient/other agree to care plan  -      Care Plan Review, Other Participant(s)  family  -MC      Recorded by [MC] Keshia Moran, PT 09/12/19 1250      Row Name 09/12/19 0835             Cognitive Assessment/Intervention- PT/OT    Orientation Status (Cognition)  oriented x 3  -MC      Follows Commands (Cognition)  WFL  -MC      Recorded by [MC] Keshia Moran, PT 09/12/19 1250      Row Name 09/12/19 0835             ADL Assessment/Intervention    01238 - PT Self Care/Mgmt Minutes  8  -MC      Recorded by [MC] Kehsia Moran, PT 09/12/19 1251      Row Name 09/12/19 0835             Positioning and Restraints    Pre-Treatment Position  in bed  -MC      Post Treatment Position  bed  -MC      In Bed  supine;call light within reach;encouraged to call for assist;with family/caregiver;legs elevated  -MC      Recorded by [MC] Keshia Moran, PT 09/12/19 1250      Row Name 09/12/19  0835             Pain Scale: Numbers Pre/Post-Treatment    Pain Scale: Numbers, Pretreatment  0/10 - no pain  -MC      Pain Scale: Numbers, Post-Treatment  0/10 - no pain  -MC      Recorded by [MC] Keshia Moran, PT 09/12/19 1250      Row Name                Wound 09/06/19 1152 chest Incision    Wound - Properties Group Date first assessed: 09/06/19 [EL] Time first assessed: 1152 [EL] Location: chest [EL] Primary Wound Type: Incision [EL] Recorded by:  [EL] Rena Manuel RN 09/06/19 1152    Row Name 09/12/19 0835             Coping    Observed Emotional State  accepting;calm;cooperative  -      Verbalized Emotional State  acceptance  -MC      Recorded by [] Keshia Moran, PT 09/12/19 1250      Row Name 09/12/19 0835             Plan of Care Review    Plan of Care Reviewed With  patient;family  -      Recorded by [] Keshia Moran, PT 09/12/19 1250        User Key  (r) = Recorded By, (t) = Taken By, (c) = Cosigned By    Initials Name Effective Dates Discipline     Keshia Moran, PT 04/03/18 -  PT    Rena Martinez RN 04/10/19 -  Nurse                          PT Recommendation and Plan  Anticipated Discharge Disposition (PT): home with home health    Plan of Care Reviewed With: patient, family   Progress: improving  Outcome Summary: Rash to BLE is improving. Pt continues to have little/no edema to the BLE. No compression or skilled PT wound care needs at this time. Will d/c from our services. Re-consult for any issues during this admission.   Outcome Summary/Treatment Plan (PT)  Anticipated Discharge Disposition (PT): home with home health            Time Calculation  PT Charges     Row Name 09/12/19 0835             Time Calculation    Start Time  0835  -      PT Goal Re-Cert Due Date  09/21/19  -         Timed Charges    11371 - PT Self Care/Mgmt Minutes  8  -MC        User Key  (r) = Recorded By, (t) = Taken By, (c) = Cosigned By    Initials Name Provider Type      Keshia Moran, PT Physical Therapist          Therapy Charges for Today     Code Description Service Date Service Provider Modifiers Qty    53383654032 HC PT SELF CARE/MGMT/TRAIN EA 15 MIN 9/11/2019 Keshia Moran, PT GP 1    64855678909 HC PT EVAL LOW COMPLEXITY 2 9/11/2019 Keshia Moran, PT GP 1    58677630067 HC PT SELF CARE/MGMT/TRAIN EA 15 MIN 9/12/2019 Keshia Moran, PT GP 1                   PT Discharge Summary  Anticipated Discharge Disposition (PT): home with home health  Reason for Discharge: Independent        Keshia Moran, PT  9/12/2019

## 2019-09-12 NOTE — DISCHARGE INSTR - APPOINTMENTS
You have an appointment with Carlos Ramirez MD  on September 18, 2019 @ 9:00 AM.   Call them if you have any questions. Phone: [phone] [address] [facility] [address]

## 2019-09-12 NOTE — PROGRESS NOTES
Continued Stay Note  [facility]     Patient Name: Stephen Stoll  MRN: [id number]  Today's Date: 9/12/2019    Admit Date: 9/5/2019    Discharge Plan     Row Name 09/12/19 1402       Plan    Plan  Home at discharge    Patient/Family in Agreement with Plan  yes    Plan Comments  Patient has had bedside teaching by the [facility] OPAT nurse for home IV antbx care. His goal remains to return home with his spouse and will continue IV antibiotics at home.  Follow up with Nicole in the AVS.  - Patient will transport home with spouse - ivette [phone]         Discharge Codes    No documentation.       Expected Discharge Date and Time     Expected Discharge Date Expected Discharge Time    Sep 10, 2019             Ivette Clay RN

## 2019-09-12 NOTE — PLAN OF CARE
Problem: Patient Care Overview  Goal: Plan of Care Review  Outcome: Ongoing (interventions implemented as appropriate)   09/12/19 7770   Coping/Psychosocial   Plan of Care Reviewed With patient;family   Plan of Care Review   Progress improving   OTHER   Outcome Summary Rash to BLE is improving. Pt continues to have little/no edema to the BLE. No compression or skilled PT wound care needs at this time. Will d/c from our services. Re-consult for any issues during this admission.

## 2019-09-12 NOTE — PROGRESS NOTES
"HEMATOLOGY/ONCOLOGY PROGRESS NOTE    Subjective      CC: Anaplastic large cell lymphoma    SUBJECTIVE: Anaplastic large cell lymphoma therapy thus far tolerated.  No further fevers in the last 24 hours.  Platelet count 80,000 with white count down to 10,090% neutrophils.        Past Medical History, Past Surgical History, Social History, Family History have been reviewed and are without significant changes except as mentioned.      Medications:  The current medication list was reviewed in the EMR    ALLERGIES:   Allergies   Allergen Reactions   • Ciprofloxacin Anxiety     \"Extreme anxiety and paranoia\"   • Sulfa Antibiotics Rash       ROS:  A comprehensive 14 point review of systems was performed and was negative except as mentioned.      Objective      Vitals:    09/12/19 0407 09/12/19 0500 09/12/19 0525 09/12/19 0731   BP: 101/73   137/93   BP Location: Right arm   Right arm   Patient Position: Lying   Lying   Pulse: 58 60  63   Resp: 18   18   Temp: 97.5 °F (36.4 °C)   97.4 °F (36.3 °C)   TempSrc: Oral   Oral   SpO2:       Weight:   106 kg (234 lb 6.4 oz)    Height:            ECOG: (1) Restricted in physically strenuous activity, ambulatory and able to do work of light nature    General: well appearing, in no acute distress  HEENT: sclerae anicteric, oropharynx clear  Lymphatics: no cervical, supraclavicular, inguinal, or axillary adenopathy  Cardiovascular: regular rate and rhythm, no murmurs  Lungs: clear to auscultation bilaterally  Abdomen: soft, nontender, nondistended.  No palpable organomegaly  Extremities: no lower extremity edema  Skin: no rashes, lesions, bruising, or petechiae  Neuro: Alert and oriented x 3. Moves all extremities.    RECENT LABS:    Results from last 7 days   Lab Units 09/12/19 0728 09/11/19  1153 09/09/19  0427   WBC 10*3/mm3 6.84 10.00 16.55*   HEMOGLOBIN g/dL 8.2* 8.3* 9.1*   PLATELETS 10*3/mm3 110* 80* 81*     Results from last 7 days   Lab Units 09/12/19  0728 09/11/19  1153 " 09/10/19  0440   SODIUM mmol/L 136 133* 133*   POTASSIUM mmol/L 4.1 4.0 3.9   CO2 mmol/L 27.0 25.0 23.0   BUN mg/dL 16 18 16   CREATININE mg/dL 0.51* 0.59* 0.62*   GLUCOSE mg/dL 106* 229* 205*     Results from last 7 days   Lab Units 09/09/19  0427 09/08/19  0818 09/08/19  0454   AST (SGOT) U/L 30 33 25   ALT (SGPT) U/L 34 29 24   BILIRUBIN mg/dL 0.4 0.4 0.3   ALK PHOS U/L 139* 128* 129*         Ct Abdomen Pelvis Without Contrast    Result Date: 9/10/2019   Constellation of findings concerning for metastatic disease:  - Interval development of numerous pulmonary nodules throughout the lungs with the largest measuring 2.0 cm within the right midlung and concerning for underlying pulmonary metastasis and which may account for abnormality identified in the chest radiograph performed 09/05/2019.  - Extensive axillary, mediastinal, and abdominopelvic lymphadenopathy as described above.  - Hypoattenuated regions involving the liver concerning for additional sites of metastatic disease, although are limited in evaluation secondary to lack of intravenous contrast. Attention on follow-up imaging is advised.  - Redemonstration of T8/T9 aggressive osseous lesions with additional lytic lesion involving the L4 vertebral body and additional subcentimeter lytic lesions throughout the thoracolumbar spine suggested.  Nonspecific perinephric inflammation. Correlate with urinalysis to exclude underlying pyelonephritis.  DICTATED:   09/05/2019 EDITED/ls :   09/05/2019  This report was finalized on 9/10/2019 1:50 PM by Dr. Lionel Jackson MD.      Ct Chest Without Contrast    Result Date: 9/10/2019   Constellation of findings concerning for metastatic disease:  - Interval development of numerous pulmonary nodules throughout the lungs with the largest measuring 2.0 cm within the right midlung and concerning for underlying pulmonary metastasis and which may account for abnormality identified in the chest radiograph performed 09/05/2019.   - Extensive axillary, mediastinal, and abdominopelvic lymphadenopathy as described above.  - Hypoattenuated regions involving the liver concerning for additional sites of metastatic disease, although are limited in evaluation secondary to lack of intravenous contrast. Attention on follow-up imaging is advised.  - Redemonstration of T8/T9 aggressive osseous lesions with additional lytic lesion involving the L4 vertebral body and additional subcentimeter lytic lesions throughout the thoracolumbar spine suggested.  Nonspecific perinephric inflammation. Correlate with urinalysis to exclude underlying pyelonephritis.  DICTATED:   09/05/2019 EDITED/ls :   09/05/2019  This report was finalized on 9/10/2019 1:50 PM by Dr. Lionel Jackson MD.      Xr Chest 1 View    Result Date: 9/11/2019   Limited exam secondary to patient positioning, however similar in appearance when compared to 09/07/2019.  DICTATED:   09/08/2019 EDITED/ls :   09/08/2019  This report was finalized on 9/11/2019 11:15 AM by Dr. Lionel Jackson MD.      Xr Chest 1 View    Result Date: 9/11/2019  Similar exam when compared to 09/06/2019.  DICTATED:   09/07/2019 EDITED/ls :   09/07/2019  This report was finalized on 9/11/2019 8:50 AM by Dr. Lionel Jackson MD.      Xr Chest 1 View    Result Date: 9/6/2019  Improvement seen in aeration of the right midlung.  D:  09/06/2019 E:  09/06/2019  This report was finalized on 9/6/2019 3:23 PM by Dr. Ana Paula De La Cruz MD.      Xr Chest 1 View    Result Date: 9/6/2019  There has been interval worsening of mild perihilar airspace changes with nodular airspace opacities noted within the right mid lung of undetermined etiology. Consideration would be for pneumonia as these are new when compared to 08/21/2019, however underlying developing pulmonary lesions are not excluded. Consideration for follow-up CT of the chest without IV contrast should be considered if clinically appropriate.  D:  09/05/2019 E:  09/05/2019   This report was finalized on 9/6/2019 12:06 PM by Dr. Lionel Jackson MD.                Assessment   ASSESSMENT & PLAN:    1. Anaplastic large cell lymphoma  2. Methicillin sensitive staph aureus infection    Discussion: he will follow-up with me September 30 for course #2 of brentuximab Cytoxan and Adriamycin and prednisone.  Though his white count has not dipped yet, there is 100% certainty that it will and we cannot risk him becoming neutropenic in the face of all the infectious processes that he has been through over the last few months including the current methicillin staph aureus for which he continues daptomycin for the next 14 days under the guidance of Dr. Dumont.  I will arrange Neupogen daily starting tomorrow and will not hold for a high neutrophil count until his ANC rises above 2000 POST HUYEN.  I am arranging this and he will start Neupogen tomorrow as an outpatient.  Discussed with patient face-to-face 30 minutes greater than 50% spent counseling regarding this complex decision tree and reviewing the records from Dr. Dumont and company who have been of great help.                  Brent Brizuela MD    9/12/2019

## 2019-09-13 ENCOUNTER — HOSPITAL ENCOUNTER (OUTPATIENT)
Dept: ONCOLOGY | Facility: HOSPITAL | Age: 44
Setting detail: INFUSION SERIES
Discharge: HOME | End: 2019-09-13

## 2019-09-13 ENCOUNTER — READMISSION MANAGEMENT (OUTPATIENT)
Dept: CALL CENTER | Facility: HOSPITAL | Age: 44
End: 2019-09-13

## 2019-09-13 VITALS
RESPIRATION RATE: 18 BRPM | BODY MASS INDEX: 29.12 KG/M2 | HEIGHT: 72 IN | SYSTOLIC BLOOD PRESSURE: 129 MMHG | WEIGHT: 215 LBS | TEMPERATURE: 97.7 F | HEART RATE: 125 BPM | DIASTOLIC BLOOD PRESSURE: 73 MMHG

## 2019-09-13 DIAGNOSIS — C84.60 ANAPLASTIC ALK-POSITIVE LARGE CELL LYMPHOMA, UNSPECIFIED BODY REGION (HCC): Primary | ICD-10-CM

## 2019-09-13 DIAGNOSIS — C84.60 ANAPLASTIC ALK-POSITIVE LARGE CELL LYMPHOMA, UNSPECIFIED BODY REGION (HCC): ICD-10-CM

## 2019-09-13 LAB
BACTERIA SPEC AEROBE CULT: NORMAL
BACTERIA SPEC AEROBE CULT: NORMAL

## 2019-09-13 PROCEDURE — 96372 THER/PROPH/DIAG INJ SC/IM: CPT

## 2019-09-13 PROCEDURE — 25010000002 FILGRASTIM PER 480 MCG: Performed by: INTERNAL MEDICINE

## 2019-09-13 RX ADMIN — FILGRASTIM 480 MCG: 480 INJECTION, SOLUTION INTRAVENOUS; SUBCUTANEOUS at 15:57

## 2019-09-13 NOTE — OUTREACH NOTE
Prep Survey      Responses   Facility patient discharged from?  [place]   Is patient eligible?  Yes   Discharge diagnosis  Severe sepsis    Does the patient have one of the following disease processes/diagnoses(primary or secondary)?  Other   Does the patient have Home health ordered?  No   Is there a DME ordered?  No   Prep survey completed?  Yes          Nelly Zuniga RN

## 2019-09-14 ENCOUNTER — HOSPITAL ENCOUNTER (OUTPATIENT)
Dept: ONCOLOGY | Facility: HOSPITAL | Age: 44
Setting detail: INFUSION SERIES
Discharge: HOME | End: 2019-09-14

## 2019-09-14 ENCOUNTER — READMISSION MANAGEMENT (OUTPATIENT)
Dept: CALL CENTER | Facility: HOSPITAL | Age: 44
End: 2019-09-14

## 2019-09-14 VITALS — HEART RATE: 85 BPM | DIASTOLIC BLOOD PRESSURE: 75 MMHG | SYSTOLIC BLOOD PRESSURE: 128 MMHG | TEMPERATURE: 97.4 F

## 2019-09-14 DIAGNOSIS — C84.60 ANAPLASTIC ALK-POSITIVE LARGE CELL LYMPHOMA, UNSPECIFIED BODY REGION (HCC): Primary | ICD-10-CM

## 2019-09-14 LAB
BASOPHILS # BLD AUTO: 0.07 10*3/MM3 (ref 0–0.2)
BASOPHILS NFR BLD AUTO: 0.5 % (ref 0–1.5)
DEPRECATED RDW RBC AUTO: 51 FL (ref 37–54)
EOSINOPHIL # BLD AUTO: 0.02 10*3/MM3 (ref 0–0.4)
EOSINOPHIL NFR BLD AUTO: 0.1 % (ref 0.3–6.2)
ERYTHROCYTE [DISTWIDTH] IN BLOOD BY AUTOMATED COUNT: 17 % (ref 12.3–15.4)
HCT VFR BLD AUTO: 31 % (ref 37.5–51)
HGB BLD-MCNC: 9.6 G/DL (ref 13–17.7)
IMM GRANULOCYTES # BLD AUTO: 1.15 10*3/MM3 (ref 0–0.05)
IMM GRANULOCYTES NFR BLD AUTO: 8 % (ref 0–0.5)
LYMPHOCYTES # BLD AUTO: 1.12 10*3/MM3 (ref 0.7–3.1)
LYMPHOCYTES NFR BLD AUTO: 7.8 % (ref 19.6–45.3)
MCH RBC QN AUTO: 25.6 PG (ref 26.6–33)
MCHC RBC AUTO-ENTMCNC: 31 G/DL (ref 31.5–35.7)
MCV RBC AUTO: 82.7 FL (ref 79–97)
MONOCYTES # BLD AUTO: 0.03 10*3/MM3 (ref 0.1–0.9)
MONOCYTES NFR BLD AUTO: 0.2 % (ref 5–12)
NEUTROPHILS # BLD AUTO: 11.96 10*3/MM3 (ref 1.7–7)
NEUTROPHILS NFR BLD AUTO: 83.4 % (ref 42.7–76)
NRBC BLD AUTO-RTO: 0 /100 WBC (ref 0–0.2)
PLAT MORPH BLD: NORMAL
PLATELET # BLD AUTO: 188 10*3/MM3 (ref 140–450)
PMV BLD AUTO: 10.1 FL (ref 6–12)
RBC # BLD AUTO: 3.75 10*6/MM3 (ref 4.14–5.8)
RBC MORPH BLD: NORMAL
WBC MORPH BLD: NORMAL
WBC NRBC COR # BLD: 14.35 10*3/MM3 (ref 3.4–10.8)

## 2019-09-14 PROCEDURE — 96372 THER/PROPH/DIAG INJ SC/IM: CPT

## 2019-09-14 PROCEDURE — 36415 COLL VENOUS BLD VENIPUNCTURE: CPT

## 2019-09-14 PROCEDURE — 85025 COMPLETE CBC W/AUTO DIFF WBC: CPT | Performed by: INTERNAL MEDICINE

## 2019-09-14 PROCEDURE — 85007 BL SMEAR W/DIFF WBC COUNT: CPT | Performed by: INTERNAL MEDICINE

## 2019-09-14 PROCEDURE — 25010000002 FILGRASTIM PER 480 MCG: Performed by: INTERNAL MEDICINE

## 2019-09-14 RX ADMIN — FILGRASTIM 480 MCG: 480 INJECTION, SOLUTION INTRAVENOUS; SUBCUTANEOUS at 09:10

## 2019-09-14 NOTE — OUTREACH NOTE
Medical Week 1 Survey      Responses   Facility patient discharged from?  [place]   Does the patient have one of the following disease processes/diagnoses(primary or secondary)?  Other   Is there a successful TCM telephone encounter documented?  No   Week 1 attempt successful?  No   Unsuccessful attempts  Attempt 1          Riya Chaney RN

## 2019-09-15 ENCOUNTER — HOSPITAL ENCOUNTER (OUTPATIENT)
Dept: ONCOLOGY | Facility: HOSPITAL | Age: 44
Setting detail: INFUSION SERIES
Discharge: HOME | End: 2019-09-15

## 2019-09-15 VITALS
SYSTOLIC BLOOD PRESSURE: 134 MMHG | WEIGHT: 206 LBS | TEMPERATURE: 97.2 F | HEART RATE: 117 BPM | DIASTOLIC BLOOD PRESSURE: 71 MMHG | BODY MASS INDEX: 27.94 KG/M2

## 2019-09-15 DIAGNOSIS — C84.60 ANAPLASTIC ALK-POSITIVE LARGE CELL LYMPHOMA, UNSPECIFIED BODY REGION (HCC): Primary | ICD-10-CM

## 2019-09-15 LAB
BASOPHILS # BLD AUTO: 0.01 10*3/MM3 (ref 0–0.2)
BASOPHILS NFR BLD AUTO: 0.5 % (ref 0–1.5)
DEPRECATED RDW RBC AUTO: 50.1 FL (ref 37–54)
EOSINOPHIL # BLD AUTO: 0.03 10*3/MM3 (ref 0–0.4)
EOSINOPHIL NFR BLD AUTO: 1.4 % (ref 0.3–6.2)
ERYTHROCYTE [DISTWIDTH] IN BLOOD BY AUTOMATED COUNT: 16.9 % (ref 12.3–15.4)
HCT VFR BLD AUTO: 31.2 % (ref 37.5–51)
HGB BLD-MCNC: 9.9 G/DL (ref 13–17.7)
IMM GRANULOCYTES # BLD AUTO: 0.08 10*3/MM3 (ref 0–0.05)
IMM GRANULOCYTES NFR BLD AUTO: 3.6 % (ref 0–0.5)
LYMPHOCYTES # BLD AUTO: 1.05 10*3/MM3 (ref 0.7–3.1)
LYMPHOCYTES NFR BLD AUTO: 47.3 % (ref 19.6–45.3)
MCH RBC QN AUTO: 26 PG (ref 26.6–33)
MCHC RBC AUTO-ENTMCNC: 31.7 G/DL (ref 31.5–35.7)
MCV RBC AUTO: 81.9 FL (ref 79–97)
MONOCYTES # BLD AUTO: 0.05 10*3/MM3 (ref 0.1–0.9)
MONOCYTES NFR BLD AUTO: 2.3 % (ref 5–12)
NEUTROPHILS # BLD AUTO: 1 10*3/MM3 (ref 1.7–7)
NEUTROPHILS NFR BLD AUTO: 44.9 % (ref 42.7–76)
NRBC BLD AUTO-RTO: 0 /100 WBC (ref 0–0.2)
PLAT MORPH BLD: NORMAL
PLATELET # BLD AUTO: 198 10*3/MM3 (ref 140–450)
PMV BLD AUTO: 9.9 FL (ref 6–12)
RBC # BLD AUTO: 3.81 10*6/MM3 (ref 4.14–5.8)
RBC MORPH BLD: NORMAL
WBC MORPH BLD: NORMAL
WBC NRBC COR # BLD: 2.22 10*3/MM3 (ref 3.4–10.8)

## 2019-09-15 PROCEDURE — 85025 COMPLETE CBC W/AUTO DIFF WBC: CPT | Performed by: INTERNAL MEDICINE

## 2019-09-15 PROCEDURE — 96372 THER/PROPH/DIAG INJ SC/IM: CPT

## 2019-09-15 PROCEDURE — 85007 BL SMEAR W/DIFF WBC COUNT: CPT | Performed by: INTERNAL MEDICINE

## 2019-09-15 PROCEDURE — 25010000002 FILGRASTIM PER 480 MCG: Performed by: INTERNAL MEDICINE

## 2019-09-15 PROCEDURE — 36415 COLL VENOUS BLD VENIPUNCTURE: CPT

## 2019-09-15 RX ADMIN — FILGRASTIM 480 MCG: 480 INJECTION, SOLUTION INTRAVENOUS; SUBCUTANEOUS at 09:25

## 2019-09-16 ENCOUNTER — HOSPITAL ENCOUNTER (OUTPATIENT)
Dept: ONCOLOGY | Facility: HOSPITAL | Age: 44
Setting detail: INFUSION SERIES
Discharge: HOME | End: 2019-09-16

## 2019-09-16 ENCOUNTER — READMISSION MANAGEMENT (OUTPATIENT)
Dept: CALL CENTER | Facility: HOSPITAL | Age: 44
End: 2019-09-16

## 2019-09-16 ENCOUNTER — TELEPHONE (OUTPATIENT)
Dept: ONCOLOGY | Facility: CLINIC | Age: 44
End: 2019-09-16

## 2019-09-16 VITALS
RESPIRATION RATE: 20 BRPM | TEMPERATURE: 97.5 F | WEIGHT: 203 LBS | HEART RATE: 138 BPM | BODY MASS INDEX: 27.5 KG/M2 | DIASTOLIC BLOOD PRESSURE: 58 MMHG | SYSTOLIC BLOOD PRESSURE: 101 MMHG | HEIGHT: 72 IN

## 2019-09-16 DIAGNOSIS — B95.61 STAPHYLOCOCCUS AUREUS BACTEREMIA: Primary | ICD-10-CM

## 2019-09-16 DIAGNOSIS — R78.81 STAPHYLOCOCCUS AUREUS BACTEREMIA: Primary | ICD-10-CM

## 2019-09-16 DIAGNOSIS — C84.60 ANAPLASTIC ALK-POSITIVE LARGE CELL LYMPHOMA, UNSPECIFIED BODY REGION (HCC): ICD-10-CM

## 2019-09-16 LAB
ALBUMIN SERPL-MCNC: 3.5 G/DL (ref 3.5–5.2)
ALBUMIN/GLOB SERPL: 0.8 G/DL
ALP SERPL-CCNC: 124 U/L (ref 39–117)
ALT SERPL W P-5'-P-CCNC: 36 U/L (ref 1–41)
ANION GAP SERPL CALCULATED.3IONS-SCNC: 14 MMOL/L (ref 5–15)
AST SERPL-CCNC: 19 U/L (ref 1–40)
BASOPHILS # BLD AUTO: 0 10*3/MM3 (ref 0–0.2)
BASOPHILS NFR BLD AUTO: 0 % (ref 0–1.5)
BILIRUB SERPL-MCNC: 0.7 MG/DL (ref 0.2–1.2)
BUN BLD-MCNC: 12 MG/DL (ref 6–20)
BUN/CREAT SERPL: 16.7 (ref 7–25)
CALCIUM SPEC-SCNC: 9 MG/DL (ref 8.6–10.5)
CHLORIDE SERPL-SCNC: 94 MMOL/L (ref 98–107)
CK SERPL-CCNC: 21 U/L (ref 20–200)
CO2 SERPL-SCNC: 25 MMOL/L (ref 22–29)
CREAT BLD-MCNC: 0.72 MG/DL (ref 0.76–1.27)
CRP SERPL-MCNC: 1.8 MG/DL (ref 0–0.5)
DEPRECATED RDW RBC AUTO: 50.5 FL (ref 37–54)
EOSINOPHIL # BLD AUTO: 0.01 10*3/MM3 (ref 0–0.4)
EOSINOPHIL NFR BLD AUTO: 1.5 % (ref 0.3–6.2)
ERYTHROCYTE [DISTWIDTH] IN BLOOD BY AUTOMATED COUNT: 16.6 % (ref 12.3–15.4)
ERYTHROCYTE [SEDIMENTATION RATE] IN BLOOD: 30 MM/HR (ref 0–15)
GFR SERPL CREATININE-BSD FRML MDRD: 118 ML/MIN/1.73
GLOBULIN UR ELPH-MCNC: 4.2 GM/DL
GLUCOSE BLD-MCNC: 215 MG/DL (ref 65–99)
HCT VFR BLD AUTO: 31.7 % (ref 37.5–51)
HGB BLD-MCNC: 9.6 G/DL (ref 13–17.7)
IMM GRANULOCYTES # BLD AUTO: 0.01 10*3/MM3 (ref 0–0.05)
IMM GRANULOCYTES NFR BLD AUTO: 1.5 % (ref 0–0.5)
LYMPHOCYTES # BLD AUTO: 0.59 10*3/MM3 (ref 0.7–3.1)
LYMPHOCYTES NFR BLD AUTO: 86.8 % (ref 19.6–45.3)
MCH RBC QN AUTO: 25.4 PG (ref 26.6–33)
MCHC RBC AUTO-ENTMCNC: 30.3 G/DL (ref 31.5–35.7)
MCV RBC AUTO: 83.9 FL (ref 79–97)
MONOCYTES # BLD AUTO: 0.05 10*3/MM3 (ref 0.1–0.9)
MONOCYTES NFR BLD AUTO: 7.4 % (ref 5–12)
NEUTROPHILS # BLD AUTO: 0.02 10*3/MM3 (ref 1.7–7)
NEUTROPHILS NFR BLD AUTO: 2.8 % (ref 42.7–76)
NRBC BLD AUTO-RTO: 0 /100 WBC (ref 0–0.2)
PLAT MORPH BLD: NORMAL
PLATELET # BLD AUTO: 196 10*3/MM3 (ref 140–450)
PMV BLD AUTO: 9.8 FL (ref 6–12)
POTASSIUM BLD-SCNC: 3.9 MMOL/L (ref 3.5–5.2)
PROT SERPL-MCNC: 7.7 G/DL (ref 6–8.5)
RBC # BLD AUTO: 3.78 10*6/MM3 (ref 4.14–5.8)
RBC MORPH BLD: NORMAL
SODIUM BLD-SCNC: 133 MMOL/L (ref 136–145)
WBC MORPH BLD: NORMAL
WBC NRBC COR # BLD: 0.68 10*3/MM3 (ref 3.4–10.8)

## 2019-09-16 PROCEDURE — 85652 RBC SED RATE AUTOMATED: CPT | Performed by: INTERNAL MEDICINE

## 2019-09-16 PROCEDURE — 96372 THER/PROPH/DIAG INJ SC/IM: CPT

## 2019-09-16 PROCEDURE — 36415 COLL VENOUS BLD VENIPUNCTURE: CPT

## 2019-09-16 PROCEDURE — 80053 COMPREHEN METABOLIC PANEL: CPT | Performed by: INTERNAL MEDICINE

## 2019-09-16 PROCEDURE — 25010000002 FILGRASTIM PER 480 MCG: Performed by: INTERNAL MEDICINE

## 2019-09-16 PROCEDURE — 82550 ASSAY OF CK (CPK): CPT | Performed by: INTERNAL MEDICINE

## 2019-09-16 PROCEDURE — 86140 C-REACTIVE PROTEIN: CPT | Performed by: INTERNAL MEDICINE

## 2019-09-16 PROCEDURE — 85025 COMPLETE CBC W/AUTO DIFF WBC: CPT | Performed by: INTERNAL MEDICINE

## 2019-09-16 PROCEDURE — 85007 BL SMEAR W/DIFF WBC COUNT: CPT | Performed by: INTERNAL MEDICINE

## 2019-09-16 RX ADMIN — FILGRASTIM 480 MCG: 480 INJECTION, SOLUTION INTRAVENOUS; SUBCUTANEOUS at 12:23

## 2019-09-16 NOTE — OUTREACH NOTE
Medical Week 1 Survey      Responses   Facility patient discharged from?  [place]   Does the patient have one of the following disease processes/diagnoses(primary or secondary)?  Other   Is there a successful TCM telephone encounter documented?  No   Week 1 attempt successful?  No   Unsuccessful attempts  Attempt 2          Maribell Dumont RN

## 2019-09-16 NOTE — ADDENDUM NOTE
Encounter addended by: Charlene Metcalf RN on: 9/16/2019 1:07 PM   Actions taken: Flowsheet accepted

## 2019-09-16 NOTE — TELEPHONE ENCOUNTER
Critical Test Results      Critical test result:  WBC 0.68, ANC 0.02    Time results received: 13:27      Name of Physician notified: Brent Brizuela MD    Time Physician Notified: 13:28      []  Orders received    []  Protocol/Standing orders followed    [x]  No new orders

## 2019-09-17 ENCOUNTER — TELEPHONE (OUTPATIENT)
Dept: ONCOLOGY | Facility: CLINIC | Age: 44
End: 2019-09-17

## 2019-09-17 ENCOUNTER — HOSPITAL ENCOUNTER (OUTPATIENT)
Dept: ONCOLOGY | Facility: HOSPITAL | Age: 44
Discharge: HOME | End: 2019-09-17
Admitting: INTERNAL MEDICINE

## 2019-09-17 VITALS
TEMPERATURE: 97.2 F | DIASTOLIC BLOOD PRESSURE: 81 MMHG | SYSTOLIC BLOOD PRESSURE: 131 MMHG | HEART RATE: 102 BPM | RESPIRATION RATE: 20 BRPM | HEIGHT: 72 IN | BODY MASS INDEX: 27.5 KG/M2 | WEIGHT: 203 LBS

## 2019-09-17 DIAGNOSIS — C84.60 ANAPLASTIC ALK-POSITIVE LARGE CELL LYMPHOMA, UNSPECIFIED BODY REGION (HCC): Primary | ICD-10-CM

## 2019-09-17 LAB
BASOPHILS # BLD MANUAL: 0.01 10*3/MM3 (ref 0–0.2)
BASOPHILS NFR BLD AUTO: 3 % (ref 0–1.5)
DEPRECATED RDW RBC AUTO: 49.7 FL (ref 37–54)
EOSINOPHIL # BLD MANUAL: 0.04 10*3/MM3 (ref 0–0.4)
EOSINOPHIL NFR BLD MANUAL: 8 % (ref 0.3–6.2)
ERYTHROCYTE [DISTWIDTH] IN BLOOD BY AUTOMATED COUNT: 16.6 % (ref 12.3–15.4)
HCT VFR BLD AUTO: 32.2 % (ref 37.5–51)
HGB BLD-MCNC: 9.9 G/DL (ref 13–17.7)
LYMPHOCYTES # BLD MANUAL: 0.27 10*3/MM3 (ref 0.7–3.1)
LYMPHOCYTES NFR BLD MANUAL: 18 % (ref 5–12)
LYMPHOCYTES NFR BLD MANUAL: 57 % (ref 19.6–45.3)
MCH RBC QN AUTO: 25.5 PG (ref 26.6–33)
MCHC RBC AUTO-ENTMCNC: 30.7 G/DL (ref 31.5–35.7)
MCV RBC AUTO: 83 FL (ref 79–97)
MONOCYTES # BLD AUTO: 0.08 10*3/MM3 (ref 0.1–0.9)
NEUTROPHILS # BLD AUTO: 0.01 10*3/MM3 (ref 1.7–7)
NEUTROPHILS NFR BLD MANUAL: 2 % (ref 42.7–76)
PLAT MORPH BLD: NORMAL
PLATELET # BLD AUTO: 186 10*3/MM3 (ref 140–450)
PMV BLD AUTO: 10 FL (ref 6–12)
POLYCHROMASIA BLD QL SMEAR: ABNORMAL
RBC # BLD AUTO: 3.88 10*6/MM3 (ref 4.14–5.8)
VARIANT LYMPHS NFR BLD MANUAL: 12 % (ref 0–5)
WBC MORPH BLD: NORMAL
WBC NRBC COR # BLD: 0.47 10*3/MM3 (ref 3.4–10.8)

## 2019-09-17 PROCEDURE — 85007 BL SMEAR W/DIFF WBC COUNT: CPT | Performed by: INTERNAL MEDICINE

## 2019-09-17 PROCEDURE — 36415 COLL VENOUS BLD VENIPUNCTURE: CPT

## 2019-09-17 PROCEDURE — 85025 COMPLETE CBC W/AUTO DIFF WBC: CPT | Performed by: INTERNAL MEDICINE

## 2019-09-17 PROCEDURE — 25010000002 FILGRASTIM PER 480 MCG: Performed by: INTERNAL MEDICINE

## 2019-09-17 PROCEDURE — 85060 BLOOD SMEAR INTERPRETATION: CPT | Performed by: INTERNAL MEDICINE

## 2019-09-17 PROCEDURE — 96372 THER/PROPH/DIAG INJ SC/IM: CPT

## 2019-09-17 RX ADMIN — FILGRASTIM 480 MCG: 480 INJECTION, SOLUTION INTRAVENOUS; SUBCUTANEOUS at 13:22

## 2019-09-17 NOTE — TELEPHONE ENCOUNTER
----- Message from Violette Huerta RN sent at 9/17/2019  1:11 PM EDT -----  Regarding: critical lab      Critical Test Results      MD:  Dr. Brizuela    Date:  9/17/19     Critical test result:  WBC 0.47 and ANC 0.02    Time results received: 105 pm

## 2019-09-17 NOTE — TELEPHONE ENCOUNTER
Name of Physician notified: Brent Brizuela MD    Time Physician Notified: 13:38      []  Orders received    []  Protocol/Standing orders followed    [x]  No new orders

## 2019-09-18 ENCOUNTER — TELEPHONE (OUTPATIENT)
Dept: ONCOLOGY | Facility: CLINIC | Age: 44
End: 2019-09-18

## 2019-09-18 ENCOUNTER — HOSPITAL ENCOUNTER (OUTPATIENT)
Dept: ONCOLOGY | Facility: HOSPITAL | Age: 44
Setting detail: INFUSION SERIES
Discharge: HOME | End: 2019-09-18

## 2019-09-18 VITALS
BODY MASS INDEX: 26.95 KG/M2 | SYSTOLIC BLOOD PRESSURE: 117 MMHG | WEIGHT: 199 LBS | HEART RATE: 114 BPM | DIASTOLIC BLOOD PRESSURE: 62 MMHG | RESPIRATION RATE: 20 BRPM | HEIGHT: 72 IN | TEMPERATURE: 97 F

## 2019-09-18 DIAGNOSIS — C84.60 ANAPLASTIC ALK-POSITIVE LARGE CELL LYMPHOMA, UNSPECIFIED BODY REGION (HCC): Primary | ICD-10-CM

## 2019-09-18 LAB
CYTOLOGIST CVX/VAG CYTO: NORMAL
ERYTHROCYTE [DISTWIDTH] IN BLOOD BY AUTOMATED COUNT: 17.6 % (ref 12.3–15.4)
HCT VFR BLD AUTO: 29.8 % (ref 37.5–51)
HGB BLD-MCNC: 9.6 G/DL (ref 13–17.7)
LYMPHOCYTES # BLD AUTO: 1 10*3/MM3 (ref 0.7–3.1)
LYMPHOCYTES NFR BLD AUTO: 68.4 % (ref 19.6–45.3)
MCH RBC QN AUTO: 26.5 PG (ref 26.6–33)
MCHC RBC AUTO-ENTMCNC: 32.2 G/DL (ref 31.5–35.7)
MCV RBC AUTO: 82.3 FL (ref 79–97)
MONOCYTES # BLD AUTO: 0.2 10*3/MM3 (ref 0.1–0.9)
MONOCYTES NFR BLD AUTO: 15.3 % (ref 5–12)
NEUTROPHILS # BLD AUTO: 0.2 10*3/MM3 (ref 1.7–7)
NEUTROPHILS NFR BLD AUTO: 16.3 % (ref 42.7–76)
PATH INTERP BLD-IMP: NORMAL
PLATELET # BLD AUTO: 189 10*3/MM3 (ref 140–450)
PMV BLD AUTO: 6.5 FL (ref 6–12)
RBC # BLD AUTO: 3.62 10*6/MM3 (ref 4.14–5.8)
WBC NRBC COR # BLD: 1.4 10*3/MM3 (ref 3.4–10.8)

## 2019-09-18 PROCEDURE — 25010000002 FILGRASTIM PER 480 MCG: Performed by: INTERNAL MEDICINE

## 2019-09-18 PROCEDURE — 85025 COMPLETE CBC W/AUTO DIFF WBC: CPT | Performed by: INTERNAL MEDICINE

## 2019-09-18 PROCEDURE — 96372 THER/PROPH/DIAG INJ SC/IM: CPT

## 2019-09-18 PROCEDURE — 36415 COLL VENOUS BLD VENIPUNCTURE: CPT

## 2019-09-18 RX ADMIN — FILGRASTIM 480 MCG: 480 INJECTION, SOLUTION INTRAVENOUS; SUBCUTANEOUS at 11:49

## 2019-09-18 NOTE — TELEPHONE ENCOUNTER
Name of Physician notified: Brent Yeung MD    Time Physician Notified: 11:40      []  Orders received    []  Protocol/Standing orders followed    [x]  No new orders

## 2019-09-19 ENCOUNTER — HOSPITAL ENCOUNTER (OUTPATIENT)
Dept: ONCOLOGY | Facility: HOSPITAL | Age: 44
Setting detail: INFUSION SERIES
Discharge: HOME | End: 2019-09-19

## 2019-09-19 ENCOUNTER — READMISSION MANAGEMENT (OUTPATIENT)
Dept: CALL CENTER | Facility: HOSPITAL | Age: 44
End: 2019-09-19

## 2019-09-19 VITALS
DIASTOLIC BLOOD PRESSURE: 78 MMHG | HEIGHT: 72 IN | SYSTOLIC BLOOD PRESSURE: 125 MMHG | HEART RATE: 118 BPM | BODY MASS INDEX: 26.95 KG/M2 | RESPIRATION RATE: 16 BRPM | TEMPERATURE: 97.4 F | WEIGHT: 199 LBS

## 2019-09-19 DIAGNOSIS — C84.60 ANAPLASTIC ALK-POSITIVE LARGE CELL LYMPHOMA, UNSPECIFIED BODY REGION (HCC): ICD-10-CM

## 2019-09-19 LAB
ERYTHROCYTE [DISTWIDTH] IN BLOOD BY AUTOMATED COUNT: 18.3 % (ref 12.3–15.4)
HCT VFR BLD AUTO: 30.1 % (ref 37.5–51)
HGB BLD-MCNC: 9.9 G/DL (ref 13–17.7)
LYMPHOCYTES # BLD AUTO: 1.7 10*3/MM3 (ref 0.7–3.1)
LYMPHOCYTES NFR BLD AUTO: 27.1 % (ref 19.6–45.3)
MCH RBC QN AUTO: 26.9 PG (ref 26.6–33)
MCHC RBC AUTO-ENTMCNC: 32.8 G/DL (ref 31.5–35.7)
MCV RBC AUTO: 82.2 FL (ref 79–97)
MONOCYTES # BLD AUTO: 0.2 10*3/MM3 (ref 0.1–0.9)
MONOCYTES NFR BLD AUTO: 2.5 % (ref 5–12)
NEUTROPHILS # BLD AUTO: 4.3 10*3/MM3 (ref 1.7–7)
NEUTROPHILS NFR BLD AUTO: 70.4 % (ref 42.7–76)
PLATELET # BLD AUTO: 195 10*3/MM3 (ref 140–450)
PMV BLD AUTO: 6.5 FL (ref 6–12)
RBC # BLD AUTO: 3.66 10*6/MM3 (ref 4.14–5.8)
WBC NRBC COR # BLD: 6.1 10*3/MM3 (ref 3.4–10.8)

## 2019-09-19 PROCEDURE — 36415 COLL VENOUS BLD VENIPUNCTURE: CPT

## 2019-09-19 PROCEDURE — 85025 COMPLETE CBC W/AUTO DIFF WBC: CPT | Performed by: INTERNAL MEDICINE

## 2019-09-19 NOTE — OUTREACH NOTE
Medical Week 2 Survey      Responses   Facility patient discharged from?  [facility]   Does the patient have one of the following disease processes/diagnoses(primary or secondary)?  Other   Week 2 attempt successful?  No   Unsuccessful attempts  Attempt 1          Raina Jackson RN

## 2019-09-23 ENCOUNTER — LAB (OUTPATIENT)
Dept: LAB | Facility: HOSPITAL | Age: 44
End: 2019-09-23

## 2019-09-23 ENCOUNTER — READMISSION MANAGEMENT (OUTPATIENT)
Dept: CALL CENTER | Facility: HOSPITAL | Age: 44
End: 2019-09-23

## 2019-09-23 ENCOUNTER — TRANSCRIBE ORDERS (OUTPATIENT)
Dept: LAB | Facility: HOSPITAL | Age: 44
End: 2019-09-23

## 2019-09-23 DIAGNOSIS — J18.9 UNRESOLVED PNEUMONIA: ICD-10-CM

## 2019-09-23 DIAGNOSIS — A41.01 METHICILLIN SUSCEPTIBLE STAPHYLOCOCCUS AUREUS SEPTICEMIA (HCC): ICD-10-CM

## 2019-09-23 DIAGNOSIS — I40.9 ACUTE MYOCARDITIS, UNSPECIFIED MYOCARDITIS TYPE: ICD-10-CM

## 2019-09-23 DIAGNOSIS — A41.01 METHICILLIN SUSCEPTIBLE STAPHYLOCOCCUS AUREUS SEPTICEMIA (HCC): Primary | ICD-10-CM

## 2019-09-23 LAB
ALBUMIN SERPL-MCNC: 3.9 G/DL (ref 3.5–5.2)
ALBUMIN/GLOB SERPL: 1 G/DL
ALP SERPL-CCNC: 127 U/L (ref 39–117)
ALT SERPL W P-5'-P-CCNC: 29 U/L (ref 1–41)
ANION GAP SERPL CALCULATED.3IONS-SCNC: 13 MMOL/L (ref 5–15)
AST SERPL-CCNC: 29 U/L (ref 1–40)
BASOPHILS # BLD MANUAL: 0 10*3/MM3 (ref 0–0.2)
BASOPHILS NFR BLD AUTO: 0 % (ref 0–1.5)
BILIRUB SERPL-MCNC: 0.2 MG/DL (ref 0.2–1.2)
BUN BLD-MCNC: 15 MG/DL (ref 6–20)
BUN/CREAT SERPL: 18.8 (ref 7–25)
CALCIUM SPEC-SCNC: 9.7 MG/DL (ref 8.6–10.5)
CHLORIDE SERPL-SCNC: 98 MMOL/L (ref 98–107)
CK SERPL-CCNC: 28 U/L (ref 20–200)
CO2 SERPL-SCNC: 27 MMOL/L (ref 22–29)
CREAT BLD-MCNC: 0.8 MG/DL (ref 0.76–1.27)
CRP SERPL-MCNC: 2.32 MG/DL (ref 0–0.5)
DEPRECATED RDW RBC AUTO: 54.8 FL (ref 37–54)
EOSINOPHIL # BLD MANUAL: 0 10*3/MM3 (ref 0–0.4)
EOSINOPHIL NFR BLD MANUAL: 0 % (ref 0.3–6.2)
ERYTHROCYTE [DISTWIDTH] IN BLOOD BY AUTOMATED COUNT: 19.2 % (ref 12.3–15.4)
ERYTHROCYTE [SEDIMENTATION RATE] IN BLOOD: 58 MM/HR (ref 0–15)
GFR SERPL CREATININE-BSD FRML MDRD: 105 ML/MIN/1.73
GLOBULIN UR ELPH-MCNC: 4 GM/DL
GLUCOSE BLD-MCNC: 138 MG/DL (ref 65–99)
HCT VFR BLD AUTO: 36.4 % (ref 37.5–51)
HGB BLD-MCNC: 10.8 G/DL (ref 13–17.7)
HYPOCHROMIA BLD QL: ABNORMAL
LYMPHOCYTES # BLD MANUAL: 2.2 10*3/MM3 (ref 0.7–3.1)
LYMPHOCYTES NFR BLD MANUAL: 10 % (ref 19.6–45.3)
LYMPHOCYTES NFR BLD MANUAL: 10 % (ref 5–12)
MCH RBC QN AUTO: 25.9 PG (ref 26.6–33)
MCHC RBC AUTO-ENTMCNC: 29.7 G/DL (ref 31.5–35.7)
MCV RBC AUTO: 87.3 FL (ref 79–97)
METAMYELOCYTES NFR BLD MANUAL: 7 % (ref 0–0)
MONOCYTES # BLD AUTO: 2.2 10*3/MM3 (ref 0.1–0.9)
MYELOCYTES NFR BLD MANUAL: 10 % (ref 0–0)
NEUTROPHILS # BLD AUTO: 12.33 10*3/MM3 (ref 1.7–7)
NEUTROPHILS NFR BLD MANUAL: 49 % (ref 42.7–76)
NEUTS BAND NFR BLD MANUAL: 7 % (ref 0–5)
NRBC SPEC MANUAL: 2 /100 WBC (ref 0–0.2)
PLAT MORPH BLD: NORMAL
PLATELET # BLD AUTO: 327 10*3/MM3 (ref 140–450)
PMV BLD AUTO: 9.7 FL (ref 6–12)
POLYCHROMASIA BLD QL SMEAR: ABNORMAL
POTASSIUM BLD-SCNC: 5 MMOL/L (ref 3.5–5.2)
PROMYELOCYTES NFR BLD MANUAL: 7 % (ref 0–0)
PROT SERPL-MCNC: 7.9 G/DL (ref 6–8.5)
RBC # BLD AUTO: 4.17 10*6/MM3 (ref 4.14–5.8)
SODIUM BLD-SCNC: 138 MMOL/L (ref 136–145)
WBC MORPH BLD: NORMAL
WBC NRBC COR # BLD: 22.01 10*3/MM3 (ref 3.4–10.8)

## 2019-09-23 PROCEDURE — 86140 C-REACTIVE PROTEIN: CPT

## 2019-09-23 PROCEDURE — 82550 ASSAY OF CK (CPK): CPT

## 2019-09-23 PROCEDURE — 80053 COMPREHEN METABOLIC PANEL: CPT

## 2019-09-23 PROCEDURE — 85652 RBC SED RATE AUTOMATED: CPT

## 2019-09-23 PROCEDURE — 36415 COLL VENOUS BLD VENIPUNCTURE: CPT

## 2019-09-23 PROCEDURE — 85025 COMPLETE CBC W/AUTO DIFF WBC: CPT

## 2019-09-23 PROCEDURE — 85007 BL SMEAR W/DIFF WBC COUNT: CPT

## 2019-09-23 NOTE — OUTREACH NOTE
Medical Week 2 Survey      Responses   Facility patient discharged from?  [place]   Does the patient have one of the following disease processes/diagnoses(primary or secondary)?  Other   Week 2 attempt successful?  Yes   Call start time  1140   Discharge diagnosis  Severe sepsis    Call end time  1144   Meds reviewed with patient/caregiver?  Yes   Is the patient having any side effects they believe may be caused by any medication additions or changes?  No   Does the patient have all medications ordered at discharge?  Yes   Is the patient taking all medications as directed (includes completed medication regime)?  Yes   Does the patient have a primary care provider?   Yes   Does the patient have an appointment with their PCP within 7 days of discharge?  Yes   Has the patient kept scheduled appointments due by today?  Yes   Psychosocial issues?  No   Did the patient receive a copy of their discharge instructions?  Yes   Nursing interventions  Reviewed instructions with patient   What is the patient's perception of their health status since discharge?  Same   Is the patient/caregiver able to teach back signs and symptoms related to disease process for when to call PCP?  Yes   Is the patient/caregiver able to teach back signs and symptoms related to disease process for when to call 911?  Yes   Is the patient/caregiver able to teach back the hierarchy of who to call/visit for symptoms/problems? PCP, Specialist, Home health nurse, Urgent Care, ED, 911  Yes   Week 2 Call Completed?  Yes   Wrap up additional comments  pt stated he is doing ok, sees atleast one doctor a week. coming in for blood wotk today and next week for chemo          Alexa Orona RN

## 2019-09-26 LAB
CYTO UR: NORMAL
DX PRELIMINARY: NORMAL
LAB AP CASE REPORT: NORMAL
LAB AP CLINICAL INFORMATION: NORMAL
LAB AP DIAGNOSIS COMMENT: NORMAL
PATH REPORT.ADDENDUM SPEC: NORMAL
PATH REPORT.FINAL DX SPEC: NORMAL
PATH REPORT.GROSS SPEC: NORMAL

## 2019-09-30 ENCOUNTER — OFFICE VISIT (OUTPATIENT)
Dept: ONCOLOGY | Facility: CLINIC | Age: 44
End: 2019-09-30

## 2019-09-30 ENCOUNTER — HOSPITAL ENCOUNTER (OUTPATIENT)
Dept: ONCOLOGY | Facility: HOSPITAL | Age: 44
Setting detail: INFUSION SERIES
Discharge: HOME | End: 2019-09-30

## 2019-09-30 ENCOUNTER — DOCUMENTATION (OUTPATIENT)
Dept: NUTRITION | Facility: HOSPITAL | Age: 44
End: 2019-09-30

## 2019-09-30 VITALS
TEMPERATURE: 97.4 F | SYSTOLIC BLOOD PRESSURE: 120 MMHG | HEIGHT: 72 IN | RESPIRATION RATE: 18 BRPM | DIASTOLIC BLOOD PRESSURE: 91 MMHG | HEART RATE: 95 BPM | WEIGHT: 212 LBS | OXYGEN SATURATION: 96 % | BODY MASS INDEX: 28.71 KG/M2

## 2019-09-30 DIAGNOSIS — C84.60 ANAPLASTIC ALK-POSITIVE LARGE CELL LYMPHOMA, UNSPECIFIED BODY REGION (HCC): Primary | Chronic | ICD-10-CM

## 2019-09-30 DIAGNOSIS — C84.60 ANAPLASTIC ALK-POSITIVE LARGE CELL LYMPHOMA, UNSPECIFIED BODY REGION (HCC): ICD-10-CM

## 2019-09-30 DIAGNOSIS — D72.829 LEUKOCYTOSIS, UNSPECIFIED TYPE: Chronic | ICD-10-CM

## 2019-09-30 DIAGNOSIS — C84.60 ANAPLASTIC ALK-POSITIVE LARGE CELL LYMPHOMA, UNSPECIFIED BODY REGION (HCC): Primary | ICD-10-CM

## 2019-09-30 DIAGNOSIS — M89.9 LYTIC BONE LESIONS ON XRAY: Chronic | ICD-10-CM

## 2019-09-30 LAB
ALBUMIN SERPL-MCNC: 4.2 G/DL (ref 3.5–5.2)
ALBUMIN/GLOB SERPL: 1.3 G/DL
ALP SERPL-CCNC: 93 U/L (ref 39–117)
ALT SERPL W P-5'-P-CCNC: 29 U/L (ref 1–41)
ANION GAP SERPL CALCULATED.3IONS-SCNC: 14 MMOL/L (ref 5–15)
AST SERPL-CCNC: 24 U/L (ref 1–40)
BILIRUB SERPL-MCNC: 0.3 MG/DL (ref 0.2–1.2)
BUN BLD-MCNC: 17 MG/DL (ref 6–20)
BUN/CREAT SERPL: 22.1 (ref 7–25)
CALCIUM SPEC-SCNC: 9.2 MG/DL (ref 8.6–10.5)
CHLORIDE SERPL-SCNC: 101 MMOL/L (ref 98–107)
CK SERPL-CCNC: 25 U/L (ref 20–200)
CO2 SERPL-SCNC: 23 MMOL/L (ref 22–29)
CREAT BLD-MCNC: 0.77 MG/DL (ref 0.76–1.27)
CREAT BLDA-MCNC: 0.8 MG/DL (ref 0.6–1.3)
CRP SERPL-MCNC: 2.23 MG/DL (ref 0–0.5)
ERYTHROCYTE [DISTWIDTH] IN BLOOD BY AUTOMATED COUNT: 24.2 % (ref 12.3–15.4)
ERYTHROCYTE [SEDIMENTATION RATE] IN BLOOD: 50 MM/HR (ref 0–15)
GFR SERPL CREATININE-BSD FRML MDRD: 109 ML/MIN/1.73
GLOBULIN UR ELPH-MCNC: 3.3 GM/DL
GLUCOSE BLD-MCNC: 88 MG/DL (ref 65–99)
HCT VFR BLD AUTO: 32.3 % (ref 37.5–51)
HGB BLD-MCNC: 10.3 G/DL (ref 13–17.7)
LYMPHOCYTES # BLD AUTO: 3.5 10*3/MM3 (ref 0.7–3.1)
LYMPHOCYTES NFR BLD AUTO: 23.6 % (ref 19.6–45.3)
MCH RBC QN AUTO: 27.5 PG (ref 26.6–33)
MCHC RBC AUTO-ENTMCNC: 32.1 G/DL (ref 31.5–35.7)
MCV RBC AUTO: 85.8 FL (ref 79–97)
MONOCYTES # BLD AUTO: 1.6 10*3/MM3 (ref 0.1–0.9)
MONOCYTES NFR BLD AUTO: 11 % (ref 5–12)
NEUTROPHILS # BLD AUTO: 9.8 10*3/MM3 (ref 1.7–7)
NEUTROPHILS NFR BLD AUTO: 65.4 % (ref 42.7–76)
PLATELET # BLD AUTO: 211 10*3/MM3 (ref 140–450)
PMV BLD AUTO: 6.6 FL (ref 6–12)
POTASSIUM BLD-SCNC: 3.8 MMOL/L (ref 3.5–5.2)
PROT SERPL-MCNC: 7.5 G/DL (ref 6–8.5)
RBC # BLD AUTO: 3.76 10*6/MM3 (ref 4.14–5.8)
SODIUM BLD-SCNC: 138 MMOL/L (ref 136–145)
WBC NRBC COR # BLD: 15 10*3/MM3 (ref 3.4–10.8)

## 2019-09-30 PROCEDURE — 25010000002 BRENTUXIMAB 50 MG RECONSTITUTED SOLUTION 1 EACH VIAL: Performed by: INTERNAL MEDICINE

## 2019-09-30 PROCEDURE — 25010000002 PALONOSETRON 0.25 MG/5ML SOLUTION PREFILLED SYRINGE: Performed by: INTERNAL MEDICINE

## 2019-09-30 PROCEDURE — 96411 CHEMO IV PUSH ADDL DRUG: CPT

## 2019-09-30 PROCEDURE — 96413 CHEMO IV INFUSION 1 HR: CPT

## 2019-09-30 PROCEDURE — 96366 THER/PROPH/DIAG IV INF ADDON: CPT

## 2019-09-30 PROCEDURE — 85652 RBC SED RATE AUTOMATED: CPT | Performed by: INTERNAL MEDICINE

## 2019-09-30 PROCEDURE — 96367 TX/PROPH/DG ADDL SEQ IV INF: CPT

## 2019-09-30 PROCEDURE — 25010000002 DEXAMETHASONE PER 1 MG: Performed by: INTERNAL MEDICINE

## 2019-09-30 PROCEDURE — 25010000002 CYCLOPHOSPHAMIDE PER 100 MG: Performed by: INTERNAL MEDICINE

## 2019-09-30 PROCEDURE — 86140 C-REACTIVE PROTEIN: CPT | Performed by: INTERNAL MEDICINE

## 2019-09-30 PROCEDURE — 82550 ASSAY OF CK (CPK): CPT | Performed by: INTERNAL MEDICINE

## 2019-09-30 PROCEDURE — 96375 TX/PRO/DX INJ NEW DRUG ADDON: CPT

## 2019-09-30 PROCEDURE — 99215 OFFICE O/P EST HI 40 MIN: CPT | Performed by: INTERNAL MEDICINE

## 2019-09-30 PROCEDURE — 80053 COMPREHEN METABOLIC PANEL: CPT | Performed by: INTERNAL MEDICINE

## 2019-09-30 PROCEDURE — 25010000002 DOXORUBICIN PER 10 MG: Performed by: INTERNAL MEDICINE

## 2019-09-30 PROCEDURE — 85025 COMPLETE CBC W/AUTO DIFF WBC: CPT | Performed by: INTERNAL MEDICINE

## 2019-09-30 PROCEDURE — 25010000002 PEGFILGRASTIM 6 MG/0.6ML PREFILLED SYRINGE KIT: Performed by: INTERNAL MEDICINE

## 2019-09-30 PROCEDURE — 25010000002 FOSAPREPITANT PER 1 MG: Performed by: INTERNAL MEDICINE

## 2019-09-30 PROCEDURE — 96377 APPLICATON ON-BODY INJECTOR: CPT

## 2019-09-30 PROCEDURE — 96417 CHEMO IV INFUS EACH ADDL SEQ: CPT

## 2019-09-30 PROCEDURE — 82565 ASSAY OF CREATININE: CPT

## 2019-09-30 RX ORDER — SODIUM CHLORIDE 9 MG/ML
250 INJECTION, SOLUTION INTRAVENOUS ONCE
Status: COMPLETED | OUTPATIENT
Start: 2019-09-30 | End: 2019-09-30

## 2019-09-30 RX ORDER — PALONOSETRON 0.05 MG/ML
0.25 INJECTION, SOLUTION INTRAVENOUS ONCE
Status: CANCELLED | OUTPATIENT
Start: 2019-09-30

## 2019-09-30 RX ORDER — ONDANSETRON HYDROCHLORIDE 8 MG/1
8 TABLET, FILM COATED ORAL EVERY 8 HOURS PRN
Qty: 30 TABLET | Refills: 5 | Status: SHIPPED | OUTPATIENT
Start: 2019-09-30 | End: 2020-02-04

## 2019-09-30 RX ORDER — DOXORUBICIN HYDROCHLORIDE 2 MG/ML
110 INJECTION, SOLUTION INTRAVENOUS ONCE
Status: COMPLETED | OUTPATIENT
Start: 2019-09-30 | End: 2019-09-30

## 2019-09-30 RX ORDER — OXYCODONE AND ACETAMINOPHEN 7.5; 325 MG/1; MG/1
TABLET ORAL
Qty: 100 TABLET | Refills: 0 | Status: SHIPPED | OUTPATIENT
Start: 2019-09-30 | End: 2020-02-04

## 2019-09-30 RX ORDER — DOXORUBICIN HYDROCHLORIDE 2 MG/ML
50 INJECTION, SOLUTION INTRAVENOUS ONCE
Status: CANCELLED | OUTPATIENT
Start: 2019-09-30

## 2019-09-30 RX ORDER — PREDNISONE 50 MG/1
TABLET ORAL
Qty: 10 TABLET | Refills: 0 | Status: SHIPPED | OUTPATIENT
Start: 2019-09-30 | End: 2019-10-21 | Stop reason: SDUPTHER

## 2019-09-30 RX ORDER — SODIUM CHLORIDE 9 MG/ML
250 INJECTION, SOLUTION INTRAVENOUS ONCE
Status: CANCELLED | OUTPATIENT
Start: 2019-09-30

## 2019-09-30 RX ORDER — PALONOSETRON 0.05 MG/ML
0.25 INJECTION, SOLUTION INTRAVENOUS ONCE
Status: COMPLETED | OUTPATIENT
Start: 2019-09-30 | End: 2019-09-30

## 2019-09-30 RX ADMIN — SODIUM CHLORIDE 150 MG: 9 INJECTION, SOLUTION INTRAVENOUS at 11:32

## 2019-09-30 RX ADMIN — DOXORUBICIN HYDROCHLORIDE 110 MG: 2 INJECTION, SOLUTION INTRAVENOUS at 12:35

## 2019-09-30 RX ADMIN — BRENTUXIMAB VEDOTIN 160 MG: 50 INJECTION, POWDER, LYOPHILIZED, FOR SOLUTION INTRAVENOUS at 11:58

## 2019-09-30 RX ADMIN — DEXAMETHASONE SODIUM PHOSPHATE 12 MG: 4 INJECTION, SOLUTION INTRAMUSCULAR; INTRAVENOUS at 11:29

## 2019-09-30 RX ADMIN — PEGFILGRASTIM 6 MG: KIT SUBCUTANEOUS at 13:38

## 2019-09-30 RX ADMIN — SODIUM CHLORIDE 250 ML: 9 INJECTION, SOLUTION INTRAVENOUS at 11:22

## 2019-09-30 RX ADMIN — PALONOSETRON 0.25 MG: 0.25 INJECTION, SOLUTION INTRAVENOUS at 11:25

## 2019-09-30 RX ADMIN — CYCLOPHOSPHAMIDE 1500 MG: 1 INJECTION, POWDER, FOR SOLUTION INTRAVENOUS; ORAL at 13:00

## 2019-09-30 NOTE — PROGRESS NOTES
CHIEF COMPLAINT: Stage IV anaplastic large cell lymphoma status post first course of brentuximab Cytoxan Adriamycin prednisone    Problem List:  Oncology/Hematology History    1. 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 Leukocytosis  3. FUO  4. Decreased ejection fraction with evidence of myocarditis  5. Marked elevation of sedimentation rate and C-reactive protein  6. Elevated triglycerides on fasting  7. Acute renal failure resolved with hydration    -8/23/2019 initial inpatient [facility] medical oncology consultation.  Asked to see regarding leukocytosis.  He had upper respiratory infection with pneumonia, chest pains, elevated troponins with diagnosis of myocarditis and decreased ejection fraction with fevers to 104.  Tentatively diagnosed with still's disease and treated with prednisone with some improvement in fevers as well as improvement in a rash.  By the end of July, he had severe mid back pain relatively acute and onset that progressed over time with imaging of the area showing progressive abnormality of both bone and marrow signals in the spine that could be malignant marrow involvement or, more likely per radiologist, infectious.  Sedimentation rate was over 100 with a C-reactive protein elevated and white count 20-30,000 for the better part of 2 months.  Slight left shift but no immature forms.  Broad-spectrum serologic testing has not shown pathogens as of the initial consultation.  I ordered bone marrow biopsy and Dr. Jimenez coordinated spine biopsy  -6/24/2019 CT chest abdomen pelvis without contrast showed no parenchymal lung disease but a 1.6 cm mildly enlarged centrally necrotic right  paratracheal node with no other adenopathy or hepatosplenomegaly.  Ejection fraction 35% on echocardiogram.  -6/28/2019 MRI brain and cervical spine without contrast normal  -7/9/2019 fluoroscopy guided right joint aspiration unrevealing  -7/10/2019 fluoroscopic guided bronchoscopy showed no pathogens or pathology  -8/9/2019 MRI thoracic and lumbar spine showed low T1 signals in T8 and T9 with increased T2 involvement concerning for aggressive marrow signal findings that could represent lipid poor spinal hemangioma but concerning for metastasis versus multiple myeloma.  Contrast MRI recommended.  -8/21/2019 MRI thoracic spine with and without contrast showed multiple foci of abnormal marrow signal within the thoracic spine was prominent T8 and T9 with interval increase compared to 8/9/2019 with deformity of the endplates at T8 and T9 but no definitive fracture or discitis.  Several smaller additional lesions suspected at T1, T2, T4, T5, and T12 and the differential would include multifocal vertebral osteomyelitis versus metastasis.  -8/23/2019 bone marrow biopsy showed mild polyclonal plasmacytosis with normocellular marrow and trilineage hematopoiesis with mild myeloid expansion.  No lymphoid infiltrate and no organisms for fungi, acid fast bacilli, tumor, or granuloma.  Bennett 2- negative.  -8/24/2019 ejection fraction up to 65%  -8/26/2019 T8 biopsy show focal area of markedly atypical appearance.  There are inflammatory cells with occasional eosinophils and larger cells.  Some focal hemosiderin.  There is equivocal positivity for CD4, , CD45, and Constance-Barr virus.  It is positive for CD30, ALK 1, EMA, and vimentin.  No staining for CD 5, 7, 3, 20, or 15.  No staining for cytokeratin, SOX 10, , or CD1a.  Outside referral to [facility] pending.  -8/30/2019 [facility] outpatient follow-up visit: Anaplastic large cell lymphoma ALK positive just involving the bone would be unusual but not impossible  I suppose.  We will get PET scan.  Were this hemophagocytic syndrome I would have expected his marrow to be abnormal along with splenomegaly and cytopenias as would the marrow be abnormal for this a plasma cell dyscrasia and the plasma cells were polyclonal.  I will check his SIEP along with triglycerides, peripheral blood flow cytometry for soluble CD 25, ferritin, and NK cell activity.  Once I have that information, hopefully we will have more information back from [facility] and unlikely to involve my colleagues at the [facility] in this process if this is an ALK positive anaplastic large cell lymphoma without kia involvement given the unusual nature and appearance of this.    -9/6/2019 subsequent oncology reconsultation for readmission: Met patient with his last admission.  He has had a 4-month history of fevers with upper respiratory infection and possible pneumonia.  Found to have myocarditis with low ejection fraction with extensive infectious disease work-up negative.  A month later readmitted with shortness of breath and arthralgias with some redness in his wrist.  Bronchoscopy was negative and he was treated with some steroids.  On his third admission with relatively recent acute onset back pain he was found to have some spine lesions.  The bone marrow biopsy was unrevealing,  a T8 biopsy by Dr. Wilde showed markedly atypical appearing cells in a background of eosinophils.  The larger cells were positive for ALK 1, CD30, EMA, and vimentin.  No staining for CD 5, 7, 3, 20, or 15.  No staining for cytokeratin, sox 10, , or CD1a.  Tissue has been sent for second opinion at [facility].  Anaplastic large cell lymphoma ALK positive was considered but given that his fevers which started months back included testing with CT chest abdomen pelvis 6/24/2019 only showing mildly enlarged centrally necrotic right lower paratracheal node of indeterminate significance and no other significant  adenopathy or splenomegaly and no pulmonary infiltrate or mediastinal adenopathy on repeat CT chest 7/9/2019, PET scan was ordered.  Before PET could be completed and GI evaluation completed for evaluation of possible GI source for kia procurement, the patient on the day he was due for endoscopy continue to have fevers with hypotension and a pulse in the 150s.  9/5/2019 CT chest abdomen and pelvis on admission now shows showed new numerous bilateral pulmonary lung nodules in the right lung measuring 2 cm without focal pneumonia.  There was extensive axillary, mediastinal adenopathy the largest in the right paratracheal area 2.1 cm with central necrosis.  There were hypoattenuating regions in the right hepatic lobe measuring up to 2.5 cm incompletely assessed due to lack of contrast due to his creatinine on admission 9/5/2019 being up to 1.67 down to 0.94 by the morning of 9/6/2019.  CT also showed new extensive axillary, mediastinal, and abdominopelvic lymphadenopathy.  There was redemonstration of the T8-T9 aggressive osseous lytic lesions involving thoracolumbar spine and multiple levels.  Nonspecific perinephric inflammation also seen possibly representing pyelonephritis.  Laboratory testing on admission 9/5/2019 showed C-reactive protein up to 37.66 with sedimentation rate greater than 130.  Procalcitonin over 38 with lactic acid  4.8.   normal with normal troponin.  Fasting triglyceride up to 269 and peripheral blood flow cytometry sent for NK cell activity as well as CD25 for IL-2 receptor for the possibility of hemophagocytic syndrome which would be unusual without hemophagocytosis bone marrow.  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  We will get echocardiogram and hold on the GI evaluation and I discussed in person with Dr. Benja Can to see for  procurement of lymph node.  He is going to go after upper mediastinal lymph node.  Once we have more tissue then we will make further plans for treatment.  His ejection fraction on 8/24/2019 was 62%.  Assuming that we find lymphoma and not some aggressive infectious disease process, he will need a port as well but we will wait on the port until we know what we are dealing with.  My partners are on board and will cover me this weekend and I will check back Monday to see where we stand.  He already has an appointment with Dr. Roosevelt Nguyen at  on the 11th who I was enlisting to help sort this out but we may be able to get our answers now that we have node to go after.  I may yet need his services depending on the ilk of this lymphoma and whether or not we would need to consolidate aggressively or treat more aggressively than with standard chemotherapy upfront.  I am starting him on allopurinol.  He already has a rash on his upper thighs and heels and around the area of his prior bone marrow biopsy but I suspect that just related to this probable lymphomatous process or perhaps the recent Bactrim started by Dr. Hauser.  His procalcitonin is quite high and I am still concerned about concomitant infection and I am not sure if the procalcitonin it can be falsely dramatically elevated in the face of lymphoma.  If this is anaplastic large cell lymphoma, with his ECOG performance status generally less than 2 and age less than 60 but with at least stage III disease, even if his LDH is high, he would at least have a risk score of 2 which would put him at the low intermediate risk on the IPI index.  Whether we would use Rituxan or not depends on if this turns out to be CD20 positive where his initial bone biopsy was not.  Given that it is CD30 positive, will probably use brentuximab, Cytoxan, Adriamycin, prednisone every 3 weeks x 6 cycles.  This usually is given without vincristine but with the Brentuximab 1.8 mg/kg IV  day 1 of each cycle.  A progression free survival was seen both in ALK positive and negative anaplastic large cell lymphomas treated with brentuximab rather than vincristine.  Neuropathy still can be an issue and diarrhea is more common.  If this is anaplastic large cell lymphoma ALK positive, autologous stem cell transplant may be considered in patients over 40 years of age with an IPI score greater than or equal to 3 but generally not if the IPI score is less than 3 so we will assess that very carefully.  Despite the elevated fasting triglyceride recently, I doubt this is hemophagocytic syndrome given the negative bone marrow and now with the blossoming lymphadenopathy.  But we shall see.    -9/30/2019 outpatient medical oncology follow-up visit:  He was breathing much better the first week after his chemotherapy but then the shortness of breath worsened again his pulse increased in the 120s to 140s.  Saw Dr. Dumont in my team here as well and received IV fluids.  Slowly that improved.  In the prior 5 days he has been feeling better and no tachycardia for the last week.  He has had no fever since discharge from the hospital.  He has 1 more week of antibiotics.  His current complaints are:    Occasional bloody mucus from his nose    Alternating diarrhea and constipation better with stool softeners    Low back pain worsening with Neupogen but only on oxycodone rarely and none in the last 48 hours.   Bilateral knee pain without swelling on allopurinol    Itching but prior rash resolved   Mild gait instability due to weakness worse with showering and due for follow-up with Dr. Jimenez for serial MRIs   Labile moods but without jason depression worse with steroids now down to 20 mg    I addressed all these issues with him today my plan is:   To get a PET prior to return for course #3 of chemotherapy with brentuximab, Cytoxan, Adriamycin, prednisone   Consult Dr. Carrion for possible consolidation transplant in the  future   Add Xgeva for the lytic involvement of his bone   Taper the steroids down to 20 mg for 5 days 10 mg for 5 days 5 mg for 5 days and then off following the 100 mg of prednisone with this course of therapy and then hopefully no further steroids though he will follow with rheumatology  Follow-up with Dr. Jimenez for serial spine MRIs          Leukocytosis (leucocytosis)    6/24/2019 Initial Diagnosis     Leukocytosis (leucocytosis)            HISTORY OF PRESENT ILLNESS:  The patient is a 44 y.o. male, here for follow up on management of ALK positive anaplastic large cell lymphoma status post course 1 of brentuximab, Adriamycin, Cytoxan, prednisone.  On prednisone taper down to 20 mg.  Having occasional bloody mucus, diarrhea alternating with constipation, low back pain worse with Neupogen, bilateral knee pain, itching but rash has resolved, mild gait instability with weakness, occasional crying but without depression.      Past Medical History:   Diagnosis Date   • Acute kidney injury (CMS/HCC)    • Anaplastic ALK-positive large cell lymphoma (CMS/HCC)    • Bacteremia    • Hyperlipidemia    • Myocarditis (CMS/HCC)    • Pneumonia    • Severe sepsis (CMS/HCC)      Past Surgical History:   Procedure Laterality Date   • APPENDECTOMY     • BACK SURGERY      L5 S1   • BRONCHOSCOPY N/A 7/10/2019    Procedure: BRONCHOSCOPY WITH ENDOBRONCHIAL ULTRASOUND AND TRANSBRONCHIAL BIOPSY AND FLUORO;  Surgeon: Marvel Sandoval MD;  Location: [facility] ENDOSCOPY;  Service: Pulmonary   • CARDIAC CATHETERIZATION N/A 6/24/2019    Procedure: LEFT HEART CATH;  Surgeon: Deep Muñiz IV, MD;  Location:  [facility];  Service: Cardiovascular   • INTERVENTIONAL RADIOLOGY PROCEDURE N/A 8/26/2019    Procedure: THORACIC SPINE BIOSPY;  Surgeon: Juve Avila MD;  Location:  [facility];  Service: Interventional Radiology   • MEDIASTINOSCOPY, BRONCHOSCOPY N/A 9/6/2019    Procedure: BRONCHOSCOPY,  "MEDIASTINOSCOPY WITH BIOPSY;  Surgeon: El Can MD;  Location: [facility];  Service: Cardiothoracic       Allergies   Allergen Reactions   • Ciprofloxacin Anxiety     \"Extreme anxiety and paranoia\"   • Sulfa Antibiotics Rash       Family History and Social History reviewed and changed as necessary      REVIEW OF SYSTEM:   Review of Systems   Constitutional: Negative for appetite change, chills, diaphoresis, fatigue, fever and unexpected weight change.   HENT:   Negative for mouth sores, sore throat and trouble swallowing.    Eyes: Negative for icterus.   Respiratory: Negative for cough, hemoptysis and shortness of breath.    Cardiovascular: Negative for chest pain, leg swelling and palpitations.   Gastrointestinal: Negative for abdominal distention, abdominal pain, blood in stool, constipation, diarrhea, nausea and vomiting.   Endocrine: Negative for hot flashes.   Genitourinary: Negative for bladder incontinence, difficulty urinating, dysuria, frequency and hematuria.    Musculoskeletal: Negative for gait problem, neck pain and neck stiffness.   Skin: Negative for rash.   Neurological: Negative for dizziness, gait problem, headaches, light-headedness and numbness.   Hematological: Negative for adenopathy. Does not bruise/bleed easily.   Psychiatric/Behavioral: Negative for depression. The patient is not nervous/anxious.    All other systems reviewed and are negative.       PHYSICAL EXAM    Vitals:    09/30/19 0903   BP: 120/91   Pulse: 95   Resp: 18   Temp: 97.4 °F (36.3 °C)   SpO2: 96%   Weight: 96.2 kg (212 lb)   Height: 182.9 cm (72\")     Constitutional: Appears well-developed and well-nourished. No distress.   ECOG: (2) Ambulatory and capable of self care, unable to carry out work activity, up and about > 50% or waking hours  HENT:   Head: Normocephalic.   Mouth/Throat: Oropharynx is clear and moist.   Eyes: Conjunctivae are normal. Pupils are equal, round, and reactive to light. No scleral icterus. "   Neck: Neck supple. No JVD present. No thyromegaly present.   Cardiovascular: Normal rate, regular rhythm and normal heart sounds.    Pulmonary/Chest: Breath sounds normal. No respiratory distress.   Abdominal: Soft. Exhibits no distension and no mass. There is no hepatosplenomegaly. There is no tenderness. There is no rebound and no guarding.   Musculoskeletal:Exhibits no edema, tenderness or deformity.   Neurological: Alert and oriented to person, place, and time. Exhibits normal muscle tone.   Skin: No ecchymosis, no petechiae and no rash noted. Not diaphoretic. No cyanosis. Nails show no clubbing.   Psychiatric: Normal mood and affect.   Vitals reviewed.      Lab Results   Component Value Date    HGB 10.8 (L) 09/23/2019    HCT 36.4 (L) 09/23/2019    MCV 87.3 09/23/2019     09/23/2019    WBC 22.01 (H) 09/23/2019    NEUTROABS 12.33 (H) 09/23/2019    LYMPHSABS 1.70 09/19/2019    MONOSABS 0.20 09/19/2019    EOSABS 0.00 09/23/2019    BASOSABS 0.00 09/23/2019       Lab Results   Component Value Date    GLUCOSE 138 (H) 09/23/2019    BUN 15 09/23/2019    CREATININE 0.80 09/23/2019     09/23/2019    K 5.0 09/23/2019    CL 98 09/23/2019    CO2 27.0 09/23/2019    CALCIUM 9.7 09/23/2019    PROTEINTOT 7.9 09/23/2019    ALBUMIN 3.90 09/23/2019    BILITOT 0.2 09/23/2019    ALKPHOS 127 (H) 09/23/2019    AST 29 09/23/2019    ALT 29 09/23/2019                   ASSESSMENT & PLAN:    1. 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 Leukocytosis  3. FUO  4. Decreased ejection fraction with evidence of myocarditis  5. Marked elevation of sedimentation rate and C-reactive protein  6. Elevated triglycerides on  fasting  7. Acute renal failure resolved with hydration    -8/23/2019 initial inpatient [facility] medical oncology consultation.  Asked to see regarding leukocytosis.  He had upper respiratory infection with pneumonia, chest pains, elevated troponins with diagnosis of myocarditis and decreased ejection fraction with fevers to 104.  Tentatively diagnosed with still's disease and treated with prednisone with some improvement in fevers as well as improvement in a rash.  By the end of July, he had severe mid back pain relatively acute and onset that progressed over time with imaging of the area showing progressive abnormality of both bone and marrow signals in the spine that could be malignant marrow involvement or, more likely per radiologist, infectious.  Sedimentation rate was over 100 with a C-reactive protein elevated and white count 20-30,000 for the better part of 2 months.  Slight left shift but no immature forms.  Broad-spectrum serologic testing has not shown pathogens as of the initial consultation.  I ordered bone marrow biopsy and Dr. Jimenez coordinated spine biopsy  -6/24/2019 CT chest abdomen pelvis without contrast showed no parenchymal lung disease but a 1.6 cm mildly enlarged centrally necrotic right paratracheal node with no other adenopathy or hepatosplenomegaly.  Ejection fraction 35% on echocardiogram.  -6/28/2019 MRI brain and cervical spine without contrast normal  -7/9/2019 fluoroscopy guided right joint aspiration unrevealing  -7/10/2019 fluoroscopic guided bronchoscopy showed no pathogens or pathology  -8/9/2019 MRI thoracic and lumbar spine showed low T1 signals in T8 and T9 with increased T2 involvement concerning for aggressive marrow signal findings that could represent lipid poor spinal hemangioma but concerning for metastasis versus multiple myeloma.  Contrast MRI recommended.  -8/21/2019 MRI thoracic spine with and without contrast showed multiple foci of abnormal marrow signal within the  thoracic spine was prominent T8 and T9 with interval increase compared to 8/9/2019 with deformity of the endplates at T8 and T9 but no definitive fracture or discitis.  Several smaller additional lesions suspected at T1, T2, T4, T5, and T12 and the differential would include multifocal vertebral osteomyelitis versus metastasis.  -8/23/2019 bone marrow biopsy showed mild polyclonal plasmacytosis with normocellular marrow and trilineage hematopoiesis with mild myeloid expansion.  No lymphoid infiltrate and no organisms for fungi, acid fast bacilli, tumor, or granuloma.  Bennett 2- negative.  -8/24/2019 ejection fraction up to 65%  -8/26/2019 T8 biopsy show focal area of markedly atypical appearance.  There are inflammatory cells with occasional eosinophils and larger cells.  Some focal hemosiderin.  There is equivocal positivity for CD4, , CD45, and Constance-Barr virus.  It is positive for CD30, ALK 1, EMA, and vimentin.  No staining for CD 5, 7, 3, 20, or 15.  No staining for cytokeratin, SOX 10, , or CD1a.  Outside referral to [facility] pending.  -8/30/2019 [facility] outpatient follow-up visit: Anaplastic large cell lymphoma ALK positive just involving the bone would be unusual but not impossible I suppose.  We will get PET scan.  Were this hemophagocytic syndrome I would have expected his marrow to be abnormal along with splenomegaly and cytopenias as would the marrow be abnormal for this a plasma cell dyscrasia and the plasma cells were polyclonal.  I will check his SIEP along with triglycerides, peripheral blood flow cytometry for soluble CD 25, ferritin, and NK cell activity.  Once I have that information, hopefully we will have more information back from [facility] and unlikely to involve my colleagues at the [facility] in this process if this is an ALK positive anaplastic large cell lymphoma without kia involvement given the unusual nature and appearance of this.    -9/6/2019  subsequent oncology reconsultation for readmission: Met patient with his last admission.  He has had a 4-month history of fevers with upper respiratory infection and possible pneumonia.  Found to have myocarditis with low ejection fraction with extensive infectious disease work-up negative.  A month later readmitted with shortness of breath and arthralgias with some redness in his wrist.  Bronchoscopy was negative and he was treated with some steroids.  On his third admission with relatively recent acute onset back pain he was found to have some spine lesions.  The bone marrow biopsy was unrevealing,  a T8 biopsy by Dr. Wilde showed markedly atypical appearing cells in a background of eosinophils.  The larger cells were positive for ALK 1, CD30, EMA, and vimentin.  No staining for CD 5, 7, 3, 20, or 15.  No staining for cytokeratin, sox 10, , or CD1a.  Tissue has been sent for second opinion at [facility].  Anaplastic large cell lymphoma ALK positive was considered but given that his fevers which started months back included testing with CT chest abdomen pelvis 6/24/2019 only showing mildly enlarged centrally necrotic right lower paratracheal node of indeterminate significance and no other significant adenopathy or splenomegaly and no pulmonary infiltrate or mediastinal adenopathy on repeat CT chest 7/9/2019, PET scan was ordered.  Before PET could be completed and GI evaluation completed for evaluation of possible GI source for kia procurement, the patient on the day he was due for endoscopy continue to have fevers with hypotension and a pulse in the 150s.  9/5/2019 CT chest abdomen and pelvis on admission now shows showed new numerous bilateral pulmonary lung nodules in the right lung measuring 2 cm without focal pneumonia.  There was extensive axillary, mediastinal adenopathy the largest in the right paratracheal area 2.1 cm with central necrosis.  There were hypoattenuating regions in the right hepatic  lobe measuring up to 2.5 cm incompletely assessed due to lack of contrast due to his creatinine on admission 9/5/2019 being up to 1.67 down to 0.94 by the morning of 9/6/2019.  CT also showed new extensive axillary, mediastinal, and abdominopelvic lymphadenopathy.  There was redemonstration of the T8-T9 aggressive osseous lytic lesions involving thoracolumbar spine and multiple levels.  Nonspecific perinephric inflammation also seen possibly representing pyelonephritis.  Laboratory testing on admission 9/5/2019 showed C-reactive protein up to 37.66 with sedimentation rate greater than 130.  Procalcitonin over 38 with lactic acid  4.8.   normal with normal troponin.  Fasting triglyceride up to 269 and peripheral blood flow cytometry sent for NK cell activity as well as CD25 for IL-2 receptor for the possibility of hemophagocytic syndrome which would be unusual without hemophagocytosis bone marrow.  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  We will get echocardiogram and hold on the GI evaluation and I discussed in person with Dr. Benja Can to see for procurement of lymph node.  He is going to go after upper mediastinal lymph node.  Once we have more tissue then we will make further plans for treatment.  His ejection fraction on 8/24/2019 was 62%.  Assuming that we find lymphoma and not some aggressive infectious disease process, he will need a port as well but we will wait on the port until we know what we are dealing with.  My partners are on board and will cover me this weekend and I will check back Monday to see where we stand.  He already has an appointment with Dr. Roosevelt Nguyen at  on the 11th who I was enlisting to help sort this out but we may be able to get our answers now that we have node to go after.  I may yet need his services depending on the  ilk of this lymphoma and whether or not we would need to consolidate aggressively or treat more aggressively than with standard chemotherapy upfront.  I am starting him on allopurinol.  He already has a rash on his upper thighs and heels and around the area of his prior bone marrow biopsy but I suspect that just related to this probable lymphomatous process or perhaps the recent Bactrim started by Dr. Hauser.  His procalcitonin is quite high and I am still concerned about concomitant infection and I am not sure if the procalcitonin it can be falsely dramatically elevated in the face of lymphoma.  If this is anaplastic large cell lymphoma, with his ECOG performance status generally less than 2 and age less than 60 but with at least stage III disease, even if his LDH is high, he would at least have a risk score of 2 which would put him at the low intermediate risk on the IPI index.  Whether we would use Rituxan or not depends on if this turns out to be CD20 positive where his initial bone biopsy was not.  Given that it is CD30 positive, will probably use brentuximab, Cytoxan, Adriamycin, prednisone every 3 weeks x 6 cycles.  This usually is given without vincristine but with the Brentuximab 1.8 mg/kg IV day 1 of each cycle.  A progression free survival was seen both in ALK positive and negative anaplastic large cell lymphomas treated with brentuximab rather than vincristine.  Neuropathy still can be an issue and diarrhea is more common.  If this is anaplastic large cell lymphoma ALK positive, autologous stem cell transplant may be considered in patients over 40 years of age with an IPI score greater than or equal to 3 but generally not if the IPI score is less than 3 so we will assess that very carefully.  Despite the elevated fasting triglyceride recently, I doubt this is hemophagocytic syndrome given the negative bone marrow and now with the blossoming lymphadenopathy.  But we shall see.    -9/30/2019 outpatient  medical oncology follow-up visit:  He was breathing much better the first week after his chemotherapy but then the shortness of breath worsened again his pulse increased in the 120s to 140s.  Saw Dr. Dumont in my team here as well and received IV fluids.  Slowly that improved.  In the prior 5 days he has been feeling better and no tachycardia for the last week.  He has had no fever since discharge from the hospital.  He has 1 more week of antibiotics.  His current complaints are:    Occasional bloody mucus from his nose    Alternating diarrhea and constipation better with stool softeners    Low back pain worsening with Neupogen but only on oxycodone rarely and none in the last 48 hours.   Bilateral knee pain without swelling on allopurinol    Itching but prior rash resolved   Mild gait instability due to weakness worse with showering and due for follow-up with Dr. Jimenez for serial MRIs   Labile moods but without jason depression worse with steroids now down to 20 mg    I addressed all these issues with him today my plan is:   To get a PET prior to return for course #3 of chemotherapy with brentuximab, Cytoxan, Adriamycin, prednisone   Consult Dr. Carrion for possible consolidation transplant in the future   Add Xgeva for the lytic involvement of his bone   Taper the steroids down to 20 mg for 5 days 10 mg for 5 days 5 mg for 5 days and then off following the 100 mg of prednisone with this course of therapy and then hopefully no further steroids though he will follow with rheumatology  Follow-up with Dr. Jimenez for serial spine MRIs    I discussed with patient 60 minutes face-to-face greater than 50% in counseling regarding the complexity of this very complex decision tree.  Brent Brizuela MD    09/30/2019

## 2019-10-01 ENCOUNTER — TELEPHONE (OUTPATIENT)
Dept: ONCOLOGY | Facility: CLINIC | Age: 44
End: 2019-10-01

## 2019-10-01 NOTE — TELEPHONE ENCOUNTER
Called patient to inform that Dr. Cloud would like for patient to be scheduled to follow up with [facility] for a proctoscope for the rectal bleeding.  Message forward to Wojciech LACY to schedule and call patient with date and time.  Patient verbalized understanding.

## 2019-10-01 NOTE — TELEPHONE ENCOUNTER
----- Message from Brent Rucker MD sent at 10/1/2019 11:49 AM EDT -----  Regarding: RE: CHAIM- rectal bleeding  Contact: [phone]  See if the colorectal surgeons or GI doctors with [facility] can work him in this week for proctoscope and explained to their office the patient's cancer and need to get on top of this expeditiously  ----- Message -----  From: Violette Huerta, RN  Sent: 10/1/2019  10:31 AM  To: Brent Rucker MD  Subject: FW: RUCKER- rectal bleeding                        Please see message below for update on rectal bleeding that was discussed at yesterday's OV.  Violette   ----- Message -----  From: Ana Paula Cisneros, RN  Sent: 10/1/2019  10:09 AM  To: [facility] On[facility] [place] Nurse [place]  Subject: CHAIM- rectal bleeding                           Mr. Stoll called triage line to report bleeding after BM's. He states that he has had two BM's this morning and that each time he has noted bright red blood and dark clotty tissue after wiping. He states that he and Dr. Rucker discussed this yesterday at his visit but that it is noticeably more blood today. He was offered a [facility] appointment and/or lab work for which he declined. He would rather monitor at this point. He would however like Dr. Rucker to be updated. Thank you.

## 2019-10-02 ENCOUNTER — READMISSION MANAGEMENT (OUTPATIENT)
Dept: CALL CENTER | Facility: HOSPITAL | Age: 44
End: 2019-10-02

## 2019-10-02 NOTE — OUTREACH NOTE
Medical Week 3 Survey      Responses   Facility patient discharged from?  [facility]   Does the patient have one of the following disease processes/diagnoses(primary or secondary)?  Other   Week 3 attempt successful?  Yes   Call start time  0921   Revoke  Decline to participate   Call end time  0922          Cynthia Martin RN

## 2019-10-04 LAB
FUNGUS WND CULT: NORMAL
MYCOBACTERIUM SPEC CULT: NORMAL
NIGHT BLUE STAIN TISS: NORMAL

## 2019-10-07 ENCOUNTER — HOSPITAL ENCOUNTER (OUTPATIENT)
Dept: ONCOLOGY | Facility: HOSPITAL | Age: 44
Setting detail: INFUSION SERIES
Discharge: HOME | End: 2019-10-07

## 2019-10-07 VITALS
DIASTOLIC BLOOD PRESSURE: 84 MMHG | RESPIRATION RATE: 20 BRPM | HEART RATE: 108 BPM | SYSTOLIC BLOOD PRESSURE: 134 MMHG | TEMPERATURE: 96.8 F | WEIGHT: 216 LBS | BODY MASS INDEX: 29.26 KG/M2 | HEIGHT: 72 IN

## 2019-10-07 DIAGNOSIS — C84.60 ANAPLASTIC ALK-POSITIVE LARGE CELL LYMPHOMA, UNSPECIFIED BODY REGION (HCC): Primary | ICD-10-CM

## 2019-10-07 LAB
ALBUMIN SERPL-MCNC: 4.2 G/DL (ref 3.5–5.2)
ALBUMIN/GLOB SERPL: 1.2 G/DL
ALP SERPL-CCNC: 156 U/L (ref 39–117)
ALT SERPL W P-5'-P-CCNC: 33 U/L (ref 1–41)
ANION GAP SERPL CALCULATED.3IONS-SCNC: 15 MMOL/L (ref 5–15)
AST SERPL-CCNC: 25 U/L (ref 1–40)
BILIRUB SERPL-MCNC: 0.3 MG/DL (ref 0.2–1.2)
BUN BLD-MCNC: 15 MG/DL (ref 6–20)
BUN/CREAT SERPL: 19.7 (ref 7–25)
CALCIUM SPEC-SCNC: 9.4 MG/DL (ref 8.6–10.5)
CHLORIDE SERPL-SCNC: 97 MMOL/L (ref 98–107)
CO2 SERPL-SCNC: 21 MMOL/L (ref 22–29)
CREAT BLD-MCNC: 0.76 MG/DL (ref 0.76–1.27)
ERYTHROCYTE [DISTWIDTH] IN BLOOD BY AUTOMATED COUNT: 22.6 % (ref 12.3–15.4)
FUNGUS WND CULT: NORMAL
GFR SERPL CREATININE-BSD FRML MDRD: 111 ML/MIN/1.73
GLOBULIN UR ELPH-MCNC: 3.4 GM/DL
GLUCOSE BLD-MCNC: 136 MG/DL (ref 65–99)
HCT VFR BLD AUTO: 33.3 % (ref 37.5–51)
HGB BLD-MCNC: 11 G/DL (ref 13–17.7)
LYMPHOCYTES # BLD AUTO: 1.8 10*3/MM3 (ref 0.7–3.1)
LYMPHOCYTES NFR BLD AUTO: 22 % (ref 19.6–45.3)
MAGNESIUM SERPL-MCNC: 1.8 MG/DL (ref 1.6–2.6)
MCH RBC QN AUTO: 28.2 PG (ref 26.6–33)
MCHC RBC AUTO-ENTMCNC: 33.1 G/DL (ref 31.5–35.7)
MCV RBC AUTO: 85.2 FL (ref 79–97)
MONOCYTES # BLD AUTO: 0.4 10*3/MM3 (ref 0.1–0.9)
MONOCYTES NFR BLD AUTO: 5.1 % (ref 5–12)
MYCOBACTERIUM SPEC CULT: NORMAL
NEUTROPHILS # BLD AUTO: 6 10*3/MM3 (ref 1.7–7)
NEUTROPHILS NFR BLD AUTO: 72.9 % (ref 42.7–76)
NIGHT BLUE STAIN TISS: NORMAL
PHOSPHATE SERPL-MCNC: 3.8 MG/DL (ref 2.5–4.5)
PLATELET # BLD AUTO: 322 10*3/MM3 (ref 140–450)
PMV BLD AUTO: 7 FL (ref 6–12)
POTASSIUM BLD-SCNC: 4.2 MMOL/L (ref 3.5–5.2)
PROT SERPL-MCNC: 7.6 G/DL (ref 6–8.5)
RBC # BLD AUTO: 3.9 10*6/MM3 (ref 4.14–5.8)
SODIUM BLD-SCNC: 133 MMOL/L (ref 136–145)
WBC NRBC COR # BLD: 8.2 10*3/MM3 (ref 3.4–10.8)

## 2019-10-07 PROCEDURE — 83735 ASSAY OF MAGNESIUM: CPT | Performed by: INTERNAL MEDICINE

## 2019-10-07 PROCEDURE — 96372 THER/PROPH/DIAG INJ SC/IM: CPT

## 2019-10-07 PROCEDURE — 36415 COLL VENOUS BLD VENIPUNCTURE: CPT

## 2019-10-07 PROCEDURE — 80053 COMPREHEN METABOLIC PANEL: CPT | Performed by: INTERNAL MEDICINE

## 2019-10-07 PROCEDURE — 25010000002 DENOSUMAB 120 MG/1.7ML SOLUTION: Performed by: INTERNAL MEDICINE

## 2019-10-07 PROCEDURE — 84100 ASSAY OF PHOSPHORUS: CPT | Performed by: INTERNAL MEDICINE

## 2019-10-07 PROCEDURE — 85025 COMPLETE CBC W/AUTO DIFF WBC: CPT | Performed by: INTERNAL MEDICINE

## 2019-10-07 RX ADMIN — DENOSUMAB 120 MG: 120 INJECTION SUBCUTANEOUS at 11:34

## 2019-10-17 ENCOUNTER — LAB (OUTPATIENT)
Dept: LAB | Facility: HOSPITAL | Age: 44
End: 2019-10-17

## 2019-10-17 ENCOUNTER — HOSPITAL ENCOUNTER (OUTPATIENT)
Dept: PET IMAGING | Facility: HOSPITAL | Age: 44
Discharge: HOME | End: 2019-10-17

## 2019-10-17 ENCOUNTER — HOSPITAL ENCOUNTER (OUTPATIENT)
Dept: PET IMAGING | Facility: HOSPITAL | Age: 44
Discharge: HOME | End: 2019-10-17
Admitting: FAMILY MEDICINE

## 2019-10-17 DIAGNOSIS — C84.60 ANAPLASTIC ALK-POSITIVE LARGE CELL LYMPHOMA, UNSPECIFIED BODY REGION (HCC): Chronic | ICD-10-CM

## 2019-10-17 DIAGNOSIS — C84.60 ANAPLASTIC ALK-POSITIVE LARGE CELL LYMPHOMA, UNSPECIFIED BODY REGION (HCC): ICD-10-CM

## 2019-10-17 DIAGNOSIS — D72.829 LEUKOCYTOSIS, UNSPECIFIED TYPE: Chronic | ICD-10-CM

## 2019-10-17 LAB
ALBUMIN SERPL-MCNC: 4.1 G/DL (ref 3.5–5.2)
ALBUMIN/GLOB SERPL: 1.3 G/DL
ALP SERPL-CCNC: 81 U/L (ref 39–117)
ALT SERPL W P-5'-P-CCNC: 29 U/L (ref 1–41)
ANION GAP SERPL CALCULATED.3IONS-SCNC: 10 MMOL/L (ref 5–15)
AST SERPL-CCNC: 23 U/L (ref 1–40)
BASOPHILS # BLD AUTO: 0.1 10*3/MM3 (ref 0–0.2)
BASOPHILS NFR BLD AUTO: 0.9 % (ref 0–1.5)
BILIRUB SERPL-MCNC: 0.3 MG/DL (ref 0.2–1.2)
BUN BLD-MCNC: 17 MG/DL (ref 6–20)
BUN/CREAT SERPL: 20 (ref 7–25)
CALCIUM SPEC-SCNC: 9.5 MG/DL (ref 8.6–10.5)
CHLORIDE SERPL-SCNC: 103 MMOL/L (ref 98–107)
CO2 SERPL-SCNC: 26 MMOL/L (ref 22–29)
CREAT BLD-MCNC: 0.85 MG/DL (ref 0.76–1.27)
DEPRECATED RDW RBC AUTO: 74.1 FL (ref 37–54)
EOSINOPHIL # BLD AUTO: 0.03 10*3/MM3 (ref 0–0.4)
EOSINOPHIL NFR BLD AUTO: 0.3 % (ref 0.3–6.2)
ERYTHROCYTE [DISTWIDTH] IN BLOOD BY AUTOMATED COUNT: 23 % (ref 12.3–15.4)
GFR SERPL CREATININE-BSD FRML MDRD: 97 ML/MIN/1.73
GLOBULIN UR ELPH-MCNC: 3.2 GM/DL
GLUCOSE BLD-MCNC: 99 MG/DL (ref 65–99)
GLUCOSE BLDC GLUCOMTR-MCNC: 102 MG/DL (ref 70–130)
HCT VFR BLD AUTO: 37.8 % (ref 37.5–51)
HGB BLD-MCNC: 11.4 G/DL (ref 13–17.7)
IMM GRANULOCYTES # BLD AUTO: 0.59 10*3/MM3 (ref 0–0.05)
IMM GRANULOCYTES NFR BLD AUTO: 5.5 % (ref 0–0.5)
LYMPHOCYTES # BLD AUTO: 1.63 10*3/MM3 (ref 0.7–3.1)
LYMPHOCYTES NFR BLD AUTO: 15.2 % (ref 19.6–45.3)
MCH RBC QN AUTO: 27.5 PG (ref 26.6–33)
MCHC RBC AUTO-ENTMCNC: 30.2 G/DL (ref 31.5–35.7)
MCV RBC AUTO: 91.3 FL (ref 79–97)
MONOCYTES # BLD AUTO: 1.14 10*3/MM3 (ref 0.1–0.9)
MONOCYTES NFR BLD AUTO: 10.6 % (ref 5–12)
NEUTROPHILS # BLD AUTO: 7.24 10*3/MM3 (ref 1.7–7)
NEUTROPHILS NFR BLD AUTO: 67.5 % (ref 42.7–76)
NRBC BLD AUTO-RTO: 0.3 /100 WBC (ref 0–0.2)
PLATELET # BLD AUTO: 274 10*3/MM3 (ref 140–450)
PMV BLD AUTO: 9.9 FL (ref 6–12)
POTASSIUM BLD-SCNC: 5.1 MMOL/L (ref 3.5–5.2)
PROT SERPL-MCNC: 7.3 G/DL (ref 6–8.5)
RBC # BLD AUTO: 4.14 10*6/MM3 (ref 4.14–5.8)
SODIUM BLD-SCNC: 139 MMOL/L (ref 136–145)
WBC NRBC COR # BLD: 10.73 10*3/MM3 (ref 3.4–10.8)

## 2019-10-17 PROCEDURE — 0 FLUDEOXYGLUCOSE F18 SOLUTION: Performed by: INTERNAL MEDICINE

## 2019-10-17 PROCEDURE — 82962 GLUCOSE BLOOD TEST: CPT

## 2019-10-17 PROCEDURE — 80053 COMPREHEN METABOLIC PANEL: CPT

## 2019-10-17 PROCEDURE — A9552 F18 FDG: HCPCS | Performed by: INTERNAL MEDICINE

## 2019-10-17 PROCEDURE — 78815 PET IMAGE W/CT SKULL-THIGH: CPT

## 2019-10-17 PROCEDURE — 36415 COLL VENOUS BLD VENIPUNCTURE: CPT

## 2019-10-17 PROCEDURE — 85025 COMPLETE CBC W/AUTO DIFF WBC: CPT

## 2019-10-17 RX ADMIN — FLUDEOXYGLUCOSE F18 1 DOSE: 300 INJECTION INTRAVENOUS at 09:57

## 2019-10-18 ENCOUNTER — OFFICE VISIT (OUTPATIENT)
Dept: NEUROSURGERY | Facility: CLINIC | Age: 44
End: 2019-10-18

## 2019-10-18 ENCOUNTER — TELEPHONE (OUTPATIENT)
Dept: ONCOLOGY | Facility: CLINIC | Age: 44
End: 2019-10-18

## 2019-10-18 VITALS — HEIGHT: 72 IN | BODY MASS INDEX: 29.8 KG/M2 | TEMPERATURE: 97.5 F | WEIGHT: 220 LBS

## 2019-10-18 DIAGNOSIS — C79.51 METASTATIC CANCER TO SPINE (HCC): Primary | ICD-10-CM

## 2019-10-18 PROCEDURE — 99213 OFFICE O/P EST LOW 20 MIN: CPT | Performed by: NEUROLOGICAL SURGERY

## 2019-10-18 NOTE — TELEPHONE ENCOUNTER
[phone]    Prescribed by AILYN Grimes    PT notified and verbalized understanding.    [id number]  ~Shell

## 2019-10-18 NOTE — TELEPHONE ENCOUNTER
----- Message from Dara Keller sent at 10/18/2019 11:26 AM EDT -----  Regarding: RUCKER-MEDICATION  Contact: [phone]  Patient left a voicemail wanting to know if he needs to keep taking the allopurinol he is out of refills and the pharmacy sent over a refill request and it was denied? Please call.

## 2019-10-18 NOTE — PROGRESS NOTES
Patient: Stephen Stoll  : 1975    Primary Care Provider: Carlos Ramirez MD    Requesting Provider: As above        History    Chief Complaint: Mid back pain.    History of Present Illness: Mr. Stoll is a 44-year-old  at [facility] [employer] who was seen as an inpatient consultation at the end of August of this year.  He harbored fevers, myocarditis, and severe mid back pain.  Ultimately Dr. 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  A second opinion regarding the pathology was obtained from the [facility].  He is now on his third course of chemotherapy.  Only on occasion is he having mid back pain.  From time to time he will take a Percocet to help with the pain.  His legs feel a little bit wobbly.  He denies any bowel or bladder dysfunction.  He has no sensory alteration.    Review of Systems   Constitutional: Negative for activity change, appetite change, chills, diaphoresis, fatigue, fever and unexpected weight change.   HENT: Negative for congestion, dental problem, drooling, ear discharge, ear pain, facial swelling, hearing loss, mouth sores, nosebleeds, postnasal drip, rhinorrhea, sinus pressure, sneezing, sore throat, tinnitus, trouble swallowing and voice change.    Eyes: Negative for photophobia, pain, discharge, redness, itching and visual disturbance.   Respiratory: Negative for apnea, cough, choking, chest tightness, shortness of breath, wheezing and stridor.    Cardiovascular: Negative for chest pain, palpitations and leg swelling.   Gastrointestinal: Negative for abdominal distention, abdominal pain, anal bleeding, blood in stool, constipation, diarrhea, nausea, rectal pain and vomiting.   Endocrine: Negative for cold intolerance, heat intolerance, polydipsia, polyphagia and polyuria.   Genitourinary: Negative for decreased urine volume, difficulty urinating, dysuria, enuresis, flank pain, frequency, genital sores, hematuria and  "urgency.   Musculoskeletal: Positive for back pain. Negative for arthralgias, gait problem, joint swelling, myalgias, neck pain and neck stiffness.   Skin: Negative for color change, pallor, rash and wound.   Allergic/Immunologic: Negative for environmental allergies, food allergies and immunocompromised state.   Neurological: Negative for dizziness, tremors, seizures, syncope, facial asymmetry, speech difficulty, weakness, light-headedness, numbness and headaches.   Hematological: Negative for adenopathy. Does not bruise/bleed easily.   Psychiatric/Behavioral: Negative for agitation, behavioral problems, confusion, decreased concentration, dysphoric mood, hallucinations, self-injury, sleep disturbance and suicidal ideas. The patient is not nervous/anxious and is not hyperactive.        The patient's past medical history, past surgical history, family history, and social history have been reviewed at length in the electronic medical record.    Physical Exam:   Temp 97.5 °F (36.4 °C)   Ht 182.9 cm (72\")   Wt 99.8 kg (220 lb)   BMI 29.84 kg/m²   MUSCULOSKELETAL:  Straight leg raising is negative.  Bryant's Sign is negative.  ROM in back normal.  Tenderness in the back to palpation is not observed.  NEUROLOGICAL:  Strength is intact in the lower extremities to direct testing.  Muscle tone is normal throughout.  Station and gait are normal.  Sensation is intact to light touch testing throughout.  Deep tendon reflexes are difficult to elicit throughout.  There is no clonus.  Kong signs are negative.  Coordination is intact.      Medical Decision Making    Data Review:   Once again prior MRI study demonstrated lesions within the T8 and T9 vertebral bodies without evidence of cord compromise.    Diagnosis:   1.  T8/T9 vertebral metastases.  2.  Anaplastic large cell lymphoma.    Treatment Options:   The patient will follow-up in 1 month with an MRI of the thoracic spine with and without gadolinium.       Diagnosis " Plan   1. Metastatic cancer to spine (CMS/HCC)  MRI Thoracic Spine With & Without Contrast       Scribed for Mihai Jimenez MD by Mckayla Thornton CMA on 10/18/2019 at 10:23 AM      I, Dr. Jimenez, personally performed the services described in the documentation, as scribed in my presence, and it is both accurate and complete.

## 2019-10-21 ENCOUNTER — HOSPITAL ENCOUNTER (OUTPATIENT)
Dept: ONCOLOGY | Facility: HOSPITAL | Age: 44
Setting detail: INFUSION SERIES
Discharge: HOME | End: 2019-10-21

## 2019-10-21 ENCOUNTER — OFFICE VISIT (OUTPATIENT)
Dept: ONCOLOGY | Facility: CLINIC | Age: 44
End: 2019-10-21

## 2019-10-21 VITALS
HEIGHT: 72 IN | TEMPERATURE: 97.5 F | WEIGHT: 224 LBS | RESPIRATION RATE: 20 BRPM | DIASTOLIC BLOOD PRESSURE: 88 MMHG | HEART RATE: 88 BPM | OXYGEN SATURATION: 97 % | BODY MASS INDEX: 30.34 KG/M2 | SYSTOLIC BLOOD PRESSURE: 152 MMHG

## 2019-10-21 DIAGNOSIS — C84.60 ANAPLASTIC ALK-POSITIVE LARGE CELL LYMPHOMA, UNSPECIFIED BODY REGION (HCC): ICD-10-CM

## 2019-10-21 DIAGNOSIS — R59.0 MEDIASTINAL LYMPHADENOPATHY: Primary | ICD-10-CM

## 2019-10-21 DIAGNOSIS — C84.60 ANAPLASTIC ALK-POSITIVE LARGE CELL LYMPHOMA, UNSPECIFIED BODY REGION (HCC): Primary | Chronic | ICD-10-CM

## 2019-10-21 LAB
ALBUMIN SERPL-MCNC: 3.8 G/DL (ref 3.5–5.2)
ALBUMIN/GLOB SERPL: 1.2 G/DL
ALP SERPL-CCNC: 64 U/L (ref 39–117)
ALT SERPL W P-5'-P-CCNC: 28 U/L (ref 1–41)
ANION GAP SERPL CALCULATED.3IONS-SCNC: 11 MMOL/L (ref 5–15)
AST SERPL-CCNC: 23 U/L (ref 1–40)
BILIRUB SERPL-MCNC: 0.2 MG/DL (ref 0.2–1.2)
BUN BLD-MCNC: 9 MG/DL (ref 6–20)
BUN/CREAT SERPL: 12.9 (ref 7–25)
CALCIUM SPEC-SCNC: 8.3 MG/DL (ref 8.6–10.5)
CHLORIDE SERPL-SCNC: 104 MMOL/L (ref 98–107)
CO2 SERPL-SCNC: 24 MMOL/L (ref 22–29)
CREAT BLD-MCNC: 0.7 MG/DL (ref 0.76–1.27)
CREAT BLDA-MCNC: 0.7 MG/DL
CREAT BLDA-MCNC: 0.7 MG/DL (ref 0.6–1.3)
ERYTHROCYTE [DISTWIDTH] IN BLOOD BY AUTOMATED COUNT: 23.4 % (ref 12.3–15.4)
GFR SERPL CREATININE-BSD FRML MDRD: 122 ML/MIN/1.73
GLOBULIN UR ELPH-MCNC: 3.2 GM/DL
GLUCOSE BLD-MCNC: 94 MG/DL (ref 65–99)
HCT VFR BLD AUTO: 30.9 % (ref 37.5–51)
HGB BLD-MCNC: 10.3 G/DL (ref 13–17.7)
LYMPHOCYTES # BLD AUTO: 1.9 10*3/MM3 (ref 0.7–3.1)
LYMPHOCYTES NFR BLD AUTO: 27 % (ref 19.6–45.3)
MCH RBC QN AUTO: 29.1 PG (ref 26.6–33)
MCHC RBC AUTO-ENTMCNC: 33.4 G/DL (ref 31.5–35.7)
MCV RBC AUTO: 87 FL (ref 79–97)
MONOCYTES # BLD AUTO: 0.7 10*3/MM3 (ref 0.1–0.9)
MONOCYTES NFR BLD AUTO: 9.8 % (ref 5–12)
NEUTROPHILS # BLD AUTO: 4.5 10*3/MM3 (ref 1.7–7)
NEUTROPHILS NFR BLD AUTO: 63.2 % (ref 42.7–76)
PLATELET # BLD AUTO: 318 10*3/MM3 (ref 140–450)
PMV BLD AUTO: 6.4 FL (ref 6–12)
POTASSIUM BLD-SCNC: 3.6 MMOL/L (ref 3.5–5.2)
PROT SERPL-MCNC: 7 G/DL (ref 6–8.5)
RBC # BLD AUTO: 3.55 10*6/MM3 (ref 4.14–5.8)
SODIUM BLD-SCNC: 139 MMOL/L (ref 136–145)
WBC NRBC COR # BLD: 7.1 10*3/MM3 (ref 3.4–10.8)

## 2019-10-21 PROCEDURE — 96367 TX/PROPH/DG ADDL SEQ IV INF: CPT

## 2019-10-21 PROCEDURE — 99215 OFFICE O/P EST HI 40 MIN: CPT | Performed by: INTERNAL MEDICINE

## 2019-10-21 PROCEDURE — 80053 COMPREHEN METABOLIC PANEL: CPT | Performed by: INTERNAL MEDICINE

## 2019-10-21 PROCEDURE — 25010000002 CYCLOPHOSPHAMIDE PER 100 MG: Performed by: INTERNAL MEDICINE

## 2019-10-21 PROCEDURE — 25010000002 PALONOSETRON 0.25 MG/5ML SOLUTION PREFILLED SYRINGE: Performed by: INTERNAL MEDICINE

## 2019-10-21 PROCEDURE — 85025 COMPLETE CBC W/AUTO DIFF WBC: CPT | Performed by: INTERNAL MEDICINE

## 2019-10-21 PROCEDURE — 25010000002 DEXAMETHASONE PER 1 MG: Performed by: INTERNAL MEDICINE

## 2019-10-21 PROCEDURE — 96411 CHEMO IV PUSH ADDL DRUG: CPT

## 2019-10-21 PROCEDURE — 82565 ASSAY OF CREATININE: CPT

## 2019-10-21 PROCEDURE — 25010000002 DOXORUBICIN PER 10 MG: Performed by: INTERNAL MEDICINE

## 2019-10-21 PROCEDURE — 96375 TX/PRO/DX INJ NEW DRUG ADDON: CPT

## 2019-10-21 PROCEDURE — 96377 APPLICATON ON-BODY INJECTOR: CPT

## 2019-10-21 PROCEDURE — 96413 CHEMO IV INFUSION 1 HR: CPT

## 2019-10-21 PROCEDURE — 25010000002 BRENTUXIMAB 50 MG RECONSTITUTED SOLUTION 1 EACH VIAL: Performed by: INTERNAL MEDICINE

## 2019-10-21 PROCEDURE — 25010000002 FOSAPREPITANT PER 1 MG: Performed by: INTERNAL MEDICINE

## 2019-10-21 PROCEDURE — 25010000002 PEGFILGRASTIM 6 MG/0.6ML PREFILLED SYRINGE KIT: Performed by: INTERNAL MEDICINE

## 2019-10-21 PROCEDURE — 96417 CHEMO IV INFUS EACH ADDL SEQ: CPT

## 2019-10-21 RX ORDER — PALONOSETRON 0.05 MG/ML
0.25 INJECTION, SOLUTION INTRAVENOUS ONCE
Status: COMPLETED | OUTPATIENT
Start: 2019-10-21 | End: 2019-10-21

## 2019-10-21 RX ORDER — PALONOSETRON 0.05 MG/ML
0.25 INJECTION, SOLUTION INTRAVENOUS ONCE
Status: CANCELLED | OUTPATIENT
Start: 2019-10-21

## 2019-10-21 RX ORDER — SODIUM CHLORIDE 9 MG/ML
250 INJECTION, SOLUTION INTRAVENOUS ONCE
Status: CANCELLED | OUTPATIENT
Start: 2019-10-21

## 2019-10-21 RX ORDER — PREDNISONE 50 MG/1
TABLET ORAL
Qty: 10 TABLET | Refills: 3 | Status: SHIPPED | OUTPATIENT
Start: 2019-10-21 | End: 2019-10-21 | Stop reason: SDUPTHER

## 2019-10-21 RX ORDER — DOXORUBICIN HYDROCHLORIDE 2 MG/ML
50 INJECTION, SOLUTION INTRAVENOUS ONCE
Status: CANCELLED | OUTPATIENT
Start: 2019-10-21

## 2019-10-21 RX ORDER — SODIUM CHLORIDE 9 MG/ML
250 INJECTION, SOLUTION INTRAVENOUS ONCE
Status: COMPLETED | OUTPATIENT
Start: 2019-10-21 | End: 2019-10-21

## 2019-10-21 RX ORDER — DOXORUBICIN HYDROCHLORIDE 2 MG/ML
50 INJECTION, SOLUTION INTRAVENOUS ONCE
Status: COMPLETED | OUTPATIENT
Start: 2019-10-21 | End: 2019-10-21

## 2019-10-21 RX ORDER — PREDNISONE 50 MG/1
TABLET ORAL
Qty: 10 TABLET | Refills: 3 | Status: SHIPPED | OUTPATIENT
Start: 2019-10-21 | End: 2019-12-20 | Stop reason: SDUPTHER

## 2019-10-21 RX ADMIN — DEXAMETHASONE SODIUM PHOSPHATE 12 MG: 4 INJECTION, SOLUTION INTRAMUSCULAR; INTRAVENOUS at 12:35

## 2019-10-21 RX ADMIN — CYCLOPHOSPHAMIDE 1680 MG: 1 INJECTION, POWDER, FOR SOLUTION INTRAVENOUS; ORAL at 14:05

## 2019-10-21 RX ADMIN — BRENTUXIMAB VEDOTIN 180 MG: 50 INJECTION, POWDER, LYOPHILIZED, FOR SOLUTION INTRAVENOUS at 13:09

## 2019-10-21 RX ADMIN — SODIUM CHLORIDE 250 ML: 9 INJECTION, SOLUTION INTRAVENOUS at 12:26

## 2019-10-21 RX ADMIN — PALONOSETRON 0.25 MG: 0.25 INJECTION, SOLUTION INTRAVENOUS at 12:28

## 2019-10-21 RX ADMIN — DOXORUBICIN HYDROCHLORIDE 112 MG: 2 INJECTION, SOLUTION INTRAVENOUS at 13:50

## 2019-10-21 RX ADMIN — SODIUM CHLORIDE 150 MG: 9 INJECTION, SOLUTION INTRAVENOUS at 12:35

## 2019-10-21 RX ADMIN — PEGFILGRASTIM 6 MG: KIT SUBCUTANEOUS at 14:39

## 2019-10-21 NOTE — PROGRESS NOTES
CHIEF COMPLAINT: Alk positive anaplastic large cell lymphoma    Problem List:  Oncology/Hematology History    1. 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 Leukocytosis  3. FUO  4. Decreased ejection fraction with evidence of myocarditis  5. Marked elevation of sedimentation rate and C-reactive protein  6. Elevated triglycerides on fasting  7. Acute renal failure resolved with hydration    -8/23/2019 initial inpatient [facility] medical oncology consultation.  Asked to see regarding leukocytosis.  He had upper respiratory infection with pneumonia, chest pains, elevated troponins with diagnosis of myocarditis and decreased ejection fraction with fevers to 104.  Tentatively diagnosed with still's disease and treated with prednisone with some improvement in fevers as well as improvement in a rash.  By the end of July, he had severe mid back pain relatively acute and onset that progressed over time with imaging of the area showing progressive abnormality of both bone and marrow signals in the spine that could be malignant marrow involvement or, more likely per radiologist, infectious.  Sedimentation rate was over 100 with a C-reactive protein elevated and white count 20-30,000 for the better part of 2 months.  Slight left shift but no immature forms.  Broad-spectrum serologic testing has not shown pathogens as of the initial consultation.  I ordered bone marrow biopsy and Dr. Jimenez coordinated spine biopsy  -6/24/2019 CT chest abdomen pelvis without contrast showed no parenchymal lung disease but a 1.6 cm mildly enlarged centrally necrotic right paratracheal node with no other adenopathy or hepatosplenomegaly.   Ejection fraction 35% on echocardiogram.  -6/28/2019 MRI brain and cervical spine without contrast normal  -7/9/2019 fluoroscopy guided right joint aspiration unrevealing  -7/10/2019 fluoroscopic guided bronchoscopy showed no pathogens or pathology  -8/9/2019 MRI thoracic and lumbar spine showed low T1 signals in T8 and T9 with increased T2 involvement concerning for aggressive marrow signal findings that could represent lipid poor spinal hemangioma but concerning for metastasis versus multiple myeloma.  Contrast MRI recommended.  -8/21/2019 MRI thoracic spine with and without contrast showed multiple foci of abnormal marrow signal within the thoracic spine was prominent T8 and T9 with interval increase compared to 8/9/2019 with deformity of the endplates at T8 and T9 but no definitive fracture or discitis.  Several smaller additional lesions suspected at T1, T2, T4, T5, and T12 and the differential would include multifocal vertebral osteomyelitis versus metastasis.  -8/23/2019 bone marrow biopsy showed mild polyclonal plasmacytosis with normocellular marrow and trilineage hematopoiesis with mild myeloid expansion.  No lymphoid infiltrate and no organisms for fungi, acid fast bacilli, tumor, or granuloma.  Bennett 2- negative.  -8/24/2019 ejection fraction up to 65%  -8/26/2019 T8 biopsy show focal area of markedly atypical appearance.  There are inflammatory cells with occasional eosinophils and larger cells.  Some focal hemosiderin.  There is equivocal positivity for CD4, , CD45, and Constance-Barr virus.  It is positive for CD30, ALK 1, EMA, and vimentin.  No staining for CD 5, 7, 3, 20, or 15.  No staining for cytokeratin, SOX 10, , or CD1a.  Outside referral to [facility] pending.  -8/30/2019 [facility] outpatient follow-up visit: Anaplastic large cell lymphoma ALK positive just involving the bone would be unusual but not impossible I suppose.  We will get PET scan.  Were this hemophagocytic  syndrome I would have expected his marrow to be abnormal along with splenomegaly and cytopenias as would the marrow be abnormal for this a plasma cell dyscrasia and the plasma cells were polyclonal.  I will check his SIEP along with triglycerides, peripheral blood flow cytometry for soluble CD 25, ferritin, and NK cell activity.  Once I have that information, hopefully we will have more information back from [facility] and unlikely to involve my colleagues at the [facility] in this process if this is an ALK positive anaplastic large cell lymphoma without kia involvement given the unusual nature and appearance of this.    -9/6/2019 subsequent oncology reconsultation for readmission: Met patient with his last admission.  He has had a 4-month history of fevers with upper respiratory infection and possible pneumonia.  Found to have myocarditis with low ejection fraction with extensive infectious disease work-up negative.  A month later readmitted with shortness of breath and arthralgias with some redness in his wrist.  Bronchoscopy was negative and he was treated with some steroids.  On his third admission with relatively recent acute onset back pain he was found to have some spine lesions.  The bone marrow biopsy was unrevealing,  a T8 biopsy by Dr. Wilde showed markedly atypical appearing cells in a background of eosinophils.  The larger cells were positive for ALK 1, CD30, EMA, and vimentin.  No staining for CD 5, 7, 3, 20, or 15.  No staining for cytokeratin, sox 10, , or CD1a.  Tissue has been sent for second opinion at [facility].  Anaplastic large cell lymphoma ALK positive was considered but given that his fevers which started months back included testing with CT chest abdomen pelvis 6/24/2019 only showing mildly enlarged centrally necrotic right lower paratracheal node of indeterminate significance and no other significant adenopathy or splenomegaly and no pulmonary infiltrate or  mediastinal adenopathy on repeat CT chest 7/9/2019, PET scan was ordered.  Before PET could be completed and GI evaluation completed for evaluation of possible GI source for kia procurement, the patient on the day he was due for endoscopy continue to have fevers with hypotension and a pulse in the 150s.  9/5/2019 CT chest abdomen and pelvis on admission now shows showed new numerous bilateral pulmonary lung nodules in the right lung measuring 2 cm without focal pneumonia.  There was extensive axillary, mediastinal adenopathy the largest in the right paratracheal area 2.1 cm with central necrosis.  There were hypoattenuating regions in the right hepatic lobe measuring up to 2.5 cm incompletely assessed due to lack of contrast due to his creatinine on admission 9/5/2019 being up to 1.67 down to 0.94 by the morning of 9/6/2019.  CT also showed new extensive axillary, mediastinal, and abdominopelvic lymphadenopathy.  There was redemonstration of the T8-T9 aggressive osseous lytic lesions involving thoracolumbar spine and multiple levels.  Nonspecific perinephric inflammation also seen possibly representing pyelonephritis.  Laboratory testing on admission 9/5/2019 showed C-reactive protein up to 37.66 with sedimentation rate greater than 130.  Procalcitonin over 38 with lactic acid  4.8.   normal with normal troponin.  Fasting triglyceride up to 269 and peripheral blood flow cytometry sent for NK cell activity as well as CD25 for IL-2 receptor for the possibility of hemophagocytic syndrome which would be unusual without hemophagocytosis bone marrow.  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  We will get echocardiogram and hold on the GI evaluation and I discussed in person with Dr. Benja Can to see for procurement of lymph node.  He is going to go after upper  mediastinal lymph node.  Once we have more tissue then we will make further plans for treatment.  His ejection fraction on 8/24/2019 was 62%.  Assuming that we find lymphoma and not some aggressive infectious disease process, he will need a port as well but we will wait on the port until we know what we are dealing with.  My partners are on board and will cover me this weekend and I will check back Monday to see where we stand.  He already has an appointment with Dr. Roosevelt Nguyen at  on the 11th who I was enlisting to help sort this out but we may be able to get our answers now that we have node to go after.  I may yet need his services depending on the ilk of this lymphoma and whether or not we would need to consolidate aggressively or treat more aggressively than with standard chemotherapy upfront.  I am starting him on allopurinol.  He already has a rash on his upper thighs and heels and around the area of his prior bone marrow biopsy but I suspect that just related to this probable lymphomatous process or perhaps the recent Bactrim started by Dr. Hauser.  His procalcitonin is quite high and I am still concerned about concomitant infection and I am not sure if the procalcitonin it can be falsely dramatically elevated in the face of lymphoma.  If this is anaplastic large cell lymphoma, with his ECOG performance status generally less than 2 and age less than 60 but with at least stage III disease, even if his LDH is high, he would at least have a risk score of 2 which would put him at the low intermediate risk on the IPI index.  Whether we would use Rituxan or not depends on if this turns out to be CD20 positive where his initial bone biopsy was not.  Given that it is CD30 positive, will probably use brentuximab, Cytoxan, Adriamycin, prednisone every 3 weeks x 6 cycles.  This usually is given without vincristine but with the Brentuximab 1.8 mg/kg IV day 1 of each cycle.  A progression free survival was seen  both in ALK positive and negative anaplastic large cell lymphomas treated with brentuximab rather than vincristine.  Neuropathy still can be an issue and diarrhea is more common.  If this is anaplastic large cell lymphoma ALK positive, autologous stem cell transplant may be considered in patients over 40 years of age with an IPI score greater than or equal to 3 but generally not if the IPI score is less than 3 so we will assess that very carefully.  Despite the elevated fasting triglyceride recently, I doubt this is hemophagocytic syndrome given the negative bone marrow and now with the blossoming lymphadenopathy.  But we shall see.    -9/30/2019 outpatient medical oncology follow-up visit:  He was breathing much better the first week after his chemotherapy but then the shortness of breath worsened again his pulse increased in the 120s to 140s.  Saw Dr. Dumont in my team here as well and received IV fluids.  Slowly that improved.  In the prior 5 days he has been feeling better and no tachycardia for the last week.  He has had no fever since discharge from the hospital.  He has 1 more week of antibiotics.  His current complaints are:    Occasional bloody mucus from his nose    Alternating diarrhea and constipation better with stool softeners    Low back pain worsening with Neupogen but only on oxycodone rarely and none in the last 48 hours.   Bilateral knee pain without swelling on allopurinol    Itching but prior rash resolved   Mild gait instability due to weakness worse with showering and due for follow-up with Dr. Jimenez for serial MRIs   Labile moods but without jason depression worse with steroids now down to 20 mg    I addressed all these issues with him today my plan is:   To get a PET prior to return for course #3 of chemotherapy with brentuximab, Cytoxan, Adriamycin, prednisone   Consult Dr. Carrion for possible consolidation transplant in the future   Add Xgeva for the lytic involvement of his bone    Taper the steroids down to 20 mg for 5 days 10 mg for 5 days 5 mg for 5 days and then off following the 100 mg of prednisone with this course of therapy and then hopefully no further steroids though he will follow with rheumatology  Follow-up with Dr. Jimenez for serial spine MRIs.    -10/21/2019 medical oncology office visit: 10/17/2019 PET scan was negative.  All prior CT abnormalities have resolved.  He is due for follow-up with Dr. Jimenez for spine MRI in about a month.  He has seen Dr. Carrion.  I do not have those notes but according to the patient there are no plans for stem cell transplant in first remission.  I have left a message with Dr. Carrion raising the question of whether spinal fluid analysis or MRI of brain would be in order but he feels great has no neurologic symptoms and his only complaint presently is mid back pain which clearly is improving over time.  Unless Dr. Carrion has other ideas, my plan would be to continue on with courses 3 and 4 of Cytoxan Hilario Brentuximab prednisone, repeat the PET, proceed with courses 5 and 6, repeat the PET again, and assuming that everything is negative at that junction then go to routine survivorship.          Leukocytosis (leucocytosis)    6/24/2019 Initial Diagnosis     Leukocytosis (leucocytosis)            HISTORY OF PRESENT ILLNESS:  The patient is a 44 y.o. male, here for follow up on management of ALK positive anaplastic large cell lymphoma.  Tolerating treatment with Cytoxan, Adriamycin, brentuximab, prednisone.  Mid back pain persists but clearly improving.      Past Medical History:   Diagnosis Date   • Acute kidney injury (CMS/HCC)    • Anaplastic ALK-positive large cell lymphoma (CMS/HCC)    • Bacteremia    • Hyperlipidemia    • Myocarditis (CMS/HCC)    • Pneumonia    • Severe sepsis (CMS/HCC)      Past Surgical History:   Procedure Laterality Date   • APPENDECTOMY     • BACK SURGERY      L5 S1   • BRONCHOSCOPY N/A 7/10/2019     "Procedure: BRONCHOSCOPY WITH ENDOBRONCHIAL ULTRASOUND AND TRANSBRONCHIAL BIOPSY AND FLUORO;  Surgeon: Marvel Sandoval MD;  Location: [facility] ENDOSCOPY;  Service: Pulmonary   • CARDIAC CATHETERIZATION N/A 6/24/2019    Procedure: LEFT HEART CATH;  Surgeon: Deep Muñiz IV, MD;  Location:  [facility];  Service: Cardiovascular   • INTERVENTIONAL RADIOLOGY PROCEDURE N/A 8/26/2019    Procedure: THORACIC SPINE BIOSPY;  Surgeon: Juve Avila MD;  Location:  [facility];  Service: Interventional Radiology   • MEDIASTINOSCOPY, BRONCHOSCOPY N/A 9/6/2019    Procedure: BRONCHOSCOPY, MEDIASTINOSCOPY WITH BIOPSY;  Surgeon: El Can MD;  Location:  [facility] OR;  Service: Cardiothoracic       Allergies   Allergen Reactions   • Ciprofloxacin Anxiety     \"Extreme anxiety and paranoia\"   • Sulfa Antibiotics Rash       Family History and Social History reviewed and changed as necessary      REVIEW OF SYSTEM:   Review of Systems   Constitutional: Negative for appetite change, chills, diaphoresis, fatigue, fever and unexpected weight change.   HENT:   Negative for mouth sores, sore throat and trouble swallowing.    Eyes: Negative for icterus.   Respiratory: Negative for cough, hemoptysis and shortness of breath.    Cardiovascular: Negative for chest pain, leg swelling and palpitations.   Gastrointestinal: Negative for abdominal distention, abdominal pain, blood in stool, constipation, diarrhea, nausea and vomiting.   Endocrine: Negative for hot flashes.   Genitourinary: Negative for bladder incontinence, difficulty urinating, dysuria, frequency and hematuria.    Musculoskeletal: Negative for gait problem, neck pain and neck stiffness.   Skin: Negative for rash.   Neurological: Negative for dizziness, gait problem, headaches, light-headedness and numbness.   Hematological: Negative for adenopathy. Does not bruise/bleed easily.   Psychiatric/Behavioral: Negative for depression. The " "patient is not nervous/anxious.    All other systems reviewed and are negative.       PHYSICAL EXAM    Vitals:    10/21/19 0953   BP: 152/88   Pulse: 88   Resp: 20   Temp: 97.5 °F (36.4 °C)   SpO2: 97%   Weight: 102 kg (224 lb)   Height: 182.9 cm (72\")     Constitutional: Appears well-developed and well-nourished. No distress.   ECOG: (0) Fully active, able to carry on all predisease performance without restriction  HENT:   Head: Normocephalic.  Alopecia  Mouth/Throat: Oropharynx is clear and moist.   Eyes: Conjunctivae are normal. Pupils are equal, round, and reactive to light. No scleral icterus.   Neck: Neck supple. No JVD present. No thyromegaly present.   Cardiovascular: Normal rate, regular rhythm and normal heart sounds.    Pulmonary/Chest: Breath sounds normal. No respiratory distress.   Abdominal: Soft. Exhibits no distension and no mass. There is no hepatosplenomegaly. There is no tenderness. There is no rebound and no guarding.   Musculoskeletal:Exhibits no edema, tenderness or deformity.   Neurological: Alert and oriented to person, place, and time. Exhibits normal muscle tone.   Skin: No ecchymosis, no petechiae and no rash noted. Not diaphoretic. No cyanosis. Nails show no clubbing.   Psychiatric: Normal mood and affect.   Vitals reviewed.      Lab Results   Component Value Date    HGB 11.4 (L) 10/17/2019    HCT 37.8 10/17/2019    MCV 91.3 10/17/2019     10/17/2019    WBC 10.73 10/17/2019    NEUTROABS 7.24 (H) 10/17/2019    LYMPHSABS 1.63 10/17/2019    MONOSABS 1.14 (H) 10/17/2019    EOSABS 0.03 10/17/2019    BASOSABS 0.10 10/17/2019       Lab Results   Component Value Date    GLUCOSE 99 10/17/2019    BUN 17 10/17/2019    CREATININE 0.85 10/17/2019     10/17/2019    K 5.1 10/17/2019     10/17/2019    CO2 26.0 10/17/2019    CALCIUM 9.5 10/17/2019    PROTEINTOT 7.3 10/17/2019    ALBUMIN 4.10 10/17/2019    BILITOT 0.3 10/17/2019    ALKPHOS 81 10/17/2019    AST 23 10/17/2019    ALT 29 " 10/17/2019                   ASSESSMENT & PLAN:    1. 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 Leukocytosis  3. FUO  4. Decreased ejection fraction with evidence of myocarditis  5. Marked elevation of sedimentation rate and C-reactive protein  6. Elevated triglycerides on fasting  7. Acute renal failure resolved with hydration      -10/21/2019 medical oncology office visit: 10/17/2019 PET scan was negative.  All prior CT abnormalities have resolved.  He is due for follow-up with Dr. Jimenez for spine MRI in about a month.  He has seen Dr. Carrion.  I do not have those notes but according to the patient there are no plans for stem cell transplant in first remission.  I have left a message with Dr. Carrion raising the question of whether spinal fluid analysis or MRI of brain would be in order but he feels great has no neurologic symptoms and his only complaint presently is mid back pain which clearly is improving over time.  Unless Dr. Carrion has other ideas, my plan would be to continue on with courses 3 and 4 of Cytoxan Hilario Brentuximab prednisone, repeat the PET, proceed with courses 5 and 6, repeat the PET again, and assuming that everything is negative at that junction then go to routine survivorship.    I discussed with patient face-to-face for 50 minutes greater than 50% spent counseling regarding this above plan and their interactions with Dr. Carrion.  Brent Brizuela MD    10/21/2019

## 2019-11-04 ENCOUNTER — HOSPITAL ENCOUNTER (OUTPATIENT)
Dept: ONCOLOGY | Facility: HOSPITAL | Age: 44
Setting detail: INFUSION SERIES
Discharge: HOME | End: 2019-11-04

## 2019-11-04 ENCOUNTER — TELEPHONE (OUTPATIENT)
Dept: ONCOLOGY | Facility: CLINIC | Age: 44
End: 2019-11-04

## 2019-11-04 VITALS
TEMPERATURE: 97.4 F | DIASTOLIC BLOOD PRESSURE: 79 MMHG | BODY MASS INDEX: 30.61 KG/M2 | RESPIRATION RATE: 16 BRPM | HEART RATE: 117 BPM | SYSTOLIC BLOOD PRESSURE: 131 MMHG | WEIGHT: 226 LBS | HEIGHT: 72 IN

## 2019-11-04 DIAGNOSIS — C84.60 ANAPLASTIC ALK-POSITIVE LARGE CELL LYMPHOMA, UNSPECIFIED BODY REGION (HCC): Primary | ICD-10-CM

## 2019-11-04 DIAGNOSIS — C84.60 ANAPLASTIC ALK-POSITIVE LARGE CELL LYMPHOMA, UNSPECIFIED BODY REGION (HCC): ICD-10-CM

## 2019-11-04 LAB
ALBUMIN SERPL-MCNC: 3.9 G/DL (ref 3.5–5.2)
ALBUMIN/GLOB SERPL: 1.2 G/DL
ALP SERPL-CCNC: 83 U/L (ref 39–117)
ALT SERPL W P-5'-P-CCNC: 45 U/L (ref 1–41)
ANION GAP SERPL CALCULATED.3IONS-SCNC: 12 MMOL/L (ref 5–15)
AST SERPL-CCNC: 27 U/L (ref 1–40)
BILIRUB SERPL-MCNC: 0.2 MG/DL (ref 0.2–1.2)
BUN BLD-MCNC: 12 MG/DL (ref 6–20)
BUN/CREAT SERPL: 15.4 (ref 7–25)
CALCIUM SPEC-SCNC: 8.9 MG/DL (ref 8.6–10.5)
CHLORIDE SERPL-SCNC: 103 MMOL/L (ref 98–107)
CO2 SERPL-SCNC: 25 MMOL/L (ref 22–29)
CREAT BLD-MCNC: 0.78 MG/DL (ref 0.76–1.27)
ERYTHROCYTE [DISTWIDTH] IN BLOOD BY AUTOMATED COUNT: 22.5 % (ref 12.3–15.4)
GFR SERPL CREATININE-BSD FRML MDRD: 108 ML/MIN/1.73
GLOBULIN UR ELPH-MCNC: 3.2 GM/DL
GLUCOSE BLD-MCNC: 102 MG/DL (ref 65–99)
HCT VFR BLD AUTO: 35.8 % (ref 37.5–51)
HGB BLD-MCNC: 11.6 G/DL (ref 13–17.7)
LYMPHOCYTES # BLD AUTO: 1.9 10*3/MM3 (ref 0.7–3.1)
LYMPHOCYTES NFR BLD AUTO: 17.1 % (ref 19.6–45.3)
MAGNESIUM SERPL-MCNC: 1.9 MG/DL (ref 1.6–2.6)
MCH RBC QN AUTO: 28.8 PG (ref 26.6–33)
MCHC RBC AUTO-ENTMCNC: 32.4 G/DL (ref 31.5–35.7)
MCV RBC AUTO: 89 FL (ref 79–97)
MONOCYTES # BLD AUTO: 0.9 10*3/MM3 (ref 0.1–0.9)
MONOCYTES NFR BLD AUTO: 7.6 % (ref 5–12)
NEUTROPHILS # BLD AUTO: 8.5 10*3/MM3 (ref 1.7–7)
NEUTROPHILS NFR BLD AUTO: 75.3 % (ref 42.7–76)
PHOSPHATE SERPL-MCNC: 3.5 MG/DL (ref 2.5–4.5)
PLATELET # BLD AUTO: 284 10*3/MM3 (ref 140–450)
PMV BLD AUTO: 6.6 FL (ref 6–12)
POTASSIUM BLD-SCNC: 4 MMOL/L (ref 3.5–5.2)
PROT SERPL-MCNC: 7.1 G/DL (ref 6–8.5)
RBC # BLD AUTO: 4.02 10*6/MM3 (ref 4.14–5.8)
SODIUM BLD-SCNC: 140 MMOL/L (ref 136–145)
WBC NRBC COR # BLD: 11.3 10*3/MM3 (ref 3.4–10.8)

## 2019-11-04 PROCEDURE — 96372 THER/PROPH/DIAG INJ SC/IM: CPT

## 2019-11-04 PROCEDURE — 83735 ASSAY OF MAGNESIUM: CPT | Performed by: NURSE PRACTITIONER

## 2019-11-04 PROCEDURE — 80053 COMPREHEN METABOLIC PANEL: CPT | Performed by: NURSE PRACTITIONER

## 2019-11-04 PROCEDURE — 25010000002 DENOSUMAB 120 MG/1.7ML SOLUTION: Performed by: NURSE PRACTITIONER

## 2019-11-04 PROCEDURE — 84100 ASSAY OF PHOSPHORUS: CPT | Performed by: NURSE PRACTITIONER

## 2019-11-04 PROCEDURE — 85025 COMPLETE CBC W/AUTO DIFF WBC: CPT | Performed by: NURSE PRACTITIONER

## 2019-11-04 RX ADMIN — DENOSUMAB 120 MG: 120 INJECTION SUBCUTANEOUS at 11:00

## 2019-11-04 NOTE — TELEPHONE ENCOUNTER
Colleen from [facility] ([place] is calling to see what date the patient began Chemo in the outpatient setting. She can be called back at [phone] ext [phone].

## 2019-11-11 ENCOUNTER — HOSPITAL ENCOUNTER (OUTPATIENT)
Dept: ONCOLOGY | Facility: HOSPITAL | Age: 44
Setting detail: INFUSION SERIES
Discharge: HOME | End: 2019-11-11

## 2019-11-11 ENCOUNTER — OFFICE VISIT (OUTPATIENT)
Dept: ONCOLOGY | Facility: CLINIC | Age: 44
End: 2019-11-11

## 2019-11-11 VITALS
TEMPERATURE: 97.4 F | DIASTOLIC BLOOD PRESSURE: 87 MMHG | HEART RATE: 81 BPM | HEIGHT: 72 IN | WEIGHT: 229 LBS | RESPIRATION RATE: 18 BRPM | SYSTOLIC BLOOD PRESSURE: 145 MMHG | OXYGEN SATURATION: 96 % | BODY MASS INDEX: 31.02 KG/M2

## 2019-11-11 DIAGNOSIS — C84.60 ANAPLASTIC ALK-POSITIVE LARGE CELL LYMPHOMA, UNSPECIFIED BODY REGION (HCC): ICD-10-CM

## 2019-11-11 DIAGNOSIS — C84.60 ANAPLASTIC ALK-POSITIVE LARGE CELL LYMPHOMA, UNSPECIFIED BODY REGION (HCC): Primary | ICD-10-CM

## 2019-11-11 LAB
ALBUMIN SERPL-MCNC: 4 G/DL (ref 3.5–5.2)
ALBUMIN/GLOB SERPL: 1.5 G/DL
ALP SERPL-CCNC: 62 U/L (ref 39–117)
ALT SERPL W P-5'-P-CCNC: 29 U/L (ref 1–41)
ANION GAP SERPL CALCULATED.3IONS-SCNC: 10 MMOL/L (ref 5–15)
AST SERPL-CCNC: 23 U/L (ref 1–40)
BILIRUB SERPL-MCNC: 0.2 MG/DL (ref 0.2–1.2)
BUN BLD-MCNC: 8 MG/DL (ref 6–20)
BUN/CREAT SERPL: 11 (ref 7–25)
CALCIUM SPEC-SCNC: 8.3 MG/DL (ref 8.6–10.5)
CHLORIDE SERPL-SCNC: 106 MMOL/L (ref 98–107)
CO2 SERPL-SCNC: 24 MMOL/L (ref 22–29)
CREAT BLD-MCNC: 0.73 MG/DL (ref 0.76–1.27)
CREAT BLDA-MCNC: 0.8 MG/DL (ref 0.6–1.3)
ERYTHROCYTE [DISTWIDTH] IN BLOOD BY AUTOMATED COUNT: 21.7 % (ref 12.3–15.4)
GFR SERPL CREATININE-BSD FRML MDRD: 116 ML/MIN/1.73
GLOBULIN UR ELPH-MCNC: 2.7 GM/DL
GLUCOSE BLD-MCNC: 138 MG/DL (ref 65–99)
HCT VFR BLD AUTO: 34.2 % (ref 37.5–51)
HGB BLD-MCNC: 11.7 G/DL (ref 13–17.7)
LYMPHOCYTES # BLD AUTO: 1.7 10*3/MM3 (ref 0.7–3.1)
LYMPHOCYTES NFR BLD AUTO: 25.8 % (ref 19.6–45.3)
MCH RBC QN AUTO: 30.5 PG (ref 26.6–33)
MCHC RBC AUTO-ENTMCNC: 34.2 G/DL (ref 31.5–35.7)
MCV RBC AUTO: 89.2 FL (ref 79–97)
MONOCYTES # BLD AUTO: 0.7 10*3/MM3 (ref 0.1–0.9)
MONOCYTES NFR BLD AUTO: 10.2 % (ref 5–12)
NEUTROPHILS # BLD AUTO: 4.2 10*3/MM3 (ref 1.7–7)
NEUTROPHILS NFR BLD AUTO: 64 % (ref 42.7–76)
PLATELET # BLD AUTO: 308 10*3/MM3 (ref 140–450)
PMV BLD AUTO: 6.6 FL (ref 6–12)
POTASSIUM BLD-SCNC: 3.8 MMOL/L (ref 3.5–5.2)
PROT SERPL-MCNC: 6.7 G/DL (ref 6–8.5)
RBC # BLD AUTO: 3.83 10*6/MM3 (ref 4.14–5.8)
SODIUM BLD-SCNC: 140 MMOL/L (ref 136–145)
WBC NRBC COR # BLD: 6.5 10*3/MM3 (ref 3.4–10.8)

## 2019-11-11 PROCEDURE — 99214 OFFICE O/P EST MOD 30 MIN: CPT | Performed by: NURSE PRACTITIONER

## 2019-11-11 PROCEDURE — 25010000002 PEGFILGRASTIM 6 MG/0.6ML PREFILLED SYRINGE KIT: Performed by: NURSE PRACTITIONER

## 2019-11-11 PROCEDURE — 25010000002 DOXORUBICIN PER 10 MG: Performed by: NURSE PRACTITIONER

## 2019-11-11 PROCEDURE — 96417 CHEMO IV INFUS EACH ADDL SEQ: CPT

## 2019-11-11 PROCEDURE — 82565 ASSAY OF CREATININE: CPT

## 2019-11-11 PROCEDURE — 80053 COMPREHEN METABOLIC PANEL: CPT | Performed by: INTERNAL MEDICINE

## 2019-11-11 PROCEDURE — 25010000002 CYCLOPHOSPHAMIDE PER 100 MG: Performed by: NURSE PRACTITIONER

## 2019-11-11 PROCEDURE — 96411 CHEMO IV PUSH ADDL DRUG: CPT

## 2019-11-11 PROCEDURE — 96367 TX/PROPH/DG ADDL SEQ IV INF: CPT

## 2019-11-11 PROCEDURE — 25010000002 DEXAMETHASONE PER 1 MG: Performed by: NURSE PRACTITIONER

## 2019-11-11 PROCEDURE — 96377 APPLICATON ON-BODY INJECTOR: CPT

## 2019-11-11 PROCEDURE — 25010000002 PALONOSETRON 0.25 MG/5ML SOLUTION PREFILLED SYRINGE: Performed by: NURSE PRACTITIONER

## 2019-11-11 PROCEDURE — 85025 COMPLETE CBC W/AUTO DIFF WBC: CPT | Performed by: INTERNAL MEDICINE

## 2019-11-11 PROCEDURE — 96413 CHEMO IV INFUSION 1 HR: CPT

## 2019-11-11 PROCEDURE — 25010000002 FOSAPREPITANT PER 1 MG: Performed by: NURSE PRACTITIONER

## 2019-11-11 PROCEDURE — 25010000002 BRENTUXIMAB 50 MG RECONSTITUTED SOLUTION 1 EACH VIAL: Performed by: NURSE PRACTITIONER

## 2019-11-11 PROCEDURE — 96375 TX/PRO/DX INJ NEW DRUG ADDON: CPT

## 2019-11-11 RX ORDER — DOXORUBICIN HYDROCHLORIDE 2 MG/ML
50 INJECTION, SOLUTION INTRAVENOUS ONCE
Status: COMPLETED | OUTPATIENT
Start: 2019-11-11 | End: 2019-11-11

## 2019-11-11 RX ORDER — SODIUM CHLORIDE 9 MG/ML
250 INJECTION, SOLUTION INTRAVENOUS ONCE
Status: CANCELLED | OUTPATIENT
Start: 2019-11-11

## 2019-11-11 RX ORDER — PALONOSETRON 0.05 MG/ML
0.25 INJECTION, SOLUTION INTRAVENOUS ONCE
Status: CANCELLED | OUTPATIENT
Start: 2019-11-11

## 2019-11-11 RX ORDER — SODIUM CHLORIDE 9 MG/ML
250 INJECTION, SOLUTION INTRAVENOUS ONCE
Status: COMPLETED | OUTPATIENT
Start: 2019-11-11 | End: 2019-11-11

## 2019-11-11 RX ORDER — PALONOSETRON 0.05 MG/ML
0.25 INJECTION, SOLUTION INTRAVENOUS ONCE
Status: COMPLETED | OUTPATIENT
Start: 2019-11-11 | End: 2019-11-11

## 2019-11-11 RX ORDER — DOXORUBICIN HYDROCHLORIDE 2 MG/ML
50 INJECTION, SOLUTION INTRAVENOUS ONCE
Status: CANCELLED | OUTPATIENT
Start: 2019-11-11

## 2019-11-11 RX ADMIN — SODIUM CHLORIDE 250 ML: 9 INJECTION, SOLUTION INTRAVENOUS at 10:50

## 2019-11-11 RX ADMIN — PEGFILGRASTIM 6 MG: KIT SUBCUTANEOUS at 12:55

## 2019-11-11 RX ADMIN — SODIUM CHLORIDE 150 MG: 9 INJECTION, SOLUTION INTRAVENOUS at 10:50

## 2019-11-11 RX ADMIN — DOXORUBICIN HYDROCHLORIDE 112 MG: 2 INJECTION, SOLUTION INTRAVENOUS at 12:09

## 2019-11-11 RX ADMIN — CYCLOPHOSPHAMIDE 1680 MG: 1 INJECTION, POWDER, FOR SOLUTION INTRAVENOUS; ORAL at 12:21

## 2019-11-11 RX ADMIN — PALONOSETRON 0.25 MG: 0.25 INJECTION, SOLUTION INTRAVENOUS at 10:50

## 2019-11-11 RX ADMIN — DEXAMETHASONE SODIUM PHOSPHATE 12 MG: 4 INJECTION, SOLUTION INTRAMUSCULAR; INTRAVENOUS at 10:50

## 2019-11-11 RX ADMIN — BRENTUXIMAB VEDOTIN 180 MG: 50 INJECTION, POWDER, LYOPHILIZED, FOR SOLUTION INTRAVENOUS at 11:24

## 2019-11-11 NOTE — PROGRESS NOTES
CHIEF COMPLAINT: Alk positive anaplastic large cell lymphoma    Problem List:  Oncology/Hematology History    1. 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 Leukocytosis  3. FUO  4. Decreased ejection fraction with evidence of myocarditis  5. Marked elevation of sedimentation rate and C-reactive protein  6. Elevated triglycerides on fasting  7. Acute renal failure resolved with hydration    -8/23/2019 initial inpatient [facility] medical oncology consultation.  Asked to see regarding leukocytosis.  He had upper respiratory infection with pneumonia, chest pains, elevated troponins with diagnosis of myocarditis and decreased ejection fraction with fevers to 104.  Tentatively diagnosed with still's disease and treated with prednisone with some improvement in fevers as well as improvement in a rash.  By the end of July, he had severe mid back pain relatively acute and onset that progressed over time with imaging of the area showing progressive abnormality of both bone and marrow signals in the spine that could be malignant marrow involvement or, more likely per radiologist, infectious.  Sedimentation rate was over 100 with a C-reactive protein elevated and white count 20-30,000 for the better part of 2 months.  Slight left shift but no immature forms.  Broad-spectrum serologic testing has not shown pathogens as of the initial consultation.  I ordered bone marrow biopsy and Dr. Jimenez coordinated spine biopsy  -6/24/2019 CT chest abdomen pelvis without contrast showed no parenchymal lung disease but a 1.6 cm mildly enlarged centrally necrotic right paratracheal node with no other adenopathy or hepatosplenomegaly.   Ejection fraction 35% on echocardiogram.  -6/28/2019 MRI brain and cervical spine without contrast normal  -7/9/2019 fluoroscopy guided right joint aspiration unrevealing  -7/10/2019 fluoroscopic guided bronchoscopy showed no pathogens or pathology  -8/9/2019 MRI thoracic and lumbar spine showed low T1 signals in T8 and T9 with increased T2 involvement concerning for aggressive marrow signal findings that could represent lipid poor spinal hemangioma but concerning for metastasis versus multiple myeloma.  Contrast MRI recommended.  -8/21/2019 MRI thoracic spine with and without contrast showed multiple foci of abnormal marrow signal within the thoracic spine was prominent T8 and T9 with interval increase compared to 8/9/2019 with deformity of the endplates at T8 and T9 but no definitive fracture or discitis.  Several smaller additional lesions suspected at T1, T2, T4, T5, and T12 and the differential would include multifocal vertebral osteomyelitis versus metastasis.  -8/23/2019 bone marrow biopsy showed mild polyclonal plasmacytosis with normocellular marrow and trilineage hematopoiesis with mild myeloid expansion.  No lymphoid infiltrate and no organisms for fungi, acid fast bacilli, tumor, or granuloma.  Bennett 2- negative.  -8/24/2019 ejection fraction up to 65%  -8/26/2019 T8 biopsy show focal area of markedly atypical appearance.  There are inflammatory cells with occasional eosinophils and larger cells.  Some focal hemosiderin.  There is equivocal positivity for CD4, , CD45, and Constance-Barr virus.  It is positive for CD30, ALK 1, EMA, and vimentin.  No staining for CD 5, 7, 3, 20, or 15.  No staining for cytokeratin, SOX 10, , or CD1a.  Outside referral to [facility] pending.  -8/30/2019 [facility] outpatient follow-up visit: Anaplastic large cell lymphoma ALK positive just involving the bone would be unusual but not impossible I suppose.  We will get PET scan.  Were this hemophagocytic  syndrome I would have expected his marrow to be abnormal along with splenomegaly and cytopenias as would the marrow be abnormal for this a plasma cell dyscrasia and the plasma cells were polyclonal.  I will check his SIEP along with triglycerides, peripheral blood flow cytometry for soluble CD 25, ferritin, and NK cell activity.  Once I have that information, hopefully we will have more information back from [facility] and unlikely to involve my colleagues at the [facility] in this process if this is an ALK positive anaplastic large cell lymphoma without kia involvement given the unusual nature and appearance of this.    -9/6/2019 subsequent oncology reconsultation for readmission: Met patient with his last admission.  He has had a 4-month history of fevers with upper respiratory infection and possible pneumonia.  Found to have myocarditis with low ejection fraction with extensive infectious disease work-up negative.  A month later readmitted with shortness of breath and arthralgias with some redness in his wrist.  Bronchoscopy was negative and he was treated with some steroids.  On his third admission with relatively recent acute onset back pain he was found to have some spine lesions.  The bone marrow biopsy was unrevealing,  a T8 biopsy by Dr. Wilde showed markedly atypical appearing cells in a background of eosinophils.  The larger cells were positive for ALK 1, CD30, EMA, and vimentin.  No staining for CD 5, 7, 3, 20, or 15.  No staining for cytokeratin, sox 10, , or CD1a.  Tissue has been sent for second opinion at [facility].  Anaplastic large cell lymphoma ALK positive was considered but given that his fevers which started months back included testing with CT chest abdomen pelvis 6/24/2019 only showing mildly enlarged centrally necrotic right lower paratracheal node of indeterminate significance and no other significant adenopathy or splenomegaly and no pulmonary infiltrate or  mediastinal adenopathy on repeat CT chest 7/9/2019, PET scan was ordered.  Before PET could be completed and GI evaluation completed for evaluation of possible GI source for kia procurement, the patient on the day he was due for endoscopy continue to have fevers with hypotension and a pulse in the 150s.  9/5/2019 CT chest abdomen and pelvis on admission now shows showed new numerous bilateral pulmonary lung nodules in the right lung measuring 2 cm without focal pneumonia.  There was extensive axillary, mediastinal adenopathy the largest in the right paratracheal area 2.1 cm with central necrosis.  There were hypoattenuating regions in the right hepatic lobe measuring up to 2.5 cm incompletely assessed due to lack of contrast due to his creatinine on admission 9/5/2019 being up to 1.67 down to 0.94 by the morning of 9/6/2019.  CT also showed new extensive axillary, mediastinal, and abdominopelvic lymphadenopathy.  There was redemonstration of the T8-T9 aggressive osseous lytic lesions involving thoracolumbar spine and multiple levels.  Nonspecific perinephric inflammation also seen possibly representing pyelonephritis.  Laboratory testing on admission 9/5/2019 showed C-reactive protein up to 37.66 with sedimentation rate greater than 130.  Procalcitonin over 38 with lactic acid  4.8.   normal with normal troponin.  Fasting triglyceride up to 269 and peripheral blood flow cytometry sent for NK cell activity as well as CD25 for IL-2 receptor for the possibility of hemophagocytic syndrome which would be unusual without hemophagocytosis bone marrow.  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  We will get echocardiogram and hold on the GI evaluation and I discussed in person with Dr. Benja Can to see for procurement of lymph node.  He is going to go after upper  mediastinal lymph node.  Once we have more tissue then we will make further plans for treatment.  His ejection fraction on 8/24/2019 was 62%.  Assuming that we find lymphoma and not some aggressive infectious disease process, he will need a port as well but we will wait on the port until we know what we are dealing with.  My partners are on board and will cover me this weekend and I will check back Monday to see where we stand.  He already has an appointment with Dr. Roosevelt Nguyen at  on the 11th who I was enlisting to help sort this out but we may be able to get our answers now that we have node to go after.  I may yet need his services depending on the ilk of this lymphoma and whether or not we would need to consolidate aggressively or treat more aggressively than with standard chemotherapy upfront.  I am starting him on allopurinol.  He already has a rash on his upper thighs and heels and around the area of his prior bone marrow biopsy but I suspect that just related to this probable lymphomatous process or perhaps the recent Bactrim started by Dr. Hauser.  His procalcitonin is quite high and I am still concerned about concomitant infection and I am not sure if the procalcitonin it can be falsely dramatically elevated in the face of lymphoma.  If this is anaplastic large cell lymphoma, with his ECOG performance status generally less than 2 and age less than 60 but with at least stage III disease, even if his LDH is high, he would at least have a risk score of 2 which would put him at the low intermediate risk on the IPI index.  Whether we would use Rituxan or not depends on if this turns out to be CD20 positive where his initial bone biopsy was not.  Given that it is CD30 positive, will probably use brentuximab, Cytoxan, Adriamycin, prednisone every 3 weeks x 6 cycles.  This usually is given without vincristine but with the Brentuximab 1.8 mg/kg IV day 1 of each cycle.  A progression free survival was seen  both in ALK positive and negative anaplastic large cell lymphomas treated with brentuximab rather than vincristine.  Neuropathy still can be an issue and diarrhea is more common.  If this is anaplastic large cell lymphoma ALK positive, autologous stem cell transplant may be considered in patients over 40 years of age with an IPI score greater than or equal to 3 but generally not if the IPI score is less than 3 so we will assess that very carefully.  Despite the elevated fasting triglyceride recently, I doubt this is hemophagocytic syndrome given the negative bone marrow and now with the blossoming lymphadenopathy.  But we shall see.    -9/30/2019 outpatient medical oncology follow-up visit:  He was breathing much better the first week after his chemotherapy but then the shortness of breath worsened again his pulse increased in the 120s to 140s.  Saw Dr. Dumont in my team here as well and received IV fluids.  Slowly that improved.  In the prior 5 days he has been feeling better and no tachycardia for the last week.  He has had no fever since discharge from the hospital.  He has 1 more week of antibiotics.  His current complaints are:    Occasional bloody mucus from his nose    Alternating diarrhea and constipation better with stool softeners    Low back pain worsening with Neupogen but only on oxycodone rarely and none in the last 48 hours.   Bilateral knee pain without swelling on allopurinol    Itching but prior rash resolved   Mild gait instability due to weakness worse with showering and due for follow-up with Dr. Jimenez for serial MRIs   Labile moods but without jason depression worse with steroids now down to 20 mg    I addressed all these issues with him today my plan is:   To get a PET prior to return for course #3 of chemotherapy with brentuximab, Cytoxan, Adriamycin, prednisone   Consult Dr. Carrion for possible consolidation transplant in the future   Add Xgeva for the lytic involvement of his bone    Taper the steroids down to 20 mg for 5 days 10 mg for 5 days 5 mg for 5 days and then off following the 100 mg of prednisone with this course of therapy and then hopefully no further steroids though he will follow with rheumatology  Follow-up with Dr. Jimenez for serial spine MRIs.    -10/21/2019 medical oncology office visit: 10/17/2019 PET scan was negative.  All prior CT abnormalities have resolved.  He is due for follow-up with Dr. Jimenez for spine MRI in about a month.  He has seen Dr. Carrion.  I do not have those notes but according to the patient there are no plans for stem cell transplant in first remission.  I have left a message with Dr. Carrion raising the question of whether spinal fluid analysis or MRI of brain would be in order but he feels great has no neurologic symptoms and his only complaint presently is mid back pain which clearly is improving over time.  Unless Dr. Carrion has other ideas, my plan would be to continue on with courses 3 and 4 of Cytoxan Hilario Brentuximab prednisone, repeat the PET, proceed with courses 5 and 6, repeat the PET again, and assuming that everything is negative at that junction then go to routine survivorship.          Anaplastic ALK-positive large cell lymphoma     6/28/2019 Imaging     MRI of the brain negative         8/9/2019 Imaging     MRI thoracic and lumbar spine IMPRESSION:  1.  Abnormal ill-defined low T1 signal lesions in the T8 and T9  vertebral bodies with increased T2 involvement concerning for aggressive  bone marrow signal findings and although this may represent lipid poor  spinal hemangiomas concern for metastasis versus myeloma remains a  consideration given overall appearance on this noncontrast evaluation.  Contrast-enhanced examination of the thoracic spine and/or nuclear  medicine bone scan may be useful for further evaluation.     2. Lumbar spine with degenerative changes, however, no aggressive bone  marrow signal findings or acute  fracture. No significant spinal canal or  neuroforaminal stenosis present.         8/24/2019 -  Other Event     Echocardiogram: Estimated EF 62%.         8/26/2019 Initial Diagnosis     Anaplastic ALK-positive large cell lymphoma          8/26/2019 Biopsy     T8 vertebral biopsy positive for anaplastic large cell lymphoma, ALK-positive.         9/5/2019 Imaging     CT chest, abdomen and pelvis IMPRESSION:     Constellation of findings concerning for metastatic disease:      - Interval development of numerous pulmonary nodules throughout the  lungs with the largest measuring 2.0 cm within the right midlung and  concerning for underlying pulmonary metastasis and which may account for  abnormality identified in the chest radiograph performed 09/05/2019.     - Extensive axillary, mediastinal, and abdominopelvic lymphadenopathy as  described above.     - Hypoattenuated regions involving the liver concerning for additional  sites of metastatic disease, although are limited in evaluation  secondary to lack of intravenous contrast. Attention on follow-up  imaging is advised.     - Redemonstration of T8/T9 aggressive osseous lesions with additional  lytic lesion involving the L4 vertebral body and additional  subcentimeter lytic lesions throughout the thoracolumbar spine  suggested.     Nonspecific perinephric inflammation. Correlate with urinalysis to  exclude underlying pyelonephritis.         9/6/2019 Biopsy     Mediastinal lymph node excisional biopsy positive for ALK-1 positive anaplastic large cell lymphoma         9/9/2019 -  Chemotherapy     OP LYMPHOMA Brentuximab, Cyclophosphamide / DOXOrubicin / predniSONE / Pegfilgrastim           10/17/2019 Imaging     PET/CT IMPRESSION:  1. Essentially negative PET scan for active lymphoma. CT scan findings  from 09/05/2019 appear to have largely resolved as well.  2. Small focus of mild activity in the right groin appears to reflect  calcific tendinosis of the distal right  "iliopsoas muscle.  3. Probable marrow stimulation effect.            HISTORY OF PRESENT ILLNESS:  The patient is a 44 y.o. male, here for follow up on management of ALK positive anaplastic large cell lymphoma.  Tolerating treatment with Cytoxan, Adriamycin, brentuximab, prednisone.  Has had some mild nasal irritation and blood on the tissue when he blows his nose along with passing of some mucus.  Had a little more nausea without vomiting after this last cycle, Zofran was helpful but he took it fairly regularly for 1 week.      Past Medical History:   Diagnosis Date   • Acute kidney injury (CMS/HCC)    • Anaplastic ALK-positive large cell lymphoma (CMS/HCC)    • Bacteremia    • Hyperlipidemia    • Myocarditis (CMS/HCC)    • Pneumonia    • Severe sepsis (CMS/HCC)      Past Surgical History:   Procedure Laterality Date   • APPENDECTOMY     • BACK SURGERY      L5 S1   • BRONCHOSCOPY N/A 7/10/2019    Procedure: BRONCHOSCOPY WITH ENDOBRONCHIAL ULTRASOUND AND TRANSBRONCHIAL BIOPSY AND FLUORO;  Surgeon: Marvel Sandoval MD;  Location: [facility] ENDOSCOPY;  Service: Pulmonary   • CARDIAC CATHETERIZATION N/A 6/24/2019    Procedure: LEFT HEART CATH;  Surgeon: Deep Muñiz IV, MD;  Location:  [facility];  Service: Cardiovascular   • INTERVENTIONAL RADIOLOGY PROCEDURE N/A 8/26/2019    Procedure: THORACIC SPINE BIOSPY;  Surgeon: Juve Avila MD;  Location:  [facility];  Service: Interventional Radiology   • MEDIASTINOSCOPY, BRONCHOSCOPY N/A 9/6/2019    Procedure: BRONCHOSCOPY, MEDIASTINOSCOPY WITH BIOPSY;  Surgeon: El Can MD;  Location:  [facility] OR;  Service: Cardiothoracic       Allergies   Allergen Reactions   • Ciprofloxacin Anxiety     \"Extreme anxiety and paranoia\"   • Sulfa Antibiotics Rash       Family History and Social History reviewed and changed as necessary      REVIEW OF SYSTEM:   Review of Systems   Constitutional: Negative for appetite change, chills, " "diaphoresis, fever and unexpected weight change.  Positive for mild fatigue.  HENT:   Negative for mouth sores, sore throat and trouble swallowing.   Positive for nasal irritation.  Eyes: Negative for icterus.   Respiratory: Negative for cough, hemoptysis and shortness of breath.    Cardiovascular: Negative for chest pain, leg swelling and palpitations.   Gastrointestinal: Negative for abdominal distention, abdominal pain, blood in stool, constipation, diarrhea.  Positive for nausea without vomiting  Endocrine: Negative for hot flashes.   Genitourinary: Negative for bladder incontinence, difficulty urinating, dysuria, frequency and hematuria.    Musculoskeletal: Negative for gait problem, neck pain and neck stiffness.   Skin: Negative for rash.   Neurological: Negative for dizziness, gait problem, headaches, light-headedness and numbness.   Hematological: Negative for adenopathy. Does not bruise/bleed easily.   Psychiatric/Behavioral: Negative for depression. The patient is not nervous/anxious.    All other systems reviewed and are negative.       PHYSICAL EXAM    Vitals:    11/11/19 0923   BP: 145/87   Pulse: 81   Resp: 18   Temp: 97.4 °F (36.3 °C)   SpO2: 96%   Weight: 104 kg (229 lb)   Height: 182.9 cm (72\")     Constitutional: Appears well-developed and well-nourished. No distress.   ECOG: (0) Fully active, able to carry on all predisease performance without restriction  HENT:   Head: Normocephalic.  Alopecia  Mouth/Throat: Oropharynx is clear and moist.   Eyes: Conjunctivae are normal. Pupils are equal, round, and reactive. No scleral icterus.   Neck: Neck supple. No JVD present.    Cardiovascular: Normal rate, regular rhythm and normal heart sounds.    Pulmonary/Chest: Breath sounds normal. No respiratory distress.   Abdominal: Soft. Exhibits no distension. There is no tenderness. There is no rebound and no guarding.   Musculoskeletal:Exhibits no edema, tenderness or deformity.   Neurological: Alert and " oriented to person, place, and time. Exhibits normal muscle tone.   Skin: No ecchymosis, no petechiae and no rash noted. Not diaphoretic. No cyanosis. Nails show no clubbing.   Psychiatric: Normal mood and affect.   Vitals reviewed.  Labs reviewed.    Lab Results   Component Value Date    HGB 11.6 (L) 11/04/2019    HCT 35.8 (L) 11/04/2019    MCV 89.0 11/04/2019     11/04/2019    WBC 11.30 (H) 11/04/2019    NEUTROABS 8.50 (H) 11/04/2019    LYMPHSABS 1.90 11/04/2019    MONOSABS 0.90 11/04/2019    EOSABS 0.03 10/17/2019    BASOSABS 0.10 10/17/2019       Lab Results   Component Value Date    GLUCOSE 102 (H) 11/04/2019    BUN 12 11/04/2019    CREATININE 0.78 11/04/2019     11/04/2019    K 4.0 11/04/2019     11/04/2019    CO2 25.0 11/04/2019    CALCIUM 8.9 11/04/2019    PROTEINTOT 7.1 11/04/2019    ALBUMIN 3.90 11/04/2019    BILITOT 0.2 11/04/2019    ALKPHOS 83 11/04/2019    AST 27 11/04/2019    ALT 45 (H) 11/04/2019                   ASSESSMENT & PLAN:    1. 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 Leukocytosis  3. FUO  4. Decreased ejection fraction with evidence of myocarditis  5. Marked elevation of sedimentation rate and C-reactive protein  6. Elevated triglycerides on fasting  7. Acute renal failure resolved with hydration      -10/21/2019 medical oncology office visit: 10/17/2019 PET scan was negative.  All prior CT abnormalities have resolved.  He is due for follow-up with Dr. Jimenez for spine MRI in about a month.  He has seen Dr. Carrion.  I do not have those notes but according to the patient there are no plans for stem cell transplant in first remission.  I have left a  message with Dr. Carrion raising the question of whether spinal fluid analysis or MRI of brain would be in order but he feels great has no neurologic symptoms and his only complaint presently is mid back pain which clearly is improving over time.  Unless Dr. Carrion has other ideas, my plan would be to continue on with courses 3 and 4 of Cytoxan Hilario Brentuximab prednisone, repeat the PET, proceed with courses 5 and 6, repeat the PET again, and assuming that everything is negative at that junction then go to routine survivorship.    -11/11/2019 [facility] oncology clinic visit: Patient is doing well, tolerating treatment with brentuximab, Cytoxan, Adriamycin and prednisone overall fairly well.  Had a little more nausea after his third cycle but Zofran was effective.  I have asked him to let us know if he needs other antinausea medication and we could call in Compazine.  Also has some nasal irritation for which I recommended [place] and also could put Vaseline or Eucerin ointment gently with a Q-tip in his nares.  No febrile illnesses.  We will continue as outlined above today with cycle #4 unchanged.  We will repeat his PET scan prior to return and I have ordered that today.  He has a baseline negative MRI of the brain from June.  Dr. Brizuela spoke with Dr. Carrion and there was no recommendation for spinal fluid assessment.  We will see him back in 3 weeks for follow-up assuming his PET is negative he will then complete course is 5 and 6 followed by repeat PET scan and if everything is still negative would then go to surveillance.  He has an MRI of his thoracic spine and follow-up with Dr. Jimenez scheduled for 11/19/2019.    I spent a total of 25 minutes in direct patient care, greater than 16  minutes (greater than 50%) were spent in coordination of care, and counseling the patient regarding  (diagnosis) . Answered any questions patient had regarding medications and plan of care.     Bing MENDOZA  Laura, AILYN    11/11/2019

## 2019-11-18 ENCOUNTER — HOSPITAL ENCOUNTER (OUTPATIENT)
Dept: ONCOLOGY | Facility: HOSPITAL | Age: 44
Setting detail: INFUSION SERIES
Discharge: HOME | End: 2019-11-18

## 2019-11-18 VITALS
HEIGHT: 72 IN | WEIGHT: 227 LBS | BODY MASS INDEX: 30.75 KG/M2 | SYSTOLIC BLOOD PRESSURE: 133 MMHG | DIASTOLIC BLOOD PRESSURE: 82 MMHG | HEART RATE: 112 BPM | TEMPERATURE: 97.5 F | RESPIRATION RATE: 16 BRPM

## 2019-11-18 DIAGNOSIS — C84.60 ANAPLASTIC ALK-POSITIVE LARGE CELL LYMPHOMA, UNSPECIFIED BODY REGION (HCC): Chronic | ICD-10-CM

## 2019-11-18 PROCEDURE — G0463 HOSPITAL OUTPT CLINIC VISIT: HCPCS

## 2019-11-19 ENCOUNTER — OFFICE VISIT (OUTPATIENT)
Dept: NEUROSURGERY | Facility: CLINIC | Age: 44
End: 2019-11-19

## 2019-11-19 ENCOUNTER — HOSPITAL ENCOUNTER (OUTPATIENT)
Dept: MRI IMAGING | Facility: HOSPITAL | Age: 44
Discharge: HOME | End: 2019-11-19
Admitting: NEUROLOGICAL SURGERY

## 2019-11-19 VITALS — WEIGHT: 228 LBS | RESPIRATION RATE: 16 BRPM | HEIGHT: 72 IN | BODY MASS INDEX: 30.88 KG/M2 | TEMPERATURE: 97.9 F

## 2019-11-19 DIAGNOSIS — C79.51 METASTATIC CANCER TO SPINE (HCC): Primary | ICD-10-CM

## 2019-11-19 DIAGNOSIS — C79.51 METASTATIC CANCER TO SPINE (HCC): ICD-10-CM

## 2019-11-19 PROCEDURE — 0 GADOBENATE DIMEGLUMINE 529 MG/ML SOLUTION: Performed by: NEUROLOGICAL SURGERY

## 2019-11-19 PROCEDURE — 99213 OFFICE O/P EST LOW 20 MIN: CPT | Performed by: NEUROLOGICAL SURGERY

## 2019-11-19 PROCEDURE — A9577 INJ MULTIHANCE: HCPCS | Performed by: NEUROLOGICAL SURGERY

## 2019-11-19 PROCEDURE — 72157 MRI CHEST SPINE W/O & W/DYE: CPT

## 2019-11-19 RX ADMIN — GADOBENATE DIMEGLUMINE 19 ML: 529 INJECTION, SOLUTION INTRAVENOUS at 09:32

## 2019-11-19 NOTE — PROGRESS NOTES
Patient: Stephen Stoll  : 1975    Primary Care Provider: Carlos Ramirez MD    Requesting Provider: As above        History    Chief Complaint: Mid back pain.    History of Present Illness: Mr. Stoll is a 44-year-old  at [facility] who was seen as an inpatient consultation at the end of August of this year.  He harbored fevers, myocarditis, and severe mid back pain.  Ultimately Dr. 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  A second opinion regarding the pathology was obtained from the [facility].  He continues on his chemotherapy.  His back pain is modest and somewhat better than it was previously.  The wobbliness in his legs has improved.  He denies any bowel or bladder dysfunction.  He has no sensory alteration.    Review of Systems   Constitutional: Negative for activity change, appetite change, chills, diaphoresis, fatigue, fever and unexpected weight change.   HENT: Negative for congestion, dental problem, drooling, ear discharge, ear pain, facial swelling, hearing loss, mouth sores, nosebleeds, postnasal drip, rhinorrhea, sinus pressure, sneezing, sore throat, tinnitus, trouble swallowing and voice change.    Eyes: Negative for photophobia, pain, discharge, redness, itching and visual disturbance.   Respiratory: Negative for apnea, cough, choking, chest tightness, shortness of breath, wheezing and stridor.    Cardiovascular: Negative for chest pain, palpitations and leg swelling.   Gastrointestinal: Negative for abdominal distention, abdominal pain, anal bleeding, blood in stool, constipation, diarrhea, nausea, rectal pain and vomiting.   Endocrine: Negative for cold intolerance, heat intolerance, polydipsia, polyphagia and polyuria.   Genitourinary: Negative for decreased urine volume, difficulty urinating, dysuria, enuresis, flank pain, frequency, genital sores, hematuria and urgency.   Musculoskeletal: Negative for  "arthralgias, back pain, gait problem, joint swelling, myalgias, neck pain and neck stiffness.   Skin: Negative for color change, pallor, rash and wound.   Allergic/Immunologic: Negative for environmental allergies, food allergies and immunocompromised state.   Neurological: Negative for dizziness, tremors, seizures, syncope, facial asymmetry, speech difficulty, weakness, light-headedness, numbness and headaches.   Hematological: Negative for adenopathy. Does not bruise/bleed easily.   Psychiatric/Behavioral: Negative for agitation, behavioral problems, confusion, decreased concentration, dysphoric mood, hallucinations, self-injury, sleep disturbance and suicidal ideas. The patient is not nervous/anxious and is not hyperactive.    All other systems reviewed and are negative.      The patient's past medical history, past surgical history, family history, and social history have been reviewed at length in the electronic medical record.    Physical Exam:   Temp 97.9 °F (36.6 °C) (Temporal)   Resp 16   Ht 182.9 cm (72.01\")   Wt 103 kg (228 lb)   BMI 30.91 kg/m²    He harbors significant alopecia.  MUSCULOSKELETAL:  Straight leg raising is negative.  Bryant's Sign is negative.  ROM in back normal.  Tenderness in the back to palpation is not observed.  NEUROLOGICAL:  Strength is intact in the lower extremities to direct testing.  Muscle tone is normal throughout.  Station and gait are normal.  Sensation is intact to light touch testing throughout.  Deep tendon reflexes are 1+ and symmetrical.  Coordination is intact.        Medical Decision Making    Data Review:   Follow-up MRI study demonstrates loss of height at T8 and T9.  There is still some enhancement.  My suspicion is that this is the natural evolution of his treated vertebral tumor.  Do not see progressive disease.    Diagnosis:   1.  T8/T9 vertebral metastases.  2.  Anaplastic large cell lymphoma.    Treatment Options:   Mr. Stoll is holding his own.  His " thoracic disease appears to be stable.  He will follow-up in 4 months with a new MRI of the thoracic spine with and without gadolinium.       Diagnosis Plan   1. Metastatic cancer to spine (CMS/HCC)  MRI Thoracic Spine With & Without Contrast       Scribed for Mihai Jimenez MD by Cindy Tovar CMA on 11/19/2019 11:39 AM       I, Dr. Jimenez, personally performed the services described in the documentation, as scribed in my presence, and it is both accurate and complete.

## 2019-11-25 ENCOUNTER — HOSPITAL ENCOUNTER (OUTPATIENT)
Dept: ONCOLOGY | Facility: HOSPITAL | Age: 44
Setting detail: INFUSION SERIES
Discharge: HOME | End: 2019-11-25

## 2019-11-25 VITALS
DIASTOLIC BLOOD PRESSURE: 92 MMHG | TEMPERATURE: 97.8 F | WEIGHT: 228 LBS | SYSTOLIC BLOOD PRESSURE: 128 MMHG | HEIGHT: 72 IN | BODY MASS INDEX: 30.88 KG/M2 | RESPIRATION RATE: 16 BRPM | HEART RATE: 106 BPM

## 2019-11-25 DIAGNOSIS — C84.60 ANAPLASTIC ALK-POSITIVE LARGE CELL LYMPHOMA, UNSPECIFIED BODY REGION (HCC): Chronic | ICD-10-CM

## 2019-11-25 PROCEDURE — G0463 HOSPITAL OUTPT CLINIC VISIT: HCPCS

## 2019-12-02 ENCOUNTER — HOSPITAL ENCOUNTER (OUTPATIENT)
Dept: ONCOLOGY | Facility: HOSPITAL | Age: 44
Setting detail: INFUSION SERIES
Discharge: HOME | End: 2019-12-02

## 2019-12-02 ENCOUNTER — OFFICE VISIT (OUTPATIENT)
Dept: ONCOLOGY | Facility: CLINIC | Age: 44
End: 2019-12-02

## 2019-12-02 VITALS
BODY MASS INDEX: 31.56 KG/M2 | SYSTOLIC BLOOD PRESSURE: 157 MMHG | HEART RATE: 100 BPM | TEMPERATURE: 97.5 F | OXYGEN SATURATION: 96 % | HEIGHT: 72 IN | DIASTOLIC BLOOD PRESSURE: 92 MMHG | RESPIRATION RATE: 18 BRPM | WEIGHT: 233 LBS

## 2019-12-02 DIAGNOSIS — C84.60 ANAPLASTIC ALK-POSITIVE LARGE CELL LYMPHOMA, UNSPECIFIED BODY REGION (HCC): ICD-10-CM

## 2019-12-02 DIAGNOSIS — C84.60 ANAPLASTIC ALK-POSITIVE LARGE CELL LYMPHOMA, UNSPECIFIED BODY REGION (HCC): Primary | Chronic | ICD-10-CM

## 2019-12-02 DIAGNOSIS — C84.60 ANAPLASTIC ALK-POSITIVE LARGE CELL LYMPHOMA, UNSPECIFIED BODY REGION (HCC): Primary | ICD-10-CM

## 2019-12-02 LAB
ALBUMIN SERPL-MCNC: 4.4 G/DL (ref 3.5–5.2)
ALBUMIN/GLOB SERPL: 1.5 G/DL
ALP SERPL-CCNC: 67 U/L (ref 39–117)
ALT SERPL W P-5'-P-CCNC: 34 U/L (ref 1–41)
ANION GAP SERPL CALCULATED.3IONS-SCNC: 13 MMOL/L (ref 5–15)
AST SERPL-CCNC: 23 U/L (ref 1–40)
BILIRUB SERPL-MCNC: 0.2 MG/DL (ref 0.2–1.2)
BUN BLD-MCNC: 12 MG/DL (ref 6–20)
BUN/CREAT SERPL: 13.8 (ref 7–25)
CALCIUM SPEC-SCNC: 9.8 MG/DL (ref 8.6–10.5)
CHLORIDE SERPL-SCNC: 102 MMOL/L (ref 98–107)
CO2 SERPL-SCNC: 24 MMOL/L (ref 22–29)
CREAT BLD-MCNC: 0.87 MG/DL (ref 0.76–1.27)
CREAT BLDA-MCNC: 0.9 MG/DL (ref 0.6–1.3)
ERYTHROCYTE [DISTWIDTH] IN BLOOD BY AUTOMATED COUNT: 18.6 % (ref 12.3–15.4)
GFR SERPL CREATININE-BSD FRML MDRD: 95 ML/MIN/1.73
GLOBULIN UR ELPH-MCNC: 3 GM/DL
GLUCOSE BLD-MCNC: 111 MG/DL (ref 65–99)
HCT VFR BLD AUTO: 41.6 % (ref 37.5–51)
HGB BLD-MCNC: 13.6 G/DL (ref 13–17.7)
LYMPHOCYTES # BLD AUTO: 1.9 10*3/MM3 (ref 0.7–3.1)
LYMPHOCYTES NFR BLD AUTO: 20.2 % (ref 19.6–45.3)
MAGNESIUM SERPL-MCNC: 2.1 MG/DL (ref 1.6–2.6)
MCH RBC QN AUTO: 29.5 PG (ref 26.6–33)
MCHC RBC AUTO-ENTMCNC: 32.7 G/DL (ref 31.5–35.7)
MCV RBC AUTO: 90.3 FL (ref 79–97)
MONOCYTES # BLD AUTO: 0.9 10*3/MM3 (ref 0.1–0.9)
MONOCYTES NFR BLD AUTO: 9.3 % (ref 5–12)
NEUTROPHILS # BLD AUTO: 6.7 10*3/MM3 (ref 1.7–7)
NEUTROPHILS NFR BLD AUTO: 70.5 % (ref 42.7–76)
PHOSPHATE SERPL-MCNC: 3.8 MG/DL (ref 2.5–4.5)
PLATELET # BLD AUTO: 346 10*3/MM3 (ref 140–450)
PMV BLD AUTO: 7 FL (ref 6–12)
POTASSIUM BLD-SCNC: 3.9 MMOL/L (ref 3.5–5.2)
PROT SERPL-MCNC: 7.4 G/DL (ref 6–8.5)
RBC # BLD AUTO: 4.6 10*6/MM3 (ref 4.14–5.8)
SODIUM BLD-SCNC: 139 MMOL/L (ref 136–145)
WBC NRBC COR # BLD: 9.5 10*3/MM3 (ref 3.4–10.8)

## 2019-12-02 PROCEDURE — 96372 THER/PROPH/DIAG INJ SC/IM: CPT

## 2019-12-02 PROCEDURE — 25010000002 PEGFILGRASTIM 6 MG/0.6ML PREFILLED SYRINGE KIT: Performed by: INTERNAL MEDICINE

## 2019-12-02 PROCEDURE — 25010000002 DOXORUBICIN PER 10 MG: Performed by: INTERNAL MEDICINE

## 2019-12-02 PROCEDURE — 80053 COMPREHEN METABOLIC PANEL: CPT | Performed by: NURSE PRACTITIONER

## 2019-12-02 PROCEDURE — 25010000002 FOSAPREPITANT PER 1 MG: Performed by: INTERNAL MEDICINE

## 2019-12-02 PROCEDURE — 25010000002 DEXAMETHASONE PER 1 MG: Performed by: INTERNAL MEDICINE

## 2019-12-02 PROCEDURE — 25010000002 DENOSUMAB 120 MG/1.7ML SOLUTION: Performed by: INTERNAL MEDICINE

## 2019-12-02 PROCEDURE — 99214 OFFICE O/P EST MOD 30 MIN: CPT | Performed by: INTERNAL MEDICINE

## 2019-12-02 PROCEDURE — 85025 COMPLETE CBC W/AUTO DIFF WBC: CPT | Performed by: NURSE PRACTITIONER

## 2019-12-02 PROCEDURE — 25010000002 CYCLOPHOSPHAMIDE PER 100 MG: Performed by: INTERNAL MEDICINE

## 2019-12-02 PROCEDURE — 96413 CHEMO IV INFUSION 1 HR: CPT

## 2019-12-02 PROCEDURE — 25010000002 PALONOSETRON 0.25 MG/5ML SOLUTION PREFILLED SYRINGE: Performed by: INTERNAL MEDICINE

## 2019-12-02 PROCEDURE — 25010000002 BRENTUXIMAB 50 MG RECONSTITUTED SOLUTION 1 EACH VIAL: Performed by: INTERNAL MEDICINE

## 2019-12-02 PROCEDURE — 82565 ASSAY OF CREATININE: CPT

## 2019-12-02 PROCEDURE — 96375 TX/PRO/DX INJ NEW DRUG ADDON: CPT

## 2019-12-02 PROCEDURE — 83735 ASSAY OF MAGNESIUM: CPT | Performed by: INTERNAL MEDICINE

## 2019-12-02 PROCEDURE — 96367 TX/PROPH/DG ADDL SEQ IV INF: CPT

## 2019-12-02 PROCEDURE — 96411 CHEMO IV PUSH ADDL DRUG: CPT

## 2019-12-02 PROCEDURE — 96417 CHEMO IV INFUS EACH ADDL SEQ: CPT

## 2019-12-02 PROCEDURE — 84100 ASSAY OF PHOSPHORUS: CPT | Performed by: INTERNAL MEDICINE

## 2019-12-02 PROCEDURE — 96377 APPLICATON ON-BODY INJECTOR: CPT

## 2019-12-02 RX ORDER — PALONOSETRON 0.05 MG/ML
0.25 INJECTION, SOLUTION INTRAVENOUS ONCE
Status: CANCELLED | OUTPATIENT
Start: 2019-12-02

## 2019-12-02 RX ORDER — SODIUM CHLORIDE 9 MG/ML
250 INJECTION, SOLUTION INTRAVENOUS ONCE
Status: COMPLETED | OUTPATIENT
Start: 2019-12-02 | End: 2019-12-02

## 2019-12-02 RX ORDER — PALONOSETRON 0.05 MG/ML
0.25 INJECTION, SOLUTION INTRAVENOUS ONCE
Status: COMPLETED | OUTPATIENT
Start: 2019-12-02 | End: 2019-12-02

## 2019-12-02 RX ORDER — SODIUM CHLORIDE 9 MG/ML
250 INJECTION, SOLUTION INTRAVENOUS ONCE
Status: CANCELLED | OUTPATIENT
Start: 2019-12-02

## 2019-12-02 RX ORDER — DOXORUBICIN HYDROCHLORIDE 2 MG/ML
50 INJECTION, SOLUTION INTRAVENOUS ONCE
Status: CANCELLED | OUTPATIENT
Start: 2019-12-02

## 2019-12-02 RX ORDER — DOXORUBICIN HYDROCHLORIDE 2 MG/ML
50 INJECTION, SOLUTION INTRAVENOUS ONCE
Status: COMPLETED | OUTPATIENT
Start: 2019-12-02 | End: 2019-12-02

## 2019-12-02 RX ADMIN — PALONOSETRON 0.25 MG: 0.25 INJECTION, SOLUTION INTRAVENOUS at 13:01

## 2019-12-02 RX ADMIN — DEXAMETHASONE SODIUM PHOSPHATE 12 MG: 4 INJECTION, SOLUTION INTRAMUSCULAR; INTRAVENOUS at 13:01

## 2019-12-02 RX ADMIN — CYCLOPHOSPHAMIDE 1680 MG: 1 INJECTION, POWDER, FOR SOLUTION INTRAVENOUS; ORAL at 14:46

## 2019-12-02 RX ADMIN — BRENTUXIMAB VEDOTIN 180 MG: 50 INJECTION, POWDER, LYOPHILIZED, FOR SOLUTION INTRAVENOUS at 13:33

## 2019-12-02 RX ADMIN — DENOSUMAB 120 MG: 120 INJECTION SUBCUTANEOUS at 14:08

## 2019-12-02 RX ADMIN — SODIUM CHLORIDE 150 MG: 9 INJECTION, SOLUTION INTRAVENOUS at 13:01

## 2019-12-02 RX ADMIN — PEGFILGRASTIM 6 MG: KIT SUBCUTANEOUS at 15:08

## 2019-12-02 RX ADMIN — DOXORUBICIN HYDROCHLORIDE 112 MG: 2 INJECTION, SOLUTION INTRAVENOUS at 14:32

## 2019-12-02 RX ADMIN — SODIUM CHLORIDE 250 ML: 9 INJECTION, SOLUTION INTRAVENOUS at 13:01

## 2019-12-02 NOTE — PROGRESS NOTES
CHIEF COMPLAINT: ALK positive anaplastic large cell lymphoma    Problem List:  Oncology/Hematology History    1. 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 Leukocytosis  3. FUO  4. Decreased ejection fraction with evidence of myocarditis  5. Marked elevation of sedimentation rate and C-reactive protein  6. Elevated triglycerides on fasting  7. Acute renal failure resolved with hydration    -8/23/2019 initial inpatient [facility] medical oncology consultation.  Asked to see regarding leukocytosis.  He had upper respiratory infection with pneumonia, chest pains, elevated troponins with diagnosis of myocarditis and decreased ejection fraction with fevers to 104.  Tentatively diagnosed with still's disease and treated with prednisone with some improvement in fevers as well as improvement in a rash.  By the end of July, he had severe mid back pain relatively acute and onset that progressed over time with imaging of the area showing progressive abnormality of both bone and marrow signals in the spine that could be malignant marrow involvement or, more likely per radiologist, infectious.  Sedimentation rate was over 100 with a C-reactive protein elevated and white count 20-30,000 for the better part of 2 months.  Slight left shift but no immature forms.  Broad-spectrum serologic testing has not shown pathogens as of the initial consultation.  I ordered bone marrow biopsy and Dr. Jimenez coordinated spine biopsy  -6/24/2019 CT chest abdomen pelvis without contrast showed no parenchymal lung disease but a 1.6 cm mildly enlarged centrally necrotic right paratracheal node with no other adenopathy or hepatosplenomegaly.   Ejection fraction 35% on echocardiogram.  -6/28/2019 MRI brain and cervical spine without contrast normal  -7/9/2019 fluoroscopy guided right joint aspiration unrevealing  -7/10/2019 fluoroscopic guided bronchoscopy showed no pathogens or pathology  -8/9/2019 MRI thoracic and lumbar spine showed low T1 signals in T8 and T9 with increased T2 involvement concerning for aggressive marrow signal findings that could represent lipid poor spinal hemangioma but concerning for metastasis versus multiple myeloma.  Contrast MRI recommended.  -8/21/2019 MRI thoracic spine with and without contrast showed multiple foci of abnormal marrow signal within the thoracic spine was prominent T8 and T9 with interval increase compared to 8/9/2019 with deformity of the endplates at T8 and T9 but no definitive fracture or discitis.  Several smaller additional lesions suspected at T1, T2, T4, T5, and T12 and the differential would include multifocal vertebral osteomyelitis versus metastasis.  -8/23/2019 bone marrow biopsy showed mild polyclonal plasmacytosis with normocellular marrow and trilineage hematopoiesis with mild myeloid expansion.  No lymphoid infiltrate and no organisms for fungi, acid fast bacilli, tumor, or granuloma.  Bennett 2- negative.  -8/24/2019 ejection fraction up to 65%  -8/26/2019 T8 biopsy show focal area of markedly atypical appearance.  There are inflammatory cells with occasional eosinophils and larger cells.  Some focal hemosiderin.  There is equivocal positivity for CD4, , CD45, and Constance-Barr virus.  It is positive for CD30, ALK 1, EMA, and vimentin.  No staining for CD 5, 7, 3, 20, or 15.  No staining for cytokeratin, SOX 10, , or CD1a.  Outside referral to [facility] pending.  -8/30/2019 [facility] oncology outpatient follow-up visit: Anaplastic large cell lymphoma ALK positive just involving the bone would be unusual but not impossible I suppose.  We will get PET scan.  Were this hemophagocytic  syndrome I would have expected his marrow to be abnormal along with splenomegaly and cytopenias as would the marrow be abnormal for this a plasma cell dyscrasia and the plasma cells were polyclonal.  I will check his SIEP along with triglycerides, peripheral blood flow cytometry for soluble CD 25, ferritin, and NK cell activity.  Once I have that information, hopefully we will have more information back from [facility] and unlikely to involve my colleagues at the [facility] in this process if this is an ALK positive anaplastic large cell lymphoma without kia involvement given the unusual nature and appearance of this.    -9/6/2019 subsequent oncology reconsultation for readmission: Met patient with his last admission.  He has had a 4-month history of fevers with upper respiratory infection and possible pneumonia.  Found to have myocarditis with low ejection fraction with extensive infectious disease work-up negative.  A month later readmitted with shortness of breath and arthralgias with some redness in his wrist.  Bronchoscopy was negative and he was treated with some steroids.  On his third admission with relatively recent acute onset back pain he was found to have some spine lesions.  The bone marrow biopsy was unrevealing,  a T8 biopsy by Dr. Wilde showed markedly atypical appearing cells in a background of eosinophils.  The larger cells were positive for ALK 1, CD30, EMA, and vimentin.  No staining for CD 5, 7, 3, 20, or 15.  No staining for cytokeratin, sox 10, , or CD1a.  Tissue has been sent for second opinion at [facility].  Anaplastic large cell lymphoma ALK positive was considered but given that his fevers which started months back included testing with CT chest abdomen pelvis 6/24/2019 only showing mildly enlarged centrally necrotic right lower paratracheal node of indeterminate significance and no other significant adenopathy or splenomegaly and no pulmonary infiltrate or  mediastinal adenopathy on repeat CT chest 7/9/2019, PET scan was ordered.  Before PET could be completed and GI evaluation completed for evaluation of possible GI source for kia procurement, the patient on the day he was due for endoscopy continue to have fevers with hypotension and a pulse in the 150s.  9/5/2019 CT chest abdomen and pelvis on admission now shows showed new numerous bilateral pulmonary lung nodules in the right lung measuring 2 cm without focal pneumonia.  There was extensive axillary, mediastinal adenopathy the largest in the right paratracheal area 2.1 cm with central necrosis.  There were hypoattenuating regions in the right hepatic lobe measuring up to 2.5 cm incompletely assessed due to lack of contrast due to his creatinine on admission 9/5/2019 being up to 1.67 down to 0.94 by the morning of 9/6/2019.  CT also showed new extensive axillary, mediastinal, and abdominopelvic lymphadenopathy.  There was redemonstration of the T8-T9 aggressive osseous lytic lesions involving thoracolumbar spine and multiple levels.  Nonspecific perinephric inflammation also seen possibly representing pyelonephritis.  Laboratory testing on admission 9/5/2019 showed C-reactive protein up to 37.66 with sedimentation rate greater than 130.  Procalcitonin over 38 with lactic acid  4.8.   normal with normal troponin.  Fasting triglyceride up to 269 and peripheral blood flow cytometry sent for NK cell activity as well as CD25 for IL-2 receptor for the possibility of hemophagocytic syndrome which would be unusual without hemophagocytosis bone marrow.  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  We will get echocardiogram and hold on the GI evaluation and I discussed in person with Dr. Benja Can to see for procurement of lymph node.  He is going to go after upper  mediastinal lymph node.  Once we have more tissue then we will make further plans for treatment.  His ejection fraction on 8/24/2019 was 62%.  Assuming that we find lymphoma and not some aggressive infectious disease process, he will need a port as well but we will wait on the port until we know what we are dealing with.  My partners are on board and will cover me this weekend and I will check back Monday to see where we stand.  He already has an appointment with Dr. Roosevelt Nguyen at  on the 11th who I was enlisting to help sort this out but we may be able to get our answers now that we have node to go after.  I may yet need his services depending on the ilk of this lymphoma and whether or not we would need to consolidate aggressively or treat more aggressively than with standard chemotherapy upfront.  I am starting him on allopurinol.  He already has a rash on his upper thighs and heels and around the area of his prior bone marrow biopsy but I suspect that just related to this probable lymphomatous process or perhaps the recent Bactrim started by Dr. Hauser.  His procalcitonin is quite high and I am still concerned about concomitant infection and I am not sure if the procalcitonin it can be falsely dramatically elevated in the face of lymphoma.  If this is anaplastic large cell lymphoma, with his ECOG performance status generally less than 2 and age less than 60 but with at least stage III disease, even if his LDH is high, he would at least have a risk score of 2 which would put him at the low intermediate risk on the IPI index.  Whether we would use Rituxan or not depends on if this turns out to be CD20 positive where his initial bone biopsy was not.  Given that it is CD30 positive, will probably use brentuximab, Cytoxan, Adriamycin, prednisone every 3 weeks x 6 cycles.  This usually is given without vincristine but with the Brentuximab 1.8 mg/kg IV day 1 of each cycle.  A progression free survival was seen  both in ALK positive and negative anaplastic large cell lymphomas treated with brentuximab rather than vincristine.  Neuropathy still can be an issue and diarrhea is more common.  If this is anaplastic large cell lymphoma ALK positive, autologous stem cell transplant may be considered in patients over 40 years of age with an IPI score greater than or equal to 3 but generally not if the IPI score is less than 3 so we will assess that very carefully.  Despite the elevated fasting triglyceride recently, I doubt this is hemophagocytic syndrome given the negative bone marrow and now with the blossoming lymphadenopathy.  But we shall see.    -9/30/2019 outpatient medical oncology follow-up visit:  He was breathing much better the first week after his chemotherapy but then the shortness of breath worsened again his pulse increased in the 120s to 140s.  Saw Dr. Dumont in my team here as well and received IV fluids.  Slowly that improved.  In the prior 5 days he has been feeling better and no tachycardia for the last week.  He has had no fever since discharge from the hospital.  He has 1 more week of antibiotics.  His current complaints are:    Occasional bloody mucus from his nose    Alternating diarrhea and constipation better with stool softeners    Low back pain worsening with Neupogen but only on oxycodone rarely and none in the last 48 hours.   Bilateral knee pain without swelling on allopurinol    Itching but prior rash resolved   Mild gait instability due to weakness worse with showering and due for follow-up with Dr. Jimenez for serial MRIs   Labile moods but without jason depression worse with steroids now down to 20 mg    I addressed all these issues with him today my plan is:   To get a PET prior to return for course #3 of chemotherapy with brentuximab, Cytoxan, Adriamycin, prednisone   Consult Dr. Carrion for possible consolidation transplant in the future   Add Xgeva for the lytic involvement of his bone    Taper the steroids down to 20 mg for 5 days 10 mg for 5 days 5 mg for 5 days and then off following the 100 mg of prednisone with this course of therapy and then hopefully no further steroids though he will follow with rheumatology  Follow-up with Dr. Jimenez for serial spine MRIs.    -10/21/2019 medical oncology office visit: 10/17/2019 PET scan was negative.  All prior CT abnormalities have resolved.  He is due for follow-up with Dr. Jimenez for spine MRI in about a month.  He has seen Dr. Carrion.  I do not have those notes but according to the patient there are no plans for stem cell transplant in first remission.  I have left a message with Dr. Carrion raising the question of whether spinal fluid analysis or MRI of brain would be in order but he feels great has no neurologic symptoms and his only complaint presently is mid back pain which clearly is improving over time.  Unless Dr. Carrion has other ideas, my plan would be to continue on with courses 3 and 4 of Cytoxan Hilario Brentuximab prednisone, repeat the PET, proceed with courses 5 and 6, repeat the PET again, and assuming that everything is negative at that junction then go to routine survivorship.          Anaplastic ALK-positive large cell lymphoma     6/28/2019 Imaging     MRI of the brain negative         8/9/2019 Imaging     MRI thoracic and lumbar spine IMPRESSION:  1.  Abnormal ill-defined low T1 signal lesions in the T8 and T9  vertebral bodies with increased T2 involvement concerning for aggressive  bone marrow signal findings and although this may represent lipid poor  spinal hemangiomas concern for metastasis versus myeloma remains a  consideration given overall appearance on this noncontrast evaluation.  Contrast-enhanced examination of the thoracic spine and/or nuclear  medicine bone scan may be useful for further evaluation.     2. Lumbar spine with degenerative changes, however, no aggressive bone  marrow signal findings or acute  fracture. No significant spinal canal or  neuroforaminal stenosis present.         8/24/2019 -  Other Event     Echocardiogram: Estimated EF 62%.         8/26/2019 Initial Diagnosis     Anaplastic ALK-positive large cell lymphoma          8/26/2019 Biopsy     T8 vertebral biopsy positive for anaplastic large cell lymphoma, ALK-positive.         9/5/2019 Imaging     CT chest, abdomen and pelvis IMPRESSION:     Constellation of findings concerning for metastatic disease:      - Interval development of numerous pulmonary nodules throughout the  lungs with the largest measuring 2.0 cm within the right midlung and  concerning for underlying pulmonary metastasis and which may account for  abnormality identified in the chest radiograph performed 09/05/2019.     - Extensive axillary, mediastinal, and abdominopelvic lymphadenopathy as  described above.     - Hypoattenuated regions involving the liver concerning for additional  sites of metastatic disease, although are limited in evaluation  secondary to lack of intravenous contrast. Attention on follow-up  imaging is advised.     - Redemonstration of T8/T9 aggressive osseous lesions with additional  lytic lesion involving the L4 vertebral body and additional  subcentimeter lytic lesions throughout the thoracolumbar spine  suggested.     Nonspecific perinephric inflammation. Correlate with urinalysis to  exclude underlying pyelonephritis.         9/6/2019 Biopsy     Mediastinal lymph node excisional biopsy positive for ALK-1 positive anaplastic large cell lymphoma         9/9/2019 -  Chemotherapy     OP LYMPHOMA Brentuximab, Cyclophosphamide / DOXOrubicin / predniSONE / Pegfilgrastim           10/17/2019 Imaging     PET/CT IMPRESSION:  1. Essentially negative PET scan for active lymphoma. CT scan findings  from 09/05/2019 appear to have largely resolved as well.  2. Small focus of mild activity in the right groin appears to reflect  calcific tendinosis of the distal right  "iliopsoas muscle.  3. Probable marrow stimulation effect.         11/19/2019 Imaging     MRI T-spine shows progressive T8 and 9 compression pathologic fracture and bone and bone marrow activity.  Dr. Jimenez reviewed and thought this was his treated disease.  And plans on serial MRI imaging.            HISTORY OF PRESENT ILLNESS:  The patient is a 44 y.o. male, here for follow up on management of ALK positive anaplastic large cell lymphoma.  Having a little dysesthesia on the tips of his thumbs but that is the only neuropathy.  PET scan is due on the 10th unfortunately we were not able to get it sooner and he is due for course #5 today      Past Medical History:   Diagnosis Date   • Acute kidney injury (CMS/HCC)    • Anaplastic ALK-positive large cell lymphoma (CMS/HCC)    • Bacteremia    • Hyperlipidemia    • Myocarditis (CMS/HCC)    • Pneumonia    • Severe sepsis (CMS/HCC)      Past Surgical History:   Procedure Laterality Date   • APPENDECTOMY     • BACK SURGERY      L5 S1   • BRONCHOSCOPY N/A 7/10/2019    Procedure: BRONCHOSCOPY WITH ENDOBRONCHIAL ULTRASOUND AND TRANSBRONCHIAL BIOPSY AND FLUORO;  Surgeon: Marvel Sandoval MD;  Location:  [facility];  Service: Pulmonary   • CARDIAC CATHETERIZATION N/A 6/24/2019    Procedure: LEFT HEART CATH;  Surgeon: Deep Muñiz IV, MD;  Location:  [facility];  Service: Cardiovascular   • INTERVENTIONAL RADIOLOGY PROCEDURE N/A 8/26/2019    Procedure: THORACIC SPINE BIOSPY;  Surgeon: Juve Avila MD;  Location:  [facility];  Service: Interventional Radiology   • MEDIASTINOSCOPY, BRONCHOSCOPY N/A 9/6/2019    Procedure: BRONCHOSCOPY, MEDIASTINOSCOPY WITH BIOPSY;  Surgeon: El Can MD;  Location:  [facility];  Service: Cardiothoracic       Allergies   Allergen Reactions   • Ciprofloxacin Anxiety     \"Extreme anxiety and paranoia\"   • Sulfa Antibiotics Rash       Family History and Social History reviewed and changed as " "necessary      REVIEW OF SYSTEM:   Review of Systems   Constitutional: Negative for appetite change, chills, diaphoresis, fatigue, fever and unexpected weight change.   HENT:   Negative for mouth sores, sore throat and trouble swallowing.    Eyes: Negative for icterus.   Respiratory: Negative for cough, hemoptysis and shortness of breath.    Cardiovascular: Negative for chest pain, leg swelling and palpitations.   Gastrointestinal: Negative for abdominal distention, abdominal pain, blood in stool, constipation, diarrhea, nausea and vomiting.   Endocrine: Negative for hot flashes.   Genitourinary: Negative for bladder incontinence, difficulty urinating, dysuria, frequency and hematuria.    Musculoskeletal: Negative for gait problem, neck pain and neck stiffness.   Skin: Negative for rash.   Neurological: Negative for dizziness, gait problem, headaches, light-headedness and numbness.   Hematological: Negative for adenopathy. Does not bruise/bleed easily.   Psychiatric/Behavioral: Negative for depression. The patient is not nervous/anxious.    All other systems reviewed and are negative.       PHYSICAL EXAM    Vitals:    12/02/19 1105   BP: 157/92   Pulse: 100   Resp: 18   Temp: 97.5 °F (36.4 °C)   SpO2: 96%   Weight: 106 kg (233 lb)   Height: 182.9 cm (72\")     Constitutional: Appears well-developed and well-nourished. No distress.   ECOG: (1) Restricted in physically strenuous activity, ambulatory and able to do work of light nature  HENT:   Head: Normocephalic.   Mouth/Throat: Oropharynx is clear and moist.   Eyes: Conjunctivae are normal. Pupils are equal, round, and reactive to light. No scleral icterus.   Neck: Neck supple. No JVD present. No thyromegaly present.   Cardiovascular: Normal rate, regular rhythm and normal heart sounds.    Pulmonary/Chest: Breath sounds normal. No respiratory distress.   Abdominal: Soft. Exhibits no distension and no mass. There is no hepatosplenomegaly. There is no tenderness. There " is no rebound and no guarding.   Musculoskeletal:Exhibits no edema, tenderness or deformity.   Neurological: Alert and oriented to person, place, and time. Exhibits normal muscle tone.   Skin: No ecchymosis, no petechiae and no rash noted. Not diaphoretic. No cyanosis. Nails show no clubbing.   Psychiatric: Normal mood and affect.   Vitals reviewed.      Lab Results   Component Value Date    HGB 11.7 (L) 11/11/2019    HCT 34.2 (L) 11/11/2019    MCV 89.2 11/11/2019     11/11/2019    WBC 6.50 11/11/2019    NEUTROABS 4.20 11/11/2019    LYMPHSABS 1.70 11/11/2019    MONOSABS 0.70 11/11/2019    EOSABS 0.03 10/17/2019    BASOSABS 0.10 10/17/2019       Lab Results   Component Value Date    GLUCOSE 138 (H) 11/11/2019    BUN 8 11/11/2019    CREATININE 0.80 11/11/2019     11/11/2019    K 3.8 11/11/2019     11/11/2019    CO2 24.0 11/11/2019    CALCIUM 8.3 (L) 11/11/2019    PROTEINTOT 6.7 11/11/2019    ALBUMIN 4.00 11/11/2019    BILITOT 0.2 11/11/2019    ALKPHOS 62 11/11/2019    AST 23 11/11/2019    ALT 29 11/11/2019                   ASSESSMENT & PLAN:  1. Anaplastic large cell lymphoma  2. Mild neuropathy    Discussion: He will get his PET scan done on the 10th and we will see him back for his treatment on 23 December which should be his sixth and final course assuming that his current PET scan is negative.  Ideally he would have had his PET scan before this, his fifth course, but the peter that be would not approve it until a great deal of hassle and hence this was delayed.  If the present PET scan is negative, I would consider the MRI spine findings to be treated disease and not active disease and I would repeat the PET after his sixth course approximately 8 weeks after his sixth course given that he would have just had a PET between the fifth and sixth courses.  I would not want to press on with the seventh course with the anthracycline.  Discussed with patient 30 minutes face-to-face greater than 50%  spent counseling regarding this plan.  I will see him 12/20/2019 to go over the PET and signed the orders for his 12/23/2019 treatment.        Brent Brizuela MD    12/02/2019

## 2019-12-10 ENCOUNTER — HOSPITAL ENCOUNTER (OUTPATIENT)
Dept: ONCOLOGY | Facility: HOSPITAL | Age: 44
Setting detail: INFUSION SERIES
Discharge: HOME | End: 2019-12-10

## 2019-12-10 ENCOUNTER — HOSPITAL ENCOUNTER (OUTPATIENT)
Dept: PET IMAGING | Facility: HOSPITAL | Age: 44
Discharge: HOME | End: 2019-12-10

## 2019-12-10 ENCOUNTER — HOSPITAL ENCOUNTER (OUTPATIENT)
Dept: PET IMAGING | Facility: HOSPITAL | Age: 44
Discharge: HOME | End: 2019-12-10
Admitting: NURSE PRACTITIONER

## 2019-12-10 VITALS
HEART RATE: 97 BPM | RESPIRATION RATE: 20 BRPM | SYSTOLIC BLOOD PRESSURE: 143 MMHG | BODY MASS INDEX: 31.15 KG/M2 | TEMPERATURE: 97.3 F | HEIGHT: 72 IN | WEIGHT: 230 LBS | DIASTOLIC BLOOD PRESSURE: 89 MMHG

## 2019-12-10 DIAGNOSIS — C84.60 ANAPLASTIC ALK-POSITIVE LARGE CELL LYMPHOMA, UNSPECIFIED BODY REGION (HCC): ICD-10-CM

## 2019-12-10 DIAGNOSIS — C84.60 ANAPLASTIC ALK-POSITIVE LARGE CELL LYMPHOMA, UNSPECIFIED BODY REGION (HCC): Chronic | ICD-10-CM

## 2019-12-10 LAB — GLUCOSE BLDC GLUCOMTR-MCNC: 136 MG/DL (ref 70–130)

## 2019-12-10 PROCEDURE — G0463 HOSPITAL OUTPT CLINIC VISIT: HCPCS

## 2019-12-10 PROCEDURE — 78815 PET IMAGE W/CT SKULL-THIGH: CPT

## 2019-12-10 PROCEDURE — 82962 GLUCOSE BLOOD TEST: CPT

## 2019-12-10 PROCEDURE — 0 FLUDEOXYGLUCOSE F18 SOLUTION: Performed by: NURSE PRACTITIONER

## 2019-12-10 PROCEDURE — A9552 F18 FDG: HCPCS | Performed by: NURSE PRACTITIONER

## 2019-12-10 RX ADMIN — FLUDEOXYGLUCOSE F18 1 DOSE: 300 INJECTION INTRAVENOUS at 12:48

## 2019-12-16 ENCOUNTER — INFUSION (OUTPATIENT)
Dept: ONCOLOGY | Facility: HOSPITAL | Age: 44
End: 2019-12-16

## 2019-12-16 DIAGNOSIS — C84.60 ANAPLASTIC ALK-POSITIVE LARGE CELL LYMPHOMA, UNSPECIFIED BODY REGION (HCC): Primary | ICD-10-CM

## 2019-12-16 PROCEDURE — G0463 HOSPITAL OUTPT CLINIC VISIT: HCPCS

## 2019-12-16 PROCEDURE — 96523 IRRIG DRUG DELIVERY DEVICE: CPT

## 2019-12-16 RX ORDER — SODIUM CHLORIDE 0.9 % (FLUSH) 0.9 %
10 SYRINGE (ML) INJECTION AS NEEDED
Status: DISCONTINUED | OUTPATIENT
Start: 2019-12-16 | End: 2019-12-16 | Stop reason: HOSPADM

## 2019-12-16 RX ORDER — SODIUM CHLORIDE 0.9 % (FLUSH) 0.9 %
10 SYRINGE (ML) INJECTION AS NEEDED
OUTPATIENT
Start: 2019-12-16

## 2019-12-16 RX ADMIN — SODIUM CHLORIDE, PRESERVATIVE FREE 10 ML: 5 INJECTION INTRAVENOUS at 13:00

## 2019-12-17 ENCOUNTER — APPOINTMENT (OUTPATIENT)
Dept: ONCOLOGY | Facility: HOSPITAL | Age: 44
End: 2019-12-17

## 2019-12-20 ENCOUNTER — OFFICE VISIT (OUTPATIENT)
Dept: ONCOLOGY | Facility: CLINIC | Age: 44
End: 2019-12-20

## 2019-12-20 VITALS
HEART RATE: 94 BPM | TEMPERATURE: 97.2 F | HEIGHT: 72 IN | WEIGHT: 236 LBS | DIASTOLIC BLOOD PRESSURE: 83 MMHG | BODY MASS INDEX: 31.97 KG/M2 | RESPIRATION RATE: 18 BRPM | OXYGEN SATURATION: 95 % | SYSTOLIC BLOOD PRESSURE: 136 MMHG

## 2019-12-20 DIAGNOSIS — C84.60 ANAPLASTIC ALK-POSITIVE LARGE CELL LYMPHOMA, UNSPECIFIED BODY REGION (HCC): Primary | Chronic | ICD-10-CM

## 2019-12-20 PROCEDURE — 99215 OFFICE O/P EST HI 40 MIN: CPT | Performed by: INTERNAL MEDICINE

## 2019-12-20 RX ORDER — PREDNISONE 50 MG/1
TABLET ORAL
Qty: 10 TABLET | Refills: 0 | Status: SHIPPED | OUTPATIENT
Start: 2019-12-20 | End: 2020-02-04

## 2019-12-20 RX ORDER — DOXORUBICIN HYDROCHLORIDE 2 MG/ML
50 INJECTION, SOLUTION INTRAVENOUS ONCE
Status: CANCELLED | OUTPATIENT
Start: 2019-12-23

## 2019-12-20 RX ORDER — PALONOSETRON 0.05 MG/ML
0.25 INJECTION, SOLUTION INTRAVENOUS ONCE
Status: CANCELLED | OUTPATIENT
Start: 2019-12-23

## 2019-12-20 RX ORDER — SODIUM CHLORIDE 9 MG/ML
250 INJECTION, SOLUTION INTRAVENOUS ONCE
Status: CANCELLED | OUTPATIENT
Start: 2019-12-23

## 2019-12-20 NOTE — PROGRESS NOTES
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 Leukocytosis  3. FUO  4. Decreased ejection fraction with evidence of myocarditis  5. Marked elevation of sedimentation rate and C-reactive protein  6. Elevated triglycerides on fasting  7. Acute renal failure resolved with hydration    -8/23/2019 initial inpatient [facility] medical oncology consultation.  Asked to see regarding leukocytosis.  He had upper respiratory infection with pneumonia, chest pains, elevated troponins with diagnosis of myocarditis and decreased ejection fraction with fevers to 104.  Tentatively diagnosed with still's disease and treated with prednisone with some improvement in fevers as well as improvement in a rash.  By the end of July, he had severe mid back pain relatively acute and onset that progressed over time with imaging of the area showing progressive abnormality of both bone and marrow signals in the spine that could be malignant marrow involvement or, more likely per radiologist, infectious.  Sedimentation rate was over 100 with a C-reactive protein elevated and white count 20-30,000 for the better part of 2 months.  Slight left shift but no immature forms.  Broad-spectrum serologic testing has not shown pathogens as of the initial consultation.  I ordered bone marrow biopsy and Dr. Jimenez coordinated spine biopsy  -6/24/2019 CT chest abdomen pelvis without contrast showed no parenchymal lung disease but a 1.6 cm mildly enlarged centrally necrotic right paratracheal node with no other adenopathy or  hepatosplenomegaly.  Ejection fraction 35% on echocardiogram.  -6/28/2019 MRI brain and cervical spine without contrast normal  -7/9/2019 fluoroscopy guided right joint aspiration unrevealing  -7/10/2019 fluoroscopic guided bronchoscopy showed no pathogens or pathology  -8/9/2019 MRI thoracic and lumbar spine showed low T1 signals in T8 and T9 with increased T2 involvement concerning for aggressive marrow signal findings that could represent lipid poor spinal hemangioma but concerning for metastasis versus multiple myeloma.  Contrast MRI recommended.  -8/21/2019 MRI thoracic spine with and without contrast showed multiple foci of abnormal marrow signal within the thoracic spine was prominent T8 and T9 with interval increase compared to 8/9/2019 with deformity of the endplates at T8 and T9 but no definitive fracture or discitis.  Several smaller additional lesions suspected at T1, T2, T4, T5, and T12 and the differential would include multifocal vertebral osteomyelitis versus metastasis.  -8/23/2019 bone marrow biopsy showed mild polyclonal plasmacytosis with normocellular marrow and trilineage hematopoiesis with mild myeloid expansion.  No lymphoid infiltrate and no organisms for fungi, acid fast bacilli, tumor, or granuloma.  Bennett 2- negative.  -8/24/2019 ejection fraction up to 65%  -8/26/2019 T8 biopsy show focal area of markedly atypical appearance.  There are inflammatory cells with occasional eosinophils and larger cells.  Some focal hemosiderin.  There is equivocal positivity for CD4, , CD45, and Constance-Barr virus.  It is positive for CD30, ALK 1, EMA, and vimentin.  No staining for CD 5, 7, 3, 20, or 15.  No staining for cytokeratin, SOX 10, , or CD1a.  Outside referral to [facility] pending.  -8/30/2019 [facility] outpatient follow-up visit: Anaplastic large cell lymphoma ALK positive just involving the bone would be unusual but not impossible I suppose.  We will get PET scan.  Were this  hemophagocytic syndrome I would have expected his marrow to be abnormal along with splenomegaly and cytopenias as would the marrow be abnormal for this a plasma cell dyscrasia and the plasma cells were polyclonal.  I will check his SIEP along with triglycerides, peripheral blood flow cytometry for soluble CD 25, ferritin, and NK cell activity.  Once I have that information, hopefully we will have more information back from [facility] and unlikely to involve my colleagues at the [facility] in this process if this is an ALK positive anaplastic large cell lymphoma without kia involvement given the unusual nature and appearance of this.    -9/6/2019 subsequent oncology reconsultation for readmission: Met patient with his last admission.  He has had a 4-month history of fevers with upper respiratory infection and possible pneumonia.  Found to have myocarditis with low ejection fraction with extensive infectious disease work-up negative.  A month later readmitted with shortness of breath and arthralgias with some redness in his wrist.  Bronchoscopy was negative and he was treated with some steroids.  On his third admission with relatively recent acute onset back pain he was found to have some spine lesions.  The bone marrow biopsy was unrevealing,  a T8 biopsy by Dr. Wilde showed markedly atypical appearing cells in a background of eosinophils.  The larger cells were positive for ALK 1, CD30, EMA, and vimentin.  No staining for CD 5, 7, 3, 20, or 15.  No staining for cytokeratin, sox 10, , or CD1a.  Tissue has been sent for second opinion at [facility].  Anaplastic large cell lymphoma ALK positive was considered but given that his fevers which started months back included testing with CT chest abdomen pelvis 6/24/2019 only showing mildly enlarged centrally necrotic right lower paratracheal node of indeterminate significance and no other significant adenopathy or splenomegaly and no pulmonary  infiltrate or mediastinal adenopathy on repeat CT chest 7/9/2019, PET scan was ordered.  Before PET could be completed and GI evaluation completed for evaluation of possible GI source for kia procurement, the patient on the day he was due for endoscopy continue to have fevers with hypotension and a pulse in the 150s.  9/5/2019 CT chest abdomen and pelvis on admission now shows showed new numerous bilateral pulmonary lung nodules in the right lung measuring 2 cm without focal pneumonia.  There was extensive axillary, mediastinal adenopathy the largest in the right paratracheal area 2.1 cm with central necrosis.  There were hypoattenuating regions in the right hepatic lobe measuring up to 2.5 cm incompletely assessed due to lack of contrast due to his creatinine on admission 9/5/2019 being up to 1.67 down to 0.94 by the morning of 9/6/2019.  CT also showed new extensive axillary, mediastinal, and abdominopelvic lymphadenopathy.  There was redemonstration of the T8-T9 aggressive osseous lytic lesions involving thoracolumbar spine and multiple levels.  Nonspecific perinephric inflammation also seen possibly representing pyelonephritis.  Laboratory testing on admission 9/5/2019 showed C-reactive protein up to 37.66 with sedimentation rate greater than 130.  Procalcitonin over 38 with lactic acid  4.8.   normal with normal troponin.  Fasting triglyceride up to 269 and peripheral blood flow cytometry sent for NK cell activity as well as CD25 for IL-2 receptor for the possibility of hemophagocytic syndrome which would be unusual without hemophagocytosis bone marrow.  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  We will get echocardiogram and hold on the GI evaluation and I discussed in person with Dr. Benja Can to see for procurement of lymph node.  He is going to go  after upper mediastinal lymph node.  Once we have more tissue then we will make further plans for treatment.  His ejection fraction on 8/24/2019 was 62%.  Assuming that we find lymphoma and not some aggressive infectious disease process, he will need a port as well but we will wait on the port until we know what we are dealing with.  My partners are on board and will cover me this weekend and I will check back Monday to see where we stand.  He already has an appointment with Dr. Roosevelt Nguyen at  on the 11th who I was enlisting to help sort this out but we may be able to get our answers now that we have node to go after.  I may yet need his services depending on the ilk of this lymphoma and whether or not we would need to consolidate aggressively or treat more aggressively than with standard chemotherapy upfront.  I am starting him on allopurinol.  He already has a rash on his upper thighs and heels and around the area of his prior bone marrow biopsy but I suspect that just related to this probable lymphomatous process or perhaps the recent Bactrim started by Dr. Hauser.  His procalcitonin is quite high and I am still concerned about concomitant infection and I am not sure if the procalcitonin it can be falsely dramatically elevated in the face of lymphoma.  If this is anaplastic large cell lymphoma, with his ECOG performance status generally less than 2 and age less than 60 but with at least stage III disease, even if his LDH is high, he would at least have a risk score of 2 which would put him at the low intermediate risk on the IPI index.  Whether we would use Rituxan or not depends on if this turns out to be CD20 positive where his initial bone biopsy was not.  Given that it is CD30 positive, will probably use brentuximab, Cytoxan, Adriamycin, prednisone every 3 weeks x 6 cycles.  This usually is given without vincristine but with the Brentuximab 1.8 mg/kg IV day 1 of each cycle.  A progression free  survival was seen both in ALK positive and negative anaplastic large cell lymphomas treated with brentuximab rather than vincristine.  Neuropathy still can be an issue and diarrhea is more common.  If this is anaplastic large cell lymphoma ALK positive, autologous stem cell transplant may be considered in patients over 40 years of age with an IPI score greater than or equal to 3 but generally not if the IPI score is less than 3 so we will assess that very carefully.  Despite the elevated fasting triglyceride recently, I doubt this is hemophagocytic syndrome given the negative bone marrow and now with the blossoming lymphadenopathy.  But we shall see.    -9/30/2019 outpatient medical oncology follow-up visit:  He was breathing much better the first week after his chemotherapy but then the shortness of breath worsened again his pulse increased in the 120s to 140s.  Saw Dr. Dumont in my team here as well and received IV fluids.  Slowly that improved.  In the prior 5 days he has been feeling better and no tachycardia for the last week.  He has had no fever since discharge from the hospital.  He has 1 more week of antibiotics.  His current complaints are:    Occasional bloody mucus from his nose    Alternating diarrhea and constipation better with stool softeners    Low back pain worsening with Neupogen but only on oxycodone rarely and none in the last 48 hours.   Bilateral knee pain without swelling on allopurinol    Itching but prior rash resolved   Mild gait instability due to weakness worse with showering and due for follow-up with Dr. Jimenez for serial MRIs   Labile moods but without jason depression worse with steroids now down to 20 mg    I addressed all these issues with him today my plan is:   To get a PET prior to return for course #3 of chemotherapy with brentuximab, Cytoxan, Adriamycin, prednisone   Consult Dr. Carrion for possible consolidation transplant in the future   Add Xgeva for the lytic  involvement of his bone   Taper the steroids down to 20 mg for 5 days 10 mg for 5 days 5 mg for 5 days and then off following the 100 mg of prednisone with this course of therapy and then hopefully no further steroids though he will follow with rheumatology  Follow-up with Dr. Jimenez for serial spine MRIs.    -10/21/2019 medical oncology office visit: 10/17/2019 PET scan was negative.  All prior CT abnormalities have resolved.  He is due for follow-up with Dr. Jimenez for spine MRI in about a month.  He has seen Dr. Carrion.  I do not have those notes but according to the patient there are no plans for stem cell transplant in first remission.  I have left a message with Dr. Carrion raising the question of whether spinal fluid analysis or MRI of brain would be in order but he feels great has no neurologic symptoms and his only complaint presently is mid back pain which clearly is improving over time.  Unless Dr. Carrion has other ideas, my plan would be to continue on with courses 3 and 4 of Cytoxan Hilario Brentuximab prednisone, repeat the PET, proceed with courses 5 and 6, repeat the PET again, and assuming that everything is negative at that junction then go to routine survivorship.    -12/20/2019 medical oncology office visit: 12/10/2019 PET scan shows just active marrow response with no active lymphoma or recurrence.  He will call Dr. Carrion for an appointment after course #6 12/23/2019 to discuss definitively whether there is any plans for consolidation but as far as I am aware there is no plan for stem cell transplant autologous at this junction.  We will get his PET 8 weeks out from this final dose of of brentuximab Cytoxan, Adriamycin, prednisone.              Anaplastic ALK-positive large cell lymphoma     6/28/2019 Imaging     MRI of the brain negative      8/9/2019 Imaging     MRI thoracic and lumbar spine IMPRESSION:  1.  Abnormal ill-defined low T1 signal lesions in the T8 and  T9  vertebral bodies with increased T2 involvement concerning for aggressive  bone marrow signal findings and although this may represent lipid poor  spinal hemangiomas concern for metastasis versus myeloma remains a  consideration given overall appearance on this noncontrast evaluation.  Contrast-enhanced examination of the thoracic spine and/or nuclear  medicine bone scan may be useful for further evaluation.     2. Lumbar spine with degenerative changes, however, no aggressive bone  marrow signal findings or acute fracture. No significant spinal canal or  neuroforaminal stenosis present.      8/24/2019 -  Other Event     Echocardiogram: Estimated EF 62%.      8/26/2019 Initial Diagnosis     Anaplastic ALK-positive large cell lymphoma       8/26/2019 Biopsy     T8 vertebral biopsy positive for anaplastic large cell lymphoma, ALK-positive.      9/5/2019 Imaging     CT chest, abdomen and pelvis IMPRESSION:     Constellation of findings concerning for metastatic disease:      - Interval development of numerous pulmonary nodules throughout the  lungs with the largest measuring 2.0 cm within the right midlung and  concerning for underlying pulmonary metastasis and which may account for  abnormality identified in the chest radiograph performed 09/05/2019.     - Extensive axillary, mediastinal, and abdominopelvic lymphadenopathy as  described above.     - Hypoattenuated regions involving the liver concerning for additional  sites of metastatic disease, although are limited in evaluation  secondary to lack of intravenous contrast. Attention on follow-up  imaging is advised.     - Redemonstration of T8/T9 aggressive osseous lesions with additional  lytic lesion involving the L4 vertebral body and additional  subcentimeter lytic lesions throughout the thoracolumbar spine  suggested.     Nonspecific perinephric inflammation. Correlate with urinalysis to  exclude underlying pyelonephritis.      9/6/2019 Biopsy     Mediastinal  lymph node excisional biopsy positive for ALK-1 positive anaplastic large cell lymphoma      9/9/2019 -  Chemotherapy     OP LYMPHOMA Brentuximab, Cyclophosphamide / DOXOrubicin / predniSONE / Pegfilgrastim        10/17/2019 Imaging     PET/CT IMPRESSION:  1. Essentially negative PET scan for active lymphoma. CT scan findings  from 09/05/2019 appear to have largely resolved as well.  2. Small focus of mild activity in the right groin appears to reflect  calcific tendinosis of the distal right iliopsoas muscle.  3. Probable marrow stimulation effect.      11/19/2019 Imaging     MRI T-spine shows progressive T8 and 9 compression pathologic fracture and bone and bone marrow activity.  Dr. Jimenez reviewed and thought this was his treated disease.  And plans on serial MRI imaging.      12/10/2019 Imaging     PET/CT IMPRESSION:  1. No convincing evidence of abnormal FDG activity as visualized to  suggest recurrent disease or lymphomatous involvement.     2. Redemonstration of diffuse osseous marrow activity throughout the  axial skeleton most consistent with marrow stimulation effect similar to  prior exam with redemonstration of compression fractures of the lower  thoracic spine better seen on the MRI performed 11/19/2019.         HISTORY OF PRESENT ILLNESS:  The patient is a 44 y.o. male, here for follow up on management of stage IV out positive anaplastic large cell lymphoma with no evidence of disease on current PET scan.  Is having some back pain and has follow-up with Dr. Jimenez with MRI of the spine previously planned.      Past Medical History:   Diagnosis Date   • Acute kidney injury (CMS/HCC)    • Anaplastic ALK-positive large cell lymphoma (CMS/HCC)    • Bacteremia    • Hyperlipidemia    • Myocarditis (CMS/HCC)    • Pneumonia    • Severe sepsis (CMS/HCC)      Past Surgical History:   Procedure Laterality Date   • APPENDECTOMY     • BACK SURGERY      L5 S1   • BRONCHOSCOPY N/A 7/10/2019    Procedure:  "BRONCHOSCOPY WITH ENDOBRONCHIAL ULTRASOUND AND TRANSBRONCHIAL BIOPSY AND FLUORO;  Surgeon: Marvel Sandoval MD;  Location: [facility] ENDOSCOPY;  Service: Pulmonary   • CARDIAC CATHETERIZATION N/A 6/24/2019    Procedure: LEFT HEART CATH;  Surgeon: Deep Muñiz IV, MD;  Location:  [facility]VAS[facility];  Service: Cardiovascular   • INTERVENTIONAL RADIOLOGY PROCEDURE N/A 8/26/2019    Procedure: THORACIC SPINE BIOSPY;  Surgeon: Juve Avila MD;  Location:  [facility];  Service: Interventional Radiology   • MEDIASTINOSCOPY, BRONCHOSCOPY N/A 9/6/2019    Procedure: BRONCHOSCOPY, MEDIASTINOSCOPY WITH BIOPSY;  Surgeon: El Can MD;  Location:  [facility] OR;  Service: Cardiothoracic       Allergies   Allergen Reactions   • Ciprofloxacin Anxiety and Hallucinations     \"Extreme anxiety and paranoia\"   • Sulfa Antibiotics Rash       Family History and Social History reviewed and changed as necessary      REVIEW OF SYSTEM:   Review of Systems   Constitutional: Negative for appetite change, chills, diaphoresis, fatigue, fever and unexpected weight change.   HENT:   Negative for mouth sores, sore throat and trouble swallowing.    Eyes: Negative for icterus.   Respiratory: Negative for cough, hemoptysis and shortness of breath.    Cardiovascular: Negative for chest pain, leg swelling and palpitations.   Gastrointestinal: Negative for abdominal distention, abdominal pain, blood in stool, constipation, diarrhea, nausea and vomiting.   Endocrine: Negative for hot flashes.   Genitourinary: Negative for bladder incontinence, difficulty urinating, dysuria, frequency and hematuria.    Musculoskeletal: Negative for gait problem, neck pain and neck stiffness.   Skin: Negative for rash.   Neurological: Negative for dizziness, gait problem, headaches, light-headedness and numbness.   Hematological: Negative for adenopathy. Does not bruise/bleed easily.   Psychiatric/Behavioral: Negative for " "depression. The patient is not nervous/anxious.    All other systems reviewed and are negative.       PHYSICAL EXAM    Vitals:    12/20/19 1547   BP: 136/83   Pulse: 94   Resp: 18   Temp: 97.2 °F (36.2 °C)   SpO2: 95%   Weight: 107 kg (236 lb)   Height: 182.9 cm (72\")     Constitutional: Appears well-developed and well-nourished. No distress.   ECOG: (0) Fully active, able to carry on all predisease performance without restriction  HENT:   Head: Normocephalic.   Mouth/Throat: Oropharynx is clear and moist.   Eyes: Conjunctivae are normal. Pupils are equal, round, and reactive to light. No scleral icterus.   Neck: Neck supple. No JVD present. No thyromegaly present.   Cardiovascular: Normal rate, regular rhythm and normal heart sounds.    Pulmonary/Chest: Breath sounds normal. No respiratory distress.   Abdominal: Soft. Exhibits no distension and no mass. There is no hepatosplenomegaly. There is no tenderness. There is no rebound and no guarding.   Musculoskeletal:Exhibits no edema, tenderness or deformity.   Neurological: Alert and oriented to person, place, and time. Exhibits normal muscle tone.   Skin: No ecchymosis, no petechiae and no rash noted. Not diaphoretic. No cyanosis. Nails show no clubbing.   Psychiatric: Normal mood and affect.   Vitals reviewed.      Lab Results   Component Value Date    HGB 13.6 12/02/2019    HCT 41.6 12/02/2019    MCV 90.3 12/02/2019     12/02/2019    WBC 9.50 12/02/2019    NEUTROABS 6.70 12/02/2019    LYMPHSABS 1.90 12/02/2019    MONOSABS 0.90 12/02/2019    EOSABS 0.03 10/17/2019    BASOSABS 0.10 10/17/2019       Lab Results   Component Value Date    GLUCOSE 111 (H) 12/02/2019    BUN 12 12/02/2019    CREATININE 0.90 12/02/2019     12/02/2019    K 3.9 12/02/2019     12/02/2019    CO2 24.0 12/02/2019    CALCIUM 9.8 12/02/2019    PROTEINTOT 7.4 12/02/2019    ALBUMIN 4.40 12/02/2019    BILITOT 0.2 12/02/2019    ALKPHOS 67 12/02/2019    AST 23 12/02/2019    ALT 34 " 12/02/2019                   ASSESSMENT & PLAN:    1. 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 Leukocytosis  3. FUO  4. Decreased ejection fraction with evidence of myocarditis  5. Marked elevation of sedimentation rate and C-reactive protein  6. Elevated triglycerides on fasting  7. Acute renal failure resolved with hydration    Discussion:     12/10/2019 PET scan shows just active marrow response with no active lymphoma or recurrence.  He will call Dr. Carrion for an appointment after course #6 12/23/2019 to discuss definitively whether there is any plans for consolidation but as far as I am aware there is no plan for stem cell transplant autologous at this junction.  We will get his PET 8 weeks out from this final dose of of brentuximab Cytoxan, Adriamycin, prednisone.    Kathryn with patient face-to-face 45 minutes greater than 50% spent counseling regarding complexity of this plan as outlined.  Brent Brizuela MD    12/20/2019

## 2019-12-23 ENCOUNTER — HOSPITAL ENCOUNTER (OUTPATIENT)
Dept: ONCOLOGY | Facility: HOSPITAL | Age: 44
Setting detail: INFUSION SERIES
Discharge: HOME | End: 2019-12-23

## 2019-12-23 VITALS
BODY MASS INDEX: 31.56 KG/M2 | HEART RATE: 126 BPM | SYSTOLIC BLOOD PRESSURE: 128 MMHG | WEIGHT: 233 LBS | DIASTOLIC BLOOD PRESSURE: 85 MMHG | HEIGHT: 72 IN | TEMPERATURE: 97.8 F | RESPIRATION RATE: 16 BRPM

## 2019-12-23 DIAGNOSIS — C84.60 ANAPLASTIC ALK-POSITIVE LARGE CELL LYMPHOMA, UNSPECIFIED BODY REGION (HCC): Primary | ICD-10-CM

## 2019-12-23 LAB
ALBUMIN SERPL-MCNC: 3.9 G/DL (ref 3.5–5.2)
ALBUMIN/GLOB SERPL: 1.3 G/DL
ALP SERPL-CCNC: 58 U/L (ref 39–117)
ALT SERPL W P-5'-P-CCNC: 25 U/L (ref 1–41)
ANION GAP SERPL CALCULATED.3IONS-SCNC: 11 MMOL/L (ref 5–15)
AST SERPL-CCNC: 22 U/L (ref 1–40)
BILIRUB SERPL-MCNC: 0.2 MG/DL (ref 0.2–1.2)
BUN BLD-MCNC: 11 MG/DL (ref 6–20)
BUN/CREAT SERPL: 13.3 (ref 7–25)
CALCIUM SPEC-SCNC: 9.3 MG/DL (ref 8.6–10.5)
CHLORIDE SERPL-SCNC: 102 MMOL/L (ref 98–107)
CO2 SERPL-SCNC: 24 MMOL/L (ref 22–29)
CREAT BLD-MCNC: 0.83 MG/DL (ref 0.76–1.27)
CREAT BLDA-MCNC: 0.8 MG/DL (ref 0.6–1.3)
ERYTHROCYTE [DISTWIDTH] IN BLOOD BY AUTOMATED COUNT: 15.7 % (ref 12.3–15.4)
GFR SERPL CREATININE-BSD FRML MDRD: 100 ML/MIN/1.73
GLOBULIN UR ELPH-MCNC: 2.9 GM/DL
GLUCOSE BLD-MCNC: 160 MG/DL (ref 65–99)
HCT VFR BLD AUTO: 39.3 % (ref 37.5–51)
HGB BLD-MCNC: 13.1 G/DL (ref 13–17.7)
LYMPHOCYTES # BLD AUTO: 1.3 10*3/MM3 (ref 0.7–3.1)
LYMPHOCYTES NFR BLD AUTO: 18.4 % (ref 19.6–45.3)
MCH RBC QN AUTO: 30.2 PG (ref 26.6–33)
MCHC RBC AUTO-ENTMCNC: 33.3 G/DL (ref 31.5–35.7)
MCV RBC AUTO: 90.7 FL (ref 79–97)
MONOCYTES # BLD AUTO: 0.8 10*3/MM3 (ref 0.1–0.9)
MONOCYTES NFR BLD AUTO: 11.2 % (ref 5–12)
NEUTROPHILS # BLD AUTO: 5 10*3/MM3 (ref 1.7–7)
NEUTROPHILS NFR BLD AUTO: 70.4 % (ref 42.7–76)
PLATELET # BLD AUTO: 331 10*3/MM3 (ref 140–450)
PMV BLD AUTO: 6.4 FL (ref 6–12)
POTASSIUM BLD-SCNC: 3.7 MMOL/L (ref 3.5–5.2)
PROT SERPL-MCNC: 6.8 G/DL (ref 6–8.5)
RBC # BLD AUTO: 4.34 10*6/MM3 (ref 4.14–5.8)
SODIUM BLD-SCNC: 137 MMOL/L (ref 136–145)
WBC NRBC COR # BLD: 7.1 10*3/MM3 (ref 3.4–10.8)

## 2019-12-23 PROCEDURE — 25010000002 DEXAMETHASONE PER 1 MG: Performed by: INTERNAL MEDICINE

## 2019-12-23 PROCEDURE — 25010000002 PALONOSETRON 0.25 MG/5ML SOLUTION PREFILLED SYRINGE: Performed by: INTERNAL MEDICINE

## 2019-12-23 PROCEDURE — 25010000002 BRENTUXIMAB 50 MG RECONSTITUTED SOLUTION 1 EACH VIAL: Performed by: INTERNAL MEDICINE

## 2019-12-23 PROCEDURE — 96367 TX/PROPH/DG ADDL SEQ IV INF: CPT

## 2019-12-23 PROCEDURE — 96377 APPLICATON ON-BODY INJECTOR: CPT

## 2019-12-23 PROCEDURE — 82565 ASSAY OF CREATININE: CPT

## 2019-12-23 PROCEDURE — 96375 TX/PRO/DX INJ NEW DRUG ADDON: CPT

## 2019-12-23 PROCEDURE — 25010000002 CYCLOPHOSPHAMIDE PER 100 MG: Performed by: INTERNAL MEDICINE

## 2019-12-23 PROCEDURE — 96413 CHEMO IV INFUSION 1 HR: CPT

## 2019-12-23 PROCEDURE — 80053 COMPREHEN METABOLIC PANEL: CPT | Performed by: INTERNAL MEDICINE

## 2019-12-23 PROCEDURE — 25010000002 FOSAPREPITANT PER 1 MG: Performed by: INTERNAL MEDICINE

## 2019-12-23 PROCEDURE — 85025 COMPLETE CBC W/AUTO DIFF WBC: CPT | Performed by: INTERNAL MEDICINE

## 2019-12-23 PROCEDURE — 96411 CHEMO IV PUSH ADDL DRUG: CPT

## 2019-12-23 PROCEDURE — 25010000002 PEGFILGRASTIM 6 MG/0.6ML PREFILLED SYRINGE KIT: Performed by: INTERNAL MEDICINE

## 2019-12-23 PROCEDURE — 96417 CHEMO IV INFUS EACH ADDL SEQ: CPT

## 2019-12-23 PROCEDURE — 25010000002 DOXORUBICIN PER 10 MG: Performed by: INTERNAL MEDICINE

## 2019-12-23 RX ORDER — SODIUM CHLORIDE 9 MG/ML
250 INJECTION, SOLUTION INTRAVENOUS ONCE
Status: COMPLETED | OUTPATIENT
Start: 2019-12-23 | End: 2019-12-23

## 2019-12-23 RX ORDER — DOXORUBICIN HYDROCHLORIDE 2 MG/ML
50 INJECTION, SOLUTION INTRAVENOUS ONCE
Status: COMPLETED | OUTPATIENT
Start: 2019-12-23 | End: 2019-12-23

## 2019-12-23 RX ORDER — PALONOSETRON 0.05 MG/ML
0.25 INJECTION, SOLUTION INTRAVENOUS ONCE
Status: COMPLETED | OUTPATIENT
Start: 2019-12-23 | End: 2019-12-23

## 2019-12-23 RX ADMIN — PALONOSETRON 0.25 MG: 0.25 INJECTION, SOLUTION INTRAVENOUS at 13:25

## 2019-12-23 RX ADMIN — SODIUM CHLORIDE 150 MG: 9 INJECTION, SOLUTION INTRAVENOUS at 13:25

## 2019-12-23 RX ADMIN — BRENTUXIMAB VEDOTIN 180 MG: 50 INJECTION, POWDER, LYOPHILIZED, FOR SOLUTION INTRAVENOUS at 14:00

## 2019-12-23 RX ADMIN — CYCLOPHOSPHAMIDE 1680 MG: 500 INJECTION, POWDER, FOR SOLUTION INTRAVENOUS; ORAL at 15:14

## 2019-12-23 RX ADMIN — DOXORUBICIN HYDROCHLORIDE 112 MG: 2 INJECTION, SOLUTION INTRAVENOUS at 15:02

## 2019-12-23 RX ADMIN — SODIUM CHLORIDE 250 ML: 9 INJECTION, SOLUTION INTRAVENOUS at 13:25

## 2019-12-23 RX ADMIN — DEXAMETHASONE SODIUM PHOSPHATE 12 MG: 4 INJECTION, SOLUTION INTRAMUSCULAR; INTRAVENOUS at 13:27

## 2019-12-23 RX ADMIN — PEGFILGRASTIM 6 MG: KIT SUBCUTANEOUS at 15:47

## 2020-01-03 ENCOUNTER — TELEPHONE (OUTPATIENT)
Dept: ONCOLOGY | Facility: CLINIC | Age: 45
End: 2020-01-03

## 2020-01-03 NOTE — TELEPHONE ENCOUNTER
Discussed with Dr. Brizuela.  Repeat ECHO not indicated unless symptoms dictate. Therapy completed.   Patient notified and currently not symptomatic.  Verbalized understanding.

## 2020-01-03 NOTE — TELEPHONE ENCOUNTER
Pt called because at his last chemo appt the nurse mentioned that he hasn't had an echo done in a while, and he's thinking he would like that to be done. Can we please send in an order for him? It can go to [facility].    Pt can be reached at . Please call and let him know.

## 2020-02-04 ENCOUNTER — OFFICE VISIT (OUTPATIENT)
Dept: CARDIOLOGY | Facility: CLINIC | Age: 45
End: 2020-02-04

## 2020-02-04 VITALS
OXYGEN SATURATION: 99 % | DIASTOLIC BLOOD PRESSURE: 82 MMHG | HEIGHT: 72 IN | HEART RATE: 98 BPM | BODY MASS INDEX: 32.97 KG/M2 | SYSTOLIC BLOOD PRESSURE: 118 MMHG | WEIGHT: 243.4 LBS

## 2020-02-04 DIAGNOSIS — I42.8 NON-ISCHEMIC CARDIOMYOPATHY (HCC): ICD-10-CM

## 2020-02-04 DIAGNOSIS — I25.119 CORONARY ARTERY DISEASE INVOLVING NATIVE CORONARY ARTERY OF NATIVE HEART WITH ANGINA PECTORIS (HCC): Primary | ICD-10-CM

## 2020-02-04 DIAGNOSIS — E78.5 HYPERLIPIDEMIA LDL GOAL <70: ICD-10-CM

## 2020-02-04 DIAGNOSIS — B33.22 VIRAL MYOCARDITIS, UNSPECIFIED CHRONICITY: ICD-10-CM

## 2020-02-04 PROCEDURE — 99214 OFFICE O/P EST MOD 30 MIN: CPT | Performed by: NURSE PRACTITIONER

## 2020-02-04 RX ORDER — METOPROLOL SUCCINATE 25 MG/1
25 TABLET, EXTENDED RELEASE ORAL
Qty: 90 TABLET | Refills: 3 | Status: SHIPPED | OUTPATIENT
Start: 2020-02-04 | End: 2020-09-15

## 2020-02-12 ENCOUNTER — TELEPHONE (OUTPATIENT)
Dept: ONCOLOGY | Facility: CLINIC | Age: 45
End: 2020-02-12

## 2020-02-12 NOTE — TELEPHONE ENCOUNTER
PT called the Triage Line only to give a message to Dr. Brizuela that he is having some bloating with joint pain & stiffness.  PT has an appt on [id number]. I offered an earlier appt but PT declined. PT is not having any other symptoms.  PT aware that Dr. Brizuela is out of the office today and will be back in clinic tomorrow.  PT aware we are available should he need anything else.   [id number]  ~Shell

## 2020-02-17 ENCOUNTER — TELEPHONE (OUTPATIENT)
Dept: ONCOLOGY | Facility: CLINIC | Age: 45
End: 2020-02-17

## 2020-02-17 NOTE — TELEPHONE ENCOUNTER
Patient called triage line today to report that he was 6 weeks out of chemo but was having joint and bone pain and felt like he was having a hard time moving around like he would want to be.  He stated after sitting for a while or laying down, it is sometimes a little challenging to get going.  He also reported that he has gas at night.  No changes with bowels but patient says he has noticed this gas issue at night.  I talked with AILYN Duran and she did not have a reason for patient's nighttime gas.  Patient does have a PET scan scheduled for this week so that his bones will be evaluated to see if anything shows on the scan.  Patient is able to do his ADLs and function as he would like but he feels like he is stiffer and more achy.  I instructed that he could try over the counter meds like ibuprofen or aleve to see if that helped with his aches and he said he would do so.  He will keep scan as scheduled and appt with Dr. Brizuela next week.

## 2020-02-20 ENCOUNTER — HOSPITAL ENCOUNTER (OUTPATIENT)
Dept: PET IMAGING | Facility: HOSPITAL | Age: 45
Discharge: HOME | End: 2020-02-20
Admitting: INTERNAL MEDICINE

## 2020-02-20 ENCOUNTER — HOSPITAL ENCOUNTER (OUTPATIENT)
Dept: PET IMAGING | Facility: HOSPITAL | Age: 45
Discharge: HOME | End: 2020-02-20

## 2020-02-20 DIAGNOSIS — C84.60 ANAPLASTIC ALK-POSITIVE LARGE CELL LYMPHOMA, UNSPECIFIED BODY REGION (HCC): Chronic | ICD-10-CM

## 2020-02-20 LAB — GLUCOSE BLDC GLUCOMTR-MCNC: 95 MG/DL (ref 70–130)

## 2020-02-20 PROCEDURE — 0 FLUDEOXYGLUCOSE F18 SOLUTION: Performed by: INTERNAL MEDICINE

## 2020-02-20 PROCEDURE — 78815 PET IMAGE W/CT SKULL-THIGH: CPT

## 2020-02-20 PROCEDURE — 82962 GLUCOSE BLOOD TEST: CPT

## 2020-02-20 PROCEDURE — A9552 F18 FDG: HCPCS | Performed by: INTERNAL MEDICINE

## 2020-02-20 RX ADMIN — FLUDEOXYGLUCOSE F18 1 DOSE: 300 INJECTION INTRAVENOUS at 09:39

## 2020-02-24 ENCOUNTER — TELEPHONE (OUTPATIENT)
Dept: ONCOLOGY | Facility: CLINIC | Age: 45
End: 2020-02-24

## 2020-02-24 NOTE — TELEPHONE ENCOUNTER
Discussed with Dr. Brizuela.  PET wn, but he will discuss in detail during office visit on Thursday.  Left  for patient.

## 2020-02-24 NOTE — TELEPHONE ENCOUNTER
Patient had a PET scan last Thursday.  He has an appointment this Thursday, but he is anxious about the results of this test.    He said that last time a nurse called him to go over the results and he would appreciate if he could get a call about this scan if the results come in before his appointment.     Please give him a call either way.    [phone]

## 2020-02-27 ENCOUNTER — OFFICE VISIT (OUTPATIENT)
Dept: ONCOLOGY | Facility: CLINIC | Age: 45
End: 2020-02-27

## 2020-02-27 ENCOUNTER — LAB (OUTPATIENT)
Dept: LAB | Facility: HOSPITAL | Age: 45
End: 2020-02-27

## 2020-02-27 VITALS
RESPIRATION RATE: 18 BRPM | DIASTOLIC BLOOD PRESSURE: 95 MMHG | BODY MASS INDEX: 33.18 KG/M2 | HEART RATE: 95 BPM | TEMPERATURE: 97.8 F | SYSTOLIC BLOOD PRESSURE: 155 MMHG | OXYGEN SATURATION: 96 % | WEIGHT: 245 LBS | HEIGHT: 72 IN

## 2020-02-27 DIAGNOSIS — C84.60 ANAPLASTIC ALK-POSITIVE LARGE CELL LYMPHOMA, UNSPECIFIED BODY REGION (HCC): Primary | Chronic | ICD-10-CM

## 2020-02-27 DIAGNOSIS — C84.60 ANAPLASTIC ALK-POSITIVE LARGE CELL LYMPHOMA, UNSPECIFIED BODY REGION (HCC): ICD-10-CM

## 2020-02-27 LAB
ALBUMIN SERPL-MCNC: 4.5 G/DL (ref 3.5–5.2)
ALBUMIN/GLOB SERPL: 1.4 G/DL
ALP SERPL-CCNC: 46 U/L (ref 39–117)
ALT SERPL W P-5'-P-CCNC: 28 U/L (ref 1–41)
ANION GAP SERPL CALCULATED.3IONS-SCNC: 11 MMOL/L (ref 5–15)
AST SERPL-CCNC: 33 U/L (ref 1–40)
BILIRUB SERPL-MCNC: 0.3 MG/DL (ref 0.2–1.2)
BUN BLD-MCNC: 12 MG/DL (ref 6–20)
BUN/CREAT SERPL: 13.5 (ref 7–25)
CALCIUM SPEC-SCNC: 9.3 MG/DL (ref 8.6–10.5)
CHLORIDE SERPL-SCNC: 100 MMOL/L (ref 98–107)
CO2 SERPL-SCNC: 24 MMOL/L (ref 22–29)
CREAT BLD-MCNC: 0.89 MG/DL (ref 0.76–1.27)
ERYTHROCYTE [DISTWIDTH] IN BLOOD BY AUTOMATED COUNT: 14.1 % (ref 12.3–15.4)
GFR SERPL CREATININE-BSD FRML MDRD: 92 ML/MIN/1.73
GLOBULIN UR ELPH-MCNC: 3.3 GM/DL
GLUCOSE BLD-MCNC: 101 MG/DL (ref 65–99)
HCT VFR BLD AUTO: 45.5 % (ref 37.5–51)
HGB BLD-MCNC: 14.7 G/DL (ref 13–17.7)
LDH SERPL-CCNC: 257 U/L (ref 135–225)
LYMPHOCYTES # BLD AUTO: 2.2 10*3/MM3 (ref 0.7–3.1)
LYMPHOCYTES NFR BLD AUTO: 33.1 % (ref 19.6–45.3)
MCH RBC QN AUTO: 27.8 PG (ref 26.6–33)
MCHC RBC AUTO-ENTMCNC: 32.4 G/DL (ref 31.5–35.7)
MCV RBC AUTO: 85.7 FL (ref 79–97)
MONOCYTES # BLD AUTO: 0.6 10*3/MM3 (ref 0.1–0.9)
MONOCYTES NFR BLD AUTO: 9 % (ref 5–12)
NEUTROPHILS # BLD AUTO: 3.8 10*3/MM3 (ref 1.7–7)
NEUTROPHILS NFR BLD AUTO: 57.9 % (ref 42.7–76)
PLATELET # BLD AUTO: 347 10*3/MM3 (ref 140–450)
PMV BLD AUTO: 7 FL (ref 6–12)
POTASSIUM BLD-SCNC: 4.6 MMOL/L (ref 3.5–5.2)
PROT SERPL-MCNC: 7.8 G/DL (ref 6–8.5)
RBC # BLD AUTO: 5.31 10*6/MM3 (ref 4.14–5.8)
SODIUM BLD-SCNC: 135 MMOL/L (ref 136–145)
WBC NRBC COR # BLD: 6.5 10*3/MM3 (ref 3.4–10.8)

## 2020-02-27 PROCEDURE — 80053 COMPREHEN METABOLIC PANEL: CPT

## 2020-02-27 PROCEDURE — 99214 OFFICE O/P EST MOD 30 MIN: CPT | Performed by: INTERNAL MEDICINE

## 2020-02-27 PROCEDURE — 36415 COLL VENOUS BLD VENIPUNCTURE: CPT

## 2020-02-27 PROCEDURE — 85025 COMPLETE CBC W/AUTO DIFF WBC: CPT

## 2020-02-27 PROCEDURE — 83615 LACTATE (LD) (LDH) ENZYME: CPT

## 2020-02-27 NOTE — PROGRESS NOTES
CHIEF COMPLAINT: Stage IV ALK positive lymphoma    Problem List:  Oncology/Hematology History    1. 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 Leukocytosis  3. FUO  4. Decreased ejection fraction with evidence of myocarditis  5. Marked elevation of sedimentation rate and C-reactive protein  6. Elevated triglycerides on fasting  7. Acute renal failure resolved with hydration    -8/23/2019 initial inpatient [facility], [place] medical oncology consultation.  Asked to see regarding leukocytosis.  He had upper respiratory infection with pneumonia, chest pains, elevated troponins with diagnosis of myocarditis and decreased ejection fraction with fevers to 104.  Tentatively diagnosed with still's disease and treated with prednisone with some improvement in fevers as well as improvement in a rash.  By the end of July, he had severe mid back pain relatively acute and onset that progressed over time with imaging of the area showing progressive abnormality of both bone and marrow signals in the spine that could be malignant marrow involvement or, more likely per radiologist, infectious.  Sedimentation rate was over 100 with a C-reactive protein elevated and white count 20-30,000 for the better part of 2 months.  Slight left shift but no immature forms.  Broad-spectrum serologic testing has not shown pathogens as of the initial consultation.  I ordered bone marrow biopsy and Dr. Jimenez coordinated spine biopsy  -6/24/2019 CT chest abdomen pelvis without contrast showed no parenchymal lung disease but a 1.6 cm mildly enlarged centrally necrotic right paratracheal node with no other adenopathy or hepatosplenomegaly.  Ejection  fraction 35% on echocardiogram.  -6/28/2019 MRI brain and cervical spine without contrast normal  -7/9/2019 fluoroscopy guided right joint aspiration unrevealing  -7/10/2019 fluoroscopic guided bronchoscopy showed no pathogens or pathology  -8/9/2019 MRI thoracic and lumbar spine showed low T1 signals in T8 and T9 with increased T2 involvement concerning for aggressive marrow signal findings that could represent lipid poor spinal hemangioma but concerning for metastasis versus multiple myeloma.  Contrast MRI recommended.  -8/21/2019 MRI thoracic spine with and without contrast showed multiple foci of abnormal marrow signal within the thoracic spine was prominent T8 and T9 with interval increase compared to 8/9/2019 with deformity of the endplates at T8 and T9 but no definitive fracture or discitis.  Several smaller additional lesions suspected at T1, T2, T4, T5, and T12 and the differential would include multifocal vertebral osteomyelitis versus metastasis.  -8/23/2019 bone marrow biopsy showed mild polyclonal plasmacytosis with normocellular marrow and trilineage hematopoiesis with mild myeloid expansion.  No lymphoid infiltrate and no organisms for fungi, acid fast bacilli, tumor, or granuloma.  Bennett 2- negative.  -8/24/2019 ejection fraction up to 65%  -8/26/2019 T8 biopsy show focal area of markedly atypical appearance.  There are inflammatory cells with occasional eosinophils and larger cells.  Some focal hemosiderin.  There is equivocal positivity for CD4, , CD45, and Constance-Barr virus.  It is positive for CD30, ALK 1, EMA, and vimentin.  No staining for CD 5, 7, 3, 20, or 15.  No staining for cytokeratin, SOX 10, , or CD1a.  Outside referral to [facility] pending.  -8/30/2019 [facility] outpatient follow-up visit: Anaplastic large cell lymphoma ALK positive just involving the bone would be unusual but not impossible I suppose.  We will get PET scan.  Were this hemophagocytic syndrome I would  have expected his marrow to be abnormal along with splenomegaly and cytopenias as would the marrow be abnormal for this a plasma cell dyscrasia and the plasma cells were polyclonal.  I will check his SIEP along with triglycerides, peripheral blood flow cytometry for soluble CD 25, ferritin, and NK cell activity.  Once I have that information, hopefully we will have more information back from [facility] and unlikely to involve my colleagues at the [facility] in this process if this is an ALK positive anaplastic large cell lymphoma without kia involvement given the unusual nature and appearance of this.    -9/6/2019 subsequent oncology reconsultation for readmission: Met patient with his last admission.  He has had a 4-month history of fevers with upper respiratory infection and possible pneumonia.  Found to have myocarditis with low ejection fraction with extensive infectious disease work-up negative.  A month later readmitted with shortness of breath and arthralgias with some redness in his wrist.  Bronchoscopy was negative and he was treated with some steroids.  On his third admission with relatively recent acute onset back pain he was found to have some spine lesions.  The bone marrow biopsy was unrevealing,  a T8 biopsy by Dr. Wilde showed markedly atypical appearing cells in a background of eosinophils.  The larger cells were positive for ALK 1, CD30, EMA, and vimentin.  No staining for CD 5, 7, 3, 20, or 15.  No staining for cytokeratin, sox 10, , or CD1a.  Tissue has been sent for second opinion at [facility].  Anaplastic large cell lymphoma ALK positive was considered but given that his fevers which started months back included testing with CT chest abdomen pelvis 6/24/2019 only showing mildly enlarged centrally necrotic right lower paratracheal node of indeterminate significance and no other significant adenopathy or splenomegaly and no pulmonary infiltrate or mediastinal adenopathy on  repeat CT chest 7/9/2019, PET scan was ordered.  Before PET could be completed and GI evaluation completed for evaluation of possible GI source for kia procurement, the patient on the day he was due for endoscopy continue to have fevers with hypotension and a pulse in the 150s.  9/5/2019 CT chest abdomen and pelvis on admission now shows showed new numerous bilateral pulmonary lung nodules in the right lung measuring 2 cm without focal pneumonia.  There was extensive axillary, mediastinal adenopathy the largest in the right paratracheal area 2.1 cm with central necrosis.  There were hypoattenuating regions in the right hepatic lobe measuring up to 2.5 cm incompletely assessed due to lack of contrast due to his creatinine on admission 9/5/2019 being up to 1.67 down to 0.94 by the morning of 9/6/2019.  CT also showed new extensive axillary, mediastinal, and abdominopelvic lymphadenopathy.  There was redemonstration of the T8-T9 aggressive osseous lytic lesions involving thoracolumbar spine and multiple levels.  Nonspecific perinephric inflammation also seen possibly representing pyelonephritis.  Laboratory testing on admission 9/5/2019 showed C-reactive protein up to 37.66 with sedimentation rate greater than 130.  Procalcitonin over 38 with lactic acid  4.8.   normal with normal troponin.  Fasting triglyceride up to 269 and peripheral blood flow cytometry sent for NK cell activity as well as CD25 for IL-2 receptor for the possibility of hemophagocytic syndrome which would be unusual without hemophagocytosis bone marrow.  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  We will get echocardiogram and hold on the GI evaluation and I discussed in person with Dr. Benja Can to see for procurement of lymph node.  He is going to go after upper mediastinal lymph node.  Once we  have more tissue then we will make further plans for treatment.  His ejection fraction on 8/24/2019 was 62%.  Assuming that we find lymphoma and not some aggressive infectious disease process, he will need a port as well but we will wait on the port until we know what we are dealing with.  My partners are on board and will cover me this weekend and I will check back Monday to see where we stand.  He already has an appointment with Dr. Roosevelt Nguyen at  on the 11th who I was enlisting to help sort this out but we may be able to get our answers now that we have node to go after.  I may yet need his services depending on the ilk of this lymphoma and whether or not we would need to consolidate aggressively or treat more aggressively than with standard chemotherapy upfront.  I am starting him on allopurinol.  He already has a rash on his upper thighs and heels and around the area of his prior bone marrow biopsy but I suspect that just related to this probable lymphomatous process or perhaps the recent Bactrim started by Dr. Hauser.  His procalcitonin is quite high and I am still concerned about concomitant infection and I am not sure if the procalcitonin it can be falsely dramatically elevated in the face of lymphoma.  If this is anaplastic large cell lymphoma, with his ECOG performance status generally less than 2 and age less than 60 but with at least stage III disease, even if his LDH is high, he would at least have a risk score of 2 which would put him at the low intermediate risk on the IPI index.  Whether we would use Rituxan or not depends on if this turns out to be CD20 positive where his initial bone biopsy was not.  Given that it is CD30 positive, will probably use brentuximab, Cytoxan, Adriamycin, prednisone every 3 weeks x 6 cycles.  This usually is given without vincristine but with the Brentuximab 1.8 mg/kg IV day 1 of each cycle.  A progression free survival was seen both in ALK positive and negative  anaplastic large cell lymphomas treated with brentuximab rather than vincristine.  Neuropathy still can be an issue and diarrhea is more common.  If this is anaplastic large cell lymphoma ALK positive, autologous stem cell transplant may be considered in patients over 40 years of age with an IPI score greater than or equal to 3 but generally not if the IPI score is less than 3 so we will assess that very carefully.  Despite the elevated fasting triglyceride recently, I doubt this is hemophagocytic syndrome given the negative bone marrow and now with the blossoming lymphadenopathy.  But we shall see.    -9/30/2019 outpatient medical oncology follow-up visit:  He was breathing much better the first week after his chemotherapy but then the shortness of breath worsened again his pulse increased in the 120s to 140s.  Saw Dr. Dumont in my team here as well and received IV fluids.  Slowly that improved.  In the prior 5 days he has been feeling better and no tachycardia for the last week.  He has had no fever since discharge from the hospital.  He has 1 more week of antibiotics.  His current complaints are:    Occasional bloody mucus from his nose    Alternating diarrhea and constipation better with stool softeners    Low back pain worsening with Neupogen but only on oxycodone rarely and none in the last 48 hours.   Bilateral knee pain without swelling on allopurinol    Itching but prior rash resolved   Mild gait instability due to weakness worse with showering and due for follow-up with Dr. Jimenez for serial MRIs   Labile moods but without jason depression worse with steroids now down to 20 mg    I addressed all these issues with him today my plan is:   To get a PET prior to return for course #3 of chemotherapy with brentuximab, Cytoxan, Adriamycin, prednisone   Consult Dr. Carrion for possible consolidation transplant in the future   Add Xgeva for the lytic involvement of his bone   Taper the steroids down to 20 mg  "for 5 days 10 mg for 5 days 5 mg for 5 days and then off following the 100 mg of prednisone with this course of therapy and then hopefully no further steroids though he will follow with rheumatology  Follow-up with Dr. Jimenez for serial spine MRIs.    -10/21/2019 medical oncology office visit: 10/17/2019 PET scan was negative.  All prior CT abnormalities have resolved.  He is due for follow-up with Dr. Jimenez for spine MRI in about a month.  He has seen Dr. Carrion.  I do not have those notes but according to the patient there are no plans for stem cell transplant in first remission.  I have left a message with Dr. Carrion raising the question of whether spinal fluid analysis or MRI of brain would be in order but he feels great has no neurologic symptoms and his only complaint presently is mid back pain which clearly is improving over time.  Unless Dr. Carrion has other ideas, my plan would be to continue on with courses 3 and 4 of Cytoxan Hilario Brentuximab prednisone, repeat the PET, proceed with courses 5 and 6, repeat the PET again, and assuming that everything is negative at that junction then go to routine survivorship.    -12/20/2019 medical oncology office visit: 12/10/2019 PET scan shows just active marrow response with no active lymphoma or recurrence.  He will call Dr. Carrion for an appointment after course #6 12/23/2019 to discuss definitively whether there is any plans for consolidation but as far as I am aware there is no plan for stem cell transplant autologous at this junction.  We will get his PET 8 weeks out from this final dose of of brentuximab Cytoxan, Adriamycin, prednisone.    -2/27/2020 medical oncology follow-up visit:  Follow-up per Dr. Choudhary from [place] on these individuals is as follows:     \"schedule patient visits every three months during the first two years, then every six months starting two years after complete response. Starting at year 5, patients are seen once " "per year. At these visits we perform a history and physical examination, complete blood count, chemistries, and lactate dehydrogenase. We obtain a CT scan of the chest, abdomen, and pelvis every six months for the first 18 months after documentation of an initial complete response. We only obtain additional positron emission tomography/computed tomography (PET/CT) scans to further evaluate any concerning findings noted on CT scans. We do not obtain routine CT scans after 18 months unless there are signs, symptoms, or exam findings to suggest relapse.\"              Anaplastic ALK-positive large cell lymphoma     6/28/2019 Imaging     MRI of the brain negative      8/9/2019 Imaging     MRI thoracic and lumbar spine IMPRESSION:  1.  Abnormal ill-defined low T1 signal lesions in the T8 and T9  vertebral bodies with increased T2 involvement concerning for aggressive  bone marrow signal findings and although this may represent lipid poor  spinal hemangiomas concern for metastasis versus myeloma remains a  consideration given overall appearance on this noncontrast evaluation.  Contrast-enhanced examination of the thoracic spine and/or nuclear  medicine bone scan may be useful for further evaluation.     2. Lumbar spine with degenerative changes, however, no aggressive bone  marrow signal findings or acute fracture. No significant spinal canal or  neuroforaminal stenosis present.      8/24/2019 -  Other Event     Echocardiogram: Estimated EF 62%.      8/26/2019 Initial Diagnosis     Anaplastic ALK-positive large cell lymphoma       8/26/2019 Biopsy     T8 vertebral biopsy positive for anaplastic large cell lymphoma, ALK-positive.      9/5/2019 Imaging     CT chest, abdomen and pelvis IMPRESSION:     Constellation of findings concerning for metastatic disease:      - Interval development of numerous pulmonary nodules throughout the  lungs with the largest measuring 2.0 cm within the right midlung and  concerning for " underlying pulmonary metastasis and which may account for  abnormality identified in the chest radiograph performed 09/05/2019.     - Extensive axillary, mediastinal, and abdominopelvic lymphadenopathy as  described above.     - Hypoattenuated regions involving the liver concerning for additional  sites of metastatic disease, although are limited in evaluation  secondary to lack of intravenous contrast. Attention on follow-up  imaging is advised.     - Redemonstration of T8/T9 aggressive osseous lesions with additional  lytic lesion involving the L4 vertebral body and additional  subcentimeter lytic lesions throughout the thoracolumbar spine  suggested.     Nonspecific perinephric inflammation. Correlate with urinalysis to  exclude underlying pyelonephritis.      9/6/2019 Biopsy     Mediastinal lymph node excisional biopsy positive for ALK-1 positive anaplastic large cell lymphoma      9/9/2019 -  Chemotherapy     OP LYMPHOMA Brentuximab, Cyclophosphamide / DOXOrubicin / predniSONE / Pegfilgrastim        10/17/2019 Imaging     PET/CT IMPRESSION:  1. Essentially negative PET scan for active lymphoma. CT scan findings  from 09/05/2019 appear to have largely resolved as well.  2. Small focus of mild activity in the right groin appears to reflect  calcific tendinosis of the distal right iliopsoas muscle.  3. Probable marrow stimulation effect.      11/19/2019 Imaging     MRI T-spine shows progressive T8 and 9 compression pathologic fracture and bone and bone marrow activity.  Dr. Jimenez reviewed and thought this was his treated disease.  And plans on serial MRI imaging.      12/10/2019 Imaging     PET/CT IMPRESSION:  1. No convincing evidence of abnormal FDG activity as visualized to  suggest recurrent disease or lymphomatous involvement.     2. Redemonstration of diffuse osseous marrow activity throughout the  axial skeleton most consistent with marrow stimulation effect similar to  prior exam with redemonstration of  "compression fractures of the lower  thoracic spine better seen on the MRI performed 11/19/2019.      2/20/2020 Imaging     PET negative with resolution of prior marrow abnormality.         HISTORY OF PRESENT ILLNESS:  The patient is a 44 y.o. male, here for follow up on management of stage IV ALK positive lymphoma.  Having excessive gas, slight rash on the forehead and neck now vanishing, new onset snoring, and mid back pain around the area of his initial area of presentation similar to pain that he has had all along but persists.      Past Medical History:   Diagnosis Date   • Acute kidney injury (CMS/HCC)    • Anaplastic ALK-positive large cell lymphoma (CMS/HCC)    • Bacteremia    • Hyperlipidemia    • Myocarditis (CMS/HCC)    • Pneumonia    • Severe sepsis (CMS/HCC)      Past Surgical History:   Procedure Laterality Date   • APPENDECTOMY     • BACK SURGERY      L5 S1   • BRONCHOSCOPY N/A 7/10/2019    Procedure: BRONCHOSCOPY WITH ENDOBRONCHIAL ULTRASOUND AND TRANSBRONCHIAL BIOPSY AND FLUORO;  Surgeon: Marvel Sandoval MD;  Location: [facility] ENDOSCOPY;  Service: Pulmonary   • CARDIAC CATHETERIZATION N/A 6/24/2019    Procedure: LEFT HEART CATH;  Surgeon: Deep Muñiz IV, MD;  Location:  [facility] INVASIVE [facility];  Service: Cardiovascular   • INTERVENTIONAL RADIOLOGY PROCEDURE N/A 8/26/2019    Procedure: THORACIC SPINE BIOSPY;  Surgeon: Juve Avila MD;  Location:  [facility]VAS[facility];  Service: Interventional Radiology   • MEDIASTINOSCOPY, BRONCHOSCOPY N/A 9/6/2019    Procedure: BRONCHOSCOPY, MEDIASTINOSCOPY WITH BIOPSY;  Surgeon: El Can MD;  Location:  [facility] OR;  Service: Cardiothoracic       Allergies   Allergen Reactions   • Ciprofloxacin Anxiety and Hallucinations     \"Extreme anxiety and paranoia\"   • Sulfa Antibiotics Rash       Family History and Social History reviewed and changed as necessary      REVIEW OF SYSTEM:   Review of Systems   Constitutional: Negative for " "appetite change, chills, diaphoresis, fatigue, fever and unexpected weight change.   HENT:   Negative for mouth sores, sore throat and trouble swallowing.    Eyes: Negative for icterus.   Respiratory: Negative for cough, hemoptysis and shortness of breath.    Cardiovascular: Negative for chest pain, leg swelling and palpitations.   Gastrointestinal: Negative for abdominal distention, abdominal pain, blood in stool, constipation, diarrhea, nausea and vomiting.   Endocrine: Negative for hot flashes.   Genitourinary: Negative for bladder incontinence, difficulty urinating, dysuria, frequency and hematuria.    Musculoskeletal: Negative for gait problem, neck pain and neck stiffness.   Skin: Negative for rash.   Neurological: Negative for dizziness, gait problem, headaches, light-headedness and numbness.   Hematological: Negative for adenopathy. Does not bruise/bleed easily.   Psychiatric/Behavioral: Negative for depression. The patient is not nervous/anxious.    All other systems reviewed and are negative.       PHYSICAL EXAM    Vitals:    02/27/20 1053   BP: 155/95   Pulse: 95   Resp: 18   Temp: 97.8 °F (36.6 °C)   TempSrc: Temporal   SpO2: 96%   Weight: 111 kg (245 lb)   Height: 182.9 cm (72.01\")     Constitutional: Appears well-developed and well-nourished. No distress.   ECOG: (0) Fully Active - Able to Carry On All Pre-disease Performance Without Restriction  HENT:   Head: Normocephalic.   Mouth/Throat: Oropharynx is clear and moist.   Eyes: Conjunctivae are normal. Pupils are equal, round, and reactive to light. No scleral icterus.   Neck: Neck supple. No JVD present. No thyromegaly present.   Cardiovascular: Normal rate, regular rhythm and normal heart sounds.    Pulmonary/Chest: Breath sounds normal. No respiratory distress.   Abdominal: Soft. Exhibits no distension and no mass. There is no hepatosplenomegaly. There is no tenderness. There is no rebound and no guarding.   Musculoskeletal:Exhibits no edema, " tenderness or deformity.   Neurological: Alert and oriented to person, place, and time. Exhibits normal muscle tone.   Skin: No ecchymosis, no petechiae and no rash noted. Not diaphoretic. No cyanosis. Nails show no clubbing.   Psychiatric: Normal mood and affect.   Vitals reviewed.      Lab Results   Component Value Date    HGB 13.1 12/23/2019    HCT 39.3 12/23/2019    MCV 90.7 12/23/2019     12/23/2019    WBC 7.10 12/23/2019    NEUTROABS 5.00 12/23/2019    LYMPHSABS 1.30 12/23/2019    MONOSABS 0.80 12/23/2019    EOSABS 0.03 10/17/2019    BASOSABS 0.10 10/17/2019       Lab Results   Component Value Date    GLUCOSE 160 (H) 12/23/2019    BUN 11 12/23/2019    CREATININE 0.80 12/23/2019     12/23/2019    K 3.7 12/23/2019     12/23/2019    CO2 24.0 12/23/2019    CALCIUM 9.3 12/23/2019    PROTEINTOT 6.8 12/23/2019    ALBUMIN 3.90 12/23/2019    BILITOT 0.2 12/23/2019    ALKPHOS 58 12/23/2019    AST 22 12/23/2019    ALT 25 12/23/2019                   ASSESSMENT & PLAN:    1. 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 Chronic back pain since development of problem #1  3. Excessive gas  4. Transient rash  5. Acute renal failure resolved with hydration        -2/27/2020 medical oncology follow-up visit: I suspect his back pain is just residual from his prior disease that has been fully treated and no evidence of disease on current PET scan his imaging I reviewed and reports showed no abnormalities including resolution of prior marrow abnormalities.  He sees Dr. Jimenez in a couple of weeks with an MRI before that time.  He follows up with Dr. Carrion in the next few weeks.  In patients over  "the age of 40 with an IPI score over 3, autologous stem cell transplant is sometimes used for consolidation given the high risk of recurrence.  Ultimately I defer to Dr. Carrion as to this patient and whether or not he had an IPI score over 3 in his mind and given that the patient is 44 which is of course close to 40.  Assuming no stem cell transplant, the follow-up will be as follows.      Follow-up per Dr. Choudhary from [place] on these individuals is as follows:     \"schedule patient visits every three months during the first two years, then every six months starting two years after complete response. Starting at year 5, patients are seen once per year. At these visits we perform a history and physical examination, complete blood count, chemistries, and lactate dehydrogenase. We obtain a CT scan of the chest, abdomen, and pelvis every six months for the first 18 months after documentation of an initial complete response. We only obtain additional positron emission tomography/computed tomography (PET/CT) scans to further evaluate any concerning findings noted on CT scans. We do not obtain routine CT scans after 18 months unless there are signs, symptoms, or exam findings to suggest relapse.\"      Hence I will check a CBC and CMP and LDH today and he will see my nurse practitioner back in 3 months and she will schedule the CT chest abdomen pelvis 3 months after that.  We would discontinue routine imaging if everything is negative by August 2021.    Discussed with patient face-to-face 30 minutes greater than 50% spent counseling regarding this complex decision tree and the rarity of this disease.    Brent Brizuela MD    02/27/2020      "

## 2020-03-09 ENCOUNTER — TELEPHONE (OUTPATIENT)
Dept: ONCOLOGY | Facility: CLINIC | Age: 45
End: 2020-03-09

## 2020-03-10 ENCOUNTER — TELEPHONE (OUTPATIENT)
Dept: ONCOLOGY | Facility: CLINIC | Age: 45
End: 2020-03-10

## 2020-03-10 NOTE — TELEPHONE ENCOUNTER
----- Message from Wojciech Hernandez sent at 3/10/2020 11:59 AM EDT -----  Regarding: DENTAL RELEASE  Pt needs a letter to his dentist about a dental release so he can have the dentist do some work on his teeth.  He said if you would write and sign the letter and mail to his home., he will take to the dentist himself.    Thanks,  Wojciech SALES

## 2020-03-16 ENCOUNTER — TELEPHONE (OUTPATIENT)
Dept: NEUROSURGERY | Facility: CLINIC | Age: 45
End: 2020-03-16

## 2020-03-16 NOTE — TELEPHONE ENCOUNTER
I spoke to patient. He would like to reschedule his MRI and follow up out about a month. He is concerned about the Corona virus. He checks several of the boxes due to some health issues he's had and wants to be cautious.     I moved his appointment and MRI to 4/15/20. I informed him of his new appointments. He mentioned needing something for his nerves for the MRI, like Valium for example. I advised him to call our office a few days prior to the MRI and our office could call something to his pharmacy.    I advised him that if he has any questions or concerns prior to his new follow up on 4/15/20 to call our office. He voiced understanding of all mentioned above.

## 2020-03-17 ENCOUNTER — APPOINTMENT (OUTPATIENT)
Dept: MRI IMAGING | Facility: HOSPITAL | Age: 45
End: 2020-03-17

## 2020-04-13 ENCOUNTER — TELEPHONE (OUTPATIENT)
Dept: NEUROSURGERY | Facility: CLINIC | Age: 45
End: 2020-04-13

## 2020-04-13 NOTE — TELEPHONE ENCOUNTER
Called pt and advised.  His oncologist told him he should be extra careful since he is immunocompromised.  Pt verbalized understanding and plans to keep his appt.       Pt would like something to have for his upcoming MRI for his anxiety.

## 2020-04-13 NOTE — TELEPHONE ENCOUNTER
PT called and wondered if he should reschedule his MRI and appointment on 4-15 with Dr. Jimenez (it appears it may be important to have follow-up) - will  Refer to clinical for assessment.  Please call patient to discuss.  [place]

## 2020-04-15 ENCOUNTER — OFFICE VISIT (OUTPATIENT)
Dept: NEUROSURGERY | Facility: CLINIC | Age: 45
End: 2020-04-15

## 2020-04-15 ENCOUNTER — HOSPITAL ENCOUNTER (OUTPATIENT)
Dept: MRI IMAGING | Facility: HOSPITAL | Age: 45
Discharge: HOME | End: 2020-04-15
Admitting: NEUROLOGICAL SURGERY

## 2020-04-15 VITALS — WEIGHT: 250 LBS | TEMPERATURE: 97.7 F | BODY MASS INDEX: 33.86 KG/M2 | HEIGHT: 72 IN

## 2020-04-15 DIAGNOSIS — C79.51 METASTATIC CANCER TO SPINE (HCC): ICD-10-CM

## 2020-04-15 DIAGNOSIS — C79.51 METASTATIC CANCER TO SPINE (HCC): Primary | ICD-10-CM

## 2020-04-15 PROCEDURE — A9577 INJ MULTIHANCE: HCPCS | Performed by: NEUROLOGICAL SURGERY

## 2020-04-15 PROCEDURE — 99213 OFFICE O/P EST LOW 20 MIN: CPT | Performed by: NEUROLOGICAL SURGERY

## 2020-04-15 PROCEDURE — 0 GADOBENATE DIMEGLUMINE 529 MG/ML SOLUTION: Performed by: NEUROLOGICAL SURGERY

## 2020-04-15 PROCEDURE — 72157 MRI CHEST SPINE W/O & W/DYE: CPT

## 2020-04-15 RX ADMIN — GADOBENATE DIMEGLUMINE 20 ML: 529 INJECTION, SOLUTION INTRAVENOUS at 09:47

## 2020-04-15 NOTE — PROGRESS NOTES
Patient: Stephen Stoll  : 1975    Primary Care Provider: Carlos Ramirez MD    Requesting Provider: As above        History    Chief Complaint: Back pain.    History of Present Illness: Mr. Stoll is a 44-year-old  at [facility] who was seen as an inpatient consultation at the end of August of this year.  He harbored fevers, myocarditis, and severe mid back pain.  Ultimately Dr. 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  A second opinion regarding the pathology was obtained from the [facility].  He completed his courses of chemotherapy in December.  He has some mild back pain but he also has pain in numerous joints.  These areas of pain improve once he is up and moving.  He has no bowel or bladder dysfunction.    Review of Systems   Constitutional: Negative for activity change, appetite change, chills, diaphoresis, fatigue, fever and unexpected weight change.   HENT: Negative for congestion, dental problem, drooling, ear discharge, ear pain, facial swelling, hearing loss, mouth sores, nosebleeds, postnasal drip, rhinorrhea, sinus pressure, sneezing, sore throat, tinnitus, trouble swallowing and voice change.    Eyes: Negative for photophobia, pain, discharge, redness, itching and visual disturbance.   Respiratory: Negative for apnea, cough, choking, chest tightness, shortness of breath, wheezing and stridor.    Cardiovascular: Negative for chest pain, palpitations and leg swelling.   Gastrointestinal: Negative for abdominal distention, abdominal pain, anal bleeding, blood in stool, constipation, diarrhea, nausea, rectal pain and vomiting.   Endocrine: Negative for cold intolerance, heat intolerance, polydipsia, polyphagia and polyuria.   Genitourinary: Negative for decreased urine volume, difficulty urinating, dysuria, enuresis, flank pain, frequency, genital sores, hematuria and urgency.   Musculoskeletal: Negative for arthralgias, back  "pain, gait problem, joint swelling, myalgias, neck pain and neck stiffness.   Skin: Negative for color change, pallor, rash and wound.   Allergic/Immunologic: Negative for environmental allergies, food allergies and immunocompromised state.   Neurological: Negative for dizziness, tremors, seizures, syncope, facial asymmetry, speech difficulty, weakness, light-headedness, numbness and headaches.   Hematological: Negative for adenopathy. Does not bruise/bleed easily.   Psychiatric/Behavioral: Negative for agitation, behavioral problems, confusion, decreased concentration, dysphoric mood, hallucinations, self-injury, sleep disturbance and suicidal ideas. The patient is not nervous/anxious and is not hyperactive.    All other systems reviewed and are negative.      The patient's past medical history, past surgical history, family history, and social history have been reviewed at length in the electronic medical record.    Physical Exam:   Temp 97.7 °F (36.5 °C) (Temporal)   Ht 182.9 cm (72\")   Wt 113 kg (250 lb)   BMI 33.91 kg/m²   MUSCULOSKELETAL:  Straight leg raising is negative.  Bryant's Sign is negative.  ROM in back normal.  Tenderness in the back to palpation is not observed.  NEUROLOGICAL:  Strength is intact in the lower extremities to direct testing.  Muscle tone is normal throughout.  Station and gait are normal.  Sensation is intact to light touch testing throughout.  Deep tendon reflexes are difficult to elicit throughout.  Coordination is intact.      Medical Decision Making    Data Review:   Follow-up MRI study compared to a study from 11/19/2019.  I do not see interval change.  I do not see any abnormal enhancement.  There is some wedging of T8 and T9 which remains stable.  There is no significant deformity.    Diagnosis:   1.  T8/T9 vertebral metastases.  2.  Anaplastic large cell lymphoma.    Treatment Options:   Mr. Stoll is doing well from a neurologic and neurosurgical standpoint.  He is " going to undergo every 6-month PET scanning.  I am going to see him on an as-needed basis.  If he develops recurrent difficulties in terms of his imaging or his clinical state then I will be happy to reevaluate him.       Diagnosis Plan   1. Metastatic cancer to spine (CMS/HCC)         Scribed for Mihai Jimenez MD by Mckayla Thornton CMA on 04/15/2020 at 10:38 AM      I, Dr. Jimenez, personally performed the services described in the documentation, as scribed in my presence, and it is both accurate and complete.

## 2020-05-29 ENCOUNTER — TELEMEDICINE (OUTPATIENT)
Dept: ONCOLOGY | Facility: CLINIC | Age: 45
End: 2020-05-29

## 2020-05-29 DIAGNOSIS — C84.68 ANAPLASTIC ALK-POSITIVE LARGE CELL LYMPHOMA OF LYMPH NODES OF MULTIPLE REGIONS (HCC): Primary | Chronic | ICD-10-CM

## 2020-05-29 PROCEDURE — 99213 OFFICE O/P EST LOW 20 MIN: CPT | Performed by: NURSE PRACTITIONER

## 2020-05-29 NOTE — PROGRESS NOTES
"TELEMEDICINE FOLLOW-UP     In order to limit face-to-face contact and enact \"social distancing\" in light of the COVID-19 outbreaks and in accordance to the recommendations per the CDC, WHO, and our individual department, Stephen Stoll  was contacted.  Telemedicine was used to screen the patient for needs and conduct her regularly scheduled follow-up.    This was an audio and video enabled telemedicine encounter.     COVID-19 screening: male specifically denies fever, body aches, cough, difficulty breathing, sore throat, runny nose, recent travel, or known sick exposures.      Oncology/Hematology History    1. 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 Leukocytosis  3. FUO  4. Decreased ejection fraction with evidence of myocarditis  5. Marked elevation of sedimentation rate and C-reactive protein  6. Elevated triglycerides on fasting  7. Acute renal failure resolved with hydration    -8/23/2019 initial inpatient [facility] medical oncology consultation.  Asked to see regarding leukocytosis.  He had upper respiratory infection with pneumonia, chest pains, elevated troponins with diagnosis of myocarditis and decreased ejection fraction with fevers to 104.  Tentatively diagnosed with still's disease and treated with prednisone with some improvement in fevers as well as improvement in a rash.  By the end of July, he had severe mid back pain relatively acute and onset that progressed over time with imaging of the area showing progressive abnormality of both bone and marrow signals in the spine that could be malignant marrow involvement or, more likely per radiologist, infectious.  Sedimentation rate was over " 100 with a C-reactive protein elevated and white count 20-30,000 for the better part of 2 months.  Slight left shift but no immature forms.  Broad-spectrum serologic testing has not shown pathogens as of the initial consultation.  I ordered bone marrow biopsy and Dr. Jimenez coordinated spine biopsy  -6/24/2019 CT chest abdomen pelvis without contrast showed no parenchymal lung disease but a 1.6 cm mildly enlarged centrally necrotic right paratracheal node with no other adenopathy or hepatosplenomegaly.  Ejection fraction 35% on echocardiogram.  -6/28/2019 MRI brain and cervical spine without contrast normal  -7/9/2019 fluoroscopy guided right joint aspiration unrevealing  -7/10/2019 fluoroscopic guided bronchoscopy showed no pathogens or pathology  -8/9/2019 MRI thoracic and lumbar spine showed low T1 signals in T8 and T9 with increased T2 involvement concerning for aggressive marrow signal findings that could represent lipid poor spinal hemangioma but concerning for metastasis versus multiple myeloma.  Contrast MRI recommended.  -8/21/2019 MRI thoracic spine with and without contrast showed multiple foci of abnormal marrow signal within the thoracic spine was prominent T8 and T9 with interval increase compared to 8/9/2019 with deformity of the endplates at T8 and T9 but no definitive fracture or discitis.  Several smaller additional lesions suspected at T1, T2, T4, T5, and T12 and the differential would include multifocal vertebral osteomyelitis versus metastasis.  -8/23/2019 bone marrow biopsy showed mild polyclonal plasmacytosis with normocellular marrow and trilineage hematopoiesis with mild myeloid expansion.  No lymphoid infiltrate and no organisms for fungi, acid fast bacilli, tumor, or granuloma.  Bennett 2- negative.  -8/24/2019 ejection fraction up to 65%  -8/26/2019 T8 biopsy show focal area of markedly atypical appearance.  There are inflammatory cells with occasional eosinophils and larger cells.  Some  focal hemosiderin.  There is equivocal positivity for CD4, , CD45, and Constance-Barr virus.  It is positive for CD30, ALK 1, EMA, and vimentin.  No staining for CD 5, 7, 3, 20, or 15.  No staining for cytokeratin, SOX 10, , or CD1a.  Outside referral to [facility] pending.  -8/30/2019 [facility] oncology outpatient follow-up visit: Anaplastic large cell lymphoma ALK positive just involving the bone would be unusual but not impossible I suppose.  We will get PET scan.  Were this hemophagocytic syndrome I would have expected his marrow to be abnormal along with splenomegaly and cytopenias as would the marrow be abnormal for this a plasma cell dyscrasia and the plasma cells were polyclonal.  I will check his SIEP along with triglycerides, peripheral blood flow cytometry for soluble CD 25, ferritin, and NK cell activity.  Once I have that information, hopefully we will have more information back from [facility] and unlikely to involve my colleagues at the [facility] in this process if this is an ALK positive anaplastic large cell lymphoma without kia involvement given the unusual nature and appearance of this.    -9/6/2019 subsequent oncology reconsultation for readmission: Met patient with his last admission.  He has had a 4-month history of fevers with upper respiratory infection and possible pneumonia.  Found to have myocarditis with low ejection fraction with extensive infectious disease work-up negative.  A month later readmitted with shortness of breath and arthralgias with some redness in his wrist.  Bronchoscopy was negative and he was treated with some steroids.  On his third admission with relatively recent acute onset back pain he was found to have some spine lesions.  The bone marrow biopsy was unrevealing,  a T8 biopsy by Dr. Wilde showed markedly atypical appearing cells in a background of eosinophils.  The larger cells were positive for ALK 1, CD30, EMA, and vimentin.  No staining  for CD 5, 7, 3, 20, or 15.  No staining for cytokeratin, sox 10, , or CD1a.  Tissue has been sent for second opinion at [facility].  Anaplastic large cell lymphoma ALK positive was considered but given that his fevers which started months back included testing with CT chest abdomen pelvis 6/24/2019 only showing mildly enlarged centrally necrotic right lower paratracheal node of indeterminate significance and no other significant adenopathy or splenomegaly and no pulmonary infiltrate or mediastinal adenopathy on repeat CT chest 7/9/2019, PET scan was ordered.  Before PET could be completed and GI evaluation completed for evaluation of possible GI source for kia procurement, the patient on the day he was due for endoscopy continue to have fevers with hypotension and a pulse in the 150s.  9/5/2019 CT chest abdomen and pelvis on admission now shows showed new numerous bilateral pulmonary lung nodules in the right lung measuring 2 cm without focal pneumonia.  There was extensive axillary, mediastinal adenopathy the largest in the right paratracheal area 2.1 cm with central necrosis.  There were hypoattenuating regions in the right hepatic lobe measuring up to 2.5 cm incompletely assessed due to lack of contrast due to his creatinine on admission 9/5/2019 being up to 1.67 down to 0.94 by the morning of 9/6/2019.  CT also showed new extensive axillary, mediastinal, and abdominopelvic lymphadenopathy.  There was redemonstration of the T8-T9 aggressive osseous lytic lesions involving thoracolumbar spine and multiple levels.  Nonspecific perinephric inflammation also seen possibly representing pyelonephritis.  Laboratory testing on admission 9/5/2019 showed C-reactive protein up to 37.66 with sedimentation rate greater than 130.  Procalcitonin over 38 with lactic acid  4.8.   normal with normal troponin.  Fasting triglyceride up to 269 and peripheral blood flow cytometry sent for NK cell activity as well as  CD25 for IL-2 receptor for the possibility of hemophagocytic syndrome which would be unusual without hemophagocytosis bone marrow.  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  We will get echocardiogram and hold on the GI evaluation and I discussed in person with Dr. Benja Can to see for procurement of lymph node.  He is going to go after upper mediastinal lymph node.  Once we have more tissue then we will make further plans for treatment.  His ejection fraction on 8/24/2019 was 62%.  Assuming that we find lymphoma and not some aggressive infectious disease process, he will need a port as well but we will wait on the port until we know what we are dealing with.  My partners are on board and will cover me this weekend and I will check back Monday to see where we stand.  He already has an appointment with Dr. Roosevelt Nguyen at  on the 11th who I was enlisting to help sort this out but we may be able to get our answers now that we have node to go after.  I may yet need his services depending on the ilk of this lymphoma and whether or not we would need to consolidate aggressively or treat more aggressively than with standard chemotherapy upfront.  I am starting him on allopurinol.  He already has a rash on his upper thighs and heels and around the area of his prior bone marrow biopsy but I suspect that just related to this probable lymphomatous process or perhaps the recent Bactrim started by Dr. Hauser.  His procalcitonin is quite high and I am still concerned about concomitant infection and I am not sure if the procalcitonin it can be falsely dramatically elevated in the face of lymphoma.  If this is anaplastic large cell lymphoma, with his ECOG performance status generally less than 2 and age less than 60 but with at least stage III disease, even if his LDH is high, he  would at least have a risk score of 2 which would put him at the low intermediate risk on the IPI index.  Whether we would use Rituxan or not depends on if this turns out to be CD20 positive where his initial bone biopsy was not.  Given that it is CD30 positive, will probably use brentuximab, Cytoxan, Adriamycin, prednisone every 3 weeks x 6 cycles.  This usually is given without vincristine but with the Brentuximab 1.8 mg/kg IV day 1 of each cycle.  A progression free survival was seen both in ALK positive and negative anaplastic large cell lymphomas treated with brentuximab rather than vincristine.  Neuropathy still can be an issue and diarrhea is more common.  If this is anaplastic large cell lymphoma ALK positive, autologous stem cell transplant may be considered in patients over 40 years of age with an IPI score greater than or equal to 3 but generally not if the IPI score is less than 3 so we will assess that very carefully.  Despite the elevated fasting triglyceride recently, I doubt this is hemophagocytic syndrome given the negative bone marrow and now with the blossoming lymphadenopathy.  But we shall see.    -9/30/2019 outpatient medical oncology follow-up visit:  He was breathing much better the first week after his chemotherapy but then the shortness of breath worsened again his pulse increased in the 120s to 140s.  Saw Dr. Dumont in my team here as well and received IV fluids.  Slowly that improved.  In the prior 5 days he has been feeling better and no tachycardia for the last week.  He has had no fever since discharge from the hospital.  He has 1 more week of antibiotics.  His current complaints are:    Occasional bloody mucus from his nose    Alternating diarrhea and constipation better with stool softeners    Low back pain worsening with Neupogen but only on oxycodone rarely and none in the last 48 hours.   Bilateral knee pain without swelling on allopurinol    Itching but prior rash resolved    Mild gait instability due to weakness worse with showering and due for follow-up with Dr. Jimenez for serial MRIs   Labile moods but without jason depression worse with steroids now down to 20 mg    I addressed all these issues with him today my plan is:   To get a PET prior to return for course #3 of chemotherapy with brentuximab, Cytoxan, Adriamycin, prednisone   Consult Dr. Carrion for possible consolidation transplant in the future   Add Xgeva for the lytic involvement of his bone   Taper the steroids down to 20 mg for 5 days 10 mg for 5 days 5 mg for 5 days and then off following the 100 mg of prednisone with this course of therapy and then hopefully no further steroids though he will follow with rheumatology  Follow-up with Dr. Jimenez for serial spine MRIs.    -10/21/2019 medical oncology office visit: 10/17/2019 PET scan was negative.  All prior CT abnormalities have resolved.  He is due for follow-up with Dr. Jimenez for spine MRI in about a month.  He has seen Dr. Carrion.  I do not have those notes but according to the patient there are no plans for stem cell transplant in first remission.  I have left a message with Dr. Carrion raising the question of whether spinal fluid analysis or MRI of brain would be in order but he feels great has no neurologic symptoms and his only complaint presently is mid back pain which clearly is improving over time.  Unless Dr. Carrion has other ideas, my plan would be to continue on with courses 3 and 4 of Cytoxan Hilario Brentuximab prednisone, repeat the PET, proceed with courses 5 and 6, repeat the PET again, and assuming that everything is negative at that junction then go to routine survivorship.    -12/20/2019 medical oncology office visit: 12/10/2019 PET scan shows just active marrow response with no active lymphoma or recurrence.  He will call Dr. Carrion for an appointment after course #6 12/23/2019 to discuss definitively whether there is any plans  "for consolidation but as far as I am aware there is no plan for stem cell transplant autologous at this junction.  We will get his PET 8 weeks out from this final dose of of brentuximab Cytoxan, Adriamycin, prednisone.    -2/27/2020 medical oncology follow-up visit:  Follow-up per Dr. Choudhary from [place] on these individuals is as follows:     \"schedule patient visits every three months during the first two years, then every six months starting two years after complete response. Starting at year 5, patients are seen once per year. At these visits we perform a history and physical examination, complete blood count, chemistries, and lactate dehydrogenase. We obtain a CT scan of the chest, abdomen, and pelvis every six months for the first 18 months after documentation of an initial complete response. We only obtain additional positron emission tomography/computed tomography (PET/CT) scans to further evaluate any concerning findings noted on CT scans. We do not obtain routine CT scans after 18 months unless there are signs, symptoms, or exam findings to suggest relapse.\"              Anaplastic ALK-positive large cell lymphoma     6/28/2019 Imaging     MRI of the brain negative      8/9/2019 Imaging     MRI thoracic and lumbar spine IMPRESSION:  1.  Abnormal ill-defined low T1 signal lesions in the T8 and T9  vertebral bodies with increased T2 involvement concerning for aggressive  bone marrow signal findings and although this may represent lipid poor  spinal hemangiomas concern for metastasis versus myeloma remains a  consideration given overall appearance on this noncontrast evaluation.  Contrast-enhanced examination of the thoracic spine and/or nuclear  medicine bone scan may be useful for further evaluation.     2. Lumbar spine with degenerative changes, however, no aggressive bone  marrow signal findings or acute fracture. No significant spinal canal or  neuroforaminal stenosis present.      8/24/2019 -  Other " Event     Echocardiogram: Estimated EF 62%.      8/26/2019 Initial Diagnosis     Anaplastic ALK-positive large cell lymphoma       8/26/2019 Biopsy     T8 vertebral biopsy positive for anaplastic large cell lymphoma, ALK-positive.      9/5/2019 Imaging     CT chest, abdomen and pelvis IMPRESSION:     Constellation of findings concerning for metastatic disease:      - Interval development of numerous pulmonary nodules throughout the  lungs with the largest measuring 2.0 cm within the right midlung and  concerning for underlying pulmonary metastasis and which may account for  abnormality identified in the chest radiograph performed 09/05/2019.     - Extensive axillary, mediastinal, and abdominopelvic lymphadenopathy as  described above.     - Hypoattenuated regions involving the liver concerning for additional  sites of metastatic disease, although are limited in evaluation  secondary to lack of intravenous contrast. Attention on follow-up  imaging is advised.     - Redemonstration of T8/T9 aggressive osseous lesions with additional  lytic lesion involving the L4 vertebral body and additional  subcentimeter lytic lesions throughout the thoracolumbar spine  suggested.     Nonspecific perinephric inflammation. Correlate with urinalysis to  exclude underlying pyelonephritis.      9/6/2019 Biopsy     Mediastinal lymph node excisional biopsy positive for ALK-1 positive anaplastic large cell lymphoma      9/9/2019 -  Chemotherapy     OP LYMPHOMA Brentuximab, Cyclophosphamide / DOXOrubicin / predniSONE / Pegfilgrastim        10/17/2019 Imaging     PET/CT IMPRESSION:  1. Essentially negative PET scan for active lymphoma. CT scan findings  from 09/05/2019 appear to have largely resolved as well.  2. Small focus of mild activity in the right groin appears to reflect  calcific tendinosis of the distal right iliopsoas muscle.  3. Probable marrow stimulation effect.      11/19/2019 Imaging     MRI T-spine shows progressive T8 and  9 compression pathologic fracture and bone and bone marrow activity.  Dr. Jimenez reviewed and thought this was his treated disease.  And plans on serial MRI imaging.      12/10/2019 Imaging     PET/CT IMPRESSION:  1. No convincing evidence of abnormal FDG activity as visualized to  suggest recurrent disease or lymphomatous involvement.     2. Redemonstration of diffuse osseous marrow activity throughout the  axial skeleton most consistent with marrow stimulation effect similar to  prior exam with redemonstration of compression fractures of the lower  thoracic spine better seen on the MRI performed 11/19/2019.      2/20/2020 Imaging     PET negative with resolution of prior marrow abnormality.      2/27/2020 Cancer Staged     Staging form: Hodgkin And Non-Hodgkin Lymphoma, AJCC 8th Edition  - Clinical: Stage IV - Signed by Brent Brizuela MD on 2/27/2020           Brief History: Mr. Stoll states that he has been doing well since we saw him last with no change in his health..  Continues to have mild joint pain particularly in his hips when he gets up in the morning, this lasts only moments and goes away when he is up moving about.  This is, has been occurring since midway through his chemotherapy and is not worsening with time.  Has not required any medication.  Currently feeling well, no respiratory concerns, no unusual shortness of breath, cough, no fevers or chills.  He is currently out of town with family, practicing good social distancing during current COVID 19 pandemic.  Denies any night sweats, appetite is good, no unusual weight loss, he is actually working on trying to lose weight through eating healthy and exercise.  Saw Dr. Jimenez in April and had MRI of his spine that was negative other than for postoperative changes. Back pain has resolved.    Review of Systems: a 14 point review of systems was performed and is negative except as noted above.      MEDICATIONS: Medication reconciliation for the patient  was reviewed and confirmed in the electronic medical record.    The following portions of the patient's history were reviewed and updated as appropriate: allergies, current medications, past family history, past medical history, past social history, past surgical history and problem list.    Labs:  Lab Results   Component Value Date    WBC 6.50 02/27/2020    HGB 14.7 02/27/2020    HCT 45.5 02/27/2020    MCV 85.7 02/27/2020     02/27/2020     Lab Results   Component Value Date    GLUCOSE 101 (H) 02/27/2020    BUN 12 02/27/2020    CREATININE 0.89 02/27/2020    EGFRIFNONA 92 02/27/2020    BCR 13.5 02/27/2020    K 4.6 02/27/2020    CO2 24.0 02/27/2020    CALCIUM 9.3 02/27/2020    PROTENTOTREF 8.0 08/30/2019    ALBUMIN 4.50 02/27/2020    LABIL2 0.6 (L) 08/30/2019    AST 33 02/27/2020    ALT 28 02/27/2020         Imaging:   I have personally reviewed the following studies.    MRI Thoracic Spine With & Without Contrast  Narrative: EXAMINATION: MRI THORACIC SPINE W WO CONTRAST-      INDICATION: Cancer to spine; C79.51-Secondary malignant neoplasm of  bone.     TECHNIQUE: Pre and postcontrast sagittal and axial T2 and sagittal and  axial T2 sagittal STIR images of the thoracic spine.     COMPARISON: Thoracic spine MRI 11/19/2019. 02/20/2020 whole body PET  scan.     FINDINGS: By history, patient has non-Hodgkin's lymphoma metastatic to  lungs and spine, previous chemotherapy, most recently December 2019. No  new complaints or symptoms. The previous lumbar MRI study from  11/19/2019 by report showed suspected pathologic fractures of T8 and T9,  without obvious canal stenosis. Later PET scan from 02/20/2020 by report  showed no significant activity here.     Today's study shows similar degree of deformity of T8 and T9, with no  clearly new or worsening compression deformity, however, vertebral  marrow edema has decreased, now mild, typical of healing fractures. No  significant new marrow edema is appreciated elsewhere.  There is no  evidence of new abnormality of alignment. As before, no significant  resulting canal stenosis is appreciated. Heterogeneous cord signal seen  on the sagittal series is inconsistent from series to series and likely  artifactual. No HNP or canal stenosis is appreciated. Fat-containing  vertebral hemangioma is again noted at T12.     Axial images show no evidence of significant thoracic canal stenosis. No  intrinsic thoracic cord lesion is identified. No pathologic paraspinous  mass or acute inflammatory focus is appreciated.     Postcontrast images show no definite enhancement of T8 or T9, or  evidence of pathologic bony or soft tissue enhancement elsewhere.     Impression: 1. Aging T8 and T9 compression deformities, similar in shape to prior  study, but with decreasing edema typical of healing fractures. No  evidence of any associated spinal stenosis.  2. No evidence of new thoracic vertebral involvement by disease, no  evidence of new trauma, spinal stenosis, or significant thoracic  myelopathy.     D:  04/15/2020  E:  04/15/2020     This report was finalized on 4/16/2020 9:25 AM by Dr. Bruce Crowe MD.          Assessment and Plan:   1. 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  Overall is doing well.  States he did consult with  who did not recommend states of transplant at this time in the absence of disease recurrence.  Overall he is feeling well.  He has no new worrisome symptoms.  He has not gotten labs yet as he is out of town but will get those next week.  Continues to have some arthralgias particularly in his hips when  getting up in the morning, this is not new and has occurred since chemotherapy.  I recommend that he may need to get back with his rheumatologist.  We will watch for the results of his CBC, CMP and LDH next week  There was a plan on repeating CT of the chest, abdomen and pelvis in 3 months.  Plan of care as previously outlined going forward is as follows per Dr. Choudhary from [place]:    H&P every three months during the first two years, then every six months starting two years after complete response. Starting at year 5, patients are seen once per year. At these visits we perform a history and physical examination, complete blood count, chemistries, and lactate dehydrogenase. We obtain a CT scan of the chest, abdomen, and pelvis every six months for the first 18 months after documentation of an initial complete response. We only obtain additional positron emission tomography/computed tomography (PET/CT) scans to further evaluate any concerning findings noted on CT scans. We do not obtain routine CT scans after 18 months unless there are signs, symptoms, or exam findings to suggest relapse.    This visit has been rescheduled as a phone visit to comply with patient safety concerns in accordance with CDC recommendations. Total time of discussion was 18 minutes.    Bing Sanchez, APRN  05/29/2020

## 2020-06-10 ENCOUNTER — LAB (OUTPATIENT)
Dept: LAB | Facility: HOSPITAL | Age: 45
End: 2020-06-10

## 2020-06-10 DIAGNOSIS — C84.60 ANAPLASTIC ALK-POSITIVE LARGE CELL LYMPHOMA, UNSPECIFIED BODY REGION (HCC): ICD-10-CM

## 2020-06-10 LAB
ALBUMIN SERPL-MCNC: 4.3 G/DL (ref 3.5–5.2)
ALBUMIN/GLOB SERPL: 1.4 G/DL
ALP SERPL-CCNC: 46 U/L (ref 39–117)
ALT SERPL W P-5'-P-CCNC: 19 U/L (ref 1–41)
ANION GAP SERPL CALCULATED.3IONS-SCNC: 13 MMOL/L (ref 5–15)
AST SERPL-CCNC: 25 U/L (ref 1–40)
BILIRUB SERPL-MCNC: 0.4 MG/DL (ref 0.2–1.2)
BUN BLD-MCNC: 14 MG/DL (ref 6–20)
BUN/CREAT SERPL: 13.6 (ref 7–25)
CALCIUM SPEC-SCNC: 9.4 MG/DL (ref 8.6–10.5)
CHLORIDE SERPL-SCNC: 102 MMOL/L (ref 98–107)
CO2 SERPL-SCNC: 24 MMOL/L (ref 22–29)
CREAT BLD-MCNC: 1.03 MG/DL (ref 0.76–1.27)
ERYTHROCYTE [DISTWIDTH] IN BLOOD BY AUTOMATED COUNT: 14.6 % (ref 12.3–15.4)
GFR SERPL CREATININE-BSD FRML MDRD: 78 ML/MIN/1.73
GLOBULIN UR ELPH-MCNC: 3 GM/DL
GLUCOSE BLD-MCNC: 94 MG/DL (ref 65–99)
HCT VFR BLD AUTO: 46.3 % (ref 37.5–51)
HGB BLD-MCNC: 15.1 G/DL (ref 13–17.7)
LDH SERPL-CCNC: 197 U/L (ref 135–225)
LYMPHOCYTES # BLD AUTO: 2.3 10*3/MM3 (ref 0.7–3.1)
LYMPHOCYTES NFR BLD AUTO: 35.3 % (ref 19.6–45.3)
MCH RBC QN AUTO: 27.9 PG (ref 26.6–33)
MCHC RBC AUTO-ENTMCNC: 32.6 G/DL (ref 31.5–35.7)
MCV RBC AUTO: 85.6 FL (ref 79–97)
MONOCYTES # BLD AUTO: 0.4 10*3/MM3 (ref 0.1–0.9)
MONOCYTES NFR BLD AUTO: 5.5 % (ref 5–12)
NEUTROPHILS # BLD AUTO: 3.9 10*3/MM3 (ref 1.7–7)
NEUTROPHILS NFR BLD AUTO: 59.2 % (ref 42.7–76)
PLATELET # BLD AUTO: 266 10*3/MM3 (ref 140–450)
PMV BLD AUTO: 7.2 FL (ref 6–12)
POTASSIUM BLD-SCNC: 4.4 MMOL/L (ref 3.5–5.2)
PROT SERPL-MCNC: 7.3 G/DL (ref 6–8.5)
RBC # BLD AUTO: 5.41 10*6/MM3 (ref 4.14–5.8)
SODIUM BLD-SCNC: 139 MMOL/L (ref 136–145)
WBC NRBC COR # BLD: 6.6 10*3/MM3 (ref 3.4–10.8)

## 2020-06-10 PROCEDURE — 83615 LACTATE (LD) (LDH) ENZYME: CPT

## 2020-06-10 PROCEDURE — 80053 COMPREHEN METABOLIC PANEL: CPT

## 2020-06-10 PROCEDURE — 36415 COLL VENOUS BLD VENIPUNCTURE: CPT

## 2020-06-10 PROCEDURE — 85025 COMPLETE CBC W/AUTO DIFF WBC: CPT

## 2020-08-10 ENCOUNTER — TELEPHONE (OUTPATIENT)
Dept: ONCOLOGY | Facility: CLINIC | Age: 45
End: 2020-08-10

## 2020-08-10 NOTE — TELEPHONE ENCOUNTER
PT WOULD LIKE TO RESCHEDULE HIS CT AND DR RUCKER APPTS IF IT CAN BE RESCHEDULED EARLIER OR WITHIN 5 WEEKS OUT.    SHOULD THE CT BE DONE BEFORE HIS APPT WITH DR RUCKER.    IF NOTHING AVAILABLE, PT WILL KEEP HIS APPTS.      DO NOT CANCEL.    PLEASE GIVE PT A CALL.    BEST CALL BACK # [phone]

## 2020-08-21 ENCOUNTER — LAB (OUTPATIENT)
Dept: LAB | Facility: HOSPITAL | Age: 45
End: 2020-08-21

## 2020-08-21 ENCOUNTER — HOSPITAL ENCOUNTER (OUTPATIENT)
Dept: CT IMAGING | Facility: HOSPITAL | Age: 45
Discharge: HOME | End: 2020-08-21
Admitting: NURSE PRACTITIONER

## 2020-08-21 DIAGNOSIS — C84.68 ANAPLASTIC ALK-POSITIVE LARGE CELL LYMPHOMA OF LYMPH NODES OF MULTIPLE REGIONS (HCC): ICD-10-CM

## 2020-08-21 DIAGNOSIS — C84.68 ANAPLASTIC ALK-POSITIVE LARGE CELL LYMPHOMA OF LYMPH NODES OF MULTIPLE REGIONS (HCC): Chronic | ICD-10-CM

## 2020-08-21 LAB
ALBUMIN SERPL-MCNC: 4.5 G/DL (ref 3.5–5.2)
ALBUMIN/GLOB SERPL: 1.4 G/DL
ALP SERPL-CCNC: 56 U/L (ref 39–117)
ALT SERPL W P-5'-P-CCNC: 24 U/L (ref 1–41)
ANION GAP SERPL CALCULATED.3IONS-SCNC: 10 MMOL/L (ref 5–15)
AST SERPL-CCNC: 23 U/L (ref 1–40)
BASOPHILS # BLD AUTO: 0.1 10*3/MM3 (ref 0–0.2)
BASOPHILS NFR BLD AUTO: 1.3 % (ref 0–1.5)
BILIRUB SERPL-MCNC: 0.4 MG/DL (ref 0–1.2)
BUN SERPL-MCNC: 15 MG/DL (ref 6–20)
BUN/CREAT SERPL: 14.9 (ref 7–25)
CALCIUM SPEC-SCNC: 9.7 MG/DL (ref 8.6–10.5)
CHLORIDE SERPL-SCNC: 99 MMOL/L (ref 98–107)
CO2 SERPL-SCNC: 27 MMOL/L (ref 22–29)
CREAT SERPL-MCNC: 1.01 MG/DL (ref 0.76–1.27)
DEPRECATED RDW RBC AUTO: 40.6 FL (ref 37–54)
EOSINOPHIL # BLD AUTO: 0.41 10*3/MM3 (ref 0–0.4)
EOSINOPHIL NFR BLD AUTO: 5.2 % (ref 0.3–6.2)
ERYTHROCYTE [DISTWIDTH] IN BLOOD BY AUTOMATED COUNT: 13.1 % (ref 12.3–15.4)
GFR SERPL CREATININE-BSD FRML MDRD: 80 ML/MIN/1.73
GLOBULIN UR ELPH-MCNC: 3.3 GM/DL
GLUCOSE SERPL-MCNC: 109 MG/DL (ref 65–99)
HCT VFR BLD AUTO: 49.2 % (ref 37.5–51)
HGB BLD-MCNC: 16 G/DL (ref 13–17.7)
IMM GRANULOCYTES # BLD AUTO: 0.04 10*3/MM3 (ref 0–0.05)
IMM GRANULOCYTES NFR BLD AUTO: 0.5 % (ref 0–0.5)
LDH SERPL-CCNC: 184 U/L (ref 135–225)
LYMPHOCYTES # BLD AUTO: 2.7 10*3/MM3 (ref 0.7–3.1)
LYMPHOCYTES NFR BLD AUTO: 34.1 % (ref 19.6–45.3)
MCH RBC QN AUTO: 28 PG (ref 26.6–33)
MCHC RBC AUTO-ENTMCNC: 32.5 G/DL (ref 31.5–35.7)
MCV RBC AUTO: 86.2 FL (ref 79–97)
MONOCYTES # BLD AUTO: 0.83 10*3/MM3 (ref 0.1–0.9)
MONOCYTES NFR BLD AUTO: 10.5 % (ref 5–12)
NEUTROPHILS NFR BLD AUTO: 3.83 10*3/MM3 (ref 1.7–7)
NEUTROPHILS NFR BLD AUTO: 48.4 % (ref 42.7–76)
NRBC BLD AUTO-RTO: 0 /100 WBC (ref 0–0.2)
PLATELET # BLD AUTO: 287 10*3/MM3 (ref 140–450)
PMV BLD AUTO: 9.8 FL (ref 6–12)
POTASSIUM SERPL-SCNC: 4.6 MMOL/L (ref 3.5–5.2)
PROT SERPL-MCNC: 7.8 G/DL (ref 6–8.5)
RBC # BLD AUTO: 5.71 10*6/MM3 (ref 4.14–5.8)
SODIUM SERPL-SCNC: 136 MMOL/L (ref 136–145)
WBC # BLD AUTO: 7.91 10*3/MM3 (ref 3.4–10.8)

## 2020-08-21 PROCEDURE — 85025 COMPLETE CBC W/AUTO DIFF WBC: CPT

## 2020-08-21 PROCEDURE — 80053 COMPREHEN METABOLIC PANEL: CPT

## 2020-08-21 PROCEDURE — 71260 CT THORAX DX C+: CPT

## 2020-08-21 PROCEDURE — 74177 CT ABD & PELVIS W/CONTRAST: CPT

## 2020-08-21 PROCEDURE — 83615 LACTATE (LD) (LDH) ENZYME: CPT

## 2020-08-21 PROCEDURE — 36415 COLL VENOUS BLD VENIPUNCTURE: CPT

## 2020-08-21 PROCEDURE — 25010000002 IOPAMIDOL 61 % SOLUTION: Performed by: NURSE PRACTITIONER

## 2020-08-21 RX ADMIN — IOPAMIDOL 95 ML: 612 INJECTION, SOLUTION INTRAVENOUS at 11:38

## 2020-08-24 ENCOUNTER — TELEMEDICINE (OUTPATIENT)
Dept: ONCOLOGY | Facility: CLINIC | Age: 45
End: 2020-08-24

## 2020-08-24 ENCOUNTER — TELEPHONE (OUTPATIENT)
Dept: ONCOLOGY | Facility: CLINIC | Age: 45
End: 2020-08-24

## 2020-08-24 VITALS — BODY MASS INDEX: 33.91 KG/M2 | HEIGHT: 72 IN

## 2020-08-24 DIAGNOSIS — C84.60 ANAPLASTIC ALK-POSITIVE LARGE CELL LYMPHOMA, UNSPECIFIED BODY REGION (HCC): Primary | Chronic | ICD-10-CM

## 2020-08-24 PROCEDURE — 99214 OFFICE O/P EST MOD 30 MIN: CPT | Performed by: INTERNAL MEDICINE

## 2020-08-24 NOTE — PROGRESS NOTES
Telehealth follow-up visit: This was an audio and video enabled telemedicine encounter.  Done for avoidance of Covid 19 per CDC guidelines.  Verbal consent given. D/W pt 30 min > 50% counseling re prognosis and plan.    CHIEF COMPLAINT: Follow-up lymphoma    Problem List:  Oncology/Hematology History    1. 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 Leukocytosis  3. FUO  4. Decreased ejection fraction with evidence of myocarditis  5. Marked elevation of sedimentation rate and C-reactive protein  6. Elevated triglycerides on fasting  7. Acute renal failure resolved with hydration    -8/23/2019 initial inpatient [facility] medical oncology consultation.  Asked to see regarding leukocytosis.  He had upper respiratory infection with pneumonia, chest pains, elevated troponins with diagnosis of myocarditis and decreased ejection fraction with fevers to 104.  Tentatively diagnosed with still's disease and treated with prednisone with some improvement in fevers as well as improvement in a rash.  By the end of July, he had severe mid back pain relatively acute and onset that progressed over time with imaging of the area showing progressive abnormality of both bone and marrow signals in the spine that could be malignant marrow involvement or, more likely per radiologist, infectious.  Sedimentation rate was over 100 with a C-reactive protein elevated and white count 20-30,000 for the better part of 2 months.  Slight left shift but no immature forms.  Broad-spectrum serologic testing has not shown pathogens as of the initial consultation.  I ordered bone marrow biopsy and Dr. Jimenez coordinated spine biopsy  -6/24/2019  CT chest abdomen pelvis without contrast showed no parenchymal lung disease but a 1.6 cm mildly enlarged centrally necrotic right paratracheal node with no other adenopathy or hepatosplenomegaly.  Ejection fraction 35% on echocardiogram.  -6/28/2019 MRI brain and cervical spine without contrast normal  -7/9/2019 fluoroscopy guided right joint aspiration unrevealing  -7/10/2019 fluoroscopic guided bronchoscopy showed no pathogens or pathology  -8/9/2019 MRI thoracic and lumbar spine showed low T1 signals in T8 and T9 with increased T2 involvement concerning for aggressive marrow signal findings that could represent lipid poor spinal hemangioma but concerning for metastasis versus multiple myeloma.  Contrast MRI recommended.  -8/21/2019 MRI thoracic spine with and without contrast showed multiple foci of abnormal marrow signal within the thoracic spine was prominent T8 and T9 with interval increase compared to 8/9/2019 with deformity of the endplates at T8 and T9 but no definitive fracture or discitis.  Several smaller additional lesions suspected at T1, T2, T4, T5, and T12 and the differential would include multifocal vertebral osteomyelitis versus metastasis.  -8/23/2019 bone marrow biopsy showed mild polyclonal plasmacytosis with normocellular marrow and trilineage hematopoiesis with mild myeloid expansion.  No lymphoid infiltrate and no organisms for fungi, acid fast bacilli, tumor, or granuloma.  Bennett 2- negative.  -8/24/2019 ejection fraction up to 65%  -8/26/2019 T8 biopsy show focal area of markedly atypical appearance.  There are inflammatory cells with occasional eosinophils and larger cells.  Some focal hemosiderin.  There is equivocal positivity for CD4, , CD45, and Constance-Barr virus.  It is positive for CD30, ALK 1, EMA, and vimentin.  No staining for CD 5, 7, 3, 20, or 15.  No staining for cytokeratin, SOX 10, , or CD1a.  Outside referral to [facility] pending.  -8/30/2019 [facility]  outpatient follow-up visit: Anaplastic large cell lymphoma ALK positive just involving the bone would be unusual but not impossible I suppose.  We will get PET scan.  Were this hemophagocytic syndrome I would have expected his marrow to be abnormal along with splenomegaly and cytopenias as would the marrow be abnormal for this a plasma cell dyscrasia and the plasma cells were polyclonal.  I will check his SIEP along with triglycerides, peripheral blood flow cytometry for soluble CD 25, ferritin, and NK cell activity.  Once I have that information, hopefully we will have more information back from [facility] and unlikely to involve my colleagues at the [facility] in this process if this is an ALK positive anaplastic large cell lymphoma without kia involvement given the unusual nature and appearance of this.    -9/6/2019 subsequent oncology reconsultation for readmission: Met patient with his last admission.  He has had a 4-month history of fevers with upper respiratory infection and possible pneumonia.  Found to have myocarditis with low ejection fraction with extensive infectious disease work-up negative.  A month later readmitted with shortness of breath and arthralgias with some redness in his wrist.  Bronchoscopy was negative and he was treated with some steroids.  On his third admission with relatively recent acute onset back pain he was found to have some spine lesions.  The bone marrow biopsy was unrevealing,  a T8 biopsy by Dr. Wilde showed markedly atypical appearing cells in a background of eosinophils.  The larger cells were positive for ALK 1, CD30, EMA, and vimentin.  No staining for CD 5, 7, 3, 20, or 15.  No staining for cytokeratin, sox 10, , or CD1a.  Tissue has been sent for second opinion at [facility].  Anaplastic large cell lymphoma ALK positive was considered but given that his fevers which started months back included testing with CT chest abdomen pelvis 6/24/2019 only  showing mildly enlarged centrally necrotic right lower paratracheal node of indeterminate significance and no other significant adenopathy or splenomegaly and no pulmonary infiltrate or mediastinal adenopathy on repeat CT chest 7/9/2019, PET scan was ordered.  Before PET could be completed and GI evaluation completed for evaluation of possible GI source for kia procurement, the patient on the day he was due for endoscopy continue to have fevers with hypotension and a pulse in the 150s.  9/5/2019 CT chest abdomen and pelvis on admission now shows showed new numerous bilateral pulmonary lung nodules in the right lung measuring 2 cm without focal pneumonia.  There was extensive axillary, mediastinal adenopathy the largest in the right paratracheal area 2.1 cm with central necrosis.  There were hypoattenuating regions in the right hepatic lobe measuring up to 2.5 cm incompletely assessed due to lack of contrast due to his creatinine on admission 9/5/2019 being up to 1.67 down to 0.94 by the morning of 9/6/2019.  CT also showed new extensive axillary, mediastinal, and abdominopelvic lymphadenopathy.  There was redemonstration of the T8-T9 aggressive osseous lytic lesions involving thoracolumbar spine and multiple levels.  Nonspecific perinephric inflammation also seen possibly representing pyelonephritis.  Laboratory testing on admission 9/5/2019 showed C-reactive protein up to 37.66 with sedimentation rate greater than 130.  Procalcitonin over 38 with lactic acid  4.8.   normal with normal troponin.  Fasting triglyceride up to 269 and peripheral blood flow cytometry sent for NK cell activity as well as CD25 for IL-2 receptor for the possibility of hemophagocytic syndrome which would be unusual without hemophagocytosis bone marrow.  With the eruption of lymphadenopathy, the likelihood of an ALK positive anaplastic large cell lymphoma is more likely but we should be able to get tissue for definitive results and  plans.  We will get echocardiogram and hold on the GI evaluation and I discussed in person with Dr. Benja Can to see for procurement of lymph node.  He is going to go after upper mediastinal lymph node.  Once we have more tissue then we will make further plans for treatment.  His ejection fraction on 8/24/2019 was 62%.  Assuming that we find lymphoma and not some aggressive infectious disease process, he will need a port as well but we will wait on the port until we know what we are dealing with.  My partners are on board and will cover me this weekend and I will check back Monday to see where we stand.  He already has an appointment with Dr. Roosevelt Nguyen at  on the 11th who I was enlisting to help sort this out but we may be able to get our answers now that we have node to go after.  I may yet need his services depending on the ilk of this lymphoma and whether or not we would need to consolidate aggressively or treat more aggressively than with standard chemotherapy upfront.  I am starting him on allopurinol.  He already has a rash on his upper thighs and heels and around the area of his prior bone marrow biopsy but I suspect that just related to this probable lymphomatous process or perhaps the recent Bactrim started by Dr. Hauser.  His procalcitonin is quite high and I am still concerned about concomitant infection and I am not sure if the procalcitonin it can be falsely dramatically elevated in the face of lymphoma.  If this is anaplastic large cell lymphoma, with his ECOG performance status generally less than 2 and age less than 60 but with at least stage III disease, even if his LDH is high, he would at least have a risk score of 2 which would put him at the low intermediate risk on the IPI index.  Whether we would use Rituxan or not depends on if this turns out to be CD20 positive where his initial bone biopsy was not.  Given that it is CD30 positive, will probably use brentuximab, Cytoxan,  Adriamycin, prednisone every 3 weeks x 6 cycles.  This usually is given without vincristine but with the Brentuximab 1.8 mg/kg IV day 1 of each cycle.  A progression free survival was seen both in ALK positive and negative anaplastic large cell lymphomas treated with brentuximab rather than vincristine.  Neuropathy still can be an issue and diarrhea is more common.  If this is anaplastic large cell lymphoma ALK positive, autologous stem cell transplant may be considered in patients over 40 years of age with an IPI score greater than or equal to 3 but generally not if the IPI score is less than 3 so we will assess that very carefully.  Despite the elevated fasting triglyceride recently, I doubt this is hemophagocytic syndrome given the negative bone marrow and now with the blossoming lymphadenopathy.  But we shall see.    -9/30/2019 outpatient medical oncology follow-up visit:  He was breathing much better the first week after his chemotherapy but then the shortness of breath worsened again his pulse increased in the 120s to 140s.  Saw Dr. Dumont in my team here as well and received IV fluids.  Slowly that improved.  In the prior 5 days he has been feeling better and no tachycardia for the last week.  He has had no fever since discharge from the hospital.  He has 1 more week of antibiotics.  His current complaints are:    Occasional bloody mucus from his nose    Alternating diarrhea and constipation better with stool softeners    Low back pain worsening with Neupogen but only on oxycodone rarely and none in the last 48 hours.   Bilateral knee pain without swelling on allopurinol    Itching but prior rash resolved   Mild gait instability due to weakness worse with showering and due for follow-up with Dr. Jimenez for serial MRIs   Labile moods but without jason depression worse with steroids now down to 20 mg    I addressed all these issues with him today my plan is:   To get a PET prior to return for course #3 of  chemotherapy with brentuximab, Cytoxan, Adriamycin, prednisone   Consult Dr. Carrion for possible consolidation transplant in the future   Add Xgeva for the lytic involvement of his bone   Taper the steroids down to 20 mg for 5 days 10 mg for 5 days 5 mg for 5 days and then off following the 100 mg of prednisone with this course of therapy and then hopefully no further steroids though he will follow with rheumatology  Follow-up with Dr. Jimenez for serial spine MRIs.    -10/21/2019 medical oncology office visit: 10/17/2019 PET scan was negative.  All prior CT abnormalities have resolved.  He is due for follow-up with Dr. Jimenez for spine MRI in about a month.  He has seen Dr. Carrion.  I do not have those notes but according to the patient there are no plans for stem cell transplant in first remission.  I have left a message with Dr. Carrion raising the question of whether spinal fluid analysis or MRI of brain would be in order but he feels great has no neurologic symptoms and his only complaint presently is mid back pain which clearly is improving over time.  Unless Dr. Carrion has other ideas, my plan would be to continue on with courses 3 and 4 of Cytoxan Hilario Brentuximab prednisone, repeat the PET, proceed with courses 5 and 6, repeat the PET again, and assuming that everything is negative at that junction then go to routine survivorship.    -12/20/2019 medical oncology office visit: 12/10/2019 PET scan shows just active marrow response with no active lymphoma or recurrence.  He will call Dr. Carrion for an appointment after course #6 12/23/2019 to discuss definitively whether there is any plans for consolidation but as far as I am aware there is no plan for stem cell transplant autologous at this junction.  We will get his PET 8 weeks out from this final dose of of brentuximab Cytoxan, Adriamycin, prednisone.    -2/27/2020 medical oncology follow-up visit:  Follow-up per Dr. Choudhary from  [facility] on these individuals is as follows:     \"schedule patient visits every three months during the first two years, then every six months starting two years after complete response. Starting at year 5, patients are seen once per year. At these visits we perform a history and physical examination, complete blood count, chemistries, and lactate dehydrogenase. We obtain a CT scan of the chest, abdomen, and pelvis every six months for the first 18 months after documentation of an initial complete response. We only obtain additional positron emission tomography/computed tomography (PET/CT) scans to further evaluate any concerning findings noted on CT scans. We do not obtain routine CT scans after 18 months unless there are signs, symptoms, or exam findings to suggest relapse.\"     -8/24/2020 [facility] follow-up visit: I reviewed reports and images of 8/21/2020 CT chest abdomen pelvis which showed no evidence of lymphoma.  Hemoglobin 16 with normal CBC and  normal.  CMP showed glucose 109 otherwise unremarkable.  Pursuant to the above guidelines he will see my nurse practitioner back in 3 months with CBC, CMP, and LDH and will get CT scanning 1 more time 6 months from now pursuant to the above guidelines and after that we will just follow-up clinically with physical exams as outlined above along with labs as outlined above              Anaplastic ALK-positive large cell lymphoma     6/28/2019 Imaging     MRI of the brain negative      8/9/2019 Imaging     MRI thoracic and lumbar spine IMPRESSION:  1.  Abnormal ill-defined low T1 signal lesions in the T8 and T9  vertebral bodies with increased T2 involvement concerning for aggressive  bone marrow signal findings and although this may represent lipid poor  spinal hemangiomas concern for metastasis versus myeloma remains a  consideration given overall appearance on this noncontrast evaluation.  Contrast-enhanced examination of the thoracic spine and/or " nuclear  medicine bone scan may be useful for further evaluation.     2. Lumbar spine with degenerative changes, however, no aggressive bone  marrow signal findings or acute fracture. No significant spinal canal or  neuroforaminal stenosis present.      8/24/2019 -  Other Event     Echocardiogram: Estimated EF 62%.      8/26/2019 Initial Diagnosis     Anaplastic ALK-positive large cell lymphoma       8/26/2019 Biopsy     T8 vertebral biopsy positive for anaplastic large cell lymphoma, ALK-positive.      9/5/2019 Imaging     CT chest, abdomen and pelvis IMPRESSION:     Constellation of findings concerning for metastatic disease:      - Interval development of numerous pulmonary nodules throughout the  lungs with the largest measuring 2.0 cm within the right midlung and  concerning for underlying pulmonary metastasis and which may account for  abnormality identified in the chest radiograph performed 09/05/2019.     - Extensive axillary, mediastinal, and abdominopelvic lymphadenopathy as  described above.     - Hypoattenuated regions involving the liver concerning for additional  sites of metastatic disease, although are limited in evaluation  secondary to lack of intravenous contrast. Attention on follow-up  imaging is advised.     - Redemonstration of T8/T9 aggressive osseous lesions with additional  lytic lesion involving the L4 vertebral body and additional  subcentimeter lytic lesions throughout the thoracolumbar spine  suggested.     Nonspecific perinephric inflammation. Correlate with urinalysis to  exclude underlying pyelonephritis.      9/6/2019 Biopsy     Mediastinal lymph node excisional biopsy positive for ALK-1 positive anaplastic large cell lymphoma      9/9/2019 -  Chemotherapy     OP LYMPHOMA Brentuximab, Cyclophosphamide / DOXOrubicin / predniSONE / Pegfilgrastim        10/17/2019 Imaging     PET/CT IMPRESSION:  1. Essentially negative PET scan for active lymphoma. CT scan findings  from 09/05/2019  appear to have largely resolved as well.  2. Small focus of mild activity in the right groin appears to reflect  calcific tendinosis of the distal right iliopsoas muscle.  3. Probable marrow stimulation effect.      11/19/2019 Imaging     MRI T-spine shows progressive T8 and 9 compression pathologic fracture and bone and bone marrow activity.  Dr. Jimenez reviewed and thought this was his treated disease.  And plans on serial MRI imaging.      12/10/2019 Imaging     PET/CT IMPRESSION:  1. No convincing evidence of abnormal FDG activity as visualized to  suggest recurrent disease or lymphomatous involvement.     2. Redemonstration of diffuse osseous marrow activity throughout the  axial skeleton most consistent with marrow stimulation effect similar to  prior exam with redemonstration of compression fractures of the lower  thoracic spine better seen on the MRI performed 11/19/2019.      2/20/2020 Imaging     PET negative with resolution of prior marrow abnormality.      2/27/2020 Cancer Staged     Staging form: Hodgkin And Non-Hodgkin Lymphoma, AJCC 8th Edition  - Clinical: Stage IV - Signed by Brent Brizuela MD on 2/27/2020 8/21/2020 Imaging     -8/21/2020 CT chest abdomen pelvis showed no evidence of lymphoma.  Hemoglobin 16 with normal CBC and  normal.  CMP showed glucose 109 otherwise unremarkable.         HISTORY OF PRESENT ILLNESS:  The patient is a 45 y.o. male, here for follow up on management of history of lymphoma as outlined above.  No evidence of recurrence on labs or scans.      Past Medical History:   Diagnosis Date   • Acute kidney injury (CMS/HCC)    • Anaplastic ALK-positive large cell lymphoma (CMS/HCC)    • Bacteremia    • Hyperlipidemia    • Myocarditis (CMS/HCC)    • Pneumonia    • Severe sepsis (CMS/HCC)      Past Surgical History:   Procedure Laterality Date   • APPENDECTOMY     • BACK SURGERY      L5 S1   • BRONCHOSCOPY N/A 7/10/2019    Procedure: BRONCHOSCOPY WITH ENDOBRONCHIAL  "ULTRASOUND AND TRANSBRONCHIAL BIOPSY AND FLUORO;  Surgeon: Marvel Sandoval MD;  Location:  [facility];  Service: Pulmonary   • CARDIAC CATHETERIZATION N/A 6/24/2019    Procedure: LEFT HEART CATH;  Surgeon: Deep Muñiz IV, MD;  Location:  [facility];  Service: Cardiovascular   • INTERVENTIONAL RADIOLOGY PROCEDURE N/A 8/26/2019    Procedure: THORACIC SPINE BIOSPY;  Surgeon: Juve Avila MD;  Location:  [facility];  Service: Interventional Radiology   • MEDIASTINOSCOPY, BRONCHOSCOPY N/A 9/6/2019    Procedure: BRONCHOSCOPY, MEDIASTINOSCOPY WITH BIOPSY;  Surgeon: El Can MD;  Location:  [facility];  Service: Cardiothoracic       Allergies   Allergen Reactions   • Ciprofloxacin Anxiety and Hallucinations     \"Extreme anxiety and paranoia\"   • Sulfa Antibiotics Rash       Family History and Social History reviewed and changed as necessary      REVIEW OF SYSTEM:   Review of Systems   Constitutional: Negative for appetite change, chills, diaphoresis, fatigue, fever and unexpected weight change.   HENT:   Negative for mouth sores, sore throat and trouble swallowing.    Eyes: Negative for icterus.   Respiratory: Negative for cough, hemoptysis and shortness of breath.    Cardiovascular: Negative for chest pain, leg swelling and palpitations.   Gastrointestinal: Negative for abdominal distention, abdominal pain, blood in stool, constipation, diarrhea, nausea and vomiting.   Endocrine: Negative for hot flashes.   Genitourinary: Negative for bladder incontinence, difficulty urinating, dysuria, frequency and hematuria.    Musculoskeletal: Negative for gait problem, neck pain and neck stiffness.   Skin: Negative for rash.   Neurological: Negative for dizziness, gait problem, headaches, light-headedness and numbness.   Hematological: Negative for adenopathy. Does not bruise/bleed easily.   Psychiatric/Behavioral: Negative for depression. The patient is not " "nervous/anxious.    All other systems reviewed and are negative.       PHYSICAL EXAM    Vitals:    08/24/20 1033   Height: 182.9 cm (72\")     Vitals:    08/24/20 1033   PainSc: 0-No pain        Constitutional: Appears well-developed and well-nourished. No distress.   ECOG: (0) Fully Active - Able to Carry On All Pre-disease Performance Without Restriction  HENT:   Head: Normocephalic.   Mouth/Throat: Oropharynx is clear and moist.   Eyes: Conjunctivae are normal.  No scleral icterus.   Neck:  . No visible thyromegaly present.   Cardiovascular: No JVD visible  Pulmonary/Chest: No visible respiratory distress.   Abdominal: Soft. Exhibits no visible distension.    Musculoskeletal:Exhibits no edema, tenderness or deformity.   Neurological: Alert and oriented to person, place, and time.  Moving all extremities  Skin: No ecchymosis, no petechiae and no rash noted. Not diaphoretic. No cyanosis. Nails show no clubbing.   Psychiatric: Normal mood and affect.   Vitals reviewed.  No visible adenopathy    Lab Results   Component Value Date    HGB 16.0 08/21/2020    HCT 49.2 08/21/2020    MCV 86.2 08/21/2020     08/21/2020    WBC 7.91 08/21/2020    NEUTROABS 3.83 08/21/2020    LYMPHSABS 2.70 08/21/2020    MONOSABS 0.83 08/21/2020    EOSABS 0.41 (H) 08/21/2020    BASOSABS 0.10 08/21/2020       Lab Results   Component Value Date    GLUCOSE 109 (H) 08/21/2020    BUN 15 08/21/2020    CREATININE 1.01 08/21/2020     08/21/2020    K 4.6 08/21/2020    CL 99 08/21/2020    CO2 27.0 08/21/2020    CALCIUM 9.7 08/21/2020    PROTEINTOT 7.8 08/21/2020    ALBUMIN 4.50 08/21/2020    BILITOT 0.4 08/21/2020    ALKPHOS 56 08/21/2020    AST 23 08/21/2020    ALT 24 08/21/2020                   ASSESSMENT & PLAN:  1. Stage IV ALK positive CD30 positive, CD20 negative anaplastic large cell lymphoma involving bone and marrow on biopsies presenting with abnormal counts and bone involvement with rapid development of bulky adenopathy with " "subsequent complete remission.  2. Leukocytosis resolved  3. FUO resolved  4. Marked elevated sedimentation rate and C-reactive protein resolved  5. Hypertriglyceridemia  6. Acute renal failure resolved  7. Myocarditis with decreased ejection fraction but normal EF 62% on 8/24/2019 echocardiogram    Discussion: He met with Dr. Carrion who did not recommend consolidation with transplant.  No evidence of recurrence on current CT chest abdomen pelvis and laboratories all of which I reviewed images and reports.  No signs or symptoms of recurrence.    Ongoing plan of care per recommendations of Dr. Choudhary at [place] who sees a number of these lymphomas:  \"H&P every three months during the first two years, then every six months starting two years after complete response. Starting at year 5, patients are seen once per year. At these visits we perform a history and physical examination, complete blood count, chemistries, and lactate dehydrogenase. We obtain a CT scan of the chest, abdomen, and pelvis every six months for the first 18 months after documentation of an initial complete response. We only obtain additional positron emission tomography/computed tomography (PET/CT) scans to further evaluate any concerning findings noted on CT scans. We do not obtain routine CT scans after 18 months unless there are signs, symptoms, or exam findings to suggest relapse.\"        Brent Brizuela MD  "

## 2020-08-24 NOTE — TELEPHONE ENCOUNTER
Kathy from [employer] is getting faxes on patient that has a letter to dr. Carlos hair patients pcp . Is this fax suppose to be going to [employer]      Call kathy back at [phone]

## 2020-08-25 ENCOUNTER — APPOINTMENT (OUTPATIENT)
Dept: CT IMAGING | Facility: HOSPITAL | Age: 45
End: 2020-08-25

## 2020-08-28 ENCOUNTER — APPOINTMENT (OUTPATIENT)
Dept: CT IMAGING | Facility: HOSPITAL | Age: 45
End: 2020-08-28

## 2020-08-28 ENCOUNTER — TELEPHONE (OUTPATIENT)
Dept: ONCOLOGY | Facility: CLINIC | Age: 45
End: 2020-08-28

## 2020-08-28 NOTE — TELEPHONE ENCOUNTER
Colleen  from [facility] [employer] (patient's insurance) calling to speak to a nurse about CT patient had scheduled for 8/21.  She needs the CT results.    [phone] Fax  [phone] ext Paola Colleen

## 2020-09-11 PROBLEM — J18.0 BRONCHIAL PNEUMONIA: Status: RESOLVED | Noted: 2019-09-05 | Resolved: 2020-09-11

## 2020-09-11 PROBLEM — A41.9 SEPSIS (HCC): Status: RESOLVED | Noted: 2019-09-05 | Resolved: 2020-09-11

## 2020-09-11 PROBLEM — R59.0 MEDIASTINAL LYMPHADENOPATHY: Status: RESOLVED | Noted: 2019-09-05 | Resolved: 2020-09-11

## 2020-09-11 PROBLEM — R50.9 FEVER: Status: RESOLVED | Noted: 2019-07-08 | Resolved: 2020-09-11

## 2020-09-11 PROBLEM — E87.1 HYPONATREMIA: Status: RESOLVED | Noted: 2019-09-05 | Resolved: 2020-09-11

## 2020-09-11 PROBLEM — D72.829 LEUKOCYTOSIS (LEUCOCYTOSIS): Chronic | Status: RESOLVED | Noted: 2019-06-24 | Resolved: 2020-09-11

## 2020-09-11 PROBLEM — I25.119 CORONARY ARTERY DISEASE INVOLVING NATIVE CORONARY ARTERY OF NATIVE HEART WITH ANGINA PECTORIS: Status: RESOLVED | Noted: 2019-06-24 | Resolved: 2020-09-11

## 2020-09-11 PROBLEM — M89.9 LYTIC BONE LESIONS ON XRAY: Chronic | Status: RESOLVED | Noted: 2019-09-30 | Resolved: 2020-09-11

## 2020-09-11 PROBLEM — B95.61 STAPHYLOCOCCUS AUREUS BACTEREMIA: Status: RESOLVED | Noted: 2019-09-07 | Resolved: 2020-09-11

## 2020-09-11 PROBLEM — R78.81 STAPHYLOCOCCUS AUREUS BACTEREMIA: Status: RESOLVED | Noted: 2019-09-07 | Resolved: 2020-09-11

## 2020-09-11 NOTE — PROGRESS NOTES
[facility] at [facility]  Office Visit Note    DATE: 09/15/2020    IDENTIFICATION: Stephen Stoll is a 45 y.o. male who resides in [place], [place]    REASON FOR VISIT:  • History of myocarditis  • Nonischemic cardiomyopathy            Stephen Stoll returns to the office today for routine follow-up.  The patient was first evaluated by me last summer for symptoms of chest pain and reduced ejection fraction.  He was found to have nonischemic cardiomyopathy and cardiac MR suggestive of myocarditis.  His last echocardiogram performed in August showed normalized LV systolic function.  Since that time, he has been diagnosed with anaplastic ALK positive large cell lymphoma, undergone chemotherapy, and is now in remission.    The patient states he is finally starting to feel good.  He did gain some weight due to high-dose prednisone during his chemotherapy which he is hoping to shed over the next several months.  He denies any unexpected shortness of breath, chest pressure, TIA, or stroke symptoms.  He asks what he should be taking for his mild CAD.  He states his mother had issues with statin therapy.  He is not particularly interested in starting statin therapy now as he is just now feeling better from chemo.    Review of Systems   Constitution: Negative for malaise/fatigue.   Eyes: Negative for vision loss in left eye and vision loss in right eye.   Cardiovascular: Negative for chest pain, dyspnea on exertion, near-syncope, orthopnea, palpitations, paroxysmal nocturnal dyspnea and syncope.   Musculoskeletal: Negative for myalgias.   Neurological: Negative for brief paralysis, excessive daytime sleepiness, focal weakness, numbness, paresthesias and weakness.   All other systems reviewed and are negative.      The patient's past medical, social, family history and ROS reviewed in the patient's electronic medical record.    Allergies   Allergen Reactions   • Ciprofloxacin Anxiety and Hallucinations  "    \"Extreme anxiety and paranoia\"   • Sulfa Antibiotics Rash       No current outpatient medications on file.    Past Medical History:   Diagnosis Date   • Acute kidney injury (CMS/HCC)    • Anaplastic ALK-positive large cell lymphoma (CMS/HCC)    • Bacteremia    • Hyperlipidemia    • Myocarditis (CMS/HCC)    • Pneumonia    • Severe sepsis (CMS/HCC)        Past Surgical History:   Procedure Laterality Date   • APPENDECTOMY     • BACK SURGERY      L5 S1   • BRONCHOSCOPY N/A 7/10/2019    Procedure: BRONCHOSCOPY WITH ENDOBRONCHIAL ULTRASOUND AND TRANSBRONCHIAL BIOPSY AND FLUORO;  Surgeon: Marvel Sandoval MD;  Location:  [facility];  Service: Pulmonary   • CARDIAC CATHETERIZATION N/A 6/24/2019    Procedure: LEFT HEART CATH;  Surgeon: Deep Muñiz IV, MD;  Location:  [facility];  Service: Cardiovascular   • INTERVENTIONAL RADIOLOGY PROCEDURE N/A 8/26/2019    Procedure: THORACIC SPINE BIOSPY;  Surgeon: Juve Avila MD;  Location:  [facility];  Service: Interventional Radiology   • MEDIASTINOSCOPY, BRONCHOSCOPY N/A 9/6/2019    Procedure: BRONCHOSCOPY, MEDIASTINOSCOPY WITH BIOPSY;  Surgeon: El Can MD;  Location:  [facility];  Service: Cardiothoracic       Family History   Problem Relation Age of Onset   • Hyperlipidemia Mother    • Hypertension Father         white coat   • No Known Problems Maternal Grandmother    • Stroke Maternal Grandfather         related to smoking   • Emphysema Maternal Grandfather    • Stroke Paternal Grandmother    • Cancer Paternal Grandfather         throat and mouth       Social History     Tobacco Use   • Smoking status: Never Smoker   • Smokeless tobacco: Never Used   Substance Use Topics   • Alcohol use: No     Frequency: Never     Drinks per session: 1 or 2     Comment: socially           Blood pressure 124/82, pulse 77, temperature 96.8 °F (36 °C), height 182.9 cm (72\"), weight 111 kg (245 lb 3.2 oz), SpO2 98 %.  Body mass " index is 33.26 kg/m².  Vitals:    09/15/20 1031   Patient Position: Sitting       Physical Exam   Constitutional: He is oriented to person, place, and time. He appears well-developed and well-nourished.   HENT:   Head: Normocephalic and atraumatic.   Eyes: Pupils are equal, round, and reactive to light. No scleral icterus.   Neck: No JVD present. Carotid bruit is not present. No thyromegaly present.   Cardiovascular: Normal rate, regular rhythm, S1 normal and S2 normal. Exam reveals no gallop.   No murmur heard.  Pulmonary/Chest: Effort normal and breath sounds normal.   Abdominal: Soft. He exhibits no mass. There is no hepatosplenomegaly. There is no tenderness.   Neurological: He is alert and oriented to person, place, and time.   Skin: Skin is warm and dry. No cyanosis. Nails show no clubbing.   Psychiatric: He has a normal mood and affect. His behavior is normal.       Data Review (reviewed with patient):     Procedures    Lab Results   Component Value Date    GLUCOSE 109 (H) 08/21/2020    BUN 15 08/21/2020    CREATININE 1.01 08/21/2020    EGFRIFNONA 80 08/21/2020    BCR 14.9 08/21/2020    K 4.6 08/21/2020    CO2 27.0 08/21/2020    CALCIUM 9.7 08/21/2020    ALBUMIN 4.50 08/21/2020    ALKPHOS 56 08/21/2020    AST 23 08/21/2020    ALT 24 08/21/2020      Lab Results   Component Value Date    CHOL 191 06/24/2019    TRIG 269 (H) 09/05/2019    HDL 58 06/24/2019     (H) 06/24/2019     Lab Results   Component Value Date    HGBA1C 5.70 (H) 06/24/2019     Lab Results   Component Value Date    WBC 7.91 08/21/2020    HGB 16.0 08/21/2020    HCT 49.2 08/21/2020    MCV 86.2 08/21/2020     08/21/2020     No results found for: TSH         Problem List Items Addressed This Visit        Cardiology Problems    Non-ischemic cardiomyopathy (CMS/HCC) - Primary    Overview     · Cardiac catheterization (6/24/2019): Minimal nonobstructive CAD.    · Cardiac MR (6/26/2019): Diffuse gadolinium enhancement of the myocardium  "consistent with acute myocarditis.  LVEF 42%.  · Echo (7/8/2019): LVEF 55%.  No significant valvular abnormality  · Echo (8/24/2019): LVEF 62%.  No valvular abnormality.         Current Assessment & Plan     · Normalized LV systolic function on most recent echo with no heart failure symptoms presently  · Discontinue metoprolol         Hyperlipidemia LDL goal <100    Overview     · High intensity statin therapy indicated given the presence of CAD         Current Assessment & Plan     · Statin therapy reasonable due to mild CAD  · Patient would like to defer initiating statin therapy for another 6 months as he is just now \"feeling well\"   · CMP, lipids, and CK in 6 months  · At that time, will consider starting low-dose rosuvastatin         Relevant Orders    Comprehensive Metabolic Panel    Lipid Panel    CK               · Discontinue low-dose aspirin and metoprolol  · CMP, lipids, and CK in 6 months  · At next visit, will discuss starting rosuvastatin 5 mg daily  Return in about 6 months (around 3/15/2021) for Follow-up with [facility] only.      Deep Muñiz IV MD, FACC, [facility]  9/15/2020    "

## 2020-09-15 ENCOUNTER — OFFICE VISIT (OUTPATIENT)
Dept: CARDIOLOGY | Facility: CLINIC | Age: 45
End: 2020-09-15

## 2020-09-15 VITALS
BODY MASS INDEX: 33.21 KG/M2 | HEIGHT: 72 IN | WEIGHT: 245.2 LBS | TEMPERATURE: 96.8 F | SYSTOLIC BLOOD PRESSURE: 124 MMHG | HEART RATE: 77 BPM | DIASTOLIC BLOOD PRESSURE: 82 MMHG | OXYGEN SATURATION: 98 %

## 2020-09-15 DIAGNOSIS — I42.8 NON-ISCHEMIC CARDIOMYOPATHY (HCC): Primary | ICD-10-CM

## 2020-09-15 DIAGNOSIS — E78.5 HYPERLIPIDEMIA LDL GOAL <100: ICD-10-CM

## 2020-09-15 PROBLEM — I51.4 MYOCARDITIS (HCC): Status: RESOLVED | Noted: 2019-06-26 | Resolved: 2020-09-15

## 2020-09-15 PROBLEM — M46.20 VERTEBRAL OSTEOMYELITIS: Status: RESOLVED | Noted: 2019-08-21 | Resolved: 2020-09-15

## 2020-09-15 PROBLEM — M08.20 STILL'S DISEASE: Status: RESOLVED | Noted: 2019-07-11 | Resolved: 2020-09-15

## 2020-09-15 PROCEDURE — 99213 OFFICE O/P EST LOW 20 MIN: CPT | Performed by: INTERNAL MEDICINE

## 2020-09-15 NOTE — ASSESSMENT & PLAN NOTE
· Normalized LV systolic function on most recent echo with no heart failure symptoms presently  · Discontinue metoprolol

## 2020-09-15 NOTE — ASSESSMENT & PLAN NOTE
"· Statin therapy reasonable due to mild CAD  · Patient would like to defer initiating statin therapy for another 6 months as he is just now \"feeling well\"   · CMP, lipids, and CK in 6 months  · At that time, will consider starting low-dose rosuvastatin  "

## 2021-02-15 DIAGNOSIS — C84.60 ANAPLASTIC ALK-POSITIVE LARGE CELL LYMPHOMA, UNSPECIFIED BODY REGION (HCC): Primary | Chronic | ICD-10-CM

## 2021-03-04 ENCOUNTER — HOSPITAL ENCOUNTER (OUTPATIENT)
Dept: CT IMAGING | Facility: HOSPITAL | Age: 46
Discharge: HOME | End: 2021-03-04

## 2021-03-04 ENCOUNTER — LAB (OUTPATIENT)
Dept: LAB | Facility: HOSPITAL | Age: 46
End: 2021-03-04

## 2021-03-04 DIAGNOSIS — C84.60 ANAPLASTIC ALK-POSITIVE LARGE CELL LYMPHOMA, UNSPECIFIED BODY REGION (HCC): Chronic | ICD-10-CM

## 2021-03-04 DIAGNOSIS — C84.60 ANAPLASTIC ALK-POSITIVE LARGE CELL LYMPHOMA, UNSPECIFIED BODY REGION (HCC): ICD-10-CM

## 2021-03-04 LAB
ALBUMIN SERPL-MCNC: 4.6 G/DL (ref 3.5–5.2)
ALBUMIN/GLOB SERPL: 1.6 G/DL
ALP SERPL-CCNC: 69 U/L (ref 39–117)
ALT SERPL W P-5'-P-CCNC: 22 U/L (ref 1–41)
ANION GAP SERPL CALCULATED.3IONS-SCNC: 9 MMOL/L (ref 5–15)
AST SERPL-CCNC: 28 U/L (ref 1–40)
BASOPHILS # BLD AUTO: 0.07 10*3/MM3 (ref 0–0.2)
BASOPHILS NFR BLD AUTO: 0.8 % (ref 0–1.5)
BILIRUB SERPL-MCNC: 0.4 MG/DL (ref 0–1.2)
BUN SERPL-MCNC: 12 MG/DL (ref 6–20)
BUN/CREAT SERPL: 12 (ref 7–25)
CALCIUM SPEC-SCNC: 10.1 MG/DL (ref 8.6–10.5)
CHLORIDE SERPL-SCNC: 102 MMOL/L (ref 98–107)
CO2 SERPL-SCNC: 28 MMOL/L (ref 22–29)
CREAT SERPL-MCNC: 1 MG/DL (ref 0.76–1.27)
DEPRECATED RDW RBC AUTO: 42.5 FL (ref 37–54)
EOSINOPHIL # BLD AUTO: 0.3 10*3/MM3 (ref 0–0.4)
EOSINOPHIL NFR BLD AUTO: 3.5 % (ref 0.3–6.2)
ERYTHROCYTE [DISTWIDTH] IN BLOOD BY AUTOMATED COUNT: 13.5 % (ref 12.3–15.4)
GFR SERPL CREATININE-BSD FRML MDRD: 80 ML/MIN/1.73
GLOBULIN UR ELPH-MCNC: 2.9 GM/DL
GLUCOSE SERPL-MCNC: 99 MG/DL (ref 65–99)
HCT VFR BLD AUTO: 48 % (ref 37.5–51)
HGB BLD-MCNC: 15.5 G/DL (ref 13–17.7)
IMM GRANULOCYTES # BLD AUTO: 0.03 10*3/MM3 (ref 0–0.05)
IMM GRANULOCYTES NFR BLD AUTO: 0.3 % (ref 0–0.5)
LDH SERPL-CCNC: 203 U/L (ref 135–225)
LYMPHOCYTES # BLD AUTO: 2.64 10*3/MM3 (ref 0.7–3.1)
LYMPHOCYTES NFR BLD AUTO: 30.6 % (ref 19.6–45.3)
MCH RBC QN AUTO: 27.8 PG (ref 26.6–33)
MCHC RBC AUTO-ENTMCNC: 32.3 G/DL (ref 31.5–35.7)
MCV RBC AUTO: 86 FL (ref 79–97)
MONOCYTES # BLD AUTO: 0.81 10*3/MM3 (ref 0.1–0.9)
MONOCYTES NFR BLD AUTO: 9.4 % (ref 5–12)
NEUTROPHILS NFR BLD AUTO: 4.78 10*3/MM3 (ref 1.7–7)
NEUTROPHILS NFR BLD AUTO: 55.4 % (ref 42.7–76)
NRBC BLD AUTO-RTO: 0 /100 WBC (ref 0–0.2)
PLATELET # BLD AUTO: 233 10*3/MM3 (ref 140–450)
PMV BLD AUTO: 11.8 FL (ref 6–12)
POTASSIUM SERPL-SCNC: 4.4 MMOL/L (ref 3.5–5.2)
PROT SERPL-MCNC: 7.5 G/DL (ref 6–8.5)
RBC # BLD AUTO: 5.58 10*6/MM3 (ref 4.14–5.8)
SODIUM SERPL-SCNC: 139 MMOL/L (ref 136–145)
WBC # BLD AUTO: 8.63 10*3/MM3 (ref 3.4–10.8)

## 2021-03-04 PROCEDURE — 36415 COLL VENOUS BLD VENIPUNCTURE: CPT

## 2021-03-04 PROCEDURE — 25010000002 IOPAMIDOL 61 % SOLUTION: Performed by: INTERNAL MEDICINE

## 2021-03-04 PROCEDURE — 74177 CT ABD & PELVIS W/CONTRAST: CPT

## 2021-03-04 PROCEDURE — 83615 LACTATE (LD) (LDH) ENZYME: CPT

## 2021-03-04 PROCEDURE — 80053 COMPREHEN METABOLIC PANEL: CPT

## 2021-03-04 PROCEDURE — 85025 COMPLETE CBC W/AUTO DIFF WBC: CPT

## 2021-03-04 PROCEDURE — 71260 CT THORAX DX C+: CPT

## 2021-03-04 RX ADMIN — IOPAMIDOL 95 ML: 612 INJECTION, SOLUTION INTRAVENOUS at 15:28

## 2021-03-05 ENCOUNTER — TELEPHONE (OUTPATIENT)
Dept: ONCOLOGY | Facility: CLINIC | Age: 46
End: 2021-03-05

## 2021-03-05 NOTE — TELEPHONE ENCOUNTER
Caller: Colleen    Relationship: RN CM from [employer]    Best call back number: [phone] ext [phone]    What test was performed: CT    When was the test performed: 03/04    Where was the test performed:  [facility]    Additional notes: Fax# [phone]

## 2021-03-08 ENCOUNTER — TELEMEDICINE (OUTPATIENT)
Dept: ONCOLOGY | Facility: CLINIC | Age: 46
End: 2021-03-08

## 2021-03-08 VITALS — BODY MASS INDEX: 29.29 KG/M2 | WEIGHT: 216 LBS

## 2021-03-08 DIAGNOSIS — Z85.79 HISTORY OF LYMPHOMA: Primary | ICD-10-CM

## 2021-03-08 PROCEDURE — 99213 OFFICE O/P EST LOW 20 MIN: CPT | Performed by: NURSE PRACTITIONER

## 2021-03-08 NOTE — PROGRESS NOTES
Telehealth follow-up visit: This was an audio and video enabled telemedicine encounter via [email or web address].  Done for avoidance of Covid 19 per CDC guidelines.  Verbal consent given. D/W pt 15 min > 50% counseling re prognosis and plan.    CHIEF COMPLAINT: Follow-up lymphoma    Problem List:  Oncology/Hematology History Overview Note   1. 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 Leukocytosis  3. FUO  4. Decreased ejection fraction with evidence of myocarditis  5. Marked elevation of sedimentation rate and C-reactive protein  6. Elevated triglycerides on fasting  7. Acute renal failure resolved with hydration    -8/23/2019 initial inpatient [facility] medical oncology consultation.  Asked to see regarding leukocytosis.  He had upper respiratory infection with pneumonia, chest pains, elevated troponins with diagnosis of myocarditis and decreased ejection fraction with fevers to 104.  Tentatively diagnosed with still's disease and treated with prednisone with some improvement in fevers as well as improvement in a rash.  By the end of July, he had severe mid back pain relatively acute and onset that progressed over time with imaging of the area showing progressive abnormality of both bone and marrow signals in the spine that could be malignant marrow involvement or, more likely per radiologist, infectious.  Sedimentation rate was over 100 with a C-reactive protein elevated and white count 20-30,000 for the better part of 2 months.  Slight left shift but no immature forms.  Broad-spectrum serologic testing has not shown pathogens as of the initial consultation.  I ordered bone marrow biopsy and Dr. Jimenez coordinated  spine biopsy  -6/24/2019 CT chest abdomen pelvis without contrast showed no parenchymal lung disease but a 1.6 cm mildly enlarged centrally necrotic right paratracheal node with no other adenopathy or hepatosplenomegaly.  Ejection fraction 35% on echocardiogram.  -6/28/2019 MRI brain and cervical spine without contrast normal  -7/9/2019 fluoroscopy guided right joint aspiration unrevealing  -7/10/2019 fluoroscopic guided bronchoscopy showed no pathogens or pathology  -8/9/2019 MRI thoracic and lumbar spine showed low T1 signals in T8 and T9 with increased T2 involvement concerning for aggressive marrow signal findings that could represent lipid poor spinal hemangioma but concerning for metastasis versus multiple myeloma.  Contrast MRI recommended.  -8/21/2019 MRI thoracic spine with and without contrast showed multiple foci of abnormal marrow signal within the thoracic spine was prominent T8 and T9 with interval increase compared to 8/9/2019 with deformity of the endplates at T8 and T9 but no definitive fracture or discitis.  Several smaller additional lesions suspected at T1, T2, T4, T5, and T12 and the differential would include multifocal vertebral osteomyelitis versus metastasis.  -8/23/2019 bone marrow biopsy showed mild polyclonal plasmacytosis with normocellular marrow and trilineage hematopoiesis with mild myeloid expansion.  No lymphoid infiltrate and no organisms for fungi, acid fast bacilli, tumor, or granuloma.  Bennett 2- negative.  -8/24/2019 ejection fraction up to 65%  -8/26/2019 T8 biopsy show focal area of markedly atypical appearance.  There are inflammatory cells with occasional eosinophils and larger cells.  Some focal hemosiderin.  There is equivocal positivity for CD4, , CD45, and Constance-Barr virus.  It is positive for CD30, ALK 1, EMA, and vimentin.  No staining for CD 5, 7, 3, 20, or 15.  No staining for cytokeratin, SOX 10, , or CD1a.  Outside referral to [facility]  pending.  -8/30/2019 [facility] oncology outpatient follow-up visit: Anaplastic large cell lymphoma ALK positive just involving the bone would be unusual but not impossible I suppose.  We will get PET scan.  Were this hemophagocytic syndrome I would have expected his marrow to be abnormal along with splenomegaly and cytopenias as would the marrow be abnormal for this a plasma cell dyscrasia and the plasma cells were polyclonal.  I will check his SIEP along with triglycerides, peripheral blood flow cytometry for soluble CD 25, ferritin, and NK cell activity.  Once I have that information, hopefully we will have more information back from [facility] and unlikely to involve my colleagues at the [facility] in this process if this is an ALK positive anaplastic large cell lymphoma without kia involvement given the unusual nature and appearance of this.    -9/6/2019 subsequent oncology reconsultation for readmission: Met patient with his last admission.  He has had a 4-month history of fevers with upper respiratory infection and possible pneumonia.  Found to have myocarditis with low ejection fraction with extensive infectious disease work-up negative.  A month later readmitted with shortness of breath and arthralgias with some redness in his wrist.  Bronchoscopy was negative and he was treated with some steroids.  On his third admission with relatively recent acute onset back pain he was found to have some spine lesions.  The bone marrow biopsy was unrevealing,  a T8 biopsy by Dr. Wilde showed markedly atypical appearing cells in a background of eosinophils.  The larger cells were positive for ALK 1, CD30, EMA, and vimentin.  No staining for CD 5, 7, 3, 20, or 15.  No staining for cytokeratin, sox 10, , or CD1a.  Tissue has been sent for second opinion at [facility].  Anaplastic large cell lymphoma ALK positive was considered but given that his fevers which started months back included testing with CT  chest abdomen pelvis 6/24/2019 only showing mildly enlarged centrally necrotic right lower paratracheal node of indeterminate significance and no other significant adenopathy or splenomegaly and no pulmonary infiltrate or mediastinal adenopathy on repeat CT chest 7/9/2019, PET scan was ordered.  Before PET could be completed and GI evaluation completed for evaluation of possible GI source for kia procurement, the patient on the day he was due for endoscopy continue to have fevers with hypotension and a pulse in the 150s.  9/5/2019 CT chest abdomen and pelvis on admission now shows showed new numerous bilateral pulmonary lung nodules in the right lung measuring 2 cm without focal pneumonia.  There was extensive axillary, mediastinal adenopathy the largest in the right paratracheal area 2.1 cm with central necrosis.  There were hypoattenuating regions in the right hepatic lobe measuring up to 2.5 cm incompletely assessed due to lack of contrast due to his creatinine on admission 9/5/2019 being up to 1.67 down to 0.94 by the morning of 9/6/2019.  CT also showed new extensive axillary, mediastinal, and abdominopelvic lymphadenopathy.  There was redemonstration of the T8-T9 aggressive osseous lytic lesions involving thoracolumbar spine and multiple levels.  Nonspecific perinephric inflammation also seen possibly representing pyelonephritis.  Laboratory testing on admission 9/5/2019 showed C-reactive protein up to 37.66 with sedimentation rate greater than 130.  Procalcitonin over 38 with lactic acid  4.8.   normal with normal troponin.  Fasting triglyceride up to 269 and peripheral blood flow cytometry sent for NK cell activity as well as CD25 for IL-2 receptor for the possibility of hemophagocytic syndrome which would be unusual without hemophagocytosis bone marrow.  With the eruption of lymphadenopathy, the likelihood of an ALK positive anaplastic large cell lymphoma is more likely but we should be able to get  tissue for definitive results and plans.  We will get echocardiogram and hold on the GI evaluation and I discussed in person with Dr. Benja Can to see for procurement of lymph node.  He is going to go after upper mediastinal lymph node.  Once we have more tissue then we will make further plans for treatment.  His ejection fraction on 8/24/2019 was 62%.  Assuming that we find lymphoma and not some aggressive infectious disease process, he will need a port as well but we will wait on the port until we know what we are dealing with.  My partners are on board and will cover me this weekend and I will check back Monday to see where we stand.  He already has an appointment with Dr. Roosevelt Nguyen at  on the 11th who I was enlisting to help sort this out but we may be able to get our answers now that we have node to go after.  I may yet need his services depending on the ilk of this lymphoma and whether or not we would need to consolidate aggressively or treat more aggressively than with standard chemotherapy upfront.  I am starting him on allopurinol.  He already has a rash on his upper thighs and heels and around the area of his prior bone marrow biopsy but I suspect that just related to this probable lymphomatous process or perhaps the recent Bactrim started by Dr. Hauser.  His procalcitonin is quite high and I am still concerned about concomitant infection and I am not sure if the procalcitonin it can be falsely dramatically elevated in the face of lymphoma.  If this is anaplastic large cell lymphoma, with his ECOG performance status generally less than 2 and age less than 60 but with at least stage III disease, even if his LDH is high, he would at least have a risk score of 2 which would put him at the low intermediate risk on the IPI index.  Whether we would use Rituxan or not depends on if this turns out to be CD20 positive where his initial bone biopsy was not.  Given that it is CD30 positive, will probably  use brentuximab, Cytoxan, Adriamycin, prednisone every 3 weeks x 6 cycles.  This usually is given without vincristine but with the Brentuximab 1.8 mg/kg IV day 1 of each cycle.  A progression free survival was seen both in ALK positive and negative anaplastic large cell lymphomas treated with brentuximab rather than vincristine.  Neuropathy still can be an issue and diarrhea is more common.  If this is anaplastic large cell lymphoma ALK positive, autologous stem cell transplant may be considered in patients over 40 years of age with an IPI score greater than or equal to 3 but generally not if the IPI score is less than 3 so we will assess that very carefully.  Despite the elevated fasting triglyceride recently, I doubt this is hemophagocytic syndrome given the negative bone marrow and now with the blossoming lymphadenopathy.  But we shall see.    -9/30/2019 outpatient medical oncology follow-up visit:  He was breathing much better the first week after his chemotherapy but then the shortness of breath worsened again his pulse increased in the 120s to 140s.  Saw Dr. Dumont in my team here as well and received IV fluids.  Slowly that improved.  In the prior 5 days he has been feeling better and no tachycardia for the last week.  He has had no fever since discharge from the hospital.  He has 1 more week of antibiotics.  His current complaints are:    Occasional bloody mucus from his nose    Alternating diarrhea and constipation better with stool softeners    Low back pain worsening with Neupogen but only on oxycodone rarely and none in the last 48 hours.   Bilateral knee pain without swelling on allopurinol    Itching but prior rash resolved   Mild gait instability due to weakness worse with showering and due for follow-up with Dr. Jimenez for serial MRIs   Labile moods but without jason depression worse with steroids now down to 20 mg    I addressed all these issues with him today my plan is:   To get a PET prior to  return for course #3 of chemotherapy with brentuximab, Cytoxan, Adriamycin, prednisone   Consult Dr. Carrion for possible consolidation transplant in the future   Add Xgeva for the lytic involvement of his bone   Taper the steroids down to 20 mg for 5 days 10 mg for 5 days 5 mg for 5 days and then off following the 100 mg of prednisone with this course of therapy and then hopefully no further steroids though he will follow with rheumatology  Follow-up with Dr. Jimenez for serial spine MRIs.    -10/21/2019 medical oncology office visit: 10/17/2019 PET scan was negative.  All prior CT abnormalities have resolved.  He is due for follow-up with Dr. Jimenez for spine MRI in about a month.  He has seen Dr. Carrion.  I do not have those notes but according to the patient there are no plans for stem cell transplant in first remission.  I have left a message with Dr. Carrion raising the question of whether spinal fluid analysis or MRI of brain would be in order but he feels great has no neurologic symptoms and his only complaint presently is mid back pain which clearly is improving over time.  Unless Dr. Carrion has other ideas, my plan would be to continue on with courses 3 and 4 of Cytoxan Hilario Brentuximab prednisone, repeat the PET, proceed with courses 5 and 6, repeat the PET again, and assuming that everything is negative at that junction then go to routine survivorship.    -12/20/2019 medical oncology office visit: 12/10/2019 PET scan shows just active marrow response with no active lymphoma or recurrence.  He will call Dr. Carrion for an appointment after course #6 12/23/2019 to discuss definitively whether there is any plans for consolidation but as far as I am aware there is no plan for stem cell transplant autologous at this junction.  We will get his PET 8 weeks out from this final dose of of brentuximab Cytoxan, Adriamycin, prednisone.    -2/27/2020 medical oncology follow-up visit:  Follow-up  "per Dr. Choudhary from [facility] on these individuals is as follows:     \"schedule patient visits every three months during the first two years, then every six months starting two years after complete response. Starting at year 5, patients are seen once per year. At these visits we perform a history and physical examination, complete blood count, chemistries, and lactate dehydrogenase. We obtain a CT scan of the chest, abdomen, and pelvis every six months for the first 18 months after documentation of an initial complete response. We only obtain additional positron emission tomography/computed tomography (PET/CT) scans to further evaluate any concerning findings noted on CT scans. We do not obtain routine CT scans after 18 months unless there are signs, symptoms, or exam findings to suggest relapse.\"     -8/24/2020 [facility] oncology follow-up visit: I reviewed reports and images of 8/21/2020 CT chest abdomen pelvis which showed no evidence of lymphoma.  Hemoglobin 16 with normal CBC and  normal.  CMP showed glucose 109 otherwise unremarkable.  Pursuant to the above guidelines he will see my nurse practitioner back in 3 months with CBC, CMP, and LDH and will get CT scanning 1 more time 6 months from now pursuant to the above guidelines and after that we will just follow-up clinically with physical exams as outlined above along with labs as outlined above           Anaplastic ALK-positive large cell lymphoma    6/28/2019 Imaging    MRI of the brain negative     8/9/2019 Imaging    MRI thoracic and lumbar spine IMPRESSION:  1.  Abnormal ill-defined low T1 signal lesions in the T8 and T9  vertebral bodies with increased T2 involvement concerning for aggressive  bone marrow signal findings and although this may represent lipid poor  spinal hemangiomas concern for metastasis versus myeloma remains a  consideration given overall appearance on this noncontrast evaluation.  Contrast-enhanced examination of the thoracic " spine and/or nuclear  medicine bone scan may be useful for further evaluation.     2. Lumbar spine with degenerative changes, however, no aggressive bone  marrow signal findings or acute fracture. No significant spinal canal or  neuroforaminal stenosis present.     8/24/2019 -  Other Event    Echocardiogram: Estimated EF 62%.     8/26/2019 Initial Diagnosis    Anaplastic ALK-positive large cell lymphoma      8/26/2019 Biopsy    T8 vertebral biopsy positive for anaplastic large cell lymphoma, ALK-positive.     9/5/2019 Imaging    CT chest, abdomen and pelvis IMPRESSION:     Constellation of findings concerning for metastatic disease:      - Interval development of numerous pulmonary nodules throughout the  lungs with the largest measuring 2.0 cm within the right midlung and  concerning for underlying pulmonary metastasis and which may account for  abnormality identified in the chest radiograph performed 09/05/2019.     - Extensive axillary, mediastinal, and abdominopelvic lymphadenopathy as  described above.     - Hypoattenuated regions involving the liver concerning for additional  sites of metastatic disease, although are limited in evaluation  secondary to lack of intravenous contrast. Attention on follow-up  imaging is advised.     - Redemonstration of T8/T9 aggressive osseous lesions with additional  lytic lesion involving the L4 vertebral body and additional  subcentimeter lytic lesions throughout the thoracolumbar spine  suggested.     Nonspecific perinephric inflammation. Correlate with urinalysis to  exclude underlying pyelonephritis.     9/6/2019 Biopsy    Mediastinal lymph node excisional biopsy positive for ALK-1 positive anaplastic large cell lymphoma     9/9/2019 -  Chemotherapy    OP LYMPHOMA Brentuximab, Cyclophosphamide / DOXOrubicin / predniSONE / Pegfilgrastim       10/17/2019 Imaging    PET/CT IMPRESSION:  1. Essentially negative PET scan for active lymphoma. CT scan findings  from 09/05/2019  appear to have largely resolved as well.  2. Small focus of mild activity in the right groin appears to reflect  calcific tendinosis of the distal right iliopsoas muscle.  3. Probable marrow stimulation effect.     11/19/2019 Imaging    MRI T-spine shows progressive T8 and 9 compression pathologic fracture and bone and bone marrow activity.  Dr. Jimenez reviewed and thought this was his treated disease.  And plans on serial MRI imaging.     12/10/2019 Imaging    PET/CT IMPRESSION:  1. No convincing evidence of abnormal FDG activity as visualized to  suggest recurrent disease or lymphomatous involvement.     2. Redemonstration of diffuse osseous marrow activity throughout the  axial skeleton most consistent with marrow stimulation effect similar to  prior exam with redemonstration of compression fractures of the lower  thoracic spine better seen on the MRI performed 11/19/2019.     2/20/2020 Imaging    PET negative with resolution of prior marrow abnormality.     2/27/2020 Cancer Staged    Staging form: Hodgkin And Non-Hodgkin Lymphoma, AJCC 8th Edition  - Clinical: Stage IV - Signed by Brent Brizuela MD on 2/27/2020 8/21/2020 Imaging    -8/21/2020 CT chest abdomen pelvis showed no evidence of lymphoma.  Hemoglobin 16 with normal CBC and  normal.  CMP showed glucose 109 otherwise unremarkable.     3/4/2021 Imaging    CT chest, abdomen and pelvis IMPRESSION:  Stable and essentially normal CT of the chest, abdomen and  pelvis with no evidence of recurrent or metastatic lymphomatous disease.  , CBC and CMP also normal.          HISTORY OF PRESENT ILLNESS:  The patient is a 45 y.o. male, here for follow up on management of history of lymphoma as outlined above.  Stephen has been doing well since we saw him last with no new concerns.  He is staying fit, he is exercising on a regular basis and has changed his eating habits, his weight is now down to 216 pounds through diet and exercise.  He has a follow-up  "with cardiology in May and they are going to recheck his lipid profile, he is hoping to avoid having to go on medication.  Denies any fevers, chills or bed drenching night sweats.    Past Medical History:   Diagnosis Date   • Acute kidney injury (CMS/HCC)    • Anaplastic ALK-positive large cell lymphoma (CMS/HCC)    • Bacteremia    • Hyperlipidemia    • Myocarditis (CMS/HCC)    • Pneumonia    • Severe sepsis (CMS/HCC)      Past Surgical History:   Procedure Laterality Date   • APPENDECTOMY     • BACK SURGERY      L5 S1   • BRONCHOSCOPY N/A 7/10/2019    Procedure: BRONCHOSCOPY WITH ENDOBRONCHIAL ULTRASOUND AND TRANSBRONCHIAL BIOPSY AND FLUORO;  Surgeon: Marvel Sandoval MD;  Location: [facility] ENDOSCOPY;  Service: Pulmonary   • CARDIAC CATHETERIZATION N/A 6/24/2019    Procedure: LEFT HEART CATH;  Surgeon: Deep Muñiz IV, MD;  Location:  [facility] CATH INVASIVE LOCATION;  Service: Cardiovascular   • INTERVENTIONAL RADIOLOGY PROCEDURE N/A 8/26/2019    Procedure: THORACIC SPINE BIOSPY;  Surgeon: Juve Avila MD;  Location:  [facility] INVASIVE [facility]ATION;  Service: Interventional Radiology   • MEDIASTINOSCOPY, BRONCHOSCOPY N/A 9/6/2019    Procedure: BRONCHOSCOPY, MEDIASTINOSCOPY WITH BIOPSY;  Surgeon: El Can MD;  Location:  [facility] OR;  Service: Cardiothoracic       Allergies   Allergen Reactions   • Ciprofloxacin Anxiety and Hallucinations     \"Extreme anxiety and paranoia\"   • Sulfa Antibiotics Rash       Family History and Social History reviewed and changed as necessary      REVIEW OF SYSTEM:   Review of Systems   Constitutional: Negative for appetite change, chills, diaphoresis, fatigue, fever and unexpected weight change.        PHYSICAL EXAM    Vitals:    03/08/21 0833   Weight: 98 kg (216 lb)     There were no vitals filed for this visit.     Constitutional: Appears well-developed and well-nourished. No distress.     Labs reviewed.  Recent Results (from the past 168 hour(s))   Comprehensive " Metabolic Panel    Collection Time: 03/04/21  3:36 PM    Specimen: Blood   Result Value Ref Range    Glucose 99 65 - 99 mg/dL    BUN 12 6 - 20 mg/dL    Creatinine 1.00 0.76 - 1.27 mg/dL    Sodium 139 136 - 145 mmol/L    Potassium 4.4 3.5 - 5.2 mmol/L    Chloride 102 98 - 107 mmol/L    CO2 28.0 22.0 - 29.0 mmol/L    Calcium 10.1 8.6 - 10.5 mg/dL    Total Protein 7.5 6.0 - 8.5 g/dL    Albumin 4.60 3.50 - 5.20 g/dL    ALT (SGPT) 22 1 - 41 U/L    AST (SGOT) 28 1 - 40 U/L    Alkaline Phosphatase 69 39 - 117 U/L    Total Bilirubin 0.4 0.0 - 1.2 mg/dL    eGFR Non African Amer 80 >60 mL/min/1.73    Globulin 2.9 gm/dL    A/G Ratio 1.6 g/dL    BUN/Creatinine Ratio 12.0 7.0 - 25.0    Anion Gap 9.0 5.0 - 15.0 mmol/L   Lactate Dehydrogenase    Collection Time: 03/04/21  3:36 PM    Specimen: Blood   Result Value Ref Range     135 - 225 U/L   CBC Auto Differential    Collection Time: 03/04/21  3:36 PM    Specimen: Blood   Result Value Ref Range    WBC 8.63 3.40 - 10.80 10*3/mm3    RBC 5.58 4.14 - 5.80 10*6/mm3    Hemoglobin 15.5 13.0 - 17.7 g/dL    Hematocrit 48.0 37.5 - 51.0 %    MCV 86.0 79.0 - 97.0 fL    MCH 27.8 26.6 - 33.0 pg    MCHC 32.3 31.5 - 35.7 g/dL    RDW 13.5 12.3 - 15.4 %    RDW-SD 42.5 37.0 - 54.0 fl    MPV 11.8 6.0 - 12.0 fL    Platelets 233 140 - 450 10*3/mm3    Neutrophil % 55.4 42.7 - 76.0 %    Lymphocyte % 30.6 19.6 - 45.3 %    Monocyte % 9.4 5.0 - 12.0 %    Eosinophil % 3.5 0.3 - 6.2 %    Basophil % 0.8 0.0 - 1.5 %    Immature Grans % 0.3 0.0 - 0.5 %    Neutrophils, Absolute 4.78 1.70 - 7.00 10*3/mm3    Lymphocytes, Absolute 2.64 0.70 - 3.10 10*3/mm3    Monocytes, Absolute 0.81 0.10 - 0.90 10*3/mm3    Eosinophils, Absolute 0.30 0.00 - 0.40 10*3/mm3    Basophils, Absolute 0.07 0.00 - 0.20 10*3/mm3    Immature Grans, Absolute 0.03 0.00 - 0.05 10*3/mm3    nRBC 0.0 0.0 - 0.2 /100 WBC     CT Chest With Contrast Diagnostic    Result Date: 3/5/2021  Stable and essentially normal CT of the chest, abdomen and  pelvis with no evidence of recurrent or metastatic lymphomatous disease.   This report was finalized on 3/5/2021 12:48 PM by Jose Guadalupe Sarmiento.      CT Abdomen Pelvis With Contrast    Result Date: 3/5/2021  Stable and essentially normal CT of the chest, abdomen and pelvis with no evidence of recurrent or metastatic lymphomatous disease.   This report was finalized on 3/5/2021 12:48 PM by Jose Guadalupe Sarmiento.          ASSESSMENT & PLAN:  1. Stage IV ALK positive CD30 positive, CD20 negative anaplastic large cell lymphoma involving bone and marrow on biopsies presenting with abnormal counts and bone involvement with rapid development of bulky adenopathy with subsequent complete remission.  2. Leukocytosis resolved  3. FUO resolved  4. Marked elevated sedimentation rate and C-reactive protein resolved  5. Hypertriglyceridemia  6. Acute renal failure resolved  7. Myocarditis with decreased ejection fraction but normal EF 62% on 8/24/2019 echocardiogram    Discussion: Stephen continues to do well with no evidence of disease recurrence, we reviewed the results of current CT scan which is negative, labs are normal.  He has no new worrisome symptoms.  At this time we will now go to follow-up with H&P every 6 months out to 5 years and then annually, we will do blood work at each visit but no plans on routine imaging going forward in the absence of new symptoms.  We will get CBC, CMP and LDH on return and I have ordered those today.  He has received his first COVID vaccine without complication, scheduled for his second on the 19th.    Return to clinic in 6 months for follow-up.    Bing Sanchez, APRN   03/08/2021

## 2021-05-03 NOTE — PROGRESS NOTES
"[facility]      Identification: Stephen Stoll is a 45 y.o. male who resides in [place], [place]    Reason for visit:  Non-ischemic cardiomyopathy (CMS/HCC)      Subjective      Stephen Stoll presents to [facility] for followup.    He denies any heart issues, except for one episode of chest tightness. He states he was walking a few weeks ago and had a \"little tight feeling\" in his chest that resolved soon after and did not reoccur. The patient denies any shortness of breath. He has been working on weight loss with exercise, walking 3 miles daily, and a healthy diet.     He has finished chemotherapy and his last treatment was 12/23/2019. He has been told that if he reaches 2 years without recurrence, then the odds of recurrence drop to around 5 percent.               Review of Systems   Cardiovascular: Negative.    Respiratory: Negative.        Objective     /72 (BP Location: Right arm, Patient Position: Sitting, Cuff Size: Adult)   Temp 96.8 °F (36 °C)   Ht 182.9 cm (72\")   Wt 89.8 kg (198 lb)   SpO2 98%   BMI 26.85 kg/m²       Constitutional:       Appearance: Healthy appearance. Well-developed.   Eyes:      General: No scleral icterus.  HENT:      Head: Normocephalic and atraumatic.   Neck:      Vascular: No carotid bruit or JVD. JVD normal.   Pulmonary:      Effort: Pulmonary effort is normal.      Breath sounds: Normal breath sounds.   Cardiovascular:      Normal rate. Regular rhythm.      Murmurs: There is no murmur.      No gallop.   Musculoskeletal:      Extremities: No clubbing present.Skin:     General: Skin is warm and dry. There is no cyanosis.   Neurological:      Mental Status: Alert.   Psychiatric:         Attention and Perception: Attention normal.         Behavior: Behavior normal.         Result Review :    Lab Results   Component Value Date    GLUCOSE 99 03/04/2021    BUN 12 03/04/2021    CREATININE 1.00 03/04/2021    EGFRIFNONA 80 03/04/2021    BCR 12.0 " 03/04/2021    K 4.4 03/04/2021    CO2 28.0 03/04/2021    CALCIUM 10.1 03/04/2021    ALBUMIN 4.60 03/04/2021    AST 28 03/04/2021    ALT 22 03/04/2021     Lab Results   Component Value Date    WBC 8.63 03/04/2021    HGB 15.5 03/04/2021    HCT 48.0 03/04/2021    MCV 86.0 03/04/2021     03/04/2021     Lab Results   Component Value Date    CHOL 191 06/24/2019    TRIG 269 (H) 09/05/2019    HDL 58 06/24/2019     (H) 06/24/2019             Assessment     Problem List Items Addressed This Visit        Cardiology Problems    Non-ischemic cardiomyopathy (CMS/HCC) - Primary    Overview     · Cardiac catheterization (6/24/2019): Minimal nonobstructive CAD.    · Cardiac MR (6/26/2019): Diffuse gadolinium enhancement of the myocardium consistent with acute myocarditis.  LVEF 42%.  · Echo (7/8/2019): LVEF 55%.  No significant valvular abnormality  · Echo (8/24/2019): LVEF 62%.  No valvular abnormality.         Hyperlipidemia LDL goal <100    Overview     · High intensity statin therapy indicated given the presence of CAD               Plan   • Obtain CMP and lipids with upcoming blood work  • Estimated 10-year ASCVD risk to determine whether statin therapy reasonable for primary prevention      Follow-up   Return in about 1 year (around 5/4/2022).        Fredy Muñiz MD, [facility], [facility]  5/4/2021    Scribed for Deep Muñiz IV, MD by Ana Paula Arizmendi.  05/04/21   13:18 EDT    I have personally performed the services described in this document as scribed by the above individual, and it is both accurate and complete.  Deep Muñiz IV, MD  5/4/2021  16:32 EDT

## 2021-05-04 ENCOUNTER — OFFICE VISIT (OUTPATIENT)
Dept: CARDIOLOGY | Facility: CLINIC | Age: 46
End: 2021-05-04

## 2021-05-04 VITALS
HEIGHT: 72 IN | SYSTOLIC BLOOD PRESSURE: 124 MMHG | OXYGEN SATURATION: 98 % | TEMPERATURE: 96.8 F | BODY MASS INDEX: 26.82 KG/M2 | WEIGHT: 198 LBS | DIASTOLIC BLOOD PRESSURE: 72 MMHG

## 2021-05-04 DIAGNOSIS — I42.8 NON-ISCHEMIC CARDIOMYOPATHY (HCC): Primary | ICD-10-CM

## 2021-05-04 DIAGNOSIS — E78.5 HYPERLIPIDEMIA LDL GOAL <100: ICD-10-CM

## 2021-05-04 PROCEDURE — 99213 OFFICE O/P EST LOW 20 MIN: CPT | Performed by: INTERNAL MEDICINE

## 2021-05-04 NOTE — ASSESSMENT & PLAN NOTE
· Stage C HFrEF with normalized LV systolic function  · No heart failure symptoms  · Presently on no medication

## 2021-05-04 NOTE — ASSESSMENT & PLAN NOTE
· Obtain CMP and lipids with upcoming lab work  · Will estimated 10-year ASCVD risk to determine whether statin therapy indicated for primary prevention

## 2021-09-13 ENCOUNTER — TELEPHONE (OUTPATIENT)
Dept: ONCOLOGY | Facility: CLINIC | Age: 46
End: 2021-09-13

## 2021-09-13 NOTE — TELEPHONE ENCOUNTER
Caller: PAVEL     Relationship to patient: SELF     Best call back number: [phone]    PATIENT IS SCHEDULED WITH TERESA CORDOVA TOMORROW 09-14-21 AT 2:30P. HE IS WONDERING IF HE IS SUPPOSED TO HAVE LABS BEFORE BECAUSE NOTHING IS SCHEDULED.   PLEASE CALL AND ADVISE   THANK YOU

## 2021-09-14 ENCOUNTER — OFFICE VISIT (OUTPATIENT)
Dept: ONCOLOGY | Facility: CLINIC | Age: 46
End: 2021-09-14

## 2021-09-14 ENCOUNTER — LAB (OUTPATIENT)
Dept: LAB | Facility: HOSPITAL | Age: 46
End: 2021-09-14

## 2021-09-14 VITALS
TEMPERATURE: 97.8 F | SYSTOLIC BLOOD PRESSURE: 125 MMHG | WEIGHT: 194.7 LBS | OXYGEN SATURATION: 97 % | HEIGHT: 72 IN | BODY MASS INDEX: 26.37 KG/M2 | HEART RATE: 60 BPM | RESPIRATION RATE: 16 BRPM | DIASTOLIC BLOOD PRESSURE: 80 MMHG

## 2021-09-14 DIAGNOSIS — Z85.79 HISTORY OF LYMPHOMA: Primary | ICD-10-CM

## 2021-09-14 DIAGNOSIS — Z85.79 HISTORY OF LYMPHOMA: ICD-10-CM

## 2021-09-14 PROBLEM — Z85.72 HISTORY OF LYMPHOMA: Status: ACTIVE | Noted: 2021-09-14

## 2021-09-14 LAB
ALBUMIN SERPL-MCNC: 4.7 G/DL (ref 3.5–5.2)
ALBUMIN/GLOB SERPL: 1.7 G/DL
ALP SERPL-CCNC: 61 U/L (ref 39–117)
ALT SERPL W P-5'-P-CCNC: 16 U/L (ref 1–41)
ANION GAP SERPL CALCULATED.3IONS-SCNC: 11 MMOL/L (ref 5–15)
AST SERPL-CCNC: 21 U/L (ref 1–40)
BILIRUB SERPL-MCNC: 0.4 MG/DL (ref 0–1.2)
BUN SERPL-MCNC: 15 MG/DL (ref 6–20)
BUN/CREAT SERPL: 15.2 (ref 7–25)
CALCIUM SPEC-SCNC: 9.7 MG/DL (ref 8.6–10.5)
CHLORIDE SERPL-SCNC: 103 MMOL/L (ref 98–107)
CO2 SERPL-SCNC: 24 MMOL/L (ref 22–29)
CREAT SERPL-MCNC: 0.99 MG/DL (ref 0.76–1.27)
ERYTHROCYTE [DISTWIDTH] IN BLOOD BY AUTOMATED COUNT: 13.7 % (ref 12.3–15.4)
GFR SERPL CREATININE-BSD FRML MDRD: 81 ML/MIN/1.73
GLOBULIN UR ELPH-MCNC: 2.7 GM/DL
GLUCOSE SERPL-MCNC: 98 MG/DL (ref 65–99)
HCT VFR BLD AUTO: 43.2 % (ref 37.5–51)
HGB BLD-MCNC: 14.4 G/DL (ref 13–17.7)
LDH SERPL-CCNC: 179 U/L (ref 135–225)
LYMPHOCYTES # BLD AUTO: 2.1 10*3/MM3 (ref 0.7–3.1)
LYMPHOCYTES NFR BLD AUTO: 33.3 % (ref 19.6–45.3)
MCH RBC QN AUTO: 29.8 PG (ref 26.6–33)
MCHC RBC AUTO-ENTMCNC: 33.4 G/DL (ref 31.5–35.7)
MCV RBC AUTO: 89 FL (ref 79–97)
MONOCYTES # BLD AUTO: 0.4 10*3/MM3 (ref 0.1–0.9)
MONOCYTES NFR BLD AUTO: 6.4 % (ref 5–12)
NEUTROPHILS NFR BLD AUTO: 3.9 10*3/MM3 (ref 1.7–7)
NEUTROPHILS NFR BLD AUTO: 60.3 % (ref 42.7–76)
PLATELET # BLD AUTO: 252 10*3/MM3 (ref 140–450)
PMV BLD AUTO: 7.4 FL (ref 6–12)
POTASSIUM SERPL-SCNC: 4.3 MMOL/L (ref 3.5–5.2)
PROT SERPL-MCNC: 7.4 G/DL (ref 6–8.5)
RBC # BLD AUTO: 4.85 10*6/MM3 (ref 4.14–5.8)
SODIUM SERPL-SCNC: 138 MMOL/L (ref 136–145)
WBC # BLD AUTO: 6.4 10*3/MM3 (ref 3.4–10.8)

## 2021-09-14 PROCEDURE — 80053 COMPREHEN METABOLIC PANEL: CPT

## 2021-09-14 PROCEDURE — 83615 LACTATE (LD) (LDH) ENZYME: CPT

## 2021-09-14 PROCEDURE — 36415 COLL VENOUS BLD VENIPUNCTURE: CPT

## 2021-09-14 PROCEDURE — 99213 OFFICE O/P EST LOW 20 MIN: CPT | Performed by: NURSE PRACTITIONER

## 2021-09-14 PROCEDURE — 85025 COMPLETE CBC W/AUTO DIFF WBC: CPT

## 2021-09-14 NOTE — PROGRESS NOTES
CHIEF COMPLAINT: f/u history of lymphoma    Problem List:  Oncology/Hematology History Overview Note   1. 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 Leukocytosis  3. FUO  4. Decreased ejection fraction with evidence of myocarditis  5. Marked elevation of sedimentation rate and C-reactive protein  6. Elevated triglycerides on fasting  7. Acute renal failure resolved with hydration    -8/23/2019 initial inpatient [facility] medical oncology consultation.  Asked to see regarding leukocytosis.  He had upper respiratory infection with pneumonia, chest pains, elevated troponins with diagnosis of myocarditis and decreased ejection fraction with fevers to 104.  Tentatively diagnosed with still's disease and treated with prednisone with some improvement in fevers as well as improvement in a rash.  By the end of July, he had severe mid back pain relatively acute and onset that progressed over time with imaging of the area showing progressive abnormality of both bone and marrow signals in the spine that could be malignant marrow involvement or, more likely per radiologist, infectious.  Sedimentation rate was over 100 with a C-reactive protein elevated and white count 20-30,000 for the better part of 2 months.  Slight left shift but no immature forms.  Broad-spectrum serologic testing has not shown pathogens as of the initial consultation.  I ordered bone marrow biopsy and Dr. Jimenez coordinated spine biopsy  -6/24/2019 CT chest abdomen pelvis without contrast showed no parenchymal lung disease but a 1.6 cm mildly enlarged centrally necrotic right paratracheal node with no other adenopathy or hepatosplenomegaly.   Ejection fraction 35% on echocardiogram.  -6/28/2019 MRI brain and cervical spine without contrast normal  -7/9/2019 fluoroscopy guided right joint aspiration unrevealing  -7/10/2019 fluoroscopic guided bronchoscopy showed no pathogens or pathology  -8/9/2019 MRI thoracic and lumbar spine showed low T1 signals in T8 and T9 with increased T2 involvement concerning for aggressive marrow signal findings that could represent lipid poor spinal hemangioma but concerning for metastasis versus multiple myeloma.  Contrast MRI recommended.  -8/21/2019 MRI thoracic spine with and without contrast showed multiple foci of abnormal marrow signal within the thoracic spine was prominent T8 and T9 with interval increase compared to 8/9/2019 with deformity of the endplates at T8 and T9 but no definitive fracture or discitis.  Several smaller additional lesions suspected at T1, T2, T4, T5, and T12 and the differential would include multifocal vertebral osteomyelitis versus metastasis.  -8/23/2019 bone marrow biopsy showed mild polyclonal plasmacytosis with normocellular marrow and trilineage hematopoiesis with mild myeloid expansion.  No lymphoid infiltrate and no organisms for fungi, acid fast bacilli, tumor, or granuloma.  Bennett 2- negative.  -8/24/2019 ejection fraction up to 65%  -8/26/2019 T8 biopsy show focal area of markedly atypical appearance.  There are inflammatory cells with occasional eosinophils and larger cells.  Some focal hemosiderin.  There is equivocal positivity for CD4, , CD45, and Constance-Barr virus.  It is positive for CD30, ALK 1, EMA, and vimentin.  No staining for CD 5, 7, 3, 20, or 15.  No staining for cytokeratin, SOX 10, , or CD1a.  Outside referral to [facility] pending.  -8/30/2019 [facility] outpatient follow-up visit: Anaplastic large cell lymphoma ALK positive just involving the bone would be unusual but not impossible I suppose.  We will get PET scan.  Were this hemophagocytic  syndrome I would have expected his marrow to be abnormal along with splenomegaly and cytopenias as would the marrow be abnormal for this a plasma cell dyscrasia and the plasma cells were polyclonal.  I will check his SIEP along with triglycerides, peripheral blood flow cytometry for soluble CD 25, ferritin, and NK cell activity.  Once I have that information, hopefully we will have more information back from [facility] and unlikely to involve my colleagues at the [facility] in this process if this is an ALK positive anaplastic large cell lymphoma without kia involvement given the unusual nature and appearance of this.    -9/6/2019 subsequent oncology reconsultation for readmission: Met patient with his last admission.  He has had a 4-month history of fevers with upper respiratory infection and possible pneumonia.  Found to have myocarditis with low ejection fraction with extensive infectious disease work-up negative.  A month later readmitted with shortness of breath and arthralgias with some redness in his wrist.  Bronchoscopy was negative and he was treated with some steroids.  On his third admission with relatively recent acute onset back pain he was found to have some spine lesions.  The bone marrow biopsy was unrevealing,  a T8 biopsy by Dr. Wilde showed markedly atypical appearing cells in a background of eosinophils.  The larger cells were positive for ALK 1, CD30, EMA, and vimentin.  No staining for CD 5, 7, 3, 20, or 15.  No staining for cytokeratin, sox 10, , or CD1a.  Tissue has been sent for second opinion at [facility].  Anaplastic large cell lymphoma ALK positive was considered but given that his fevers which started months back included testing with CT chest abdomen pelvis 6/24/2019 only showing mildly enlarged centrally necrotic right lower paratracheal node of indeterminate significance and no other significant adenopathy or splenomegaly and no pulmonary infiltrate or  mediastinal adenopathy on repeat CT chest 7/9/2019, PET scan was ordered.  Before PET could be completed and GI evaluation completed for evaluation of possible GI source for kia procurement, the patient on the day he was due for endoscopy continue to have fevers with hypotension and a pulse in the 150s.  9/5/2019 CT chest abdomen and pelvis on admission now shows showed new numerous bilateral pulmonary lung nodules in the right lung measuring 2 cm without focal pneumonia.  There was extensive axillary, mediastinal adenopathy the largest in the right paratracheal area 2.1 cm with central necrosis.  There were hypoattenuating regions in the right hepatic lobe measuring up to 2.5 cm incompletely assessed due to lack of contrast due to his creatinine on admission 9/5/2019 being up to 1.67 down to 0.94 by the morning of 9/6/2019.  CT also showed new extensive axillary, mediastinal, and abdominopelvic lymphadenopathy.  There was redemonstration of the T8-T9 aggressive osseous lytic lesions involving thoracolumbar spine and multiple levels.  Nonspecific perinephric inflammation also seen possibly representing pyelonephritis.  Laboratory testing on admission 9/5/2019 showed C-reactive protein up to 37.66 with sedimentation rate greater than 130.  Procalcitonin over 38 with lactic acid  4.8.   normal with normal troponin.  Fasting triglyceride up to 269 and peripheral blood flow cytometry sent for NK cell activity as well as CD25 for IL-2 receptor for the possibility of hemophagocytic syndrome which would be unusual without hemophagocytosis bone marrow.  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  We will get echocardiogram and hold on the GI evaluation and I discussed in person with Dr. Benja Can to see for procurement of lymph node.  He is going to go after upper  mediastinal lymph node.  Once we have more tissue then we will make further plans for treatment.  His ejection fraction on 8/24/2019 was 62%.  Assuming that we find lymphoma and not some aggressive infectious disease process, he will need a port as well but we will wait on the port until we know what we are dealing with.  My partners are on board and will cover me this weekend and I will check back Monday to see where we stand.  He already has an appointment with Dr. Roosevelt Nguyen at  on the 11th who I was enlisting to help sort this out but we may be able to get our answers now that we have node to go after.  I may yet need his services depending on the ilk of this lymphoma and whether or not we would need to consolidate aggressively or treat more aggressively than with standard chemotherapy upfront.  I am starting him on allopurinol.  He already has a rash on his upper thighs and heels and around the area of his prior bone marrow biopsy but I suspect that just related to this probable lymphomatous process or perhaps the recent Bactrim started by Dr. Hauser.  His procalcitonin is quite high and I am still concerned about concomitant infection and I am not sure if the procalcitonin it can be falsely dramatically elevated in the face of lymphoma.  If this is anaplastic large cell lymphoma, with his ECOG performance status generally less than 2 and age less than 60 but with at least stage III disease, even if his LDH is high, he would at least have a risk score of 2 which would put him at the low intermediate risk on the IPI index.  Whether we would use Rituxan or not depends on if this turns out to be CD20 positive where his initial bone biopsy was not.  Given that it is CD30 positive, will probably use brentuximab, Cytoxan, Adriamycin, prednisone every 3 weeks x 6 cycles.  This usually is given without vincristine but with the Brentuximab 1.8 mg/kg IV day 1 of each cycle.  A progression free survival was seen  both in ALK positive and negative anaplastic large cell lymphomas treated with brentuximab rather than vincristine.  Neuropathy still can be an issue and diarrhea is more common.  If this is anaplastic large cell lymphoma ALK positive, autologous stem cell transplant may be considered in patients over 40 years of age with an IPI score greater than or equal to 3 but generally not if the IPI score is less than 3 so we will assess that very carefully.  Despite the elevated fasting triglyceride recently, I doubt this is hemophagocytic syndrome given the negative bone marrow and now with the blossoming lymphadenopathy.  But we shall see.    -9/30/2019 outpatient medical oncology follow-up visit:  He was breathing much better the first week after his chemotherapy but then the shortness of breath worsened again his pulse increased in the 120s to 140s.  Saw Dr. Dumont in my team here as well and received IV fluids.  Slowly that improved.  In the prior 5 days he has been feeling better and no tachycardia for the last week.  He has had no fever since discharge from the hospital.  He has 1 more week of antibiotics.  His current complaints are:    Occasional bloody mucus from his nose    Alternating diarrhea and constipation better with stool softeners    Low back pain worsening with Neupogen but only on oxycodone rarely and none in the last 48 hours.   Bilateral knee pain without swelling on allopurinol    Itching but prior rash resolved   Mild gait instability due to weakness worse with showering and due for follow-up with Dr. Jimenez for serial MRIs   Labile moods but without jason depression worse with steroids now down to 20 mg    I addressed all these issues with him today my plan is:   To get a PET prior to return for course #3 of chemotherapy with brentuximab, Cytoxan, Adriamycin, prednisone   Consult Dr. Carrion for possible consolidation transplant in the future   Add Xgeva for the lytic involvement of his bone    "Taper the steroids down to 20 mg for 5 days 10 mg for 5 days 5 mg for 5 days and then off following the 100 mg of prednisone with this course of therapy and then hopefully no further steroids though he will follow with rheumatology  Follow-up with Dr. Jimenez for serial spine MRIs.    -10/21/2019 medical oncology office visit: 10/17/2019 PET scan was negative.  All prior CT abnormalities have resolved.  He is due for follow-up with Dr. Jimenez for spine MRI in about a month.  He has seen Dr. Carrion.  I do not have those notes but according to the patient there are no plans for stem cell transplant in first remission.  I have left a message with Dr. Carrion raising the question of whether spinal fluid analysis or MRI of brain would be in order but he feels great has no neurologic symptoms and his only complaint presently is mid back pain which clearly is improving over time.  Unless Dr. Carrion has other ideas, my plan would be to continue on with courses 3 and 4 of Cytoxan Hilario Brentuximab prednisone, repeat the PET, proceed with courses 5 and 6, repeat the PET again, and assuming that everything is negative at that junction then go to routine survivorship.    -12/20/2019 medical oncology office visit: 12/10/2019 PET scan shows just active marrow response with no active lymphoma or recurrence.  He will call Dr. Carrion for an appointment after course #6 12/23/2019 to discuss definitively whether there is any plans for consolidation but as far as I am aware there is no plan for stem cell transplant autologous at this junction.  We will get his PET 8 weeks out from this final dose of of brentuximab Cytoxan, Adriamycin, prednisone.    -2/27/2020 medical oncology follow-up visit:  Follow-up per Dr. Choudhary from [facility] on these individuals is as follows:     \"schedule patient visits every three months during the first two years, then every six months starting two years after complete response. Starting " "at year 5, patients are seen once per year. At these visits we perform a history and physical examination, complete blood count, chemistries, and lactate dehydrogenase. We obtain a CT scan of the chest, abdomen, and pelvis every six months for the first 18 months after documentation of an initial complete response. We only obtain additional positron emission tomography/computed tomography (PET/CT) scans to further evaluate any concerning findings noted on CT scans. We do not obtain routine CT scans after 18 months unless there are signs, symptoms, or exam findings to suggest relapse.\"     -8/24/2020 [facility] oncology follow-up visit: I reviewed reports and images of 8/21/2020 CT chest abdomen pelvis which showed no evidence of lymphoma.  Hemoglobin 16 with normal CBC and  normal.  CMP showed glucose 109 otherwise unremarkable.  Pursuant to the above guidelines he will see my nurse practitioner back in 3 months with CBC, CMP, and LDH and will get CT scanning 1 more time 6 months from now pursuant to the above guidelines and after that we will just follow-up clinically with physical exams as outlined above along with labs as outlined above           Anaplastic ALK-positive large cell lymphoma    6/28/2019 Imaging    MRI of the brain negative     8/9/2019 Imaging    MRI thoracic and lumbar spine IMPRESSION:  1.  Abnormal ill-defined low T1 signal lesions in the T8 and T9  vertebral bodies with increased T2 involvement concerning for aggressive  bone marrow signal findings and although this may represent lipid poor  spinal hemangiomas concern for metastasis versus myeloma remains a  consideration given overall appearance on this noncontrast evaluation.  Contrast-enhanced examination of the thoracic spine and/or nuclear  medicine bone scan may be useful for further evaluation.     2. Lumbar spine with degenerative changes, however, no aggressive bone  marrow signal findings or acute fracture. No significant spinal " canal or  neuroforaminal stenosis present.     8/24/2019 -  Other Event    Echocardiogram: Estimated EF 62%.     8/26/2019 Initial Diagnosis    Anaplastic ALK-positive large cell lymphoma      8/26/2019 Biopsy    T8 vertebral biopsy positive for anaplastic large cell lymphoma, ALK-positive.     9/5/2019 Imaging    CT chest, abdomen and pelvis IMPRESSION:     Constellation of findings concerning for metastatic disease:      - Interval development of numerous pulmonary nodules throughout the  lungs with the largest measuring 2.0 cm within the right midlung and  concerning for underlying pulmonary metastasis and which may account for  abnormality identified in the chest radiograph performed 09/05/2019.     - Extensive axillary, mediastinal, and abdominopelvic lymphadenopathy as  described above.     - Hypoattenuated regions involving the liver concerning for additional  sites of metastatic disease, although are limited in evaluation  secondary to lack of intravenous contrast. Attention on follow-up  imaging is advised.     - Redemonstration of T8/T9 aggressive osseous lesions with additional  lytic lesion involving the L4 vertebral body and additional  subcentimeter lytic lesions throughout the thoracolumbar spine  suggested.     Nonspecific perinephric inflammation. Correlate with urinalysis to  exclude underlying pyelonephritis.     9/6/2019 Biopsy    Mediastinal lymph node excisional biopsy positive for ALK-1 positive anaplastic large cell lymphoma     9/9/2019 - 12/23/2019 Chemotherapy    OP LYMPHOMA Brentuximab, Cyclophosphamide / DOXOrubicin / predniSONE / Pegfilgrastim       10/17/2019 Imaging    PET/CT IMPRESSION:  1. Essentially negative PET scan for active lymphoma. CT scan findings  from 09/05/2019 appear to have largely resolved as well.  2. Small focus of mild activity in the right groin appears to reflect  calcific tendinosis of the distal right iliopsoas muscle.  3. Probable marrow stimulation effect.      11/19/2019 Imaging    MRI T-spine shows progressive T8 and 9 compression pathologic fracture and bone and bone marrow activity.  Dr. Jimenez reviewed and thought this was his treated disease.  And plans on serial MRI imaging.     12/10/2019 Imaging    PET/CT IMPRESSION:  1. No convincing evidence of abnormal FDG activity as visualized to  suggest recurrent disease or lymphomatous involvement.     2. Redemonstration of diffuse osseous marrow activity throughout the  axial skeleton most consistent with marrow stimulation effect similar to  prior exam with redemonstration of compression fractures of the lower  thoracic spine better seen on the MRI performed 11/19/2019.     2/20/2020 Imaging    PET negative with resolution of prior marrow abnormality.     2/27/2020 Cancer Staged    Staging form: Hodgkin And Non-Hodgkin Lymphoma, AJCC 8th Edition  - Clinical: Stage IV - Signed by Brent Brizuela MD on 2/27/2020 8/21/2020 Imaging    -8/21/2020 CT chest abdomen pelvis showed no evidence of lymphoma.  Hemoglobin 16 with normal CBC and  normal.  CMP showed glucose 109 otherwise unremarkable.     3/4/2021 Imaging    CT chest, abdomen and pelvis IMPRESSION:  Stable and essentially normal CT of the chest, abdomen and  pelvis with no evidence of recurrent or metastatic lymphomatous disease.  , CBC and CMP also normal.          HISTORY OF PRESENT ILLNESS:  The patient is a 46 y.o. male, here for follow up on management of History of stage IV ALK positive CD30 positive CD20 negative anaplastic large cell lymphoma.  Stephen has been doing well since we saw him last with no new concerns.  He denies any bed drenching night sweats, his appetite is normal, no change in his bowel or habits.  Has occasional low back and right hip pain when he is more active but this is not new or worsening with time.  He has remained active, he exercises regularly and is following a healthy diet.  His weight is 194 pounds, September 2020  "he was 245 pounds.  He is on no medications and feeling great.    Past Medical History:   Diagnosis Date   • Acute kidney injury (CMS/HCC)    • Anaplastic ALK-positive large cell lymphoma (CMS/HCC)    • Bacteremia    • Hyperlipidemia    • Myocarditis (CMS/HCC)    • Pneumonia    • Severe sepsis (CMS/HCC)      Past Surgical History:   Procedure Laterality Date   • APPENDECTOMY     • BACK SURGERY      L5 S1   • BRONCHOSCOPY N/A 7/10/2019    Procedure: BRONCHOSCOPY WITH ENDOBRONCHIAL ULTRASOUND AND TRANSBRONCHIAL BIOPSY AND FLUORO;  Surgeon: Marvel Sandoval MD;  Location:  [facility];  Service: Pulmonary   • CARDIAC CATHETERIZATION N/A 6/24/2019    Procedure: LEFT HEART CATH;  Surgeon: Deep Muñiz IV, MD;  Location:  [facility];  Service: Cardiovascular   • INTERVENTIONAL RADIOLOGY PROCEDURE N/A 8/26/2019    Procedure: THORACIC SPINE BIOSPY;  Surgeon: Juve Avila MD;  Location:  [facility];  Service: Interventional Radiology   • MEDIASTINOSCOPY, BRONCHOSCOPY N/A 9/6/2019    Procedure: BRONCHOSCOPY, MEDIASTINOSCOPY WITH BIOPSY;  Surgeon: El Can MD;  Location:  [facility];  Service: Cardiothoracic       Allergies   Allergen Reactions   • Ciprofloxacin Anxiety and Hallucinations     \"Extreme anxiety and paranoia\"   • Sulfa Antibiotics Rash       Family History and Social History reviewed and changed as necessary    REVIEW OF SYSTEM:   Negative for new concerns    PHYSICAL EXAM:  General: Pleasant, well-developed, well-nourished male in no distress  Respiratory: Lungs clear to auscultation bilaterally, respirations even and unlabored  Cardiac: Heart regular rate and rhythm, no murmurs gallops rub  Nodes: No cervical, supraclavicular or axillary nodes palpable on exam  Abdomen: Soft, Nondistended      Vitals:    09/14/21 1428   BP: 125/80   Pulse: 60   Resp: 16   Temp: 97.8 °F (36.6 °C)   TempSrc: Temporal   SpO2: 97%   Weight: 88.3 kg (194 lb 11.2 oz) " "  Height: 182.9 cm (72\")     Vitals:    09/14/21 1428   PainSc: 0-No pain       Vitals reviewed.    ECOG: (0) Fully Active - Able to Carry On All Pre-disease Performance Without Restriction    Lab Results   Component Value Date    HGB 14.4 09/14/2021    HCT 43.2 09/14/2021    MCV 89.0 09/14/2021     09/14/2021    WBC 6.40 09/14/2021    NEUTROABS 3.90 09/14/2021    LYMPHSABS 2.10 09/14/2021    MONOSABS 0.40 09/14/2021    EOSABS 0.30 03/04/2021    BASOSABS 0.07 03/04/2021       Lab Results   Component Value Date    GLUCOSE 99 03/04/2021    BUN 12 03/04/2021    CREATININE 1.00 03/04/2021     03/04/2021    K 4.4 03/04/2021     03/04/2021    CO2 28.0 03/04/2021    CALCIUM 10.1 03/04/2021    PROTEINTOT 7.5 03/04/2021    ALBUMIN 4.60 03/04/2021    BILITOT 0.4 03/04/2021    ALKPHOS 69 03/04/2021    AST 28 03/04/2021    ALT 22 03/04/2021             ASSESSMENT & PLAN:    1.  Stage IV ALK positive CD30 positive, CD20 negative anaplastic large cell lymphoma involving bone and marrow on biopsies presenting with abnormal counts and bone involvement with rapid development of bulky adenopathy with subsequent complete remission:  Stephen continues to do well with no evidence of disease on clinical exam and no new worrisome symptoms.  CBC from today is normal, CMP and LDH pending.  He remains physically fit and continues to follow a healthy diet and exercise regimen.  We will continue surveillance per NCCN guidelines with H&P every 6 months after 5 years with blood work, no plans on routine imaging going forward in the absence of new symptoms.  We will repeat CBC, CMP and LDH on return I have ordered those today.    I spent 20 minutes caring for Stephen on this date of service. This time includes time spent by me in the following activities: preparing for the visit, reviewing tests, performing a medically appropriate examination and/or evaluation, ordering medications, tests, or procedures and documenting information " in the medical record.     Bing Sanchez, APRN    09/14/2021

## 2021-09-17 ENCOUNTER — TELEPHONE (OUTPATIENT)
Dept: ONCOLOGY | Facility: CLINIC | Age: 46
End: 2021-09-17

## 2021-09-17 NOTE — TELEPHONE ENCOUNTER
Caller: JUAN    Relationship to patient: RN WITH [employer]    Best call back number: [phone]     [phone]    JUAN IS WANTING TO TALK TO A CLINICIAN ABOUT PT PROGRESS.

## 2022-03-08 ENCOUNTER — TELEPHONE (OUTPATIENT)
Dept: ONCOLOGY | Facility: CLINIC | Age: 47
End: 2022-03-08

## 2022-03-08 NOTE — TELEPHONE ENCOUNTER
Discussed with AILYN Smart and she states patient can keep appt as is unless not feeling up to it. Called patient and he verbalized understanding.

## 2022-03-08 NOTE — TELEPHONE ENCOUNTER
Provider: TERESA CORDOVA  Caller: PAVEL  Relationship to Patient: SELF    Reason for Call: PAVEL IS CALLING, HE HAS A BROKEN IHSAN AND THE DENTIST PUT HIM ON AN ANTIBIOTIC FOR 10 DAYS.    HE HAS AN APPT COMING UP WITH TERESA AND HE WASN'T SURE IF HIS POSS INFECTED TOOTH AND ANTIBIOTIC WOULD AFFECT HIS LAB WORK.    PLEASE ADVISE

## 2022-03-15 ENCOUNTER — TELEPHONE (OUTPATIENT)
Dept: ONCOLOGY | Facility: CLINIC | Age: 47
End: 2022-03-15

## 2022-03-15 ENCOUNTER — OFFICE VISIT (OUTPATIENT)
Dept: ONCOLOGY | Facility: CLINIC | Age: 47
End: 2022-03-15

## 2022-03-15 ENCOUNTER — LAB (OUTPATIENT)
Dept: LAB | Facility: HOSPITAL | Age: 47
End: 2022-03-15

## 2022-03-15 VITALS
RESPIRATION RATE: 16 BRPM | OXYGEN SATURATION: 98 % | DIASTOLIC BLOOD PRESSURE: 92 MMHG | SYSTOLIC BLOOD PRESSURE: 158 MMHG | HEART RATE: 59 BPM | WEIGHT: 204 LBS | BODY MASS INDEX: 27.63 KG/M2 | TEMPERATURE: 96.9 F | HEIGHT: 72 IN

## 2022-03-15 DIAGNOSIS — Z85.72 HISTORY OF LYMPHOMA: ICD-10-CM

## 2022-03-15 DIAGNOSIS — Z85.72 HISTORY OF LYMPHOMA: Primary | ICD-10-CM

## 2022-03-15 LAB
ALBUMIN SERPL-MCNC: 4.9 G/DL (ref 3.5–5.2)
ALBUMIN/GLOB SERPL: 1.8 G/DL
ALP SERPL-CCNC: 59 U/L (ref 39–117)
ALT SERPL W P-5'-P-CCNC: 22 U/L (ref 1–41)
ANION GAP SERPL CALCULATED.3IONS-SCNC: 9 MMOL/L (ref 5–15)
AST SERPL-CCNC: 22 U/L (ref 1–40)
BILIRUB SERPL-MCNC: 0.5 MG/DL (ref 0–1.2)
BUN SERPL-MCNC: 17 MG/DL (ref 6–20)
BUN/CREAT SERPL: 14.5 (ref 7–25)
CALCIUM SPEC-SCNC: 9.5 MG/DL (ref 8.6–10.5)
CHLORIDE SERPL-SCNC: 102 MMOL/L (ref 98–107)
CO2 SERPL-SCNC: 28 MMOL/L (ref 22–29)
CREAT SERPL-MCNC: 1.17 MG/DL (ref 0.76–1.27)
EGFRCR SERPLBLD CKD-EPI 2021: 77.4 ML/MIN/1.73
ERYTHROCYTE [DISTWIDTH] IN BLOOD BY AUTOMATED COUNT: 13.8 % (ref 12.3–15.4)
GLOBULIN UR ELPH-MCNC: 2.8 GM/DL
GLUCOSE SERPL-MCNC: 102 MG/DL (ref 65–99)
HCT VFR BLD AUTO: 47.4 % (ref 37.5–51)
HGB BLD-MCNC: 15.9 G/DL (ref 13–17.7)
LDH SERPL-CCNC: 191 U/L (ref 135–225)
LYMPHOCYTES # BLD AUTO: 2.3 10*3/MM3 (ref 0.7–3.1)
LYMPHOCYTES NFR BLD AUTO: 29.5 % (ref 19.6–45.3)
MCH RBC QN AUTO: 29.4 PG (ref 26.6–33)
MCHC RBC AUTO-ENTMCNC: 33.6 G/DL (ref 31.5–35.7)
MCV RBC AUTO: 87.5 FL (ref 79–97)
MONOCYTES # BLD AUTO: 0.6 10*3/MM3 (ref 0.1–0.9)
MONOCYTES NFR BLD AUTO: 7.9 % (ref 5–12)
NEUTROPHILS NFR BLD AUTO: 4.9 10*3/MM3 (ref 1.7–7)
NEUTROPHILS NFR BLD AUTO: 62.6 % (ref 42.7–76)
PLATELET # BLD AUTO: 258 10*3/MM3 (ref 140–450)
PMV BLD AUTO: 7.3 FL (ref 6–12)
POTASSIUM SERPL-SCNC: 4.7 MMOL/L (ref 3.5–5.2)
PROT SERPL-MCNC: 7.7 G/DL (ref 6–8.5)
RBC # BLD AUTO: 5.41 10*6/MM3 (ref 4.14–5.8)
SODIUM SERPL-SCNC: 139 MMOL/L (ref 136–145)
WBC NRBC COR # BLD: 7.8 10*3/MM3 (ref 3.4–10.8)

## 2022-03-15 PROCEDURE — 80053 COMPREHEN METABOLIC PANEL: CPT

## 2022-03-15 PROCEDURE — 83615 LACTATE (LD) (LDH) ENZYME: CPT

## 2022-03-15 PROCEDURE — 99213 OFFICE O/P EST LOW 20 MIN: CPT | Performed by: NURSE PRACTITIONER

## 2022-03-15 PROCEDURE — 36415 COLL VENOUS BLD VENIPUNCTURE: CPT

## 2022-03-15 PROCEDURE — 85025 COMPLETE CBC W/AUTO DIFF WBC: CPT

## 2022-03-15 RX ORDER — AMOXICILLIN 500 MG/1
CAPSULE ORAL
COMMUNITY
Start: 2022-03-08 | End: 2022-09-09

## 2022-03-15 NOTE — TELEPHONE ENCOUNTER
Caller: PAVEL    Relationship to patient: SELF     Best call back number: [phone]    Patient is needing: TO MAKE SURE HE HAS LAB ORDERS IN SYSTEM AND IS ON LIST AT 2 PM BEFORE HIS 2:30 PM APPT TODAY IN OUR OFFICE.    PT WOULD LIKE A PHONE CALL CONFIRMING THIS HAS BEEN DONE.

## 2022-03-15 NOTE — PROGRESS NOTES
CHIEF COMPLAINT: History of lymphoma    Problem List:  Oncology/Hematology History Overview Note   1. 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 Leukocytosis  3. FUO  4. Decreased ejection fraction with evidence of myocarditis  5. Marked elevation of sedimentation rate and C-reactive protein  6. Elevated triglycerides on fasting  7. Acute renal failure resolved with hydration    -8/23/2019 initial inpatient [facility] medical oncology consultation.  Asked to see regarding leukocytosis.  He had upper respiratory infection with pneumonia, chest pains, elevated troponins with diagnosis of myocarditis and decreased ejection fraction with fevers to 104.  Tentatively diagnosed with still's disease and treated with prednisone with some improvement in fevers as well as improvement in a rash.  By the end of July, he had severe mid back pain relatively acute and onset that progressed over time with imaging of the area showing progressive abnormality of both bone and marrow signals in the spine that could be malignant marrow involvement or, more likely per radiologist, infectious.  Sedimentation rate was over 100 with a C-reactive protein elevated and white count 20-30,000 for the better part of 2 months.  Slight left shift but no immature forms.  Broad-spectrum serologic testing has not shown pathogens as of the initial consultation.  I ordered bone marrow biopsy and Dr. Jimenez coordinated spine biopsy  -6/24/2019 CT chest abdomen pelvis without contrast showed no parenchymal lung disease but a 1.6 cm mildly enlarged centrally necrotic right paratracheal node with no other adenopathy or hepatosplenomegaly.  Ejection  fraction 35% on echocardiogram.  -6/28/2019 MRI brain and cervical spine without contrast normal  -7/9/2019 fluoroscopy guided right joint aspiration unrevealing  -7/10/2019 fluoroscopic guided bronchoscopy showed no pathogens or pathology  -8/9/2019 MRI thoracic and lumbar spine showed low T1 signals in T8 and T9 with increased T2 involvement concerning for aggressive marrow signal findings that could represent lipid poor spinal hemangioma but concerning for metastasis versus multiple myeloma.  Contrast MRI recommended.  -8/21/2019 MRI thoracic spine with and without contrast showed multiple foci of abnormal marrow signal within the thoracic spine was prominent T8 and T9 with interval increase compared to 8/9/2019 with deformity of the endplates at T8 and T9 but no definitive fracture or discitis.  Several smaller additional lesions suspected at T1, T2, T4, T5, and T12 and the differential would include multifocal vertebral osteomyelitis versus metastasis.  -8/23/2019 bone marrow biopsy showed mild polyclonal plasmacytosis with normocellular marrow and trilineage hematopoiesis with mild myeloid expansion.  No lymphoid infiltrate and no organisms for fungi, acid fast bacilli, tumor, or granuloma.  Bennett 2- negative.  -8/24/2019 ejection fraction up to 65%  -8/26/2019 T8 biopsy show focal area of markedly atypical appearance.  There are inflammatory cells with occasional eosinophils and larger cells.  Some focal hemosiderin.  There is equivocal positivity for CD4, , CD45, and Constance-Barr virus.  It is positive for CD30, ALK 1, EMA, and vimentin.  No staining for CD 5, 7, 3, 20, or 15.  No staining for cytokeratin, SOX 10, , or CD1a.  Outside referral to [facility] pending.  -8/30/2019 [facility] oncology outpatient follow-up visit: Anaplastic large cell lymphoma ALK positive just involving the bone would be unusual but not impossible I suppose.  We will get PET scan.  Were this hemophagocytic syndrome I would  have expected his marrow to be abnormal along with splenomegaly and cytopenias as would the marrow be abnormal for this a plasma cell dyscrasia and the plasma cells were polyclonal.  I will check his SIEP along with triglycerides, peripheral blood flow cytometry for soluble CD 25, ferritin, and NK cell activity.  Once I have that information, hopefully we will have more information back from [facility] and unlikely to involve my colleagues at the [facility] in this process if this is an ALK positive anaplastic large cell lymphoma without kia involvement given the unusual nature and appearance of this.    -9/6/2019 subsequent oncology reconsultation for readmission: Met patient with his last admission.  He has had a 4-month history of fevers with upper respiratory infection and possible pneumonia.  Found to have myocarditis with low ejection fraction with extensive infectious disease work-up negative.  A month later readmitted with shortness of breath and arthralgias with some redness in his wrist.  Bronchoscopy was negative and he was treated with some steroids.  On his third admission with relatively recent acute onset back pain he was found to have some spine lesions.  The bone marrow biopsy was unrevealing,  a T8 biopsy by Dr. Wilde showed markedly atypical appearing cells in a background of eosinophils.  The larger cells were positive for ALK 1, CD30, EMA, and vimentin.  No staining for CD 5, 7, 3, 20, or 15.  No staining for cytokeratin, sox 10, , or CD1a.  Tissue has been sent for second opinion at [facility].  Anaplastic large cell lymphoma ALK positive was considered but given that his fevers which started months back included testing with CT chest abdomen pelvis 6/24/2019 only showing mildly enlarged centrally necrotic right lower paratracheal node of indeterminate significance and no other significant adenopathy or splenomegaly and no pulmonary infiltrate or mediastinal adenopathy on  repeat CT chest 7/9/2019, PET scan was ordered.  Before PET could be completed and GI evaluation completed for evaluation of possible GI source for kia procurement, the patient on the day he was due for endoscopy continue to have fevers with hypotension and a pulse in the 150s.  9/5/2019 CT chest abdomen and pelvis on admission now shows showed new numerous bilateral pulmonary lung nodules in the right lung measuring 2 cm without focal pneumonia.  There was extensive axillary, mediastinal adenopathy the largest in the right paratracheal area 2.1 cm with central necrosis.  There were hypoattenuating regions in the right hepatic lobe measuring up to 2.5 cm incompletely assessed due to lack of contrast due to his creatinine on admission 9/5/2019 being up to 1.67 down to 0.94 by the morning of 9/6/2019.  CT also showed new extensive axillary, mediastinal, and abdominopelvic lymphadenopathy.  There was redemonstration of the T8-T9 aggressive osseous lytic lesions involving thoracolumbar spine and multiple levels.  Nonspecific perinephric inflammation also seen possibly representing pyelonephritis.  Laboratory testing on admission 9/5/2019 showed C-reactive protein up to 37.66 with sedimentation rate greater than 130.  Procalcitonin over 38 with lactic acid  4.8.   normal with normal troponin.  Fasting triglyceride up to 269 and peripheral blood flow cytometry sent for NK cell activity as well as CD25 for IL-2 receptor for the possibility of hemophagocytic syndrome which would be unusual without hemophagocytosis bone marrow.  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  We will get echocardiogram and hold on the GI evaluation and I discussed in person with Dr. Benja Can to see for procurement of lymph node.  He is going to go after upper mediastinal lymph node.  Once we  have more tissue then we will make further plans for treatment.  His ejection fraction on 8/24/2019 was 62%.  Assuming that we find lymphoma and not some aggressive infectious disease process, he will need a port as well but we will wait on the port until we know what we are dealing with.  My partners are on board and will cover me this weekend and I will check back Monday to see where we stand.  He already has an appointment with Dr. Roosevelt Nguyen at  on the 11th who I was enlisting to help sort this out but we may be able to get our answers now that we have node to go after.  I may yet need his services depending on the ilk of this lymphoma and whether or not we would need to consolidate aggressively or treat more aggressively than with standard chemotherapy upfront.  I am starting him on allopurinol.  He already has a rash on his upper thighs and heels and around the area of his prior bone marrow biopsy but I suspect that just related to this probable lymphomatous process or perhaps the recent Bactrim started by Dr. Hauser.  His procalcitonin is quite high and I am still concerned about concomitant infection and I am not sure if the procalcitonin it can be falsely dramatically elevated in the face of lymphoma.  If this is anaplastic large cell lymphoma, with his ECOG performance status generally less than 2 and age less than 60 but with at least stage III disease, even if his LDH is high, he would at least have a risk score of 2 which would put him at the low intermediate risk on the IPI index.  Whether we would use Rituxan or not depends on if this turns out to be CD20 positive where his initial bone biopsy was not.  Given that it is CD30 positive, will probably use brentuximab, Cytoxan, Adriamycin, prednisone every 3 weeks x 6 cycles.  This usually is given without vincristine but with the Brentuximab 1.8 mg/kg IV day 1 of each cycle.  A progression free survival was seen both in ALK positive and negative  anaplastic large cell lymphomas treated with brentuximab rather than vincristine.  Neuropathy still can be an issue and diarrhea is more common.  If this is anaplastic large cell lymphoma ALK positive, autologous stem cell transplant may be considered in patients over 40 years of age with an IPI score greater than or equal to 3 but generally not if the IPI score is less than 3 so we will assess that very carefully.  Despite the elevated fasting triglyceride recently, I doubt this is hemophagocytic syndrome given the negative bone marrow and now with the blossoming lymphadenopathy.  But we shall see.    -9/30/2019 outpatient medical oncology follow-up visit:  He was breathing much better the first week after his chemotherapy but then the shortness of breath worsened again his pulse increased in the 120s to 140s.  Saw Dr. Dumont in my team here as well and received IV fluids.  Slowly that improved.  In the prior 5 days he has been feeling better and no tachycardia for the last week.  He has had no fever since discharge from the hospital.  He has 1 more week of antibiotics.  His current complaints are:    Occasional bloody mucus from his nose    Alternating diarrhea and constipation better with stool softeners    Low back pain worsening with Neupogen but only on oxycodone rarely and none in the last 48 hours.   Bilateral knee pain without swelling on allopurinol    Itching but prior rash resolved   Mild gait instability due to weakness worse with showering and due for follow-up with Dr. Jimenez for serial MRIs   Labile moods but without jason depression worse with steroids now down to 20 mg    I addressed all these issues with him today my plan is:   To get a PET prior to return for course #3 of chemotherapy with brentuximab, Cytoxan, Adriamycin, prednisone   Consult Dr. Carrion for possible consolidation transplant in the future   Add Xgeva for the lytic involvement of his bone   Taper the steroids down to 20 mg  "for 5 days 10 mg for 5 days 5 mg for 5 days and then off following the 100 mg of prednisone with this course of therapy and then hopefully no further steroids though he will follow with rheumatology  Follow-up with Dr. Jimenez for serial spine MRIs.    -10/21/2019 medical oncology office visit: 10/17/2019 PET scan was negative.  All prior CT abnormalities have resolved.  He is due for follow-up with Dr. Jimenez for spine MRI in about a month.  He has seen Dr. Carrion.  I do not have those notes but according to the patient there are no plans for stem cell transplant in first remission.  I have left a message with Dr. Carrion raising the question of whether spinal fluid analysis or MRI of brain would be in order but he feels great has no neurologic symptoms and his only complaint presently is mid back pain which clearly is improving over time.  Unless Dr. Carrion has other ideas, my plan would be to continue on with courses 3 and 4 of Cytoxan Hilario Brentuximab prednisone, repeat the PET, proceed with courses 5 and 6, repeat the PET again, and assuming that everything is negative at that junction then go to routine survivorship.    -12/20/2019 medical oncology office visit: 12/10/2019 PET scan shows just active marrow response with no active lymphoma or recurrence.  He will call Dr. Carrion for an appointment after course #6 12/23/2019 to discuss definitively whether there is any plans for consolidation but as far as I am aware there is no plan for stem cell transplant autologous at this junction.  We will get his PET 8 weeks out from this final dose of of brentuximab Cytoxan, Adriamycin, prednisone.    -2/27/2020 medical oncology follow-up visit:  Follow-up per Dr. Choudhary from [place] on these individuals is as follows:     \"schedule patient visits every three months during the first two years, then every six months starting two years after complete response. Starting at year 5, patients are seen once " "per year. At these visits we perform a history and physical examination, complete blood count, chemistries, and lactate dehydrogenase. We obtain a CT scan of the chest, abdomen, and pelvis every six months for the first 18 months after documentation of an initial complete response. We only obtain additional positron emission tomography/computed tomography (PET/CT) scans to further evaluate any concerning findings noted on CT scans. We do not obtain routine CT scans after 18 months unless there are signs, symptoms, or exam findings to suggest relapse.\"     -8/24/2020 [facility] follow-up visit: I reviewed reports and images of 8/21/2020 CT chest abdomen pelvis which showed no evidence of lymphoma.  Hemoglobin 16 with normal CBC and  normal.  CMP showed glucose 109 otherwise unremarkable.  Pursuant to the above guidelines he will see my nurse practitioner back in 3 months with CBC, CMP, and LDH and will get CT scanning 1 more time 6 months from now pursuant to the above guidelines and after that we will just follow-up clinically with physical exams as outlined above along with labs as outlined above    -9/14/2021 [facility] follow-up: He continues to do well with no evidence of disease on clinical exam and no new worrisome symptoms.  CBC from today is normal, CMP and LDH pending.  He remains physically fit and continues to follow a healthy diet and exercise regimen.  We will continue surveillance per NCCN guidelines with H&P every 6 months after 5 years with blood work, no plans on routine imaging going forward in the absence of new symptoms.  We will repeat CBC, CMP and LDH on return I have ordered those today.       Anaplastic ALK-positive large cell lymphoma    6/28/2019 Imaging    MRI of the brain negative     8/9/2019 Imaging    MRI thoracic and lumbar spine IMPRESSION:  1.  Abnormal ill-defined low T1 signal lesions in the T8 and T9  vertebral bodies with increased T2 involvement " concerning for aggressive  bone marrow signal findings and although this may represent lipid poor  spinal hemangiomas concern for metastasis versus myeloma remains a  consideration given overall appearance on this noncontrast evaluation.  Contrast-enhanced examination of the thoracic spine and/or nuclear  medicine bone scan may be useful for further evaluation.     2. Lumbar spine with degenerative changes, however, no aggressive bone  marrow signal findings or acute fracture. No significant spinal canal or  neuroforaminal stenosis present.     8/24/2019 -  Other Event    Echocardiogram: Estimated EF 62%.     8/26/2019 Initial Diagnosis    Anaplastic ALK-positive large cell lymphoma      8/26/2019 Biopsy    T8 vertebral biopsy positive for anaplastic large cell lymphoma, ALK-positive.     9/5/2019 Imaging    CT chest, abdomen and pelvis IMPRESSION:     Constellation of findings concerning for metastatic disease:      - Interval development of numerous pulmonary nodules throughout the  lungs with the largest measuring 2.0 cm within the right midlung and  concerning for underlying pulmonary metastasis and which may account for  abnormality identified in the chest radiograph performed 09/05/2019.     - Extensive axillary, mediastinal, and abdominopelvic lymphadenopathy as  described above.     - Hypoattenuated regions involving the liver concerning for additional  sites of metastatic disease, although are limited in evaluation  secondary to lack of intravenous contrast. Attention on follow-up  imaging is advised.     - Redemonstration of T8/T9 aggressive osseous lesions with additional  lytic lesion involving the L4 vertebral body and additional  subcentimeter lytic lesions throughout the thoracolumbar spine  suggested.     Nonspecific perinephric inflammation. Correlate with urinalysis to  exclude underlying pyelonephritis.     9/6/2019 Biopsy    Mediastinal lymph node excisional biopsy positive for ALK-1 positive  anaplastic large cell lymphoma     9/9/2019 - 12/23/2019 Chemotherapy    OP LYMPHOMA Brentuximab, Cyclophosphamide / DOXOrubicin / predniSONE / Pegfilgrastim       10/17/2019 Imaging    PET/CT IMPRESSION:  1. Essentially negative PET scan for active lymphoma. CT scan findings  from 09/05/2019 appear to have largely resolved as well.  2. Small focus of mild activity in the right groin appears to reflect  calcific tendinosis of the distal right iliopsoas muscle.  3. Probable marrow stimulation effect.     11/19/2019 Imaging    MRI T-spine shows progressive T8 and 9 compression pathologic fracture and bone and bone marrow activity.  Dr. Jimenez reviewed and thought this was his treated disease.  And plans on serial MRI imaging.     12/10/2019 Imaging    PET/CT IMPRESSION:  1. No convincing evidence of abnormal FDG activity as visualized to  suggest recurrent disease or lymphomatous involvement.     2. Redemonstration of diffuse osseous marrow activity throughout the  axial skeleton most consistent with marrow stimulation effect similar to  prior exam with redemonstration of compression fractures of the lower  thoracic spine better seen on the MRI performed 11/19/2019.     2/20/2020 Imaging    PET negative with resolution of prior marrow abnormality.     2/27/2020 Cancer Staged    Staging form: Hodgkin And Non-Hodgkin Lymphoma, AJCC 8th Edition  - Clinical: Stage IV - Signed by Brent Brizuela MD on 2/27/2020 8/21/2020 Imaging    -8/21/2020 CT chest abdomen pelvis showed no evidence of lymphoma.  Hemoglobin 16 with normal CBC and  normal.  CMP showed glucose 109 otherwise unremarkable.     3/4/2021 Imaging    CT chest, abdomen and pelvis IMPRESSION:  Stable and essentially normal CT of the chest, abdomen and  pelvis with no evidence of recurrent or metastatic lymphomatous disease.  , CBC and CMP also normal.          HISTORY OF PRESENT ILLNESS:  The patient is a 46 y.o. male, here for follow up on  "management of history of stage IV ALK positive CD30 positive CD20 negative anaplastic large cell lymphoma.  Stephen continues to do well with no new concerns.  He is currently on an antibiotic in preparation for upcoming dental work including tooth extraction and implants.  He denies any infections or illnesses since we saw him last, he has had no unusual weight loss, no fevers or chills, no break drenching night sweats, no lymphadenopathy.  Appetite is normal, no change in his bowel or bladder habits.  Continues to eat a healthy diet and remains active and has been able to maintain his weight loss.    Past Medical History:   Diagnosis Date   • Acute kidney injury (HCC)    • Anaplastic ALK-positive large cell lymphoma (HCC)    • Bacteremia    • Hyperlipidemia    • Myocarditis (HCC)    • Pneumonia    • Severe sepsis (HCC)      Past Surgical History:   Procedure Laterality Date   • APPENDECTOMY     • BACK SURGERY      L5 S1   • BRONCHOSCOPY N/A 7/10/2019    Procedure: BRONCHOSCOPY WITH ENDOBRONCHIAL ULTRASOUND AND TRANSBRONCHIAL BIOPSY AND FLUORO;  Surgeon: Marvel Sandoval MD;  Location:  [facility];  Service: Pulmonary   • CARDIAC CATHETERIZATION N/A 6/24/2019    Procedure: LEFT HEART CATH;  Surgeon: Deep Muñiz IV, MD;  Location:  [facility];  Service: Cardiovascular   • INTERVENTIONAL RADIOLOGY PROCEDURE N/A 8/26/2019    Procedure: THORACIC SPINE BIOSPY;  Surgeon: Juve Avila MD;  Location:  [facility];  Service: Interventional Radiology   • MEDIASTINOSCOPY, BRONCHOSCOPY N/A 9/6/2019    Procedure: BRONCHOSCOPY, MEDIASTINOSCOPY WITH BIOPSY;  Surgeon: El Can MD;  Location:  [facility];  Service: Cardiothoracic       Allergies   Allergen Reactions   • Ciprofloxacin Anxiety and Hallucinations     \"Extreme anxiety and paranoia\"   • Sulfa Antibiotics Rash       Family History and Social History reviewed and changed as necessary    REVIEW OF " "SYSTEM:   Negative for new concerns    PHYSICAL EXAM:  General: Well-developed, well-nourished healthy-appearing male in no distress  Nodes: No cervical, supraclavicular or axillary nodes palpable on exam    Vitals:    03/15/22 1427   BP: 158/92   Pulse: 59   Resp: 16   Temp: 96.9 °F (36.1 °C)   SpO2: 98%   Weight: 92.5 kg (204 lb)   Height: 182.9 cm (72\")     Vitals:    03/15/22 1427   PainSc: 0-No pain          ECOG score: 0           Vitals reviewed.    ECOG: (0) Fully Active - Able to Carry On All Pre-disease Performance Without Restriction    Recent Results (from the past 168 hour(s))   Comprehensive Metabolic Panel    Collection Time: 03/15/22  2:07 PM    Specimen: Blood   Result Value Ref Range    Glucose 102 (H) 65 - 99 mg/dL    BUN 17 6 - 20 mg/dL    Creatinine 1.17 0.76 - 1.27 mg/dL    Sodium 139 136 - 145 mmol/L    Potassium 4.7 3.5 - 5.2 mmol/L    Chloride 102 98 - 107 mmol/L    CO2 28.0 22.0 - 29.0 mmol/L    Calcium 9.5 8.6 - 10.5 mg/dL    Total Protein 7.7 6.0 - 8.5 g/dL    Albumin 4.90 3.50 - 5.20 g/dL    ALT (SGPT) 22 1 - 41 U/L    AST (SGOT) 22 1 - 40 U/L    Alkaline Phosphatase 59 39 - 117 U/L    Total Bilirubin 0.5 0.0 - 1.2 mg/dL    Globulin 2.8 gm/dL    A/G Ratio 1.8 g/dL    BUN/Creatinine Ratio 14.5 7.0 - 25.0    Anion Gap 9.0 5.0 - 15.0 mmol/L    eGFR 77.4 >60.0 mL/min/1.73   Lactate Dehydrogenase    Collection Time: 03/15/22  2:07 PM    Specimen: Blood   Result Value Ref Range     135 - 225 U/L   CBC Auto Differential    Collection Time: 03/15/22  2:07 PM    Specimen: Blood   Result Value Ref Range    WBC 7.80 3.40 - 10.80 10*3/mm3    RBC 5.41 4.14 - 5.80 10*6/mm3    Hemoglobin 15.9 13.0 - 17.7 g/dL    Hematocrit 47.4 37.5 - 51.0 %    RDW 13.8 12.3 - 15.4 %    MCV 87.5 79.0 - 97.0 fL    MCH 29.4 26.6 - 33.0 pg    MCHC 33.6 31.5 - 35.7 g/dL    MPV 7.3 6.0 - 12.0 fL    Platelets 258 140 - 450 10*3/mm3    Neutrophil % 62.6 42.7 - 76.0 %    Lymphocyte % 29.5 19.6 - 45.3 %    Monocyte % 7.9 " 5.0 - 12.0 %    Neutrophils, Absolute 4.90 1.70 - 7.00 10*3/mm3    Lymphocytes, Absolute 2.30 0.70 - 3.10 10*3/mm3    Monocytes, Absolute 0.60 0.10 - 0.90 10*3/mm3         ASSESSMENT & PLAN:    1.  History of stage IV ALK positive CD30 positive CD20 negative anaplastic large cell lymphoma involving bone and marrow on biopsies presenting with abnormal counts and bone involvement, completed chemotherapy December 23, 2019: Stephen continues to do well with no evidence of disease on clinical exam and no new worrisome symptoms.  His labs from today are normal as shown above.  He remains active and physically fit.  We will continue surveillance per [facility] guidelines with H&P and blood work every 6 months out to 5 years and I have ordered those today.  No plans on routine imaging going forward in the absence of new symptoms.    I spent 20 minutes caring for Stephen on this date of service. This time includes time spent by me in the following activities: preparing for the visit, reviewing tests, performing a medically appropriate examination and/or evaluation, ordering medications, tests, or procedures and documenting information in the medical record.     Bing Sanchez, APRN    03/15/2022

## 2022-03-15 NOTE — TELEPHONE ENCOUNTER
PT notified and verbalized understanding lab orders have been placed since September 2021 for today, and that he will need to sign in at the lab on arrival and then check in with our office afterwards for his appt.   [id number]  ~Shell

## 2022-09-09 ENCOUNTER — LAB (OUTPATIENT)
Dept: LAB | Facility: HOSPITAL | Age: 47
End: 2022-09-09

## 2022-09-09 ENCOUNTER — OFFICE VISIT (OUTPATIENT)
Dept: ONCOLOGY | Facility: CLINIC | Age: 47
End: 2022-09-09

## 2022-09-09 VITALS
SYSTOLIC BLOOD PRESSURE: 140 MMHG | HEART RATE: 80 BPM | RESPIRATION RATE: 18 BRPM | BODY MASS INDEX: 29.53 KG/M2 | HEIGHT: 72 IN | DIASTOLIC BLOOD PRESSURE: 90 MMHG | TEMPERATURE: 97.7 F | WEIGHT: 218 LBS | OXYGEN SATURATION: 97 %

## 2022-09-09 DIAGNOSIS — Z85.72 HISTORY OF LYMPHOMA: Primary | ICD-10-CM

## 2022-09-09 DIAGNOSIS — Z85.72 HISTORY OF LYMPHOMA: ICD-10-CM

## 2022-09-09 LAB
ALBUMIN SERPL-MCNC: 4.9 G/DL (ref 3.5–5.2)
ALBUMIN/GLOB SERPL: 1.7 G/DL
ALP SERPL-CCNC: 64 U/L (ref 39–117)
ALT SERPL W P-5'-P-CCNC: 20 U/L (ref 1–41)
ANION GAP SERPL CALCULATED.3IONS-SCNC: 11 MMOL/L (ref 5–15)
AST SERPL-CCNC: 21 U/L (ref 1–40)
BASOPHILS # BLD AUTO: 0.05 10*3/MM3 (ref 0–0.2)
BASOPHILS NFR BLD AUTO: 0.6 % (ref 0–1.5)
BILIRUB SERPL-MCNC: 0.4 MG/DL (ref 0–1.2)
BUN SERPL-MCNC: 23 MG/DL (ref 6–20)
BUN/CREAT SERPL: 21.9 (ref 7–25)
CALCIUM SPEC-SCNC: 9.7 MG/DL (ref 8.6–10.5)
CHLORIDE SERPL-SCNC: 101 MMOL/L (ref 98–107)
CO2 SERPL-SCNC: 28 MMOL/L (ref 22–29)
CREAT SERPL-MCNC: 1.05 MG/DL (ref 0.76–1.27)
DEPRECATED RDW RBC AUTO: 40.1 FL (ref 37–54)
EGFRCR SERPLBLD CKD-EPI 2021: 87.6 ML/MIN/1.73
EOSINOPHIL # BLD AUTO: 0.3 10*3/MM3 (ref 0–0.4)
EOSINOPHIL NFR BLD AUTO: 3.8 % (ref 0.3–6.2)
ERYTHROCYTE [DISTWIDTH] IN BLOOD BY AUTOMATED COUNT: 12.3 % (ref 12.3–15.4)
GLOBULIN UR ELPH-MCNC: 2.9 GM/DL
GLUCOSE SERPL-MCNC: 84 MG/DL (ref 65–99)
HCT VFR BLD AUTO: 47.3 % (ref 37.5–51)
HGB BLD-MCNC: 16.1 G/DL (ref 13–17.7)
IMM GRANULOCYTES # BLD AUTO: 0.02 10*3/MM3 (ref 0–0.05)
IMM GRANULOCYTES NFR BLD AUTO: 0.3 % (ref 0–0.5)
LDH SERPL-CCNC: 198 U/L (ref 135–225)
LYMPHOCYTES # BLD AUTO: 2.25 10*3/MM3 (ref 0.7–3.1)
LYMPHOCYTES NFR BLD AUTO: 28.8 % (ref 19.6–45.3)
MCH RBC QN AUTO: 29.6 PG (ref 26.6–33)
MCHC RBC AUTO-ENTMCNC: 34 G/DL (ref 31.5–35.7)
MCV RBC AUTO: 86.9 FL (ref 79–97)
MONOCYTES # BLD AUTO: 0.66 10*3/MM3 (ref 0.1–0.9)
MONOCYTES NFR BLD AUTO: 8.5 % (ref 5–12)
NEUTROPHILS NFR BLD AUTO: 4.52 10*3/MM3 (ref 1.7–7)
NEUTROPHILS NFR BLD AUTO: 58 % (ref 42.7–76)
PLATELET # BLD AUTO: 245 10*3/MM3 (ref 140–450)
PMV BLD AUTO: 9.7 FL (ref 6–12)
POTASSIUM SERPL-SCNC: 4.3 MMOL/L (ref 3.5–5.2)
PROT SERPL-MCNC: 7.8 G/DL (ref 6–8.5)
RBC # BLD AUTO: 5.44 10*6/MM3 (ref 4.14–5.8)
SODIUM SERPL-SCNC: 140 MMOL/L (ref 136–145)
WBC NRBC COR # BLD: 7.8 10*3/MM3 (ref 3.4–10.8)

## 2022-09-09 PROCEDURE — 83615 LACTATE (LD) (LDH) ENZYME: CPT

## 2022-09-09 PROCEDURE — 80053 COMPREHEN METABOLIC PANEL: CPT

## 2022-09-09 PROCEDURE — 99213 OFFICE O/P EST LOW 20 MIN: CPT | Performed by: NURSE PRACTITIONER

## 2022-09-09 PROCEDURE — 36415 COLL VENOUS BLD VENIPUNCTURE: CPT

## 2022-09-09 PROCEDURE — 85025 COMPLETE CBC W/AUTO DIFF WBC: CPT

## 2022-09-09 NOTE — PROGRESS NOTES
CHIEF COMPLAINT: History of lymphoma    Problem List:  Oncology/Hematology History Overview Note   1. 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 Leukocytosis  3. FUO  4. Decreased ejection fraction with evidence of myocarditis  5. Marked elevation of sedimentation rate and C-reactive protein  6. Elevated triglycerides on fasting  7. Acute renal failure resolved with hydration    -8/23/2019 initial inpatient [facility] medical oncology consultation.  Asked to see regarding leukocytosis.  He had upper respiratory infection with pneumonia, chest pains, elevated troponins with diagnosis of myocarditis and decreased ejection fraction with fevers to 104.  Tentatively diagnosed with still's disease and treated with prednisone with some improvement in fevers as well as improvement in a rash.  By the end of July, he had severe mid back pain relatively acute and onset that progressed over time with imaging of the area showing progressive abnormality of both bone and marrow signals in the spine that could be malignant marrow involvement or, more likely per radiologist, infectious.  Sedimentation rate was over 100 with a C-reactive protein elevated and white count 20-30,000 for the better part of 2 months.  Slight left shift but no immature forms.  Broad-spectrum serologic testing has not shown pathogens as of the initial consultation.  I ordered bone marrow biopsy and Dr. Jimenez coordinated spine biopsy  -6/24/2019 CT chest abdomen pelvis without contrast showed no parenchymal lung disease but a 1.6 cm mildly enlarged centrally necrotic right paratracheal node with no other adenopathy or hepatosplenomegaly.  Ejection  fraction 35% on echocardiogram.  -6/28/2019 MRI brain and cervical spine without contrast normal  -7/9/2019 fluoroscopy guided right joint aspiration unrevealing  -7/10/2019 fluoroscopic guided bronchoscopy showed no pathogens or pathology  -8/9/2019 MRI thoracic and lumbar spine showed low T1 signals in T8 and T9 with increased T2 involvement concerning for aggressive marrow signal findings that could represent lipid poor spinal hemangioma but concerning for metastasis versus multiple myeloma.  Contrast MRI recommended.  -8/21/2019 MRI thoracic spine with and without contrast showed multiple foci of abnormal marrow signal within the thoracic spine was prominent T8 and T9 with interval increase compared to 8/9/2019 with deformity of the endplates at T8 and T9 but no definitive fracture or discitis.  Several smaller additional lesions suspected at T1, T2, T4, T5, and T12 and the differential would include multifocal vertebral osteomyelitis versus metastasis.  -8/23/2019 bone marrow biopsy showed mild polyclonal plasmacytosis with normocellular marrow and trilineage hematopoiesis with mild myeloid expansion.  No lymphoid infiltrate and no organisms for fungi, acid fast bacilli, tumor, or granuloma.  Bennett 2- negative.  -8/24/2019 ejection fraction up to 65%  -8/26/2019 T8 biopsy show focal area of markedly atypical appearance.  There are inflammatory cells with occasional eosinophils and larger cells.  Some focal hemosiderin.  There is equivocal positivity for CD4, , CD45, and Constance-Barr virus.  It is positive for CD30, ALK 1, EMA, and vimentin.  No staining for CD 5, 7, 3, 20, or 15.  No staining for cytokeratin, SOX 10, , or CD1a.  Outside referral to [facility] pending.  -8/30/2019 [facility] oncology outpatient follow-up visit: Anaplastic large cell lymphoma ALK positive just involving the bone would be unusual but not impossible I suppose.  We will get PET scan.  Were this hemophagocytic syndrome I would  have expected his marrow to be abnormal along with splenomegaly and cytopenias as would the marrow be abnormal for this a plasma cell dyscrasia and the plasma cells were polyclonal.  I will check his SIEP along with triglycerides, peripheral blood flow cytometry for soluble CD 25, ferritin, and NK cell activity.  Once I have that information, hopefully we will have more information back from [facility] and unlikely to involve my colleagues at the [facility] in this process if this is an ALK positive anaplastic large cell lymphoma without kia involvement given the unusual nature and appearance of this.    -9/6/2019 subsequent oncology reconsultation for readmission: Met patient with his last admission.  He has had a 4-month history of fevers with upper respiratory infection and possible pneumonia.  Found to have myocarditis with low ejection fraction with extensive infectious disease work-up negative.  A month later readmitted with shortness of breath and arthralgias with some redness in his wrist.  Bronchoscopy was negative and he was treated with some steroids.  On his third admission with relatively recent acute onset back pain he was found to have some spine lesions.  The bone marrow biopsy was unrevealing,  a T8 biopsy by Dr. Wilde showed markedly atypical appearing cells in a background of eosinophils.  The larger cells were positive for ALK 1, CD30, EMA, and vimentin.  No staining for CD 5, 7, 3, 20, or 15.  No staining for cytokeratin, sox 10, , or CD1a.  Tissue has been sent for second opinion at [facility].  Anaplastic large cell lymphoma ALK positive was considered but given that his fevers which started months back included testing with CT chest abdomen pelvis 6/24/2019 only showing mildly enlarged centrally necrotic right lower paratracheal node of indeterminate significance and no other significant adenopathy or splenomegaly and no pulmonary infiltrate or mediastinal adenopathy on  repeat CT chest 7/9/2019, PET scan was ordered.  Before PET could be completed and GI evaluation completed for evaluation of possible GI source for kia procurement, the patient on the day he was due for endoscopy continue to have fevers with hypotension and a pulse in the 150s.  9/5/2019 CT chest abdomen and pelvis on admission now shows showed new numerous bilateral pulmonary lung nodules in the right lung measuring 2 cm without focal pneumonia.  There was extensive axillary, mediastinal adenopathy the largest in the right paratracheal area 2.1 cm with central necrosis.  There were hypoattenuating regions in the right hepatic lobe measuring up to 2.5 cm incompletely assessed due to lack of contrast due to his creatinine on admission 9/5/2019 being up to 1.67 down to 0.94 by the morning of 9/6/2019.  CT also showed new extensive axillary, mediastinal, and abdominopelvic lymphadenopathy.  There was redemonstration of the T8-T9 aggressive osseous lytic lesions involving thoracolumbar spine and multiple levels.  Nonspecific perinephric inflammation also seen possibly representing pyelonephritis.  Laboratory testing on admission 9/5/2019 showed C-reactive protein up to 37.66 with sedimentation rate greater than 130.  Procalcitonin over 38 with lactic acid  4.8.   normal with normal troponin.  Fasting triglyceride up to 269 and peripheral blood flow cytometry sent for NK cell activity as well as CD25 for IL-2 receptor for the possibility of hemophagocytic syndrome which would be unusual without hemophagocytosis bone marrow.  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  We will get echocardiogram and hold on the GI evaluation and I discussed in person with Dr. Benja Can to see for procurement of lymph node.  He is going to go after upper mediastinal lymph node.  Once we  have more tissue then we will make further plans for treatment.  His ejection fraction on 8/24/2019 was 62%.  Assuming that we find lymphoma and not some aggressive infectious disease process, he will need a port as well but we will wait on the port until we know what we are dealing with.  My partners are on board and will cover me this weekend and I will check back Monday to see where we stand.  He already has an appointment with Dr. Roosevelt Nguyen at  on the 11th who I was enlisting to help sort this out but we may be able to get our answers now that we have node to go after.  I may yet need his services depending on the ilk of this lymphoma and whether or not we would need to consolidate aggressively or treat more aggressively than with standard chemotherapy upfront.  I am starting him on allopurinol.  He already has a rash on his upper thighs and heels and around the area of his prior bone marrow biopsy but I suspect that just related to this probable lymphomatous process or perhaps the recent Bactrim started by Dr. Hauser.  His procalcitonin is quite high and I am still concerned about concomitant infection and I am not sure if the procalcitonin it can be falsely dramatically elevated in the face of lymphoma.  If this is anaplastic large cell lymphoma, with his ECOG performance status generally less than 2 and age less than 60 but with at least stage III disease, even if his LDH is high, he would at least have a risk score of 2 which would put him at the low intermediate risk on the IPI index.  Whether we would use Rituxan or not depends on if this turns out to be CD20 positive where his initial bone biopsy was not.  Given that it is CD30 positive, will probably use brentuximab, Cytoxan, Adriamycin, prednisone every 3 weeks x 6 cycles.  This usually is given without vincristine but with the Brentuximab 1.8 mg/kg IV day 1 of each cycle.  A progression free survival was seen both in ALK positive and negative  anaplastic large cell lymphomas treated with brentuximab rather than vincristine.  Neuropathy still can be an issue and diarrhea is more common.  If this is anaplastic large cell lymphoma ALK positive, autologous stem cell transplant may be considered in patients over 40 years of age with an IPI score greater than or equal to 3 but generally not if the IPI score is less than 3 so we will assess that very carefully.  Despite the elevated fasting triglyceride recently, I doubt this is hemophagocytic syndrome given the negative bone marrow and now with the blossoming lymphadenopathy.  But we shall see.    -9/30/2019 outpatient medical oncology follow-up visit:  He was breathing much better the first week after his chemotherapy but then the shortness of breath worsened again his pulse increased in the 120s to 140s.  Saw Dr. Dumont in my team here as well and received IV fluids.  Slowly that improved.  In the prior 5 days he has been feeling better and no tachycardia for the last week.  He has had no fever since discharge from the hospital.  He has 1 more week of antibiotics.  His current complaints are:    Occasional bloody mucus from his nose    Alternating diarrhea and constipation better with stool softeners    Low back pain worsening with Neupogen but only on oxycodone rarely and none in the last 48 hours.   Bilateral knee pain without swelling on allopurinol    Itching but prior rash resolved   Mild gait instability due to weakness worse with showering and due for follow-up with Dr. Jimenez for serial MRIs   Labile moods but without jason depression worse with steroids now down to 20 mg    I addressed all these issues with him today my plan is:   To get a PET prior to return for course #3 of chemotherapy with brentuximab, Cytoxan, Adriamycin, prednisone   Consult Dr. Carrion for possible consolidation transplant in the future   Add Xgeva for the lytic involvement of his bone   Taper the steroids down to 20 mg  "for 5 days 10 mg for 5 days 5 mg for 5 days and then off following the 100 mg of prednisone with this course of therapy and then hopefully no further steroids though he will follow with rheumatology  Follow-up with Dr. Jimenez for serial spine MRIs.    -10/21/2019 medical oncology office visit: 10/17/2019 PET scan was negative.  All prior CT abnormalities have resolved.  He is due for follow-up with Dr. Jimenez for spine MRI in about a month.  He has seen Dr. Carrion.  I do not have those notes but according to the patient there are no plans for stem cell transplant in first remission.  I have left a message with Dr. Carrion raising the question of whether spinal fluid analysis or MRI of brain would be in order but he feels great has no neurologic symptoms and his only complaint presently is mid back pain which clearly is improving over time.  Unless Dr. Carrion has other ideas, my plan would be to continue on with courses 3 and 4 of Cytoxan Hilario Brentuximab prednisone, repeat the PET, proceed with courses 5 and 6, repeat the PET again, and assuming that everything is negative at that junction then go to routine survivorship.    -12/20/2019 medical oncology office visit: 12/10/2019 PET scan shows just active marrow response with no active lymphoma or recurrence.  He will call Dr. Carrion for an appointment after course #6 12/23/2019 to discuss definitively whether there is any plans for consolidation but as far as I am aware there is no plan for stem cell transplant autologous at this junction.  We will get his PET 8 weeks out from this final dose of of brentuximab Cytoxan, Adriamycin, prednisone.    -2/27/2020 medical oncology follow-up visit:  Follow-up per Dr. Choudhary from [facility] on these individuals is as follows:     \"schedule patient visits every three months during the first two years, then every six months starting two years after complete response. Starting at year 5, patients are seen once " "per year. At these visits we perform a history and physical examination, complete blood count, chemistries, and lactate dehydrogenase. We obtain a CT scan of the chest, abdomen, and pelvis every six months for the first 18 months after documentation of an initial complete response. We only obtain additional positron emission tomography/computed tomography (PET/CT) scans to further evaluate any concerning findings noted on CT scans. We do not obtain routine CT scans after 18 months unless there are signs, symptoms, or exam findings to suggest relapse.\"     -8/24/2020 [facility] follow-up visit: I reviewed reports and images of 8/21/2020 CT chest abdomen pelvis which showed no evidence of lymphoma.  Hemoglobin 16 with normal CBC and  normal.  CMP showed glucose 109 otherwise unremarkable.  Pursuant to the above guidelines he will see my nurse practitioner back in 3 months with CBC, CMP, and LDH and will get CT scanning 1 more time 6 months from now pursuant to the above guidelines and after that we will just follow-up clinically with physical exams as outlined above along with labs as outlined above    -9/14/2021 [facility] clinic follow-up: He continues to do well with no evidence of disease on clinical exam and no new worrisome symptoms.  CBC from today is normal, CMP and LDH pending.  He remains physically fit and continues to follow a healthy diet and exercise regimen.  We will continue surveillance per NCCN guidelines with H&P every 6 months after 5 years with blood work, no plans on routine imaging going forward in the absence of new symptoms.  We will repeat CBC, CMP and LDH on return I have ordered those today.    -3/15/2022 normal CBC, CMP and LDH.     Anaplastic ALK-positive large cell lymphoma    6/28/2019 Imaging    MRI of the brain negative     8/9/2019 Imaging    MRI thoracic and lumbar spine IMPRESSION:  1.  Abnormal ill-defined low T1 signal lesions in the T8 and T9  vertebral bodies " with increased T2 involvement concerning for aggressive  bone marrow signal findings and although this may represent lipid poor  spinal hemangiomas concern for metastasis versus myeloma remains a  consideration given overall appearance on this noncontrast evaluation.  Contrast-enhanced examination of the thoracic spine and/or nuclear  medicine bone scan may be useful for further evaluation.     2. Lumbar spine with degenerative changes, however, no aggressive bone  marrow signal findings or acute fracture. No significant spinal canal or  neuroforaminal stenosis present.     8/24/2019 -  Other Event    Echocardiogram: Estimated EF 62%.     8/26/2019 Initial Diagnosis    Anaplastic ALK-positive large cell lymphoma      8/26/2019 Biopsy    T8 vertebral biopsy positive for anaplastic large cell lymphoma, ALK-positive.     9/5/2019 Imaging    CT chest, abdomen and pelvis IMPRESSION:     Constellation of findings concerning for metastatic disease:      - Interval development of numerous pulmonary nodules throughout the  lungs with the largest measuring 2.0 cm within the right midlung and  concerning for underlying pulmonary metastasis and which may account for  abnormality identified in the chest radiograph performed 09/05/2019.     - Extensive axillary, mediastinal, and abdominopelvic lymphadenopathy as  described above.     - Hypoattenuated regions involving the liver concerning for additional  sites of metastatic disease, although are limited in evaluation  secondary to lack of intravenous contrast. Attention on follow-up  imaging is advised.     - Redemonstration of T8/T9 aggressive osseous lesions with additional  lytic lesion involving the L4 vertebral body and additional  subcentimeter lytic lesions throughout the thoracolumbar spine  suggested.     Nonspecific perinephric inflammation. Correlate with urinalysis to  exclude underlying pyelonephritis.     9/6/2019 Biopsy    Mediastinal lymph node excisional biopsy  positive for ALK-1 positive anaplastic large cell lymphoma     9/9/2019 - 12/23/2019 Chemotherapy    OP LYMPHOMA Brentuximab, Cyclophosphamide / DOXOrubicin / predniSONE / Pegfilgrastim       10/17/2019 Imaging    PET/CT IMPRESSION:  1. Essentially negative PET scan for active lymphoma. CT scan findings  from 09/05/2019 appear to have largely resolved as well.  2. Small focus of mild activity in the right groin appears to reflect  calcific tendinosis of the distal right iliopsoas muscle.  3. Probable marrow stimulation effect.     11/19/2019 Imaging    MRI T-spine shows progressive T8 and 9 compression pathologic fracture and bone and bone marrow activity.  Dr. Jimenez reviewed and thought this was his treated disease.  And plans on serial MRI imaging.     12/10/2019 Imaging    PET/CT IMPRESSION:  1. No convincing evidence of abnormal FDG activity as visualized to  suggest recurrent disease or lymphomatous involvement.     2. Redemonstration of diffuse osseous marrow activity throughout the  axial skeleton most consistent with marrow stimulation effect similar to  prior exam with redemonstration of compression fractures of the lower  thoracic spine better seen on the MRI performed 11/19/2019.     2/20/2020 Imaging    PET negative with resolution of prior marrow abnormality.     2/27/2020 Cancer Staged    Staging form: Hodgkin And Non-Hodgkin Lymphoma, AJCC 8th Edition  - Clinical: Stage IV - Signed by Brent Brizuela MD on 2/27/2020 8/21/2020 Imaging    -8/21/2020 CT chest abdomen pelvis showed no evidence of lymphoma.  Hemoglobin 16 with normal CBC and  normal.  CMP showed glucose 109 otherwise unremarkable.     3/4/2021 Imaging    CT chest, abdomen and pelvis IMPRESSION:  Stable and essentially normal CT of the chest, abdomen and  pelvis with no evidence of recurrent or metastatic lymphomatous disease.  , CBC and CMP also normal.          HISTORY OF PRESENT ILLNESS:  The patient is a 47 y.o. male,  "here for follow up on management of history of lymphoma as outlined above in the oncology history.  Stephen has been doing well since we saw him last with no new concerns.  He states that he and his family have had a good summer, he has put on a few pounds as they traveled a lot and ate out more often but he is now getting back on track with eating healthy.  No lymphadenopathy that he is aware of.  He denies any illnesses or infections, no fevers or chills, no bed drenching night sweats.  No change in his bowel or bladder habits.  No pain.    Past Medical History:   Diagnosis Date   • Acute kidney injury (HCC)    • Anaplastic ALK-positive large cell lymphoma (HCC)    • Bacteremia    • Hyperlipidemia    • Myocarditis (HCC)    • Pneumonia    • Severe sepsis (HCC)      Past Surgical History:   Procedure Laterality Date   • APPENDECTOMY     • BACK SURGERY      L5 S1   • BRONCHOSCOPY N/A 7/10/2019    Procedure: BRONCHOSCOPY WITH ENDOBRONCHIAL ULTRASOUND AND TRANSBRONCHIAL BIOPSY AND FLUORO;  Surgeon: Marvel Sandoval MD;  Location:  [facility];  Service: Pulmonary   • CARDIAC CATHETERIZATION N/A 6/24/2019    Procedure: LEFT HEART CATH;  Surgeon: Deep Muñiz IV, MD;  Location:  [facility];  Service: Cardiovascular   • INTERVENTIONAL RADIOLOGY PROCEDURE N/A 8/26/2019    Procedure: THORACIC SPINE BIOSPY;  Surgeon: Juve Avila MD;  Location:  [facility];  Service: Interventional Radiology   • MEDIASTINOSCOPY, BRONCHOSCOPY N/A 9/6/2019    Procedure: BRONCHOSCOPY, MEDIASTINOSCOPY WITH BIOPSY;  Surgeon: El Can MD;  Location:  [facility];  Service: Cardiothoracic       Allergies   Allergen Reactions   • Ciprofloxacin Anxiety and Hallucinations     \"Extreme anxiety and paranoia\"   • Sulfa Antibiotics Rash       Family History and Social History reviewed and changed as necessary    REVIEW OF SYSTEM:   Negative for new somatic concerns    PHYSICAL " "EXAM:  Well-developed, well-nourished healthy-appearing male in no distress  No cervical, supraclavicular or axillary adenopathy on exam  Respirations regular nonlabored, lungs clear    Vitals:    09/09/22 1432   BP: 140/90   Pulse: 80   Resp: 18   Temp: 97.7 °F (36.5 °C)   SpO2: 97%   Weight: 98.9 kg (218 lb)   Height: 182.9 cm (72\")     Vitals:    09/09/22 1432   PainSc: 0-No pain          ECOG score: 0           Vitals reviewed.    ECOG: (0) Fully Active - Able to Carry On All Pre-disease Performance Without Restriction    Lab Results   Component Value Date    HGB 16.1 09/09/2022    HCT 47.3 09/09/2022    MCV 86.9 09/09/2022     09/09/2022    WBC 7.80 09/09/2022    NEUTROABS 4.52 09/09/2022    LYMPHSABS 2.25 09/09/2022    MONOSABS 0.66 09/09/2022    EOSABS 0.30 09/09/2022    BASOSABS 0.05 09/09/2022       Lab Results   Component Value Date    GLUCOSE 102 (H) 03/15/2022    BUN 17 03/15/2022    CREATININE 1.17 03/15/2022     03/15/2022    K 4.7 03/15/2022     03/15/2022    CO2 28.0 03/15/2022    CALCIUM 9.5 03/15/2022    PROTEINTOT 7.7 03/15/2022    ALBUMIN 4.90 03/15/2022    BILITOT 0.5 03/15/2022    ALKPHOS 59 03/15/2022    AST 22 03/15/2022    ALT 22 03/15/2022     CMP and LDH from today pending        ASSESSMENT & PLAN:    1.  History of stage IV ALK positive CD30 positive CD20 negative anaplastic large cell lymphoma involving bone and marrow on biopsies presenting with abnormal counts and bone involvement, completed chemotherapy December 2019: Stephen is doing well, he continues to have no evidence of disease on clinical exam and no new worrisome symptoms.  CBC from today is normal, CMP and LDH are pending.  He is now approaching 3 years out from completion of treatment.  We will continue surveillance per NCCN guidelines with H&P and blood work every 6 months out to 5 years.  No plans on routine imaging in the absence of new symptoms.    I spent 20 minutes caring for Stephen on this date of " service. This time includes time spent by me in the following activities: preparing for the visit, reviewing tests, performing a medically appropriate examination and/or evaluation, ordering medications, tests, or procedures and documenting information in the medical record.     Bing Sanchez, APRN    09/09/2022

## 2023-03-10 ENCOUNTER — LAB (OUTPATIENT)
Dept: LAB | Facility: HOSPITAL | Age: 48
End: 2023-03-10
Payer: MEDICAID

## 2023-03-10 ENCOUNTER — OFFICE VISIT (OUTPATIENT)
Dept: ONCOLOGY | Facility: CLINIC | Age: 48
End: 2023-03-10
Payer: COMMERCIAL

## 2023-03-10 VITALS
RESPIRATION RATE: 18 BRPM | DIASTOLIC BLOOD PRESSURE: 82 MMHG | HEIGHT: 72 IN | SYSTOLIC BLOOD PRESSURE: 144 MMHG | WEIGHT: 216 LBS | BODY MASS INDEX: 29.26 KG/M2 | HEART RATE: 70 BPM | TEMPERATURE: 97.8 F | OXYGEN SATURATION: 97 %

## 2023-03-10 DIAGNOSIS — C84.68 ANAPLASTIC ALK-POSITIVE LARGE CELL LYMPHOMA OF LYMPH NODES OF MULTIPLE REGIONS: Primary | Chronic | ICD-10-CM

## 2023-03-10 DIAGNOSIS — Z85.79 HISTORY OF LYMPHOMA: ICD-10-CM

## 2023-03-10 LAB
ALBUMIN SERPL-MCNC: 4.4 G/DL (ref 3.5–5.2)
ALBUMIN/GLOB SERPL: 1.6 G/DL
ALP SERPL-CCNC: 52 U/L (ref 39–117)
ALT SERPL W P-5'-P-CCNC: 22 U/L (ref 1–41)
ANION GAP SERPL CALCULATED.3IONS-SCNC: 11 MMOL/L (ref 5–15)
AST SERPL-CCNC: 29 U/L (ref 1–40)
BASOPHILS # BLD AUTO: 0.03 10*3/MM3 (ref 0–0.2)
BASOPHILS NFR BLD AUTO: 0.5 % (ref 0–1.5)
BILIRUB SERPL-MCNC: 0.4 MG/DL (ref 0–1.2)
BUN SERPL-MCNC: 17 MG/DL (ref 6–20)
BUN/CREAT SERPL: 16.2 (ref 7–25)
CALCIUM SPEC-SCNC: 9.4 MG/DL (ref 8.6–10.5)
CHLORIDE SERPL-SCNC: 103 MMOL/L (ref 98–107)
CO2 SERPL-SCNC: 25 MMOL/L (ref 22–29)
CREAT SERPL-MCNC: 1.05 MG/DL (ref 0.76–1.27)
DEPRECATED RDW RBC AUTO: 41.7 FL (ref 37–54)
EGFRCR SERPLBLD CKD-EPI 2021: 87.6 ML/MIN/1.73
EOSINOPHIL # BLD AUTO: 0.14 10*3/MM3 (ref 0–0.4)
EOSINOPHIL NFR BLD AUTO: 2.2 % (ref 0.3–6.2)
ERYTHROCYTE [DISTWIDTH] IN BLOOD BY AUTOMATED COUNT: 13 % (ref 12.3–15.4)
GLOBULIN UR ELPH-MCNC: 2.8 GM/DL
GLUCOSE SERPL-MCNC: 90 MG/DL (ref 65–99)
HCT VFR BLD AUTO: 45.3 % (ref 37.5–51)
HGB BLD-MCNC: 15.4 G/DL (ref 13–17.7)
IMM GRANULOCYTES # BLD AUTO: 0.01 10*3/MM3 (ref 0–0.05)
IMM GRANULOCYTES NFR BLD AUTO: 0.2 % (ref 0–0.5)
LDH SERPL-CCNC: 194 U/L (ref 135–225)
LYMPHOCYTES # BLD AUTO: 2.32 10*3/MM3 (ref 0.7–3.1)
LYMPHOCYTES NFR BLD AUTO: 35.7 % (ref 19.6–45.3)
MCH RBC QN AUTO: 29.5 PG (ref 26.6–33)
MCHC RBC AUTO-ENTMCNC: 34 G/DL (ref 31.5–35.7)
MCV RBC AUTO: 86.8 FL (ref 79–97)
MONOCYTES # BLD AUTO: 0.49 10*3/MM3 (ref 0.1–0.9)
MONOCYTES NFR BLD AUTO: 7.5 % (ref 5–12)
NEUTROPHILS NFR BLD AUTO: 3.51 10*3/MM3 (ref 1.7–7)
NEUTROPHILS NFR BLD AUTO: 53.9 % (ref 42.7–76)
PLATELET # BLD AUTO: 255 10*3/MM3 (ref 140–450)
PMV BLD AUTO: 9.8 FL (ref 6–12)
POTASSIUM SERPL-SCNC: 4.3 MMOL/L (ref 3.5–5.2)
PROT SERPL-MCNC: 7.2 G/DL (ref 6–8.5)
RBC # BLD AUTO: 5.22 10*6/MM3 (ref 4.14–5.8)
SODIUM SERPL-SCNC: 139 MMOL/L (ref 136–145)
WBC NRBC COR # BLD: 6.5 10*3/MM3 (ref 3.4–10.8)

## 2023-03-10 PROCEDURE — 99213 OFFICE O/P EST LOW 20 MIN: CPT | Performed by: INTERNAL MEDICINE

## 2023-03-10 PROCEDURE — 85025 COMPLETE CBC W/AUTO DIFF WBC: CPT

## 2023-03-10 PROCEDURE — 83615 LACTATE (LD) (LDH) ENZYME: CPT

## 2023-03-10 PROCEDURE — 36415 COLL VENOUS BLD VENIPUNCTURE: CPT

## 2023-03-10 PROCEDURE — 80053 COMPREHEN METABOLIC PANEL: CPT

## 2023-03-10 RX ORDER — METOPROLOL SUCCINATE 25 MG/1
TABLET, EXTENDED RELEASE ORAL EVERY 24 HOURS
COMMUNITY

## 2023-03-10 RX ORDER — HYDROXYCHLOROQUINE SULFATE 200 MG/1
TABLET, FILM COATED ORAL EVERY 24 HOURS
COMMUNITY

## 2023-03-10 RX ORDER — SULFAMETHOXAZOLE AND TRIMETHOPRIM 800; 160 MG/1; MG/1
TABLET ORAL EVERY 12 HOURS SCHEDULED
COMMUNITY

## 2023-03-10 RX ORDER — CHLORCYCLIZINE HYDROCHLORIDE AND PSEUDOEPHEDRINE HYDROCHLORIDE 25; 60 MG/1; MG/1
TABLET ORAL
COMMUNITY
Start: 2023-01-20

## 2023-03-10 RX ORDER — PREDNISONE 10 MG/1
TABLET ORAL EVERY 24 HOURS
COMMUNITY

## 2023-03-10 NOTE — PROGRESS NOTES
CHIEF COMPLAINT: No new somatic complaints    Problem List:  Oncology/Hematology History Overview Note   1.  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  Leukocytosis  3.  FUO  4.  Decreased ejection fraction with evidence of myocarditis  5.  Marked elevation of sedimentation rate and C-reactive protein  6.  Elevated triglycerides on fasting  7.  Acute renal failure resolved with hydration    -8/23/2019 initial inpatient [facility] medical oncology consultation.  Asked to see regarding leukocytosis.  He had upper respiratory infection with pneumonia, chest pains, elevated troponins with diagnosis of myocarditis and decreased ejection fraction with fevers to 104.  Tentatively diagnosed with still's disease and treated with prednisone with some improvement in fevers as well as improvement in a rash.  By the end of July, he had severe mid back pain relatively acute and onset that progressed over time with imaging of the area showing progressive abnormality of both bone and marrow signals in the spine that could be malignant marrow involvement or, more likely per radiologist, infectious.  Sedimentation rate was over 100 with a C-reactive protein elevated and white count 20-30,000 for the better part of 2 months.  Slight left shift but no immature forms.  Broad-spectrum serologic testing has not shown pathogens as of the initial consultation.  I ordered bone marrow biopsy and Dr. Jimenez coordinated spine biopsy  -6/24/2019 CT chest abdomen pelvis without contrast showed no parenchymal lung disease but a 1.6 cm mildly enlarged centrally necrotic right paratracheal node with no other adenopathy or  hepatosplenomegaly.  Ejection fraction 35% on echocardiogram.  -6/28/2019 MRI brain and cervical spine without contrast normal  -7/9/2019 fluoroscopy guided right joint aspiration unrevealing  -7/10/2019 fluoroscopic guided bronchoscopy showed no pathogens or pathology  -8/9/2019 MRI thoracic and lumbar spine showed low T1 signals in T8 and T9 with increased T2 involvement concerning for aggressive marrow signal findings that could represent lipid poor spinal hemangioma but concerning for metastasis versus multiple myeloma.  Contrast MRI recommended.  -8/21/2019 MRI thoracic spine with and without contrast showed multiple foci of abnormal marrow signal within the thoracic spine was prominent T8 and T9 with interval increase compared to 8/9/2019 with deformity of the endplates at T8 and T9 but no definitive fracture or discitis.  Several smaller additional lesions suspected at T1, T2, T4, T5, and T12 and the differential would include multifocal vertebral osteomyelitis versus metastasis.  -8/23/2019 bone marrow biopsy showed mild polyclonal plasmacytosis with normocellular marrow and trilineage hematopoiesis with mild myeloid expansion.  No lymphoid infiltrate and no organisms for fungi, acid fast bacilli, tumor, or granuloma.  Bennett 2- negative.  -8/24/2019 ejection fraction up to 65%  -8/26/2019 T8 biopsy show focal area of markedly atypical appearance.  There are inflammatory cells with occasional eosinophils and larger cells.  Some focal hemosiderin.  There is equivocal positivity for CD4, , CD45, and Constance-Barr virus.  It is positive for CD30, ALK 1, EMA, and vimentin.  No staining for CD 5, 7, 3, 20, or 15.  No staining for cytokeratin, SOX 10, , or CD1a.  Outside referral to [facility] pending.  -8/30/2019 [facility] follow-up visit: Anaplastic large cell lymphoma ALK positive just involving the bone would be unusual but not impossible I suppose.  We will get PET scan.  Were this  hemophagocytic syndrome I would have expected his marrow to be abnormal along with splenomegaly and cytopenias as would the marrow be abnormal for this a plasma cell dyscrasia and the plasma cells were polyclonal.  I will check his SIEP along with triglycerides, peripheral blood flow cytometry for soluble CD 25, ferritin, and NK cell activity.  Once I have that information, hopefully we will have more information back from [facility] and unlikely to involve my colleagues at the [facility] in this process if this is an ALK positive anaplastic large cell lymphoma without kia involvement given the unusual nature and appearance of this.    -9/6/2019 subsequent oncology reconsultation for readmission: Met patient with his last admission.  He has had a 4-month history of fevers with upper respiratory infection and possible pneumonia.  Found to have myocarditis with low ejection fraction with extensive infectious disease work-up negative.  A month later readmitted with shortness of breath and arthralgias with some redness in his wrist.  Bronchoscopy was negative and he was treated with some steroids.  On his third admission with relatively recent acute onset back pain he was found to have some spine lesions.  The bone marrow biopsy was unrevealing,  a T8 biopsy by Dr. Wilde showed markedly atypical appearing cells in a background of eosinophils.  The larger cells were positive for ALK 1, CD30, EMA, and vimentin.  No staining for CD 5, 7, 3, 20, or 15.  No staining for cytokeratin, sox 10, , or CD1a.  Tissue has been sent for second opinion at [facility].  Anaplastic large cell lymphoma ALK positive was considered but given that his fevers which started months back included testing with CT chest abdomen pelvis 6/24/2019 only showing mildly enlarged centrally necrotic right lower paratracheal node of indeterminate significance and no other significant adenopathy or splenomegaly and no pulmonary  infiltrate or mediastinal adenopathy on repeat CT chest 7/9/2019, PET scan was ordered.  Before PET could be completed and GI evaluation completed for evaluation of possible GI source for kia procurement, the patient on the day he was due for endoscopy continue to have fevers with hypotension and a pulse in the 150s.  9/5/2019 CT chest abdomen and pelvis on admission now shows showed new numerous bilateral pulmonary lung nodules in the right lung measuring 2 cm without focal pneumonia.  There was extensive axillary, mediastinal adenopathy the largest in the right paratracheal area 2.1 cm with central necrosis.  There were hypoattenuating regions in the right hepatic lobe measuring up to 2.5 cm incompletely assessed due to lack of contrast due to his creatinine on admission 9/5/2019 being up to 1.67 down to 0.94 by the morning of 9/6/2019.  CT also showed new extensive axillary, mediastinal, and abdominopelvic lymphadenopathy.  There was redemonstration of the T8-T9 aggressive osseous lytic lesions involving thoracolumbar spine and multiple levels.  Nonspecific perinephric inflammation also seen possibly representing pyelonephritis.  Laboratory testing on admission 9/5/2019 showed C-reactive protein up to 37.66 with sedimentation rate greater than 130.  Procalcitonin over 38 with lactic acid  4.8.   normal with normal troponin.  Fasting triglyceride up to 269 and peripheral blood flow cytometry sent for NK cell activity as well as CD25 for IL-2 receptor for the possibility of hemophagocytic syndrome which would be unusual without hemophagocytosis bone marrow.  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  We will get echocardiogram and hold on the GI evaluation and I discussed in person with Dr. Benja Can to see for procurement of lymph node.  He is going to go  after upper mediastinal lymph node.  Once we have more tissue then we will make further plans for treatment.  His ejection fraction on 8/24/2019 was 62%.  Assuming that we find lymphoma and not some aggressive infectious disease process, he will need a port as well but we will wait on the port until we know what we are dealing with.  My partners are on board and will cover me this weekend and I will check back Monday to see where we stand.  He already has an appointment with Dr. Roosevelt Nguyen at  on the 11th who I was enlisting to help sort this out but we may be able to get our answers now that we have node to go after.  I may yet need his services depending on the ilk of this lymphoma and whether or not we would need to consolidate aggressively or treat more aggressively than with standard chemotherapy upfront.  I am starting him on allopurinol.  He already has a rash on his upper thighs and heels and around the area of his prior bone marrow biopsy but I suspect that just related to this probable lymphomatous process or perhaps the recent Bactrim started by Dr. Hauser.  His procalcitonin is quite high and I am still concerned about concomitant infection and I am not sure if the procalcitonin it can be falsely dramatically elevated in the face of lymphoma.  If this is anaplastic large cell lymphoma, with his ECOG performance status generally less than 2 and age less than 60 but with at least stage III disease, even if his LDH is high, he would at least have a risk score of 2 which would put him at the low intermediate risk on the IPI index.  Whether we would use Rituxan or not depends on if this turns out to be CD20 positive where his initial bone biopsy was not.  Given that it is CD30 positive, will probably use brentuximab, Cytoxan, Adriamycin, prednisone every 3 weeks x 6 cycles.  This usually is given without vincristine but with the Brentuximab 1.8 mg/kg IV day 1 of each cycle.  A progression free  survival was seen both in ALK positive and negative anaplastic large cell lymphomas treated with brentuximab rather than vincristine.  Neuropathy still can be an issue and diarrhea is more common.  If this is anaplastic large cell lymphoma ALK positive, autologous stem cell transplant may be considered in patients over 40 years of age with an IPI score greater than or equal to 3 but generally not if the IPI score is less than 3 so we will assess that very carefully.  Despite the elevated fasting triglyceride recently, I doubt this is hemophagocytic syndrome given the negative bone marrow and now with the blossoming lymphadenopathy.  But we shall see.    -9/30/2019 outpatient medical oncology follow-up visit:  He was breathing much better the first week after his chemotherapy but then the shortness of breath worsened again his pulse increased in the 120s to 140s.  Saw Dr. Dumont in my team here as well and received IV fluids.  Slowly that improved.  In the prior 5 days he has been feeling better and no tachycardia for the last week.  He has had no fever since discharge from the hospital.  He has 1 more week of antibiotics.  His current complaints are:    Occasional bloody mucus from his nose    Alternating diarrhea and constipation better with stool softeners    Low back pain worsening with Neupogen but only on oxycodone rarely and none in the last 48 hours.   Bilateral knee pain without swelling on allopurinol    Itching but prior rash resolved   Mild gait instability due to weakness worse with showering and due for follow-up with Dr. Jimenez for serial MRIs   Labile moods but without jason depression worse with steroids now down to 20 mg    I addressed all these issues with him today my plan is:   To get a PET prior to return for course #3 of chemotherapy with brentuximab, Cytoxan, Adriamycin, prednisone   Consult Dr. Carrion for possible consolidation transplant in the future   Add Xgeva for the lytic  "involvement of his bone   Taper the steroids down to 20 mg for 5 days 10 mg for 5 days 5 mg for 5 days and then off following the 100 mg of prednisone with this course of therapy and then hopefully no further steroids though he will follow with rheumatology  Follow-up with Dr. Jimenez for serial spine MRIs.    -10/21/2019 medical oncology office visit: 10/17/2019 PET scan was negative.  All prior CT abnormalities have resolved.  He is due for follow-up with Dr. Jimenez for spine MRI in about a month.  He has seen Dr. Carrion.  I do not have those notes but according to the patient there are no plans for stem cell transplant in first remission.  I have left a message with Dr. Carrion raising the question of whether spinal fluid analysis or MRI of brain would be in order but he feels great has no neurologic symptoms and his only complaint presently is mid back pain which clearly is improving over time.  Unless Dr. Carrion has other ideas, my plan would be to continue on with courses 3 and 4 of Cytoxan Hilario Brentuximab prednisone, repeat the PET, proceed with courses 5 and 6, repeat the PET again, and assuming that everything is negative at that junction then go to routine survivorship.    -12/20/2019 medical oncology office visit: 12/10/2019 PET scan shows just active marrow response with no active lymphoma or recurrence.  He will call Dr. Carrion for an appointment after course #6 12/23/2019 to discuss definitively whether there is any plans for consolidation but as far as I am aware there is no plan for stem cell transplant autologous at this junction.  We will get his PET 8 weeks out from this final dose of of brentuximab Cytoxan, Adriamycin, prednisone.    -2/27/2020 medical oncology follow-up visit:  Follow-up per Dr. Choudhary from [place] on these individuals is as follows:     \"schedule patient visits every three months during the first two years, then every six months starting two years after " "complete response. Starting at year 5, patients are seen once per year. At these visits we perform a history and physical examination, complete blood count, chemistries, and lactate dehydrogenase. We obtain a CT scan of the chest, abdomen, and pelvis every six months for the first 18 months after documentation of an initial complete response. We only obtain additional positron emission tomography/computed tomography (PET/CT) scans to further evaluate any concerning findings noted on CT scans. We do not obtain routine CT scans after 18 months unless there are signs, symptoms, or exam findings to suggest relapse.\"     -8/24/2020 [facility] [place], operated by [facility] oncology follow-up visit: I reviewed reports and images of 8/21/2020 CT chest abdomen pelvis which showed no evidence of lymphoma.  Hemoglobin 16 with normal CBC and  normal.  CMP showed glucose 109 otherwise unremarkable.  Pursuant to the above guidelines he will see my nurse practitioner back in 3 months with CBC, CMP, and LDH and will get CT scanning 1 more time 6 months from now pursuant to the above guidelines and after that we will just follow-up clinically with physical exams as outlined above along with labs as outlined above    -9/14/2021 [facility] [place], operated by [facility] Oncology clinic follow-up: He continues to do well with no evidence of disease on clinical exam and no new worrisome symptoms.  CBC from today is normal, CMP and LDH pending.  He remains physically fit and continues to follow a healthy diet and exercise regimen.  We will continue surveillance per NCCN guidelines with H&P every 6 months after 5 years with blood work, no plans on routine imaging going forward in the absence of new symptoms.  We will repeat CBC, CMP and LDH on return I have ordered those today.    -3/15/2022 [facility] [place], operated by [facility] medical oncology APRN follow-up: Normal CBC, CMP and LDH. continues to do well with no evidence of disease on clinical exam and no new worrisome symptoms.  His labs from today are normal as shown " above.  He remains active and physically fit.  We will continue surveillance per NCCN guidelines with H&P and blood work every 6 months out to 5 years and I have ordered those today.  No plans on routine imaging going forward in the absence of new symptoms.    -9/9/2023 [facility] oncology APRN follow-up: he continues to have no evidence of disease on clinical exam and no new worrisome symptoms.  CBC from today is normal, CMP and LDH are pending.  He is now approaching 3 years out from completion of treatment.  We will continue surveillance per NCCN guidelines with H&P and blood work every 6 months out to 5 years.  No plans on routine imaging in the absence of new symptoms.    -3/10/2023 [facility] oncology follow-up: CBC and CMP unremarkable.  No signs or symptoms clinically of recurrence.  We will continue H&P and blood work every 6 months out through December 2024     Anaplastic ALK-positive large cell lymphoma of lymph nodes of multiple regions (HCC)   6/28/2019 Imaging    MRI of the brain negative     8/9/2019 Imaging    MRI thoracic and lumbar spine IMPRESSION:  1.  Abnormal ill-defined low T1 signal lesions in the T8 and T9  vertebral bodies with increased T2 involvement concerning for aggressive  bone marrow signal findings and although this may represent lipid poor  spinal hemangiomas concern for metastasis versus myeloma remains a  consideration given overall appearance on this noncontrast evaluation.  Contrast-enhanced examination of the thoracic spine and/or nuclear  medicine bone scan may be useful for further evaluation.     2. Lumbar spine with degenerative changes, however, no aggressive bone  marrow signal findings or acute fracture. No significant spinal canal or  neuroforaminal stenosis present.     8/24/2019 -  Other Event    Echocardiogram: Estimated EF 62%.     8/26/2019 Initial Diagnosis    Anaplastic ALK-positive large cell lymphoma      8/26/2019 Biopsy    T8 vertebral biopsy  positive for anaplastic large cell lymphoma, ALK-positive.     9/5/2019 Imaging    CT chest, abdomen and pelvis IMPRESSION:     Constellation of findings concerning for metastatic disease:      - Interval development of numerous pulmonary nodules throughout the  lungs with the largest measuring 2.0 cm within the right midlung and  concerning for underlying pulmonary metastasis and which may account for  abnormality identified in the chest radiograph performed 09/05/2019.     - Extensive axillary, mediastinal, and abdominopelvic lymphadenopathy as  described above.     - Hypoattenuated regions involving the liver concerning for additional  sites of metastatic disease, although are limited in evaluation  secondary to lack of intravenous contrast. Attention on follow-up  imaging is advised.     - Redemonstration of T8/T9 aggressive osseous lesions with additional  lytic lesion involving the L4 vertebral body and additional  subcentimeter lytic lesions throughout the thoracolumbar spine  suggested.     Nonspecific perinephric inflammation. Correlate with urinalysis to  exclude underlying pyelonephritis.     9/6/2019 Biopsy    Mediastinal lymph node excisional biopsy positive for ALK-1 positive anaplastic large cell lymphoma     9/9/2019 - 12/23/2019 Chemotherapy    OP LYMPHOMA Brentuximab, Cyclophosphamide / DOXOrubicin / predniSONE / Pegfilgrastim       10/17/2019 Imaging    PET/CT IMPRESSION:  1. Essentially negative PET scan for active lymphoma. CT scan findings  from 09/05/2019 appear to have largely resolved as well.  2. Small focus of mild activity in the right groin appears to reflect  calcific tendinosis of the distal right iliopsoas muscle.  3. Probable marrow stimulation effect.     11/19/2019 Imaging    MRI T-spine shows progressive T8 and 9 compression pathologic fracture and bone and bone marrow activity.  Dr. Jimenez reviewed and thought this was his treated disease.  And plans on serial MRI imaging.      12/10/2019 Imaging    PET/CT IMPRESSION:  1. No convincing evidence of abnormal FDG activity as visualized to  suggest recurrent disease or lymphomatous involvement.     2. Redemonstration of diffuse osseous marrow activity throughout the  axial skeleton most consistent with marrow stimulation effect similar to  prior exam with redemonstration of compression fractures of the lower  thoracic spine better seen on the MRI performed 11/19/2019.     2/20/2020 Imaging    PET negative with resolution of prior marrow abnormality.     2/27/2020 Cancer Staged    Staging form: Hodgkin And Non-Hodgkin Lymphoma, AJCC 8th Edition  - Clinical: Stage IV - Signed by Brent Brizuela MD on 2/27/2020 8/21/2020 Imaging    -8/21/2020 CT chest abdomen pelvis showed no evidence of lymphoma.  Hemoglobin 16 with normal CBC and  normal.  CMP showed glucose 109 otherwise unremarkable.     3/4/2021 Imaging    CT chest, abdomen and pelvis IMPRESSION:  Stable and essentially normal CT of the chest, abdomen and  pelvis with no evidence of recurrent or metastatic lymphomatous disease.  , CBC and CMP also normal.          HISTORY OF PRESENT ILLNESS:  The patient is a 47 y.o. male, here for follow up on management of lymphoma.  No new somatic complaints    Past Medical History:   Diagnosis Date   • Acute kidney injury (HCC)    • Anaplastic ALK-positive large cell lymphoma (HCC)    • Bacteremia    • Hyperlipidemia    • Myocarditis (HCC)    • Pneumonia    • Severe sepsis (HCC)      Past Surgical History:   Procedure Laterality Date   • APPENDECTOMY     • BACK SURGERY      L5 S1   • BRONCHOSCOPY N/A 7/10/2019    Procedure: BRONCHOSCOPY WITH ENDOBRONCHIAL ULTRASOUND AND TRANSBRONCHIAL BIOPSY AND FLUORO;  Surgeon: Marvel Sandoval MD;  Location: [facility] ENDOSCOPY;  Service: Pulmonary   • CARDIAC CATHETERIZATION N/A 6/24/2019    Procedure: LEFT HEART CATH;  Surgeon: Deep Muñiz IV, MD;  Location:  [facility] CATH INVASIVE LOCATION;  " Service: Cardiovascular   • INTERVENTIONAL RADIOLOGY PROCEDURE N/A 8/26/2019    Procedure: THORACIC SPINE BIOSPY;  Surgeon: Juve Avila MD;  Location:  [facility] CATH INVASIVE LOCATION;  Service: Interventional Radiology   • MEDIASTINOSCOPY, BRONCHOSCOPY N/A 9/6/2019    Procedure: BRONCHOSCOPY, MEDIASTINOSCOPY WITH BIOPSY;  Surgeon: El Can MD;  Location:  [facility] OR;  Service: Cardiothoracic       Allergies   Allergen Reactions   • Ciprofloxacin Anxiety and Hallucinations     \"Extreme anxiety and paranoia\"   • Sulfa Antibiotics Rash       Family History and Social History reviewed and changed as necessary    REVIEW OF SYSTEM:   No new somatic complaints    PHYSICAL EXAM:  No palpable cervical axillary or inguinal adenopathy.    Vitals:    03/10/23 1322   BP: 144/82   Pulse: 70   Resp: 18   Temp: 97.8 °F (36.6 °C)   SpO2: 97%   Weight: 98 kg (216 lb)   Height: 182.9 cm (72\")     Vitals:    03/10/23 1322   PainSc: 0-No pain          ECOG score: 0           Vitals reviewed.    ECOG: (0) Fully Active - Able to Carry On All Pre-disease Performance Without Restriction    Lab Results   Component Value Date    HGB 15.4 03/10/2023    HCT 45.3 03/10/2023    MCV 86.8 03/10/2023     03/10/2023    WBC 6.50 03/10/2023    NEUTROABS 3.51 03/10/2023    LYMPHSABS 2.32 03/10/2023    MONOSABS 0.49 03/10/2023    EOSABS 0.14 03/10/2023    BASOSABS 0.03 03/10/2023       Lab Results   Component Value Date    GLUCOSE 84 09/09/2022    BUN 23 (H) 09/09/2022    CREATININE 1.05 09/09/2022     09/09/2022    K 4.3 09/09/2022     09/09/2022    CO2 28.0 09/09/2022    CALCIUM 9.7 09/09/2022    PROTEINTOT 7.8 09/09/2022    ALBUMIN 4.90 09/09/2022    BILITOT 0.4 09/09/2022    ALKPHOS 64 09/09/2022    AST 21 09/09/2022    ALT 20 09/09/2022             ASSESSMENT & PLAN:  1.  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  Leukocytosis  3.  FUO  4.  Decreased ejection fraction with evidence of myocarditis  5.  Marked elevation of sedimentation rate and C-reactive protein  6.  Elevated triglycerides on fasting  7.  Acute renal failure resolved with hydration    -8/23/2019 initial inpatient [facility] medical oncology consultation.  Asked to see regarding leukocytosis.  He had upper respiratory infection with pneumonia, chest pains, elevated troponins with diagnosis of myocarditis and decreased ejection fraction with fevers to 104.  Tentatively diagnosed with still's disease and treated with prednisone with some improvement in fevers as well as improvement in a rash.  By the end of July, he had severe mid back pain relatively acute and onset that progressed over time with imaging of the area showing progressive abnormality of both bone and marrow signals in the spine that could be malignant marrow involvement or, more likely per radiologist, infectious.  Sedimentation rate was over 100 with a C-reactive protein elevated and white count 20-30,000 for the better part of 2 months.  Slight left shift but no immature forms.  Broad-spectrum serologic testing has not shown pathogens as of the initial consultation.  I ordered bone marrow biopsy and Dr. Jimenez coordinated spine biopsy  -6/24/2019 CT chest abdomen pelvis without contrast showed no parenchymal lung disease but a 1.6 cm mildly enlarged centrally necrotic right paratracheal node with no other adenopathy or hepatosplenomegaly.  Ejection fraction 35% on echocardiogram.  -6/28/2019 MRI brain and cervical spine without contrast normal  -7/9/2019 fluoroscopy guided right joint aspiration unrevealing  -7/10/2019 fluoroscopic guided bronchoscopy showed no pathogens or  pathology  -8/9/2019 MRI thoracic and lumbar spine showed low T1 signals in T8 and T9 with increased T2 involvement concerning for aggressive marrow signal findings that could represent lipid poor spinal hemangioma but concerning for metastasis versus multiple myeloma.  Contrast MRI recommended.  -8/21/2019 MRI thoracic spine with and without contrast showed multiple foci of abnormal marrow signal within the thoracic spine was prominent T8 and T9 with interval increase compared to 8/9/2019 with deformity of the endplates at T8 and T9 but no definitive fracture or discitis.  Several smaller additional lesions suspected at T1, T2, T4, T5, and T12 and the differential would include multifocal vertebral osteomyelitis versus metastasis.  -8/23/2019 bone marrow biopsy showed mild polyclonal plasmacytosis with normocellular marrow and trilineage hematopoiesis with mild myeloid expansion.  No lymphoid infiltrate and no organisms for fungi, acid fast bacilli, tumor, or granuloma.  Bennett 2- negative.  -8/24/2019 ejection fraction up to 65%  -8/26/2019 T8 biopsy show focal area of markedly atypical appearance.  There are inflammatory cells with occasional eosinophils and larger cells.  Some focal hemosiderin.  There is equivocal positivity for CD4, , CD45, and Constance-Barr virus.  It is positive for CD30, ALK 1, EMA, and vimentin.  No staining for CD 5, 7, 3, 20, or 15.  No staining for cytokeratin, SOX 10, , or CD1a.  Outside referral to [facility] pending.  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  We will get PET scan.  Were this hemophagocytic syndrome I would have expected his marrow to be abnormal along with splenomegaly and cytopenias as would the marrow be abnormal for this a plasma cell dyscrasia and the plasma cells were polyclonal.  I will check his SIEP along with  triglycerides, peripheral blood flow cytometry for soluble CD 25, ferritin, and NK cell activity.  Once I have that information, hopefully we will have more information back from [facility] and unlikely to involve my colleagues at the [facility] in this process if this is an ALK positive anaplastic large cell lymphoma without kia involvement given the unusual nature and appearance of this.    -9/6/2019 subsequent oncology reconsultation for readmission: Met patient with his last admission.  He has had a 4-month history of fevers with upper respiratory infection and possible pneumonia.  Found to have myocarditis with low ejection fraction with extensive infectious disease work-up negative.  A month later readmitted with shortness of breath and arthralgias with some redness in his wrist.  Bronchoscopy was negative and he was treated with some steroids.  On his third admission with relatively recent acute onset back pain he was found to have some spine lesions.  The bone marrow biopsy was unrevealing,  a T8 biopsy by Dr. Wilde showed markedly atypical appearing cells in a background of eosinophils.  The larger cells were positive for ALK 1, CD30, EMA, and vimentin.  No staining for CD 5, 7, 3, 20, or 15.  No staining for cytokeratin, sox 10, , or CD1a.  Tissue has been sent for second opinion at [facility].  Anaplastic large cell lymphoma ALK positive was considered but given that his fevers which started months back included testing with CT chest abdomen pelvis 6/24/2019 only showing mildly enlarged centrally necrotic right lower paratracheal node of indeterminate significance and no other significant adenopathy or splenomegaly and no pulmonary infiltrate or mediastinal adenopathy on repeat CT chest 7/9/2019, PET scan was ordered.  Before PET could be completed and GI evaluation completed for evaluation of possible GI source for kia procurement, the patient on the day he was due for endoscopy  continue to have fevers with hypotension and a pulse in the 150s.  9/5/2019 CT chest abdomen and pelvis on admission now shows showed new numerous bilateral pulmonary lung nodules in the right lung measuring 2 cm without focal pneumonia.  There was extensive axillary, mediastinal adenopathy the largest in the right paratracheal area 2.1 cm with central necrosis.  There were hypoattenuating regions in the right hepatic lobe measuring up to 2.5 cm incompletely assessed due to lack of contrast due to his creatinine on admission 9/5/2019 being up to 1.67 down to 0.94 by the morning of 9/6/2019.  CT also showed new extensive axillary, mediastinal, and abdominopelvic lymphadenopathy.  There was redemonstration of the T8-T9 aggressive osseous lytic lesions involving thoracolumbar spine and multiple levels.  Nonspecific perinephric inflammation also seen possibly representing pyelonephritis.  Laboratory testing on admission 9/5/2019 showed C-reactive protein up to 37.66 with sedimentation rate greater than 130.  Procalcitonin over 38 with lactic acid  4.8.   normal with normal troponin.  Fasting triglyceride up to 269 and peripheral blood flow cytometry sent for NK cell activity as well as CD25 for IL-2 receptor for the possibility of hemophagocytic syndrome which would be unusual without hemophagocytosis bone marrow.  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  We will get echocardiogram and hold on the GI evaluation and I discussed in person with Dr. Benja Can to see for procurement of lymph node.  He is going to go after upper mediastinal lymph node.  Once we have more tissue then we will make further plans for treatment.  His ejection fraction on 8/24/2019 was 62%.  Assuming that we find lymphoma and not some aggressive infectious disease process, he will need a port  as well but we will wait on the port until we know what we are dealing with.  My partners are on board and will cover me this weekend and I will check back Monday to see where we stand.  He already has an appointment with Dr. Roosevelt Nguyen at  on the 11th who I was enlisting to help sort this out but we may be able to get our answers now that we have node to go after.  I may yet need his services depending on the ilk of this lymphoma and whether or not we would need to consolidate aggressively or treat more aggressively than with standard chemotherapy upfront.  I am starting him on allopurinol.  He already has a rash on his upper thighs and heels and around the area of his prior bone marrow biopsy but I suspect that just related to this probable lymphomatous process or perhaps the recent Bactrim started by Dr. Hauser.  His procalcitonin is quite high and I am still concerned about concomitant infection and I am not sure if the procalcitonin it can be falsely dramatically elevated in the face of lymphoma.  If this is anaplastic large cell lymphoma, with his ECOG performance status generally less than 2 and age less than 60 but with at least stage III disease, even if his LDH is high, he would at least have a risk score of 2 which would put him at the low intermediate risk on the IPI index.  Whether we would use Rituxan or not depends on if this turns out to be CD20 positive where his initial bone biopsy was not.  Given that it is CD30 positive, will probably use brentuximab, Cytoxan, Adriamycin, prednisone every 3 weeks x 6 cycles.  This usually is given without vincristine but with the Brentuximab 1.8 mg/kg IV day 1 of each cycle.  A progression free survival was seen both in ALK positive and negative anaplastic large cell lymphomas treated with brentuximab rather than vincristine.  Neuropathy still can be an issue and diarrhea is more common.  If this is anaplastic large cell lymphoma ALK positive, autologous  stem cell transplant may be considered in patients over 40 years of age with an IPI score greater than or equal to 3 but generally not if the IPI score is less than 3 so we will assess that very carefully.  Despite the elevated fasting triglyceride recently, I doubt this is hemophagocytic syndrome given the negative bone marrow and now with the blossoming lymphadenopathy.  But we shall see.    -9/30/2019 outpatient medical oncology follow-up visit:  He was breathing much better the first week after his chemotherapy but then the shortness of breath worsened again his pulse increased in the 120s to 140s.  Saw Dr. Dumont in my team here as well and received IV fluids.  Slowly that improved.  In the prior 5 days he has been feeling better and no tachycardia for the last week.  He has had no fever since discharge from the hospital.  He has 1 more week of antibiotics.  His current complaints are:    Occasional bloody mucus from his nose    Alternating diarrhea and constipation better with stool softeners    Low back pain worsening with Neupogen but only on oxycodone rarely and none in the last 48 hours.   Bilateral knee pain without swelling on allopurinol    Itching but prior rash resolved   Mild gait instability due to weakness worse with showering and due for follow-up with Dr. Jimenez for serial MRIs   Labile moods but without jason depression worse with steroids now down to 20 mg    I addressed all these issues with him today my plan is:   To get a PET prior to return for course #3 of chemotherapy with brentuximab, Cytoxan, Adriamycin, prednisone   Consult Dr. Carrion for possible consolidation transplant in the future   Add Xgeva for the lytic involvement of his bone   Taper the steroids down to 20 mg for 5 days 10 mg for 5 days 5 mg for 5 days and then off following the 100 mg of prednisone with this course of therapy and then hopefully no further steroids though he will follow with rheumatology  Follow-up with  "Dr. Jimenez for serial spine MRIs.    -10/21/2019 medical oncology office visit: 10/17/2019 PET scan was negative.  All prior CT abnormalities have resolved.  He is due for follow-up with Dr. Jimenez for spine MRI in about a month.  He has seen Dr. Carrion.  I do not have those notes but according to the patient there are no plans for stem cell transplant in first remission.  I have left a message with Dr. Carrion raising the question of whether spinal fluid analysis or MRI of brain would be in order but he feels great has no neurologic symptoms and his only complaint presently is mid back pain which clearly is improving over time.  Unless Dr. Carrion has other ideas, my plan would be to continue on with courses 3 and 4 of Cytoxan Hilario Brentuximab prednisone, repeat the PET, proceed with courses 5 and 6, repeat the PET again, and assuming that everything is negative at that junction then go to routine survivorship.    -12/20/2019 medical oncology office visit: 12/10/2019 PET scan shows just active marrow response with no active lymphoma or recurrence.  He will call Dr. Carrion for an appointment after course #6 12/23/2019 to discuss definitively whether there is any plans for consolidation but as far as I am aware there is no plan for stem cell transplant autologous at this junction.  We will get his PET 8 weeks out from this final dose of of brentuximab Cytoxan, Adriamycin, prednisone.    -2/27/2020 medical oncology follow-up visit:  Follow-up per Dr. Choudhary from [place] on these individuals is as follows:     \"schedule patient visits every three months during the first two years, then every six months starting two years after complete response. Starting at year 5, patients are seen once per year. At these visits we perform a history and physical examination, complete blood count, chemistries, and lactate dehydrogenase. We obtain a CT scan of the chest, abdomen, and pelvis every six months for the " "first 18 months after documentation of an initial complete response. We only obtain additional positron emission tomography/computed tomography (PET/CT) scans to further evaluate any concerning findings noted on CT scans. We do not obtain routine CT scans after 18 months unless there are signs, symptoms, or exam findings to suggest relapse.\"     -8/24/2020 [facility] onc[facility] follow-up visit: I reviewed reports and images of 8/21/2020 CT chest abdomen pelvis which showed no evidence of lymphoma.  Hemoglobin 16 with normal CBC and  normal.  CMP showed glucose 109 otherwise unremarkable.  Pursuant to the above guidelines he will see my nurse practitioner back in 3 months with CBC, CMP, and LDH and will get CT scanning 1 more time 6 months from now pursuant to the above guidelines and after that we will just follow-up clinically with physical exams as outlined above along with labs as outlined above    -9/14/2021 [facility] [place] Oncology clinic follow-up: He continues to do well with no evidence of disease on clinical exam and no new worrisome symptoms.  CBC from today is normal, CMP and LDH pending.  He remains physically fit and continues to follow a healthy diet and exercise regimen.  We will continue surveillance per NCCN guidelines with H&P every 6 months after 5 years with blood work, no plans on routine imaging going forward in the absence of new symptoms.  We will repeat CBC, CMP and LDH on return I have ordered those today.    -3/15/2022 [facility] [place] medical oncology APRN follow-up: Normal CBC, CMP and LDH. continues to do well with no evidence of disease on clinical exam and no new worrisome symptoms.  His labs from today are normal as shown above.  He remains active and physically fit.  We will continue surveillance per NCCN guidelines with H&P and blood work every 6 months out to 5 years and I have ordered those today.  No plans on routine imaging going forward in the absence of new symptoms.    -9/9/2023 [facility] [place] " medical oncology APRN follow-up: he continues to have no evidence of disease on clinical exam and no new worrisome symptoms.  CBC from today is normal, CMP and LDH are pending.  He is now approaching 3 years out from completion of treatment.  We will continue surveillance per NCCN guidelines with H&P and blood work every 6 months out to 5 years.  No plans on routine imaging in the absence of new symptoms.    -3/10/2023 [facility] medical oncology follow-up: CBC and CMP unremarkable.  No signs or symptoms clinically of recurrence.  We will continue H&P and blood work every 6 months out through December 2024.  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  He will follow-up there but I will leave his appointment as is for now until that happens just in case.    Total time of care today 15 minutes    Brent Brizuela MD    03/10/2023

## 2024-12-04 NOTE — TELEPHONE ENCOUNTER
----- Message from Tiera Melton RN sent at 9/18/2019 11:30 AM EDT -----      Critical Test Results      MD: Gelacio    Date: 9-     Critical test result: WBC 1.4 ANC .20    Time results received: 11:30         Pt has attached results of hearing test, FYI for provider

## (undated) DEVICE — BOWL UTIL STRL 32OZ

## (undated) DEVICE — SUCTION CANISTER, 2500CC, RIGID: Brand: DEROYAL

## (undated) DEVICE — APPL CHLORAPREP W/TINT 26ML BLU

## (undated) DEVICE — ADAPT SWVL FIBROPTIC BRONCH

## (undated) DEVICE — HDRST POSTIN FM CRDL TRACH SLOT 9X8X4IN

## (undated) DEVICE — FRCP BX RADJAW4 PULM WO NDL STD1.8X2 100

## (undated) DEVICE — SINGLE USE BIOPSY VALVE MAJ-210: Brand: SINGLE USE BIOPSY VALVE (STERILE)

## (undated) DEVICE — LUER-LOK 360°: Brand: CONNECTA, LUER-LOK

## (undated) DEVICE — TRAP,MUCUS SPECIMEN,40CC: Brand: MEDLINE

## (undated) DEVICE — DRAPE,TOP,102X53,STERILE: Brand: MEDLINE

## (undated) DEVICE — CVR HNDL LT SURG ACCSSRY BLU STRL

## (undated) DEVICE — PK CATH CARD 10

## (undated) DEVICE — Device: Brand: SINGLE USE ASPIRATION NEEDLE NA-U401SX

## (undated) DEVICE — SHEET, DRAPE, SPLIT, STERILE: Brand: MEDLINE

## (undated) DEVICE — MODEL AT P65, P/N 701554-001KIT CONTENTS: HAND CONTROLLER, 3-WAY HIGH-PRESSURE STOPCOCK WITH ROTATING END AND PREMIUM HIGH-PRESSURE TUBING: Brand: ANGIOTOUCH® KIT

## (undated) DEVICE — Device

## (undated) DEVICE — GOWN,NON-REINFORCED,SIRUS,SET IN SLV,XL: Brand: MEDLINE

## (undated) DEVICE — ST NDL BRONCH ASP VIZISHOT 2 FLX 19GA

## (undated) DEVICE — DEV COMP RAD PRELUDESYNC 24CM

## (undated) DEVICE — ACCESS CANNULA: Brand: IVAS

## (undated) DEVICE — DRSNG TELFA ILND ADH 4X6IN

## (undated) DEVICE — SINGLE USE SUCTION VALVE MAJ-209: Brand: SINGLE USE SUCTION VALVE (STERILE)

## (undated) DEVICE — CATH DIAG EXPO .056 FL3.5 6F 100CM

## (undated) DEVICE — GLIDESHEATH BASIC HYDROPHILIC COATED INTRODUCER SHEATH: Brand: GLIDESHEATH

## (undated) DEVICE — PAD GRND REM POLYHESIVE A/ DISP

## (undated) DEVICE — DISH PETRI 3.5IN MD STRL LF

## (undated) DEVICE — SKIN AFFIX SURG ADHESIVE 72/CS 0.55ML: Brand: MEDLINE

## (undated) DEVICE — PK MINOR SPLT 10

## (undated) DEVICE — LEX NEURO ANGIOGRAPHY: Brand: MEDLINE INDUSTRIES, INC.

## (undated) DEVICE — SUT SILK 4/0 TIES 18IN A183H

## (undated) DEVICE — SUT MNCRYL 3/0 SH 27 IN Y416H

## (undated) DEVICE — ST EXT MICROBORE FIX M LL 38IN

## (undated) DEVICE — CATH DIAG EXPO M/ PK 6FR FL4/FR4 PIG 3PK

## (undated) DEVICE — NDL HYPO ECLPS SFTY 18G 1 1/2IN

## (undated) DEVICE — 3M™ IOBAN™ 2 ANTIMICROBIAL INCISE DRAPE 6650EZ: Brand: IOBAN™ 2

## (undated) DEVICE — MODEL BT2000 P/N 700287-012KIT CONTENTS: MANIFOLD WITH SALINE AND CONTRAST PORTS, SALINE TUBING WITH SPIKE AND HAND SYRINGE, TRANSDUCER: Brand: BT2000 AUTOMATED MANIFOLD KIT

## (undated) DEVICE — SUT SILK 3/0 TIES 18IN A184H

## (undated) DEVICE — 3M™ TEGADERM™ IV TRANSPARENT FILM DRESSING WITH BORDER 100 BAGS/CARTON 4 CARTONS/CASE 1633: Brand: 3M™ TEGADERM™

## (undated) DEVICE — DRSNG WND BORDR/ADHS NONADHR/GZ LF 2X2IN STRL

## (undated) DEVICE — SYR LL TP 10ML STRL

## (undated) DEVICE — PAD ARMBRD SURG CONVOL 7.5X20X2IN

## (undated) DEVICE — BONE BIOPSY KIT: Brand: IVAS

## (undated) DEVICE — GLV SURG PREMIERPRO MIC LTX PF SZ7 BRN

## (undated) DEVICE — Device: Brand: BALLOON

## (undated) DEVICE — ANTIBACTERIAL UNDYED BRAIDED (POLYGLACTIN 910), SYNTHETIC ABSORBABLE SUTURE: Brand: COATED VICRYL